# Patient Record
Sex: FEMALE | Race: WHITE | NOT HISPANIC OR LATINO | Employment: OTHER | URBAN - METROPOLITAN AREA
[De-identification: names, ages, dates, MRNs, and addresses within clinical notes are randomized per-mention and may not be internally consistent; named-entity substitution may affect disease eponyms.]

---

## 2017-01-30 ENCOUNTER — OFFICE VISIT (OUTPATIENT)
Dept: FAMILY MEDICINE | Facility: CLINIC | Age: 77
End: 2017-01-30
Payer: MEDICARE

## 2017-01-30 VITALS
HEART RATE: 67 BPM | RESPIRATION RATE: 17 BRPM | OXYGEN SATURATION: 95 % | DIASTOLIC BLOOD PRESSURE: 76 MMHG | BODY MASS INDEX: 40.39 KG/M2 | WEIGHT: 251.31 LBS | HEIGHT: 66 IN | TEMPERATURE: 98 F | SYSTOLIC BLOOD PRESSURE: 126 MMHG

## 2017-01-30 DIAGNOSIS — M54.12 CERVICAL RADICULAR PAIN: ICD-10-CM

## 2017-01-30 DIAGNOSIS — M94.0 COSTOCHONDRITIS: Primary | ICD-10-CM

## 2017-01-30 DIAGNOSIS — K21.9 GASTROESOPHAGEAL REFLUX DISEASE WITHOUT ESOPHAGITIS: ICD-10-CM

## 2017-01-30 DIAGNOSIS — I10 ESSENTIAL HYPERTENSION: ICD-10-CM

## 2017-01-30 PROCEDURE — 1157F ADVNC CARE PLAN IN RCRD: CPT | Mod: S$GLB,,, | Performed by: NURSE PRACTITIONER

## 2017-01-30 PROCEDURE — 96372 THER/PROPH/DIAG INJ SC/IM: CPT | Mod: S$GLB,,, | Performed by: NURSE PRACTITIONER

## 2017-01-30 PROCEDURE — 1160F RVW MEDS BY RX/DR IN RCRD: CPT | Mod: S$GLB,,, | Performed by: NURSE PRACTITIONER

## 2017-01-30 PROCEDURE — 99214 OFFICE O/P EST MOD 30 MIN: CPT | Mod: 25,S$GLB,, | Performed by: NURSE PRACTITIONER

## 2017-01-30 PROCEDURE — 99999 PR PBB SHADOW E&M-EST. PATIENT-LVL III: CPT | Mod: PBBFAC,,, | Performed by: NURSE PRACTITIONER

## 2017-01-30 PROCEDURE — 99499 UNLISTED E&M SERVICE: CPT | Mod: S$GLB,,, | Performed by: NURSE PRACTITIONER

## 2017-01-30 PROCEDURE — 1125F AMNT PAIN NOTED PAIN PRSNT: CPT | Mod: S$GLB,,, | Performed by: NURSE PRACTITIONER

## 2017-01-30 PROCEDURE — 3074F SYST BP LT 130 MM HG: CPT | Mod: S$GLB,,, | Performed by: NURSE PRACTITIONER

## 2017-01-30 PROCEDURE — 3078F DIAST BP <80 MM HG: CPT | Mod: S$GLB,,, | Performed by: NURSE PRACTITIONER

## 2017-01-30 PROCEDURE — 1159F MED LIST DOCD IN RCRD: CPT | Mod: S$GLB,,, | Performed by: NURSE PRACTITIONER

## 2017-01-30 RX ORDER — BACLOFEN 10 MG/1
TABLET ORAL
COMMUNITY
Start: 2017-01-10 | End: 2017-01-30

## 2017-01-30 RX ORDER — TIZANIDINE 2 MG/1
2-4 TABLET ORAL EVERY 8 HOURS PRN
Qty: 60 TABLET | Refills: 0 | Status: SHIPPED | OUTPATIENT
Start: 2017-01-30 | End: 2017-01-30 | Stop reason: SDUPTHER

## 2017-01-30 RX ORDER — KETOROLAC TROMETHAMINE 30 MG/ML
30 INJECTION, SOLUTION INTRAMUSCULAR; INTRAVENOUS
Status: COMPLETED | OUTPATIENT
Start: 2017-01-30 | End: 2017-01-30

## 2017-01-30 RX ORDER — DEXAMETHASONE SODIUM PHOSPHATE 4 MG/ML
8 INJECTION, SOLUTION INTRA-ARTICULAR; INTRALESIONAL; INTRAMUSCULAR; INTRAVENOUS; SOFT TISSUE
Status: COMPLETED | OUTPATIENT
Start: 2017-01-30 | End: 2017-01-30

## 2017-01-30 RX ORDER — HYDROCODONE BITARTRATE AND ACETAMINOPHEN 5; 325 MG/1; MG/1
1 TABLET ORAL EVERY 12 HOURS PRN
Qty: 30 TABLET | Refills: 0 | Status: SHIPPED | OUTPATIENT
Start: 2017-01-30 | End: 2017-01-30 | Stop reason: SDUPTHER

## 2017-01-30 RX ORDER — DULOXETIN HYDROCHLORIDE 30 MG/1
CAPSULE, DELAYED RELEASE ORAL
COMMUNITY
Start: 2016-12-08 | End: 2017-02-22

## 2017-01-30 RX ORDER — CARBAMAZEPINE 200 MG/1
TABLET ORAL
COMMUNITY
Start: 2016-12-08 | End: 2017-02-22

## 2017-01-30 RX ORDER — HYDROCODONE BITARTRATE AND ACETAMINOPHEN 5; 325 MG/1; MG/1
1 TABLET ORAL EVERY 12 HOURS PRN
Qty: 30 TABLET | Refills: 0 | Status: SHIPPED | OUTPATIENT
Start: 2017-01-30 | End: 2017-04-12 | Stop reason: SDUPTHER

## 2017-01-30 RX ORDER — TIZANIDINE 2 MG/1
2-4 TABLET ORAL EVERY 8 HOURS PRN
Qty: 60 TABLET | Refills: 0 | Status: SHIPPED | OUTPATIENT
Start: 2017-01-30 | End: 2017-02-09

## 2017-01-30 RX ORDER — METHYLPREDNISOLONE 4 MG/1
TABLET ORAL
Qty: 1 PACKAGE | Refills: 0 | Status: SHIPPED | OUTPATIENT
Start: 2017-01-30 | End: 2017-01-30 | Stop reason: SDUPTHER

## 2017-01-30 RX ORDER — METHYLPREDNISOLONE 4 MG/1
TABLET ORAL
Qty: 1 PACKAGE | Refills: 0 | Status: SHIPPED | OUTPATIENT
Start: 2017-01-30 | End: 2017-02-20

## 2017-01-30 RX ADMIN — KETOROLAC TROMETHAMINE 30 MG: 30 INJECTION, SOLUTION INTRAMUSCULAR; INTRAVENOUS at 03:01

## 2017-01-30 RX ADMIN — DEXAMETHASONE SODIUM PHOSPHATE 8 MG: 4 INJECTION, SOLUTION INTRA-ARTICULAR; INTRALESIONAL; INTRAMUSCULAR; INTRAVENOUS; SOFT TISSUE at 03:01

## 2017-01-30 NOTE — PROGRESS NOTES
Subjective:       Patient ID: Alaina Vee is a 76 y.o. female.    Chief Complaint: Breast Pain    HPI ms Clements is here for R breast and arm pain, she also has L sided neck pain, it has been for months, denies any precipitating factor, she had an MRI and mammogram done last month.  She has also seen Dr Rice and given Cymbalta and Tegretol.    Review of Systems   Constitutional: Negative for fever.   Respiratory: Negative.    Cardiovascular: Negative.    Musculoskeletal: Positive for arthralgias.       Objective:      Physical Exam   Constitutional: She is oriented to person, place, and time. She appears well-developed and well-nourished. No distress.   Cardiovascular: Normal rate, regular rhythm and normal heart sounds.  Exam reveals no friction rub.    No murmur heard.  Pulmonary/Chest: Effort normal and breath sounds normal. No respiratory distress. She has no wheezes. She has no rales.   Musculoskeletal: She exhibits no edema.        Cervical back: She exhibits normal range of motion and no tenderness.   BUE strength 5/5  B hand  equal and strong   Neurological: She is alert and oriented to person, place, and time.   Reflex Scores:       Brachioradialis reflexes are 2+ on the right side and 2+ on the left side.  Negative hoffmans   Skin: Skin is warm and dry. No erythema.   Psychiatric: She has a normal mood and affect. Her behavior is normal.   Vitals reviewed.      Assessment:       1. Costochondritis    2. Gastroesophageal reflux disease without esophagitis    3. Essential hypertension    4. Cervical radicular pain        Plan:       Costochondritis  -     Discontinue: hydrocodone-acetaminophen 5-325mg (NORCO) 5-325 mg per tablet; Take 1 tablet by mouth every 12 (twelve) hours as needed for Pain.  Dispense: 30 tablet; Refill: 0  -     Discontinue: tizanidine (ZANAFLEX) 2 MG tablet; Take 1-2 tablets (2-4 mg total) by mouth every 8 (eight) hours as needed.  Dispense: 60 tablet; Refill: 0  -      ketorolac injection 30 mg; Inject 30 mg into the muscle one time.  -     hydrocodone-acetaminophen 5-325mg (NORCO) 5-325 mg per tablet; Take 1 tablet by mouth every 12 (twelve) hours as needed for Pain.  Dispense: 30 tablet; Refill: 0  -     tizanidine (ZANAFLEX) 2 MG tablet; Take 1-2 tablets (2-4 mg total) by mouth every 8 (eight) hours as needed.  Dispense: 60 tablet; Refill: 0    Gastroesophageal reflux disease without esophagitis  Controlled, continue PPI    Essential hypertension  Controlled, continue PPI    Cervical radicular pain  -     hydrocodone-acetaminophen 5-325mg (NORCO) 5-325 mg per tablet; Take 1 tablet by mouth every 12 (twelve) hours as needed for Pain.  Dispense: 30 tablet; Refill: 0  -     methylPREDNISolone (MEDROL DOSEPACK) 4 mg tablet; use as directed  Dispense: 1 Package; Refill: 0  -     tizanidine (ZANAFLEX) 2 MG tablet; Take 1-2 tablets (2-4 mg total) by mouth every 8 (eight) hours as needed.  Dispense: 60 tablet; Refill: 0    MRI shows multilevel DJD, her pain is most likely radicular, will trial steroids, consider OT and referral if not improved  Mammogram shows fibrocystic breasts, I have reassured her this is a benign finding, and to avoid caffeine    ER for new worse or concerning symptoms    Verbalized understanding.

## 2017-01-30 NOTE — MR AVS SNAPSHOT
[facility]  [address]  [place] [address]  Phone: [phone]                  Alaina Vee   2017 2:20 PM   Office Visit    Description:  Female : 1940   Provider:  Audelia Kwon NP-C   Department:  [facility]           Reason for Visit     Breast Pain           Diagnoses this Visit        Comments    Costochondritis    -  Primary     Gastroesophageal reflux disease without esophagitis         Essential hypertension         Cervical radicular pain                To Do List           Future Appointments        Provider Department Dept Phone    2017 9:45 AM [facility] XR3 [facility] [phone]    2017 10:40 AM Yvonne Weaver MD [facility] - Urology [phone]      Goals (5 Years of Data)     None       These Medications        Disp Refills Start End    hydrocodone-acetaminophen 5-325mg (NORCO) 5-325 mg per tablet 30 tablet 0 2017     Take 1 tablet by mouth every 12 (twelve) hours as needed for Pain. - Oral    Pharmacy: [facility] - [place] [id number] Ph #: [phone]       tizanidine (ZANAFLEX) 2 MG tablet 60 tablet 0 2017    Take 1-2 tablets (2-4 mg total) by mouth every 8 (eight) hours as needed. - Oral    Pharmacy: [facility] - [facility] [address] [facility] Ph #: [phone]       methylPREDNISolone (MEDROL DOSEPACK) 4 mg tablet 1 Package 0 2017    use as directed    Pharmacy: [facility] - [place] [id number] Ph #: [phone]         [facility]     [facility] -  Assistance  Registered nurses in the [facility] provide clinical advisement, health education, appointment booking, and other advisory services.  Call for this free service at [phone].             Medications           Message regarding Medications     Verify the  changes and/or additions to your medication regime listed below are the same as discussed with your clinician today.  If any of these changes or additions are incorrect, please notify your healthcare provider.        START taking these NEW medications        Refills    tizanidine (ZANAFLEX) 2 MG tablet 0    Sig: Take 1-2 tablets (2-4 mg total) by mouth every 8 (eight) hours as needed.    Class: Normal    Route: Oral    methylPREDNISolone (MEDROL DOSEPACK) 4 mg tablet 0    Sig: use as directed    Class: Normal      These medications were administered today        Dose Freq    dexamethasone injection 8 mg 8 mg Clinic/HOD 1 time    Sig: Inject 2 mLs (8 mg total) into the muscle one time.    Class: Normal    Route: Intramuscular    ketorolac injection 30 mg 30 mg Clinic/HOD 1 time    Sig: Inject 30 mg into the muscle one time.    Class: Normal    Route: Intramuscular      STOP taking these medications     baclofen (LIORESAL) 10 MG tablet            Verify that the below list of medications is an accurate representation of the medications you are currently taking.  If none reported, the list may be blank. If incorrect, please contact your healthcare provider. Carry this list with you in case of emergency.           Current Medications     clotrimazole-betamethasone 1-0.05% (LOTRISONE) cream Apply topically 2 (two) times daily.    diclofenac sodium 1 % Gel Apply 2 g topically once daily.    duloxetine (CYMBALTA) 30 MG capsule     EPITOL 200 mg tablet     hydrocodone-acetaminophen 5-325mg (NORCO) 5-325 mg per tablet Take 1 tablet by mouth every 12 (twelve) hours as needed for Pain.    ibuprofen (ADVIL,MOTRIN) 600 MG tablet TAKE 1 TABLET EVERY 8 HOURS AS NEEDED FOR PAIN     lisinopril (PRINIVIL,ZESTRIL) 40 MG tablet TAKE 1 TABLET ONE TIME DAILY    lovastatin (MEVACOR) 40 MG tablet TAKE 1 TABLET EVERY EVENING    multivitamin (THERAGRAN) per tablet Take 1 tablet by mouth Daily. 1 Tablet Oral Every day    omeprazole (PRILOSEC)  "20 MG capsule TAKE 1 CAPSULE ONE TIME DAILY    oxybutynin (DITROPAN XL) 15 MG TR24 Take 1 tablet (15 mg total) by mouth once daily.    methylPREDNISolone (MEDROL DOSEPACK) 4 mg tablet use as directed    tizanidine (ZANAFLEX) 2 MG tablet Take 1-2 tablets (2-4 mg total) by mouth every 8 (eight) hours as needed.           Clinical Reference Information           Vital Signs - Last Recorded  Most recent update: 1/30/2017  2:40 PM by Genet Garcia MA    BP Pulse Temp Resp Ht Wt    126/76 (BP Location: Right arm, Patient Position: Sitting, BP Method: Manual) 67 97.7 °F (36.5 °C) (Oral) 17 5' 6" (1.676 m) 114 kg (251 lb 5.2 oz)    SpO2 BMI             95% 40.56 kg/m2         Blood Pressure          Most Recent Value    BP  126/76      Allergies as of 1/30/2017     No Known Allergies      Immunizations Administered on Date of Encounter - 1/30/2017     None      [facility] Sign-Up     Activating your [facility] account is as easy as 1-2-3!     1) Visit [email or web address], select Sign Up Now, enter this activation code and your date of birth, then select Next.  [email or web address]  Expires: 3/16/2017  3:05 PM      2) Create a username and password to use when you visit [facility] in the future and select a security question in case you lose your password and select Next.    3) Enter your e-mail address and click Sign Up!    Additional Information  If you have questions, please e-mail [email or web address] or call [phone] to talk to our [facility] staff. Remember, [facility] is NOT to be used for urgent needs. For medical emergencies, dial 911.         Instructions    Follow up if not improved  Go to the ER for new worse or concerning symptoms  Take Cymbalta every day  Begin medrol dose pack on Tuesday, take until it's gone  Take Ibuprofen if needed, if no relief with ibuprofen, take hydrocodone  Take tizanidine if needed    What Are Fibrocystic Breasts?  Do your breasts ever feel lumpy, sore, or tender? If so, you may have " fibrocystic breasts. This is a very common condition. It is not a disease, and it is not cancer. Your healthcare provider can rule out problems and help you ease your symptoms.    Normal breasts  Your breasts are made up of 3 kinds of tissue:  · Glandular tissue is made up of the milk ducts and glands.  · Fibrous tissue supports the breast.  · Fatty tissue fills the spaces between the other tissues. It gives the breast its size.  Fibrocystic breasts  Hormones are chemicals that control your menstrual cycle. Hormones can also make glandular tissue swell. This stretches fibrous tissue, making your breasts feel sore. Hormones may also cause one or more fluid-filled lumps to form. These lumps are called cysts. They may be large or small. Cysts are not cancerous. This means they are benign. Just before your period, cysts may become larger. Your breasts may feel more sore.  What you may feel  Changes in hormone levels during your menstrual cycle affect your breasts. If you have fibrocystic breasts, your breasts may become sore or even painful. This can often happen before your period. Your breasts may also swell or feel lumpier at this time.  Caring for your breasts  Breast self-awareness is the key to caring for your breasts. Self-awareness means knowing how your breasts normally look and feel. This helps you notice any changes right away. Call your provider if you notice new lumps or changes that feel different than normal. See your provider for regular exams. And have a mammogram as often as your provider suggests.   © 5128-0648 [employer]. [address] [place] [address], [place] [address]. All rights reserved. This information is not intended as a substitute for professional medical care. Always follow your healthcare professional's instructions.        Understanding Cervical Radiculopathy    Cervical radiculopathy is irritation or inflammation of a nerve root in the neck. It causes neck pain and other symptoms  that may spread into the chest or down the arm. To understand this condition, it helps to understand the parts of the spine:  · Vertebrae. These are bones that stack to form the spine. The cervical spine contains the 7 vertebrae in the neck.  · Disks. These are soft pads of tissue between the vertebrae. They act as shock absorbers for the spine.  · The spinal canal. This is a tunnel formed within the stacked vertebrae. The spinal cord runs through this canal.  · Nerves. These branch off the spinal cord. As they leave the spinal canal, nerves pass through openings between the vertebrae. The nerve root is the part of the nerve that is closest to the spinal cord.   With cervical radiculopathy, nerve roots in the neck become irritated. This leads to pain and symptoms that can travel to the nerves that go from the spinal cord down the arms and into the torso.  What causes cervical radiculopathy?  Aging, injury, poor posture, and other issues can lead to problems in the neck. These problems may then irritate nerve roots. These include:  · Damage to a disk in the cervical spine. The damaged disk may then press on nearby nerve roots.  · Degeneration from wear and tear, and aging. This can lead to narrowing (stenosis) of the openings between the vertebrae. The narrowed openings press on nerve roots as they leave the spinal canal.  · An unstable spine. This is when a vertebra slips forward. It can then press on a nerve root.  There are other, less common causes of pressure on nerves in the neck. These include infection, cysts, and tumors.  Symptoms of cervical radiculopathy  These include:  · Neck pain  · Pain, numbness, tingling, or weakness that travels down the arm  · Loss of neck movement  · Muscle spasms  Treatment for cervical radiculopathy  In most cases, your healthcare provider will first try treatments that help relieve symptoms. These may include:  · Prescription or over-the-counter pain medicines. These help  relieve pain and swelling.  · Cold packs. These help reduce pain.  · Resting. This involves avoiding positions and activities that increase pain.  · Neck brace (cervical collar). This can help relieve inflammation and pain.  · Physical therapy, including exercises and stretches. This can help decrease pain and increase movement and function.  · Shots of medicinesaround the nerve roots. This is done to help relieve symptoms for a time.  In some cases, your healthcare provider may advise surgery to fix the underlying problem. This depends on the cause, the symptoms, and how long the pain has lasted.  Possible complications  Over time, an irritated and inflamed nerve may become damaged. This may lead to long-lasting (permanent) numbness or weakness. If symptoms change suddenly or get worse, be sure to let your healthcare provider know.     When to call your healthcare provider  Call your healthcare provider right away if you have any of these:  · New pain or pain that gets worse  · New or increasing weakness, numbness, or tingling in your arm or hand  · Bowel or bladder changes   © 3986-8060 [employer]. [address] [place] [address], [place] [address]. All rights reserved. This information is not intended as a substitute for professional medical care. Always follow your healthcare professional's instructions.

## 2017-01-30 NOTE — PATIENT INSTRUCTIONS
Follow up if not improved  Go to the ER for new worse or concerning symptoms  Take Cymbalta every day  Begin medrol dose pack on Tuesday, take until it's gone  Take Ibuprofen if needed, if no relief with ibuprofen, take hydrocodone  Take tizanidine if needed    What Are Fibrocystic Breasts?  Do your breasts ever feel lumpy, sore, or tender? If so, you may have fibrocystic breasts. This is a very common condition. It is not a disease, and it is not cancer. Your healthcare provider can rule out problems and help you ease your symptoms.    Normal breasts  Your breasts are made up of 3 kinds of tissue:  · Glandular tissue is made up of the milk ducts and glands.  · Fibrous tissue supports the breast.  · Fatty tissue fills the spaces between the other tissues. It gives the breast its size.  Fibrocystic breasts  Hormones are chemicals that control your menstrual cycle. Hormones can also make glandular tissue swell. This stretches fibrous tissue, making your breasts feel sore. Hormones may also cause one or more fluid-filled lumps to form. These lumps are called cysts. They may be large or small. Cysts are not cancerous. This means they are benign. Just before your period, cysts may become larger. Your breasts may feel more sore.  What you may feel  Changes in hormone levels during your menstrual cycle affect your breasts. If you have fibrocystic breasts, your breasts may become sore or even painful. This can often happen before your period. Your breasts may also swell or feel lumpier at this time.  Caring for your breasts  Breast self-awareness is the key to caring for your breasts. Self-awareness means knowing how your breasts normally look and feel. This helps you notice any changes right away. Call your provider if you notice new lumps or changes that feel different than normal. See your provider for regular exams. And have a mammogram as often as your provider suggests.   © 3131-5504 [employer]. [address]  [place] [address]. All rights reserved. This information is not intended as a substitute for professional medical care. Always follow your healthcare professional's instructions.        Understanding Cervical Radiculopathy    Cervical radiculopathy is irritation or inflammation of a nerve root in the neck. It causes neck pain and other symptoms that may spread into the chest or down the arm. To understand this condition, it helps to understand the parts of the spine:  · Vertebrae. These are bones that stack to form the spine. The cervical spine contains the 7 vertebrae in the neck.  · Disks. These are soft pads of tissue between the vertebrae. They act as shock absorbers for the spine.  · The spinal canal. This is a tunnel formed within the stacked vertebrae. The spinal cord runs through this canal.  · Nerves. These branch off the spinal cord. As they leave the spinal canal, nerves pass through openings between the vertebrae. The nerve root is the part of the nerve that is closest to the spinal cord.   With cervical radiculopathy, nerve roots in the neck become irritated. This leads to pain and symptoms that can travel to the nerves that go from the spinal cord down the arms and into the torso.  What causes cervical radiculopathy?  Aging, injury, poor posture, and other issues can lead to problems in the neck. These problems may then irritate nerve roots. These include:  · Damage to a disk in the cervical spine. The damaged disk may then press on nearby nerve roots.  · Degeneration from wear and tear, and aging. This can lead to narrowing (stenosis) of the openings between the vertebrae. The narrowed openings press on nerve roots as they leave the spinal canal.  · An unstable spine. This is when a vertebra slips forward. It can then press on a nerve root.  There are other, less common causes of pressure on nerves in the neck. These include infection, cysts, and tumors.  Symptoms of cervical  radiculopathy  These include:  · Neck pain  · Pain, numbness, tingling, or weakness that travels down the arm  · Loss of neck movement  · Muscle spasms  Treatment for cervical radiculopathy  In most cases, your healthcare provider will first try treatments that help relieve symptoms. These may include:  · Prescription or over-the-counter pain medicines. These help relieve pain and swelling.  · Cold packs. These help reduce pain.  · Resting. This involves avoiding positions and activities that increase pain.  · Neck brace (cervical collar). This can help relieve inflammation and pain.  · Physical therapy, including exercises and stretches. This can help decrease pain and increase movement and function.  · Shots of medicinesaround the nerve roots. This is done to help relieve symptoms for a time.  In some cases, your healthcare provider may advise surgery to fix the underlying problem. This depends on the cause, the symptoms, and how long the pain has lasted.  Possible complications  Over time, an irritated and inflamed nerve may become damaged. This may lead to long-lasting (permanent) numbness or weakness. If symptoms change suddenly or get worse, be sure to let your healthcare provider know.     When to call your healthcare provider  Call your healthcare provider right away if you have any of these:  · New pain or pain that gets worse  · New or increasing weakness, numbness, or tingling in your arm or hand  · Bowel or bladder changes   © 8088-9249 [employer]. [address] [place] [address], [place] [address]. All rights reserved. This information is not intended as a substitute for professional medical care. Always follow your healthcare professional's instructions.

## 2017-01-31 ENCOUNTER — OFFICE VISIT (OUTPATIENT)
Dept: UROLOGY | Facility: CLINIC | Age: 77
End: 2017-01-31
Payer: MEDICARE

## 2017-01-31 ENCOUNTER — HOSPITAL ENCOUNTER (OUTPATIENT)
Dept: RADIOLOGY | Facility: HOSPITAL | Age: 77
Discharge: HOME | End: 2017-01-31
Attending: UROLOGY
Payer: MEDICARE

## 2017-01-31 VITALS
WEIGHT: 251 LBS | DIASTOLIC BLOOD PRESSURE: 70 MMHG | HEIGHT: 66 IN | SYSTOLIC BLOOD PRESSURE: 114 MMHG | BODY MASS INDEX: 40.34 KG/M2

## 2017-01-31 DIAGNOSIS — N20.0 NEPHROLITHIASIS: ICD-10-CM

## 2017-01-31 DIAGNOSIS — N39.46 MIXED INCONTINENCE URGE AND STRESS: ICD-10-CM

## 2017-01-31 DIAGNOSIS — N39.0 RECURRENT UTI: Primary | ICD-10-CM

## 2017-01-31 PROCEDURE — 74000 XR KUB: CPT | Mod: 26,,, | Performed by: RADIOLOGY

## 2017-01-31 PROCEDURE — 3078F DIAST BP <80 MM HG: CPT | Mod: S$GLB,,, | Performed by: UROLOGY

## 2017-01-31 PROCEDURE — 1159F MED LIST DOCD IN RCRD: CPT | Mod: S$GLB,,, | Performed by: UROLOGY

## 2017-01-31 PROCEDURE — 1126F AMNT PAIN NOTED NONE PRSNT: CPT | Mod: S$GLB,,, | Performed by: UROLOGY

## 2017-01-31 PROCEDURE — 99214 OFFICE O/P EST MOD 30 MIN: CPT | Mod: 25,S$GLB,, | Performed by: UROLOGY

## 2017-01-31 PROCEDURE — 99999 PR PBB SHADOW E&M-EST. PATIENT-LVL III: CPT | Mod: PBBFAC,,, | Performed by: UROLOGY

## 2017-01-31 PROCEDURE — 1160F RVW MEDS BY RX/DR IN RCRD: CPT | Mod: S$GLB,,, | Performed by: UROLOGY

## 2017-01-31 PROCEDURE — 3074F SYST BP LT 130 MM HG: CPT | Mod: S$GLB,,, | Performed by: UROLOGY

## 2017-01-31 PROCEDURE — 81002 URINALYSIS NONAUTO W/O SCOPE: CPT | Mod: S$GLB,,, | Performed by: UROLOGY

## 2017-01-31 PROCEDURE — 1157F ADVNC CARE PLAN IN RCRD: CPT | Mod: S$GLB,,, | Performed by: UROLOGY

## 2017-01-31 NOTE — MR AVS SNAPSHOT
[facility]  [address]  [address]  [place] [address]  Phone: [phone]                  Alaina Vee   2017 10:40 AM   Office Visit    Description:  Female : 1940   Provider:  Yvonne Weaver MD   Department:  [facility]           Reason for Visit     Follow-up           Diagnoses this Visit        Comments    Recurrent UTI    -  Primary     Nephrolithiasis         Mixed incontinence urge and stress                To Do List           Goals (5 Years of Data)     None      Follow-Up and Disposition     Return in about 6 months (around 2017) for Stone surveillance, KUB (x-ray) follow up.      [facility]     [facility] -  Assistance  Registered nurses in the [facility] provide clinical advisement, health education, appointment booking, and other advisory services.  Call for this free service at [phone].             Medications           Message regarding Medications     Verify the changes and/or additions to your medication regime listed below are the same as discussed with your clinician today.  If any of these changes or additions are incorrect, please notify your healthcare provider.             Verify that the below list of medications is an accurate representation of the medications you are currently taking.  If none reported, the list may be blank. If incorrect, please contact your healthcare provider. Carry this list with you in case of emergency.           Current Medications     clotrimazole-betamethasone 1-0.05% (LOTRISONE) cream Apply topically 2 (two) times daily.    diclofenac sodium 1 % Gel Apply 2 g topically once daily.    duloxetine (CYMBALTA) 30 MG capsule     EPITOL 200 mg tablet     hydrocodone-acetaminophen 5-325mg (NORCO) 5-325 mg per tablet Take 1 tablet by mouth every 12 (twelve) hours as needed for Pain.    ibuprofen (ADVIL,MOTRIN) 600 MG tablet TAKE 1 TABLET EVERY 8 HOURS AS  "NEEDED FOR PAIN     lisinopril (PRINIVIL,ZESTRIL) 40 MG tablet TAKE 1 TABLET ONE TIME DAILY    lovastatin (MEVACOR) 40 MG tablet TAKE 1 TABLET EVERY EVENING    methylPREDNISolone (MEDROL DOSEPACK) 4 mg tablet use as directed    multivitamin (THERAGRAN) per tablet Take 1 tablet by mouth Daily. 1 Tablet Oral Every day    omeprazole (PRILOSEC) 20 MG capsule TAKE 1 CAPSULE ONE TIME DAILY    oxybutynin (DITROPAN XL) 15 MG TR24 Take 1 tablet (15 mg total) by mouth once daily.    tizanidine (ZANAFLEX) 2 MG tablet Take 1-2 tablets (2-4 mg total) by mouth every 8 (eight) hours as needed.           Clinical Reference Information           Vital Signs - Last Recorded  Most recent update: 1/31/2017 11:11 AM by Lindsay Baugh MA    BP Ht Wt BMI       114/70 5' 6" (1.676 m) 113.9 kg (251 lb) 40.51 kg/m2       Blood Pressure          Most Recent Value    BP  114/70      Allergies as of 1/31/2017     No Known Allergies      Immunizations Administered on Date of Encounter - 1/31/2017     None      Orders Placed During Today's Visit     Future Labs/Procedures Expected by Expires    X-Ray KUB  8/3/2017 1/31/2018      [facility] Sign-Up     Activating your [facility] account is as easy as 1-2-3!     1) Visit [email or web address], select Sign Up Now, enter this activation code and your date of birth, then select Next.  [email or web address]  Expires: 3/16/2017  3:05 PM      2) Create a username and password to use when you visit [facility] in the future and select a security question in case you lose your password and select Next.    3) Enter your e-mail address and click Sign Up!    Additional Information  If you have questions, please e-mail [email or web address] or call [phone] to talk to our [facility] staff. Remember, [facility] is NOT to be used for urgent needs. For medical emergencies, dial 911.         "

## 2017-01-31 NOTE — PROGRESS NOTES
Subjective:       Alaina eVe is a 76 y.o. female who is an established patient who was referred by Dr Esquivel for evaluation of UTI.      She reports issues with recurrent UTIs. She was treated for UTI in 3/16 (100k E coli, pansensitive) and again in 4/16 (100k E coli). She has urinary frequency associated with UTIs. Nocturia x 1-2. She has urgency and UUI at baseline. Also with fecal urgency/incontinence. She was given Ditropan 5mg BID years ago. Also with RICHARD. She has not noted if this has helped. Denies current dysuria. Denies hematuria. No h/o kidney stones.     She has significant LBP issues. She also reports facial and R shoulder/arm/torso pain.     She was treated for UTI after initial visit. She remains on Ditropan IR not up to TID, declined ER formulations. She reports taking another medication for UI from me but I don't have records of this.     SERG (5/16) showed likley 9mm stone in LLP. PVR 2cc. Cytology was negative but noted uric acid crystals.     CT 7/16 - 7mm L UP, 6mm L LP stones and 8mm R renal pelvis stone.   KUB 7/16 - shows 7mm R stone but difficult to see (unsure if truly the stone)    KUB 1/17 - 7mm R renal stone though hard to see (likely overlying 12th rib)    She started Ditropan XL 15mg previously and had great improvement. She continues to have significant back issues.      The following portions of the patient's history were reviewed and updated as appropriate: allergies, current medications, past family history, past medical history, past social history, past surgical history and problem list.    Review of Systems  Constitutional: no fever or chills  ENT: no nasal congestion or sore throat  Respiratory: no cough or shortness of breath  Cardiovascular: no chest pain or palpitations  Gastrointestinal: no nausea or vomiting, tolerating diet  Genitourinary: as per HPI  Hematologic/Lymphatic: no easy bruising or lymphadenopathy  Musculoskeletal: no arthralgias or myalgias  Skin: no  "rashes or lesions  Neurological: no seizures or tremors  Behavioral/Psych: no auditory or visual hallucinations       Objective:    Vitals:   Visit Vitals    /70    Ht 5' 6" (1.676 m)    Wt 113.9 kg (251 lb)    BMI 40.51 kg/m2       Physical Exam   General: well developed, well nourished in no acute distress  Head: normocephalic, atraumatic  Neck: supple, trachea midline, no obvious enlargement of thyroid  HEENT: EOMI, mucus membranes moist, sclera anicteric, no hearing impairment  Lungs: symmetric expansion, non-labored breathing  Cardiovascular: regular rate and rhythm, normal pulses  Abdomen: soft, non tender, non distended, no palpable masses, no hepatosplenomegaly, no hernias, no CVA tenderness  Musculoskeletal: no peripheral edema, normal ROM in bilateral upper and lower extremities  Lymphatics: no cervical or inguinal lymphadenopathy  Skin: no rashes or lesions  Neuro: alert and oriented x 3, no gross deficits  Psych: normal judgment and insight, normal mood/affect and non-anxious  Genitourinary:   patient declined exam      Lab Review   Urine analysis today in clinic shows - 50 RBCs, pH 5    Lab Results   Component Value Date    WBC 6.80 12/08/2016    HGB 14.3 12/08/2016    HCT 43.4 12/08/2016    MCV 90 12/08/2016     12/08/2016     Lab Results   Component Value Date    CREATININE 0.9 12/08/2016    BUN 16 12/08/2016         Imaging  Images and reports were personally reviewed by me and discussed with patient  CT, SERG, KUB reviewed       Assessment/Plan:      1. Recurrent UTI / Nephrolithiasis   - UI likely contributing to UTIs   - Avoid constipation   - PVR acceptable   - Possible stone in LLP, uric acid crystals on cytology   - Discussed Estrace, will hold for now   - Consider cystoscopy in future   - CT - 2 stones on L, 1 stone on R. Only R stone visible on KUB.    - Discussed ESWL for R sided stone. Will hold for now and recheck KUB in 6 months.      2. Mixed incontinence urge and stress "    - Discussed difference of UUI and RICHARD components. Reviewed etiology and workup of each.   - RICHARD: Kegels, PFPT, bulking agent, MUS.   - UUI: Behavioral changes, PFPT, anticholinergics. Botox/InterStim for refractory UUI.   - Doing great with Ditropan XL 15mg, continue         Follow up in 6 months with KUB prior

## 2017-02-01 LAB
BILIRUB SERPL-MCNC: ABNORMAL MG/DL
BLOOD URINE, POC: 50
COLOR, POC UA: YELLOW
GLUCOSE UR QL STRIP: ABNORMAL
KETONES UR QL STRIP: ABNORMAL
LEUKOCYTE ESTERASE URINE, POC: ABNORMAL
NITRITE, POC UA: ABNORMAL
PH, POC UA: 5
PROTEIN, POC: ABNORMAL
SPECIFIC GRAVITY, POC UA: 1020
UROBILINOGEN, POC UA: ABNORMAL

## 2017-02-07 RX ORDER — OMEPRAZOLE 20 MG/1
CAPSULE, DELAYED RELEASE ORAL
Qty: 90 CAPSULE | Refills: 0 | Status: SHIPPED | OUTPATIENT
Start: 2017-02-07 | End: 2017-05-15 | Stop reason: SDUPTHER

## 2017-02-16 ENCOUNTER — TELEPHONE (OUTPATIENT)
Dept: UROLOGY | Facility: CLINIC | Age: 77
End: 2017-02-16

## 2017-02-16 ENCOUNTER — HOSPITAL ENCOUNTER (OUTPATIENT)
Dept: RADIOLOGY | Facility: HOSPITAL | Age: 77
Discharge: HOME | End: 2017-02-16
Attending: UROLOGY
Payer: MEDICARE

## 2017-02-16 DIAGNOSIS — N20.0 NEPHROLITHIASIS: ICD-10-CM

## 2017-02-16 DIAGNOSIS — R10.9 FLANK PAIN: Primary | ICD-10-CM

## 2017-02-16 PROCEDURE — 74000 XR KUB: CPT | Mod: 26,,, | Performed by: RADIOLOGY

## 2017-02-16 PROCEDURE — 74000 XR KUB: CPT | Mod: TC

## 2017-02-16 NOTE — TELEPHONE ENCOUNTER
Pt instr to go to [facility] and have KUB done, will have Dr. Weaver review result and she will be notified of result

## 2017-02-16 NOTE — TELEPHONE ENCOUNTER
----- Message from Marlen Blair sent at 2/16/2017  2:19 PM CST -----  Contact: [phone]  Pt is wanting to know since she has kidney stones should she take her pain medication Please call pt at your earliest convenience.  Thanks !

## 2017-02-16 NOTE — TELEPHONE ENCOUNTER
Difficult to tell if stones causing any obstruction based on KUB. Would rather check CT scan to evaluate acute, severe pain to better assess if stone causing issue.    CT renal stone study ordered.

## 2017-02-17 ENCOUNTER — TELEPHONE (OUTPATIENT)
Dept: UROLOGY | Facility: CLINIC | Age: 77
End: 2017-02-17

## 2017-02-17 ENCOUNTER — HOSPITAL ENCOUNTER (OUTPATIENT)
Dept: RADIOLOGY | Facility: HOSPITAL | Age: 77
Discharge: HOME | End: 2017-02-17
Attending: UROLOGY
Payer: MEDICARE

## 2017-02-17 DIAGNOSIS — R10.9 FLANK PAIN: ICD-10-CM

## 2017-02-17 PROCEDURE — 74176 CT ABD & PELVIS W/O CONTRAST: CPT | Mod: TC

## 2017-02-17 PROCEDURE — 74176 CT ABD & PELVIS W/O CONTRAST: CPT | Mod: 26,,, | Performed by: RADIOLOGY

## 2017-02-17 RX ORDER — TAMSULOSIN HYDROCHLORIDE 0.4 MG/1
0.4 CAPSULE ORAL DAILY
Qty: 20 CAPSULE | Refills: 0 | Status: SHIPPED | OUTPATIENT
Start: 2017-02-17 | End: 2017-10-11

## 2017-02-17 RX ORDER — TAMSULOSIN HYDROCHLORIDE 0.4 MG/1
0.4 CAPSULE ORAL DAILY
Qty: 20 CAPSULE | Refills: 0 | Status: SHIPPED | OUTPATIENT
Start: 2017-02-17 | End: 2017-02-17 | Stop reason: SDUPTHER

## 2017-02-17 RX ORDER — OXYCODONE AND ACETAMINOPHEN 5; 325 MG/1; MG/1
1 TABLET ORAL EVERY 4 HOURS PRN
Qty: 25 TABLET | Refills: 0 | Status: SHIPPED | OUTPATIENT
Start: 2017-02-17 | End: 2017-02-17 | Stop reason: SDUPTHER

## 2017-02-17 RX ORDER — OXYCODONE AND ACETAMINOPHEN 5; 325 MG/1; MG/1
1 TABLET ORAL EVERY 4 HOURS PRN
Qty: 25 TABLET | Refills: 0 | Status: SHIPPED | OUTPATIENT
Start: 2017-02-17 | End: 2017-10-13 | Stop reason: SDUPTHER

## 2017-02-17 NOTE — TELEPHONE ENCOUNTER
Please inform pt:  CT scan shows a stone in the ureter on the R side causing some obstruction. Recommend pain medication, Flomax to help with stone passage (sent to pharmacy).    Red flags to go to ER: fever, inability to tolerate PO, pain not controlled by medications.      Please make f/u appt to see me Monday or Tuesday.

## 2017-02-17 NOTE — TELEPHONE ENCOUNTER
----- Message from Kenyetta Padilla sent at 2/17/2017 10:24 AM CST -----  Contact: Paul Gulotta [facility]  X  1st Request  _  2nd Request  _  3rd Request        Who: Paul Gulotta [facility]    Why: Rory is calling to ask that Dr. Weaver review the report of his findings for pt.  Rory is also wanting to speak with Dr. Weaver regarding his finding on pt.  Please contact Rory to further discuss and advise.    What Number to Call Back: Pager [phone]    When to Expect a call back: (Before the end of the day)   -- if the call is after 12:00, the call back will be tomorrow.

## 2017-02-17 NOTE — TELEPHONE ENCOUNTER
Notified Dr. Cruz that Dr. Weaver did see the CT report and the patient has been taken care of. He does not need to speak to Dr. Weaver any further.

## 2017-02-17 NOTE — TELEPHONE ENCOUNTER
Patient was notified of results, what to look for, and medication. Medication was sent to [facility]. I have canceled these medications through [facility]. (Spoke to Bakari Morejon, [facility]) Please resend medication to the [facility] on [address]. I have changed the Pharmacy on the refill order.

## 2017-02-20 ENCOUNTER — OFFICE VISIT (OUTPATIENT)
Dept: UROLOGY | Facility: CLINIC | Age: 77
End: 2017-02-20
Payer: MEDICARE

## 2017-02-20 VITALS
RESPIRATION RATE: 16 BRPM | SYSTOLIC BLOOD PRESSURE: 112 MMHG | BODY MASS INDEX: 40.39 KG/M2 | DIASTOLIC BLOOD PRESSURE: 62 MMHG | WEIGHT: 251.31 LBS | HEART RATE: 68 BPM | HEIGHT: 66 IN

## 2017-02-20 DIAGNOSIS — N20.0 NEPHROLITHIASIS: Primary | ICD-10-CM

## 2017-02-20 DIAGNOSIS — N20.0 RENAL STONE: ICD-10-CM

## 2017-02-20 PROCEDURE — 99214 OFFICE O/P EST MOD 30 MIN: CPT | Mod: S$GLB,,, | Performed by: UROLOGY

## 2017-02-20 PROCEDURE — 1159F MED LIST DOCD IN RCRD: CPT | Mod: S$GLB,,, | Performed by: UROLOGY

## 2017-02-20 PROCEDURE — 3078F DIAST BP <80 MM HG: CPT | Mod: S$GLB,,, | Performed by: UROLOGY

## 2017-02-20 PROCEDURE — 99999 PR PBB SHADOW E&M-EST. PATIENT-LVL IV: CPT | Mod: PBBFAC,,, | Performed by: UROLOGY

## 2017-02-20 PROCEDURE — 1125F AMNT PAIN NOTED PAIN PRSNT: CPT | Mod: S$GLB,,, | Performed by: UROLOGY

## 2017-02-20 PROCEDURE — 1157F ADVNC CARE PLAN IN RCRD: CPT | Mod: S$GLB,,, | Performed by: UROLOGY

## 2017-02-20 PROCEDURE — 3074F SYST BP LT 130 MM HG: CPT | Mod: S$GLB,,, | Performed by: UROLOGY

## 2017-02-20 NOTE — H&P
Subjective:       Alaina Vee is a 76 y.o. female who is an established patient who was referred by Dr Esquivel for evaluation of UTI.      She reports issues with recurrent UTIs. She was treated for UTI in 3/16 (100k E coli, pansensitive) and again in 4/16 (100k E coli). She has urinary frequency associated with UTIs. Nocturia x 1-2. She has urgency and UUI at baseline. Also with fecal urgency/incontinence. She was given Ditropan 5mg BID years ago. Also with RICHARD. She has not noted if this has helped. Denies current dysuria. Denies hematuria. No h/o kidney stones.     She has significant LBP issues. She also reports facial and R shoulder/arm/torso pain.     She was treated for UTI after initial visit. She remains on Ditropan IR not up to TID, declined ER formulations. She reports taking another medication for UI from me but I don't have records of this.     SERG (5/16) showed ruslanley 9mm stone in LLP. PVR 2cc. Cytology was negative but noted uric acid crystals.     CT 7/16 - 7mm L UP, 6mm L LP stones and 8mm R renal pelvis stone.   KUB 7/16 - shows 7mm R stone but difficult to see (unsure if truly the stone)    KUB 1/17 - 7mm R renal stone though hard to see (likely overlying 12th rib)    She started Ditropan XL 15mg previously and had great improvement. She continues to have significant back issues.    She started to develop R flank pain and therefore CT scan ordered. CT showed 8mm stone at R UPJ, visible on KUB.       The following portions of the patient's history were reviewed and updated as appropriate: allergies, current medications, past family history, past medical history, past social history, past surgical history and problem list.    Review of Systems  Constitutional: no fever or chills  ENT: no nasal congestion or sore throat  Respiratory: no cough or shortness of breath  Cardiovascular: no chest pain or palpitations  Gastrointestinal: no nausea or vomiting, tolerating diet  Genitourinary: as per  "HPI  Hematologic/Lymphatic: no easy bruising or lymphadenopathy  Musculoskeletal: no arthralgias or myalgias  Skin: no rashes or lesions  Neurological: no seizures or tremors  Behavioral/Psych: no auditory or visual hallucinations       Objective:    Vitals:   Visit Vitals    /62    Pulse 68    Resp 16    Ht 5' 6" (1.676 m)    Wt 114 kg (251 lb 5.2 oz)    BMI 40.56 kg/m2       Physical Exam   General: well developed, well nourished in no acute distress  Head: normocephalic, atraumatic  Neck: supple, trachea midline, no obvious enlargement of thyroid  HEENT: EOMI, mucus membranes moist, sclera anicteric, no hearing impairment  Lungs: symmetric expansion, non-labored breathing  Cardiovascular: regular rate and rhythm, normal pulses  Abdomen: soft, non tender, non distended, no palpable masses, no hepatosplenomegaly, no hernias, no CVA tenderness  Musculoskeletal: no peripheral edema, normal ROM in bilateral upper and lower extremities  Lymphatics: no cervical or inguinal lymphadenopathy  Skin: no rashes or lesions  Neuro: alert and oriented x 3, no gross deficits  Psych: normal judgment and insight, normal mood/affect and non-anxious  Genitourinary:   patient declined exam      Lab Review   Urine analysis today in clinic shows - 50 RBCs, pH 5    Lab Results   Component Value Date    WBC 6.80 12/08/2016    HGB 14.3 12/08/2016    HCT 43.4 12/08/2016    MCV 90 12/08/2016     12/08/2016     Lab Results   Component Value Date    CREATININE 0.9 12/08/2016    BUN 16 12/08/2016         Imaging  Images and reports were personally reviewed by me and discussed with patient  CT, SERG, KUB reviewed       Assessment/Plan:      1. Recurrent UTI / Nephrolithiasis   - UI likely contributing to UTIs   - Avoid constipation   - PVR acceptable   - Possible stone in LLP, uric acid crystals on cytology   - Discussed Estrace, will hold for now   - Consider cystoscopy in future   - CT - 2 stones on L, 1 stone on R. Only R " stone visible on KUB.    - Now with 8mm R UPJ stone. Discussed options - will proceed with R ESWL if stone has not passed. Discussed risks, benefits, alternatives.   - Plan for R ESWL on 2/27/17. KUB in holding. Pt to call if stone passes.   - Strain urine     2. Mixed incontinence urge and stress    - Discussed difference of UUI and RICHARD components. Reviewed etiology and workup of each.   - RICHARD: Kegels, PFPT, bulking agent, MUS.   - UUI: Behavioral changes, PFPT, anticholinergics. Botox/InterStim for refractory UUI.   - Doing great with Ditropan XL 15mg, continue         Follow up in 2-3 weeks post-op with KUB

## 2017-02-20 NOTE — MR AVS SNAPSHOT
[facility]  [address]  [address]  [place] [address]  Phone: [phone]                  Alaina Vee   2017 10:00 AM   Office Visit    Description:  Female : 1940   Provider:  Yvonne Weaver MD   Department:  [facility]           Reason for Visit     Follow-up           Diagnoses this Visit        Comments    Nephrolithiasis    -  Primary     Renal stone                To Do List           Future Appointments        Provider Department Dept Phone    2017 11:20 AM Yvonne Weaver MD [facility] [phone]      Goals (5 Years of Data)     None      Follow-Up and Disposition     Return in about 3 weeks (around 3/13/2017) for Post-op.      [facility]     [facility] Nurse [facility] Line -  Assistance  Registered nurses in the [facility] provide clinical advisement, health education, appointment booking, and other advisory services.  Call for this free service at [phone].             Medications           Message regarding Medications     Verify the changes and/or additions to your medication regime listed below are the same as discussed with your clinician today.  If any of these changes or additions are incorrect, please notify your healthcare provider.             Verify that the below list of medications is an accurate representation of the medications you are currently taking.  If none reported, the list may be blank. If incorrect, please contact your healthcare provider. Carry this list with you in case of emergency.           Current Medications     clotrimazole-betamethasone 1-0.05% (LOTRISONE) cream Apply topically 2 (two) times daily.    diclofenac sodium 1 % Gel Apply 2 g topically once daily.    duloxetine (CYMBALTA) 30 MG capsule     EPITOL 200 mg tablet     hydrocodone-acetaminophen 5-325mg (NORCO) 5-325 mg per tablet Take 1 tablet by mouth every 12 (twelve) hours as needed for Pain.     "ibuprofen (ADVIL,MOTRIN) 600 MG tablet TAKE 1 TABLET EVERY 8 HOURS AS NEEDED FOR PAIN     lisinopril (PRINIVIL,ZESTRIL) 40 MG tablet TAKE 1 TABLET ONE TIME DAILY    lovastatin (MEVACOR) 40 MG tablet TAKE 1 TABLET EVERY EVENING    methylPREDNISolone (MEDROL DOSEPACK) 4 mg tablet use as directed    multivitamin (THERAGRAN) per tablet Take 1 tablet by mouth Daily. 1 Tablet Oral Every day    omeprazole (PRILOSEC) 20 MG capsule TAKE 1 CAPSULE ONE TIME DAILY    oxybutynin (DITROPAN XL) 15 MG TR24 Take 1 tablet (15 mg total) by mouth once daily.    oxycodone-acetaminophen (PERCOCET) 5-325 mg per tablet Take 1 tablet by mouth every 4 (four) hours as needed for Pain.    tamsulosin (FLOMAX) 0.4 mg Cp24 Take 1 capsule (0.4 mg total) by mouth once daily.           Clinical Reference Information           Your Vitals Were     BP Pulse Resp Height Weight BMI    112/62 68 16 5' 6" (1.676 m) 114 kg (251 lb 5.2 oz) 40.56 kg/m2      Blood Pressure          Most Recent Value    BP  112/62      Allergies as of 2/20/2017     No Known Allergies      Immunizations Administered on Date of Encounter - 2/20/2017     None      Orders Placed During Today's Visit      Normal Orders This Visit    Case Request Operating Room: LITHOTRIPSY-EXTRACORPOREAL SHOCK WAVE - KUB in holding       [facility] Sign-Up     Activating your [facility] account is as easy as 1-2-3!     1) Visit [email or web address], select Sign Up Now, enter this activation code and your date of birth, then select Next.  [email or web address]  Expires: 3/16/2017  3:05 PM      2) Create a username and password to use when you visit [facility] in the future and select a security question in case you lose your password and select Next.    3) Enter your e-mail address and click Sign Up!    Additional Information  If you have questions, please e-mail [email or web address] or call [phone] to talk to our [facility] staff. Remember, [facility] is NOT to be used for urgent needs. For medical " emergencies, dial 911.         Language Assistance Services     ATTENTION: Language assistance services are available, free of charge. Please call [phone].      ATENCIÓN: Si habla jeramie, tiene a coyle disposición servicios gratuitos de asistencia lingüística. Llame al [phone].     CHÚ Ý: N?u b?n nói Ti?ng Vi?t, có các d?ch v? h? tr? ngôn ng? mi?n phí dành cho b?n. G?i s? [phone].         [facility] complies with applicable Federal civil rights laws and does not discriminate on the basis of race, color, national origin, age, disability, or sex.

## 2017-02-22 ENCOUNTER — HOSPITAL ENCOUNTER (OUTPATIENT)
Dept: PREADMISSION TESTING | Facility: HOSPITAL | Age: 77
Discharge: HOME | End: 2017-02-22
Attending: UROLOGY
Payer: MEDICARE

## 2017-02-22 VITALS
SYSTOLIC BLOOD PRESSURE: 120 MMHG | BODY MASS INDEX: 39.86 KG/M2 | WEIGHT: 248 LBS | HEIGHT: 66 IN | DIASTOLIC BLOOD PRESSURE: 78 MMHG | RESPIRATION RATE: 16 BRPM | OXYGEN SATURATION: 96 % | HEART RATE: 70 BPM | TEMPERATURE: 99 F

## 2017-02-22 DIAGNOSIS — Z01.818 PRE-OP TESTING: Primary | ICD-10-CM

## 2017-02-22 LAB
ANION GAP SERPL CALC-SCNC: 9 MMOL/L
BASOPHILS # BLD AUTO: 0.04 K/UL
BASOPHILS NFR BLD: 0.6 %
BUN SERPL-MCNC: 19 MG/DL
CALCIUM SERPL-MCNC: 9.7 MG/DL
CHLORIDE SERPL-SCNC: 104 MMOL/L
CO2 SERPL-SCNC: 29 MMOL/L
CREAT SERPL-MCNC: 1.1 MG/DL
DIFFERENTIAL METHOD: NORMAL
EOSINOPHIL # BLD AUTO: 0.2 K/UL
EOSINOPHIL NFR BLD: 3.5 %
ERYTHROCYTE [DISTWIDTH] IN BLOOD BY AUTOMATED COUNT: 13.1 %
EST. GFR  (AFRICAN AMERICAN): 57 ML/MIN/1.73 M^2
EST. GFR  (NON AFRICAN AMERICAN): 50 ML/MIN/1.73 M^2
GLUCOSE SERPL-MCNC: 105 MG/DL
HCT VFR BLD AUTO: 38.4 %
HGB BLD-MCNC: 12.6 G/DL
LYMPHOCYTES # BLD AUTO: 1.2 K/UL
LYMPHOCYTES NFR BLD: 19.5 %
MCH RBC QN AUTO: 29.9 PG
MCHC RBC AUTO-ENTMCNC: 32.8 %
MCV RBC AUTO: 91 FL
MONOCYTES # BLD AUTO: 0.4 K/UL
MONOCYTES NFR BLD: 6.7 %
NEUTROPHILS # BLD AUTO: 4.4 K/UL
NEUTROPHILS NFR BLD: 69.7 %
PLATELET # BLD AUTO: 289 K/UL
PMV BLD AUTO: 9.9 FL
POTASSIUM SERPL-SCNC: 4.3 MMOL/L
RBC # BLD AUTO: 4.22 M/UL
SODIUM SERPL-SCNC: 142 MMOL/L
WBC # BLD AUTO: 6.3 K/UL

## 2017-02-22 PROCEDURE — 85025 COMPLETE CBC W/AUTO DIFF WBC: CPT

## 2017-02-22 PROCEDURE — 93005 ELECTROCARDIOGRAM TRACING: CPT

## 2017-02-22 PROCEDURE — 80048 BASIC METABOLIC PNL TOTAL CA: CPT

## 2017-02-22 RX ORDER — AMOXICILLIN 500 MG
1 CAPSULE ORAL DAILY
COMMUNITY
End: 2022-05-24

## 2017-02-22 NOTE — PLAN OF CARE
Pre-operative instructions, medication directives and pain scales reviewed with patient. All questions the patient had  were answered. Re-assurance about surgical procedure and day of surgery routine given as needed. The patient verbalized understanding of the pre-op instructions.      2/27/17 LITHOTRIPSY-EXTRACORPOREAL SHOCK WAVE --Right

## 2017-02-22 NOTE — PRE ADMISSION SCREENING
Newly noted Atrial Flutter on pre op EKG. Pt denies ant knowledge previously of any irregular heart rhythm. Dr. White states patient must see cardiology and get clearance for Monday's procedure.  2/23/17 ESWL-Right    Appointment with Dr. Hidalgo Thursday 2/23/17 at 0920.

## 2017-02-22 NOTE — DISCHARGE INSTRUCTIONS
Newly noted Atrial Flutter on pre op EKG. Pt denies ant knowledge previously of any irregular heart rhythm. Dr. White states patient must see cardiology and get clearance for Monday's procedure.    Appointment with Dr. Hidalgo Thursday 2/23/17 at 0920.        Your surgery is scheduled for __Monday 2/27/17 __________________.    Call [phone] between 2 p.m. and 5 p.m. on   ___Friday 2/24/17______ to find out your arrival time for the day of your surgery.      Please report to SAME DAY SURGERY UNIT on the 2nd FLOOR at _______ a.m.  Use front door entrance. The doors open at 0530 am.      INSTRUCTIONS IMPORTANT!!!  ¨ Do not eat or drink after 12 midnight-including water. OK to brush teeth, no   gum, candy or mints!    ¨ Take only these medicines with a small swallow of water-morning of surgery.    OMEPRAZOLE,  OXYBUTYNIN, TAMSULOSIN,  And a pain pill if needed      X____  Prep instructions:    SHOWER   OTHER  ______________________  X____  Do not wear makeup, including mascara. WEARING EYE MAKEUP MAY                   LEAD TO SERIOUS EYE INJURY during surgery.  X____  No powder, lotions or creams to surgical area.  X____  You may wear only deodorant on the day of surgery.  X____  Please remove all jewelry, including piercings and leave at home.  X____  No money or valuables needed. Please leave at home.  You may bring your           cell phone.  X____  If going home the same day, arrange for a ride home. You will not be able to   drive if Anesthesia was used.  X____  Wear loose fitting clothing. Allow for dressings, bandages.  X____  Stop Aspirin, Ibuprofen, Motrin and Aleve at least 3-5 days before    surgery, unless otherwise instructed by your doctor, or the nurse.              You MAY use Tylenol/acetaminophen until day of surgery.  X____  Call MD for temperature above 101 degrees.        X____ Stop taking any Fish Oil supplement or any Vitamins that contain Vitamin  E at least 5 days prior to surgery.          I  have read or had read and explained to me, and understand the above information.  Additional comments or instructions:Please call  [facility] at   [phone] if you have any questions regarding the instructions above.

## 2017-02-22 NOTE — IP AVS SNAPSHOT
[place] [address] [place] [address]  Phone: [phone]           Patient Discharge Instructions    Our goal is to set you up for success. This packet includes information on your condition, medications, and your home care. It will help you to care for yourself so you don't get sicker.     Please ask your nurse if you have any questions.        There are many details to remember when preparing for your surgery. Here is what you will need to do, please ask your nurse if there are more specific instructions and if you have any questions:    1. 24 hours before procedure Do not smoke or drink alcoholic beverages 24 hours prior to your procedure    2. Eating before procedure Do not eat or drink anything 8 hours before your procedure - this includes gum, mints, and candy.     3. Day of procedure Please remove all jewelry for the procedure. If you wear contact lenses, dentures, hearing aids or glasses, bring a container to put them in during your surgery and give to a family member for safekeeping.  If your doctor has scheduled you for an overnight stay, bring a small overnight bag with any personal items that you need.    4. After procedure Make arrangements in advance for transportation home by a responsible adult. It is not safe to drive a vehicle during the 24 hours following surgery.     PLEASE NOTE: You may be contacted the day before your surgery to confirm your surgery date and arrival time. The Surgery schedule has many variables which may affect the time of your surgery case. Family members should be available if your surgery time changes.                [facility]  Unless otherwise directed by your provider, please contact [facility], our nurse care line that is available for 24/7 assistance.     [phone] (toll-free)    Registered nurses in the [facility] provide clinical advisement, health education, appointment booking, and other advisory  services.                    ** Verify the list of medication(s) below is accurate and up to date. Carry this with you in case of emergency. If your medications have changed, please notify your healthcare provider.             Medication List      TAKE these medications        Additional Info                      clotrimazole-betamethasone 1-0.05% cream   Commonly known as:  LOTRISONE   Quantity:  90 g   Refills:  1    Instructions:  Apply topically 2 (two) times daily.     Begin Date    AM    Noon    PM    Bedtime       diclofenac sodium 1 % Gel   Quantity:  100 g   Refills:  3   Dose:  2 g    Instructions:  Apply 2 g topically once daily.     Begin Date    AM    Noon    PM    Bedtime       hydrocodone-acetaminophen 5-325mg 5-325 mg per tablet   Commonly known as:  NORCO   Quantity:  30 tablet   Refills:  0   Dose:  1 tablet    Instructions:  Take 1 tablet by mouth every 12 (twelve) hours as needed for Pain.     Begin Date    AM    Noon    PM    Bedtime       ibuprofen 600 MG tablet   Commonly known as:  ADVIL,MOTRIN   Quantity:  90 tablet   Refills:  0    Instructions:  TAKE 1 TABLET EVERY 8 HOURS AS NEEDED FOR PAIN     Begin Date    AM    Noon    PM    Bedtime       lisinopril 40 MG tablet   Commonly known as:  PRINIVIL,ZESTRIL   Quantity:  90 tablet   Refills:  1    Instructions:  TAKE 1 TABLET ONE TIME DAILY     Begin Date    AM    Noon    PM    Bedtime       lovastatin 40 MG tablet   Commonly known as:  MEVACOR   Quantity:  90 tablet   Refills:  1    Instructions:  TAKE 1 TABLET EVERY EVENING     Begin Date    AM    Noon    PM    Bedtime       multivitamin per tablet   Commonly known as:  THERAGRAN   Refills:  0   Dose:  1 tablet    Instructions:  Take 1 tablet by mouth Daily. 1 Tablet Oral Every day     Begin Date    AM    Noon    PM    Bedtime       omeprazole 20 MG capsule   Commonly known as:  PRILOSEC   Quantity:  90 capsule   Refills:  0    Instructions:  TAKE 1 CAPSULE ONE TIME DAILY     Begin Date     AM    Noon    PM    Bedtime       oxybutynin 15 MG Tr24   Commonly known as:  DITROPAN XL   Quantity:  30 tablet   Refills:  11   Dose:  15 mg    Instructions:  Take 1 tablet (15 mg total) by mouth once daily.     Begin Date    AM    Noon    PM    Bedtime       oxycodone-acetaminophen 5-325 mg per tablet   Commonly known as:  PERCOCET   Quantity:  25 tablet   Refills:  0   Dose:  1 tablet    Instructions:  Take 1 tablet by mouth every 4 (four) hours as needed for Pain.     Begin Date    AM    Noon    PM    Bedtime       tamsulosin 0.4 mg Cp24   Commonly known as:  FLOMAX   Quantity:  20 capsule   Refills:  0   Dose:  0.4 mg    Instructions:  Take 1 capsule (0.4 mg total) by mouth once daily.     Begin Date    AM    Noon    PM    Bedtime                  Please bring to all follow up appointments:    1. A copy of your discharge instructions.  2. All medicines you are currently taking in their original bottles.  3. Identification and insurance card.    Please arrive 15 minutes ahead of scheduled appointment time.    Please call 24 hours in advance if you must reschedule your appointment and/or time.        Your Scheduled Appointments     Feb 23, 2017  9:20 AM CST   New Patient with Nahid Hidalgo MD   [facility] - Cardiology ([facility])    [address]  [place] [address]   [phone]            Mar 13, 2017 10:00 AM CDT   Post OP with Yvonne Weaver MD   [facility] - Urology ([facility])    [address]  [address]  [place] [address]   [phone]            Jul 31, 2017 11:20 AM CDT   Established Patient Visit with Yvonne Weaver MD   [facility] Urology ([facility])    [address]  [address]  [place] [address]   [phone]              Your Future Surgeries/Procedures     Feb 27, 2017   Surgery with Yvonne Weaver MD   [facility] ([facility])    [address]  [place] [address]   [phone]                   Discharge Instructions       Newly noted Atrial Flutter on pre op EKG. Pt denies ant knowledge previously of any irregular heart rhythm. Dr. White states patient must see cardiology and get clearance for Monday's procedure.    Appointment with Dr. Hidalgo Thursday 2/23/17 at 0920.        Your surgery is scheduled for __Monday 2/23/17 __________________.    Call [phone] between 2 p.m. and 5 p.m. on   ___Friday 2/22/17______ to find out your arrival time for the day of your surgery.      Please report to SAME DAY SURGERY UNIT on the 2nd FLOOR at _______ a.m.  Use front door entrance. The doors open at 0530 am.      INSTRUCTIONS IMPORTANT!!!  ¨ Do not eat or drink after 12 midnight-including water. OK to brush teeth, no   gum, candy or mints!    ¨ Take only these medicines with a small swallow of water-morning of surgery.    OMEPRAZOLE,  OXYBUTYNIN, TAMSULOSIN,  And a pain pill if needed      X____  Prep instructions:    SHOWER   OTHER  ______________________  X____  Do not wear makeup, including mascara. WEARING EYE MAKEUP MAY                   LEAD TO SERIOUS EYE INJURY during surgery.  X____  No powder, lotions or creams to surgical area.  X____  You may wear only deodorant on the day of surgery.  X____  Please remove all jewelry, including piercings and leave at home.  X____  No money or valuables needed. Please leave at home.  You may bring your           cell phone.  X____  If going home the same day, arrange for a ride home. You will not be able to   drive if Anesthesia was used.  X____  Wear loose fitting clothing. Allow for dressings, bandages.  X____  Stop Aspirin, Ibuprofen, Motrin and Aleve at least 3-5 days before    surgery, unless otherwise instructed by your doctor, or the nurse.              You MAY use Tylenol/acetaminophen until day of surgery.  X____  Call MD for temperature above 101 degrees.        X____ Stop taking any Fish Oil supplement or any Vitamins that contain Vitamin  E at least  "5 days prior to surgery.          I have read or had read and explained to me, and understand the above information.  Additional comments or instructions:Please call  [facility] at   [phone] if you have any questions regarding the instructions above.                 Admission Information     Date & Time Provider Department CSN    2/22/2017 12:30 PM Yvonne Weaver MD [facility] [id number]      Care Providers     Provider Role Specialty Primary office phone    Yvonne Weaver MD Attending Provider Urology [phone]      Your Vitals Were     BP Pulse Temp Resp Height Weight    120/78 (BP Location: Left arm, Patient Position: Sitting, BP Method: Automatic) 70 99 °F (37.2 °C) (Oral) 16 5' 6" (1.676 m) 112.5 kg (248 lb)    SpO2 BMI             96% 40.03 kg/m2         Recent Lab Values        6/7/2013 12/8/2016                        8:12 AM  1:00 PM          A1C 6.1 5.8          Comment for A1C at  1:00 PM on 12/8/2016:  According to ADA guidelines, hemoglobin A1C <7.0% represents  optimal control in non-pregnant diabetic patients.  Different  metrics may apply to specific populations.   Standards of Medical Care in Diabetes - 2016.  For the purpose of screening for the presence of diabetes:  <5.7%     Consistent with the absence of diabetes  5.7-6.4%  Consistent with increasing risk for diabetes   (prediabetes)  >or=6.5%  Consistent with diabetes  Currently no consensus exists for use of hemoglobin A1C  for diagnosis of diabetes for children.        Pending Labs     Order Current Status    Basic metabolic panel In process    CBC auto differential In process      Allergies as of 2/22/2017     No Known Allergies      Advance Directives     An advance directive is a document which, in the event you are no longer able to make decisions for yourself, tells your healthcare team what kind of treatment you do or do not want to receive, or who you would like to make those decisions for you.  If you " do not currently have an advance directive, [facility] encourages you to create one.  For more information call:  [phone]), [phone] ([phone]),  or log on to [email or web address].        Language Assistance Services     ATTENTION: Language assistance services are available, free of charge. Please call [phone].      ATENCIÓN: Si habla jeramie, tiene a coyle disposición servicios gratuitos de asistencia lingüística. Llame al [phone].     [facility] Ý: N?u b?n nói Ti?ng Vi?t, có các d?ch v? h? tr? ngôn ng? mi?n phí dành cho b?n. G?i s? [phone].        [facility] Sign-Up     Activating your [facility] account is as easy as 1-2-3!     1) Visit [email or web address], select Sign Up Now, enter this activation code and your date of birth, then select Next.  [email or web address]  Expires: 3/16/2017  3:05 PM      2) Create a username and password to use when you visit [facility] in the future and select a security question in case you lose your password and select Next.    3) Enter your e-mail address and click Sign Up!    Additional Information  If you have questions, please e-mail [email or web address] or call [phone] to talk to our [facility] staff. Remember, [facility] is NOT to be used for urgent needs. For medical emergencies, dial 911.          [facility] complies with applicable Federal civil rights laws and does not discriminate on the basis of race, color, national origin, age, disability, or sex.

## 2017-02-23 ENCOUNTER — OFFICE VISIT (OUTPATIENT)
Dept: CARDIOLOGY | Facility: CLINIC | Age: 77
End: 2017-02-23
Payer: MEDICARE

## 2017-02-23 VITALS
HEART RATE: 74 BPM | BODY MASS INDEX: 37.46 KG/M2 | SYSTOLIC BLOOD PRESSURE: 112 MMHG | HEIGHT: 68 IN | WEIGHT: 247.13 LBS | OXYGEN SATURATION: 93 % | DIASTOLIC BLOOD PRESSURE: 55 MMHG | RESPIRATION RATE: 15 BRPM

## 2017-02-23 DIAGNOSIS — R06.09 DOE (DYSPNEA ON EXERTION): ICD-10-CM

## 2017-02-23 DIAGNOSIS — E66.9 NON MORBID OBESITY, UNSPECIFIED OBESITY TYPE: ICD-10-CM

## 2017-02-23 DIAGNOSIS — G47.33 OSA (OBSTRUCTIVE SLEEP APNEA): ICD-10-CM

## 2017-02-23 DIAGNOSIS — E78.5 HYPERLIPIDEMIA, UNSPECIFIED HYPERLIPIDEMIA TYPE: ICD-10-CM

## 2017-02-23 DIAGNOSIS — Z01.810 PREOP CARDIOVASCULAR EXAM: ICD-10-CM

## 2017-02-23 DIAGNOSIS — I48.3 TYPICAL ATRIAL FLUTTER: ICD-10-CM

## 2017-02-23 DIAGNOSIS — N20.0 NEPHROLITHIASIS: ICD-10-CM

## 2017-02-23 DIAGNOSIS — I10 ESSENTIAL HYPERTENSION: Primary | ICD-10-CM

## 2017-02-23 PROCEDURE — 99999 PR PBB SHADOW E&M-EST. PATIENT-LVL III: CPT | Mod: PBBFAC,,, | Performed by: INTERNAL MEDICINE

## 2017-02-23 PROCEDURE — 1126F AMNT PAIN NOTED NONE PRSNT: CPT | Mod: S$GLB,,, | Performed by: INTERNAL MEDICINE

## 2017-02-23 PROCEDURE — 1157F ADVNC CARE PLAN IN RCRD: CPT | Mod: S$GLB,,, | Performed by: INTERNAL MEDICINE

## 2017-02-23 PROCEDURE — 1160F RVW MEDS BY RX/DR IN RCRD: CPT | Mod: S$GLB,,, | Performed by: INTERNAL MEDICINE

## 2017-02-23 PROCEDURE — 99499 UNLISTED E&M SERVICE: CPT | Mod: S$GLB,,, | Performed by: INTERNAL MEDICINE

## 2017-02-23 PROCEDURE — 3078F DIAST BP <80 MM HG: CPT | Mod: S$GLB,,, | Performed by: INTERNAL MEDICINE

## 2017-02-23 PROCEDURE — 3074F SYST BP LT 130 MM HG: CPT | Mod: S$GLB,,, | Performed by: INTERNAL MEDICINE

## 2017-02-23 PROCEDURE — 99205 OFFICE O/P NEW HI 60 MIN: CPT | Mod: S$GLB,,, | Performed by: INTERNAL MEDICINE

## 2017-02-23 PROCEDURE — 1159F MED LIST DOCD IN RCRD: CPT | Mod: S$GLB,,, | Performed by: INTERNAL MEDICINE

## 2017-02-23 NOTE — MR AVS SNAPSHOT
[facility] Card[facility]  [address] [place] [address]  Phone: [phone]                  Alaina Vee   2017 9:20 AM   Office Visit    Description:  Female : 1940   Provider:  Nahid Hidalgo MD   Department:  [facility] - Cardiology           Reason for Visit     Hypertension                To Do List           Future Appointments        Provider Department Dept Phone    3/13/2017 10:00 AM Yvonne Weaver MD [facility] - Urology [phone]    2017 11:20 AM Yvonne Weaver MD [facility] Urology [phone]      Your Future Surgeries/Procedures     2017   Surgery with Yvonne Weaver MD   [facility] ([facility])    [address] [place] [address] [phone]              Goals (5 Years of Data)     None      [facility]     [facility] Nurse Care Line -  Assistance  Registered nurses in the [facility] provide clinical advisement, health education, appointment booking, and other advisory services.  Call for this free service at [phone].             Medications           Message regarding Medications     Verify the changes and/or additions to your medication regime listed below are the same as discussed with your clinician today.  If any of these changes or additions are incorrect, please notify your healthcare provider.             Verify that the below list of medications is an accurate representation of the medications you are currently taking.  If none reported, the list may be blank. If incorrect, please contact your healthcare provider. Carry this list with you in case of emergency.           Current Medications     clotrimazole-betamethasone 1-0.05% (LOTRISONE) cream Apply topically 2 (two) times daily.    diclofenac sodium 1 % Gel Apply 2 g topically once daily.    lisinopril (PRINIVIL,ZESTRIL) 40 MG tablet TAKE 1 TABLET ONE TIME DAILY    lovastatin (MEVACOR) 40  "MG tablet TAKE 1 TABLET EVERY EVENING    omeprazole (PRILOSEC) 20 MG capsule TAKE 1 CAPSULE ONE TIME DAILY    oxybutynin (DITROPAN XL) 15 MG TR24 Take 1 tablet (15 mg total) by mouth once daily.    tamsulosin (FLOMAX) 0.4 mg Cp24 Take 1 capsule (0.4 mg total) by mouth once daily.    fish oil-omega-3 fatty acids 300-1,000 mg capsule Take 1 g by mouth once daily.    hydrocodone-acetaminophen 5-325mg (NORCO) 5-325 mg per tablet Take 1 tablet by mouth every 12 (twelve) hours as needed for Pain.    ibuprofen (ADVIL,MOTRIN) 600 MG tablet TAKE 1 TABLET EVERY 8 HOURS AS NEEDED FOR PAIN     multivitamin (THERAGRAN) per tablet Take 1 tablet by mouth Daily. 1 Tablet Oral Every day    oxycodone-acetaminophen (PERCOCET) 5-325 mg per tablet Take 1 tablet by mouth every 4 (four) hours as needed for Pain.           Clinical Reference Information           Your Vitals Were     BP Pulse Resp Height Weight SpO2    112/55 74 15 5' 8" (1.727 m) 112.1 kg (247 lb 2.2 oz) 93%    BMI                37.58 kg/m2          Blood Pressure          Most Recent Value    BP  (!)  112/55      Allergies as of 2/23/2017     No Known Allergies      Immunizations Administered on Date of Encounter - 2/23/2017     None      [facility] Sign-Up     Activating your [facility] account is as easy as 1-2-3!     1) Visit [email or web address], select Sign Up Now, enter this activation code and your date of birth, then select Next.  [email or web address]  Expires: 3/16/2017  3:05 PM      2) Create a username and password to use when you visit [facility] in the future and select a security question in case you lose your password and select Next.    3) Enter your e-mail address and click Sign Up!    Additional Information  If you have questions, please e-mail [email or web address] or call [phone] to talk to our [facility] staff. Remember, [facility] is NOT to be used for urgent needs. For medical emergencies, dial 911.         Language Assistance Services     ATTENTION: " Language assistance services are available, free of charge. Please call [phone].      ATENCIÓN: Si habla jeramie, tiene a coyle disposición servicios gratuitos de asistencia lingüística. Llame al [phone].     CH[facility] Ý: N?u b?n nói Ti?ng Vi?t, có các d?ch v? h? tr? ngôn ng? mi?n phí dành cho b?n. G?i s? [phone].         [facility] complies with applicable Federal civil rights laws and does not discriminate on the basis of race, color, national origin, age, disability, or sex.

## 2017-02-23 NOTE — PROGRESS NOTES
CARDIOVASCULAR CONSULTATION    REASON FOR CONSULT:   Alaina Vee is a 76 y.o. female who presents for newly noted AFL, preop CV eval.    PCP: Sierra Chawla/anesth: Christopher  Uro: Meg  HISTORY OF PRESENT ILLNESS:   The patient comes in today at the request of the anesthesia service for preoperative cardiovascular evaluation.  In the preoperative area, the patient had an EKG performed noting atrial flutter, which apparently is a new diagnosis for this patient.  In regards to symptoms, she denies angina or dyspnea.  She's had no palpitations, lightheadedness, dizziness, or syncope.  There's been no PND, orthopnea, melena, hematuria, or claudicant symptoms.  She does describe some edema of her lower extremities, as well as a coolness to her toes.  She does not describe sarthak calf claudication.  She denies any prior history of stroke or TIA.    The patient is due to undergo lithotripsy with Dr. Yvonne Weaver on Monday February 27th.  I took the liberty of exercising the patient.  She was able to climb up 2 flights of stairs without angina or significant limitation, although she was winded when she was finished.    Family history appears to be negative for premature CAD or sudden cardiac death.    The patient is a nonsmoker.    CARDIOVASCULAR HISTORY:   AFL, CHADSVASC 3    PAST MEDICAL HISTORY:     Past Medical History   Diagnosis Date    Arthritis      knees    Degenerative disc disease     Depression     General anesthetics causing adverse effect in therapeutic use      pt states sometimes slow to awaken.    GERD (gastroesophageal reflux disease)     Hyperlipidemia     Hypertension     Sleep apnea      does not wear CPAP    Varicosities     Ventral hernia        PAST SURGICAL HISTORY:     Past Surgical History   Procedure Laterality Date    Appendectomy      Cholecystectomy      Breast biopsy, left      Pr exploratory of abdomen      Joint replacement       TKR , right    Joint replacement   2009     TKR  left       ALLERGIES AND MEDICATION:   Review of patient's allergies indicates:  No Known Allergies  Previous Medications    CLOTRIMAZOLE-BETAMETHASONE 1-0.05% (LOTRISONE) CREAM    Apply topically 2 (two) times daily.    DICLOFENAC SODIUM 1 % GEL    Apply 2 g topically once daily.    FISH OIL-OMEGA-3 FATTY ACIDS 300-1,000 MG CAPSULE    Take 1 g by mouth once daily.    HYDROCODONE-ACETAMINOPHEN 5-325MG (NORCO) 5-325 MG PER TABLET    Take 1 tablet by mouth every 12 (twelve) hours as needed for Pain.    IBUPROFEN (ADVIL,MOTRIN) 600 MG TABLET    TAKE 1 TABLET EVERY 8 HOURS AS NEEDED FOR PAIN     LISINOPRIL (PRINIVIL,ZESTRIL) 40 MG TABLET    TAKE 1 TABLET ONE TIME DAILY    LOVASTATIN (MEVACOR) 40 MG TABLET    TAKE 1 TABLET EVERY EVENING    MULTIVITAMIN (THERAGRAN) PER TABLET    Take 1 tablet by mouth Daily. 1 Tablet Oral Every day    OMEPRAZOLE (PRILOSEC) 20 MG CAPSULE    TAKE 1 CAPSULE ONE TIME DAILY    OXYBUTYNIN (DITROPAN XL) 15 MG TR24    Take 1 tablet (15 mg total) by mouth once daily.    OXYCODONE-ACETAMINOPHEN (PERCOCET) 5-325 MG PER TABLET    Take 1 tablet by mouth every 4 (four) hours as needed for Pain.    TAMSULOSIN (FLOMAX) 0.4 MG CP24    Take 1 capsule (0.4 mg total) by mouth once daily.       SOCIAL HISTORY:     Social History     Social History    Marital status:      Spouse name: N/A    Number of children: N/A    Years of education: N/A     Occupational History    Not on file.     Social History Main Topics    Smoking status: Never Smoker    Smokeless tobacco: Not on file    Alcohol use No    Drug use: Not on file    Sexual activity: Not on file     Other Topics Concern    Not on file     Social History Narrative       FAMILY HISTORY:     Family History   Problem Relation Age of Onset    COPD Mother     Heart disease Sister      half sister    COPD Sister        REVIEW OF SYSTEMS:   Review of Systems   Constitutional: Negative for chills, diaphoresis and fever.   HENT:  "Negative for nosebleeds.    Eyes: Negative for blurred vision, double vision and photophobia.   Respiratory: Negative for hemoptysis, shortness of breath and wheezing.    Cardiovascular: Positive for leg swelling. Negative for chest pain, palpitations, orthopnea, claudication and PND.   Gastrointestinal: Negative for abdominal pain, blood in stool, heartburn, melena, nausea and vomiting.   Genitourinary: Negative for flank pain and hematuria.   Musculoskeletal: Negative for falls, myalgias and neck pain.   Skin: Negative for rash.   Neurological: Negative for dizziness, seizures, loss of consciousness, weakness and headaches.   Endo/Heme/Allergies: Negative for polydipsia. Does not bruise/bleed easily.   Psychiatric/Behavioral: Negative for depression and memory loss. The patient is not nervous/anxious.        PHYSICAL EXAM:     Vitals:    02/23/17 0923   BP: (!) 112/55   Pulse: 74   Resp: 15    Body mass index is 37.58 kg/(m^2).  Weight: 112.1 kg (247 lb 2.2 oz)   Height: 5' 8" (172.7 cm)     Physical Exam   Constitutional: She is oriented to person, place, and time. She appears well-developed and well-nourished. She is cooperative.  Non-toxic appearance. No distress.   HENT:   Head: Normocephalic and atraumatic.   Eyes: Conjunctivae and EOM are normal. Pupils are equal, round, and reactive to light. No scleral icterus.   Neck: Trachea normal and normal range of motion. Neck supple. Normal carotid pulses and no JVD present. Carotid bruit is not present. No rigidity. No edema present. No thyromegaly present.   Cardiovascular: Normal rate, regular rhythm, S1 normal and S2 normal.  PMI is not displaced.  Exam reveals distant heart sounds. Exam reveals no gallop and no friction rub.    No murmur heard.  Pulses:       Carotid pulses are 2+ on the right side, and 2+ on the left side.  Pulmonary/Chest: Effort normal and breath sounds normal. No respiratory distress. She has no wheezes. She has no rales. She exhibits no " tenderness.   Abdominal: Soft. Bowel sounds are normal. She exhibits no distension and no mass. There is no hepatosplenomegaly. There is no tenderness.   obese   Musculoskeletal: She exhibits no edema or tenderness.   Feet:   Right Foot:   Skin Integrity: Negative for ulcer.   Left Foot:   Skin Integrity: Negative for ulcer.   Neurological: She is alert and oriented to person, place, and time. No cranial nerve deficit.   Skin: Skin is warm and dry. No rash noted. No erythema.   Psychiatric: She has a normal mood and affect. Her speech is normal and behavior is normal.   Vitals reviewed.      DATA:   EKG: (personally reviewed tracing)  2/23/17 AFL (typical) VR 73, 4:1 AV conduction.  No ST changes or Q waves.  AFL new vs 5/28/09.    Laboratory:  CBC:    Recent Labs  Lab 11/19/15  1037 12/08/16  1300 02/22/17  1500   WHITE BLOOD CELL COUNT 5.43 6.80 6.30   HEMOGLOBIN 14.8 14.3 12.6   HEMATOCRIT 44.1 43.4 38.4   PLATELETS 308 315 289       CHEMISTRIES:    Recent Labs  Lab 05/31/16  1452 12/08/16  1300 02/22/17  1500   GLUCOSE 104 98 105   SODIUM 141 142 142   POTASSIUM 4.9 4.7 4.3   BUN BLD 24 H 16 19   CREATININE 1.1 0.9 1.1   EGFR IF  58 A >60 57 A   EGFR IF NON- 50 A >60 50 A   CALCIUM 9.5 10.4 9.7   MAGNESIUM 2.4  --   --        CARDIAC BIOMARKERS:    Recent Labs  Lab 05/31/16  1452   CPK 49       COAGS:        LIPIDS/LFTS:    Recent Labs  Lab 06/24/14  0910 04/09/15  1050 11/19/15  1037 05/31/16  1452 12/08/16  1300   CHOLESTEROL 188 254 H 238 H  --   --    TRIGLYCERIDES 95 176 H 154 H  --   --    HDL 42 45 55  --   --    LDL CHOLESTEROL 127.0 173.8 H 152.2  --   --    NON-HDL CHOLESTEROL 146 209 183  --   --    AST 12 17 20 16 21   ALT 7 L 15 17 14 16     Lab Results   Component Value Date    TSH 1.350 12/08/2016     Cardiovascular Testing:  RADHA 7/14/16  Right lower extremity RADHA: 1.06  Left lower extremity RADHA: 1.08    ASSESSMENT:   # Preop CV eval prior to planned lithotripsy on  2/27/17.  The patient has acceptable exercise capacity and is at low risk for periop CV events.  No preop CV testing needed.  # AFL, controlled VR.  CHADSVASC 3.  # HTN  # HLP  # BMI 38  # SULEMA    PLAN:   The patient is at low risk for perioperative cardiac events.  No preoperative cardiac testing is required at this time.  Check 2-D echo and Holter monitor.  This does not need to hold up the patient's planned surgery.  Check lipid panel.  Diet/exercise/weight loss, consider bariatric evaluation.  Consider SULEMA evaluation for CPAP.  RTC 2-4 weeks for review of testing and initiation of NOAC.  Will hold off on initiation at present due to planned surgery early next week.    Nahid Hidalgo MD, FACC

## 2017-02-23 NOTE — PROGRESS NOTES
Patient, Alaina Vee (MRN #[id number]), presented with a recorded BMI of 37.58 kg/m^2 and a documented comorbidity(s):  - Obstructive Sleep Apnea  - Hypertension  - Hyperlipidemia  to which the severe obesity is a contributing factor. This is consistent with the definition of severe obesity (BMI 35.0-35.9) with comorbidity (ICD-10 E66.01, Z68.35). The patient's severe obesity was monitored, evaluated, addressed and/or treated. This addendum to the medical record is made on 02/23/2017.

## 2017-02-26 ENCOUNTER — ANESTHESIA EVENT (OUTPATIENT)
Dept: SURGERY | Facility: HOSPITAL | Age: 77
End: 2017-02-26
Payer: MEDICARE

## 2017-02-27 ENCOUNTER — SURGERY (OUTPATIENT)
Age: 77
End: 2017-02-27

## 2017-02-27 ENCOUNTER — HOSPITAL ENCOUNTER (OUTPATIENT)
Facility: HOSPITAL | Age: 77
Discharge: HOME | End: 2017-02-27
Attending: UROLOGY | Admitting: UROLOGY
Payer: MEDICARE

## 2017-02-27 ENCOUNTER — ANESTHESIA (OUTPATIENT)
Dept: SURGERY | Facility: HOSPITAL | Age: 77
End: 2017-02-27
Payer: MEDICARE

## 2017-02-27 VITALS
RESPIRATION RATE: 18 BRPM | HEIGHT: 66 IN | SYSTOLIC BLOOD PRESSURE: 108 MMHG | HEART RATE: 66 BPM | BODY MASS INDEX: 39.86 KG/M2 | TEMPERATURE: 98 F | WEIGHT: 248 LBS | OXYGEN SATURATION: 97 % | DIASTOLIC BLOOD PRESSURE: 73 MMHG

## 2017-02-27 DIAGNOSIS — N20.0 NEPHROLITHIASIS: ICD-10-CM

## 2017-02-27 DIAGNOSIS — N20.0 RENAL STONE: Primary | ICD-10-CM

## 2017-02-27 DIAGNOSIS — N20.0 NEPHROLITHIASIS: Primary | ICD-10-CM

## 2017-02-27 PROCEDURE — 71000016 HC POSTOP RECOV ADDL HR: Performed by: UROLOGY

## 2017-02-27 PROCEDURE — 25000003 PHARM REV CODE 250: Performed by: ANESTHESIOLOGY

## 2017-02-27 PROCEDURE — 37000008 HC ANESTHESIA 1ST 15 MINUTES: Performed by: UROLOGY

## 2017-02-27 PROCEDURE — 36000704 HC OR TIME LEV I 1ST 15 MIN: Performed by: UROLOGY

## 2017-02-27 PROCEDURE — 63600175 PHARM REV CODE 636 W HCPCS

## 2017-02-27 PROCEDURE — 25000003 PHARM REV CODE 250

## 2017-02-27 PROCEDURE — 63600175 PHARM REV CODE 636 W HCPCS: Performed by: NURSE ANESTHETIST, CERTIFIED REGISTERED

## 2017-02-27 PROCEDURE — 50590 FRAGMENTING OF KIDNEY STONE: CPT | Mod: RT,,, | Performed by: UROLOGY

## 2017-02-27 PROCEDURE — D9220A PRA ANESTHESIA: Mod: CRNA,,, | Performed by: NURSE ANESTHETIST, CERTIFIED REGISTERED

## 2017-02-27 PROCEDURE — 25000003 PHARM REV CODE 250: Performed by: NURSE ANESTHETIST, CERTIFIED REGISTERED

## 2017-02-27 PROCEDURE — D9220A PRA ANESTHESIA: Mod: ANES,,, | Performed by: ANESTHESIOLOGY

## 2017-02-27 PROCEDURE — 27200651 HC AIRWAY, LMA: Performed by: NURSE ANESTHETIST, CERTIFIED REGISTERED

## 2017-02-27 PROCEDURE — 36000705 HC OR TIME LEV I EA ADD 15 MIN: Performed by: UROLOGY

## 2017-02-27 PROCEDURE — 71000033 HC RECOVERY, INTIAL HOUR: Performed by: UROLOGY

## 2017-02-27 PROCEDURE — 37000009 HC ANESTHESIA EA ADD 15 MINS: Performed by: UROLOGY

## 2017-02-27 PROCEDURE — 71000015 HC POSTOP RECOV 1ST HR: Performed by: UROLOGY

## 2017-02-27 RX ORDER — PROPOFOL 10 MG/ML
VIAL (ML) INTRAVENOUS
Status: DISCONTINUED | OUTPATIENT
Start: 2017-02-27 | End: 2017-02-27

## 2017-02-27 RX ORDER — HYDROMORPHONE HYDROCHLORIDE 2 MG/ML
0.2 INJECTION, SOLUTION INTRAMUSCULAR; INTRAVENOUS; SUBCUTANEOUS EVERY 5 MIN PRN
Status: DISCONTINUED | OUTPATIENT
Start: 2017-02-27 | End: 2017-02-27 | Stop reason: HOSPADM

## 2017-02-27 RX ORDER — MIDAZOLAM HYDROCHLORIDE 1 MG/ML
INJECTION INTRAMUSCULAR; INTRAVENOUS
Status: COMPLETED
Start: 2017-02-27 | End: 2017-02-27

## 2017-02-27 RX ORDER — METOCLOPRAMIDE HYDROCHLORIDE 5 MG/ML
INJECTION INTRAMUSCULAR; INTRAVENOUS
Status: COMPLETED
Start: 2017-02-27 | End: 2017-02-27

## 2017-02-27 RX ORDER — HYDROCODONE BITARTRATE AND ACETAMINOPHEN 5; 325 MG/1; MG/1
1 TABLET ORAL EVERY 6 HOURS PRN
Qty: 20 TABLET | Refills: 0 | Status: ON HOLD | OUTPATIENT
Start: 2017-02-27 | End: 2017-06-13 | Stop reason: HOSPADM

## 2017-02-27 RX ORDER — CIPROFLOXACIN 2 MG/ML
400 INJECTION, SOLUTION INTRAVENOUS
Status: DISCONTINUED | OUTPATIENT
Start: 2017-02-27 | End: 2017-02-27 | Stop reason: HOSPADM

## 2017-02-27 RX ORDER — ONDANSETRON 2 MG/ML
4 INJECTION INTRAMUSCULAR; INTRAVENOUS EVERY 12 HOURS PRN
Status: DISCONTINUED | OUTPATIENT
Start: 2017-02-27 | End: 2017-02-27 | Stop reason: HOSPADM

## 2017-02-27 RX ORDER — GLYCOPYRROLATE 0.2 MG/ML
INJECTION INTRAMUSCULAR; INTRAVENOUS
Status: COMPLETED
Start: 2017-02-27 | End: 2017-02-27

## 2017-02-27 RX ORDER — SODIUM CHLORIDE 0.9 % (FLUSH) 0.9 %
3 SYRINGE (ML) INJECTION
Status: DISCONTINUED | OUTPATIENT
Start: 2017-02-27 | End: 2017-02-27 | Stop reason: HOSPADM

## 2017-02-27 RX ORDER — PHENYLEPHRINE HYDROCHLORIDE 10 MG/ML
INJECTION INTRAVENOUS
Status: DISCONTINUED | OUTPATIENT
Start: 2017-02-27 | End: 2017-02-27

## 2017-02-27 RX ORDER — CIPROFLOXACIN 2 MG/ML
INJECTION, SOLUTION INTRAVENOUS
Status: COMPLETED
Start: 2017-02-27 | End: 2017-02-27

## 2017-02-27 RX ORDER — OXYCODONE AND ACETAMINOPHEN 5; 325 MG/1; MG/1
1 TABLET ORAL
Status: DISCONTINUED | OUTPATIENT
Start: 2017-02-27 | End: 2017-02-27 | Stop reason: HOSPADM

## 2017-02-27 RX ORDER — HYDROCODONE BITARTRATE AND ACETAMINOPHEN 5; 325 MG/1; MG/1
1 TABLET ORAL EVERY 4 HOURS PRN
Status: DISCONTINUED | OUTPATIENT
Start: 2017-02-27 | End: 2017-02-27 | Stop reason: HOSPADM

## 2017-02-27 RX ORDER — MEPERIDINE HYDROCHLORIDE 50 MG/ML
12.5 INJECTION INTRAMUSCULAR; INTRAVENOUS; SUBCUTANEOUS ONCE AS NEEDED
Status: DISCONTINUED | OUTPATIENT
Start: 2017-02-27 | End: 2017-02-27 | Stop reason: HOSPADM

## 2017-02-27 RX ORDER — LIDOCAINE HYDROCHLORIDE 10 MG/ML
1 INJECTION, SOLUTION EPIDURAL; INFILTRATION; INTRACAUDAL; PERINEURAL ONCE
Status: DISCONTINUED | OUTPATIENT
Start: 2017-02-27 | End: 2017-02-27 | Stop reason: HOSPADM

## 2017-02-27 RX ORDER — LIDOCAINE HCL/PF 100 MG/5ML
SYRINGE (ML) INTRAVENOUS
Status: DISCONTINUED | OUTPATIENT
Start: 2017-02-27 | End: 2017-02-27

## 2017-02-27 RX ORDER — ACETAMINOPHEN 325 MG/1
650 TABLET ORAL EVERY 4 HOURS PRN
Status: DISCONTINUED | OUTPATIENT
Start: 2017-02-27 | End: 2017-02-27 | Stop reason: HOSPADM

## 2017-02-27 RX ORDER — ONDANSETRON 2 MG/ML
4 INJECTION INTRAMUSCULAR; INTRAVENOUS EVERY 8 HOURS PRN
Status: DISCONTINUED | OUTPATIENT
Start: 2017-02-27 | End: 2017-02-27 | Stop reason: HOSPADM

## 2017-02-27 RX ORDER — SODIUM CHLORIDE, SODIUM LACTATE, POTASSIUM CHLORIDE, CALCIUM CHLORIDE 600; 310; 30; 20 MG/100ML; MG/100ML; MG/100ML; MG/100ML
INJECTION, SOLUTION INTRAVENOUS CONTINUOUS
Status: DISCONTINUED | OUTPATIENT
Start: 2017-02-27 | End: 2017-02-27 | Stop reason: HOSPADM

## 2017-02-27 RX ORDER — HYDROMORPHONE HYDROCHLORIDE 2 MG/ML
0.5 INJECTION, SOLUTION INTRAMUSCULAR; INTRAVENOUS; SUBCUTANEOUS EVERY 4 HOURS PRN
Status: DISCONTINUED | OUTPATIENT
Start: 2017-02-27 | End: 2017-02-27 | Stop reason: HOSPADM

## 2017-02-27 RX ORDER — SODIUM CHLORIDE 0.9 % (FLUSH) 0.9 %
3 SYRINGE (ML) INJECTION EVERY 8 HOURS
Status: DISCONTINUED | OUTPATIENT
Start: 2017-02-27 | End: 2017-02-27 | Stop reason: HOSPADM

## 2017-02-27 RX ORDER — CIPROFLOXACIN 500 MG/1
500 TABLET ORAL 2 TIMES DAILY
Qty: 4 TABLET | Refills: 0 | Status: SHIPPED | OUTPATIENT
Start: 2017-02-27 | End: 2017-03-01

## 2017-02-27 RX ADMIN — PHENYLEPHRINE HYDROCHLORIDE 200 MCG: 10 INJECTION INTRAVENOUS at 07:02

## 2017-02-27 RX ADMIN — GLYCOPYRROLATE 0.2 MG: 0.2 INJECTION, SOLUTION INTRAMUSCULAR; INTRAVENOUS at 07:02

## 2017-02-27 RX ADMIN — PHENYLEPHRINE HYDROCHLORIDE 200 MCG: 10 INJECTION INTRAVENOUS at 08:02

## 2017-02-27 RX ADMIN — SODIUM CHLORIDE, SODIUM LACTATE, POTASSIUM CHLORIDE, AND CALCIUM CHLORIDE: .6; .31; .03; .02 INJECTION, SOLUTION INTRAVENOUS at 06:02

## 2017-02-27 RX ADMIN — MIDAZOLAM HYDROCHLORIDE 2 MG: 1 INJECTION, SOLUTION INTRAMUSCULAR; INTRAVENOUS at 07:02

## 2017-02-27 RX ADMIN — PROPOFOL 120 MG: 10 INJECTION, EMULSION INTRAVENOUS at 07:02

## 2017-02-27 RX ADMIN — CIPROFLOXACIN 400 MG: 2 INJECTION, SOLUTION INTRAVENOUS at 07:02

## 2017-02-27 RX ADMIN — LIDOCAINE HYDROCHLORIDE 100 MG: 20 INJECTION, SOLUTION INTRAVENOUS at 07:02

## 2017-02-27 RX ADMIN — METOCLOPRAMIDE 10 MG: 5 INJECTION, SOLUTION INTRAMUSCULAR; INTRAVENOUS at 07:02

## 2017-02-27 NOTE — ANESTHESIA PREPROCEDURE EVALUATION
02/27/2017  Alaina Vee is a 76 y.o., female.    [facility] Anesthesia Evaluation    I have reviewed the Patient Summary Reports.    I have reviewed the Nursing Notes.   I have reviewed the Medications.     Review of Systems  Anesthesia Hx:  No problems with previous Anesthesia Hx of Anesthetic complications Delayed emergence   Social:  Non-Smoker, No Alcohol Use    Cardiovascular:   Hypertension, well controlled hyperlipidemia    Pulmonary:   Sleep Apnea    Renal/:   Chronic Renal Disease renal calculi    Hepatic/GI:   GERD    Musculoskeletal:   Arthritis     Psych:   Psychiatric History          Physical Exam  General:  Well nourished    Airway/Jaw/Neck:  Airway Findings: Mouth Opening: Normal Tongue: Normal  General Airway Assessment: Adult  Mallampati: II  TM Distance: Normal, at least 6 cm  Jaw/Neck Findings:  Neck ROM: Normal ROM      Dental:  Dental Findings: In tact   Chest/Lungs:  Chest/Lungs Findings: Clear to auscultation, Normal Respiratory Rate     Heart/Vascular:  Heart Findings: Rate: Normal        Mental Status:  Mental Status Findings:  Cooperative, Alert and Oriented         Anesthesia Plan  Type of Anesthesia, risks & benefits discussed:  Anesthesia Type:  general  Patient's Preference:   Intra-op Monitoring Plan:   Intra-op Monitoring Plan Comments:   Post Op Pain Control Plan:   Post Op Pain Control Plan Comments:   Induction:   IV  Beta Blocker:  Patient is not currently on a Beta-Blocker (No further documentation required).       Informed Consent: Patient understands risks and agrees with Anesthesia plan.  Questions answered. Anesthesia consent signed with patient.  ASA Score: 3     Day of Surgery Review of History & Physical: I have interviewed and examined the patient. I have reviewed the patient's H&P dated:  There are no significant changes.          Ready For Surgery From  Anesthesia Perspective.

## 2017-02-27 NOTE — TRANSFER OF CARE
"Anesthesia Transfer of Care Note    Patient: Alaina Vee    Procedure(s) Performed: Procedure(s) (LRB):  LITHOTRIPSY-EXTRACORPOREAL SHOCK WAVE  (Right)    Patient location: PACU    Anesthesia Type: general    Transport from OR: Transported from OR on room air with adequate spontaneous ventilation    Post pain: adequate analgesia    Post assessment: no apparent anesthetic complications    Post vital signs: stable    Level of consciousness: awake, alert and oriented    Nausea/Vomiting: no nausea/vomiting    Complications: none          Last vitals:   Visit Vitals    BP (!) 94/54 (BP Location: Left arm, Patient Position: Lying, BP Method: Automatic)    Pulse 64    Temp 36.6 °C (97.9 °F) (Oral)    Resp 20    Ht 5' 6" (1.676 m)    Wt 112.5 kg (248 lb)    SpO2 96%    Breastfeeding No    BMI 40.03 kg/m2     "

## 2017-02-27 NOTE — DISCHARGE INSTRUCTIONS
Expect blood with urination. Drink plenty of fluids.  Have abdominal x-ray (KUB) done prior to follow up visit on 3/13/2017    BATHING/DRESSING:  ? Ok to shower tomorrow    ACTIVITY LEVEL: If you have received sedation or an anesthetic, you may feel sleepy for several hours. Rest until you are more awake. Gradually resume your normal activities.    No heavy lifting.      DIET: You may resume your home diet. If nausea is present, increase your diet gradually with fluids and bland foods.  Medications: Pain medication should be taken only if needed and as directed. If antibiotics are prescribed, the medication should be taken until completed. You will be given an updated list of you medications.  ? No driving, alcoholic beverages or signing legal documents for next 24 hours or while taking pain medication    CALL THE DOCTOR:    For any obvious bleeding (some dried blood over the incision is normal).    Some blood in your urine is normal.     Redness, swelling, foul smell around incision or fever over 101.   Shortness of breath, Coughing Up Bloody Sputum, or Pains or Swelling in your Calves..   Persistent pain or nausea not relieved by medication.   Problems urinating - unable to urinate or heavy bleeding (with our without clots) in urine.    If any unusual problems or difficulties occur contact your doctor. If you cannot contact your doctor but feel your signs and symptoms warrant a physicians attention return to the emergency room.

## 2017-02-27 NOTE — OP NOTE
DATE OF PROCEDURE:  02/27/2017 8:22 AM    SURGEON: Yvonne Weaver M.D.    PREOPERATIVE DIAGNOSIS: Right renal calculus.    POSTOPERATIVE DIAGNOSIS: Right renal calculus.  .  PROCEDURE PERFORMED: Right extracorporeal shockwave lithotripsy (ESWL)    INDICATIONS FOR PROCEDURE: Alaina Vee is a 76 y.o. female who is an established patient who was referred by Dr Esquivel for evaluation of UTI.      She reports issues with recurrent UTIs. She was treated for UTI in 3/16 (100k E coli, pansensitive) and again in 4/16 (100k E coli). She has urinary frequency associated with UTIs. Nocturia x 1-2. She has urgency and UUI at baseline. Also with fecal urgency/incontinence. She was given Ditropan 5mg BID years ago. Also with RICHARD. She has not noted if this has helped. Denies current dysuria. Denies hematuria. No h/o kidney stones.      She has significant LBP issues. She also reports facial and R shoulder/arm/torso pain.      She was treated for UTI after initial visit. She remains on Ditropan IR not up to TID, declined ER formulations. She reports taking another medication for UI from me but I don't have records of this.      SERG (5/16) showed pamela 9mm stone in LLP. PVR 2cc. Cytology was negative but noted uric acid crystals.      CT 7/16 - 7mm L UP, 6mm L LP stones and 8mm R renal pelvis stone.   KUB 7/16 - shows 7mm R stone but difficult to see (unsure if truly the stone)     KUB 1/17 - 7mm R renal stone though hard to see (likely overlying 12th rib)     She started Ditropan XL 15mg previously and had great improvement. She continues to have significant back issues.     She started to develop R flank pain and therefore CT scan ordered. CT showed 8mm stone at R UPJ, visible on KUB.     Pre-KUB showed stone no longer in R UPJ but rather in R kidney.      PROCEDURE IN DETAIL: The patient was brought to the Operating Room and placed under general LMA anesthesia. Full timeout procedures were performed   identifying the  correct patient, procedure and laterality. Appropriate IV   antibiotics with ciprofloxacin were given prior to commencement of surgery. The  patient was placed in a supine position on the ESWL table.    The stone was able to be visualized with fluoroscopy showing a 8mm   stone in the right kidney. The patient was placed in the appropriate position on the ESWL table and water was placed below for transfer of the energy from the ESWL machine. The stone was visualized and able to be easily seen and was located in X, Y and Z plane for adequate targeting. The ESWL was begun with low energy and increased appropriately. The rate of treatment was 120 shocks/min to allow appropriate fragmentation.     Fluoroscopy was done intermittently throughout the entirety of the procedure, monitoring the stone fragmentation, as well as to ensure that the stone continued to align appropriately in the X, Y and Z plane. At the conclusion of 3000 shocks, the   stone was barely visible on fluoroscopy.          The patient was awakened from general anesthesia and transferred to the PACU in   stable condition.    COMPLICATIONS: None.    FINDINGS: Radiopaque 8mm calculus in the right kidney with excellent response after 3000 shocks.    ESTIMATED BLOOD LOSS: 0 mL.    SPECIMENS: None.    DRAINS: None.    PATIENT DISPOSITION: The patient is stable and deemed appropriate for discharge home. Follow up will be in approximately 3 weeks with KUB prior.      Discharge Note    SUMMARY     Admit Date:  02/27/2017 8:24 AM    Discharge Date and Time:  02/27/2017 8:24 AM    Hospital Course (synopsis of major diagnoses, care, treatment, and services provided during the course of the hospital stay): Uncomplicated ESWL procedure     Final Diagnosis: Post-Op Diagnosis Codes:     * Nephrolithiasis [N20.0]    Disposition: Home or Self Care    Follow Up/Patient Instructions:   Drink plenty of fluids.      Medications:  Reconciled Home Medications:   Current  Discharge Medication List      CONTINUE these medications which have NOT CHANGED    Details   diclofenac sodium 1 % Gel Apply 2 g topically once daily.  Qty: 100 g, Refills: 3      lisinopril (PRINIVIL,ZESTRIL) 40 MG tablet TAKE 1 TABLET ONE TIME DAILY  Qty: 90 tablet, Refills: 1    Associated Diagnoses: HTN (hypertension), benign      lovastatin (MEVACOR) 40 MG tablet TAKE 1 TABLET EVERY EVENING  Qty: 90 tablet, Refills: 1      omeprazole (PRILOSEC) 20 MG capsule TAKE 1 CAPSULE ONE TIME DAILY  Qty: 90 capsule, Refills: 0      oxybutynin (DITROPAN XL) 15 MG TR24 Take 1 tablet (15 mg total) by mouth once daily.  Qty: 30 tablet, Refills: 11      oxycodone-acetaminophen (PERCOCET) 5-325 mg per tablet Take 1 tablet by mouth every 4 (four) hours as needed for Pain.  Qty: 25 tablet, Refills: 0      tamsulosin (FLOMAX) 0.4 mg Cp24 Take 1 capsule (0.4 mg total) by mouth once daily.  Qty: 20 capsule, Refills: 0      clotrimazole-betamethasone 1-0.05% (LOTRISONE) cream Apply topically 2 (two) times daily.  Qty: 90 g, Refills: 1    Associated Diagnoses: Dermatitis      fish oil-omega-3 fatty acids 300-1,000 mg capsule Take 1 g by mouth once daily.      hydrocodone-acetaminophen 5-325mg (NORCO) 5-325 mg per tablet Take 1 tablet by mouth every 12 (twelve) hours as needed for Pain.  Qty: 30 tablet, Refills: 0    Associated Diagnoses: Costochondritis; Cervical radicular pain      ibuprofen (ADVIL,MOTRIN) 600 MG tablet TAKE 1 TABLET EVERY 8 HOURS AS NEEDED FOR PAIN   Qty: 90 tablet, Refills: 0      multivitamin (THERAGRAN) per tablet Take 1 tablet by mouth Daily. 1 Tablet Oral Every day             Discharge Procedure Orders  Diet general     Activity as tolerated     Call MD for:  temperature >100.4     Call MD for:  persistent nausea and vomiting     Call MD for:  severe uncontrolled pain     Call MD for:  difficulty breathing, headache or visual disturbances     No dressing needed       Follow-up Information     Follow up with  Yvonne Weaver MD In 3 weeks.    Specialty:  Urology    Why:  For post-op follow up with KUB prior    Contact information:    [address] [place], [place] [address] [phone]

## 2017-02-27 NOTE — ANESTHESIA POSTPROCEDURE EVALUATION
"Anesthesia Post Evaluation    Patient: Alaian Vee    Procedure(s) Performed: Procedure(s) (LRB):  LITHOTRIPSY-EXTRACORPOREAL SHOCK WAVE  (Right)    Final Anesthesia Type: general  Patient location during evaluation: PACU  Patient participation: Yes- Able to Participate  Level of consciousness: awake and alert and oriented  Post-procedure vital signs: reviewed and stable  Pain management: adequate  Airway patency: patent  PONV status at discharge: No PONV  Anesthetic complications: no      Cardiovascular status: blood pressure returned to baseline and hemodynamically stable  Respiratory status: unassisted, spontaneous ventilation and room air  Hydration status: euvolemic  Follow-up not needed.        Visit Vitals    BP (!) 98/57    Pulse 65    Temp 37 °C (98.6 °F) (Oral)    Resp 20    Ht 5' 6" (1.676 m)    Wt 112.5 kg (248 lb)    SpO2 95%    Breastfeeding No    BMI 40.03 kg/m2       Pain/Rajeev Score: Pain Assessment Performed: Yes (2/27/2017  9:27 AM)  Presence of Pain: denies (2/27/2017  9:27 AM)  Pain Rating Prior to Med Admin: 0 (2/27/2017  9:21 AM)  Rajeev Score: 10 (2/27/2017  9:27 AM)      "

## 2017-02-27 NOTE — INTERVAL H&P NOTE
The patient has been examined and the H&P has been reviewed:    I concur with the findings and changes have been noted since the H&P was written: stone difficult to see KUB today. Consented for possible cysto/RPG if stone unable to be seen easily.     Anesthesia/Surgery risks, benefits and alternative options discussed and understood by patient/family.          Active Hospital Problems    Diagnosis  POA    Renal stone [N20.0]  Yes      Resolved Hospital Problems    Diagnosis Date Resolved POA   No resolved problems to display.

## 2017-03-07 ENCOUNTER — HOSPITAL ENCOUNTER (OUTPATIENT)
Dept: CARDIOLOGY | Facility: HOSPITAL | Age: 77
Discharge: HOME | End: 2017-03-07
Attending: INTERNAL MEDICINE
Payer: MEDICARE

## 2017-03-07 DIAGNOSIS — I48.3 TYPICAL ATRIAL FLUTTER: ICD-10-CM

## 2017-03-07 LAB
DIASTOLIC DYSFUNCTION: YES
ESTIMATED PA SYSTOLIC PRESSURE: 16.1
GLOBAL PERICARDIAL EFFUSION: ABNORMAL
MITRAL VALVE REGURGITATION: ABNORMAL
RETIRED EF AND QEF - SEE NOTES: 55 (ref 55–65)
TRICUSPID VALVE REGURGITATION: ABNORMAL

## 2017-03-07 PROCEDURE — 93225 XTRNL ECG REC<48 HRS REC: CPT

## 2017-03-07 PROCEDURE — 93306 TTE W/DOPPLER COMPLETE: CPT | Mod: 26,,, | Performed by: INTERNAL MEDICINE

## 2017-03-07 PROCEDURE — 93306 TTE W/DOPPLER COMPLETE: CPT

## 2017-03-13 ENCOUNTER — HOSPITAL ENCOUNTER (OUTPATIENT)
Dept: RADIOLOGY | Facility: HOSPITAL | Age: 77
Discharge: HOME | End: 2017-03-13
Attending: UROLOGY
Payer: MEDICARE

## 2017-03-13 ENCOUNTER — OFFICE VISIT (OUTPATIENT)
Dept: UROLOGY | Facility: CLINIC | Age: 77
End: 2017-03-13
Payer: MEDICARE

## 2017-03-13 VITALS
DIASTOLIC BLOOD PRESSURE: 60 MMHG | SYSTOLIC BLOOD PRESSURE: 100 MMHG | WEIGHT: 251.31 LBS | HEART RATE: 74 BPM | HEIGHT: 66 IN | BODY MASS INDEX: 40.39 KG/M2

## 2017-03-13 DIAGNOSIS — N39.46 MIXED INCONTINENCE URGE AND STRESS: ICD-10-CM

## 2017-03-13 DIAGNOSIS — N39.0 RECURRENT UTI: ICD-10-CM

## 2017-03-13 DIAGNOSIS — N20.0 NEPHROLITHIASIS: ICD-10-CM

## 2017-03-13 DIAGNOSIS — N20.0 NEPHROLITHIASIS: Primary | ICD-10-CM

## 2017-03-13 PROCEDURE — 99999 PR PBB SHADOW E&M-EST. PATIENT-LVL III: CPT | Mod: PBBFAC,,, | Performed by: UROLOGY

## 2017-03-13 PROCEDURE — 99024 POSTOP FOLLOW-UP VISIT: CPT | Mod: S$GLB,,, | Performed by: UROLOGY

## 2017-03-13 PROCEDURE — 74000 XR KUB: CPT | Mod: 26,,, | Performed by: RADIOLOGY

## 2017-03-13 NOTE — MR AVS SNAPSHOT
[facility]  [address]  [address]  [place] [address]  Phone: [phone]                  Alaina Vee   3/13/2017 10:00 AM   Office Visit    Description:  Female : 1940   Provider:  Yvonne Weaver MD   Department:  [facility]           Reason for Visit     Follow-up           Diagnoses this Visit        Comments    Nephrolithiasis    -  Primary     Recurrent UTI         Mixed incontinence urge and stress                To Do List           Future Appointments        Provider Department Dept Phone    3/15/2017 10:20 AM Nahid Hidalgo MD [facility] [phone]    2017 11:20 AM Yvonne Weaver MD [facility] [phone]      Goals (5 Years of Data)     None      Follow-Up and Disposition     Return in about 2 weeks (around 3/27/2017) for CT scan follow up.      [facility]     [facility] Nurse Care [facility] -  Assistance  Registered nurses in the [facility] provide clinical advisement, health education, appointment booking, and other advisory services.  Call for this free service at [phone].             Medications           Message regarding Medications     Verify the changes and/or additions to your medication regime listed below are the same as discussed with your clinician today.  If any of these changes or additions are incorrect, please notify your healthcare provider.             Verify that the below list of medications is an accurate representation of the medications you are currently taking.  If none reported, the list may be blank. If incorrect, please contact your healthcare provider. Carry this list with you in case of emergency.           Current Medications     diclofenac sodium 1 % Gel Apply 2 g topically once daily.    fish oil-omega-3 fatty acids 300-1,000 mg capsule Take 1 g by mouth once daily.    lisinopril (PRINIVIL,ZESTRIL) 40 MG tablet TAKE 1 TABLET ONE TIME DAILY     "lovastatin (MEVACOR) 40 MG tablet TAKE 1 TABLET EVERY EVENING    multivitamin (THERAGRAN) per tablet Take 1 tablet by mouth Daily. 1 Tablet Oral Every day    omeprazole (PRILOSEC) 20 MG capsule TAKE 1 CAPSULE ONE TIME DAILY    oxybutynin (DITROPAN XL) 15 MG TR24 Take 1 tablet (15 mg total) by mouth once daily.    clotrimazole-betamethasone 1-0.05% (LOTRISONE) cream Apply topically 2 (two) times daily.    hydrocodone-acetaminophen 5-325mg (NORCO) 5-325 mg per tablet Take 1 tablet by mouth every 12 (twelve) hours as needed for Pain.    hydrocodone-acetaminophen 5-325mg (NORCO) 5-325 mg per tablet Take 1 tablet by mouth every 6 (six) hours as needed for Pain.    ibuprofen (ADVIL,MOTRIN) 600 MG tablet TAKE 1 TABLET EVERY 8 HOURS AS NEEDED FOR PAIN     oxycodone-acetaminophen (PERCOCET) 5-325 mg per tablet Take 1 tablet by mouth every 4 (four) hours as needed for Pain.    tamsulosin (FLOMAX) 0.4 mg Cp24 Take 1 capsule (0.4 mg total) by mouth once daily.           Clinical Reference Information           Your Vitals Were     BP Pulse Height Weight BMI    100/60 74 5' 6" (1.676 m) 114 kg (251 lb 5.2 oz) 40.56 kg/m2      Blood Pressure          Most Recent Value    BP  100/60      Allergies as of 3/13/2017     No Known Allergies      Immunizations Administered on Date of Encounter - 3/13/2017     None      Orders Placed During Today's Visit     Future Labs/Procedures Expected by Expires    CT Renal Stone Study ABD Pelvis WO  3/13/2017 3/13/2018      [facility] Sign-Up     Activating your [facility] account is as easy as 1-2-3!     1) Visit [email or web address], select Sign Up Now, enter this activation code and your date of birth, then select Next.  [email or web address]  Expires: 3/16/2017  4:05 PM      2) Create a username and password to use when you visit [facility] in the future and select a security question in case you lose your password and select Next.    3) Enter your e-mail address and click Sign Up!    Additional " Information  If you have questions, please e-mail [email or web address] or call [phone] to talk to our [facility] staff. Remember, [facility] is NOT to be used for urgent needs. For medical emergencies, dial 911.         Language Assistance Services     ATTENTION: Language assistance services are available, free of charge. Please call [phone].      ATENCIÓN: Si habla español, tiene a coyle disposición servicios gratuitos de asistencia lingüística. Llame al [phone].     [facility] Ý: N?u b?n nói Ti?ng Vi?t, có các d?ch v? h? tr? ngôn ng? mi?n phí dành cho b?n. G?i s? [phone].         [facility] complies with applicable Federal civil rights laws and does not discriminate on the basis of race, color, national origin, age, disability, or sex.

## 2017-03-13 NOTE — PROGRESS NOTES
Subjective:       Alaina Vee is a 76 y.o. female who is an established patient who was referred by Dr Esquivel for evaluation of UTI.      She reports issues with recurrent UTIs. She was treated for UTI in 3/16 (100k E coli, pansensitive) and again in 4/16 (100k E coli). She has urinary frequency associated with UTIs. Nocturia x 1-2. She has urgency and UUI at baseline. Also with fecal urgency/incontinence. She was given Ditropan 5mg BID years ago. Also with RICHARD. She has not noted if this has helped. Denies current dysuria. Denies hematuria. No h/o kidney stones.     She has significant LBP issues. She also reports facial and R shoulder/arm/torso pain.     She was treated for UTI after initial visit. She remains on Ditropan IR not up to TID, declined ER formulations. She reports taking another medication for UI from me but I don't have records of this.     SERG (5/16) showed ruslanley 9mm stone in LLP. PVR 2cc. Cytology was negative but noted uric acid crystals.     CT 7/16 - 7mm L UP, 6mm L LP stones and 8mm R renal pelvis stone.   KUB 7/16 - shows 7mm R stone but difficult to see (unsure if truly the stone)    KUB 1/17 - 7mm R renal stone though hard to see (likely overlying 12th rib)    She started Ditropan XL 15mg previously and had great improvement. She continues to have significant back issues.    She started to develop R flank pain and therefore CT scan ordered. CT showed 8mm stone at R UPJ, visible on KUB. She is now s/p R ESWL. Stone was noted to fragment well. She did not note passing any stone and is still having flank pain.       The following portions of the patient's history were reviewed and updated as appropriate: allergies, current medications, past family history, past medical history, past social history, past surgical history and problem list.    Review of Systems  Constitutional: no fever or chills  ENT: no nasal congestion or sore throat  Respiratory: no cough or shortness of  "breath  Cardiovascular: no chest pain or palpitations  Gastrointestinal: no nausea or vomiting, tolerating diet  Genitourinary: as per HPI  Hematologic/Lymphatic: no easy bruising or lymphadenopathy  Musculoskeletal: no arthralgias or myalgias  Skin: no rashes or lesions  Neurological: no seizures or tremors  Behavioral/Psych: no auditory or visual hallucinations       Objective:    Vitals:   /60  Pulse 74  Ht 5' 6" (1.676 m)  Wt 114 kg (251 lb 5.2 oz)  BMI 40.56 kg/m2    Physical Exam   General: well developed, well nourished in no acute distress  Head: normocephalic, atraumatic  Neck: supple, trachea midline, no obvious enlargement of thyroid  HEENT: EOMI, mucus membranes moist, sclera anicteric, no hearing impairment  Lungs: symmetric expansion, non-labored breathing  Cardiovascular: regular rate and rhythm, normal pulses  Abdomen: soft, non tender, non distended, no palpable masses, no hepatosplenomegaly, no hernias, no CVA tenderness  Musculoskeletal: no peripheral edema, normal ROM in bilateral upper and lower extremities  Lymphatics: no cervical or inguinal lymphadenopathy  Skin: no rashes or lesions  Neuro: alert and oriented x 3, no gross deficits  Psych: normal judgment and insight, normal mood/affect and non-anxious  Genitourinary:   patient declined exam      Lab Review   Urine analysis today in clinic shows - no urine given    Lab Results   Component Value Date    WBC 6.30 02/22/2017    HGB 12.6 02/22/2017    HCT 38.4 02/22/2017    MCV 91 02/22/2017     02/22/2017     Lab Results   Component Value Date    CREATININE 1.1 02/22/2017    BUN 19 02/22/2017         Imaging  Images and reports were personally reviewed by me and discussed with patient  CT, SERG, KUB reviewed       Assessment/Plan:      1. Recurrent UTI / Nephrolithiasis   - UI likely contributing to UTIs   - Avoid constipation   - PVR acceptable   - Possible stone in LLP, uric acid crystals on cytology   - Discussed Estrace, " will hold for now   - Consider cystoscopy in future   - CT - 2 stones on L, 1 stone on R. Only R stone visible on KUB.    - 8mm R UPJ stone. Discussed options - s/p R ESWL. Discussed risks, benefits, alternatives.   - s/p R ESWL on 2/27/17 - stone with excellent response   - Strain urine   - KUB today - possible residual stone. + pain   - Will check post-op CT scan due to residual pain.     2. Mixed incontinence urge and stress    - Discussed difference of UUI and RICHARD components. Reviewed etiology and workup of each.   - RICHARD: Kegels, PFPT, bulking agent, MUS.   - UUI: Behavioral changes, PFPT, anticholinergics. Botox/InterStim for refractory UUI.   - Doing great with Ditropan XL 15mg, continue         Follow up in 2 weeks with CT scan

## 2017-03-14 ENCOUNTER — HOSPITAL ENCOUNTER (OUTPATIENT)
Dept: RADIOLOGY | Facility: HOSPITAL | Age: 77
Discharge: HOME | End: 2017-03-14
Attending: UROLOGY
Payer: MEDICARE

## 2017-03-14 DIAGNOSIS — N20.0 NEPHROLITHIASIS: ICD-10-CM

## 2017-03-14 PROCEDURE — 74176 CT ABD & PELVIS W/O CONTRAST: CPT | Mod: TC

## 2017-03-14 PROCEDURE — 74176 CT ABD & PELVIS W/O CONTRAST: CPT | Mod: 26,,, | Performed by: RADIOLOGY

## 2017-03-15 ENCOUNTER — OFFICE VISIT (OUTPATIENT)
Dept: CARDIOLOGY | Facility: CLINIC | Age: 77
End: 2017-03-15
Payer: MEDICARE

## 2017-03-15 ENCOUNTER — TELEPHONE (OUTPATIENT)
Dept: CARDIOLOGY | Facility: CLINIC | Age: 77
End: 2017-03-15

## 2017-03-15 ENCOUNTER — TELEPHONE (OUTPATIENT)
Dept: UROLOGY | Facility: CLINIC | Age: 77
End: 2017-03-15

## 2017-03-15 VITALS
WEIGHT: 250.88 LBS | DIASTOLIC BLOOD PRESSURE: 74 MMHG | OXYGEN SATURATION: 95 % | RESPIRATION RATE: 15 BRPM | BODY MASS INDEX: 40.32 KG/M2 | HEIGHT: 66 IN | HEART RATE: 56 BPM | SYSTOLIC BLOOD PRESSURE: 124 MMHG

## 2017-03-15 DIAGNOSIS — I10 ESSENTIAL HYPERTENSION: ICD-10-CM

## 2017-03-15 DIAGNOSIS — E66.01 MORBID OBESITY DUE TO EXCESS CALORIES: ICD-10-CM

## 2017-03-15 DIAGNOSIS — I48.3 TYPICAL ATRIAL FLUTTER: Primary | ICD-10-CM

## 2017-03-15 DIAGNOSIS — E78.5 HYPERLIPIDEMIA, UNSPECIFIED HYPERLIPIDEMIA TYPE: ICD-10-CM

## 2017-03-15 PROCEDURE — 3074F SYST BP LT 130 MM HG: CPT | Mod: S$GLB,,, | Performed by: INTERNAL MEDICINE

## 2017-03-15 PROCEDURE — 1160F RVW MEDS BY RX/DR IN RCRD: CPT | Mod: S$GLB,,, | Performed by: INTERNAL MEDICINE

## 2017-03-15 PROCEDURE — 99999 PR PBB SHADOW E&M-EST. PATIENT-LVL III: CPT | Mod: PBBFAC,,, | Performed by: INTERNAL MEDICINE

## 2017-03-15 PROCEDURE — 1157F ADVNC CARE PLAN IN RCRD: CPT | Mod: S$GLB,,, | Performed by: INTERNAL MEDICINE

## 2017-03-15 PROCEDURE — 3078F DIAST BP <80 MM HG: CPT | Mod: S$GLB,,, | Performed by: INTERNAL MEDICINE

## 2017-03-15 PROCEDURE — 1159F MED LIST DOCD IN RCRD: CPT | Mod: S$GLB,,, | Performed by: INTERNAL MEDICINE

## 2017-03-15 PROCEDURE — 1126F AMNT PAIN NOTED NONE PRSNT: CPT | Mod: S$GLB,,, | Performed by: INTERNAL MEDICINE

## 2017-03-15 PROCEDURE — 99215 OFFICE O/P EST HI 40 MIN: CPT | Mod: S$GLB,,, | Performed by: INTERNAL MEDICINE

## 2017-03-15 PROCEDURE — 99499 UNLISTED E&M SERVICE: CPT | Mod: S$GLB,,, | Performed by: INTERNAL MEDICINE

## 2017-03-15 RX ORDER — ATORVASTATIN CALCIUM 40 MG/1
40 TABLET, FILM COATED ORAL NIGHTLY
Qty: 90 TABLET | Refills: 3 | Status: SHIPPED | OUTPATIENT
Start: 2017-03-15 | End: 2017-03-16

## 2017-03-15 RX ORDER — ATORVASTATIN CALCIUM 40 MG/1
40 TABLET, FILM COATED ORAL NIGHTLY
Qty: 90 TABLET | Refills: 3 | Status: SHIPPED | OUTPATIENT
Start: 2017-03-15 | End: 2017-03-15 | Stop reason: SDUPTHER

## 2017-03-15 NOTE — PROGRESS NOTES
CARDIOVASCULAR PROGRESS NOTE    REASON FOR CONSULT:   Alaina Vee is a 76 y.o. female who presents for f/u of AFL.    PCP: Sierra  HISTORY OF PRESENT ILLNESS:   The patient returns for follow-up.  She underwent successful removal of her kidney stone by Dr. Weaver.  She reports a generally asymptomatic status without angina or dyspnea.  She's had no palpitations, lightheadedness, dizziness, or syncope.  She's had no PND, orthopnea.  She does report chronic bipedal edema.  There's been no melena, hematuria, or claudicant symptoms.    CARDIOVASCULAR HISTORY:   AFL, CHADSVASC 3  Aortic root dil 3.9 cm (echo 3/2017)    PAST MEDICAL HISTORY:     Past Medical History:   Diagnosis Date    Arthritis     knees    Atrial flutter     Degenerative disc disease     Depression     General anesthetics causing adverse effect in therapeutic use     pt states sometimes slow to awaken.    GERD (gastroesophageal reflux disease)     Hyperlipidemia     Hypertension     Sleep apnea     does not wear CPAP    Varicosities     Ventral hernia        PAST SURGICAL HISTORY:     Past Surgical History:   Procedure Laterality Date    APPENDECTOMY      breast biopsy, left      CHOLECYSTECTOMY      JOINT REPLACEMENT      TKR , right    JOINT REPLACEMENT  2009    TKR  left    OH EXPLORATORY OF ABDOMEN         ALLERGIES AND MEDICATION:   Review of patient's allergies indicates:  No Known Allergies  Previous Medications    CLOTRIMAZOLE-BETAMETHASONE 1-0.05% (LOTRISONE) CREAM    Apply topically 2 (two) times daily.    DICLOFENAC SODIUM 1 % GEL    Apply 2 g topically once daily.    FISH OIL-OMEGA-3 FATTY ACIDS 300-1,000 MG CAPSULE    Take 1 g by mouth once daily.    HYDROCODONE-ACETAMINOPHEN 5-325MG (NORCO) 5-325 MG PER TABLET    Take 1 tablet by mouth every 12 (twelve) hours as needed for Pain.    HYDROCODONE-ACETAMINOPHEN 5-325MG (NORCO) 5-325 MG PER TABLET    Take 1 tablet by mouth every 6 (six) hours as needed for Pain.     IBUPROFEN (ADVIL,MOTRIN) 600 MG TABLET    TAKE 1 TABLET EVERY 8 HOURS AS NEEDED FOR PAIN     LISINOPRIL (PRINIVIL,ZESTRIL) 40 MG TABLET    TAKE 1 TABLET ONE TIME DAILY    LOVASTATIN (MEVACOR) 40 MG TABLET    TAKE 1 TABLET EVERY EVENING    MULTIVITAMIN (THERAGRAN) PER TABLET    Take 1 tablet by mouth Daily. 1 Tablet Oral Every day    OMEPRAZOLE (PRILOSEC) 20 MG CAPSULE    TAKE 1 CAPSULE ONE TIME DAILY    OXYBUTYNIN (DITROPAN XL) 15 MG TR24    Take 1 tablet (15 mg total) by mouth once daily.    OXYCODONE-ACETAMINOPHEN (PERCOCET) 5-325 MG PER TABLET    Take 1 tablet by mouth every 4 (four) hours as needed for Pain.    TAMSULOSIN (FLOMAX) 0.4 MG CP24    Take 1 capsule (0.4 mg total) by mouth once daily.       SOCIAL HISTORY:     Social History     Social History    Marital status:      Spouse name: N/A    Number of children: N/A    Years of education: N/A     Occupational History    Not on file.     Social History Main Topics    Smoking status: Never Smoker    Smokeless tobacco: Not on file    Alcohol use No    Drug use: Not on file    Sexual activity: Not on file     Other Topics Concern    Not on file     Social History Narrative       FAMILY HISTORY:     Family History   Problem Relation Age of Onset    COPD Mother     Heart disease Sister      half sister    COPD Sister        REVIEW OF SYSTEMS:   Review of Systems   Constitutional: Negative for chills, diaphoresis and fever.   HENT: Negative for nosebleeds.    Eyes: Negative for blurred vision, double vision and photophobia.   Respiratory: Negative for hemoptysis, shortness of breath and wheezing.    Cardiovascular: Positive for leg swelling. Negative for chest pain, palpitations, orthopnea, claudication and PND.   Gastrointestinal: Negative for abdominal pain, blood in stool, heartburn, melena, nausea and vomiting.   Genitourinary: Negative for flank pain and hematuria.   Musculoskeletal: Negative for falls, myalgias and neck pain.   Skin:  "Negative for rash.   Neurological: Negative for dizziness, seizures, loss of consciousness, weakness and headaches.   Endo/Heme/Allergies: Negative for polydipsia. Does not bruise/bleed easily.   Psychiatric/Behavioral: Negative for depression and memory loss. The patient is not nervous/anxious.        PHYSICAL EXAM:     Vitals:    03/15/17 1013   BP: 124/74   Pulse: (!) 56   Resp: 15    Body mass index is 40.49 kg/(m^2).  Weight: 113.8 kg (250 lb 14.1 oz)   Height: 5' 6" (167.6 cm)     Physical Exam   Constitutional: She is oriented to person, place, and time. She appears well-developed and well-nourished. She is cooperative.  Non-toxic appearance. No distress.   HENT:   Head: Normocephalic and atraumatic.   Eyes: Conjunctivae and EOM are normal. Pupils are equal, round, and reactive to light. No scleral icterus.   Neck: Trachea normal and normal range of motion. Neck supple. Normal carotid pulses and no JVD present. Carotid bruit is not present. No rigidity. No edema present. No thyromegaly present.   Cardiovascular: Normal rate, regular rhythm, S1 normal and S2 normal.  PMI is not displaced.  Exam reveals distant heart sounds. Exam reveals no gallop and no friction rub.    No murmur heard.  Pulses:       Carotid pulses are 2+ on the right side, and 2+ on the left side.  Pulmonary/Chest: Effort normal and breath sounds normal. No respiratory distress. She has no wheezes. She has no rales. She exhibits no tenderness.   Abdominal: Soft. Bowel sounds are normal. She exhibits no distension and no mass. There is no hepatosplenomegaly. There is no tenderness.   obese   Musculoskeletal: She exhibits no edema or tenderness.   Feet:   Right Foot:   Skin Integrity: Negative for ulcer.   Left Foot:   Skin Integrity: Negative for ulcer.   Neurological: She is alert and oriented to person, place, and time. No cranial nerve deficit.   Skin: Skin is warm and dry. No rash noted. No erythema.   Psychiatric: She has a normal mood " and affect. Her speech is normal and behavior is normal.   Vitals reviewed.      DATA:   EKG: (personally reviewed tracing)  2/23/17 AFL (typical) VR 73, 4:1 AV conduction.  No ST changes or Q waves.  AFL new vs 5/28/09.    Laboratory:  CBC:    Recent Labs  Lab 11/19/15  1037 12/08/16  1300 02/22/17  1500   WHITE BLOOD CELL COUNT 5.43 6.80 6.30   HEMOGLOBIN 14.8 14.3 12.6   HEMATOCRIT 44.1 43.4 38.4   PLATELETS 308 315 289       CHEMISTRIES:    Recent Labs  Lab 05/31/16  1452 12/08/16  1300 02/22/17  1500   GLUCOSE 104 98 105   SODIUM 141 142 142   POTASSIUM 4.9 4.7 4.3   BUN BLD 24 H 16 19   CREATININE 1.1 0.9 1.1   EGFR IF  58 A >60 57 A   EGFR IF NON- 50 A >60 50 A   CALCIUM 9.5 10.4 9.7   MAGNESIUM 2.4  --   --        CARDIAC BIOMARKERS:    Recent Labs  Lab 05/31/16  1452   CPK 49       COAGS:        LIPIDS/LFTS:    Recent Labs  Lab 04/09/15  1050 11/19/15  1037 05/31/16  1452 12/08/16  1300 03/07/17  0920   CHOLESTEROL 254 H 238 H  --   --  193   TRIGLYCERIDES 176 H 154 H  --   --  156 H   HDL 45 55  --   --  33 L   LDL CHOLESTEROL 173.8 H 152.2  --   --  128.8   NON-HDL CHOLESTEROL 209 183  --   --  160   AST 17 20 16 21  --    ALT 15 17 14 16  --      Lab Results   Component Value Date    TSH 1.350 12/08/2016     Cardiovascular Testing:  Echo 3/7/17    1 - Normal left ventricular systolic function (EF 55-60%).     2 - Moderate left atrial enlargement.     3 - Aortic root dilation, 3.9cm    Holter 3/7/17  PREDOMINANT RHYTHM  1. Atrial flutter with ventricular rates varying between 29 and 89 bpm with an average of 55 bpm.   VENTRICULAR ARRHYTHMIAS  1. There were no PVCs. There was no ventricular ectopic activity.  SUPRA VENTRICULAR ARRHYTHMIAS  1. Atrial flutter was noted throughout the study.   AV CONDUCTION  1. There was no evidence of high grade AV emma filtering.   2. The longest RR interval was 2215 msec.   DIARY  1. The diary was not returned  MISCELLANEOUS  1. There were  occasional hookup related artifacts. Overall, the study was of good quality.   2. This was a tape of adequate length (24 hrs).    RADHA 7/14/16  Right lower extremity RADHA: 1.06  Left lower extremity RADHA: 1.08    ASSESSMENT:   # AFL, controlled VR.  CHADSVASC 3.  # bradycardia (asymptomatic) noted on no offending meds.  # HTN, controlled  # HLP, LDL not at goal on lovastatin  # BMI 40  # SULEMA  # Aortic root dil 3.9 cm (echo 3/2017)    PLAN:   Cont med rx  Change lovastatin to atorva 40mg qhs  Add Xarelto 20mg qd, pt has concerns about side effects and cost but is willing to give it a try.  Diet/exercise/weight loss, pt declines bariatric evaluation.  Consider SULEMA evaluation for CPAP.  Refer to Dr. Lloyd to discuss AFL ablation (and possible ability to stop NOAC if arrhythmia cured).  Hopefully will be able to avoid PPM given bradycardia.  Check lipids/LFT 3 month (mid June 2017)  RTC 3 months    Nahid Hidalgo MD, [facility]    Addendum 3/16/17:  Pt called the office to tell our staff that she is intolerant of Lipitor and is unable to afford Xarelto and would prefer coumadin.  I will stop the lipitor and restart the lovastatin at an increased dose of 80mg qd.  Start coumadin and refer pt to coumadin monitoring clinic.

## 2017-03-15 NOTE — MR AVS SNAPSHOT
[facility]  [address] [place] [address]  Phone: [phone]                  Alaina eVe   3/15/2017 10:20 AM   Office Visit    Description:  Female : 1940   Provider:  Nahid Hidalgo MD   Department:  [facility]           Reason for Visit     Hypertension                To Do List           Future Appointments        Provider Department Dept Phone    3/15/2017 10:20 AM Nahid Hidalgo MD [facility] [phone]    4/3/2017 9:20 AM Yvonne Weaver MD [facility] [phone]    2017 11:20 AM Yvonne Weaver MD [facility] [phone]      Goals (5 Years of Data)     None      [facility]     [facility] Care [facility] -  Assistance  Registered nurses in the [facility] provide clinical advisement, health education, appointment booking, and other advisory services.  Call for this free service at [phone].             Medications           Message regarding Medications     Verify the changes and/or additions to your medication regime listed below are the same as discussed with your clinician today.  If any of these changes or additions are incorrect, please notify your healthcare provider.             Verify that the below list of medications is an accurate representation of the medications you are currently taking.  If none reported, the list may be blank. If incorrect, please contact your healthcare provider. Carry this list with you in case of emergency.           Current Medications     clotrimazole-betamethasone 1-0.05% (LOTRISONE) cream Apply topically 2 (two) times daily.    diclofenac sodium 1 % Gel Apply 2 g topically once daily.    fish oil-omega-3 fatty acids 300-1,000 mg capsule Take 1 g by mouth once daily.    hydrocodone-acetaminophen 5-325mg (NORCO) 5-325 mg per tablet Take 1 tablet by mouth every 12 (twelve) hours as needed for Pain.     "hydrocodone-acetaminophen 5-325mg (NORCO) 5-325 mg per tablet Take 1 tablet by mouth every 6 (six) hours as needed for Pain.    ibuprofen (ADVIL,MOTRIN) 600 MG tablet TAKE 1 TABLET EVERY 8 HOURS AS NEEDED FOR PAIN     lisinopril (PRINIVIL,ZESTRIL) 40 MG tablet TAKE 1 TABLET ONE TIME DAILY    lovastatin (MEVACOR) 40 MG tablet TAKE 1 TABLET EVERY EVENING    multivitamin (THERAGRAN) per tablet Take 1 tablet by mouth Daily. 1 Tablet Oral Every day    omeprazole (PRILOSEC) 20 MG capsule TAKE 1 CAPSULE ONE TIME DAILY    oxybutynin (DITROPAN XL) 15 MG TR24 Take 1 tablet (15 mg total) by mouth once daily.    oxycodone-acetaminophen (PERCOCET) 5-325 mg per tablet Take 1 tablet by mouth every 4 (four) hours as needed for Pain.    tamsulosin (FLOMAX) 0.4 mg Cp24 Take 1 capsule (0.4 mg total) by mouth once daily.           Clinical Reference Information           Your Vitals Were     BP Pulse Resp Height Weight SpO2    124/74 56 15 5' 6" (1.676 m) 113.8 kg (250 lb 14.1 oz) 95%    BMI                40.49 kg/m2          Blood Pressure          Most Recent Value    BP  124/74      Allergies as of 3/15/2017     No Known Allergies      Immunizations Administered on Date of Encounter - 3/15/2017     None      [facility] Sign-Up     Activating your [facility] account is as easy as 1-2-3!     1) Visit [email or web address], select Sign Up Now, enter this activation code and your date of birth, then select Next.  [email or web address]  Expires: 3/16/2017  4:05 PM      2) Create a username and password to use when you visit [facility] in the future and select a security question in case you lose your password and select Next.    3) Enter your e-mail address and click Sign Up!    Additional Information  If you have questions, please e-mail [email or web address] or call [phone] to talk to our [facility] staff. Remember, [facility] is NOT to be used for urgent needs. For medical emergencies, dial 911.         Language Assistance Services     " ATTENTION: Language assistance services are available, free of charge. Please call [phone].      ATENCIÓN: Si habla stewartañol, tiene a coyle disposición servicios gratuitos de asistencia lingüística. Llame al [phone].     CHÚ Ý: N?u b?n nói Ti?ng Vi?t, có các d?ch v? h? tr? ngôn ng? mi?n phí dành cho b?n. G?i s? [phone].         [facility] complies with applicable Federal civil rights laws and does not discriminate on the basis of race, color, national origin, age, disability, or sex.

## 2017-03-15 NOTE — TELEPHONE ENCOUNTER
Please inform pt:    The stone causing her issues has been completely treated by her surgery - stone is no longer there. She does have some non-obstructing stones in both kidneys but this would not cause any pain.

## 2017-03-16 ENCOUNTER — ANTI-COAG VISIT (OUTPATIENT)
Dept: CARDIOLOGY | Facility: CLINIC | Age: 77
End: 2017-03-16

## 2017-03-16 DIAGNOSIS — Z79.01 LONG TERM (CURRENT) USE OF ANTICOAGULANTS: Primary | ICD-10-CM

## 2017-03-16 DIAGNOSIS — I48.3 TYPICAL ATRIAL FLUTTER: ICD-10-CM

## 2017-03-16 RX ORDER — WARFARIN SODIUM 5 MG/1
5 TABLET ORAL DAILY
Qty: 30 TABLET | Refills: 11 | Status: SHIPPED | OUTPATIENT
Start: 2017-03-16 | End: 2017-04-19 | Stop reason: SDUPTHER

## 2017-03-16 NOTE — PROGRESS NOTES
77yo F with PMHx: Atrial flutter, arthritis, DDD, depression, GERD, HTN, HLD, sleep apnea, varicosities, ventral hernia and hx of kidney stones.  The pt was given a Rx for Xarelto, but is unable to take it due to cost.  I was able to reach the pt this afternoon.  She has not yet picked up her Rx for warfarin, but will get it either later today or tomorrow.  Per her med card, she was given Warfarin 5mg tablets.  She will start therapy either 3/16 or 3/17 and knows to take her coumadin in the evenings.  She has our contact info and will go to the lab for an INR on 3/20.  She is already booked for a new pt education session on 3/23 at the [facility].

## 2017-03-20 ENCOUNTER — LAB VISIT (OUTPATIENT)
Dept: LAB | Facility: HOSPITAL | Age: 77
End: 2017-03-20
Attending: INTERNAL MEDICINE
Payer: MEDICARE

## 2017-03-20 ENCOUNTER — ANTI-COAG VISIT (OUTPATIENT)
Dept: CARDIOLOGY | Facility: CLINIC | Age: 77
End: 2017-03-20

## 2017-03-20 DIAGNOSIS — Z79.01 LONG TERM (CURRENT) USE OF ANTICOAGULANTS: ICD-10-CM

## 2017-03-20 DIAGNOSIS — I48.3 TYPICAL ATRIAL FLUTTER: ICD-10-CM

## 2017-03-20 LAB
INR PPP: 1.5
PROTHROMBIN TIME: 16 SEC

## 2017-03-20 PROCEDURE — 85610 PROTHROMBIN TIME: CPT

## 2017-03-20 PROCEDURE — 36415 COLL VENOUS BLD VENIPUNCTURE: CPT

## 2017-03-23 ENCOUNTER — ANTI-COAG VISIT (OUTPATIENT)
Dept: CARDIOLOGY | Facility: CLINIC | Age: 77
End: 2017-03-23
Payer: MEDICARE

## 2017-03-23 DIAGNOSIS — I48.3 TYPICAL ATRIAL FLUTTER: ICD-10-CM

## 2017-03-23 DIAGNOSIS — Z79.01 LONG TERM (CURRENT) USE OF ANTICOAGULANTS: Primary | ICD-10-CM

## 2017-03-23 LAB — INR PPP: 2.6 (ref 2–3)

## 2017-03-23 PROCEDURE — 85610 PROTHROMBIN TIME: CPT | Mod: QW,S$GLB,,

## 2017-03-23 PROCEDURE — 99211 OFF/OP EST MAY X REQ PHY/QHP: CPT | Mod: 25,S$GLB,,

## 2017-03-23 NOTE — PROGRESS NOTES
Educated pt on coumadin diet and when to call CC; handouts given on each. Pts  is also my patient. She will be avoiding high vit k foods. She occasionally has moderate vit k foods. She was taking an MVI but ran out. Pt advised of vit K in MVI and needing to keep consistent. She will resume her MVI asap. No s/sx of bleeding. INR is moving nicely. I think adding the MVI will help level off INR, as it is increasing. Repeat INR Tuesday. Pt will alternate lab with clinic visits while needing frequent testing.

## 2017-03-28 ENCOUNTER — ANTI-COAG VISIT (OUTPATIENT)
Dept: CARDIOLOGY | Facility: CLINIC | Age: 77
End: 2017-03-28

## 2017-03-28 ENCOUNTER — LAB VISIT (OUTPATIENT)
Dept: LAB | Facility: HOSPITAL | Age: 77
End: 2017-03-28
Attending: INTERNAL MEDICINE
Payer: MEDICARE

## 2017-03-28 DIAGNOSIS — Z79.01 LONG TERM (CURRENT) USE OF ANTICOAGULANTS: ICD-10-CM

## 2017-03-28 DIAGNOSIS — I48.3 TYPICAL ATRIAL FLUTTER: ICD-10-CM

## 2017-03-28 LAB
INR PPP: 3.2
PROTHROMBIN TIME: 32.5 SEC

## 2017-03-28 PROCEDURE — 36415 COLL VENOUS BLD VENIPUNCTURE: CPT

## 2017-03-28 PROCEDURE — 85610 PROTHROMBIN TIME: CPT

## 2017-03-31 ENCOUNTER — LAB VISIT (OUTPATIENT)
Dept: LAB | Facility: HOSPITAL | Age: 77
End: 2017-03-31
Attending: INTERNAL MEDICINE
Payer: MEDICARE

## 2017-03-31 ENCOUNTER — ANTI-COAG VISIT (OUTPATIENT)
Dept: CARDIOLOGY | Facility: CLINIC | Age: 77
End: 2017-03-31

## 2017-03-31 DIAGNOSIS — I48.3 TYPICAL ATRIAL FLUTTER: ICD-10-CM

## 2017-03-31 DIAGNOSIS — Z79.01 LONG TERM (CURRENT) USE OF ANTICOAGULANTS: ICD-10-CM

## 2017-03-31 LAB
INR PPP: 3.9
PROTHROMBIN TIME: 39.1 SEC

## 2017-03-31 PROCEDURE — 36415 COLL VENOUS BLD VENIPUNCTURE: CPT

## 2017-03-31 PROCEDURE — 85610 PROTHROMBIN TIME: CPT

## 2017-04-03 ENCOUNTER — OFFICE VISIT (OUTPATIENT)
Dept: UROLOGY | Facility: CLINIC | Age: 77
End: 2017-04-03
Payer: MEDICARE

## 2017-04-03 VITALS
BODY MASS INDEX: 40.11 KG/M2 | SYSTOLIC BLOOD PRESSURE: 100 MMHG | HEIGHT: 66 IN | DIASTOLIC BLOOD PRESSURE: 62 MMHG | WEIGHT: 249.56 LBS | RESPIRATION RATE: 16 BRPM | HEART RATE: 68 BPM

## 2017-04-03 DIAGNOSIS — N20.0 NEPHROLITHIASIS: Primary | ICD-10-CM

## 2017-04-03 DIAGNOSIS — N39.46 MIXED INCONTINENCE URGE AND STRESS: ICD-10-CM

## 2017-04-03 DIAGNOSIS — N39.0 RECURRENT UTI: ICD-10-CM

## 2017-04-03 PROCEDURE — 99499 UNLISTED E&M SERVICE: CPT | Mod: S$GLB,,, | Performed by: UROLOGY

## 2017-04-03 PROCEDURE — 99024 POSTOP FOLLOW-UP VISIT: CPT | Mod: S$GLB,,, | Performed by: UROLOGY

## 2017-04-03 PROCEDURE — 99999 PR PBB SHADOW E&M-EST. PATIENT-LVL III: CPT | Mod: PBBFAC,,, | Performed by: UROLOGY

## 2017-04-03 NOTE — MR AVS SNAPSHOT
[facility]  [address] [facility] [address]  [place] [address]  Phone: [phone]                  Alaina Vee   4/3/2017 9:20 AM   Office Visit    Description:  Female : 1940   Provider:  Yvonne Weaver MD   Department:  [facility]           Reason for Visit     Follow-up     Flank Pain           Diagnoses this Visit        Comments    Nephrolithiasis    -  Primary     Recurrent UTI         Mixed incontinence urge and stress                To Do List           Future Appointments        Provider Department Dept Phone    2017 8:15 AM Jessica Copeland, PharmD [place] - Coumadin [phone]    2017 10:45 AM EKG, APPT [facility] - [phone]    2017 11:20 AM Patrick Lloyd MD [facility] - Arrhythmia [phone]    2017 2:00 PM HRA, [facility] 1 [facility] [phone]    2017 10:00 AM LAB, [facility] [phone]      Goals (5 Years of Data)     None      Follow-Up and Disposition     Return in about 3 months (around 7/3/2017) for KUB (x-ray) follow up, Stone surveillance.      [facility]     [facility] Nurse Care [facility] -  Assistance  Unless otherwise directed by your provider, please contact [facility], our nurse care line that is available for  assistance.     Registered nurses in the [facility] provide: appointment scheduling, clinical advisement, health education, and other advisory services.  Call: [phone] (toll free)               Medications           Message regarding Medications     Verify the changes and/or additions to your medication regime listed below are the same as discussed with your clinician today.  If any of these changes or additions are incorrect, please notify your healthcare provider.             Verify that the below list of medications is an accurate representation of the medications you are currently taking.  If none reported, the list  "may be blank. If incorrect, please contact your healthcare provider. Carry this list with you in case of emergency.           Current Medications     clotrimazole-betamethasone 1-0.05% (LOTRISONE) cream Apply topically 2 (two) times daily.    diclofenac sodium 1 % Gel Apply 2 g topically once daily.    fish oil-omega-3 fatty acids 300-1,000 mg capsule Take 1 g by mouth once daily.    hydrocodone-acetaminophen 5-325mg (NORCO) 5-325 mg per tablet Take 1 tablet by mouth every 12 (twelve) hours as needed for Pain.    hydrocodone-acetaminophen 5-325mg (NORCO) 5-325 mg per tablet Take 1 tablet by mouth every 6 (six) hours as needed for Pain.    ibuprofen (ADVIL,MOTRIN) 600 MG tablet TAKE 1 TABLET EVERY 8 HOURS AS NEEDED FOR PAIN     lisinopril (PRINIVIL,ZESTRIL) 40 MG tablet TAKE 1 TABLET ONE TIME DAILY    lovastatin (ALTOPREV) 40 mg 24 hr tablet Take 2 tablets (80 mg total) by mouth every evening.    multivitamin (THERAGRAN) per tablet Take 1 tablet by mouth Daily. 1 Tablet Oral Every day    omeprazole (PRILOSEC) 20 MG capsule TAKE 1 CAPSULE ONE TIME DAILY    oxybutynin (DITROPAN XL) 15 MG TR24 Take 1 tablet (15 mg total) by mouth once daily.    oxycodone-acetaminophen (PERCOCET) 5-325 mg per tablet Take 1 tablet by mouth every 4 (four) hours as needed for Pain.    tamsulosin (FLOMAX) 0.4 mg Cp24 Take 1 capsule (0.4 mg total) by mouth once daily.    warfarin (COUMADIN) 5 MG tablet Take 1 tablet (5 mg total) by mouth Daily.           Clinical Reference Information           Your Vitals Were     BP Pulse Resp Height Weight BMI    100/62 68 16 5' 6" (1.676 m) 113.2 kg (249 lb 9 oz) 40.28 kg/m2      Blood Pressure          Most Recent Value    BP  100/62      Allergies as of 4/3/2017     Lipitor [Atorvastatin]      Immunizations Administered on Date of Encounter - 4/3/2017     None      Orders Placed During Today's Visit     Future Labs/Procedures Expected by Expires    X-Ray KUB  7/3/2017 4/3/2018      [facility] Sign-Up     " Activating your [facility] account is as easy as 1-2-3!     1) Visit [email or web address], select Sign Up Now, enter this activation code and your date of birth, then select Next.  [email or web address]  Expires: 5/18/2017  9:59 AM      2) Create a username and password to use when you visit [facility] in the future and select a security question in case you lose your password and select Next.    3) Enter your e-mail address and click Sign Up!    Additional Information  If you have questions, please e-mail [email or web address] or call [phone] to talk to our [facility] staff. Remember, [facility] is NOT to be used for urgent needs. For medical emergencies, dial 911.         Language Assistance Services     ATTENTION: Language assistance services are available, free of charge. Please call [phone].      ATENCIÓN: Si habla jeramie, tiene a coyle disposición servicios gratuitos de asistencia lingüística. Llame al [phone].     CHÚ Ý: N?u b?n nói Ti?ng Vi?t, có các d?ch v? h? tr? ngôn ng? mi?n phí dành cho b?n. G?i s? [phone].         [facility] complies with applicable Federal civil rights laws and does not discriminate on the basis of race, color, national origin, age, disability, or sex.

## 2017-04-03 NOTE — PROGRESS NOTES
Subjective:       Alaina Vee is a 76 y.o. female who is an established patient who was referred by Dr Esquivel for evaluation of UTI.      She reports issues with recurrent UTIs. She was treated for UTI in 3/16 (100k E coli, pansensitive) and again in 4/16 (100k E coli). She has urinary frequency associated with UTIs. Nocturia x 1-2. She has urgency and UUI at baseline. Also with fecal urgency/incontinence. She was given Ditropan 5mg BID years ago. Also with RICHARD. She has not noted if this has helped. Denies current dysuria. Denies hematuria. No h/o kidney stones.     She has significant LBP issues. She also reports facial and R shoulder/arm/torso pain.     She was treated for UTI after initial visit. She remains on Ditropan IR not up to TID, declined ER formulations. She reports taking another medication for UI from me but I don't have records of this.     SERG (5/16) showed ruslanley 9mm stone in LLP. PVR 2cc. Cytology was negative but noted uric acid crystals.     CT 7/16 - 7mm L UP, 6mm L LP stones and 8mm R renal pelvis stone.   KUB 7/16 - shows 7mm R stone but difficult to see (unsure if truly the stone)    KUB 1/17 - 7mm R renal stone though hard to see (likely overlying 12th rib)    She started Ditropan XL 15mg previously and had great improvement. She continues to have significant back issues.    She started to develop R flank pain and therefore CT scan ordered. CT showed 8mm stone at R UPJ, visible on KUB. She is now s/p R ESWL. Stone was noted to fragment well. She did not note passing any stone and is still having flank pain.     CT with R LP stone, no obstruction. Recently started on Coumadin for a fib.       The following portions of the patient's history were reviewed and updated as appropriate: allergies, current medications, past family history, past medical history, past social history, past surgical history and problem list.    Review of Systems  Constitutional: no fever or chills  ENT: no  "nasal congestion or sore throat  Respiratory: no cough or shortness of breath  Cardiovascular: no chest pain or palpitations  Gastrointestinal: no nausea or vomiting, tolerating diet  Genitourinary: as per HPI  Hematologic/Lymphatic: no easy bruising or lymphadenopathy  Musculoskeletal: no arthralgias or myalgias  Skin: no rashes or lesions  Neurological: no seizures or tremors  Behavioral/Psych: no auditory or visual hallucinations       Objective:    Vitals:   /62  Pulse 68  Resp 16  Ht 5' 6" (1.676 m)  Wt 113.2 kg (249 lb 9 oz)  BMI 40.28 kg/m2    Physical Exam   General: well developed, well nourished in no acute distress  Head: normocephalic, atraumatic  Neck: supple, trachea midline, no obvious enlargement of thyroid  HEENT: EOMI, mucus membranes moist, sclera anicteric, no hearing impairment  Lungs: symmetric expansion, non-labored breathing  Cardiovascular: regular rate and rhythm, normal pulses  Abdomen: soft, non tender, non distended, no palpable masses, no hepatosplenomegaly, no hernias, no CVA tenderness  Musculoskeletal: no peripheral edema, normal ROM in bilateral upper and lower extremities  Lymphatics: no cervical or inguinal lymphadenopathy  Skin: no rashes or lesions  Neuro: alert and oriented x 3, no gross deficits  Psych: normal judgment and insight, normal mood/affect and non-anxious  Genitourinary:   patient declined exam      Lab Review   Urine analysis today in clinic shows - 50 RBC    Lab Results   Component Value Date    WBC 6.30 02/22/2017    HGB 12.6 02/22/2017    HCT 38.4 02/22/2017    MCV 91 02/22/2017     02/22/2017     Lab Results   Component Value Date    CREATININE 1.1 02/22/2017    BUN 19 02/22/2017         Imaging  Images and reports were personally reviewed by me and discussed with patient  CT, SERG, KUB reviewed       Assessment/Plan:      1. Recurrent UTI / Nephrolithiasis   - UI likely contributing to UTIs   - Avoid constipation   - PVR acceptable   - " Possible stone in LLP, uric acid crystals on cytology   - Discussed Estrace, will hold for now   - Consider cystoscopy in future   - CT - 2 stones on L, 1 stone on R. Only R stone visible on KUB.    - 8mm R UPJ stone. Discussed options - s/p R ESWL. Discussed risks, benefits, alternatives.   - s/p R ESWL on 2/27/17 - stone with excellent response   - KUB - residual stone.    - CT - no obstructing stones, R LP noted     2. Mixed incontinence urge and stress    - Discussed difference of UUI and RICHARD components. Reviewed etiology and workup of each.   - RICHARD: Kegels, PFPT, bulking agent, MUS.   - UUI: Behavioral changes, PFPT, anticholinergics. Botox/InterStim for refractory UUI.   - Doing great with Ditropan XL 15mg, continue         Follow up in 3 months w KUB

## 2017-04-05 ENCOUNTER — ANTI-COAG VISIT (OUTPATIENT)
Dept: CARDIOLOGY | Facility: CLINIC | Age: 77
End: 2017-04-05
Payer: MEDICARE

## 2017-04-05 DIAGNOSIS — Z79.01 LONG TERM (CURRENT) USE OF ANTICOAGULANTS: Primary | ICD-10-CM

## 2017-04-05 DIAGNOSIS — I48.3 TYPICAL ATRIAL FLUTTER: ICD-10-CM

## 2017-04-05 LAB — INR PPP: 2.3 (ref 2–3)

## 2017-04-05 PROCEDURE — 99211 OFF/OP EST MAY X REQ PHY/QHP: CPT | Mod: 25,S$GLB,,

## 2017-04-05 PROCEDURE — 85610 PROTHROMBIN TIME: CPT | Mod: QW,S$GLB,,

## 2017-04-05 NOTE — PROGRESS NOTES
Pt here for close monitoring due to recent high INR and new start. INR good today. Pt confirmed dose and compliance. She has not started her MVI. Pt advised if she wants to be on a MVI then now is the time to start it. She is hesitating. Pt advised to let us know if/when she starts it. For now, we will adjust dose based on weekly trend. Repeat INR next week while at [facility] in lab.

## 2017-04-06 DIAGNOSIS — I48.92 ATRIAL FLUTTER, UNSPECIFIED TYPE: Primary | ICD-10-CM

## 2017-04-12 ENCOUNTER — INITIAL CONSULT (OUTPATIENT)
Dept: ELECTROPHYSIOLOGY | Facility: CLINIC | Age: 77
End: 2017-04-12
Payer: MEDICARE

## 2017-04-12 ENCOUNTER — LAB VISIT (OUTPATIENT)
Dept: LAB | Facility: HOSPITAL | Age: 77
End: 2017-04-12
Payer: MEDICARE

## 2017-04-12 ENCOUNTER — HOSPITAL ENCOUNTER (OUTPATIENT)
Dept: CARDIOLOGY | Facility: CLINIC | Age: 77
Discharge: HOME | End: 2017-04-12
Payer: MEDICARE

## 2017-04-12 ENCOUNTER — ANTI-COAG VISIT (OUTPATIENT)
Dept: CARDIOLOGY | Facility: CLINIC | Age: 77
End: 2017-04-12

## 2017-04-12 VITALS
SYSTOLIC BLOOD PRESSURE: 107 MMHG | HEIGHT: 66 IN | HEART RATE: 64 BPM | WEIGHT: 246 LBS | DIASTOLIC BLOOD PRESSURE: 57 MMHG | BODY MASS INDEX: 39.53 KG/M2

## 2017-04-12 DIAGNOSIS — I48.3 TYPICAL ATRIAL FLUTTER: Primary | ICD-10-CM

## 2017-04-12 DIAGNOSIS — I48.92 ATRIAL FLUTTER, UNSPECIFIED TYPE: ICD-10-CM

## 2017-04-12 DIAGNOSIS — I48.3 TYPICAL ATRIAL FLUTTER: ICD-10-CM

## 2017-04-12 DIAGNOSIS — E78.5 HYPERLIPIDEMIA, UNSPECIFIED HYPERLIPIDEMIA TYPE: ICD-10-CM

## 2017-04-12 DIAGNOSIS — I48.0 PAROXYSMAL ATRIAL FIBRILLATION: ICD-10-CM

## 2017-04-12 DIAGNOSIS — I10 ESSENTIAL HYPERTENSION: ICD-10-CM

## 2017-04-12 DIAGNOSIS — Z79.01 LONG TERM (CURRENT) USE OF ANTICOAGULANTS: ICD-10-CM

## 2017-04-12 DIAGNOSIS — G47.33 OSA (OBSTRUCTIVE SLEEP APNEA): ICD-10-CM

## 2017-04-12 LAB
INR PPP: 2.1
PROTHROMBIN TIME: 21.4 SEC

## 2017-04-12 PROCEDURE — 1126F AMNT PAIN NOTED NONE PRSNT: CPT | Mod: S$GLB,,, | Performed by: INTERNAL MEDICINE

## 2017-04-12 PROCEDURE — 3078F DIAST BP <80 MM HG: CPT | Mod: S$GLB,,, | Performed by: INTERNAL MEDICINE

## 2017-04-12 PROCEDURE — 99999 PR PBB SHADOW E&M-EST. PATIENT-LVL IV: CPT | Mod: PBBFAC,,, | Performed by: INTERNAL MEDICINE

## 2017-04-12 PROCEDURE — 3074F SYST BP LT 130 MM HG: CPT | Mod: S$GLB,,, | Performed by: INTERNAL MEDICINE

## 2017-04-12 PROCEDURE — 1160F RVW MEDS BY RX/DR IN RCRD: CPT | Mod: S$GLB,,, | Performed by: INTERNAL MEDICINE

## 2017-04-12 PROCEDURE — 99499 UNLISTED E&M SERVICE: CPT | Mod: S$GLB,,, | Performed by: INTERNAL MEDICINE

## 2017-04-12 PROCEDURE — 93000 ELECTROCARDIOGRAM COMPLETE: CPT | Mod: S$GLB,,, | Performed by: INTERNAL MEDICINE

## 2017-04-12 PROCEDURE — 99215 OFFICE O/P EST HI 40 MIN: CPT | Mod: S$GLB,,, | Performed by: INTERNAL MEDICINE

## 2017-04-12 PROCEDURE — 1159F MED LIST DOCD IN RCRD: CPT | Mod: S$GLB,,, | Performed by: INTERNAL MEDICINE

## 2017-04-12 PROCEDURE — 36415 COLL VENOUS BLD VENIPUNCTURE: CPT

## 2017-04-12 PROCEDURE — 1157F ADVNC CARE PLAN IN RCRD: CPT | Mod: S$GLB,,, | Performed by: INTERNAL MEDICINE

## 2017-04-12 PROCEDURE — 85610 PROTHROMBIN TIME: CPT

## 2017-04-12 NOTE — PROGRESS NOTES
Subjective:    Patient ID:  Alaina Vee is a 76 y.o. female who presents for evaluation of Atrial Flutter      HPI   76 y.o. F  HTN HL SULEMA(not on CPAP)    AFL detected during recent preprocedure (kidney stones) exam. Completely asx, though does get brief palps about once per day to once per week (short lived).  No syncope, ever.  Unlimited walking tolerance.  Saw Dr Hidalgo; Rx xarelto, but $$ -> coumadin.    ECGs of 2/23, 2/22: typical AFL  today: AF with controlled V response    echo 3/17: 55%.    My interpretation of today's ECG is AF with normal HR    Review of Systems   Constitution: Negative. Negative for weakness and malaise/fatigue.   HENT: Negative.  Negative for ear pain and tinnitus.    Eyes: Negative for blurred vision.   Cardiovascular: Positive for palpitations. Negative for chest pain, dyspnea on exertion, near-syncope and syncope.   Respiratory: Negative.  Negative for shortness of breath.    Endocrine: Negative.  Negative for polyuria.   Hematologic/Lymphatic: Does not bruise/bleed easily.   Skin: Negative.  Negative for rash.   Musculoskeletal: Negative.  Negative for joint pain and muscle weakness.   Gastrointestinal: Negative.  Negative for abdominal pain and change in bowel habit.   Genitourinary: Negative for frequency.   Neurological: Negative.  Negative for dizziness.   Psychiatric/Behavioral: Negative.  Negative for depression. The patient is not nervous/anxious.    Allergic/Immunologic: Negative for environmental allergies.        Objective:    Physical Exam   Constitutional: She is oriented to person, place, and time. Vital signs are normal. She appears well-developed and well-nourished. She is active and cooperative.   HENT:   Head: Normocephalic and atraumatic.   Eyes: Conjunctivae and EOM are normal.   Neck: Normal range of motion. Carotid bruit is not present. No tracheal deviation and no edema present. No thyroid mass and no thyromegaly present.   Cardiovascular: Normal rate,  normal heart sounds, intact distal pulses and normal pulses.  An irregularly irregular rhythm present.  No extrasystoles are present. PMI is not displaced.  Exam reveals no gallop and no friction rub.    No murmur heard.  Pulmonary/Chest: Effort normal and breath sounds normal. No respiratory distress. She has no wheezes. She has no rales.   Abdominal: Soft. Normal appearance. She exhibits no distension. There is no hepatosplenomegaly.   Musculoskeletal: Normal range of motion.   Neurological: She is alert and oriented to person, place, and time. Coordination normal.   Skin: Skin is warm and dry. No rash noted.   Psychiatric: She has a normal mood and affect. Her speech is normal and behavior is normal. Thought content normal. Cognition and memory are normal.   Nursing note and vitals reviewed.        Assessment:       1. Typical atrial flutter    2. Paroxysmal atrial fibrillation    3. SULEMA (obstructive sleep apnea)    4. Essential hypertension    5. Hyperlipidemia, unspecified hyperlipidemia type         Plan:       Discussed AFL and its basic pathophysiology, including its health implications and treatment options (rate vs. rhythm control, meds vs. procedural/device treatment) as appropriate for the patient.  Discussed AF and its basic pathophysiology, including its health implications and treatment options (rate vs. rhythm control, meds vs. procedural/device treatment) as appropriate for the patient.  Discussed that AF and AFL have both been seen, and that control of one (AFL) via ablation may facilitate control of the other (AF).  Discussed that in the presence of uncontrolled/incompletely treated obstructive sleep apnea, AF is very difficult to treat. Need to optimize SULEMA treatment in order to optimize attempts at AF treatment.    Sleep consult re: SULEMA. Need to treat SULEMA to facilitate control of AF.  RFA of AFL  SKINNY before ablation if in AF/AFL    I spent about a half hour discussing the nature of EP study and  ablation, including possible transseptal puncture. We discused risks and benefits at length. Our discussion included, but was not limited to the risk of death, infection, bleeding, stroke, MI, cardiac perforation, embolism, cardiac tamponade, skin burns, and other organic injury including the possibility for need for surgery or pacemaker implantation.  I discussed with patient risks, indications, benefits, and alternatives of the planned procedure. All questions were answered. Patient understands and wishes to proceed.

## 2017-04-12 NOTE — MR AVS SNAPSHOT
[facility]  [address]  [facility] [address]  Phone: [phone]  Fax: [phone]                  Alaina Vee   2017 11:20 AM   Initial consult    Description:  Female : 1940   Provider:  Patrick Lloyd MD   Department:  [facility]           Reason for Visit     Atrial Flutter           Diagnoses this Visit        Comments    Typical atrial flutter    -  Primary     Paroxysmal atrial fibrillation         SULEMA (obstructive sleep apnea)         Essential hypertension         Hyperlipidemia, unspecified hyperlipidemia type                To Do List           Future Appointments        Provider Department Dept Phone    2017 12:00 PM LAB, APPOINTMENT [place] [facility] [phone]    2017 2:00 PM HRA, [place] 1 [facility] [phone]    2017 10:00 AM LAB, [facility] [phone]    2017 10:40 AM Nahid Hidalgo MD [facility] [phone]    7/3/2017 9:15 AM [facility] XR3 [facility] [phone]      Goals (5 Years of Data)     None      [facility]     [facility] Nurse Care Line -  Assistance  Unless otherwise directed by your provider, please contact [facility], our nurse care line that is available for  assistance.     Registered nurses in the [facility] provide: appointment scheduling, clinical advisement, health education, and other advisory services.  Call: [phone] (toll free)               Medications           Message regarding Medications     Verify the changes and/or additions to your medication regime listed below are the same as discussed with your clinician today.  If any of these changes or additions are incorrect, please notify your healthcare provider.        STOP taking these medications     ibuprofen (ADVIL,MOTRIN) 600 MG tablet TAKE 1 TABLET EVERY 8 HOURS AS NEEDED FOR PAIN      "       Verify that the below list of medications is an accurate representation of the medications you are currently taking.  If none reported, the list may be blank. If incorrect, please contact your healthcare provider. Carry this list with you in case of emergency.           Current Medications     clotrimazole-betamethasone 1-0.05% (LOTRISONE) cream Apply topically 2 (two) times daily.    diclofenac sodium 1 % Gel Apply 2 g topically once daily.    fish oil-omega-3 fatty acids 300-1,000 mg capsule Take 1 g by mouth once daily.    hydrocodone-acetaminophen 5-325mg (NORCO) 5-325 mg per tablet Take 1 tablet by mouth every 6 (six) hours as needed for Pain.    lisinopril (PRINIVIL,ZESTRIL) 40 MG tablet TAKE 1 TABLET ONE TIME DAILY    lovastatin (ALTOPREV) 40 mg 24 hr tablet Take 2 tablets (80 mg total) by mouth every evening.    multivitamin (THERAGRAN) per tablet Take 1 tablet by mouth Daily. 1 Tablet Oral Every day    omeprazole (PRILOSEC) 20 MG capsule TAKE 1 CAPSULE ONE TIME DAILY    oxybutynin (DITROPAN XL) 15 MG TR24 Take 1 tablet (15 mg total) by mouth once daily.    oxycodone-acetaminophen (PERCOCET) 5-325 mg per tablet Take 1 tablet by mouth every 4 (four) hours as needed for Pain.    tamsulosin (FLOMAX) 0.4 mg Cp24 Take 1 capsule (0.4 mg total) by mouth once daily.    warfarin (COUMADIN) 5 MG tablet Take 1 tablet (5 mg total) by mouth Daily.           Clinical Reference Information           Your Vitals Were     BP Pulse Height Weight BMI    107/57 64 5' 6" (1.676 m) 111.6 kg (246 lb) 39.71 kg/m2      Blood Pressure          Most Recent Value    BP  (!)  107/57      Allergies as of 4/12/2017     Lipitor [Atorvastatin]      Immunizations Administered on Date of Encounter - 4/12/2017     None      Orders Placed During Today's Visit      Normal Orders This Visit    Ambulatory consult to Sleep Disorders       [facility] Sign-Up     Activating your [facility] account is as easy as 1-2-3!     1) Visit " [email or web address], select Sign Up Now, enter this activation code and your date of birth, then select Next.  [email or web address]  Expires: 5/18/2017  9:59 AM      2) Create a username and password to use when you visit [facility] in the future and select a security question in case you lose your password and select Next.    3) Enter your e-mail address and click Sign Up!    Additional Information  If you have questions, please e-mail [email or web address] or call [phone] to talk to our [facility] staff. Remember, [facility] is NOT to be used for urgent needs. For medical emergencies, dial 911.         Language Assistance Services     ATTENTION: Language assistance services are available, free of charge. Please call [phone].      ATENCIÓN: Si habla español, tiene a coyle disposición servicios gratuitos de asistencia lingüística. Llame al [phone].     CHÚ Ý: N?u b?n nói Ti?ng Vi?t, có các d?ch v? h? tr? ngôn ng? mi?n phí dành cho b?n. G?i s? [phone].         [facility] complies with applicable Federal civil rights laws and does not discriminate on the basis of race, color, national origin, age, disability, or sex.

## 2017-04-12 NOTE — LETTER
April 12, 2017      Nahid Hidalgo MD  [address]  [address]  [facility] [address]           [facility]  [address]  [facility] [address]  Phone: [phone]  Fax: [phone]          Patient: Alaina Vee   MR Number: [id number]   YOB: 1940   Date of Visit: 4/12/2017       Dear Dr. Nahid Hidalgo:    Thank you for referring Alaina Vee to me for evaluation. Attached you will find relevant portions of my assessment and plan of care.    If you have questions, please do not hesitate to call me. I look forward to following Alaina Vee along with you.    Sincerely,    Patrick Lloyd MD    Enclosure  CC:  No Recipients    If you would like to receive this communication electronically, please contact [email or web address] or [phone] to request more information on [facility] access.    For providers and/or their staff who would like to refer a patient to [facility], please contact us through our one-stop-shop provider referral line, [facility], at [phone].    If you feel you have received this communication in error or would no longer like to receive these types of communications, please e-mail [email or web address]

## 2017-04-19 ENCOUNTER — ANTI-COAG VISIT (OUTPATIENT)
Dept: CARDIOLOGY | Facility: CLINIC | Age: 77
End: 2017-04-19
Payer: MEDICARE

## 2017-04-19 DIAGNOSIS — I48.3 TYPICAL ATRIAL FLUTTER: ICD-10-CM

## 2017-04-19 DIAGNOSIS — Z79.01 LONG TERM (CURRENT) USE OF ANTICOAGULANTS: Primary | ICD-10-CM

## 2017-04-19 LAB — INR PPP: 1.6 (ref 2–3)

## 2017-04-19 PROCEDURE — 99211 OFF/OP EST MAY X REQ PHY/QHP: CPT | Mod: 25,S$GLB,,

## 2017-04-19 PROCEDURE — 85610 PROTHROMBIN TIME: CPT | Mod: QW,S$GLB,,

## 2017-04-19 RX ORDER — WARFARIN SODIUM 5 MG/1
2.5-5 TABLET ORAL DAILY
Qty: 90 TABLET | Refills: 3 | Status: SHIPPED | OUTPATIENT
Start: 2017-04-19 | End: 2018-05-30 | Stop reason: SDUPTHER

## 2017-04-20 ENCOUNTER — OFFICE VISIT (OUTPATIENT)
Dept: FAMILY MEDICINE | Facility: CLINIC | Age: 77
End: 2017-04-20
Payer: MEDICARE

## 2017-04-20 VITALS
HEART RATE: 60 BPM | DIASTOLIC BLOOD PRESSURE: 86 MMHG | TEMPERATURE: 98 F | SYSTOLIC BLOOD PRESSURE: 122 MMHG | WEIGHT: 246.94 LBS | RESPIRATION RATE: 17 BRPM | HEIGHT: 66 IN | OXYGEN SATURATION: 95 % | BODY MASS INDEX: 39.69 KG/M2

## 2017-04-20 DIAGNOSIS — I48.3 TYPICAL ATRIAL FLUTTER: ICD-10-CM

## 2017-04-20 DIAGNOSIS — N39.46 MIXED INCONTINENCE URGE AND STRESS: ICD-10-CM

## 2017-04-20 DIAGNOSIS — I48.0 PAROXYSMAL ATRIAL FIBRILLATION: ICD-10-CM

## 2017-04-20 DIAGNOSIS — I87.8 VENOUS STASIS OF LOWER EXTREMITY: ICD-10-CM

## 2017-04-20 DIAGNOSIS — Z00.00 ENCOUNTER FOR PREVENTIVE HEALTH EXAMINATION: Primary | ICD-10-CM

## 2017-04-20 DIAGNOSIS — I10 ESSENTIAL HYPERTENSION: ICD-10-CM

## 2017-04-20 DIAGNOSIS — E78.5 HYPERLIPIDEMIA, UNSPECIFIED HYPERLIPIDEMIA TYPE: ICD-10-CM

## 2017-04-20 DIAGNOSIS — Z79.01 LONG TERM (CURRENT) USE OF ANTICOAGULANTS: ICD-10-CM

## 2017-04-20 DIAGNOSIS — N39.0 RECURRENT UTI: ICD-10-CM

## 2017-04-20 DIAGNOSIS — N20.0 NEPHROLITHIASIS: ICD-10-CM

## 2017-04-20 DIAGNOSIS — G47.33 OSA (OBSTRUCTIVE SLEEP APNEA): ICD-10-CM

## 2017-04-20 DIAGNOSIS — K21.9 GASTROESOPHAGEAL REFLUX DISEASE WITHOUT ESOPHAGITIS: ICD-10-CM

## 2017-04-20 PROCEDURE — 99499 UNLISTED E&M SERVICE: CPT | Mod: S$GLB,,, | Performed by: NURSE PRACTITIONER

## 2017-04-20 PROCEDURE — 3079F DIAST BP 80-89 MM HG: CPT | Mod: S$GLB,,, | Performed by: NURSE PRACTITIONER

## 2017-04-20 PROCEDURE — 3074F SYST BP LT 130 MM HG: CPT | Mod: S$GLB,,, | Performed by: NURSE PRACTITIONER

## 2017-04-20 PROCEDURE — G0439 PPPS, SUBSEQ VISIT: HCPCS | Mod: S$GLB,,, | Performed by: NURSE PRACTITIONER

## 2017-04-20 PROCEDURE — 99999 PR PBB SHADOW E&M-EST. PATIENT-LVL IV: CPT | Mod: PBBFAC,,, | Performed by: NURSE PRACTITIONER

## 2017-04-20 NOTE — PATIENT INSTRUCTIONS
Counseling and Referral of Other Preventative  (Italic type indicates deductible and co-insurance are waived)    Patient Name: Alaina Vee  Today's Date: 4/20/2017      SERVICE LIMITATIONS RECOMMENDATION    Vaccines    · Pneumococcal (once after 65)    · Influenza (annually)    · Hepatitis B (if medium/high risk)    · Prevnar 13      Hepatitis B medium/high risk factors:       - End-stage renal disease       - Hemophiliacs who received Factor VII or         IX concentrates       - Clients of institutions for the mentally             retarded       - Persons who live in the same house as          a HepB carrier       - Homosexual men       - Illicit injectable drug abusers     Pneumococcal: N/A     Influenza: Done, repeat in one year     Hepatitis B: N/A     Prevnar 13: N/A    Mammogram (biennial age 50-74)  Annually (age 40 or over)  Done this year, repeat every year    Pap (up to age 70 and after 70 if unknown history or abnormal study last 10 years)    N/A     The USPSTF recommends against screening for cervical cancer in women older than age 65 years who have had adequate prior screening and are not otherwise at high risk for cervical cancer.      Colorectal cancer screening (to age 75)    · Fecal occult blood test (annual)  · Flexible sigmoidoscopy (5y)  · Screening colonoscopy (10y)  · Barium enema   N/A    Diabetes self-management training (no USPSTF recommendations)  Requires referral by treating physician for patient with diabetes or renal disease. 10 hours of initial DSMT sessions of no less than 30 minutes each in a continuous 12-month period. 2 hours of follow-up DSMT in subsequent years.  N/A    Bone mass measurements (age 65 & older, biennial)  Requires diagnosis related to osteoporosis or estrogen deficiency. Biennial benefit unless patient has history of long-term glucocorticoid  Done this year, repeat every year    Glaucoma screening (no USPSTF recommendation)  Diabetes mellitus, family history    , age 50 or over    American, age 65 or over  N/A    Medical nutrition therapy for diabetes or renal disease (no recommended schedule)  Requires referral by treating physician for patient with diabetes or renal disease or kidney transplant within the past 3 years.  Can be provided in same year as diabetes self-management training (DSMT), and CMS recommends medical nutrition therapy take place after DSMT. Up to 3 hours for initial year and 2 hours in subsequent years.  N/A    Cardiovascular screening blood tests (every 5 years)  · Fasting lipid panel  Order as a panel if possible  N/A    Diabetes screening tests (at least every 3 years, Medicare covers annually or at 6-month intervals for prediabetic patients)  · Fasting blood sugar (FBS) or glucose tolerance test (GTT)  Patient must be diagnosed with one of the following:       - Hypertension       - Dyslipidemia       - Obesity (BMI 30kg/m2)       - Previous elevated impaired FBS or GTT       ... or any two of the following:       - Overweight (BMI 25 but <30)       - Family history of diabetes       - Age 65 or older       - History of gestational diabetes or birth of baby weighing more than 9 pounds  N/A    Abdominal aortic aneurysm screening (once)  · Sonogram   Limited to patients who meet one of the following criteria:       - Men who are 65-75 years old and have smoked more than 100 cigarette in their lifetime       - Anyone with a family history of abdominal aortic aneurysm       - Anyone recommended for screening by the USPSTF  N/A    HIV screening (annually for increased risk patients)  · HIV-1 and HIV-2 by EIA, or NGOC, rapid antibody test or oral mucosa transudate  Patients must be at increased risk for HIV infection per USPSTF guidelines or pregnant. Tests covered annually for patient at increased risk or as requested by the patient. Pregnant patients may receive up to 3 tests during pregnancy.  Risks discussed, screening is not  recommended    Smoking cessation counseling (up to 8 sessions per year)  Patients must be asymptomatic of tobacco-related conditions to receive as a preventative service.  Non-smoker    Subsequent annual wellness visit  At least 12 months since last AWV  Return in one year     The following information is provided to all patients.  This information is to help you find resources for any of the problems found today that may be affecting your health:                Living healthy guide: www.[facility].[place].[facility]      Understanding Diabetes: www.diabetes.org      Eating healthy: www.cdc.gov/healthyweight      [facility] home safety checklist: www.cdc.gov/steadi/patient.html      Agency on Aging: www.goea.[place].[facility]      Alcoholics anonymous (AA): www.aa.org      Physical Activity: www.[place].nih.gov/fd1lyry      Tobacco use: www.quitwithusla.org

## 2017-04-21 PROBLEM — N20.0 RENAL STONE: Status: RESOLVED | Noted: 2017-02-27 | Resolved: 2017-04-21

## 2017-04-21 PROBLEM — E66.01 MORBID OBESITY DUE TO EXCESS CALORIES: Status: RESOLVED | Noted: 2017-02-23 | Resolved: 2017-04-21

## 2017-04-21 NOTE — PROGRESS NOTES
Subjective:       Patient ID: Alaina Vee is a 76 y.o. female.    Chief Complaint: HRA    HPI Ms Vee is here for her annual wellness exam.  She feels well today.  Review of Systems   Constitutional: Negative for fever.   Respiratory: Negative.    Cardiovascular: Negative.        Objective:      Physical Exam   Constitutional: She is oriented to person, place, and time. She appears well-developed and well-nourished. She does not appear ill. No distress.   Cardiovascular: Normal rate, regular rhythm and normal heart sounds.  Exam reveals no friction rub.    No murmur heard.  Pulses:       Dorsalis pedis pulses are 2+ on the right side, and 2+ on the left side.        Posterior tibial pulses are 2+ on the right side, and 2+ on the left side.   Pulmonary/Chest: Effort normal and breath sounds normal. No respiratory distress. She has no decreased breath sounds. She has no wheezes. She has no rhonchi. She has no rales.   Musculoskeletal: Normal range of motion. She exhibits no edema.   Neurological: She is alert and oriented to person, place, and time.   Skin: Skin is warm and dry. No erythema.   Psychiatric: She has a normal mood and affect. Her behavior is normal.   Vitals reviewed.      Assessment:       1. Encounter for preventive health examination    2. Gastroesophageal reflux disease without esophagitis    3. Essential hypertension    4. Hyperlipidemia, unspecified hyperlipidemia type    5. Long term (current) use of anticoagulants    6. Mixed incontinence urge and stress    7. SULEMA (obstructive sleep apnea)    8. Paroxysmal atrial fibrillation    9. Nephrolithiasis    10. Recurrent UTI    11. Typical atrial flutter    12. Venous stasis of lower extremity        Plan:       Encounter for preventive health examination    Gastroesophageal reflux disease without esophagitis  Comments:  controlled, continue omeprazole    Essential hypertension  Comments:  controlled, continue lisinopril    Hyperlipidemia,  unspecified hyperlipidemia type  Comments:  continue to monitor, continue lovastatin    Long term (current) use of anticoagulants  Comments:  controlled, continue coumadin, keep all f/u with Dr Lloyd    Mixed incontinence urge and stress  Comments:  continue to monitor, keep all appointments with Dr Weaver    SULEMA (obstructive sleep apnea)  Comments:  controlled, continue CPAP use    Paroxysmal atrial fibrillation  Comments:  controlled, continue coumadin, keep all f/u with Dr Lloyd    Nephrolithiasis  Comments:  continue to monitor, keep all appointments with Dr Weaver    Recurrent UTI  Comments:  continue to monitor, keep all appointments with Dr Weaver    Typical atrial flutter  Comments:  controlled, continue coumadin, keep all f/u with Dr Lloyd    Venous stasis of lower extremity  Comments:  controlled, continue to monitor    Pt instructed this does not replace routine followups with her PCP.  Follow up as previously directed.  Keep all f/u with specialists.  Verbalized understanding

## 2017-04-26 ENCOUNTER — ANTI-COAG VISIT (OUTPATIENT)
Dept: CARDIOLOGY | Facility: CLINIC | Age: 77
End: 2017-04-26

## 2017-04-26 ENCOUNTER — LAB VISIT (OUTPATIENT)
Dept: LAB | Facility: HOSPITAL | Age: 77
End: 2017-04-26
Attending: INTERNAL MEDICINE
Payer: MEDICARE

## 2017-04-26 DIAGNOSIS — Z79.01 LONG TERM (CURRENT) USE OF ANTICOAGULANTS: ICD-10-CM

## 2017-04-26 DIAGNOSIS — I48.3 TYPICAL ATRIAL FLUTTER: ICD-10-CM

## 2017-04-26 LAB
INR PPP: 2.2
PROTHROMBIN TIME: 22.7 SEC

## 2017-04-26 PROCEDURE — 85610 PROTHROMBIN TIME: CPT

## 2017-04-26 PROCEDURE — 36415 COLL VENOUS BLD VENIPUNCTURE: CPT

## 2017-05-03 ENCOUNTER — ANTI-COAG VISIT (OUTPATIENT)
Dept: CARDIOLOGY | Facility: CLINIC | Age: 77
End: 2017-05-03
Payer: MEDICARE

## 2017-05-03 DIAGNOSIS — Z79.01 LONG TERM (CURRENT) USE OF ANTICOAGULANTS: Primary | ICD-10-CM

## 2017-05-03 DIAGNOSIS — I48.3 TYPICAL ATRIAL FLUTTER: ICD-10-CM

## 2017-05-03 LAB — INR PPP: 4.2 (ref 2–3)

## 2017-05-03 PROCEDURE — 85610 PROTHROMBIN TIME: CPT | Mod: QW,S$GLB,,

## 2017-05-03 PROCEDURE — 99211 OFF/OP EST MAY X REQ PHY/QHP: CPT | Mod: 25,S$GLB,,

## 2017-05-03 NOTE — PROGRESS NOTES
INR high. Pt confirmed dose and compliance. She reports a couple alcoholic beverages last night which is not normal for her. No other changes. No s/sx of bleeding. I suspect INR is high due to alcohol however dose not yet established. We will hold a dose today and resume current dose until follow up next week.

## 2017-05-11 ENCOUNTER — ANTI-COAG VISIT (OUTPATIENT)
Dept: CARDIOLOGY | Facility: CLINIC | Age: 77
End: 2017-05-11

## 2017-05-11 ENCOUNTER — LAB VISIT (OUTPATIENT)
Dept: LAB | Facility: HOSPITAL | Age: 77
End: 2017-05-11
Attending: INTERNAL MEDICINE
Payer: MEDICARE

## 2017-05-11 DIAGNOSIS — Z79.01 LONG TERM (CURRENT) USE OF ANTICOAGULANTS: ICD-10-CM

## 2017-05-11 DIAGNOSIS — I48.3 TYPICAL ATRIAL FLUTTER: ICD-10-CM

## 2017-05-11 LAB
INR PPP: 3.8
PROTHROMBIN TIME: 38.1 SEC

## 2017-05-11 PROCEDURE — 36415 COLL VENOUS BLD VENIPUNCTURE: CPT

## 2017-05-11 PROCEDURE — 85610 PROTHROMBIN TIME: CPT

## 2017-05-11 RX ORDER — OXYBUTYNIN CHLORIDE 5 MG/1
TABLET ORAL
Qty: 180 TABLET | Refills: 1 | Status: SHIPPED | OUTPATIENT
Start: 2017-05-11 | End: 2017-09-05 | Stop reason: ALTCHOICE

## 2017-05-15 RX ORDER — OMEPRAZOLE 20 MG/1
20 CAPSULE, DELAYED RELEASE ORAL DAILY
Qty: 90 CAPSULE | Refills: 1 | Status: SHIPPED | OUTPATIENT
Start: 2017-05-15 | End: 2018-09-13

## 2017-05-18 ENCOUNTER — ANTI-COAG VISIT (OUTPATIENT)
Dept: CARDIOLOGY | Facility: CLINIC | Age: 77
End: 2017-05-18
Payer: MEDICARE

## 2017-05-18 DIAGNOSIS — Z79.01 LONG TERM (CURRENT) USE OF ANTICOAGULANTS: Primary | ICD-10-CM

## 2017-05-18 DIAGNOSIS — I48.3 TYPICAL ATRIAL FLUTTER: ICD-10-CM

## 2017-05-18 LAB — INR PPP: 2.8 (ref 2–3)

## 2017-05-18 PROCEDURE — 85610 PROTHROMBIN TIME: CPT | Mod: QW,S$GLB,,

## 2017-05-18 PROCEDURE — 99211 OFF/OP EST MAY X REQ PHY/QHP: CPT | Mod: 25,S$GLB,,

## 2017-05-18 NOTE — PROGRESS NOTES
INR good but trending back up with a held dose last week. Pt c/o severe back pain. She is now taking calcium. No s/sx of bleeding. No changes in diet. We will decrease dose a little more. Repeat INR 5/31 in lab

## 2017-05-19 DIAGNOSIS — M54.16 LUMBAR RADICULOPATHY: Primary | ICD-10-CM

## 2017-05-25 ENCOUNTER — TELEPHONE (OUTPATIENT)
Dept: ELECTROPHYSIOLOGY | Facility: CLINIC | Age: 77
End: 2017-05-25

## 2017-05-25 NOTE — TELEPHONE ENCOUNTER
Returned Pt call. Procedure scheduled for 6/15/17.Pt voiced understanding. Instructions will be mailed to Pt.

## 2017-05-25 NOTE — TELEPHONE ENCOUNTER
----- Message from Leann Galindo MA sent at 5/25/2017  9:34 AM CDT -----  Contact: patient  Talk procedure..  Wants Tuesday 6-13-17..  Please call her.  ----- Message -----  From: Deepika Lopez  Sent: 5/25/2017   9:07 AM  To: [facility] Staff    Please call pt at [phone]. Need to discuss future procedure with Dr Llody    Thank you

## 2017-05-30 ENCOUNTER — ANTI-COAG VISIT (OUTPATIENT)
Dept: CARDIOLOGY | Facility: CLINIC | Age: 77
End: 2017-05-30

## 2017-05-30 ENCOUNTER — LAB VISIT (OUTPATIENT)
Dept: LAB | Facility: HOSPITAL | Age: 77
End: 2017-05-30
Attending: INTERNAL MEDICINE
Payer: MEDICARE

## 2017-05-30 DIAGNOSIS — I48.3 TYPICAL ATRIAL FLUTTER: ICD-10-CM

## 2017-05-30 DIAGNOSIS — Z79.01 LONG TERM (CURRENT) USE OF ANTICOAGULANTS: ICD-10-CM

## 2017-05-30 LAB
INR PPP: 2
PROTHROMBIN TIME: 20.4 SEC

## 2017-05-30 PROCEDURE — 36415 COLL VENOUS BLD VENIPUNCTURE: CPT

## 2017-05-30 PROCEDURE — 85610 PROTHROMBIN TIME: CPT

## 2017-06-06 ENCOUNTER — TELEPHONE (OUTPATIENT)
Dept: CARDIOLOGY | Facility: CLINIC | Age: 77
End: 2017-06-06

## 2017-06-06 ENCOUNTER — HOSPITAL ENCOUNTER (OUTPATIENT)
Dept: RADIOLOGY | Facility: HOSPITAL | Age: 77
Discharge: HOME | End: 2017-06-06
Attending: PSYCHIATRY & NEUROLOGY
Payer: MEDICARE

## 2017-06-06 ENCOUNTER — ANTI-COAG VISIT (OUTPATIENT)
Dept: CARDIOLOGY | Facility: CLINIC | Age: 77
End: 2017-06-06

## 2017-06-06 ENCOUNTER — NURSE TRIAGE (OUTPATIENT)
Dept: ADMINISTRATIVE | Facility: CLINIC | Age: 77
End: 2017-06-06

## 2017-06-06 DIAGNOSIS — I48.3 TYPICAL ATRIAL FLUTTER: Primary | ICD-10-CM

## 2017-06-06 DIAGNOSIS — I48.3 TYPICAL ATRIAL FLUTTER: ICD-10-CM

## 2017-06-06 DIAGNOSIS — Z79.01 LONG TERM (CURRENT) USE OF ANTICOAGULANTS: ICD-10-CM

## 2017-06-06 DIAGNOSIS — I49.9 CARDIAC ARRHYTHMIA, UNSPECIFIED CARDIAC ARRHYTHMIA TYPE: ICD-10-CM

## 2017-06-06 DIAGNOSIS — M54.16 LUMBAR RADICULOPATHY: ICD-10-CM

## 2017-06-06 PROCEDURE — 72148 MRI LUMBAR SPINE W/O DYE: CPT | Mod: TC

## 2017-06-06 PROCEDURE — 72148 MRI LUMBAR SPINE W/O DYE: CPT | Mod: 26,,, | Performed by: RADIOLOGY

## 2017-06-06 NOTE — TELEPHONE ENCOUNTER
Reason for Disposition   [1] Caller requesting NON-URGENT health information AND [2] PCP's office is the best resource    Protocols used: ST INFORMATION ONLY CALL-A-AH    Pt is scheduled for cardiac ablation 6/12, states on the paper she was given it states to stop warfarin 5 days prior to surgery. Pt did not take warfarin today. Pt states she wants to make sure she should not be taking warfarin. Care advice given.

## 2017-06-06 NOTE — PROGRESS NOTES
ABLATION EDUCATION CHECKLIST    6-06-17   PRE - PROCEDURE LABS HAVE BEEN ORDERED FOR YOU @ [facility]  (YOU DO NOT HAVE TO FAST FOR THIS LABWORK!!!!)    6-12-17 @ 8 AM  Report to Cardiology Waiting Room on 3rd floor of the Hospital    (Do not report to clinic)  Directions for Reporting to Cardiology Waiting Area in the Hospital  If you park in the Parking Garage:  Take elevators to the 2nd floor  Walk up ramp and turn right by Gold Elevators  Take elevator to the 3rd floor  Upon exiting the elevator, turn away from the clinic areas  Walk long [address]lett around to front of hospital to area with windows overlooking [facility]  Check in at Reception Desk  OR  If family is dropping you off:  Have them drop you off at the front of the Hospital  (Near the ER, where all the flags are hung).  Take the E elevators to the 3rd floor.  Check in at the Reception Desk in the waiting room.        Do not eat or drink anything after: 12 mn on the night before your procedure    Medications:     You may take your usual morning medications with a sip of water    You will be spending the night after your procedure  You will need someone to drive you home the day after your procedure.    Your pain during your procedure will be managed by the anesthesia team.     THE ABOVE INSTRUCTIONS WERE GIVEN TO THE PATIENT VERBALLY AND THEY VERBALIZED UNDERSTANDING.  THEY DO NOT REQUIRE ANY SPECIAL NEEDS AND DO NOT HAVE ANY LEARNING BARRIERS.    Any need to reschedule or cancel procedures, or any questions regarding your procedures should be addressed directly with the Arrhythmia Department Nurses at the following phone number: [phone]

## 2017-06-06 NOTE — TELEPHONE ENCOUNTER
Patient called about SKINNY scheduled on 6/12/17   . Pre-procedural questions asked.  Denies swallowing issues and esophageal issues. Denies previous sedation/anesthesia problems. States does  have sleep apnea.  Denies recent trauma/surgery/radiation therapy to head/neck/airway. States is able to move neck without difficulty. SKINNY described to patient. Instructed NPO past midnight and to have a designated  to drive patient home after the procedures due to sedation being given. Instructed to report to  sscu at  0800 am.  Verbalizes understanding. Questions answered.

## 2017-06-07 NOTE — PROGRESS NOTES
Patient reports missing her coumadin dose on 6/6. Patient scheduled for SKINNY/RFA 6/12. Per our records (note from 5/25, need INR 2-3 for procedure)

## 2017-06-09 ENCOUNTER — ANTI-COAG VISIT (OUTPATIENT)
Dept: CARDIOLOGY | Facility: CLINIC | Age: 77
End: 2017-06-09

## 2017-06-09 ENCOUNTER — LAB VISIT (OUTPATIENT)
Dept: LAB | Facility: HOSPITAL | Age: 77
End: 2017-06-09
Attending: INTERNAL MEDICINE
Payer: MEDICARE

## 2017-06-09 DIAGNOSIS — Z79.01 LONG TERM (CURRENT) USE OF ANTICOAGULANTS: ICD-10-CM

## 2017-06-09 DIAGNOSIS — I48.3 TYPICAL ATRIAL FLUTTER: ICD-10-CM

## 2017-06-09 LAB
INR PPP: 2
PROTHROMBIN TIME: 20.5 SEC

## 2017-06-09 PROCEDURE — 85610 PROTHROMBIN TIME: CPT

## 2017-06-09 PROCEDURE — 36415 COLL VENOUS BLD VENIPUNCTURE: CPT

## 2017-06-12 ENCOUNTER — HOSPITAL ENCOUNTER (OUTPATIENT)
Dept: CARDIOLOGY | Facility: CLINIC | Age: 77
Discharge: HOME | End: 2017-06-12
Attending: INTERNAL MEDICINE | Admitting: INTERNAL MEDICINE
Payer: MEDICARE

## 2017-06-12 ENCOUNTER — HOSPITAL ENCOUNTER (OUTPATIENT)
Facility: HOSPITAL | Age: 77
Discharge: HOME | End: 2017-06-13
Attending: INTERNAL MEDICINE | Admitting: INTERNAL MEDICINE
Payer: MEDICARE

## 2017-06-12 ENCOUNTER — SURGERY (OUTPATIENT)
Age: 77
End: 2017-06-12

## 2017-06-12 ENCOUNTER — ANESTHESIA EVENT (OUTPATIENT)
Dept: MEDSURG UNIT | Facility: HOSPITAL | Age: 77
End: 2017-06-12
Payer: MEDICARE

## 2017-06-12 ENCOUNTER — ANESTHESIA (OUTPATIENT)
Dept: MEDSURG UNIT | Facility: HOSPITAL | Age: 77
End: 2017-06-12
Payer: MEDICARE

## 2017-06-12 DIAGNOSIS — I48.0 PAF (PAROXYSMAL ATRIAL FIBRILLATION): ICD-10-CM

## 2017-06-12 DIAGNOSIS — I48.3 TYPICAL ATRIAL FLUTTER: Primary | ICD-10-CM

## 2017-06-12 DIAGNOSIS — I48.92 ATRIAL FLUTTER: ICD-10-CM

## 2017-06-12 DIAGNOSIS — I48.92 ATRIAL FLUTTER, UNSPECIFIED TYPE: ICD-10-CM

## 2017-06-12 LAB
AORTIC ATHEROMA: YES
INR PPP: 2.5
MITRAL VALVE MOBILITY: NORMAL
MITRAL VALVE REGURGITATION: ABNORMAL
PROTHROMBIN TIME: 25.1 SEC

## 2017-06-12 PROCEDURE — 63600175 PHARM REV CODE 636 W HCPCS: Performed by: NURSE ANESTHETIST, CERTIFIED REGISTERED

## 2017-06-12 PROCEDURE — 93653 COMPRE EP EVAL TX SVT: CPT | Mod: ,,, | Performed by: INTERNAL MEDICINE

## 2017-06-12 PROCEDURE — 25000003 PHARM REV CODE 250

## 2017-06-12 PROCEDURE — 93621 COMP EP EVL L PAC&REC C SINS: CPT | Mod: 26,,, | Performed by: INTERNAL MEDICINE

## 2017-06-12 PROCEDURE — D9220A PRA ANESTHESIA: Mod: ANES,,, | Performed by: ANESTHESIOLOGY

## 2017-06-12 PROCEDURE — 85610 PROTHROMBIN TIME: CPT

## 2017-06-12 PROCEDURE — 63600175 PHARM REV CODE 636 W HCPCS

## 2017-06-12 PROCEDURE — 93010 ELECTROCARDIOGRAM REPORT: CPT | Mod: S$GLB,,, | Performed by: INTERNAL MEDICINE

## 2017-06-12 PROCEDURE — 25000003 PHARM REV CODE 250: Performed by: NURSE ANESTHETIST, CERTIFIED REGISTERED

## 2017-06-12 PROCEDURE — 93005 ELECTROCARDIOGRAM TRACING: CPT | Mod: 59

## 2017-06-12 PROCEDURE — 93653 COMPRE EP EVAL TX SVT: CPT

## 2017-06-12 PROCEDURE — C1731 CATH, EP, 20 OR MORE ELEC: HCPCS

## 2017-06-12 PROCEDURE — 37000009 HC ANESTHESIA EA ADD 15 MINS: Performed by: INTERNAL MEDICINE

## 2017-06-12 PROCEDURE — 37000008 HC ANESTHESIA 1ST 15 MINUTES: Performed by: INTERNAL MEDICINE

## 2017-06-12 PROCEDURE — 99220 PR INITIAL OBSERVATION CARE,LEVL III: CPT | Mod: ,,, | Performed by: INTERNAL MEDICINE

## 2017-06-12 PROCEDURE — 93355 ECHO TRANSESOPHAGEAL (TEE): CPT

## 2017-06-12 PROCEDURE — 93613 INTRACARDIAC EPHYS 3D MAPG: CPT | Mod: ,,, | Performed by: INTERNAL MEDICINE

## 2017-06-12 PROCEDURE — 93355 ECHO TRANSESOPHAGEAL (TEE): CPT | Mod: ,,, | Performed by: INTERNAL MEDICINE

## 2017-06-12 PROCEDURE — D9220A PRA ANESTHESIA: Mod: CRNA,,, | Performed by: NURSE ANESTHETIST, CERTIFIED REGISTERED

## 2017-06-12 RX ORDER — PROPOFOL 10 MG/ML
VIAL (ML) INTRAVENOUS CONTINUOUS PRN
Status: DISCONTINUED | OUTPATIENT
Start: 2017-06-12 | End: 2017-06-12

## 2017-06-12 RX ORDER — ONDANSETRON 2 MG/ML
4 INJECTION INTRAMUSCULAR; INTRAVENOUS EVERY 8 HOURS PRN
Status: DISCONTINUED | OUTPATIENT
Start: 2017-06-12 | End: 2017-06-13 | Stop reason: HOSPADM

## 2017-06-12 RX ORDER — LISINOPRIL 20 MG/1
40 TABLET ORAL DAILY
Status: DISCONTINUED | OUTPATIENT
Start: 2017-06-13 | End: 2017-06-13 | Stop reason: HOSPADM

## 2017-06-12 RX ORDER — FENTANYL CITRATE 50 UG/ML
INJECTION, SOLUTION INTRAMUSCULAR; INTRAVENOUS
Status: DISCONTINUED | OUTPATIENT
Start: 2017-06-12 | End: 2017-06-12

## 2017-06-12 RX ORDER — MIDAZOLAM HYDROCHLORIDE 1 MG/ML
INJECTION, SOLUTION INTRAMUSCULAR; INTRAVENOUS
Status: DISCONTINUED | OUTPATIENT
Start: 2017-06-12 | End: 2017-06-12

## 2017-06-12 RX ORDER — ACETAMINOPHEN 325 MG/1
650 TABLET ORAL EVERY 6 HOURS PRN
Status: DISCONTINUED | OUTPATIENT
Start: 2017-06-12 | End: 2017-06-13 | Stop reason: HOSPADM

## 2017-06-12 RX ORDER — HYDROCODONE BITARTRATE AND ACETAMINOPHEN 5; 325 MG/1; MG/1
1 TABLET ORAL EVERY 6 HOURS PRN
Status: DISCONTINUED | OUTPATIENT
Start: 2017-06-12 | End: 2017-06-13 | Stop reason: HOSPADM

## 2017-06-12 RX ORDER — LIDOCAINE HCL/PF 100 MG/5ML
SYRINGE (ML) INTRAVENOUS
Status: DISCONTINUED | OUTPATIENT
Start: 2017-06-12 | End: 2017-06-12

## 2017-06-12 RX ORDER — TAMSULOSIN HYDROCHLORIDE 0.4 MG/1
0.4 CAPSULE ORAL DAILY
Status: DISCONTINUED | OUTPATIENT
Start: 2017-06-13 | End: 2017-06-13 | Stop reason: HOSPADM

## 2017-06-12 RX ORDER — PANTOPRAZOLE SODIUM 40 MG/1
40 TABLET, DELAYED RELEASE ORAL DAILY
Status: DISCONTINUED | OUTPATIENT
Start: 2017-06-13 | End: 2017-06-13 | Stop reason: HOSPADM

## 2017-06-12 RX ORDER — SODIUM CHLORIDE 9 MG/ML
INJECTION, SOLUTION INTRAVENOUS CONTINUOUS PRN
Status: DISCONTINUED | OUTPATIENT
Start: 2017-06-12 | End: 2017-06-12

## 2017-06-12 RX ORDER — PROPOFOL 10 MG/ML
VIAL (ML) INTRAVENOUS
Status: DISCONTINUED | OUTPATIENT
Start: 2017-06-12 | End: 2017-06-12

## 2017-06-12 RX ORDER — ATROPINE SULFATE 0.1 MG/ML
INJECTION INTRAVENOUS
Status: DISCONTINUED | OUTPATIENT
Start: 2017-06-12 | End: 2017-06-12

## 2017-06-12 RX ORDER — LOVASTATIN 20 MG/1
80 TABLET ORAL NIGHTLY
Status: DISCONTINUED | OUTPATIENT
Start: 2017-06-12 | End: 2017-06-13 | Stop reason: HOSPADM

## 2017-06-12 RX ORDER — IBUPROFEN 600 MG/1
600 TABLET ORAL EVERY 6 HOURS PRN
Status: DISCONTINUED | OUTPATIENT
Start: 2017-06-12 | End: 2017-06-13 | Stop reason: HOSPADM

## 2017-06-12 RX ORDER — BISACODYL 5 MG
5 TABLET, DELAYED RELEASE (ENTERIC COATED) ORAL DAILY PRN
Status: DISCONTINUED | OUTPATIENT
Start: 2017-06-12 | End: 2017-06-13 | Stop reason: HOSPADM

## 2017-06-12 RX ORDER — OXYBUTYNIN CHLORIDE 5 MG/1
5 TABLET ORAL 2 TIMES DAILY
Status: DISCONTINUED | OUTPATIENT
Start: 2017-06-12 | End: 2017-06-13 | Stop reason: HOSPADM

## 2017-06-12 RX ORDER — WARFARIN 2.5 MG/1
2.5 TABLET ORAL DAILY
Status: DISCONTINUED | OUTPATIENT
Start: 2017-06-12 | End: 2017-06-13 | Stop reason: HOSPADM

## 2017-06-12 RX ADMIN — ATROPINE SULFATE 0.5 MG: 0.1 INJECTION PARENTERAL at 01:06

## 2017-06-12 RX ADMIN — FENTANYL CITRATE 25 MCG: 50 INJECTION, SOLUTION INTRAMUSCULAR; INTRAVENOUS at 01:06

## 2017-06-12 RX ADMIN — PROPOFOL 20 MG: 10 INJECTION, EMULSION INTRAVENOUS at 11:06

## 2017-06-12 RX ADMIN — PROPOFOL 30 MG: 10 INJECTION, EMULSION INTRAVENOUS at 11:06

## 2017-06-12 RX ADMIN — FENTANYL CITRATE 25 MCG: 50 INJECTION, SOLUTION INTRAMUSCULAR; INTRAVENOUS at 02:06

## 2017-06-12 RX ADMIN — PROPOFOL 50 MCG/KG/MIN: 10 INJECTION, EMULSION INTRAVENOUS at 11:06

## 2017-06-12 RX ADMIN — SODIUM CHLORIDE, SODIUM GLUCONATE, SODIUM ACETATE, POTASSIUM CHLORIDE, MAGNESIUM CHLORIDE, SODIUM PHOSPHATE, DIBASIC, AND POTASSIUM PHOSPHATE: .53; .5; .37; .037; .03; .012; .00082 INJECTION, SOLUTION INTRAVENOUS at 02:06

## 2017-06-12 RX ADMIN — FENTANYL CITRATE 50 MCG: 50 INJECTION, SOLUTION INTRAMUSCULAR; INTRAVENOUS at 11:06

## 2017-06-12 RX ADMIN — FENTANYL CITRATE 50 MCG: 50 INJECTION, SOLUTION INTRAMUSCULAR; INTRAVENOUS at 02:06

## 2017-06-12 RX ADMIN — LIDOCAINE HYDROCHLORIDE 75 MG: 20 INJECTION, SOLUTION INTRAVENOUS at 11:06

## 2017-06-12 RX ADMIN — MIDAZOLAM 0.5 MG: 1 INJECTION INTRAMUSCULAR; INTRAVENOUS at 01:06

## 2017-06-12 RX ADMIN — MIDAZOLAM 2 MG: 1 INJECTION INTRAMUSCULAR; INTRAVENOUS at 11:06

## 2017-06-12 RX ADMIN — SODIUM CHLORIDE: 0.9 INJECTION, SOLUTION INTRAVENOUS at 11:06

## 2017-06-12 RX ADMIN — FENTANYL CITRATE 25 MCG: 50 INJECTION, SOLUTION INTRAMUSCULAR; INTRAVENOUS at 12:06

## 2017-06-12 RX ADMIN — MIDAZOLAM 0.5 MG: 1 INJECTION INTRAMUSCULAR; INTRAVENOUS at 12:06

## 2017-06-12 NOTE — ANESTHESIA RELEASE NOTE
"Anesthesia Release from PACU Note    Patient: Alaina Vee    Procedure(s) Performed: Procedure(s) (LRB):  ABLATION (N/A)  TRANSESOPHAGEAL ECHOCARDIOGRAM (SKINNY) (N/A)    Anesthesia type: MAC    Post pain: Adequate analgesia    Post assessment: no apparent anesthetic complications, tolerated procedure well and no evidence of recall    Last Vitals:   Visit Vitals  /67 (BP Location: Left arm, Patient Position: Lying, BP Method: Automatic)   Pulse 72   Temp 36.4 °C (97.5 °F) (Oral)   Resp 16   Ht 5' 6" (1.676 m)   Wt 108.9 kg (240 lb)   SpO2 100%   Breastfeeding? No   BMI 38.74 kg/m²       Post vital signs: stable    Level of consciousness: awake, alert  and oriented    Nausea/Vomiting: no nausea/no vomiting    Complications: none    Airway Patency: patent    Respiratory: unassisted    Cardiovascular: stable and blood pressure at baseline    Hydration: euvolemic  "

## 2017-06-12 NOTE — TRANSFER OF CARE
"Anesthesia Transfer of Care Note    Patient: Alaina Vee    Procedure(s) Performed: Procedure(s) (LRB):  ABLATION (N/A)  TRANSESOPHAGEAL ECHOCARDIOGRAM (SKINNY) (N/A)    Patient location: PACU    Anesthesia Type: MAC    Transport from OR: Transported from OR on 2-3 L/min O2 by NC with adequate spontaneous ventilation    Post pain: adequate analgesia    Post assessment: no apparent anesthetic complications and tolerated procedure well    Post vital signs: stable    Level of consciousness: awake, alert and oriented    Nausea/Vomiting: no nausea/vomiting    Complications: none    Transfer of care protocol was followed      Last vitals:   Visit Vitals  /93   Pulse 76   Temp 97.8   Resp 17 Sats 100%   Ht 5' 6" (1.676 m)   Wt 108.9 kg (240 lb)   Breastfeeding? No   BMI 38.74 kg/m²     "

## 2017-06-12 NOTE — PROGRESS NOTES
EP Procedure Note    Procedure: Atrial flutter RFA  Access: R CFV x2    SKINNY was negative for thrombus.  Access was obtained in R CFV.   Catheters were advanced and positioned.  CTI ablation was performed.  During the procedure, she had frequent episodes of competing junctional and sinus rhythm.  Bidirectional block was confirmed.

## 2017-06-12 NOTE — PLAN OF CARE
Received report from SHELLY Ramos. Patient s/p rfa, AAOx3. VSS, no c/o pain or discomfort at this time, resp even and unlabored. Gauze/tegaderm dressing to r groin is CDI. No active bleeding. No hematoma noted. Post procedure protocol reviewed with patient and patient's family. Understanding verbalized. Family members at bedside. Nurse call bell within reach. Will continue to monitor per post procedure protocol.

## 2017-06-12 NOTE — NURSING
Pt ambulated on unit.   at side.  C/o pain of 4 in lower back.  Pt states she has chronic back pain.  Verbalizes understanding of procedure.  Will continue to monitor.

## 2017-06-12 NOTE — ANESTHESIA PREPROCEDURE EVALUATION
06/12/2017  Alaina Vee is a 76 y.o., female.    Active Ambulatory Problems     Diagnosis Date Noted    Essential hypertension 07/28/2012    Ventral hernia 07/28/2012    UI (urinary incontinence) 08/01/2012    Hyperlipidemia 08/01/2012    Venous stasis of lower extremity 10/02/2012    GERD (gastroesophageal reflux disease) 12/07/2012    Low back pain without sciatica 01/28/2016    Weakness of both hips 01/28/2016    Segmental dysfunction of lumbar region 01/28/2016    Recurrent UTI 04/29/2016    Mixed incontinence urge and stress 04/29/2016    Nephrolithiasis 07/26/2016    Cervical radicular pain 01/30/2017    Typical atrial flutter 02/23/2017    SULEMA (obstructive sleep apnea) 02/23/2017    Long term (current) use of anticoagulants 03/16/2017    PAF (paroxysmal atrial fibrillation) 04/12/2017     Resolved Ambulatory Problems     Diagnosis Date Noted    HTN (hypertension), benign 12/07/2012    Costochondritis 07/08/2014    Back tightness 01/28/2016    Poor motor control of trunk 01/28/2016    Morbid obesity due to excess calories 02/23/2017    Renal stone 02/27/2017     Past Medical History:   Diagnosis Date    Arthritis     Atrial fibrillation     Atrial flutter     Degenerative disc disease     Depression     General anesthetics causing adverse effect in therapeutic use     GERD (gastroesophageal reflux disease)     Hyperlipidemia     Hypertension     Sleep apnea     Varicosities     Ventral hernia          TTE 3/2017  CONCLUSIONS     1 - Normal left ventricular systolic function (EF 55-60%).     2 - Moderate left atrial enlargement.     Anesthesia Evaluation    I have reviewed the Patient Summary Reports.    I have reviewed the Nursing Notes.      Review of Systems  Anesthesia Hx:  Hx of Anesthetic complications    Social:  Non-Smoker    Hematology/Oncology:         Hematology Comments: On warfarin   Cardiovascular:   Exercise tolerance: good Hypertension Dysrhythmias (Aflutter/Afib)  Denies Angina. hyperlipidemia        Pulmonary:   Denies COPD.  Denies Asthma.  Denies Shortness of breath. Sleep Apnea (No longer complies with CPAP)    Renal/:   Denies Chronic Renal Disease. renal calculi     Hepatic/GI:   GERD, well controlled Denies Liver Disease.    Musculoskeletal:   Arthritis     Neurological:   Denies CVA. Denies Seizures.    Endocrine:   Denies Diabetes. Denies Hypothyroidism.    Psych:   depression          Physical Exam  General:  Morbid Obesity    Airway/Jaw/Neck:  Airway Findings: Mallampati: III Improves to II with phonation.  TM Distance: 4 - 6 cm  Jaw/Neck Findings:  Neck ROM: Normal ROM  Neck Findings:  Girth Increased       Chest/Lungs:  Chest/Lungs Findings: Clear to auscultation     Heart/Vascular:  Heart Findings: Rate: Normal  Rhythm: Irregularly Irregular        Mental Status:  Mental Status Findings:  Cooperative, Alert and Oriented         Anesthesia Plan  Type of Anesthesia, risks & benefits discussed:  Anesthesia Type:  general  Patient's Preference: GA  Intra-op Monitoring Plan: standard ASA monitors  Intra-op Monitoring Plan Comments:   Post Op Pain Control Plan: multimodal analgesia, IV/PO Opioids PRN and per primary service following discharge from PACU  Post Op Pain Control Plan Comments: Opioids PRN  Induction:    Beta Blocker:  Patient is not currently on a Beta-Blocker (No further documentation required).       Informed Consent: Patient understands risks and agrees with Anesthesia plan.  Questions answered. Anesthesia consent signed with patient.  ASA Score: 3     Day of Surgery Review of History & Physical:        Anesthesia Plan Notes: I personally saw the patient and a history and physical was performed.    Plan for GA - TIVA        Ready For Surgery From Anesthesia Perspective.

## 2017-06-12 NOTE — H&P
[facility]  EP Pre-Procedure Note    Attending Physician: Patrick Lloyd MD    HPI:     Ms Vee is a 75yo F with HTN, HLD, SULEMA, atrial fibrillation and atrial flutter, who presents for RFA for atrial flutter.     She was found to be in atrial flutter at the time of a pre-operative exam. She does report frequent palpitations, but unclear if symptoms correspond to atrial flutter. She is on warfarin for anticoagulation. Her INR has been >2.0 since 4/19/17. She took all of her usual medications this morning, including warfarin.    Review of Systems   Review of Systems   Constitution: Negative.   HENT: Negative.    Eyes: Negative.    Cardiovascular: Negative.    Respiratory: Negative.    Endocrine: Negative.    Hematologic/Lymphatic: Negative.    Skin: Negative.    Musculoskeletal: Negative.    Gastrointestinal: Negative.    Genitourinary: Negative.    Neurological: Negative.    Psychiatric/Behavioral: Negative.      PMH:     Past Medical History:   Diagnosis Date    Arthritis     knees    Atrial fibrillation     Atrial flutter     Degenerative disc disease     Depression     General anesthetics causing adverse effect in therapeutic use     pt states sometimes slow to awaken.    GERD (gastroesophageal reflux disease)     Hyperlipidemia     Hypertension     Sleep apnea     does not wear CPAP    Varicosities     Ventral hernia      Past Surgical History:   Procedure Laterality Date    APPENDECTOMY      breast biopsy, left      CHOLECYSTECTOMY      JOINT REPLACEMENT      TKR , right    JOINT REPLACEMENT  2009    TKR  left    MO EXPLORATORY OF ABDOMEN          Allergies:     Review of patient's allergies indicates:   Allergen Reactions    Lipitor [atorvastatin] Other (See Comments)        Medications:     No current facility-administered medications on file prior to encounter.      Current Outpatient Prescriptions on File Prior to Encounter   Medication Sig Dispense Refill     clotrimazole-betamethasone 1-0.05% (LOTRISONE) cream Apply topically 2 (two) times daily. 90 g 1    diclofenac sodium 1 % Gel Apply 2 g topically once daily. 100 g 3    fish oil-omega-3 fatty acids 300-1,000 mg capsule Take 1 g by mouth once daily.      hydrocodone-acetaminophen 5-325mg (NORCO) 5-325 mg per tablet Take 1 tablet by mouth every 6 (six) hours as needed for Pain. 20 tablet 0    lisinopril (PRINIVIL,ZESTRIL) 40 MG tablet TAKE 1 TABLET ONE TIME DAILY 90 tablet 1    lovastatin (ALTOPREV) 40 mg 24 hr tablet Take 2 tablets (80 mg total) by mouth every evening. 180 tablet 1    multivitamin (THERAGRAN) per tablet Take 1 tablet by mouth Daily. 1 Tablet Oral Every day      omeprazole (PRILOSEC) 20 MG capsule Take 1 capsule (20 mg total) by mouth once daily. 90 capsule 1    oxybutynin (DITROPAN) 5 MG Tab TAKE 1 TABLET TWICE DAILY 180 tablet 1    oxycodone-acetaminophen (PERCOCET) 5-325 mg per tablet Take 1 tablet by mouth every 4 (four) hours as needed for Pain. 25 tablet 0    tamsulosin (FLOMAX) 0.4 mg Cp24 Take 1 capsule (0.4 mg total) by mouth once daily. 20 capsule 0    warfarin (COUMADIN) 5 MG tablet Take 0.5-1 tablets (2.5-5 mg total) by mouth Daily. As directed by coumadin clinic 90 tablet 3        Social History:     Social History   Substance Use Topics    Smoking status: Never Smoker    Smokeless tobacco: Not on file    Alcohol use No        Family History:     Family History   Problem Relation Age of Onset    COPD Mother     Heart disease Sister      half sister    COPD Sister         Physical Exam:     Vitals:  Temp:  [97.8 °F (36.6 °C)]   Pulse:  [60]   Resp:  [20]   BP: (119)/(56)   I/O's:  No intake or output data in the 24 hours ending 06/12/17 0755     Physical Exam  Constitutional: NAD, conversant  HEENT: Sclera anicteric  Neck: No JVD  CV: RRR  Pulm: CTAB  GI: Abdomen soft, obese, NTND  Extremities: No LE edema, warm with palpable pulses  Skin: No ecchymosis, erythema, or  ulcers  Psych: AOx3, appropriate affect  Neuro: Nonfocal    Labs:     Recent Results (from the past 336 hour(s))   CBC auto differential    Collection Time: 17  4:35 PM   Result Value Ref Range    WBC 7.11 3.90 - 12.70 K/uL    Hemoglobin 13.7 12.0 - 16.0 g/dL    Hematocrit 41.7 37.0 - 48.5 %    Platelets 264 150 - 350 K/uL       Recent Results (from the past 336 hour(s))   Basic metabolic panel    Collection Time: 17  4:35 PM   Result Value Ref Range    Sodium 142 136 - 145 mmol/L    Potassium 4.0 3.5 - 5.1 mmol/L    Chloride 107 95 - 110 mmol/L    CO2 28 23 - 29 mmol/L    BUN, Bld 16 8 - 23 mg/dL    Creatinine 1.0 0.5 - 1.4 mg/dL    Calcium 9.4 8.7 - 10.5 mg/dL    Anion Gap 7 (L) 8 - 16 mmol/L       Lab Results   Component Value Date    CHOL 158 2017    HDL 35 (L) 2017    LDLCALC 89.0 2017    TRIG 170 (H) 2017       No results for input(s): TROPONINI, CPKMB, CPK, MB in the last 168 hours.    Lab Results   Component Value Date    HGBA1C 5.8 2016       Estimated Creatinine Clearance: 61.6 mL/min (based on Cr of 1).    Echo:  TTE 3/7/17  1 - Normal left ventricular systolic function (EF 55-60%).   2 - Moderate left atrial enlargement.     EK17 - atrial fibrillation, rate controlled    Assessment & Recommendations:     Ms Vee is a 77yo F with HTN, HLD, SULEMA, atrial fibrillation and atrial flutter, who presents for RFA for atrial flutter.    # Atrial flutter  - will proceed with scheduled procedure  - will need SKINNY prior to procedure    We discussed risks and benefits at length. Our discussion included, but was not limited to the risk of death, infection, bleeding, stroke, MI, cardiac perforation, vascular injury, embolism, cardiac tamponade, skin burns, and other organic injury including the possibility for need for surgery or pacemaker implantation.      Signed:  Brooks Pastrana MD  Cardiology Fellow, PGY-5  Pager: [phone]  2017 7:55 AM    Attending Addendum:

## 2017-06-12 NOTE — PLAN OF CARE
AAOx4. No complaints of pain or nausea. Dressing to right groin remains CDI. Pulses WNL. VSS. Safety maintained.

## 2017-06-12 NOTE — PROGRESS NOTES
TRANSESOPHAGEAL ECHOCARDIOGRAPHY   PRE-PROCEDURE NOTE    06/12/2017    HPI:     Alaina Vee is a 76 y.o. with HTN, HLD, SULEMA, atrial fibrillation and atrial flutter, who presents for RFA for atrial flutter.     Dysphagia or odynophagia:  no  Liver Disease, esophageal disease, or known varices:  no  Upper GI Bleeding: no  Snoring:  yes  Sleep Apnea:  Yes uses CPAP  Prior neck surgery or radiation:  no  Able to move neck in all directions:  yes  History of anesthetic difficulties:  no  Family history of anesthetic difficulties:  no  Last oral intake:  8 PM 6/11/17    Mallampati Class:  3  ASA Score:  2      Meds:     Scheduled Meds:  No current facility-administered medications on file prior to encounter.      Current Outpatient Prescriptions on File Prior to Encounter   Medication Sig Dispense Refill    clotrimazole-betamethasone 1-0.05% (LOTRISONE) cream Apply topically 2 (two) times daily. 90 g 1    diclofenac sodium 1 % Gel Apply 2 g topically once daily. 100 g 3    fish oil-omega-3 fatty acids 300-1,000 mg capsule Take 1 g by mouth once daily.      hydrocodone-acetaminophen 5-325mg (NORCO) 5-325 mg per tablet Take 1 tablet by mouth every 6 (six) hours as needed for Pain. 20 tablet 0    lisinopril (PRINIVIL,ZESTRIL) 40 MG tablet TAKE 1 TABLET ONE TIME DAILY 90 tablet 1    lovastatin (ALTOPREV) 40 mg 24 hr tablet Take 2 tablets (80 mg total) by mouth every evening. 180 tablet 1    multivitamin (THERAGRAN) per tablet Take 1 tablet by mouth Daily. 1 Tablet Oral Every day      omeprazole (PRILOSEC) 20 MG capsule Take 1 capsule (20 mg total) by mouth once daily. 90 capsule 1    oxybutynin (DITROPAN) 5 MG Tab TAKE 1 TABLET TWICE DAILY 180 tablet 1    oxycodone-acetaminophen (PERCOCET) 5-325 mg per tablet Take 1 tablet by mouth every 4 (four) hours as needed for Pain. 25 tablet 0    tamsulosin (FLOMAX) 0.4 mg Cp24 Take 1 capsule (0.4 mg total) by mouth once daily. 20 capsule 0    warfarin (COUMADIN) 5  MG tablet Take 0.5-1 tablets (2.5-5 mg total) by mouth Daily. As directed by coumadin clinic 90 tablet 3     PRN Meds:  Continuous Infusions:    Physical Exam:     Vitals:  Temp:  [97.8 °F (36.6 °C)]   Pulse:  [60]   Resp:  [20]   BP: (119)/(56)        Constitutional:  NAD, conversant  HEENT:   Sclera anicteric, Uvula midline, EOMI, OP clear  Neck:   No JVD, moves to all direction without any limitations  CV:   irregular, no murmurs / rubs / gallops, normal S1/S2  Pulm:   CTAB, no wheezes, rales, or ronchi  GI:   Abdomen soft, NTND, +BS  Extremities:  No LE edema, warm with palpable pulses  Neuro:  AAOX3, no focal motor deficits      Labs:     Recent Results (from the past 336 hour(s))   CBC auto differential    Collection Time: 06/06/17  4:35 PM   Result Value Ref Range    WBC 7.11 3.90 - 12.70 K/uL    Hemoglobin 13.7 12.0 - 16.0 g/dL    Hematocrit 41.7 37.0 - 48.5 %    Platelets 264 150 - 350 K/uL       Recent Results (from the past 336 hour(s))   Basic metabolic panel    Collection Time: 06/06/17  4:35 PM   Result Value Ref Range    Sodium 142 136 - 145 mmol/L    Potassium 4.0 3.5 - 5.1 mmol/L    Chloride 107 95 - 110 mmol/L    CO2 28 23 - 29 mmol/L    BUN, Bld 16 8 - 23 mg/dL    Creatinine 1.0 0.5 - 1.4 mg/dL    Calcium 9.4 8.7 - 10.5 mg/dL    Anion Gap 7 (L) 8 - 16 mmol/L       Estimated Creatinine Clearance: 61.6 mL/min (based on Cr of 1).    INR: 2 on 6/9/17    Imaging: 3/7/17 TTE    CONCLUSIONS     1 - Normal left ventricular systolic function (EF 55-60%).     2 - Moderate left atrial enlargement.       EKG: Afib  Date: 6/12/17      Telemetry:  Telemetry currently shows:       Assessment & Plan:       PLAN:  1. SKINNY for evaluation of LA/CARISA thrombus    -The risks, benefits & alternatives of the procedure were explained to the patient.    -The risks of transesophageal echo include but are not limited to:  Dental trauma, esophageal trauma/perforation, bleeding, laryngospasm/brochospasm, aspiration, sore  throat/hoarseness, & dislodgement of the endotracheal tube/nasogastric tube (where applicable).    -The risks of moderate sedation include hypotension, respiratory depression, arrhythmias, bronchospasm, & death.    -Informed consent was obtained & the patient is agreeable to proceed with the procedure.    I will discuss with the attending physician. Attending addendum is to follow.    Signed:  Cory Kraft MD  Cardiology Fellow, PGY-V  Pager: [phone]  6/12/2017 8:22 AM    Attending Addendum:

## 2017-06-12 NOTE — NURSING TRANSFER
Nursing Transfer Note      6/12/2017     Transfer to: [id number]    Transfer via: Stretcher    Transfer with: N/A    Transported by: PCT    Medicines sent: N/A    Chart send with patient: Yes    Notified: spouse; Report called to SHELLY Patrick    Patient reassessed at: 1700

## 2017-06-12 NOTE — ANESTHESIA POSTPROCEDURE EVALUATION
"Anesthesia Post Evaluation    Patient: Alaina Vee    Procedure(s) Performed: Procedure(s) (LRB):  ABLATION (N/A)  TRANSESOPHAGEAL ECHOCARDIOGRAM (SKINNY) (N/A)    Final Anesthesia Type: MAC  Patient location during evaluation: PACU  Patient participation: Yes- Able to Participate  Level of consciousness: awake and alert  Post-procedure vital signs: reviewed and stable  Pain management: adequate  Airway patency: patent  PONV status at discharge: No PONV  Anesthetic complications: no      Cardiovascular status: stable  Respiratory status: unassisted and spontaneous ventilation  Hydration status: euvolemic  Follow-up not needed.        Visit Vitals  /67 (BP Location: Left arm, Patient Position: Lying, BP Method: Automatic)   Pulse 72   Temp 36.4 °C (97.5 °F) (Oral)   Resp 16   Ht 5' 6" (1.676 m)   Wt 108.9 kg (240 lb)   SpO2 100%   Breastfeeding? No   BMI 38.74 kg/m²       Pain/Rajeev Score: Pain Assessment Performed: Yes (6/12/2017  4:10 PM)  Presence of Pain: denies (6/12/2017  4:10 PM)  Rajeev Score: 10 (6/12/2017  4:10 PM)      "

## 2017-06-13 ENCOUNTER — ANTI-COAG VISIT (OUTPATIENT)
Dept: CARDIOLOGY | Facility: CLINIC | Age: 77
End: 2017-06-13

## 2017-06-13 VITALS
DIASTOLIC BLOOD PRESSURE: 64 MMHG | HEART RATE: 65 BPM | RESPIRATION RATE: 18 BRPM | BODY MASS INDEX: 38.57 KG/M2 | HEIGHT: 66 IN | WEIGHT: 240 LBS | OXYGEN SATURATION: 96 % | SYSTOLIC BLOOD PRESSURE: 119 MMHG | TEMPERATURE: 98 F

## 2017-06-13 DIAGNOSIS — I48.3 TYPICAL ATRIAL FLUTTER: ICD-10-CM

## 2017-06-13 DIAGNOSIS — Z79.01 LONG TERM (CURRENT) USE OF ANTICOAGULANTS: ICD-10-CM

## 2017-06-13 NOTE — NURSING
Pt d/c'd to home per md orders.  Groin site remains cdi.  0 bleed, 0 hematoma.  piv removed intact and without difficulty.  Telemetry monitor removed prior to discharge.  Instructed pt on home medications, post procedure precautions and follow up visits.  Pt and spouse verbalizes understanding.  Pt in no acute distress and verbalizes no complaints.

## 2017-06-13 NOTE — DISCHARGE SUMMARY
Cardiology Discharge Summary      Admit Date: 6/12/2017    Discharge Date:  6/13/2017    Attending Physician: Patrick Lloyd MD    Discharge Physician: Dr. Karan Pastrana    Principal Diagnoses:   Atrial flutter    Indication for Admission:   RFA for atrial flutter    Discharged Condition:   Good    Hospital Course:   Ms Vee presented for RFA for atrial flutter. SKINNY was negative for thrombus. Access was obtained in R CFV. Catheters were advanced and positioned. CTI ablation was performed. During the procedure, she had frequent episodes of competing junctional and sinus rhythm.Bidirectional block was confirmed. No complications overnight.    Outpatient Plan:  Follow up with Dr Lloyd in 6 weeks    Notable Studies:  RFA 6/12/17    Activity:  No heavy lifting (>10lb) for one week  No driving for at least 72 hours  No baths, only showers for 1 week    Diet:  Cardiac    Disposition:   Home or Self Care    Discharge Medications:      Medication List      CONTINUE taking these medications    clotrimazole-betamethasone 1-0.05% cream  Commonly known as:  LOTRISONE  Apply topically 2 (two) times daily.     diclofenac sodium 1 % Gel  Apply 2 g topically once daily.     fish oil-omega-3 fatty acids 300-1,000 mg capsule     lisinopril 40 MG tablet  Commonly known as:  PRINIVIL,ZESTRIL  TAKE 1 TABLET ONE TIME DAILY     lovastatin 40 mg 24 hr tablet  Commonly known as:  ALTOPREV  Take 2 tablets (80 mg total) by mouth every evening.     multivitamin per tablet  Commonly known as:  THERAGRAN     omeprazole 20 MG capsule  Commonly known as:  PRILOSEC  Take 1 capsule (20 mg total) by mouth once daily.     oxybutynin 5 MG Tab  Commonly known as:  DITROPAN  TAKE 1 TABLET TWICE DAILY     oxycodone-acetaminophen 5-325 mg per tablet  Commonly known as:  PERCOCET  Take 1 tablet by mouth every 4 (four) hours as needed for Pain.     tamsulosin 0.4 mg Cp24  Commonly known as:  FLOMAX  Take 1 capsule (0.4 mg total) by mouth once daily.      warfarin 5 MG tablet  Commonly known as:  COUMADIN  Take 0.5-1 tablets (2.5-5 mg total) by mouth Daily. As directed by coumadin clinic        STOP taking these medications    hydrocodone-acetaminophen 5-325mg 5-325 mg per tablet  Commonly known as:  NORCO

## 2017-06-13 NOTE — PROGRESS NOTES
Pt d/c 6/13 s/p successful RFA/DCCV on 6/12. No significant med changes. Continue weekly INR for now

## 2017-06-13 NOTE — DISCHARGE INSTRUCTIONS
1. Do not strain or lift anything greater than 5 lb for 1 week.   2. Do not drive or operate any dangerous machinery for 24 hours.   3. Keep the dressing on, clean, and dry for 24 hours.   4. After 24 hours, the dressing may be removed and a shower is allowed.   5. Clean the area with mild soap and water and then leave it opened to air. Do not apply any lotions or ointments to the site.   6. Once the skin has healed, bathing in a tub or swimming is allowed.   7. Inspect the groin site daily and report to the physician any bleeding and/or swelling at the site that   cannot be controlled with manual pressure for 10 minutes, unusual pain at the   access site or affected extremity, unusual swelling at the access site, or signs or   symptoms of infection such as redness, pain, or fever.   Call 911 if you have:   Bleeding from the puncture site that you cannot stop by doing the following:   Relax and lie down right away. Keep your leg flat and apply firm pressure to the site using your fingers and a gauze pad. Keep the pressure on for 20 minutes. Continue this until the bleeding stops. This may take awhile. When bleeding stops, cover the site with a sterile bandage and keep your leg still as much as possible.

## 2017-06-13 NOTE — PLAN OF CARE
Problem: Patient Care Overview  Goal: Plan of Care Review  Outcome: Outcome(s) achieved Date Met: 06/13/17  Patient discharged per MD orders. Instructions given on medications, wound care, activity, signs of infection, when to call MD, and follow up appointments. Pt verbalized understanding. Patient AAOx3, VSS, no c/o pain or discomfort at this time. Right groin puncture site is closed and opened to air. No active bleeding. No hematoma noted. Telemetry and PIV removed. Pt will be discharged home via wheelchair with escort accompanied by her  once she is dressed and ready to go.

## 2017-06-19 ENCOUNTER — OFFICE VISIT (OUTPATIENT)
Dept: CARDIOLOGY | Facility: CLINIC | Age: 77
End: 2017-06-19
Payer: MEDICARE

## 2017-06-19 ENCOUNTER — ANTI-COAG VISIT (OUTPATIENT)
Dept: CARDIOLOGY | Facility: CLINIC | Age: 77
End: 2017-06-19
Payer: MEDICARE

## 2017-06-19 VITALS
OXYGEN SATURATION: 95 % | DIASTOLIC BLOOD PRESSURE: 61 MMHG | SYSTOLIC BLOOD PRESSURE: 118 MMHG | BODY MASS INDEX: 40.11 KG/M2 | WEIGHT: 249.56 LBS | HEIGHT: 66 IN | HEART RATE: 63 BPM | RESPIRATION RATE: 15 BRPM

## 2017-06-19 DIAGNOSIS — I48.3 TYPICAL ATRIAL FLUTTER: ICD-10-CM

## 2017-06-19 DIAGNOSIS — I48.3 TYPICAL ATRIAL FLUTTER: Primary | ICD-10-CM

## 2017-06-19 DIAGNOSIS — I10 ESSENTIAL HYPERTENSION: ICD-10-CM

## 2017-06-19 DIAGNOSIS — Z79.01 LONG TERM (CURRENT) USE OF ANTICOAGULANTS: Primary | ICD-10-CM

## 2017-06-19 DIAGNOSIS — E66.01 MORBID OBESITY, UNSPECIFIED OBESITY TYPE: ICD-10-CM

## 2017-06-19 DIAGNOSIS — E78.5 HYPERLIPIDEMIA, UNSPECIFIED HYPERLIPIDEMIA TYPE: ICD-10-CM

## 2017-06-19 LAB — INR PPP: 2.1 (ref 2–3)

## 2017-06-19 PROCEDURE — 1126F AMNT PAIN NOTED NONE PRSNT: CPT | Mod: S$GLB,,, | Performed by: INTERNAL MEDICINE

## 2017-06-19 PROCEDURE — 85610 PROTHROMBIN TIME: CPT | Mod: QW,S$GLB,,

## 2017-06-19 PROCEDURE — 99999 PR PBB SHADOW E&M-EST. PATIENT-LVL III: CPT | Mod: PBBFAC,,, | Performed by: INTERNAL MEDICINE

## 2017-06-19 PROCEDURE — 99211 OFF/OP EST MAY X REQ PHY/QHP: CPT | Mod: 25,S$GLB,,

## 2017-06-19 PROCEDURE — 1159F MED LIST DOCD IN RCRD: CPT | Mod: S$GLB,,, | Performed by: INTERNAL MEDICINE

## 2017-06-19 PROCEDURE — 99214 OFFICE O/P EST MOD 30 MIN: CPT | Mod: S$GLB,,, | Performed by: INTERNAL MEDICINE

## 2017-06-19 PROCEDURE — 99499 UNLISTED E&M SERVICE: CPT | Mod: S$GLB,,, | Performed by: INTERNAL MEDICINE

## 2017-06-19 NOTE — PROGRESS NOTES
CARDIOVASCULAR PROGRESS NOTE    REASON FOR CONSULT:   Alaina Vee is a 76 y.o. female who presents for f/u of AFL.    PCP: Seirra  EP: Prema  HISTORY OF PRESENT ILLNESS:   The patient returns for follow-up.  She underwent successful atrial flutter ablation by Dr. Lloyd on June 12, 2017.  She states that she's just getting use to her regular heart rhythm.  She tells me she still has some slight dyspnea without angina.  There's been no palpitations, lightheadedness, dizziness, or syncope.  There's been no PND, orthopnea, or lower extremity edema.  There's been no melena, hematuria, or claudicant symptoms.  The patient is asking if she can be enrolled in cardiac rehabilitation, explained to her that her cardiac issues do not qualify for cardiac rehabilitation.  I certainly do not have an issue with her exercising at a gym.    CARDIOVASCULAR HISTORY:   AFL, CHADSVASC 3, s/p ablation (Dr. Lloyd) 6/12/17  Aortic root dil 3.9 cm (echo 3/2017)    PAST MEDICAL HISTORY:     Past Medical History:   Diagnosis Date    Arthritis     knees    Atrial fibrillation     Atrial flutter     Degenerative disc disease     Depression     General anesthetics causing adverse effect in therapeutic use     pt states sometimes slow to awaken.    GERD (gastroesophageal reflux disease)     Hyperlipidemia     Hypertension     Sleep apnea     does not wear CPAP    Varicosities     Ventral hernia        PAST SURGICAL HISTORY:     Past Surgical History:   Procedure Laterality Date    APPENDECTOMY      breast biopsy, left      CHOLECYSTECTOMY      JOINT REPLACEMENT      TKR , right    JOINT REPLACEMENT  2009    TKR  left    MN EXPLORATORY OF ABDOMEN         ALLERGIES AND MEDICATION:   Review of patient's allergies indicates:  No Known Allergies  Previous Medications    CLOTRIMAZOLE-BETAMETHASONE 1-0.05% (LOTRISONE) CREAM    Apply topically 2 (two) times daily.    DICLOFENAC SODIUM 1 % GEL    Apply 2 g topically once daily.     FISH OIL-OMEGA-3 FATTY ACIDS 300-1,000 MG CAPSULE    Take 1 g by mouth once daily.    LISINOPRIL (PRINIVIL,ZESTRIL) 40 MG TABLET    TAKE 1 TABLET ONE TIME DAILY    LOVASTATIN (ALTOPREV) 40 MG 24 HR TABLET    Take 2 tablets (80 mg total) by mouth every evening.    MULTIVITAMIN (THERAGRAN) PER TABLET    Take 1 tablet by mouth Daily. 1 Tablet Oral Every day    OMEPRAZOLE (PRILOSEC) 20 MG CAPSULE    Take 1 capsule (20 mg total) by mouth once daily.    OXYBUTYNIN (DITROPAN) 5 MG TAB    TAKE 1 TABLET TWICE DAILY    OXYCODONE-ACETAMINOPHEN (PERCOCET) 5-325 MG PER TABLET    Take 1 tablet by mouth every 4 (four) hours as needed for Pain.    TAMSULOSIN (FLOMAX) 0.4 MG CP24    Take 1 capsule (0.4 mg total) by mouth once daily.    WARFARIN (COUMADIN) 5 MG TABLET    Take 0.5-1 tablets (2.5-5 mg total) by mouth Daily. As directed by coumadin clinic       SOCIAL HISTORY:     Social History     Social History    Marital status:      Spouse name: N/A    Number of children: N/A    Years of education: N/A     Occupational History    Not on file.     Social History Main Topics    Smoking status: Never Smoker    Smokeless tobacco: Not on file    Alcohol use No    Drug use: Unknown    Sexual activity: Not on file     Other Topics Concern    Not on file     Social History Narrative    No narrative on file       FAMILY HISTORY:     Family History   Problem Relation Age of Onset    COPD Mother     Heart disease Sister      half sister    COPD Sister        REVIEW OF SYSTEMS:   Review of Systems   Constitutional: Negative for chills, diaphoresis and fever.   HENT: Negative for nosebleeds.    Eyes: Negative for blurred vision, double vision and photophobia.   Respiratory: Negative for hemoptysis, shortness of breath and wheezing.    Cardiovascular: Negative for chest pain, palpitations, orthopnea, claudication, leg swelling and PND.   Gastrointestinal: Negative for abdominal pain, blood in stool, heartburn, melena, nausea  "and vomiting.   Genitourinary: Negative for flank pain and hematuria.   Musculoskeletal: Negative for falls, myalgias and neck pain.   Skin: Negative for rash.   Neurological: Negative for dizziness, seizures, loss of consciousness, weakness and headaches.   Endo/Heme/Allergies: Negative for polydipsia. Does not bruise/bleed easily.   Psychiatric/Behavioral: Negative for depression and memory loss. The patient is not nervous/anxious.        PHYSICAL EXAM:     Vitals:    06/19/17 1046   BP: 118/61   Pulse: 63   Resp: 15    Body mass index is 40.28 kg/m².  Weight: 113.2 kg (249 lb 9 oz)   Height: 5' 6" (167.6 cm)     Physical Exam   Constitutional: She is oriented to person, place, and time. She appears well-developed and well-nourished. She is cooperative.  Non-toxic appearance. No distress.   HENT:   Head: Normocephalic and atraumatic.   Eyes: Conjunctivae and EOM are normal. Pupils are equal, round, and reactive to light. No scleral icterus.   Neck: Trachea normal and normal range of motion. Neck supple. Normal carotid pulses and no JVD present. Carotid bruit is not present. No no neck rigidity. No edema present. No thyromegaly present.   Cardiovascular: Normal rate, regular rhythm, S1 normal and S2 normal.  PMI is not displaced.  Exam reveals distant heart sounds. Exam reveals no gallop and no friction rub.    No murmur heard.  Pulses:       Carotid pulses are 2+ on the right side, and 2+ on the left side.  Pulmonary/Chest: Effort normal and breath sounds normal. No respiratory distress. She has no wheezes. She has no rales. She exhibits no tenderness.   Abdominal: Soft. Bowel sounds are normal. She exhibits no distension and no mass. There is no hepatosplenomegaly. There is no tenderness.   obese   Musculoskeletal: She exhibits no edema or tenderness.   Feet:   Right Foot:   Skin Integrity: Negative for ulcer.   Left Foot:   Skin Integrity: Negative for ulcer.   Neurological: She is alert and oriented to person, " place, and time. No cranial nerve deficit.   Skin: Skin is warm and dry. No rash noted. No erythema.   Psychiatric: She has a normal mood and affect. Her speech is normal and behavior is normal.   Vitals reviewed.      DATA:   EKG: (personally reviewed tracing)  2/23/17 AFL (typical) VR 73, 4:1 AV conduction.  No ST changes or Q waves.  AFL new vs 5/28/09.    Laboratory:  CBC:    Recent Labs  Lab 12/08/16  1300 02/22/17  1500 06/06/17  1635   WHITE BLOOD CELL COUNT 6.80 6.30 7.11   HEMOGLOBIN 14.3 12.6 13.7   HEMATOCRIT 43.4 38.4 41.7   PLATELETS 315 289 264       CHEMISTRIES:    Recent Labs  Lab 05/31/16  1452 12/08/16  1300 02/22/17  1500 06/06/17  1635   GLUCOSE 104 98 105 115 H   SODIUM 141 142 142 142   POTASSIUM 4.9 4.7 4.3 4.0   BUN BLD 24 H 16 19 16   CREATININE 1.1 0.9 1.1 1.0   EGFR IF  58 A >60 57 A >60   EGFR IF NON- 50 A >60 50 A 56 A   CALCIUM 9.5 10.4 9.7 9.4   MAGNESIUM 2.4  --   --   --        CARDIAC BIOMARKERS:    Recent Labs  Lab 05/31/16  1452   CPK 49       COAGS:    Recent Labs  Lab 06/09/17  0801 06/12/17  0752 06/19/17  0846   INR 2.0 H 2.5 H 2.1       LIPIDS/LFTS:    Recent Labs  Lab 11/19/15  1037 05/31/16  1452 12/08/16  1300 03/07/17  0920 06/06/17  1635   CHOLESTEROL 238 H  --   --  193 158   TRIGLYCERIDES 154 H  --   --  156 H 170 H   HDL 55  --   --  33 L 35 L   LDL CHOLESTEROL 152.2  --   --  128.8 89.0   NON-HDL CHOLESTEROL 183  --   --  160 123   AST 20 16 21  --  13   ALT 17 14 16  --  11     Lab Results   Component Value Date    TSH 1.350 12/08/2016     Cardiovascular Testing:  Echo 3/7/17    1 - Normal left ventricular systolic function (EF 55-60%).     2 - Moderate left atrial enlargement.     3 - Aortic root dilation, 3.9cm    Holter 3/7/17  PREDOMINANT RHYTHM  1. Atrial flutter with ventricular rates varying between 29 and 89 bpm with an average of 55 bpm.   VENTRICULAR ARRHYTHMIAS  1. There were no PVCs. There was no ventricular ectopic  activity.  SUPRA VENTRICULAR ARRHYTHMIAS  1. Atrial flutter was noted throughout the study.   AV CONDUCTION  1. There was no evidence of high grade AV emma filtering.   2. The longest RR interval was 2215 msec.   DIARY  1. The diary was not returned  MISCELLANEOUS  1. There were occasional hookup related artifacts. Overall, the study was of good quality.   2. This was a tape of adequate length (24 hrs).    RADHA 7/14/16  Right lower extremity RADHA: 1.06  Left lower extremity RADHA: 1.08    ASSESSMENT:   # AFL, controlled VR.  CHADSVASC 3.  S/p ablation 6/2017  # bradycardia (asymptomatic) noted on no offending meds.  # HTN, controlled  # HLP, LDL acceptable  # BMI 40, stable vs last OV  # SULEMA  # Aortic root dil 3.9 cm (echo 3/2017)    PLAN:   Cont med rx  Diet/exercise/weight loss, pt previously declined bariatric evaluation.  Continuation of coumadin as per instructions of Dr. Lloyd's office  RTC 6 months    Nahid Hidalgo MD, FACC

## 2017-06-19 NOTE — PROGRESS NOTES
INR good. Patient is s/p ablation last week. She denies medication or diet changes. She is not sleeping well. No other complaints. No signs or symptoms of bleeding. We will continue on maintenance dose and repeat INR next week.

## 2017-06-22 ENCOUNTER — TELEPHONE (OUTPATIENT)
Dept: ELECTROPHYSIOLOGY | Facility: CLINIC | Age: 77
End: 2017-06-22

## 2017-06-22 NOTE — TELEPHONE ENCOUNTER
----- Message from Abelino Padilla sent at 6/22/2017 12:55 PM CDT -----  Contact: pt   Pt request to reschedule EKG and visit appointment 07/28/2017 to 07/29/2017 please call back to discuss [phone]

## 2017-06-26 ENCOUNTER — LAB VISIT (OUTPATIENT)
Dept: LAB | Facility: HOSPITAL | Age: 77
End: 2017-06-26
Attending: INTERNAL MEDICINE
Payer: MEDICARE

## 2017-06-26 ENCOUNTER — ANTI-COAG VISIT (OUTPATIENT)
Dept: CARDIOLOGY | Facility: CLINIC | Age: 77
End: 2017-06-26

## 2017-06-26 DIAGNOSIS — I48.3 TYPICAL ATRIAL FLUTTER: ICD-10-CM

## 2017-06-26 DIAGNOSIS — Z79.01 LONG TERM (CURRENT) USE OF ANTICOAGULANTS: ICD-10-CM

## 2017-06-26 LAB
INR PPP: 2
PROTHROMBIN TIME: 20.2 SEC

## 2017-06-26 PROCEDURE — 36415 COLL VENOUS BLD VENIPUNCTURE: CPT

## 2017-06-26 PROCEDURE — 85610 PROTHROMBIN TIME: CPT

## 2017-07-03 ENCOUNTER — ANTI-COAG VISIT (OUTPATIENT)
Dept: CARDIOLOGY | Facility: CLINIC | Age: 77
End: 2017-07-03
Payer: MEDICARE

## 2017-07-03 DIAGNOSIS — Z79.01 LONG TERM (CURRENT) USE OF ANTICOAGULANTS: Primary | ICD-10-CM

## 2017-07-03 DIAGNOSIS — I48.3 TYPICAL ATRIAL FLUTTER: ICD-10-CM

## 2017-07-03 LAB — INR PPP: 2.3 (ref 2–3)

## 2017-07-03 PROCEDURE — 85610 PROTHROMBIN TIME: CPT | Mod: QW,S$GLB,,

## 2017-07-03 PROCEDURE — 99211 OFF/OP EST MAY X REQ PHY/QHP: CPT | Mod: 25,S$GLB,,

## 2017-07-10 ENCOUNTER — LAB VISIT (OUTPATIENT)
Dept: LAB | Facility: HOSPITAL | Age: 77
End: 2017-07-10
Attending: INTERNAL MEDICINE
Payer: MEDICARE

## 2017-07-10 DIAGNOSIS — G50.9: ICD-10-CM

## 2017-07-10 LAB
CREAT SERPL-MCNC: 1.1 MG/DL
EST. GFR  (AFRICAN AMERICAN): 57 ML/MIN/1.73 M^2
EST. GFR  (NON AFRICAN AMERICAN): 50 ML/MIN/1.73 M^2

## 2017-07-10 PROCEDURE — 82565 ASSAY OF CREATININE: CPT

## 2017-07-10 PROCEDURE — 36415 COLL VENOUS BLD VENIPUNCTURE: CPT

## 2017-07-12 ENCOUNTER — ANTI-COAG VISIT (OUTPATIENT)
Dept: CARDIOLOGY | Facility: CLINIC | Age: 77
End: 2017-07-12

## 2017-07-12 ENCOUNTER — LAB VISIT (OUTPATIENT)
Dept: LAB | Facility: HOSPITAL | Age: 77
End: 2017-07-12
Attending: PSYCHIATRY & NEUROLOGY
Payer: MEDICARE

## 2017-07-12 DIAGNOSIS — Z79.01 LONG TERM (CURRENT) USE OF ANTICOAGULANTS: ICD-10-CM

## 2017-07-12 DIAGNOSIS — I48.3 TYPICAL ATRIAL FLUTTER: ICD-10-CM

## 2017-07-12 LAB
INR PPP: 2.9
PROTHROMBIN TIME: 29.6 SEC

## 2017-07-12 PROCEDURE — 85610 PROTHROMBIN TIME: CPT

## 2017-07-12 PROCEDURE — 36415 COLL VENOUS BLD VENIPUNCTURE: CPT

## 2017-07-18 DIAGNOSIS — I10 HTN (HYPERTENSION), BENIGN: ICD-10-CM

## 2017-07-18 RX ORDER — LISINOPRIL 40 MG/1
TABLET ORAL
Qty: 90 TABLET | Refills: 1 | Status: SHIPPED | OUTPATIENT
Start: 2017-07-18 | End: 2019-04-05

## 2017-07-19 ENCOUNTER — LAB VISIT (OUTPATIENT)
Dept: LAB | Facility: HOSPITAL | Age: 77
End: 2017-07-19
Attending: INTERNAL MEDICINE
Payer: MEDICARE

## 2017-07-19 ENCOUNTER — ANTI-COAG VISIT (OUTPATIENT)
Dept: CARDIOLOGY | Facility: CLINIC | Age: 77
End: 2017-07-19

## 2017-07-19 DIAGNOSIS — I48.3 TYPICAL ATRIAL FLUTTER: ICD-10-CM

## 2017-07-19 DIAGNOSIS — Z79.01 LONG TERM (CURRENT) USE OF ANTICOAGULANTS: ICD-10-CM

## 2017-07-19 LAB
INR PPP: 2.3
PROTHROMBIN TIME: 23.8 SEC

## 2017-07-19 PROCEDURE — 36415 COLL VENOUS BLD VENIPUNCTURE: CPT

## 2017-07-19 PROCEDURE — 85610 PROTHROMBIN TIME: CPT

## 2017-07-26 ENCOUNTER — OFFICE VISIT (OUTPATIENT)
Dept: ELECTROPHYSIOLOGY | Facility: CLINIC | Age: 77
End: 2017-07-26
Payer: MEDICARE

## 2017-07-26 ENCOUNTER — ANTI-COAG VISIT (OUTPATIENT)
Dept: CARDIOLOGY | Facility: CLINIC | Age: 77
End: 2017-07-26
Payer: MEDICARE

## 2017-07-26 ENCOUNTER — HOSPITAL ENCOUNTER (OUTPATIENT)
Dept: CARDIOLOGY | Facility: CLINIC | Age: 77
Discharge: HOME | End: 2017-07-26
Payer: MEDICARE

## 2017-07-26 VITALS
BODY MASS INDEX: 39.53 KG/M2 | HEIGHT: 66 IN | SYSTOLIC BLOOD PRESSURE: 125 MMHG | WEIGHT: 246 LBS | DIASTOLIC BLOOD PRESSURE: 69 MMHG | HEART RATE: 55 BPM

## 2017-07-26 DIAGNOSIS — I48.92 ATRIAL FLUTTER, UNSPECIFIED TYPE: ICD-10-CM

## 2017-07-26 DIAGNOSIS — Z79.01 LONG TERM (CURRENT) USE OF ANTICOAGULANTS: Primary | ICD-10-CM

## 2017-07-26 DIAGNOSIS — I48.0 PAF (PAROXYSMAL ATRIAL FIBRILLATION): ICD-10-CM

## 2017-07-26 DIAGNOSIS — G47.33 OSA (OBSTRUCTIVE SLEEP APNEA): ICD-10-CM

## 2017-07-26 DIAGNOSIS — Z79.01 LONG TERM (CURRENT) USE OF ANTICOAGULANTS: ICD-10-CM

## 2017-07-26 DIAGNOSIS — I48.3 TYPICAL ATRIAL FLUTTER: ICD-10-CM

## 2017-07-26 DIAGNOSIS — I48.3 TYPICAL ATRIAL FLUTTER: Primary | ICD-10-CM

## 2017-07-26 LAB
CTP QC/QA: YES
INR PPP: 1.9 (ref 2–3)

## 2017-07-26 PROCEDURE — 99211 OFF/OP EST MAY X REQ PHY/QHP: CPT | Mod: 25,S$GLB,,

## 2017-07-26 PROCEDURE — 99499 UNLISTED E&M SERVICE: CPT | Mod: S$GLB,,, | Performed by: INTERNAL MEDICINE

## 2017-07-26 PROCEDURE — 99215 OFFICE O/P EST HI 40 MIN: CPT | Mod: S$GLB,,, | Performed by: INTERNAL MEDICINE

## 2017-07-26 PROCEDURE — 93000 ELECTROCARDIOGRAM COMPLETE: CPT | Mod: S$GLB,,, | Performed by: INTERNAL MEDICINE

## 2017-07-26 PROCEDURE — 85610 PROTHROMBIN TIME: CPT | Mod: QW,S$GLB,,

## 2017-07-26 PROCEDURE — 99999 PR PBB SHADOW E&M-EST. PATIENT-LVL III: CPT | Mod: PBBFAC,,, | Performed by: INTERNAL MEDICINE

## 2017-07-26 PROCEDURE — 1159F MED LIST DOCD IN RCRD: CPT | Mod: S$GLB,,, | Performed by: INTERNAL MEDICINE

## 2017-07-26 PROCEDURE — 1126F AMNT PAIN NOTED NONE PRSNT: CPT | Mod: S$GLB,,, | Performed by: INTERNAL MEDICINE

## 2017-07-26 NOTE — PROGRESS NOTES
"From JORGE Wise: "Pt is having SKINNY/DCCV on 8/21/17. Need INR 2-3. "  Reschedule next INR from 8/9 to 8/2.  "

## 2017-07-26 NOTE — PROGRESS NOTES
INR slightly subtherapeutic, likely due to increased greens this past week. Patient denies other changes.  Boost then rechallenge warfarin dose.  Consider dose increase if INR remains low without changes.  She has completed weekly INRs s/p RFA.  Patient advised to contact clinic with any changes, questions, or concerns.

## 2017-07-26 NOTE — PROGRESS NOTES
Subjective:    Patient ID:  Alaina Vee is a 76 y.o. female who presents for evaluation of Atrial Flutter      HPI   76 y.o. F  HTN on meds  HL on meds   SULEMA (has CPAP machine in her closet at home)  AFL, s/p RFA 6/2017  PAF    AFL detected during recent preprocedure (kidney stones) exam. Completely asx, though does get brief palps about once per day to once per week (short lived).  No syncope, ever. Unlimited walking tolerance.  Saw Dr Hidalgo; Rx xarelto, but $$ -> coumadin.    Since ablation, she feels much better: more energy. Today she's back into AF.    ECGs of 2/23, 2/22: typical AFL  today: AF with controlled V response    echo 3/17: 55%.    My interpretation of today's ECG is AF with HR 49 bpm    Review of Systems   Constitution: Negative. Negative for weakness and malaise/fatigue.   HENT: Negative.  Negative for ear pain and tinnitus.    Eyes: Negative for blurred vision.   Cardiovascular: Negative for chest pain, dyspnea on exertion, near-syncope and syncope.   Respiratory: Negative.  Negative for shortness of breath.    Endocrine: Negative.  Negative for polyuria.   Hematologic/Lymphatic: Does not bruise/bleed easily.   Skin: Negative.  Negative for rash.   Musculoskeletal: Negative.  Negative for joint pain and muscle weakness.   Gastrointestinal: Negative.  Negative for abdominal pain and change in bowel habit.   Genitourinary: Negative for frequency.   Neurological: Negative.  Negative for dizziness.   Psychiatric/Behavioral: Negative.  Negative for depression. The patient is not nervous/anxious.    Allergic/Immunologic: Negative for environmental allergies.        Objective:    Physical Exam   Constitutional: She is oriented to person, place, and time. Vital signs are normal. She appears well-developed and well-nourished. She is active and cooperative.   HENT:   Head: Normocephalic and atraumatic.   Eyes: Conjunctivae and EOM are normal.   Neck: Normal range of motion. Carotid bruit is not  present. No tracheal deviation and no edema present. No thyroid mass and no thyromegaly present.   Cardiovascular: Normal heart sounds, intact distal pulses and normal pulses.  An irregularly irregular rhythm present.  No extrasystoles are present. Bradycardia present.  PMI is not displaced.  Exam reveals no gallop and no friction rub.    No murmur heard.  Pulmonary/Chest: Effort normal and breath sounds normal. No respiratory distress. She has no wheezes. She has no rales.   Abdominal: Soft. Normal appearance. She exhibits no distension. There is no hepatosplenomegaly.   Musculoskeletal: Normal range of motion.   Neurological: She is alert and oriented to person, place, and time. Coordination normal.   Skin: Skin is warm and dry. No rash noted.   Psychiatric: She has a normal mood and affect. Her speech is normal and behavior is normal. Thought content normal. Cognition and memory are normal.   Nursing note and vitals reviewed.        Assessment:       1. Typical atrial flutter    2. PAF (paroxysmal atrial fibrillation)    3. SULEMA (obstructive sleep apnea)    4. Long term (current) use of anticoagulants         Plan:       Discussed AFL and its basic pathophysiology, including its health implications and treatment options (rate vs. rhythm control, meds vs. procedural/device treatment) as appropriate for the patient.  Discussed AF and its basic pathophysiology, including its health implications and treatment options (rate vs. rhythm control, meds vs. procedural/device treatment) as appropriate for the patient.  Discussed that AF and AFL have both been seen, and that control of one (AFL) via ablation may facilitate control of the other (AF).  Discussed that in the presence of uncontrolled/incompletely treated obstructive sleep apnea, AF is very difficult to treat. Need to optimize SULEMA treatment in order to optimize attempts at AF treatment.    Sleep consult re: SULEMA. Need to treat SULEMA to facilitate control of  AF.    She might feel even better if we're able to maintain NSR.  Plan SKINNY/DCCV, then start propafenone  bid and place 30-day monitor.  If no sx change, then rate-control strategy would be reasonable. If improvement, then continue to attempt rhythm control.    I discussed with patient risks, indications, benefits, and alternatives of the planned procedure. All questions were answered. Patient understands and wishes to proceed.  Discussed the indications and possible side effects of the medications prescribed to the patient by me.

## 2017-07-27 DIAGNOSIS — I48.92 ATRIAL FLUTTER, UNSPECIFIED TYPE: Primary | ICD-10-CM

## 2017-07-27 DIAGNOSIS — I49.9 CARDIAC ARRHYTHMIA, UNSPECIFIED CARDIAC ARRHYTHMIA TYPE: ICD-10-CM

## 2017-07-27 NOTE — PROGRESS NOTES
CARDIOVERSION EDUCATION CHECK LIST    8/18/17 -Labs  PRE - PROCEDURE LABS HAVE BEEN ORDERED FOR YOU @ [facility]  BE SURE TO ARRIVE AT YOUR SCHEDULED TIME FOR THIS LAB WORK!   (YOU DO NOT HAVE TO FAST FOR THIS LABWORK!!!!)    8/21/17 @ 9 AM -SKINNY/Cardioversion  REPORT TO CARDIOLOGY WAITING ROOM ON 3RD FLOOR OF HOSPITAL (DO NOT REPORT TO CLINIC)  Directions for Reporting to Cardiology Waiting Area in the Hospital  If you park in the Parking Garage:  Take elevators to the 2nd floor  Walk up ramp and turn right by Gold Elevators  Take elevator to the 3rd floor  Upon exiting the elevator, turn away from the clinic areas  Walk long willett around to front of hospital to area with windows overlooking [facility]  Check in at Reception Desk  OR  If family is dropping you off:  Have them drop you off at the front of the Hospital  (Near the ER, where all the flags are hung).  Take the E elevators to the 3rd floor.  Check in at the Reception Desk in the waiting room.    DO NOT EAT OR DRINK ANYTHING AFTER: 12 MIDNIGHT ON THE NIGHT BEFORE YOUR PROCEDURE    MEDICATIONS:  YOU MAY TAKE YOUR USUAL MORNING MEDICATIONS WITH A SIP OF WATER    YOU WILL BE GOING HOME THE SAME DAY AS YOUR PROCEDURE  YOU WILL NEED SOMEONE TO DRIVE YOU HOME AFTER YOUR PROCEDURE     YOUR PAIN DURING YOUR PROCEDURE WILL BE MANAGED BY THE ANESTHESIA TEAM      THE ABOVE INSTRUCTIONS WERE GIVEN TO THE PATIENT VERBALLY AND THEY VERBALIZED UNDERSTANDING. THEY DO NOT REQUIRE ANY SPECIAL NEEDS AND DO NOT HAVE ANY LEARNING BARRIERS.     Any need to reschedule or cancel procedures, or any questions regarding your procedure should be addressed directly with the Arrhythmia Department Nurses at the following phone number: [phone]

## 2017-08-02 ENCOUNTER — ANTI-COAG VISIT (OUTPATIENT)
Dept: CARDIOLOGY | Facility: CLINIC | Age: 77
End: 2017-08-02
Payer: MEDICARE

## 2017-08-02 DIAGNOSIS — Z79.01 LONG TERM (CURRENT) USE OF ANTICOAGULANTS: Primary | ICD-10-CM

## 2017-08-02 DIAGNOSIS — I48.3 TYPICAL ATRIAL FLUTTER: ICD-10-CM

## 2017-08-02 LAB — INR PPP: 1.8 (ref 2–3)

## 2017-08-02 PROCEDURE — 99211 OFF/OP EST MAY X REQ PHY/QHP: CPT | Mod: 25,S$GLB,,

## 2017-08-02 PROCEDURE — 85610 PROTHROMBIN TIME: CPT | Mod: QW,S$GLB,,

## 2017-08-02 NOTE — PROGRESS NOTES
INR low again. Patient denies changes. We will boost dose and increase dose. Continue weekly INR. Message sent to JORGE Wise in EP about 2 low INRs and asked to let us know if DCCV will need to be r/s

## 2017-08-08 ENCOUNTER — TELEPHONE (OUTPATIENT)
Dept: CARDIOLOGY | Facility: CLINIC | Age: 77
End: 2017-08-08

## 2017-08-08 NOTE — TELEPHONE ENCOUNTER
Patient called about SKINNY scheduled on 8/21/17 . Pre-procedural questions asked.  Denies swallowing issues and esophageal issues. Denies previous sedation/anesthesia problems. States does  have sleep apnea.  Denies recent trauma/surgery/radiation therapy to head/neck/airway. States is able to move neck without difficulty. SKINNY described to patient. Instructed NPO past midnight and to have a designated  to drive patient home after the procedures due to sedation being given. Instructed to report to   [facility] at 0900 am.  Verbalizes understanding. Questions answered.

## 2017-08-09 ENCOUNTER — ANTI-COAG VISIT (OUTPATIENT)
Dept: CARDIOLOGY | Facility: CLINIC | Age: 77
End: 2017-08-09

## 2017-08-09 ENCOUNTER — LAB VISIT (OUTPATIENT)
Dept: LAB | Facility: HOSPITAL | Age: 77
End: 2017-08-09
Attending: INTERNAL MEDICINE
Payer: MEDICARE

## 2017-08-09 DIAGNOSIS — I48.3 TYPICAL ATRIAL FLUTTER: ICD-10-CM

## 2017-08-09 DIAGNOSIS — Z79.01 LONG TERM (CURRENT) USE OF ANTICOAGULANTS: ICD-10-CM

## 2017-08-09 LAB
INR PPP: 2.7
PROTHROMBIN TIME: 27.3 SEC

## 2017-08-09 PROCEDURE — 85610 PROTHROMBIN TIME: CPT

## 2017-08-09 PROCEDURE — 36415 COLL VENOUS BLD VENIPUNCTURE: CPT

## 2017-08-15 ENCOUNTER — LAB VISIT (OUTPATIENT)
Dept: LAB | Facility: HOSPITAL | Age: 77
End: 2017-08-15
Attending: INTERNAL MEDICINE
Payer: MEDICARE

## 2017-08-15 ENCOUNTER — ANTI-COAG VISIT (OUTPATIENT)
Dept: CARDIOLOGY | Facility: CLINIC | Age: 77
End: 2017-08-15

## 2017-08-15 DIAGNOSIS — Z79.01 LONG TERM (CURRENT) USE OF ANTICOAGULANTS: ICD-10-CM

## 2017-08-15 DIAGNOSIS — I48.3 TYPICAL ATRIAL FLUTTER: ICD-10-CM

## 2017-08-15 LAB
INR PPP: 2
PROTHROMBIN TIME: 21 SEC

## 2017-08-15 PROCEDURE — 85610 PROTHROMBIN TIME: CPT

## 2017-08-17 RX ORDER — OXYBUTYNIN CHLORIDE 15 MG/1
TABLET, EXTENDED RELEASE ORAL
Qty: 90 TABLET | Refills: 11 | Status: SHIPPED | OUTPATIENT
Start: 2017-08-17 | End: 2018-02-06

## 2017-08-18 ENCOUNTER — TELEPHONE (OUTPATIENT)
Dept: ELECTROPHYSIOLOGY | Facility: CLINIC | Age: 77
End: 2017-08-18

## 2017-08-18 ENCOUNTER — ANTI-COAG VISIT (OUTPATIENT)
Dept: CARDIOLOGY | Facility: CLINIC | Age: 77
End: 2017-08-18

## 2017-08-18 ENCOUNTER — LAB VISIT (OUTPATIENT)
Dept: LAB | Facility: HOSPITAL | Age: 77
End: 2017-08-18
Attending: INTERNAL MEDICINE
Payer: MEDICARE

## 2017-08-18 DIAGNOSIS — Z79.01 LONG TERM (CURRENT) USE OF ANTICOAGULANTS: ICD-10-CM

## 2017-08-18 DIAGNOSIS — I48.3 TYPICAL ATRIAL FLUTTER: ICD-10-CM

## 2017-08-18 LAB
ANION GAP SERPL CALC-SCNC: 9 MMOL/L
BASOPHILS # BLD AUTO: 0.05 K/UL
BASOPHILS NFR BLD: 0.9 %
BUN SERPL-MCNC: 19 MG/DL
CALCIUM SERPL-MCNC: 9.8 MG/DL
CHLORIDE SERPL-SCNC: 105 MMOL/L
CO2 SERPL-SCNC: 28 MMOL/L
CREAT SERPL-MCNC: 1.1 MG/DL
DIFFERENTIAL METHOD: NORMAL
EOSINOPHIL # BLD AUTO: 0.2 K/UL
EOSINOPHIL NFR BLD: 2.6 %
ERYTHROCYTE [DISTWIDTH] IN BLOOD BY AUTOMATED COUNT: 14.1 %
EST. GFR  (AFRICAN AMERICAN): 57 ML/MIN/1.73 M^2
EST. GFR  (NON AFRICAN AMERICAN): 50 ML/MIN/1.73 M^2
GLUCOSE SERPL-MCNC: 116 MG/DL
HCT VFR BLD AUTO: 44.4 %
HGB BLD-MCNC: 14.6 G/DL
INR PPP: 2.3
LYMPHOCYTES # BLD AUTO: 1.5 K/UL
LYMPHOCYTES NFR BLD: 26.7 %
MCH RBC QN AUTO: 29.6 PG
MCHC RBC AUTO-ENTMCNC: 32.9 G/DL
MCV RBC AUTO: 90 FL
MONOCYTES # BLD AUTO: 0.5 K/UL
MONOCYTES NFR BLD: 8.1 %
NEUTROPHILS # BLD AUTO: 3.6 K/UL
NEUTROPHILS NFR BLD: 61.5 %
PLATELET # BLD AUTO: 256 K/UL
PMV BLD AUTO: 10.2 FL
POTASSIUM SERPL-SCNC: 4.6 MMOL/L
PROTHROMBIN TIME: 23.2 SEC
RBC # BLD AUTO: 4.93 M/UL
SODIUM SERPL-SCNC: 142 MMOL/L
WBC # BLD AUTO: 5.77 K/UL

## 2017-08-18 PROCEDURE — 85610 PROTHROMBIN TIME: CPT

## 2017-08-18 PROCEDURE — 36415 COLL VENOUS BLD VENIPUNCTURE: CPT

## 2017-08-18 PROCEDURE — 80048 BASIC METABOLIC PNL TOTAL CA: CPT

## 2017-08-18 PROCEDURE — 85025 COMPLETE CBC W/AUTO DIFF WBC: CPT

## 2017-08-18 NOTE — TELEPHONE ENCOUNTER
Pt was calling to see what medication that Dr Lloyd wanted her to check the price on prior to procedure on Monday. Informed Pt that it would be propafenone ER 225mg BID. Pt stated she would check price for Monday.

## 2017-08-18 NOTE — TELEPHONE ENCOUNTER
----- Message from Christy Langley sent at 8/18/2017 10:24 AM CDT -----  Contact: pt  Zonia pt said she spoke to you this morning and she would like for you to call her.    Thanks

## 2017-08-21 ENCOUNTER — HOSPITAL ENCOUNTER (OUTPATIENT)
Dept: CARDIOLOGY | Facility: CLINIC | Age: 77
Discharge: HOME | End: 2017-08-21

## 2017-08-22 ENCOUNTER — TELEPHONE (OUTPATIENT)
Dept: ELECTROPHYSIOLOGY | Facility: CLINIC | Age: 77
End: 2017-08-22

## 2017-08-22 NOTE — TELEPHONE ENCOUNTER
----- Message from Christy Langley sent at 8/22/2017 12:24 PM CDT -----  Contact: pt  Pt called to reschedule her missed procedure from yesterday.    Thanks

## 2017-08-22 NOTE — PROGRESS NOTES
See the above note.  The pt has rescheduled her DCCV from 8/21 to 8/31.  See calendar for current warfarin dose and we need an INR this week.
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
## 2017-08-22 NOTE — PROGRESS NOTES
CARDIOVERSION EDUCATION CHECK LIST    8-28-17    - LAB WORK  PRE - PROCEDURE LABS HAVE BEEN ORDERED FOR YOU @ [facility]  BE SURE TO ARRIVE AT YOUR SCHEDULED TIME FOR THIS LAB WORK!   (YOU DO NOT HAVE TO FAST FOR THIS LABWORK!!!!)     8-31-17 @ 11 AM  REPORT TO CARDIOLOGY WAITING ROOM ON 3RD FLOOR OF HOSPITAL (DO NOT REPORT TO CLINIC)  Directions for Reporting to Cardiology Waiting Area in the Hospital  If you park in the Parking Garage:  Take elevators to the 2nd floor  Walk up ramp and turn right by Gold Elevators  Take elevator to the 3rd floor  Upon exiting the elevator, turn away from the clinic areas  Walk long willett around to front of hospital to area with windows overlooking [facility]  Check in at Reception Desk  OR  If family is dropping you off:  Have them drop you off at the front of the Hospital  (Near the ER, where all the flags are hung).  Take the E elevators to the 3rd floor.  Check in at the Reception Desk in the waiting room.        DO NOT EAT OR DRINK ANYTHING AFTER: 12 MN ON THE NIGHT BEFORE YOUR PROCEDURE    MEDICATIONS:  YOU MAY TAKE ALL YOUR  MORNING MEDICATIONS WITH A SIP OF WATER    YOU WILL BE GOING HOME THE SAME DAY AS YOUR PROCEDURE  YOU WILL NEED SOMEONE TO DRIVE YOU HOME AFTER YOUR PROCEDURE     YOUR PAIN DURING YOUR PROCEDURE WILL BE MANAGED BY THE ANESTHESIA TEAM      THE ABOVE INSTRUCTIONS WERE GIVEN TO THE PATIENT VERBALLY AND THEY VERBALIZED UNDERSTANDING. THEY DO NOT REQUIRE ANY SPECIAL NEEDS AND DO NOT HAVE ANY LEARNING BARRIERS.     Any need to reschedule or cancel procedures, or any questions regarding your procedure should be addressed directly with the Arrhythmia Department Nurses at the following phone number: [phone]

## 2017-08-23 ENCOUNTER — LAB VISIT (OUTPATIENT)
Dept: LAB | Facility: HOSPITAL | Age: 77
End: 2017-08-23
Attending: INTERNAL MEDICINE
Payer: MEDICARE

## 2017-08-23 ENCOUNTER — ANTI-COAG VISIT (OUTPATIENT)
Dept: CARDIOLOGY | Facility: CLINIC | Age: 77
End: 2017-08-23

## 2017-08-23 DIAGNOSIS — I48.3 TYPICAL ATRIAL FLUTTER: ICD-10-CM

## 2017-08-23 DIAGNOSIS — Z79.01 LONG TERM (CURRENT) USE OF ANTICOAGULANTS: ICD-10-CM

## 2017-08-23 LAB
INR PPP: 2.1
PROTHROMBIN TIME: 21.4 SEC

## 2017-08-23 PROCEDURE — 36415 COLL VENOUS BLD VENIPUNCTURE: CPT

## 2017-08-23 PROCEDURE — 85610 PROTHROMBIN TIME: CPT

## 2017-08-28 ENCOUNTER — LAB VISIT (OUTPATIENT)
Dept: LAB | Facility: HOSPITAL | Age: 77
End: 2017-08-28
Attending: INTERNAL MEDICINE
Payer: MEDICARE

## 2017-08-28 ENCOUNTER — ANTI-COAG VISIT (OUTPATIENT)
Dept: CARDIOLOGY | Facility: CLINIC | Age: 77
End: 2017-08-28

## 2017-08-28 DIAGNOSIS — I48.3 TYPICAL ATRIAL FLUTTER: ICD-10-CM

## 2017-08-28 DIAGNOSIS — Z79.01 LONG TERM (CURRENT) USE OF ANTICOAGULANTS: ICD-10-CM

## 2017-08-28 LAB
INR PPP: 2.5
PROTHROMBIN TIME: 25.5 SEC

## 2017-08-28 PROCEDURE — 36415 COLL VENOUS BLD VENIPUNCTURE: CPT

## 2017-08-28 PROCEDURE — 85610 PROTHROMBIN TIME: CPT

## 2017-08-30 ENCOUNTER — TELEPHONE (OUTPATIENT)
Dept: ELECTROPHYSIOLOGY | Facility: CLINIC | Age: 77
End: 2017-08-30

## 2017-08-30 NOTE — H&P
TRANSESOPHAGEAL ECHOCARDIOGRAPHY   PRE-PROCEDURE NOTE    08/31/2017    HPI:     Alaina Vee is a 76 y.o. woman with PMHx of HTN, HLD, SULEMA, and PAF s/p ablation on 6/2017 who presents today for SKINNY prior to DCC with Dr. Lloyd. Afib occasionally symptomatic.    The patient states that She feels well and has no complaints.    Dysphagia or odynophagia:  No  Liver Disease, esophageal disease, or known varices:  No  Upper GI Bleeding: No  Snoring:  No  Sleep Apnea:  No  Prior neck surgery or radiation:  No  History of anesthetic difficulties:  No  Family history of anesthetic difficulties:  No  Last oral intake:  12 hours ago  Able to move neck in all directions:  Yes      Meds:     No current facility-administered medications on file prior to encounter.      Current Outpatient Prescriptions on File Prior to Encounter   Medication Sig    lisinopril (PRINIVIL,ZESTRIL) 40 MG tablet TAKE 1 TABLET ONE TIME DAILY    lovastatin (ALTOPREV) 40 mg 24 hr tablet Take 2 tablets (80 mg total) by mouth every evening.    omeprazole (PRILOSEC) 20 MG capsule Take 1 capsule (20 mg total) by mouth once daily.    warfarin (COUMADIN) 5 MG tablet Take 0.5-1 tablets (2.5-5 mg total) by mouth Daily. As directed by coumadin clinic    diclofenac sodium 1 % Gel Apply 2 g topically once daily.    fish oil-omega-3 fatty acids 300-1,000 mg capsule Take 1 g by mouth once daily.    multivitamin (THERAGRAN) per tablet Take 1 tablet by mouth Daily. 1 Tablet Oral Every day    oxybutynin (DITROPAN) 5 MG Tab TAKE 1 TABLET TWICE DAILY    oxycodone-acetaminophen (PERCOCET) 5-325 mg per tablet Take 1 tablet by mouth every 4 (four) hours as needed for Pain.    tamsulosin (FLOMAX) 0.4 mg Cp24 Take 1 capsule (0.4 mg total) by mouth once daily.       Physical Exam:     Vitals:  Temp:  [97.7 °F (36.5 °C)]   Pulse:  [57]   Resp:  [18]   BP: ()/(55-63)   SpO2:  [96 %]        Constitutional: NAD, conversant  HEENT:   Sclera anicteric, Uvula  midline, EOMI, OP clear  Neck:               No JVD, moves to all direction without any limitations  CV:               RRR, no murmurs / rubs / gallops, normal S1/S2  Pulm:               CTAB, no wheezes, rales, or ronchi  GI:               Abdomen soft, NTND, +BS  Extremities:              No LE edema, warm with palpable pulses  Neuro:   AAOX3, no focal motor deficits      Labs:     Recent Results (from the past 336 hour(s))   CBC W/ AUTO DIFFERENTIAL    Collection Time: 17  8:38 AM   Result Value Ref Range    WBC 5.77 3.90 - 12.70 K/uL    Hemoglobin 14.6 12.0 - 16.0 g/dL    Hematocrit 44.4 37.0 - 48.5 %    Platelets 256 150 - 350 K/uL       Recent Results (from the past 336 hour(s))   BASIC METABOLIC PANEL    Collection Time: 17  8:38 AM   Result Value Ref Range    Sodium 142 136 - 145 mmol/L    Potassium 4.6 3.5 - 5.1 mmol/L    Chloride 105 95 - 110 mmol/L    CO2 28 23 - 29 mmol/L    BUN, Bld 19 8 - 23 mg/dL    Creatinine 1.1 0.5 - 1.4 mg/dL    Calcium 9.8 8.7 - 10.5 mg/dL    Anion Gap 9 8 - 16 mmol/L       CrCl cannot be calculated (Patient's most recent sCr result is older than the maximum 7 days allowed.).    INR: 3.2    Imaging:  SKINNY 17  CONCLUSIONS     1 - Left atrium is enlarged.     2 - Left atrial appendage is single lobed with no visualized thrombus.     3 - Normal left ventricular systolic function.     4 - Grade 3 atheroma disease of aorta.    Echo 3/7/17  CONCLUSIONS     1 - Normal left ventricular systolic function (EF 55-60%).     2 - Moderate left atrial enlargement.       EK2017  aFib @ 54 bpm        Assessment & Plan:     PLAN:  1. SKINNY for evaluation of LA and CARISA prior to DCCV    -The risks, benefits & alternatives of the procedure were explained to the patient.    -The risks of transesophageal echo include but are not limited to:  Dental trauma, esophageal trauma/perforation, bleeding, laryngospasm/brochospasm, aspiration, sore throat/hoarseness, & dislodgement of the  endotracheal tube/nasogastric tube (where applicable).    -The risks of moderate sedation include hypotension, respiratory depression, arrhythmias, bronchospasm, & death.    -Informed consent was obtained & the patient is agreeable to proceed with the procedure.    I will discuss with the attending physician. Attending addendum is to follow.    Signed:    Deshaun Perea MD  Chief Cardiology Fellow  Pager: [phone]  8/31/2017 11:50 AM

## 2017-08-30 NOTE — TELEPHONE ENCOUNTER
Called Pt this morning to verify procedure and time. No answer. Left message for her to report at 11am on 8/31/17. Advised to call for any questions or concerns.

## 2017-08-31 ENCOUNTER — CLINICAL SUPPORT (OUTPATIENT)
Dept: ELECTROPHYSIOLOGY | Facility: CLINIC | Age: 77
End: 2017-08-31
Payer: MEDICARE

## 2017-08-31 ENCOUNTER — ANESTHESIA (OUTPATIENT)
Dept: MEDSURG UNIT | Facility: HOSPITAL | Age: 77
End: 2017-08-31
Payer: MEDICARE

## 2017-08-31 ENCOUNTER — HOSPITAL ENCOUNTER (OUTPATIENT)
Facility: HOSPITAL | Age: 77
Discharge: HOME | End: 2017-08-31
Attending: INTERNAL MEDICINE | Admitting: INTERNAL MEDICINE
Payer: MEDICARE

## 2017-08-31 ENCOUNTER — ANTI-COAG VISIT (OUTPATIENT)
Dept: CARDIOLOGY | Facility: CLINIC | Age: 77
End: 2017-08-31

## 2017-08-31 ENCOUNTER — HOSPITAL ENCOUNTER (OUTPATIENT)
Dept: CARDIOLOGY | Facility: CLINIC | Age: 77
Discharge: HOME | End: 2017-08-31
Attending: INTERNAL MEDICINE | Admitting: INTERNAL MEDICINE
Payer: MEDICARE

## 2017-08-31 ENCOUNTER — ANESTHESIA EVENT (OUTPATIENT)
Dept: MEDSURG UNIT | Facility: HOSPITAL | Age: 77
End: 2017-08-31
Payer: MEDICARE

## 2017-08-31 ENCOUNTER — SURGERY (OUTPATIENT)
Age: 77
End: 2017-08-31

## 2017-08-31 VITALS
TEMPERATURE: 98 F | SYSTOLIC BLOOD PRESSURE: 117 MMHG | WEIGHT: 200 LBS | OXYGEN SATURATION: 97 % | RESPIRATION RATE: 18 BRPM | HEART RATE: 65 BPM | BODY MASS INDEX: 32.14 KG/M2 | DIASTOLIC BLOOD PRESSURE: 58 MMHG | HEIGHT: 66 IN

## 2017-08-31 DIAGNOSIS — I48.92 ATRIAL FLUTTER: ICD-10-CM

## 2017-08-31 DIAGNOSIS — I48.92 ATRIAL FLUTTER, UNSPECIFIED TYPE: ICD-10-CM

## 2017-08-31 DIAGNOSIS — I48.3 TYPICAL ATRIAL FLUTTER: Primary | ICD-10-CM

## 2017-08-31 DIAGNOSIS — I48.3 TYPICAL ATRIAL FLUTTER: ICD-10-CM

## 2017-08-31 DIAGNOSIS — Z79.01 LONG TERM (CURRENT) USE OF ANTICOAGULANTS: ICD-10-CM

## 2017-08-31 DIAGNOSIS — I48.0 PAF (PAROXYSMAL ATRIAL FIBRILLATION): Primary | ICD-10-CM

## 2017-08-31 LAB
AORTIC ATHEROMA: YES
INR PPP: 3.2
MITRAL VALVE MOBILITY: NORMAL
MITRAL VALVE REGURGITATION: ABNORMAL
PROTHROMBIN TIME: 32.6 SEC

## 2017-08-31 PROCEDURE — 27100006 CARDIOVERSION (DCCV)

## 2017-08-31 PROCEDURE — 93010 ELECTROCARDIOGRAM REPORT: CPT | Mod: 76,,, | Performed by: INTERNAL MEDICINE

## 2017-08-31 PROCEDURE — 25000003 PHARM REV CODE 250

## 2017-08-31 PROCEDURE — D9220A PRA ANESTHESIA: Mod: CRNA,,, | Performed by: NURSE ANESTHETIST, CERTIFIED REGISTERED

## 2017-08-31 PROCEDURE — 25000003 PHARM REV CODE 250: Performed by: NURSE PRACTITIONER

## 2017-08-31 PROCEDURE — 93325 DOPPLER ECHO COLOR FLOW MAPG: CPT

## 2017-08-31 PROCEDURE — 63600175 PHARM REV CODE 636 W HCPCS: Performed by: NURSE ANESTHETIST, CERTIFIED REGISTERED

## 2017-08-31 PROCEDURE — 37000009 HC ANESTHESIA EA ADD 15 MINS: Performed by: INTERNAL MEDICINE

## 2017-08-31 PROCEDURE — 93312 ECHO TRANSESOPHAGEAL: CPT | Mod: 26,,, | Performed by: INTERNAL MEDICINE

## 2017-08-31 PROCEDURE — 92960 CARDIOVERSION ELECTRIC EXT: CPT | Mod: ,,, | Performed by: INTERNAL MEDICINE

## 2017-08-31 PROCEDURE — 25000003 PHARM REV CODE 250: Performed by: NURSE ANESTHETIST, CERTIFIED REGISTERED

## 2017-08-31 PROCEDURE — 85610 PROTHROMBIN TIME: CPT

## 2017-08-31 PROCEDURE — 93312 ECHO TRANSESOPHAGEAL: CPT

## 2017-08-31 PROCEDURE — 93320 DOPPLER ECHO COMPLETE: CPT | Mod: 26,,, | Performed by: INTERNAL MEDICINE

## 2017-08-31 PROCEDURE — D9220A PRA ANESTHESIA: Mod: ANES,,, | Performed by: ANESTHESIOLOGY

## 2017-08-31 PROCEDURE — 93005 ELECTROCARDIOGRAM TRACING: CPT

## 2017-08-31 PROCEDURE — 93010 ELECTROCARDIOGRAM REPORT: CPT | Mod: ,,, | Performed by: INTERNAL MEDICINE

## 2017-08-31 PROCEDURE — 93325 DOPPLER ECHO COLOR FLOW MAPG: CPT | Mod: 26,,, | Performed by: INTERNAL MEDICINE

## 2017-08-31 PROCEDURE — 37000008 HC ANESTHESIA 1ST 15 MINUTES: Performed by: INTERNAL MEDICINE

## 2017-08-31 RX ORDER — FENTANYL CITRATE 50 UG/ML
INJECTION, SOLUTION INTRAMUSCULAR; INTRAVENOUS
Status: DISCONTINUED | OUTPATIENT
Start: 2017-08-31 | End: 2017-08-31

## 2017-08-31 RX ORDER — LIDOCAINE HCL/PF 100 MG/5ML
SYRINGE (ML) INTRAVENOUS
Status: DISCONTINUED | OUTPATIENT
Start: 2017-08-31 | End: 2017-08-31

## 2017-08-31 RX ORDER — PROPOFOL 10 MG/ML
VIAL (ML) INTRAVENOUS
Status: DISCONTINUED | OUTPATIENT
Start: 2017-08-31 | End: 2017-08-31

## 2017-08-31 RX ORDER — PROPAFENONE HYDROCHLORIDE 225 MG/1
225 CAPSULE, EXTENDED RELEASE ORAL EVERY 12 HOURS
Qty: 60 CAPSULE | Refills: 0 | Status: SHIPPED | OUTPATIENT
Start: 2017-08-31 | End: 2017-08-31

## 2017-08-31 RX ORDER — PROPAFENONE HYDROCHLORIDE 225 MG/1
225 CAPSULE, EXTENDED RELEASE ORAL EVERY 12 HOURS
Qty: 180 CAPSULE | Refills: 3
Start: 2017-08-31 | End: 2017-09-01 | Stop reason: SDUPTHER

## 2017-08-31 RX ORDER — MIDAZOLAM HYDROCHLORIDE 1 MG/ML
INJECTION, SOLUTION INTRAMUSCULAR; INTRAVENOUS
Status: DISCONTINUED | OUTPATIENT
Start: 2017-08-31 | End: 2017-08-31

## 2017-08-31 RX ORDER — LIDOCAINE HYDROCHLORIDE 20 MG/ML
SOLUTION OROPHARYNGEAL
Status: DISCONTINUED | OUTPATIENT
Start: 2017-08-31 | End: 2017-08-31

## 2017-08-31 RX ORDER — SODIUM CHLORIDE 9 MG/ML
INJECTION, SOLUTION INTRAVENOUS CONTINUOUS
Status: DISCONTINUED | OUTPATIENT
Start: 2017-08-31 | End: 2017-08-31 | Stop reason: HOSPADM

## 2017-08-31 RX ORDER — PROPAFENONE HYDROCHLORIDE 150 MG/1
150 TABLET, COATED ORAL ONCE
Status: COMPLETED | OUTPATIENT
Start: 2017-08-31 | End: 2017-08-31

## 2017-08-31 RX ADMIN — MIDAZOLAM 1 MG: 1 INJECTION INTRAMUSCULAR; INTRAVENOUS at 01:08

## 2017-08-31 RX ADMIN — PROPOFOL 40 MG: 10 INJECTION, EMULSION INTRAVENOUS at 01:08

## 2017-08-31 RX ADMIN — FENTANYL CITRATE 25 MCG: 50 INJECTION, SOLUTION INTRAMUSCULAR; INTRAVENOUS at 01:08

## 2017-08-31 RX ADMIN — PROPAFENONE HYDROCHLORIDE 150 MG: 150 TABLET, FILM COATED ORAL at 03:08

## 2017-08-31 RX ADMIN — LIDOCAINE HYDROCHLORIDE 15 ML: 20 SOLUTION OROPHARYNGEAL at 01:08

## 2017-08-31 RX ADMIN — SODIUM CHLORIDE 1000 ML: 0.9 INJECTION, SOLUTION INTRAVENOUS at 10:08

## 2017-08-31 RX ADMIN — PROPOFOL 30 MG: 10 INJECTION, EMULSION INTRAVENOUS at 01:08

## 2017-08-31 RX ADMIN — LIDOCAINE HYDROCHLORIDE 20 MG: 20 INJECTION, SOLUTION INTRAVENOUS at 01:08

## 2017-08-31 NOTE — PLAN OF CARE
Patient discharged per MD orders. Instructions given on medications, wound care, activity, signs of infection, when to call MD, and follow up appointments. Pt verbalized understanding. Telemetry and PIV removed. Patient and family used wc off unit.

## 2017-08-31 NOTE — PLAN OF CARE
Problem: Patient Care Overview  Goal: Plan of Care Review  Outcome: Ongoing (interventions implemented as appropriate)  Pt transported from recovery via stretcher.  Vss.  Post op orders and assessment initiated.  Pt aao.  Pt in no acute distress and verbalizes no complaints.  Will continue to monitor.   Call bell within reach

## 2017-08-31 NOTE — ANESTHESIA POSTPROCEDURE EVALUATION
"Anesthesia Post Evaluation    Patient: Alaina Vee    Procedure(s) Performed: Procedure(s) (LRB):  CARDIOVERSION (N/A)  TRANSESOPHAGEAL ECHOCARDIOGRAM (SKINNY) (N/A)    Final Anesthesia Type: general  Patient location during evaluation: PACU  Patient participation: Yes- Able to Participate  Level of consciousness: awake and alert  Post-procedure vital signs: reviewed and stable  Pain management: adequate  Airway patency: patent  PONV status at discharge: No PONV  Anesthetic complications: no      Cardiovascular status: blood pressure returned to baseline  Respiratory status: unassisted  Hydration status: euvolemic  Follow-up not needed.        Visit Vitals  BP (!) 117/58 (BP Location: Left arm, Patient Position: Lying)   Pulse 65   Temp 36.6 °C (97.8 °F) (Oral)   Resp 18   Ht 5' 6" (1.676 m)   Wt 90.7 kg (200 lb)   SpO2 97%   Breastfeeding? No   BMI 32.28 kg/m²       Pain/Rajeev Score: Pain Assessment Performed: Yes (8/31/2017  2:37 PM)  Presence of Pain: denies (8/31/2017  2:37 PM)      "

## 2017-08-31 NOTE — PROGRESS NOTES
The pt underwent a DCCV today and her INR is slightly supratherapeutic at 3.2.  I have advised a 2.5mg dose for 8/31 to Brisa PATEL

## 2017-08-31 NOTE — DISCHARGE SUMMARY
[facility]  Cardiac Electrophysiology  Discharge Summary      Patient Name: Alaina Vee  MRN: [id number]  Admission Date: 8/31/2017  Hospital Length of Stay: 0 days  Discharge Date and Time:  08/31/2017 2:34 PM  Attending Physician: Patrick Lloyd MD    Discharging Provider: Christine Kohli NP  Primary Care Physician: Long Esquivel MD    HPI: 76 year old woman with PMHx of HTN, HLD, SULEMA, and AFL s/p CTI  ablation on 6/12/ 2017, PAF who presents today for SKINNY prior to DCCV,        Procedure(s) (LRB):  CARDIOVERSION (N/A)  TRANSESOPHAGEAL ECHOCARDIOGRAM (SKINNY) (N/A)     Hospital Course: SKINNY done showed no CARISA thrombus, hence proceeded to DCCV which converted patient from AF to sinus rhythm with PACs ( see procedure note for details)   Patient tolerated procedure.  Pt on coumadin, discussed with coumadin clinic, will take 2.5 mg tonight> coumadin clinic will closely follow. Pt to start on start propafenone  bid ( 1 monthx Rx to [facility], and then  refills Rx to [facility] per pt request).  placed 30-day monitor. Plan to assess for symptomatic benefit. Pt to follow up with EKG in 1 week,  and 4 weeks with MD Lloyd    Discharge plans/instructions discussed with patient who verbalized understanding  and agreement of plans of care. No further questions or concerns  voiced at this time.     Consults:  Anesthesia     Final Active Diagnoses:    Diagnosis Date Noted POA    PRINCIPAL PROBLEM:  PAF (paroxysmal atrial fibrillation) [I48.0] 04/12/2017 Yes    Atrial flutter [I48.92] 06/12/2017 Yes      Problems Resolved During this Admission:    Diagnosis Date Noted Date Resolved POA     Discharged Condition: good    Disposition: Home or Self Care    Follow Up:  Follow-up Information     Patrick Lloyd MD In 3 months.    Specialties:  Cardiology, Electrophysiology  Why:  EKG in 1 week   Contact information:  [address] [place] [id number] [phone]                 Patient Instructions:      Diet general   Order Specific Question Answer Comments   Additional restrictions: Cardiac (Low Na/Chol)      Activity as tolerated     Call MD for:  temperature >100.4     Call MD for:  persistent nausea and vomiting or diarrhea     Call MD for:  severe uncontrolled pain     Call MD for:  redness, tenderness, or signs of infection (pain, swelling, redness, odor or green/yellow discharge around incision site)     Call MD for:  difficulty breathing or increased cough     Call MD for:  severe persistent headache     Call MD for:  worsening rash     Call MD for:  persistent dizziness, light-headedness, or visual disturbances     Call MD for:  increased confusion or weakness     Call MD for:   Scheduling Instructions: For any concerning medical symptoms     EKG 12-lead   Standing Status: Future  Standing Exp. Date: 08/31/18   Order Specific Question Answer Comments   Diagnosis Atrial fibrillation [427.31.ICD-9-CM]        Medications:  Reconciled Home Medications:   Current Discharge Medication List      START taking these medications    Details   propafenone (RYTHMOL SR) 225 MG Cp12 Take 1 capsule (225 mg total) by mouth every 12 (twelve) hours.  Qty: 180 capsule, Refills: 3         CONTINUE these medications which have NOT CHANGED    Details   lisinopril (PRINIVIL,ZESTRIL) 40 MG tablet TAKE 1 TABLET ONE TIME DAILY  Qty: 90 tablet, Refills: 1    Associated Diagnoses: HTN (hypertension), benign      lovastatin (ALTOPREV) 40 mg 24 hr tablet Take 2 tablets (80 mg total) by mouth every evening.  Qty: 180 tablet, Refills: 1      omeprazole (PRILOSEC) 20 MG capsule Take 1 capsule (20 mg total) by mouth once daily.  Qty: 90 capsule, Refills: 1      oxybutynin (DITROPAN XL) 15 MG TR24 TAKE 1 TABLET ONE TIME DAILY  Qty: 90 tablet, Refills: 11      warfarin (COUMADIN) 5 MG tablet Take 0.5-1 tablets (2.5-5 mg total) by mouth Daily. As directed by coumadin clinic  Qty: 90 tablet, Refills: 3    Associated Diagnoses: Long term  (current) use of anticoagulants; Typical atrial flutter      diclofenac sodium 1 % Gel Apply 2 g topically once daily.  Qty: 100 g, Refills: 3      fish oil-omega-3 fatty acids 300-1,000 mg capsule Take 1 g by mouth once daily.      multivitamin (THERAGRAN) per tablet Take 1 tablet by mouth Daily. 1 Tablet Oral Every day      oxybutynin (DITROPAN) 5 MG Tab TAKE 1 TABLET TWICE DAILY  Qty: 180 tablet, Refills: 1      oxycodone-acetaminophen (PERCOCET) 5-325 mg per tablet Take 1 tablet by mouth every 4 (four) hours as needed for Pain.  Qty: 25 tablet, Refills: 0      tamsulosin (FLOMAX) 0.4 mg Cp24 Take 1 capsule (0.4 mg total) by mouth once daily.  Qty: 20 capsule, Refills: 0             ELIDIA Lazcano-BC  Cardiology Electrophysiology  NP   [facility]    Attending: MD Prema Nguyen

## 2017-08-31 NOTE — ANESTHESIA PREPROCEDURE EVALUATION
08/31/2017  Alaina Vee is a 76 y.o., female.    Anesthesia Evaluation    I have reviewed the Patient Summary Reports.     I have reviewed the Medications.     Review of Systems  Anesthesia Hx:  History of prior surgery of interest to airway management or planning:  Denies Personal Hx of Anesthesia complications.   Cardiovascular:   Hypertension    Pulmonary:   Sleep Apnea    Renal/:   Chronic Renal Disease    Hepatic/GI:   GERD    Musculoskeletal:   Arthritis   Spine Disorders:    Neurological:   Chronic Pain Syndrome   Psych:   Psychiatric History          Physical Exam  General:  Obesity    Airway/Jaw/Neck:  Airway Findings: Mouth Opening: Normal Tongue: Normal  General Airway Assessment: Adult  Mallampati: III  Improves to II with phonation.  TM Distance: Normal, at least 6 cm  Jaw/Neck Findings:  Neck ROM: Normal ROM       Chest/Lungs:  Chest/Lungs Findings: Normal Respiratory Rate     Heart/Vascular:  Heart Findings: Rate: Normal        Mental Status:  Mental Status Findings:  Alert and Oriented         Anesthesia Plan  Type of Anesthesia, risks & benefits discussed:  Anesthesia Type:  general  Patient's Preference: General, likely with natural airway  Intra-op Monitoring Plan: standard ASA monitors  Intra-op Monitoring Plan Comments:   Post Op Pain Control Plan: IV/PO Opioids PRN  Post Op Pain Control Plan Comments:   Induction:   IV  Beta Blocker:  Patient is on a Beta-Blocker and has received one dose within the past 24 hours (No further documentation required).       Informed Consent: Patient understands risks and agrees with Anesthesia plan.  Questions answered. Anesthesia consent signed with patient.  ASA Score: 3     Day of Surgery Review of History & Physical:    H&P update referred to the surgeon.     Anesthesia Plan Notes: NPO confirmed.   No anesthesia problems.        Ready For  Surgery From Anesthesia Perspective.

## 2017-08-31 NOTE — TRANSFER OF CARE
"Anesthesia Transfer of Care Note    Patient: Alaina Vee    Procedure(s) Performed: Procedure(s) (LRB):  CARDIOVERSION (N/A)  TRANSESOPHAGEAL ECHOCARDIOGRAM (SKINNY) (N/A)    Patient location: PACU    Anesthesia Type: MAC    Transport from OR: Transported from OR on 2-3 L/min O2 by NC with adequate spontaneous ventilation    Post pain: adequate analgesia    Post assessment: no apparent anesthetic complications and tolerated procedure well    Post vital signs: stable    Level of consciousness: awake, alert and oriented    Nausea/Vomiting: no nausea/vomiting    Complications: none    Transfer of care protocol was followed      Last vitals:   Visit Vitals  BP (!) 99/55 (BP Location: Right arm, Patient Position: Lying)   Pulse (!) 57   Temp 36.5 °C (97.7 °F) (Oral)   Resp 18   Ht 5' 6" (1.676 m)   Wt 90.7 kg (200 lb)   SpO2 96%   Breastfeeding? No   BMI 32.28 kg/m²     "

## 2017-08-31 NOTE — NURSING TRANSFER
Nursing Transfer Note      8/31/2017     Transfer To: [facility] From: [id number]    Transfer via stretcher    Transfer with none    Transported by Pct    Medicines sent: none    Chart send with patient: Yes    Notified: [facility] RN    Patient reassessed at: 08/31/17 1415 (date, time)    Upon arrival to floor: patient oriented to room, call bell in reach and bed in lowest position

## 2017-09-01 DIAGNOSIS — I48.0 PAF (PAROXYSMAL ATRIAL FIBRILLATION): Primary | ICD-10-CM

## 2017-09-01 RX ORDER — PROPAFENONE HYDROCHLORIDE 225 MG/1
225 CAPSULE, EXTENDED RELEASE ORAL EVERY 12 HOURS
Qty: 180 CAPSULE | Refills: 3
Start: 2017-09-01 | End: 2017-09-05

## 2017-09-01 NOTE — TELEPHONE ENCOUNTER
----- Message from Deepika Lopez sent at 9/1/2017  3:33 PM CDT -----  Contact: patient  Please call pt at [phone]. Needs help purchasing Propafenone 225 mg because it is too expensive (Have not started medication). Dr Lloyd pt    Thank you

## 2017-09-01 NOTE — TELEPHONE ENCOUNTER
Spoke with patient. She is able to get a 90 day supply for $76 through [employer]. The rx did not go through the first time. Sent to Dr Lloyd to sign and resend.

## 2017-09-05 ENCOUNTER — OFFICE VISIT (OUTPATIENT)
Dept: CARDIOLOGY | Facility: CLINIC | Age: 77
End: 2017-09-05
Payer: MEDICARE

## 2017-09-05 VITALS
RESPIRATION RATE: 15 BRPM | DIASTOLIC BLOOD PRESSURE: 63 MMHG | WEIGHT: 248.25 LBS | HEART RATE: 60 BPM | SYSTOLIC BLOOD PRESSURE: 115 MMHG | OXYGEN SATURATION: 95 % | HEIGHT: 66 IN | BODY MASS INDEX: 39.9 KG/M2

## 2017-09-05 DIAGNOSIS — I10 ESSENTIAL HYPERTENSION: ICD-10-CM

## 2017-09-05 DIAGNOSIS — I48.0 PAF (PAROXYSMAL ATRIAL FIBRILLATION): Primary | ICD-10-CM

## 2017-09-05 DIAGNOSIS — E66.01 MORBID OBESITY, UNSPECIFIED OBESITY TYPE: ICD-10-CM

## 2017-09-05 DIAGNOSIS — E78.5 HYPERLIPIDEMIA, UNSPECIFIED HYPERLIPIDEMIA TYPE: ICD-10-CM

## 2017-09-05 DIAGNOSIS — G47.33 OSA (OBSTRUCTIVE SLEEP APNEA): ICD-10-CM

## 2017-09-05 PROCEDURE — 3078F DIAST BP <80 MM HG: CPT | Mod: S$GLB,,, | Performed by: INTERNAL MEDICINE

## 2017-09-05 PROCEDURE — 1159F MED LIST DOCD IN RCRD: CPT | Mod: S$GLB,,, | Performed by: INTERNAL MEDICINE

## 2017-09-05 PROCEDURE — 93000 ELECTROCARDIOGRAM COMPLETE: CPT | Mod: S$GLB,,, | Performed by: INTERNAL MEDICINE

## 2017-09-05 PROCEDURE — 99999 PR PBB SHADOW E&M-EST. PATIENT-LVL III: CPT | Mod: PBBFAC,,, | Performed by: INTERNAL MEDICINE

## 2017-09-05 PROCEDURE — 3074F SYST BP LT 130 MM HG: CPT | Mod: S$GLB,,, | Performed by: INTERNAL MEDICINE

## 2017-09-05 PROCEDURE — 99499 UNLISTED E&M SERVICE: CPT | Mod: S$GLB,,, | Performed by: INTERNAL MEDICINE

## 2017-09-05 PROCEDURE — 3008F BODY MASS INDEX DOCD: CPT | Mod: S$GLB,,, | Performed by: INTERNAL MEDICINE

## 2017-09-05 PROCEDURE — 99214 OFFICE O/P EST MOD 30 MIN: CPT | Mod: S$GLB,,, | Performed by: INTERNAL MEDICINE

## 2017-09-05 PROCEDURE — 1126F AMNT PAIN NOTED NONE PRSNT: CPT | Mod: S$GLB,,, | Performed by: INTERNAL MEDICINE

## 2017-09-05 RX ORDER — FLECAINIDE ACETATE 100 MG/1
100 TABLET ORAL EVERY 12 HOURS
Qty: 60 TABLET | Refills: 3 | Status: SHIPPED | OUTPATIENT
Start: 2017-09-05 | End: 2018-01-04 | Stop reason: SDUPTHER

## 2017-09-05 NOTE — PROGRESS NOTES
CARDIOVASCULAR PROGRESS NOTE    REASON FOR CONSULT:   Alaina Vee is a 77 y.o. female who presents for f/u of AFL.    PCP: Sierra  EP: Prema  HISTORY OF PRESENT ILLNESS:   The patient returns for follow-up.  She underwent SKINNY guided cardioversion on 8/31/17.  The patient does not seem to feel like she was feeling any better when she was in sinus rhythm immediately after the cardioversion.  She was supposed to start propafenone, but did not get the medication due to cost and supply issues.  She has been taking her Coumadin, and had previously been recommended to start a NOAC but did not get this medication also because of cost issues.  At present, she denies any angina or dyspnea.  She's had no palpitations, lightheadedness, dizziness, or syncope.  There's been no PND, orthopnea, or lower extremity edema.  She denies melena, hematuria, or claudicant symptoms.  She's currently wearing an event monitor, and I suggested that she send 1 tracing a day even if she has no symptoms.    CARDIOVASCULAR HISTORY:   AFL, CHADSVASC 3, s/p ablation (Dr. Lloyd) 6/12/17  Aortic root dil 3.9 cm (echo 3/2017)    PAST MEDICAL HISTORY:     Past Medical History:   Diagnosis Date    Arthritis     knees    Atrial fibrillation     Atrial flutter     Degenerative disc disease     Depression     General anesthetics causing adverse effect in therapeutic use     pt states sometimes slow to awaken.    GERD (gastroesophageal reflux disease)     Hyperlipidemia     Hypertension     Sleep apnea     does not wear CPAP    Varicosities     Ventral hernia        PAST SURGICAL HISTORY:     Past Surgical History:   Procedure Laterality Date    APPENDECTOMY      breast biopsy, left      CHOLECYSTECTOMY      JOINT REPLACEMENT      TKR , right    JOINT REPLACEMENT  2009    TKR  left    RI EXPLORATORY OF ABDOMEN         ALLERGIES AND MEDICATION:   Review of patient's allergies indicates:  No Known Allergies  Previous Medications     DICLOFENAC SODIUM 1 % GEL    Apply 2 g topically once daily.    FISH OIL-OMEGA-3 FATTY ACIDS 300-1,000 MG CAPSULE    Take 1 g by mouth once daily.    LISINOPRIL (PRINIVIL,ZESTRIL) 40 MG TABLET    TAKE 1 TABLET ONE TIME DAILY    LOVASTATIN (ALTOPREV) 40 MG 24 HR TABLET    Take 2 tablets (80 mg total) by mouth every evening.    MULTIVITAMIN (THERAGRAN) PER TABLET    Take 1 tablet by mouth Daily. 1 Tablet Oral Every day    OMEPRAZOLE (PRILOSEC) 20 MG CAPSULE    Take 1 capsule (20 mg total) by mouth once daily.    OXYBUTYNIN (DITROPAN XL) 15 MG TR24    TAKE 1 TABLET ONE TIME DAILY    OXYCODONE-ACETAMINOPHEN (PERCOCET) 5-325 MG PER TABLET    Take 1 tablet by mouth every 4 (four) hours as needed for Pain.    PROPAFENONE (RYTHMOL SR) 225 MG CP12    Take 1 capsule (225 mg total) by mouth every 12 (twelve) hours.    TAMSULOSIN (FLOMAX) 0.4 MG CP24    Take 1 capsule (0.4 mg total) by mouth once daily.    WARFARIN (COUMADIN) 5 MG TABLET    Take 0.5-1 tablets (2.5-5 mg total) by mouth Daily. As directed by coumadin clinic       SOCIAL HISTORY:     Social History     Social History    Marital status:      Spouse name: N/A    Number of children: N/A    Years of education: N/A     Occupational History    Not on file.     Social History Main Topics    Smoking status: Never Smoker    Smokeless tobacco: Never Used    Alcohol use No    Drug use: Unknown    Sexual activity: Not on file     Other Topics Concern    Not on file     Social History Narrative    No narrative on file       FAMILY HISTORY:     Family History   Problem Relation Age of Onset    COPD Mother     Heart disease Sister      half sister    COPD Sister        REVIEW OF SYSTEMS:   Review of Systems   Constitutional: Negative for chills, diaphoresis and fever.   HENT: Negative for nosebleeds.    Eyes: Negative for blurred vision, double vision and photophobia.   Respiratory: Negative for hemoptysis, shortness of breath and wheezing.   "  Cardiovascular: Negative for chest pain, palpitations, orthopnea, claudication, leg swelling and PND.   Gastrointestinal: Negative for abdominal pain, blood in stool, heartburn, melena, nausea and vomiting.   Genitourinary: Negative for flank pain and hematuria.   Musculoskeletal: Negative for falls, myalgias and neck pain.   Skin: Negative for rash.   Neurological: Negative for dizziness, seizures, loss of consciousness, weakness and headaches.   Endo/Heme/Allergies: Negative for polydipsia. Does not bruise/bleed easily.   Psychiatric/Behavioral: Negative for depression and memory loss. The patient is not nervous/anxious.        PHYSICAL EXAM:     Vitals:    09/05/17 0900   BP: 115/63   Pulse: 60   Resp: 15    Body mass index is 40.07 kg/m².  Weight: 112.6 kg (248 lb 3.8 oz)   Height: 5' 6" (167.6 cm)     Physical Exam   Constitutional: She is oriented to person, place, and time. She appears well-developed and well-nourished. She is cooperative.  Non-toxic appearance. No distress.   HENT:   Head: Normocephalic and atraumatic.   Eyes: Conjunctivae and EOM are normal. Pupils are equal, round, and reactive to light. No scleral icterus.   Neck: Trachea normal and normal range of motion. Neck supple. Normal carotid pulses and no JVD present. Carotid bruit is not present. No neck rigidity. No edema present. No thyromegaly present.   Cardiovascular: Normal rate, regular rhythm, S1 normal and S2 normal.  PMI is not displaced.  Exam reveals distant heart sounds. Exam reveals no gallop and no friction rub.    No murmur heard.  Pulses:       Carotid pulses are 2+ on the right side, and 2+ on the left side.  Pulmonary/Chest: Effort normal and breath sounds normal. No respiratory distress. She has no wheezes. She has no rales. She exhibits no tenderness.   Abdominal: Soft. Bowel sounds are normal. She exhibits no distension and no mass. There is no hepatosplenomegaly. There is no tenderness.   obese   Musculoskeletal: She " exhibits no edema or tenderness.   Feet:   Right Foot:   Skin Integrity: Negative for ulcer.   Left Foot:   Skin Integrity: Negative for ulcer.   Neurological: She is alert and oriented to person, place, and time. No cranial nerve deficit.   Skin: Skin is warm and dry. No rash noted. No erythema.   Psychiatric: She has a normal mood and affect. Her speech is normal and behavior is normal.   Vitals reviewed.      DATA:   EKG: (personally reviewed tracing)  9/5/17 AF 59, c/w 8/31/17 AFib replaced SR    Laboratory:  CBC:    Recent Labs  Lab 02/22/17  1500 06/06/17  1635 08/18/17  0838   WHITE BLOOD CELL COUNT 6.30 7.11 5.77   HEMOGLOBIN 12.6 13.7 14.6   HEMATOCRIT 38.4 41.7 44.4   PLATELETS 289 264 256       CHEMISTRIES:    Recent Labs  Lab 05/31/16  1452  02/22/17  1500 06/06/17  1635 07/10/17  0940 08/18/17  0838   GLUCOSE 104  < > 105 115 H  --  116 H   SODIUM 141  < > 142 142  --  142   POTASSIUM 4.9  < > 4.3 4.0  --  4.6   BUN BLD 24 H  < > 19 16  --  19   CREATININE 1.1  < > 1.1 1.0 1.1 1.1   EGFR IF  58 A  < > 57 A >60 57 A 57 A   EGFR IF NON- 50 A  < > 50 A 56 A 50 A 50 A   CALCIUM 9.5  < > 9.7 9.4  --  9.8   MAGNESIUM 2.4  --   --   --   --   --    < > = values in this interval not displayed.    CARDIAC BIOMARKERS:    Recent Labs  Lab 05/31/16  1452   CPK 49       COAGS:    Recent Labs  Lab 08/23/17  1328 08/28/17  0904 08/31/17  1026   INR 2.1 H 2.5 H 3.2 H       LIPIDS/LFTS:    Recent Labs  Lab 11/19/15  1037 05/31/16  1452 12/08/16  1300 03/07/17  0920 06/06/17  1635   CHOLESTEROL 238 H  --   --  193 158   TRIGLYCERIDES 154 H  --   --  156 H 170 H   HDL 55  --   --  33 L 35 L   LDL CHOLESTEROL 152.2  --   --  128.8 89.0   NON-HDL CHOLESTEROL 183  --   --  160 123   AST 20 16 21  --  13   ALT 17 14 16  --  11     Lab Results   Component Value Date    TSH 1.350 12/08/2016     Cardiovascular Testing:  Echo 3/7/17    1 - Normal left ventricular systolic function (EF 55-60%).      2 - Moderate left atrial enlargement.     3 - Aortic root dilation, 3.9cm    Holter 3/7/17  PREDOMINANT RHYTHM  1. Atrial flutter with ventricular rates varying between 29 and 89 bpm with an average of 55 bpm.   VENTRICULAR ARRHYTHMIAS  1. There were no PVCs. There was no ventricular ectopic activity.  SUPRA VENTRICULAR ARRHYTHMIAS  1. Atrial flutter was noted throughout the study.   AV CONDUCTION  1. There was no evidence of high grade AV emma filtering.   2. The longest RR interval was 2215 msec.   DIARY  1. The diary was not returned  MISCELLANEOUS  1. There were occasional hookup related artifacts. Overall, the study was of good quality.   2. This was a tape of adequate length (24 hrs).    RADHA 7/14/16  Right lower extremity RADHA: 1.06  Left lower extremity RADHA: 1.08    ASSESSMENT:   # AFib, controlled VR.  CHADSVASC 3.  S/p AFL ablation 6/2017, s/p SKINNY/DCCV 8/31/17. Not taking propafenone d/t cost.  # HTN, controlled  # HLP, LDL acceptable  # BMI 40, stable vs last OV  # SULEMA  # Aortic root dil 3.9 cm (echo 3/2017)    PLAN:   Cont med rx  Propafenone removed from med list  Case d/w Dr. Lloyd, will start flecainide 100mg bid  Diet/exercise/weight loss, pt previously declined bariatric evaluation.  Continuation of coumadin for goal INR 2.0-3.0 (pt not on NOAC d/t cost issues)  Follow up with Dr. Lloyd planned at the end of Sept 2017, he will discuss continuation of Flecainide vs rate control/OAC strategy.  Not clear that pt is feeling any better when in SR.  RTC 6 months    Nahid Hidalgo MD, FACC

## 2017-09-06 ENCOUNTER — ANTI-COAG VISIT (OUTPATIENT)
Dept: CARDIOLOGY | Facility: CLINIC | Age: 77
End: 2017-09-06

## 2017-09-06 ENCOUNTER — TELEPHONE (OUTPATIENT)
Dept: CARDIOLOGY | Facility: CLINIC | Age: 77
End: 2017-09-06

## 2017-09-06 ENCOUNTER — LAB VISIT (OUTPATIENT)
Dept: LAB | Facility: HOSPITAL | Age: 77
End: 2017-09-06
Attending: INTERNAL MEDICINE
Payer: MEDICARE

## 2017-09-06 DIAGNOSIS — Z79.01 LONG TERM (CURRENT) USE OF ANTICOAGULANTS: ICD-10-CM

## 2017-09-06 DIAGNOSIS — I48.3 TYPICAL ATRIAL FLUTTER: ICD-10-CM

## 2017-09-06 LAB
INR PPP: 5.1
PROTHROMBIN TIME: 50.6 SEC

## 2017-09-06 PROCEDURE — 36415 COLL VENOUS BLD VENIPUNCTURE: CPT

## 2017-09-06 PROCEDURE — 85610 PROTHROMBIN TIME: CPT

## 2017-09-06 NOTE — PROGRESS NOTES
Patient questioned and confirmed correct dose 2.5 mg 8/31 as advised .  Reports new medication flecainide (TAMBOCOR) 100 MG Tab started Tuesday 9/5.  Reports no other changes.

## 2017-09-07 ENCOUNTER — ANTI-COAG VISIT (OUTPATIENT)
Dept: CARDIOLOGY | Facility: CLINIC | Age: 77
End: 2017-09-07
Payer: MEDICARE

## 2017-09-07 DIAGNOSIS — Z79.01 LONG TERM (CURRENT) USE OF ANTICOAGULANTS: Primary | ICD-10-CM

## 2017-09-07 DIAGNOSIS — I48.3 TYPICAL ATRIAL FLUTTER: ICD-10-CM

## 2017-09-07 LAB — INR PPP: 4.3 (ref 2–3)

## 2017-09-07 PROCEDURE — 99211 OFF/OP EST MAY X REQ PHY/QHP: CPT | Mod: 25,S$GLB,,

## 2017-09-07 PROCEDURE — 85610 PROTHROMBIN TIME: CPT | Mod: QW,S$GLB,,

## 2017-09-07 NOTE — PROGRESS NOTES
Patient here for close monitoring due to recent high INR. INR was 5.1 yesterday she held and ate broccoli. INR improved but still high. She is now on flecainide since DCCV. She reports increases stress with home life. No signs or symptoms of bleeding. She was on a lower dose in the past. We will hold another dose and plan a dose decrease. Repeat INR Monday.

## 2017-09-11 ENCOUNTER — ANTI-COAG VISIT (OUTPATIENT)
Dept: CARDIOLOGY | Facility: CLINIC | Age: 77
End: 2017-09-11

## 2017-09-11 ENCOUNTER — LAB VISIT (OUTPATIENT)
Dept: LAB | Facility: HOSPITAL | Age: 77
End: 2017-09-11
Attending: INTERNAL MEDICINE
Payer: MEDICARE

## 2017-09-11 DIAGNOSIS — Z79.01 LONG TERM (CURRENT) USE OF ANTICOAGULANTS: ICD-10-CM

## 2017-09-11 DIAGNOSIS — I48.3 TYPICAL ATRIAL FLUTTER: ICD-10-CM

## 2017-09-11 LAB
INR PPP: 2
PROTHROMBIN TIME: 20.7 SEC

## 2017-09-11 PROCEDURE — 85610 PROTHROMBIN TIME: CPT

## 2017-09-11 PROCEDURE — 36415 COLL VENOUS BLD VENIPUNCTURE: CPT

## 2017-09-14 ENCOUNTER — ANTI-COAG VISIT (OUTPATIENT)
Dept: CARDIOLOGY | Facility: CLINIC | Age: 77
End: 2017-09-14

## 2017-09-14 ENCOUNTER — LAB VISIT (OUTPATIENT)
Dept: LAB | Facility: HOSPITAL | Age: 77
End: 2017-09-14
Attending: INTERNAL MEDICINE
Payer: MEDICARE

## 2017-09-14 DIAGNOSIS — I48.3 TYPICAL ATRIAL FLUTTER: ICD-10-CM

## 2017-09-14 DIAGNOSIS — Z79.01 LONG TERM (CURRENT) USE OF ANTICOAGULANTS: ICD-10-CM

## 2017-09-14 LAB
INR PPP: 3.5
PROTHROMBIN TIME: 35 SEC

## 2017-09-14 PROCEDURE — 85610 PROTHROMBIN TIME: CPT

## 2017-09-14 PROCEDURE — 36415 COLL VENOUS BLD VENIPUNCTURE: CPT

## 2017-09-19 ENCOUNTER — ANTI-COAG VISIT (OUTPATIENT)
Dept: CARDIOLOGY | Facility: CLINIC | Age: 77
End: 2017-09-19

## 2017-09-19 ENCOUNTER — LAB VISIT (OUTPATIENT)
Dept: LAB | Facility: HOSPITAL | Age: 77
End: 2017-09-19
Attending: INTERNAL MEDICINE
Payer: MEDICARE

## 2017-09-19 DIAGNOSIS — Z79.01 LONG TERM (CURRENT) USE OF ANTICOAGULANTS: ICD-10-CM

## 2017-09-19 DIAGNOSIS — I48.3 TYPICAL ATRIAL FLUTTER: ICD-10-CM

## 2017-09-19 LAB
INR PPP: 3.2
PROTHROMBIN TIME: 32.2 SEC

## 2017-09-19 PROCEDURE — 36415 COLL VENOUS BLD VENIPUNCTURE: CPT

## 2017-09-19 PROCEDURE — 85610 PROTHROMBIN TIME: CPT

## 2017-09-25 ENCOUNTER — ANTI-COAG VISIT (OUTPATIENT)
Dept: CARDIOLOGY | Facility: CLINIC | Age: 77
End: 2017-09-25
Payer: MEDICARE

## 2017-09-25 DIAGNOSIS — I48.0 PAF (PAROXYSMAL ATRIAL FIBRILLATION): Primary | ICD-10-CM

## 2017-09-25 DIAGNOSIS — Z79.01 LONG TERM (CURRENT) USE OF ANTICOAGULANTS: Primary | ICD-10-CM

## 2017-09-25 DIAGNOSIS — I48.3 TYPICAL ATRIAL FLUTTER: ICD-10-CM

## 2017-09-25 LAB — INR PPP: 2.2 (ref 2–3)

## 2017-09-25 PROCEDURE — 99211 OFF/OP EST MAY X REQ PHY/QHP: CPT | Mod: 25,S$GLB,,

## 2017-09-25 PROCEDURE — 85610 PROTHROMBIN TIME: CPT | Mod: QW,S$GLB,,

## 2017-09-25 NOTE — PROGRESS NOTES
Patient here for close monitoring due to recent high INRs and dose change. Patient confirmed dose and compliance. No changes since last week. No signs or symptoms of bleeding. We will continue on current dose plan. Repeat INR next week. Patient has post DCCV follow up on 9/29. If INR is good next week and no more plans for procedures. We will be able to resume routine INR.

## 2017-09-29 ENCOUNTER — ANTI-COAG VISIT (OUTPATIENT)
Dept: CARDIOLOGY | Facility: CLINIC | Age: 77
End: 2017-09-29

## 2017-09-29 ENCOUNTER — TELEPHONE (OUTPATIENT)
Dept: ELECTROPHYSIOLOGY | Facility: CLINIC | Age: 77
End: 2017-09-29

## 2017-09-29 ENCOUNTER — OFFICE VISIT (OUTPATIENT)
Dept: ELECTROPHYSIOLOGY | Facility: CLINIC | Age: 77
End: 2017-09-29
Payer: MEDICARE

## 2017-09-29 ENCOUNTER — HOSPITAL ENCOUNTER (OUTPATIENT)
Dept: CARDIOLOGY | Facility: CLINIC | Age: 77
Discharge: HOME | End: 2017-09-29
Payer: MEDICARE

## 2017-09-29 ENCOUNTER — LAB VISIT (OUTPATIENT)
Dept: LAB | Facility: HOSPITAL | Age: 77
End: 2017-09-29
Payer: MEDICARE

## 2017-09-29 VITALS
SYSTOLIC BLOOD PRESSURE: 101 MMHG | HEIGHT: 66 IN | DIASTOLIC BLOOD PRESSURE: 66 MMHG | HEART RATE: 59 BPM | WEIGHT: 249 LBS | BODY MASS INDEX: 40.02 KG/M2

## 2017-09-29 DIAGNOSIS — I49.9 CARDIAC ARRHYTHMIA, UNSPECIFIED CARDIAC ARRHYTHMIA TYPE: ICD-10-CM

## 2017-09-29 DIAGNOSIS — I48.0 PAF (PAROXYSMAL ATRIAL FIBRILLATION): ICD-10-CM

## 2017-09-29 DIAGNOSIS — E78.5 HYPERLIPIDEMIA, UNSPECIFIED HYPERLIPIDEMIA TYPE: ICD-10-CM

## 2017-09-29 DIAGNOSIS — I48.92 ATRIAL FLUTTER, UNSPECIFIED TYPE: Primary | ICD-10-CM

## 2017-09-29 DIAGNOSIS — I48.3 TYPICAL ATRIAL FLUTTER: ICD-10-CM

## 2017-09-29 DIAGNOSIS — I48.3 TYPICAL ATRIAL FLUTTER: Primary | ICD-10-CM

## 2017-09-29 DIAGNOSIS — Z79.01 LONG TERM (CURRENT) USE OF ANTICOAGULANTS: ICD-10-CM

## 2017-09-29 LAB
ANION GAP SERPL CALC-SCNC: 6 MMOL/L
APTT BLDCRRT: 29.4 SEC
BASOPHILS # BLD AUTO: 0.04 K/UL
BASOPHILS NFR BLD: 0.6 %
BUN SERPL-MCNC: 18 MG/DL
CALCIUM SERPL-MCNC: 9.3 MG/DL
CHLORIDE SERPL-SCNC: 105 MMOL/L
CO2 SERPL-SCNC: 30 MMOL/L
CREAT SERPL-MCNC: 1 MG/DL
DIFFERENTIAL METHOD: NORMAL
EOSINOPHIL # BLD AUTO: 0.2 K/UL
EOSINOPHIL NFR BLD: 2.4 %
ERYTHROCYTE [DISTWIDTH] IN BLOOD BY AUTOMATED COUNT: 13.9 %
EST. GFR  (AFRICAN AMERICAN): >60 ML/MIN/1.73 M^2
EST. GFR  (NON AFRICAN AMERICAN): 55.2 ML/MIN/1.73 M^2
GLUCOSE SERPL-MCNC: 111 MG/DL
HCT VFR BLD AUTO: 41.8 %
HGB BLD-MCNC: 13.9 G/DL
INR PPP: 2
LYMPHOCYTES # BLD AUTO: 1.4 K/UL
LYMPHOCYTES NFR BLD: 21 %
MCH RBC QN AUTO: 29.6 PG
MCHC RBC AUTO-ENTMCNC: 33.3 G/DL
MCV RBC AUTO: 89 FL
MONOCYTES # BLD AUTO: 0.5 K/UL
MONOCYTES NFR BLD: 7.8 %
NEUTROPHILS # BLD AUTO: 4.6 K/UL
NEUTROPHILS NFR BLD: 67.9 %
PLATELET # BLD AUTO: 263 K/UL
PMV BLD AUTO: 9.8 FL
POTASSIUM SERPL-SCNC: 4.1 MMOL/L
PROTHROMBIN TIME: 19.8 SEC
RBC # BLD AUTO: 4.7 M/UL
SODIUM SERPL-SCNC: 141 MMOL/L
WBC # BLD AUTO: 6.8 K/UL

## 2017-09-29 PROCEDURE — 99999 PR PBB SHADOW E&M-EST. PATIENT-LVL III: CPT | Mod: PBBFAC,,, | Performed by: INTERNAL MEDICINE

## 2017-09-29 PROCEDURE — 85610 PROTHROMBIN TIME: CPT

## 2017-09-29 PROCEDURE — 36415 COLL VENOUS BLD VENIPUNCTURE: CPT

## 2017-09-29 PROCEDURE — 3074F SYST BP LT 130 MM HG: CPT | Mod: S$GLB,,, | Performed by: INTERNAL MEDICINE

## 2017-09-29 PROCEDURE — 85025 COMPLETE CBC W/AUTO DIFF WBC: CPT

## 2017-09-29 PROCEDURE — 1159F MED LIST DOCD IN RCRD: CPT | Mod: S$GLB,,, | Performed by: INTERNAL MEDICINE

## 2017-09-29 PROCEDURE — 99499 UNLISTED E&M SERVICE: CPT | Mod: S$GLB,,, | Performed by: INTERNAL MEDICINE

## 2017-09-29 PROCEDURE — 1126F AMNT PAIN NOTED NONE PRSNT: CPT | Mod: S$GLB,,, | Performed by: INTERNAL MEDICINE

## 2017-09-29 PROCEDURE — 3078F DIAST BP <80 MM HG: CPT | Mod: S$GLB,,, | Performed by: INTERNAL MEDICINE

## 2017-09-29 PROCEDURE — 85730 THROMBOPLASTIN TIME PARTIAL: CPT

## 2017-09-29 PROCEDURE — 3008F BODY MASS INDEX DOCD: CPT | Mod: S$GLB,,, | Performed by: INTERNAL MEDICINE

## 2017-09-29 PROCEDURE — 93000 ELECTROCARDIOGRAM COMPLETE: CPT | Mod: S$GLB,,, | Performed by: INTERNAL MEDICINE

## 2017-09-29 PROCEDURE — 99215 OFFICE O/P EST HI 40 MIN: CPT | Mod: S$GLB,,, | Performed by: INTERNAL MEDICINE

## 2017-09-29 PROCEDURE — 80048 BASIC METABOLIC PNL TOTAL CA: CPT

## 2017-09-29 NOTE — PROGRESS NOTES
Subjective:    Patient ID:  Alaina Vee is a 77 y.o. female who presents for evaluation of No chief complaint on file.      HPI   77 y.o. F  HTN on meds  HL on meds   SULEMA (has CPAP machine in her closet at home)  AFL, s/p RFA 6/2017  PAF    AFL detected during recent preprocedure (kidney stones) exam. Completely asx, though does get brief palps about once per day to once per week (short lived).  No syncope, ever. Unlimited walking tolerance.  Saw Dr Hidalgo; Rx xarelto, but $$ -> coumadin.    Since ablation, she feels much better: more energy. She went back into AF, though.  Underwent DCCV 8/17, after which she was to start propafenone but didn't get Rx filled due to $.  Was back into AF by 4 days post DCCV. Unclear whether there was any sx benefit from SR.  She's been taking flecainide since 9/5/17 without problems. INRs remain therapeutic.    ECGs of 2/23, 2/22: typical AFL  today: AF with controlled V response    echo 3/17: 55%.    My interpretation of today's ECG is AF with HR 59 bpm    Review of Systems   Constitution: Negative. Negative for weakness and malaise/fatigue.   HENT: Negative.  Negative for ear pain and tinnitus.    Eyes: Negative for blurred vision.   Cardiovascular: Negative.  Negative for chest pain, dyspnea on exertion, near-syncope, palpitations and syncope.   Respiratory: Negative.  Negative for shortness of breath.    Endocrine: Negative.  Negative for polyuria.   Hematologic/Lymphatic: Does not bruise/bleed easily.   Skin: Negative.  Negative for rash.   Musculoskeletal: Negative.  Negative for joint pain and muscle weakness.   Gastrointestinal: Negative.  Negative for abdominal pain and change in bowel habit.   Genitourinary: Negative for frequency.   Neurological: Negative.  Negative for dizziness.   Psychiatric/Behavioral: Negative.  Negative for depression. The patient is not nervous/anxious.    Allergic/Immunologic: Negative for environmental allergies.        Objective:     Physical Exam   Constitutional: She is oriented to person, place, and time. Vital signs are normal. She appears well-developed and well-nourished. She is active and cooperative.   HENT:   Head: Normocephalic and atraumatic.   Eyes: Conjunctivae and EOM are normal.   Neck: Normal range of motion. Carotid bruit is not present. No tracheal deviation and no edema present. No thyroid mass and no thyromegaly present.   Cardiovascular: Normal heart sounds, intact distal pulses and normal pulses.  An irregularly irregular rhythm present.  No extrasystoles are present. Bradycardia present.  PMI is not displaced.  Exam reveals no gallop and no friction rub.    No murmur heard.  Pulmonary/Chest: Effort normal and breath sounds normal. No respiratory distress. She has no wheezes. She has no rales.   Abdominal: Soft. Normal appearance. She exhibits no distension. There is no hepatosplenomegaly.   Musculoskeletal: Normal range of motion.   Neurological: She is alert and oriented to person, place, and time. Coordination normal.   Skin: Skin is warm and dry. No rash noted.   Psychiatric: She has a normal mood and affect. Her speech is normal and behavior is normal. Thought content normal. Cognition and memory are normal.   Nursing note and vitals reviewed.        Assessment:       1. Atrial flutter, unspecified type    2. Typical atrial flutter    3. Hyperlipidemia, unspecified hyperlipidemia type         Plan:       Will repeat DCCV and support NSR with flecainide.  30 day monitor again after DCCV, to follow rhythm/symptom correlation. If no change in sx with NSR, would likely d/c flecainide and adopt rate-control strategy.    I discussed with patient risks, indications, benefits, and alternatives of the planned procedure. All questions were answered. Patient understands and wishes to proceed.

## 2017-09-29 NOTE — TELEPHONE ENCOUNTER
CARDIOVERSION EDUCATION CHECK LIST    10/4/17 @ 8 AM  REPORT TO CARDIOLOGY WAITING ROOM ON 3RD FLOOR OF HOSPITAL (DO NOT REPORT TO CLINIC)  Directions for Reporting to Cardiology Waiting Area in the Hospital  If you park in the Parking Garage:  Take elevators to the 2nd floor  Walk up ramp and turn right by Gold Elevators  Take elevator to the 3rd floor  Upon exiting the elevator, turn away from the clinic areas  Walk long [address] around to front of hospital to area with windows overlooking [facility]  Check in at Reception Desk  OR  If family is dropping you off:  Have them drop you off at the front of the Hospital  (Near the ER, where all the flags are hung).  Take the E elevators to the 3rd floor.  Check in at the Reception Desk in the waiting room.    DO NOT EAT OR DRINK ANYTHING AFTER: 12 MN ON THE NIGHT BEFORE YOUR PROCEDURE    MEDICATIONS:  YOU MAY TAKE YOUR USUAL MORNING MEDICATIONS WITH A SIP OF WATER    YOU WILL BE GOING HOME THE SAME DAY AS YOUR PROCEDURE  YOU WILL NEED SOMEONE TO DRIVE YOU HOME AFTER YOUR PROCEDURE     YOUR PAIN DURING YOUR PROCEDURE WILL BE MANAGED BY THE ANESTHESIA TEAM    THE ABOVE INSTRUCTIONS WERE GIVEN TO THE PATIENT VERBALLY AND THEY VERBALIZED UNDERSTANDING. THEY DO NOT REQUIRE ANY SPECIAL NEEDS AND DO NOT HAVE ANY LEARNING BARRIERS.     Any need to reschedule or cancel procedures, or any questions regarding your procedure should be addressed directly with the Arrhythmia Department Nurses at the following phone number: [phone]

## 2017-10-02 DIAGNOSIS — I48.0 PAF (PAROXYSMAL ATRIAL FIBRILLATION): Primary | ICD-10-CM

## 2017-10-03 ENCOUNTER — TELEPHONE (OUTPATIENT)
Dept: ELECTROPHYSIOLOGY | Facility: CLINIC | Age: 77
End: 2017-10-03

## 2017-10-03 PROCEDURE — 93268 ECG RECORD/REVIEW: CPT | Mod: S$GLB,,, | Performed by: INTERNAL MEDICINE

## 2017-10-03 NOTE — TELEPHONE ENCOUNTER
Called Pt to confirm procedure for tomorrow. Reviewed instructions. Pt denied any further questions, needs or concerns.

## 2017-10-04 ENCOUNTER — ANESTHESIA EVENT (OUTPATIENT)
Dept: MEDSURG UNIT | Facility: HOSPITAL | Age: 77
End: 2017-10-04
Payer: MEDICARE

## 2017-10-04 ENCOUNTER — CLINICAL SUPPORT (OUTPATIENT)
Dept: ELECTROPHYSIOLOGY | Facility: CLINIC | Age: 77
End: 2017-10-04
Payer: MEDICARE

## 2017-10-04 ENCOUNTER — ANESTHESIA (OUTPATIENT)
Dept: MEDSURG UNIT | Facility: HOSPITAL | Age: 77
End: 2017-10-04
Payer: MEDICARE

## 2017-10-04 ENCOUNTER — ANTI-COAG VISIT (OUTPATIENT)
Dept: CARDIOLOGY | Facility: CLINIC | Age: 77
End: 2017-10-04

## 2017-10-04 ENCOUNTER — HOSPITAL ENCOUNTER (OUTPATIENT)
Facility: HOSPITAL | Age: 77
Discharge: HOME | End: 2017-10-04
Attending: INTERNAL MEDICINE | Admitting: INTERNAL MEDICINE
Payer: MEDICARE

## 2017-10-04 VITALS
TEMPERATURE: 97 F | SYSTOLIC BLOOD PRESSURE: 112 MMHG | WEIGHT: 249 LBS | DIASTOLIC BLOOD PRESSURE: 57 MMHG | HEIGHT: 66 IN | OXYGEN SATURATION: 96 % | BODY MASS INDEX: 40.02 KG/M2 | RESPIRATION RATE: 16 BRPM | HEART RATE: 60 BPM

## 2017-10-04 DIAGNOSIS — I48.92 ATRIAL FLUTTER, UNSPECIFIED TYPE: ICD-10-CM

## 2017-10-04 DIAGNOSIS — I48.0 PAF (PAROXYSMAL ATRIAL FIBRILLATION): ICD-10-CM

## 2017-10-04 DIAGNOSIS — I48.3 TYPICAL ATRIAL FLUTTER: ICD-10-CM

## 2017-10-04 DIAGNOSIS — Z98.890 POSTPROCEDURAL STATE: ICD-10-CM

## 2017-10-04 DIAGNOSIS — I48.92 ATRIAL FLUTTER: Primary | ICD-10-CM

## 2017-10-04 LAB
INR PPP: 2.5
PROTHROMBIN TIME: 25.3 SEC

## 2017-10-04 PROCEDURE — 85610 PROTHROMBIN TIME: CPT

## 2017-10-04 PROCEDURE — 99220 PR INITIAL OBSERVATION CARE,LEVL III: CPT | Mod: ,,, | Performed by: INTERNAL MEDICINE

## 2017-10-04 PROCEDURE — 37000009 HC ANESTHESIA EA ADD 15 MINS: Performed by: INTERNAL MEDICINE

## 2017-10-04 PROCEDURE — 25000003 PHARM REV CODE 250: Performed by: NURSE PRACTITIONER

## 2017-10-04 PROCEDURE — 93005 ELECTROCARDIOGRAM TRACING: CPT

## 2017-10-04 PROCEDURE — D9220A PRA ANESTHESIA: Mod: ANES,,, | Performed by: ANESTHESIOLOGY

## 2017-10-04 PROCEDURE — 37000008 HC ANESTHESIA 1ST 15 MINUTES: Performed by: INTERNAL MEDICINE

## 2017-10-04 PROCEDURE — 93010 ELECTROCARDIOGRAM REPORT: CPT | Mod: ,,, | Performed by: INTERNAL MEDICINE

## 2017-10-04 PROCEDURE — 92960 CARDIOVERSION ELECTRIC EXT: CPT | Mod: ,,, | Performed by: INTERNAL MEDICINE

## 2017-10-04 PROCEDURE — 63600175 PHARM REV CODE 636 W HCPCS: Performed by: NURSE ANESTHETIST, CERTIFIED REGISTERED

## 2017-10-04 PROCEDURE — D9220A PRA ANESTHESIA: Mod: CRNA,,, | Performed by: NURSE ANESTHETIST, CERTIFIED REGISTERED

## 2017-10-04 PROCEDURE — 93271 ECG/MONITORING AND ANALYSIS: CPT | Mod: S$GLB,,, | Performed by: INTERNAL MEDICINE

## 2017-10-04 PROCEDURE — 27100006 CARDIOVERSION (DCCV)

## 2017-10-04 PROCEDURE — 93270 REMOTE 30 DAY ECG REV/REPORT: CPT | Mod: S$GLB,,, | Performed by: INTERNAL MEDICINE

## 2017-10-04 PROCEDURE — 94760 N-INVAS EAR/PLS OXIMETRY 1: CPT

## 2017-10-04 RX ORDER — SILVER SULFADIAZINE 10 G/1000G
CREAM TOPICAL 2 TIMES DAILY PRN
Status: DISCONTINUED | OUTPATIENT
Start: 2017-10-04 | End: 2017-10-04 | Stop reason: HOSPADM

## 2017-10-04 RX ORDER — PROPOFOL 10 MG/ML
VIAL (ML) INTRAVENOUS
Status: DISCONTINUED | OUTPATIENT
Start: 2017-10-04 | End: 2017-10-04

## 2017-10-04 RX ORDER — LIDOCAINE HCL/PF 100 MG/5ML
SYRINGE (ML) INTRAVENOUS
Status: DISCONTINUED | OUTPATIENT
Start: 2017-10-04 | End: 2017-10-04

## 2017-10-04 RX ORDER — SODIUM CHLORIDE 0.9 % (FLUSH) 0.9 %
3 SYRINGE (ML) INJECTION
Status: DISCONTINUED | OUTPATIENT
Start: 2017-10-04 | End: 2017-10-04 | Stop reason: HOSPADM

## 2017-10-04 RX ORDER — SODIUM CHLORIDE 9 MG/ML
INJECTION, SOLUTION INTRAVENOUS CONTINUOUS
Status: DISCONTINUED | OUTPATIENT
Start: 2017-10-04 | End: 2017-10-04 | Stop reason: HOSPADM

## 2017-10-04 RX ADMIN — PROPOFOL 100 MG: 10 INJECTION, EMULSION INTRAVENOUS at 08:10

## 2017-10-04 RX ADMIN — LIDOCAINE HYDROCHLORIDE 100 MG: 20 INJECTION, SOLUTION INTRAVENOUS at 08:10

## 2017-10-04 RX ADMIN — SODIUM CHLORIDE 1000 ML: 0.9 INJECTION, SOLUTION INTRAVENOUS at 07:10

## 2017-10-04 NOTE — PROGRESS NOTES
Pt DC'd per MD order. Discharge instructions given including activity, wound care, future appointments, and when to call MD. Medications reviewed including when to take next dose. PIV DC'd, catheter tip intact. Pt left unit via wheelchair with .

## 2017-10-04 NOTE — INTERVAL H&P NOTE
The patient has been examined and the H&P has been reviewed:    I concur with the findings and no changes have occurred since H&P was written. Pt. Presents for DCCV.  Last dose flecainide this morning.  INR 2.5 this morning.   EKG: AFib rate controlled.   Labs reviewed       - planned for DCCV  - No SKINNY given reports full compliance with coumadin, INR is 2.5 this morning.  - Anesthesia for sedation      Prior to procedure, extensive discussion with patient regarding risks and benefits of DCCV, and patient would like to proceed.  The patient voices understanding and all questions have been answered. No further questions/concerns voiced at this time.       Active Hospital Problems    Diagnosis  POA    Atrial flutter [I48.92]  Yes      Resolved Hospital Problems    Diagnosis Date Resolved POA   No resolved problems to display.       KOLBY Rubio, APRN, FNP-BC  Nurse Practitioner  Cardiac Electrophysiology    Attending:  JORGE Lloyd MD

## 2017-10-04 NOTE — NURSING TRANSFER
Nursing Transfer Note      10/4/2017     Transfer SSCU room 4    Transfer via stretcher    Transfer with chart and ECG in chart    Transported by PCT    Medicines sent: Silver cream    Chart send with patient: yes    Notified: Matilda    Patient reassessed at:departure and arrival to SSCU

## 2017-10-04 NOTE — DISCHARGE SUMMARY
[facility]  Cardiac Electrophysiology  Discharge Summary      Patient Name: Alaina Vee  MRN: [id number]  Admission Date: 10/4/2017  Hospital Length of Stay: 0 days  Discharge Date and Time: 10/4/2017 11:08 AM  Attending Physician: JORGE Lloyd MD  Discharging Provider: Laurie Saucedo NP  Primary Care Physician: Long Esquivel MD    HPI: Alaina Vee 77 y.o. HTN on meds, HL on meds, SULEMA (has CPAP machine in her closet at home)  AFL, s/p RFA 6/2017,PAF  AFL detected during recent preprocedure (kidney stones) exam. Completely asx, though does get brief palps about once per day to once per week (short lived).  No syncope, ever. Unlimited walking tolerance.  Saw Dr Hidalgo; Rx xarelto, but $$ -> coumadin.     Since ablation, she feels much better: more energy. She went back into AF, though.  Underwent DCCV 8/17, after which she was to start propafenone but didn't get Rx filled due to $.  Was back into AF by 4 days post DCCV. Unclear whether there was any sx benefit from SR.  She's been taking flecainide since 9/5/17 without problems. INRs remain therapeutic.    INR 2.5 this morning.   EKG: AFib rate controlled. Last dose flecainide this morning.     ECGs of 2/23, 2/22: typical AFL  today: AF with controlled V response    echo 3/17: 55%.    Procedure(s) (LRB):  CARDIOVERSION (N/A)     Hospital Course: s/p DCCV. Tolerated procedure well. EKG now NSR. Home with 30 day monitor.         Consults: Anesthesia     Significant Diagnostic Studies:    Final Active Diagnoses:    Diagnosis Date Noted POA    PRINCIPAL PROBLEM:  Atrial flutter [I48.92] 06/12/2017 Yes      Problems Resolved During this Admission:    Diagnosis Date Noted Date Resolved POA     Discharged Condition: good    Disposition: Home or Self Care    Follow Up:  Follow-up Information     Patrick Lloyd MD In 4 weeks.    Specialties:  Cardiology, Electrophysiology  Why:  F/u in 1 week w EKG; 4 weeks with Prema  Contact  information:  [address]  [facility] [address]  [phone]                 Patient Instructions:     Diet general   Order Comments: Cardiac     Activity as tolerated     Call MD for:  temperature >100.4     Call MD for:  persistent nausea and vomiting     Call MD for:  severe uncontrolled pain     Call MD for:  difficulty breathing, headache or visual disturbances     Call MD for:  redness, tenderness, or signs of infection (pain, swelling, redness, odor or green/yellow discharge around incision site)     Call MD for:  hives     Call MD for:  persistent dizziness or light-headedness     Call MD for:  extreme fatigue     Call MD for:   Order Comments: Any concerns regarding procedure     No dressing needed       Medications:  Reconciled Home Medications:   Discharge Medication List as of 10/4/2017 10:48 AM      CONTINUE these medications which have NOT CHANGED    Details   CYANOCOBALAMIN, VITAMIN B-12, (VITAMIN B-12 ORAL) Take 2,500 mcg by mouth every evening., Historical Med      diclofenac sodium 1 % Gel Apply 2 g topically once daily., Starting 5/20/2015, Until Discontinued, Normal      fish oil-omega-3 fatty acids 300-1,000 mg capsule Take 1 g by mouth once daily., Until Discontinued, Historical Med      flecainide (TAMBOCOR) 100 MG Tab Take 1 tablet (100 mg total) by mouth every 12 (twelve) hours., Starting Tue 9/5/2017, Until Wed 9/5/2018, Normal      lisinopril (PRINIVIL,ZESTRIL) 40 MG tablet TAKE 1 TABLET ONE TIME DAILY, Normal      lovastatin (ALTOPREV) 40 mg 24 hr tablet Take 2 tablets (80 mg total) by mouth every evening., Starting Thu 6/1/2017, Until Fri 6/1/2018, Normal      multivitamin (THERAGRAN) per tablet Take 1 tablet by mouth every evening. 1 Tablet Oral Every day, Historical Med      omeprazole (PRILOSEC) 20 MG capsule Take 1 capsule (20 mg total) by mouth once daily., Starting 5/15/2017, Until Discontinued, Normal      oxybutynin (DITROPAN XL) 15 MG TR24 TAKE 1 TABLET ONE TIME DAILY,  Normal      oxycodone-acetaminophen (PERCOCET) 5-325 mg per tablet Take 1 tablet by mouth every 4 (four) hours as needed for Pain., Starting 2/17/2017, Until Discontinued, Normal      tamsulosin (FLOMAX) 0.4 mg Cp24 Take 1 capsule (0.4 mg total) by mouth once daily., Starting 2/17/2017, Until Sat 2/17/18, Normal      warfarin (COUMADIN) 5 MG tablet Take 0.5-1 tablets (2.5-5 mg total) by mouth Daily. As directed by coumadin clinic, Starting 4/19/2017, Until Discontinued, Normal             Laurie Saucedo NP  Cardiac Electrophysiology  [facility]    Attending: JORGE Lloyd MD

## 2017-10-04 NOTE — ANESTHESIA PREPROCEDURE EVALUATION
10/04/2017  Alaina Vee is a 77 y.o., female.    Anesthesia Evaluation    I have reviewed the Patient Summary Reports.     I have reviewed the Medications.     Review of Systems  Anesthesia Hx:  History of prior surgery of interest to airway management or planning:  Denies Personal Hx of Anesthesia complications.   Social:  Non-Smoker    Cardiovascular:   Hypertension Dysrhythmias hyperlipidemia    Pulmonary:   Sleep Apnea    Renal/:   renal calculi    Hepatic/GI:   GERD    Musculoskeletal:   Arthritis   Spine Disorders: lumbar and cervical    Psych:   depression          Physical Exam  General:  Obesity    Airway/Jaw/Neck:  Airway Findings: Mouth Opening: Normal Tongue: Normal  General Airway Assessment: Adult  Mallampati: III  Improves to II with phonation.  TM Distance: Normal, at least 6 cm  Jaw/Neck Findings:  Neck ROM: Normal ROM       Chest/Lungs:  Chest/Lungs Findings: Normal Respiratory Rate     Heart/Vascular:  Heart Findings: Rhythm: Irregularly Irregular        Mental Status:  Mental Status Findings:  Alert and Oriented         Anesthesia Plan  Type of Anesthesia, risks & benefits discussed:  Anesthesia Type:  general  Patient's Preference: General   Intra-op Monitoring Plan: standard ASA monitors  Intra-op Monitoring Plan Comments:   Post Op Pain Control Plan: IV/PO Opioids PRN  Post Op Pain Control Plan Comments:   Induction:   IV  Beta Blocker:  Patient is on a Beta-Blocker and has received one dose within the past 24 hours (No further documentation required).       Informed Consent: Patient understands risks and agrees with Anesthesia plan.  Questions answered. Anesthesia consent signed with patient.  ASA Score: 3     Day of Surgery Review of History & Physical:    H&P update referred to the surgeon.     Anesthesia Plan Notes: NPO confirmed.   Per patient, no history of anesthesia  problems.   Brief IV general with natural airway.          Ready For Surgery From Anesthesia Perspective.

## 2017-10-04 NOTE — ANESTHESIA POSTPROCEDURE EVALUATION
"Anesthesia Post Evaluation    Patient: Alaina Vee    Procedure(s) Performed: Procedure(s) (LRB):  CARDIOVERSION (N/A)    Final Anesthesia Type: general  Patient location during evaluation: PACU  Patient participation: Yes- Able to Participate  Level of consciousness: awake and alert  Post-procedure vital signs: reviewed and stable  Pain management: adequate  Airway patency: patent  PONV status at discharge: No PONV  Anesthetic complications: no      Cardiovascular status: blood pressure returned to baseline  Respiratory status: unassisted  Hydration status: euvolemic  Follow-up not needed.        Visit Vitals  BP (!) 112/57   Pulse 60   Temp 35.8 °C (96.5 °F) (Oral)   Resp 16   Ht 5' 6" (1.676 m)   Wt 112.9 kg (249 lb)   SpO2 96%   Breastfeeding? No   BMI 40.19 kg/m²       Pain/Rajeev Score: Pain Assessment Performed: Yes (10/4/2017  9:30 AM)  Presence of Pain: denies (10/4/2017  9:30 AM)      "

## 2017-10-04 NOTE — TRANSFER OF CARE
"Anesthesia Transfer of Care Note    Patient: Alaina Vee    Procedure(s) Performed: Procedure(s) (LRB):  CARDIOVERSION (N/A)    Patient location: [facility]    Anesthesia Type: general    Transport from OR: Transported from OR on room air with adequate spontaneous ventilation    Post pain: adequate analgesia    Post assessment: no apparent anesthetic complications    Post vital signs: stable    Level of consciousness: awake    Nausea/Vomiting: no nausea/vomiting    Complications: none    Transfer of care protocol was followed      Last vitals:   Visit Vitals  BP (!) 98/55 (BP Location: Left arm, Patient Position: Lying)   Pulse 62   Temp 36.9 °C (98.4 °F) (Oral)   Resp 20   Ht 5' 6" (1.676 m)   Wt 112.9 kg (249 lb)   SpO2 (!) 93%   Breastfeeding? No   BMI 40.19 kg/m²     "

## 2017-10-05 NOTE — PROGRESS NOTES
"Pt. Had DCCV today. She is now NSR. Per Dr. Lloyd,   "If ECG at four weeks reveals NSR, switch to monthly INR check. Check INR weekly for three more weeks  "

## 2017-10-11 ENCOUNTER — ANTI-COAG VISIT (OUTPATIENT)
Dept: CARDIOLOGY | Facility: CLINIC | Age: 77
End: 2017-10-11

## 2017-10-11 ENCOUNTER — HOSPITAL ENCOUNTER (OUTPATIENT)
Dept: CARDIOLOGY | Facility: HOSPITAL | Age: 77
Discharge: HOME | End: 2017-10-11
Attending: INTERNAL MEDICINE
Payer: MEDICARE

## 2017-10-11 ENCOUNTER — OFFICE VISIT (OUTPATIENT)
Dept: UROLOGY | Facility: CLINIC | Age: 77
End: 2017-10-11
Payer: MEDICARE

## 2017-10-11 VITALS
SYSTOLIC BLOOD PRESSURE: 130 MMHG | DIASTOLIC BLOOD PRESSURE: 80 MMHG | WEIGHT: 249 LBS | HEIGHT: 66 IN | BODY MASS INDEX: 40.02 KG/M2 | HEART RATE: 72 BPM

## 2017-10-11 DIAGNOSIS — N20.0 NEPHROLITHIASIS: ICD-10-CM

## 2017-10-11 DIAGNOSIS — I48.3 TYPICAL ATRIAL FLUTTER: ICD-10-CM

## 2017-10-11 DIAGNOSIS — R10.9 RIGHT FLANK PAIN: Primary | ICD-10-CM

## 2017-10-11 DIAGNOSIS — R11.0 NAUSEA: ICD-10-CM

## 2017-10-11 PROCEDURE — 99214 OFFICE O/P EST MOD 30 MIN: CPT | Mod: 25,S$GLB,, | Performed by: NURSE PRACTITIONER

## 2017-10-11 PROCEDURE — 81001 URINALYSIS AUTO W/SCOPE: CPT | Mod: S$GLB,,, | Performed by: NURSE PRACTITIONER

## 2017-10-11 PROCEDURE — 99999 PR PBB SHADOW E&M-EST. PATIENT-LVL IV: CPT | Mod: PBBFAC,,, | Performed by: NURSE PRACTITIONER

## 2017-10-11 RX ORDER — ONDANSETRON HYDROCHLORIDE 8 MG/1
8 TABLET, FILM COATED ORAL EVERY 8 HOURS PRN
Qty: 10 TABLET | Refills: 0 | Status: SHIPPED | OUTPATIENT
Start: 2017-10-11 | End: 2018-09-13

## 2017-10-11 NOTE — PROGRESS NOTES
Subjective:       Patient ID: Alaina Vee is a 77 y.o. female who is an established patient of Dr. Weaver though new to me was last seen in this office 4/3/2017 found    Chief Complaint:   Chief Complaint   Patient presents with    Flank Pain     pt coming in for rt.flank pain        Nephrolithiasis   Patient complains of right flank pain without radiation to the abdomen. Onset of symptoms was abrupt starting 1 day ago with gradually improving course since that time. She reports moderate relief of right flank pain after taking Percocet on yesterday.  Patient now describes the pain as aching and dull, continuous and rated as mild (3/10). The patient has had nausea and vomiting x 1 episode (she reports emesis was mostly water) and no diaphoresis. There has been no fever or chills. The patient is not complaining of dysuria or frequency. Risk factors for urolithiasis: history of stone disease.    Patient is s/p right ESWL on 2/27/17 per Dr. Weaver for 8 mm stone in R kidney. Stone was noted to fragment well though patient with right flank pain following procedure. Subsequently CT renal stone study was ordered. CT (3/14/17) with bilateral non obstructing stones, largest measuring  6 mm, no hydro noted.    ACTIVE MEDICAL ISSUES:  Patient Active Problem List   Diagnosis    Essential hypertension    Ventral hernia    UI (urinary incontinence)    Hyperlipidemia    Venous stasis of lower extremity    GERD (gastroesophageal reflux disease)    Low back pain without sciatica    Weakness of both hips    Segmental dysfunction of lumbar region    Recurrent UTI    Mixed incontinence urge and stress    Nephrolithiasis    Cervical radicular pain    Typical atrial flutter    SULEMA (obstructive sleep apnea)    Long term (current) use of anticoagulants    PAF (paroxysmal atrial fibrillation)    Atrial flutter       ALLERGIES AND MEDICATIONS: updated and reviewed.  Review of patient's allergies indicates:    Allergen Reactions    Lipitor [atorvastatin] Other (See Comments)     Current Outpatient Prescriptions   Medication Sig    CYANOCOBALAMIN, VITAMIN B-12, (VITAMIN B-12 ORAL) Take 2,500 mcg by mouth every evening.    diclofenac sodium 1 % Gel Apply 2 g topically once daily.    fish oil-omega-3 fatty acids 300-1,000 mg capsule Take 1 g by mouth once daily.    flecainide (TAMBOCOR) 100 MG Tab Take 1 tablet (100 mg total) by mouth every 12 (twelve) hours.    lisinopril (PRINIVIL,ZESTRIL) 40 MG tablet TAKE 1 TABLET ONE TIME DAILY (Patient taking differently: every evening. TAKE 1 TABLET ONE TIME DAILY)    lovastatin (ALTOPREV) 40 mg 24 hr tablet Take 2 tablets (80 mg total) by mouth every evening.    multivitamin (THERAGRAN) per tablet Take 1 tablet by mouth every evening. 1 Tablet Oral Every day    omeprazole (PRILOSEC) 20 MG capsule Take 1 capsule (20 mg total) by mouth once daily. (Patient taking differently: Take 20 mg by mouth every evening. )    oxybutynin (DITROPAN XL) 15 MG TR24 TAKE 1 TABLET ONE TIME DAILY (Patient taking differently: TAKE 1 TABLET ONE TIME nightly)    oxycodone-acetaminophen (PERCOCET) 5-325 mg per tablet Take 1 tablet by mouth every 4 (four) hours as needed for Pain.    warfarin (COUMADIN) 5 MG tablet Take 0.5-1 tablets (2.5-5 mg total) by mouth Daily. As directed by coumadin clinic    ondansetron (ZOFRAN) 8 MG tablet Take 1 tablet (8 mg total) by mouth every 8 (eight) hours as needed for Nausea.     No current facility-administered medications for this visit.        Review of Systems   Constitutional: Negative for activity change, chills, fatigue, fever and unexpected weight change.   Eyes: Negative for discharge, redness and visual disturbance.   Respiratory: Negative for cough, shortness of breath and wheezing.    Cardiovascular: Negative for chest pain and leg swelling.   Gastrointestinal: Positive for nausea and vomiting. Negative for abdominal distention, abdominal pain,  "constipation and diarrhea.   Genitourinary: Positive for flank pain (R sided). Negative for difficulty urinating, dysuria, frequency, hematuria, pelvic pain and urgency.   Musculoskeletal: Negative for arthralgias, joint swelling and myalgias.   Skin: Negative for color change and rash.   Neurological: Negative for dizziness and light-headedness.   Psychiatric/Behavioral: Negative for behavioral problems and confusion. The patient is not nervous/anxious.        Objective:      Vitals:    10/11/17 0920   BP: 130/80   Pulse: 72   Weight: 112.9 kg (249 lb)   Height: 5' 6" (1.676 m)     Physical Exam   Constitutional: She is oriented to person, place, and time. She appears well-developed.   HENT:   Head: Normocephalic and atraumatic.   Nose: Nose normal.   Eyes: Conjunctivae are normal. Right eye exhibits no discharge. Left eye exhibits no discharge.   Neck: Normal range of motion. Neck supple. No tracheal deviation present. No thyromegaly present.   Cardiovascular: Normal rate and regular rhythm.    Pulmonary/Chest: Effort normal. No respiratory distress. She has no wheezes.   Abdominal: Soft. She exhibits no distension. There is no hepatosplenomegaly. There is no tenderness. There is no CVA tenderness. No hernia.   Genitourinary:   Genitourinary Comments: Patient declined exam   Musculoskeletal: Normal range of motion. She exhibits no edema.   Neurological: She is alert and oriented to person, place, and time.   Skin: Skin is warm and dry. No rash noted. No erythema.     Psychiatric: She has a normal mood and affect. Her behavior is normal. Judgment normal.       Urine dipstick shows 5-10 RBCs.      CT abdomen and pelvis without contrast    The adrenal glands, liver, spleen, and pancreas are unremarkable. There are non-obstructing bilateral renal calculi. The largest measures 6 mm. The 8 mm proximal left ureteral calculus seen on prior CT is not visualized. There is no evidence bladder calculus or " hydronephrosis.    There is no evidence bowel obstruction. The patient is status post cholecystectomy. There is a fat containing umbilical hernia.    There is sigmoid diverticulosis without evidence diverticulitis. There is no evidence of right lower quadrant abdominal inflammation.   Impression      The 8mm proximal right ureteral calculus seen on prior CT is no longer visualized. There is no evidence hydronephrosis.      Electronically signed by: KEEGAN BOGGS MD  Date: 03/14/17  Time: 15:15          Assessment:       1. Right flank pain    2. Nephrolithiasis    3. Nausea          Plan:       1. Right flank pain  -Gradually improving since onset w/ pain medication, continue Percocet prn pain  -Adequate hydration  - POCT urinalysis, dipstick or tablet reag  - CT Renal Stone Study ABD Pelvis WO; Future  -Discussed return precautions with patient. To ER with uncontrollable pain and n/v, fever, or gross hematuria    2. Nephrolithiasis  -s/p R ESWL on 2/27/17, stone with excellent response  -CT (3/14/17)--allen non obstructing stones, no hydro    3. Nausea  -Zofran prn nausea, rx sent to pharmacy         Return in about 2 weeks (around 10/25/2017) for Follow up, Review X-ray.

## 2017-10-13 ENCOUNTER — TELEPHONE (OUTPATIENT)
Dept: UROLOGY | Facility: CLINIC | Age: 77
End: 2017-10-13

## 2017-10-13 ENCOUNTER — HOSPITAL ENCOUNTER (OUTPATIENT)
Dept: RADIOLOGY | Facility: HOSPITAL | Age: 77
Discharge: HOME | End: 2017-10-13
Attending: NURSE PRACTITIONER
Payer: MEDICARE

## 2017-10-13 DIAGNOSIS — R10.9 RIGHT FLANK PAIN: ICD-10-CM

## 2017-10-13 LAB
BILIRUB SERPL-MCNC: NORMAL MG/DL
BLOOD URINE, POC: NORMAL
COLOR, POC UA: YELLOW
GLUCOSE UR QL STRIP: NORMAL
KETONES UR QL STRIP: NORMAL
LEUKOCYTE ESTERASE URINE, POC: NORMAL
NITRITE, POC UA: NORMAL
PH, POC UA: 5
PROTEIN, POC: NORMAL
SPECIFIC GRAVITY, POC UA: 1030
UROBILINOGEN, POC UA: NORMAL

## 2017-10-13 PROCEDURE — 74176 CT ABD & PELVIS W/O CONTRAST: CPT | Mod: 26,,, | Performed by: RADIOLOGY

## 2017-10-13 PROCEDURE — 74176 CT ABD & PELVIS W/O CONTRAST: CPT | Mod: TC

## 2017-10-13 RX ORDER — TAMSULOSIN HYDROCHLORIDE 0.4 MG/1
0.4 CAPSULE ORAL DAILY
Qty: 10 CAPSULE | Refills: 0 | Status: SHIPPED | OUTPATIENT
Start: 2017-10-13 | End: 2017-10-16 | Stop reason: CLARIF

## 2017-10-13 RX ORDER — OXYCODONE AND ACETAMINOPHEN 5; 325 MG/1; MG/1
1 TABLET ORAL EVERY 4 HOURS PRN
Qty: 25 TABLET | Refills: 0 | Status: SHIPPED | OUTPATIENT
Start: 2017-10-13 | End: 2018-04-20

## 2017-10-13 NOTE — TELEPHONE ENCOUNTER
Attempt made to contact patient via phone to inform of CT results and f/u on patient ssx, no answer, left VM to return call.    Please contact patient and instruct to increase fluid intake. Inform patient that Percocet for pain and flomax sent to pharmacy. Have patient schedule f/u visit with me early next week when Dr. Weaver is here so that we can discuss intervention for stone.    Instruct patient to present to ED if she experiences fever, uncontrollable pain, n/v, or hematuria

## 2017-10-13 NOTE — TELEPHONE ENCOUNTER
Attempted to call patient multiple times and was unsuccessful. Pt medication called into pharmacy.  Patient needs to come in sometime Monday for assessment - apt made in the even that patient calls over the weekend. Please confirm apt.

## 2017-10-16 ENCOUNTER — TELEPHONE (OUTPATIENT)
Dept: UROLOGY | Facility: CLINIC | Age: 77
End: 2017-10-16

## 2017-10-16 ENCOUNTER — OFFICE VISIT (OUTPATIENT)
Dept: UROLOGY | Facility: CLINIC | Age: 77
End: 2017-10-16
Payer: MEDICARE

## 2017-10-16 VITALS — BODY MASS INDEX: 40 KG/M2 | RESPIRATION RATE: 14 BRPM | HEIGHT: 66 IN | WEIGHT: 248.88 LBS

## 2017-10-16 DIAGNOSIS — N13.2 HYDRONEPHROSIS WITH URINARY OBSTRUCTION DUE TO URETERAL CALCULUS: ICD-10-CM

## 2017-10-16 DIAGNOSIS — N20.0 NEPHROLITHIASIS: ICD-10-CM

## 2017-10-16 DIAGNOSIS — R10.9 RIGHT FLANK PAIN: Primary | ICD-10-CM

## 2017-10-16 PROCEDURE — 99214 OFFICE O/P EST MOD 30 MIN: CPT | Mod: S$GLB,,, | Performed by: NURSE PRACTITIONER

## 2017-10-16 PROCEDURE — 99499 UNLISTED E&M SERVICE: CPT | Mod: S$GLB,,, | Performed by: NURSE PRACTITIONER

## 2017-10-16 PROCEDURE — 99999 PR PBB SHADOW E&M-EST. PATIENT-LVL III: CPT | Mod: PBBFAC,,, | Performed by: NURSE PRACTITIONER

## 2017-10-16 RX ORDER — TAMSULOSIN HYDROCHLORIDE 0.4 MG/1
0.4 CAPSULE ORAL DAILY
Qty: 14 CAPSULE | Refills: 0 | Status: SHIPPED | OUTPATIENT
Start: 2017-10-16 | End: 2018-07-19

## 2017-10-16 RX ORDER — TAMSULOSIN HYDROCHLORIDE 0.4 MG/1
0.4 CAPSULE ORAL DAILY
Qty: 30 CAPSULE | Refills: 11 | Status: SHIPPED | OUTPATIENT
Start: 2017-10-16 | End: 2017-10-16 | Stop reason: SDUPTHER

## 2017-10-16 NOTE — TELEPHONE ENCOUNTER
----- Message from Rocio Birch sent at 10/16/2017  8:07 AM CDT -----  Contact: Self/[phone]  Patient returned staff's call. Thank you.

## 2017-10-16 NOTE — PROGRESS NOTES
"Subjective:       Patient ID: Alaina Vee is a 77 y.o. female who was last seen in this office 10/11/2017    Chief Complaint:   Chief Complaint   Patient presents with    Follow-up     patient is here to discuss tx options for stone     Nephrolithiasis   Patient presented to clinic with c/o right flank pain without radiation to the abdomen on 10/11/17. Onset of symptoms was abrupt starting 1 day prior to office visit with gradually improving course since that time. She reports moderate relief of right flank pain after taking Percocet.  Patient described the pain as aching and dull, continuous and rated as mild (3/10). The patient has had nausea and vomiting x 1 episode (she reports emesis was mostly water) and no diaphoresis. There has been no fever or chills. The patient is not complaining of dysuria or frequency. Risk factors for urolithiasis: history of stone disease.  Patient is s/p right ESWL on 2/27/17 per Dr. Weaver for 8 mm stone in R kidney.     She underwent CT renal stone study on 10/13/17 d/t c/o R flank pain. CT showed small stone in UVJ with moderate hydro, small stone RLP x2 and non obstructing stones in LUP and LLP. She is here today to discuss results of imaging and treatment options. She tells me that her right flank pain is "about the same" (remains 3/10 dull ache). Today she denies dysuria, frequency, hematuria, n/v or fever      ACTIVE MEDICAL ISSUES:  Patient Active Problem List   Diagnosis    Essential hypertension    Ventral hernia    UI (urinary incontinence)    Hyperlipidemia    Venous stasis of lower extremity    GERD (gastroesophageal reflux disease)    Low back pain without sciatica    Weakness of both hips    Segmental dysfunction of lumbar region    Recurrent UTI    Mixed incontinence urge and stress    Nephrolithiasis    Cervical radicular pain    Typical atrial flutter    SULEMA (obstructive sleep apnea)    Long term (current) use of anticoagulants    PAF " (paroxysmal atrial fibrillation)    Atrial flutter       ALLERGIES AND MEDICATIONS: updated and reviewed.  Review of patient's allergies indicates:   Allergen Reactions    Lipitor [atorvastatin] Other (See Comments)     Current Outpatient Prescriptions   Medication Sig    CYANOCOBALAMIN, VITAMIN B-12, (VITAMIN B-12 ORAL) Take 2,500 mcg by mouth every evening.    diclofenac sodium 1 % Gel Apply 2 g topically once daily.    fish oil-omega-3 fatty acids 300-1,000 mg capsule Take 1 g by mouth once daily.    flecainide (TAMBOCOR) 100 MG Tab Take 1 tablet (100 mg total) by mouth every 12 (twelve) hours.    lisinopril (PRINIVIL,ZESTRIL) 40 MG tablet TAKE 1 TABLET ONE TIME DAILY (Patient taking differently: every evening. TAKE 1 TABLET ONE TIME DAILY)    lovastatin (ALTOPREV) 40 mg 24 hr tablet Take 2 tablets (80 mg total) by mouth every evening.    multivitamin (THERAGRAN) per tablet Take 1 tablet by mouth every evening. 1 Tablet Oral Every day    omeprazole (PRILOSEC) 20 MG capsule Take 1 capsule (20 mg total) by mouth once daily. (Patient taking differently: Take 20 mg by mouth every evening. )    ondansetron (ZOFRAN) 8 MG tablet Take 1 tablet (8 mg total) by mouth every 8 (eight) hours as needed for Nausea.    oxybutynin (DITROPAN XL) 15 MG TR24 TAKE 1 TABLET ONE TIME DAILY (Patient taking differently: TAKE 1 TABLET ONE TIME nightly)    oxycodone-acetaminophen (PERCOCET) 5-325 mg per tablet Take 1 tablet by mouth every 4 (four) hours as needed for Pain.    warfarin (COUMADIN) 5 MG tablet Take 0.5-1 tablets (2.5-5 mg total) by mouth Daily. As directed by coumadin clinic    tamsulosin (FLOMAX) 0.4 mg Cp24 Take 1 capsule (0.4 mg total) by mouth once daily.     No current facility-administered medications for this visit.        Review of Systems   Constitutional: Negative for activity change, chills, fatigue, fever and unexpected weight change.   Eyes: Negative for discharge, redness and visual disturbance.  "  Respiratory: Negative for cough, shortness of breath and wheezing.    Cardiovascular: Negative for chest pain and leg swelling.   Gastrointestinal: Negative for abdominal distention, abdominal pain, constipation, diarrhea, nausea and vomiting.   Genitourinary: Positive for flank pain (Right). Negative for decreased urine volume, difficulty urinating, dysuria, frequency, hematuria, pelvic pain and urgency.   Musculoskeletal: Negative for arthralgias, joint swelling and myalgias.   Skin: Negative for color change and rash.   Neurological: Negative for dizziness and light-headedness.   Psychiatric/Behavioral: Negative for behavioral problems and confusion. The patient is not nervous/anxious.        Objective:      Vitals:    10/16/17 1134   Resp: 14   Weight: 112.9 kg (248 lb 14.4 oz)   Height: 5' 6" (1.676 m)     Physical Exam   Constitutional: She is oriented to person, place, and time. She appears well-developed.   HENT:   Head: Normocephalic and atraumatic.   Nose: Nose normal.   Eyes: Conjunctivae are normal. Right eye exhibits no discharge. Left eye exhibits no discharge.   Neck: Normal range of motion. Neck supple. No tracheal deviation present. No thyromegaly present.   Cardiovascular: Normal rate and regular rhythm.    Pulmonary/Chest: Effort normal. No respiratory distress. She has no wheezes.   Abdominal: Soft. She exhibits no distension. There is no hepatosplenomegaly. There is no tenderness. There is no CVA tenderness. No hernia.   Genitourinary:   Genitourinary Comments: Patient declined exam   Musculoskeletal: Normal range of motion. She exhibits no edema.   Neurological: She is alert and oriented to person, place, and time.   Skin: Skin is warm and dry. No rash noted. No erythema.     Psychiatric: She has a normal mood and affect. Her behavior is normal. Judgment normal.       Urine dipstick shows not done.  Micro exam: not done.    History: Nonspecific abdominal pain.    Procedure: Axial CT scan of " the abdomen and pelvis are performed from the lung bases to the ischial tuberosity without contrast.  Coronal and sagittal reformats performed.    Comparison study: CT scan 3/14/2017.    Findings    Since the previous study there is moderate right hydronephrosis.  The 2 small lower pole calyceal calculi are in unchanged position.  The small midpole renal calculi that were seen on the previous study are now  at the right ureterovesical junction (image 76 series 2.  There is also mild-to-moderate hydroureter.  Also on the course of the distal one third of the ureter there is infiltration of the periureteral fat, most likely related to the obstructive uropathy.    The upper pole and lower pole left renal calculi remain without significant change from the previous study.  There is no obstructive uropathy on the left.    Minimal scarlike change in the left base noted.  There is no pleural effusion.  Heart size is within normal limits.    Limited evaluation of the solid organs of the abdomen without intravenous contrast.  The liver, the spleen and the pancreas and the adrenal glands are within normal limits.  Gallbladder is surgically absent.    There is a anteverted uterus.  No adnexal masses visualized.  There is no free fluid in the cul-de-sac.  There is no ascites.    Small to moderate colonic stool burden.  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  No inflammatory processes of the colon.  There is no small bowel obstruction.  No mesenteric masses noted.    Atherosclerotic changes of the abdominal aorta without aneurysmal dilatation.  There is no retroperitoneal or pelvic or inguinal adenopathy.    There is a approximately 3 cm fat containing umbilical hernia.   Impression         1.  Right ureterovesical junction calculus associated with obstructive uropathy as above.  2.  Nonobstructing left renal calculi.  2.  Nonobstructing lower pole right renal calculi.  4.  Status post  cholecystectomy.  5.  Fat containing umbilical hernia.    This report has been flagged in the [facility] medical record.      Electronically signed by: PRECIOUS MAYER MD  Date: 10/13/17  Time: 14:50          Assessment:       1. Nephrolithiasis    2. Right flank pain    3. Hydronephrosis with urinary obstruction due to ureteral calculus          Plan:     1. Right flank pain  -Pain controlled with percocet  -Percocet prn pain, rx given to patient today    2. Hydronephrosis with urinary obstruction due to ureteral calculus  -CT (10/13/17)--- small stone R UVJ. CT reviewed with Dr. Weaver, patient has good possibility stone will pass on its own  -Discussed treatment options--ureteroscopy vs trial of passage, patient would like to proceed with trial of passage at this time  -Encouraged adequate hydration  -Strain urine, strainer provided to patient  -Flomax  -Return precautions discussed with patient. To ER with uncontrollable pain and n/v, fever, or gross hematuria    3. Nephrolithiasis  -same as above  -s/p R ESWL on 2/27/17, stone with excellent response  -CT(3/14/17)-- allen non obstructing stones, no hydro  -CT (10/13/17)-- small stone R UVJ w/ hydro; small stone RLP x 2 and non obstructing stones LUP and LLP  - US Retroperitoneal Complete (Kidney and; Future          Return in about 2 weeks (around 10/30/2017) for Follow up, Review X-ray.

## 2017-10-18 ENCOUNTER — ANTI-COAG VISIT (OUTPATIENT)
Dept: CARDIOLOGY | Facility: CLINIC | Age: 77
End: 2017-10-18
Payer: MEDICARE

## 2017-10-18 DIAGNOSIS — I48.3 TYPICAL ATRIAL FLUTTER: ICD-10-CM

## 2017-10-18 DIAGNOSIS — Z79.01 LONG-TERM (CURRENT) USE OF ANTICOAGULANTS: Primary | ICD-10-CM

## 2017-10-18 LAB — INR PPP: 1.7 (ref 2–3)

## 2017-10-18 PROCEDURE — 99211 OFF/OP EST MAY X REQ PHY/QHP: CPT | Mod: 25,S$GLB,,

## 2017-10-18 PROCEDURE — 85610 PROTHROMBIN TIME: CPT | Mod: QW,S$GLB,,

## 2017-10-18 NOTE — PROGRESS NOTES
INR low. Patient reports kidney stones and went to ER 10/13. She missed her coumadin that day and possible another day. She is now on flomax. No other changes. No signs or symptoms of bleeding. We will boost dose today then resume maintenance dose.

## 2017-10-25 ENCOUNTER — LAB VISIT (OUTPATIENT)
Dept: LAB | Facility: HOSPITAL | Age: 77
End: 2017-10-25
Attending: INTERNAL MEDICINE
Payer: MEDICARE

## 2017-10-25 ENCOUNTER — ANTI-COAG VISIT (OUTPATIENT)
Dept: CARDIOLOGY | Facility: CLINIC | Age: 77
End: 2017-10-25

## 2017-10-25 DIAGNOSIS — I48.3 TYPICAL ATRIAL FLUTTER: ICD-10-CM

## 2017-10-25 DIAGNOSIS — Z79.01 LONG TERM CURRENT USE OF ANTICOAGULANT THERAPY: ICD-10-CM

## 2017-10-25 LAB
INR PPP: 2.6
PROTHROMBIN TIME: 26.4 SEC

## 2017-10-25 PROCEDURE — 85610 PROTHROMBIN TIME: CPT

## 2017-10-25 PROCEDURE — 36415 COLL VENOUS BLD VENIPUNCTURE: CPT

## 2017-10-27 ENCOUNTER — HOSPITAL ENCOUNTER (OUTPATIENT)
Dept: RADIOLOGY | Facility: HOSPITAL | Age: 77
Discharge: HOME | End: 2017-10-27
Attending: NURSE PRACTITIONER
Payer: MEDICARE

## 2017-10-27 DIAGNOSIS — N20.0 NEPHROLITHIASIS: ICD-10-CM

## 2017-10-27 PROCEDURE — 76770 US EXAM ABDO BACK WALL COMP: CPT | Mod: 26,,, | Performed by: RADIOLOGY

## 2017-10-27 PROCEDURE — 76770 US EXAM ABDO BACK WALL COMP: CPT | Mod: TC

## 2017-10-30 ENCOUNTER — OFFICE VISIT (OUTPATIENT)
Dept: UROLOGY | Facility: CLINIC | Age: 77
End: 2017-10-30
Payer: MEDICARE

## 2017-10-30 VITALS
HEIGHT: 66 IN | RESPIRATION RATE: 14 BRPM | BODY MASS INDEX: 42.1 KG/M2 | DIASTOLIC BLOOD PRESSURE: 78 MMHG | SYSTOLIC BLOOD PRESSURE: 120 MMHG | WEIGHT: 261.94 LBS

## 2017-10-30 DIAGNOSIS — N20.0 NEPHROLITHIASIS: Primary | ICD-10-CM

## 2017-10-30 DIAGNOSIS — N13.2 HYDRONEPHROSIS WITH URINARY OBSTRUCTION DUE TO RENAL CALCULUS: ICD-10-CM

## 2017-10-30 DIAGNOSIS — R10.9 RIGHT FLANK PAIN: ICD-10-CM

## 2017-10-30 PROCEDURE — 99214 OFFICE O/P EST MOD 30 MIN: CPT | Mod: 25,S$GLB,, | Performed by: NURSE PRACTITIONER

## 2017-10-30 PROCEDURE — 81001 URINALYSIS AUTO W/SCOPE: CPT | Mod: S$GLB,,, | Performed by: NURSE PRACTITIONER

## 2017-10-30 PROCEDURE — 99999 PR PBB SHADOW E&M-EST. PATIENT-LVL IV: CPT | Mod: PBBFAC,,, | Performed by: NURSE PRACTITIONER

## 2017-10-31 LAB
BILIRUB SERPL-MCNC: NORMAL MG/DL
BLOOD URINE, POC: NORMAL
COLOR, POC UA: NORMAL
GLUCOSE UR QL STRIP: NORMAL
KETONES UR QL STRIP: NORMAL
LEUKOCYTE ESTERASE URINE, POC: NORMAL
NITRITE, POC UA: NORMAL
PH, POC UA: 5
PROTEIN, POC: NORMAL
SPECIFIC GRAVITY, POC UA: 1000
UROBILINOGEN, POC UA: NORMAL

## 2017-11-01 ENCOUNTER — ANTI-COAG VISIT (OUTPATIENT)
Dept: CARDIOLOGY | Facility: CLINIC | Age: 77
End: 2017-11-01

## 2017-11-01 ENCOUNTER — LAB VISIT (OUTPATIENT)
Dept: LAB | Facility: HOSPITAL | Age: 77
End: 2017-11-01
Attending: INTERNAL MEDICINE
Payer: MEDICARE

## 2017-11-01 DIAGNOSIS — I48.3 TYPICAL ATRIAL FLUTTER: ICD-10-CM

## 2017-11-01 DIAGNOSIS — Z79.01 LONG TERM CURRENT USE OF ANTICOAGULANT THERAPY: ICD-10-CM

## 2017-11-01 LAB
INR PPP: 2.4
PROTHROMBIN TIME: 24.5 SEC

## 2017-11-01 PROCEDURE — 85610 PROTHROMBIN TIME: CPT

## 2017-11-01 PROCEDURE — 36415 COLL VENOUS BLD VENIPUNCTURE: CPT

## 2017-11-08 ENCOUNTER — ANTI-COAG VISIT (OUTPATIENT)
Dept: CARDIOLOGY | Facility: CLINIC | Age: 77
End: 2017-11-08
Payer: MEDICARE

## 2017-11-08 DIAGNOSIS — I48.3 TYPICAL ATRIAL FLUTTER: ICD-10-CM

## 2017-11-08 DIAGNOSIS — Z79.01 LONG-TERM (CURRENT) USE OF ANTICOAGULANTS: Primary | ICD-10-CM

## 2017-11-08 LAB — INR PPP: 2.3 (ref 2–3)

## 2017-11-08 PROCEDURE — 99211 OFF/OP EST MAY X REQ PHY/QHP: CPT | Mod: 25,S$GLB,,

## 2017-11-08 PROCEDURE — 85610 PROTHROMBIN TIME: CPT | Mod: QW,S$GLB,,

## 2017-11-08 NOTE — PROGRESS NOTES
INR good and stable. Patient is now on flomax; no interaction. She reports increased stress. No other changes. No signs or symptoms of bleeding. She has normal bruising. Continue maintenance dose. Patient is 4 weeks out from DCCV, we will start to space labs out. Patient advised to contact Dr. Lloyd's office to reschedule her post DCCV follow up that she cancelled. Recheck INR in 2 weeks

## 2017-11-10 ENCOUNTER — HOSPITAL ENCOUNTER (OUTPATIENT)
Dept: RADIOLOGY | Facility: HOSPITAL | Age: 77
Discharge: HOME | End: 2017-11-10
Attending: NURSE PRACTITIONER
Payer: MEDICARE

## 2017-11-10 DIAGNOSIS — N13.2 HYDRONEPHROSIS WITH URINARY OBSTRUCTION DUE TO RENAL CALCULUS: ICD-10-CM

## 2017-11-10 PROCEDURE — 76770 US EXAM ABDO BACK WALL COMP: CPT | Mod: TC

## 2017-11-10 PROCEDURE — 76770 US EXAM ABDO BACK WALL COMP: CPT | Mod: 26,,, | Performed by: RADIOLOGY

## 2017-11-13 ENCOUNTER — OFFICE VISIT (OUTPATIENT)
Dept: UROLOGY | Facility: CLINIC | Age: 77
End: 2017-11-13
Payer: MEDICARE

## 2017-11-13 VITALS
DIASTOLIC BLOOD PRESSURE: 70 MMHG | HEIGHT: 66 IN | BODY MASS INDEX: 39.43 KG/M2 | WEIGHT: 245.38 LBS | RESPIRATION RATE: 14 BRPM | SYSTOLIC BLOOD PRESSURE: 110 MMHG

## 2017-11-13 DIAGNOSIS — R10.9 RIGHT FLANK PAIN: ICD-10-CM

## 2017-11-13 DIAGNOSIS — N13.2 HYDRONEPHROSIS WITH URINARY OBSTRUCTION DUE TO RENAL CALCULUS: ICD-10-CM

## 2017-11-13 DIAGNOSIS — N20.0 NEPHROLITHIASIS: Primary | ICD-10-CM

## 2017-11-13 PROCEDURE — 81001 URINALYSIS AUTO W/SCOPE: CPT | Mod: S$GLB,,, | Performed by: NURSE PRACTITIONER

## 2017-11-13 PROCEDURE — 99214 OFFICE O/P EST MOD 30 MIN: CPT | Mod: 25,S$GLB,, | Performed by: NURSE PRACTITIONER

## 2017-11-13 PROCEDURE — 99999 PR PBB SHADOW E&M-EST. PATIENT-LVL IV: CPT | Mod: PBBFAC,,, | Performed by: NURSE PRACTITIONER

## 2017-11-13 NOTE — PROGRESS NOTES
"Subjective:       Patient ID: Alaina Vee is a 77 y.o. female who was last seen in this office 10/30/2017    Chief Complaint:   Chief Complaint   Patient presents with    Follow-up       Nephrolithiasis   Patient presented to clinic with c/o right flank pain without radiation to the abdomen on 10/11/17. Onset of symptoms was abrupt starting 1 day prior to office visit with gradually improving course since that time. She reports moderate relief of right flank pain after taking Percocet.  Patient described the pain as aching and dull, continuous and rated as mild (3/10). The patient has had nausea and vomiting x 1 episode (she reports emesis was mostly water) and no diaphoresis. There has been no fever or chills. The patient is not complaining of dysuria or frequency. Risk factors for urolithiasis: history of stone disease.  Patient is s/p right ESWL on 2/27/17 per Dr. Weaver for 8 mm stone in R kidney.     She underwent CT renal stone study on 10/13/17 d/t c/o R flank pain. CT showed small stone in UVJ with moderate hydro, small stone RLP x2 and non obstructing stones in LUP and LLP. During her last visit with me she reported persistent right flank pain that remained unchanged since onset of symptoms. She elected to undergo trial of passage instead of ureteroscopy.  It was recommended that she also have SERG done.    SERG (10/27/17)-- reduced prominence of right renal pelvis suggestive of improving hydro, RLP stones not visible, non obstructing stones in LUP and LLP noted    Patient still with persistent intermittent (3/10) right flank pain. She tells me that she has not needed the pain medication that was prescribed d/t pain not being "that bad".She denies dysuria, gross hematuria, abdominal pain, fever, or n/v. She is unsure is she has passed any stones    Repeat SERG done on 11/10/17 showed mild right sided hydro (similar to previous imaging). No stones or masses noted. Patient still reluctant to have any " procedures done at this time.    ACTIVE MEDICAL ISSUES:  Patient Active Problem List   Diagnosis    Essential hypertension    Ventral hernia    UI (urinary incontinence)    Hyperlipidemia    Venous stasis of lower extremity    GERD (gastroesophageal reflux disease)    Low back pain without sciatica    Weakness of both hips    Segmental dysfunction of lumbar region    Recurrent UTI    Mixed incontinence urge and stress    Nephrolithiasis    Cervical radicular pain    Typical atrial flutter    SULEMA (obstructive sleep apnea)    Long term (current) use of anticoagulants    PAF (paroxysmal atrial fibrillation)    Atrial flutter       ALLERGIES AND MEDICATIONS: updated and reviewed.  Review of patient's allergies indicates:   Allergen Reactions    Lipitor [atorvastatin] Other (See Comments)     Current Outpatient Prescriptions   Medication Sig    CYANOCOBALAMIN, VITAMIN B-12, (VITAMIN B-12 ORAL) Take 2,500 mcg by mouth every evening.    diclofenac sodium 1 % Gel Apply 2 g topically once daily.    fish oil-omega-3 fatty acids 300-1,000 mg capsule Take 1 g by mouth once daily.    flecainide (TAMBOCOR) 100 MG Tab Take 1 tablet (100 mg total) by mouth every 12 (twelve) hours.    lisinopril (PRINIVIL,ZESTRIL) 40 MG tablet TAKE 1 TABLET ONE TIME DAILY (Patient taking differently: every evening. TAKE 1 TABLET ONE TIME DAILY)    lovastatin (ALTOPREV) 40 mg 24 hr tablet Take 2 tablets (80 mg total) by mouth every evening.    multivitamin (THERAGRAN) per tablet Take 1 tablet by mouth every evening. 1 Tablet Oral Every day    omeprazole (PRILOSEC) 20 MG capsule Take 1 capsule (20 mg total) by mouth once daily. (Patient taking differently: Take 20 mg by mouth every evening. )    ondansetron (ZOFRAN) 8 MG tablet Take 1 tablet (8 mg total) by mouth every 8 (eight) hours as needed for Nausea.    oxybutynin (DITROPAN XL) 15 MG TR24 TAKE 1 TABLET ONE TIME DAILY (Patient taking differently: TAKE 1 TABLET ONE  "TIME nightly)    oxycodone-acetaminophen (PERCOCET) 5-325 mg per tablet Take 1 tablet by mouth every 4 (four) hours as needed for Pain.    tamsulosin (FLOMAX) 0.4 mg Cp24 Take 1 capsule (0.4 mg total) by mouth once daily.    warfarin (COUMADIN) 5 MG tablet Take 0.5-1 tablets (2.5-5 mg total) by mouth Daily. As directed by coumadin clinic     No current facility-administered medications for this visit.        Review of Systems   Constitutional: Negative for activity change, chills, fatigue, fever and unexpected weight change.   Eyes: Negative for discharge, redness and visual disturbance.   Respiratory: Negative for cough, shortness of breath and wheezing.    Cardiovascular: Negative for chest pain and leg swelling.   Gastrointestinal: Negative for abdominal distention, abdominal pain, constipation, diarrhea, nausea and vomiting.   Genitourinary: Positive for flank pain (right sided). Negative for decreased urine volume, difficulty urinating, dysuria, frequency, hematuria, pelvic pain and urgency.   Musculoskeletal: Negative for arthralgias, joint swelling and myalgias.   Skin: Negative for color change and rash.   Neurological: Negative for dizziness and light-headedness.   Psychiatric/Behavioral: Negative for behavioral problems and confusion. The patient is not nervous/anxious.        Objective:      Vitals:    11/13/17 1038   BP: 110/70   Resp: 14   Weight: 111.3 kg (245 lb 6 oz)   Height: 5' 6" (1.676 m)     Physical Exam   Constitutional: She is oriented to person, place, and time. She appears well-developed.   HENT:   Head: Normocephalic and atraumatic.   Nose: Nose normal.   Eyes: Conjunctivae are normal. Right eye exhibits no discharge. Left eye exhibits no discharge.   Neck: Normal range of motion. Neck supple. No tracheal deviation present. No thyromegaly present.   Cardiovascular: Normal rate and regular rhythm.    Pulmonary/Chest: Effort normal. No respiratory distress. She has no wheezes. "   Abdominal: Soft. She exhibits no distension. There is no hepatosplenomegaly. There is no tenderness. There is no CVA tenderness. No hernia.   Genitourinary:   Genitourinary Comments: Patient declined exam   Musculoskeletal: Normal range of motion. She exhibits no edema.   Neurological: She is alert and oriented to person, place, and time.   Skin: Skin is warm and dry. No rash noted. No erythema.     Psychiatric: She has a normal mood and affect. Her behavior is normal. Judgment normal.       Urine dipstick shows negative for all components.  Micro exam: negative for WBC's or RBC's.      Narrative     Renal ultrasound    History: Hydronephrosis    Comparison: None    Technique: Sonographic evaluation of the kidneys and bladder was performed.    Findings: The right kidney measures 11.0 cm in length.  There is no hydronephrosis.  The resistive index within a parenchymal artery is 0.7.There is mild hydronephrosis.      The left kidney measures 10.1 cm in length.  There is no hydronephrosis. The resistive index within a parenchymal artery is 0.7.    The bladder is unremarkable.   Impression       There is mild right hydronephrosis appearing similar to what was seen on prior exam.      Electronically signed by: KEEGAN BOGGS MD  Date: 11/10/17  Time: 15:01        Assessment:       1. Nephrolithiasis    2. Right flank pain    3. Hydronephrosis with urinary obstruction due to renal calculus          Plan:       1. Nephrolithiasis  -s/p R ESWL on 2/27/17, stone with excellent response  -CT(3/14/17)-- allen non obstructing stones, no hydro  -CT (10/13/17)-- small stone R UVJ w/ hydro; small stone RLP x 2 and non obstructing stones LUP and LLP  -SERG (10/27/17)-- reduced prominence of right renal pelvis suggestive of improving hydro, RLP stones not visible, non obstructing stones in LUP and LLP noted (reviewed with Dr. eWaver)  -SERG (11/10/17)--mild right sided hydro (similar to previous imaging). No stones or masses  noted.  - POCT urinalysis, dipstick or tablet reag  - X-Ray Abdomen AP 1 View; Future    2. Right flank pain  -Stable, not worsening    3. Hydronephrosis with urinary obstruction due to renal calculus  -CT (10/13/17)--- small stone R UVJ. CT reviewed with Dr. Weaver, patient has good possibility stone will pass on its own  -SERG (10/27/17)-- suggestive of improving hydronephrosis  -SERG (11/10/17)--mild right sided hydro (similar to previous imaging). No stones or masses noted.  -Again strongly recommend patient have URS to evaluate cause of persistent right sided hydro. Discussed with patient long term effects/risks of prolonged obstruction to kidneys including but not limited to renal failure. She verbalized understanding of risks. Patient declined having any procedures done at this time  -Advised patient to present to ED with fever, uncontrollable pain, n/v, or gross hematuria. She verbalized understanding  -KUB/SERG in 2 months             Return in about 2 months (around 1/13/2018) for Review X-ray, Follow up.

## 2017-11-16 PROCEDURE — 93272 ECG/REVIEW INTERPRET ONLY: CPT | Mod: S$GLB,,, | Performed by: INTERNAL MEDICINE

## 2017-11-21 ENCOUNTER — LAB VISIT (OUTPATIENT)
Dept: LAB | Facility: HOSPITAL | Age: 77
End: 2017-11-21
Attending: INTERNAL MEDICINE
Payer: MEDICARE

## 2017-11-21 ENCOUNTER — ANTI-COAG VISIT (OUTPATIENT)
Dept: CARDIOLOGY | Facility: CLINIC | Age: 77
End: 2017-11-21

## 2017-11-21 DIAGNOSIS — I48.3 TYPICAL ATRIAL FLUTTER: ICD-10-CM

## 2017-11-21 DIAGNOSIS — Z79.01 LONG TERM CURRENT USE OF ANTICOAGULANT THERAPY: ICD-10-CM

## 2017-11-21 LAB
INR PPP: 2.2
PROTHROMBIN TIME: 23 SEC

## 2017-11-21 PROCEDURE — 36415 COLL VENOUS BLD VENIPUNCTURE: CPT

## 2017-11-21 PROCEDURE — 85610 PROTHROMBIN TIME: CPT

## 2017-12-12 ENCOUNTER — TELEPHONE (OUTPATIENT)
Dept: UROLOGY | Facility: CLINIC | Age: 77
End: 2017-12-12

## 2017-12-12 NOTE — TELEPHONE ENCOUNTER
Please advise on the correct way to take the Oxybutynin. I will  Then check to see if she needs refills and confirm the pharmacy. Thank you

## 2017-12-12 NOTE — TELEPHONE ENCOUNTER
----- Message from Lakshmi Phillip sent at 12/12/2017  9:24 AM CST -----  Contact: Self   Patient says she would like to go back to taking her medication twice a day because there is no copy when she get it that way. Please call patient at [phone].      oxybutynin (DITROPAN XL) 15 MG TR24      [facility] - [place], [place] - [address]

## 2017-12-12 NOTE — TELEPHONE ENCOUNTER
Per Dr. Weaver's notes Patient has been taking Ditropan 15 mg XL daily since approximately 4/2016. Prior to this she was taking Ditropan 5 mg IR TID. She is still receiving same dosage of medication just in less quantity of pills. Advise patient that extended release tablets release the medication over a longer period of time. Thanks

## 2017-12-21 ENCOUNTER — ANTI-COAG VISIT (OUTPATIENT)
Dept: CARDIOLOGY | Facility: CLINIC | Age: 77
End: 2017-12-21
Payer: MEDICARE

## 2017-12-21 DIAGNOSIS — I48.3 TYPICAL ATRIAL FLUTTER: ICD-10-CM

## 2017-12-21 DIAGNOSIS — Z79.01 LONG-TERM (CURRENT) USE OF ANTICOAGULANTS: Primary | ICD-10-CM

## 2017-12-21 LAB — INR PPP: 1.6 (ref 2–3)

## 2017-12-21 PROCEDURE — 85610 PROTHROMBIN TIME: CPT | Mod: QW,S$GLB,,

## 2017-12-21 PROCEDURE — 99211 OFF/OP EST MAY X REQ PHY/QHP: CPT | Mod: 25,S$GLB,,

## 2017-12-21 NOTE — PROGRESS NOTES
INR low. Patient could not confirm compliance. She may have missed a dose. Patient advised to start using a weekly pillbox. She also reports having a dark leaf salad a few days ago. No other changes. No signs or symptoms of bleeding. We will boost dose today then resume maintenance dose. Recheck INR in 2 weeks

## 2017-12-28 ENCOUNTER — TELEPHONE (OUTPATIENT)
Dept: UROLOGY | Facility: CLINIC | Age: 77
End: 2017-12-28

## 2017-12-28 DIAGNOSIS — N20.0 NEPHROLITHIASIS: Primary | ICD-10-CM

## 2017-12-28 NOTE — TELEPHONE ENCOUNTER
Pt dropped off a stone she passed does a stone analysis need to be done on this./please advise/isaura

## 2018-01-04 ENCOUNTER — TELEPHONE (OUTPATIENT)
Dept: UROLOGY | Facility: CLINIC | Age: 78
End: 2018-01-04

## 2018-01-04 ENCOUNTER — ANTI-COAG VISIT (OUTPATIENT)
Dept: CARDIOLOGY | Facility: CLINIC | Age: 78
End: 2018-01-04

## 2018-01-04 ENCOUNTER — LAB VISIT (OUTPATIENT)
Dept: LAB | Facility: HOSPITAL | Age: 78
End: 2018-01-04
Attending: INTERNAL MEDICINE
Payer: MEDICARE

## 2018-01-04 DIAGNOSIS — N20.0 NEPHROLITHIASIS: ICD-10-CM

## 2018-01-04 DIAGNOSIS — Z79.01 LONG TERM CURRENT USE OF ANTICOAGULANT THERAPY: ICD-10-CM

## 2018-01-04 DIAGNOSIS — I48.3 TYPICAL ATRIAL FLUTTER: ICD-10-CM

## 2018-01-04 LAB
INR PPP: 2.2
PROTHROMBIN TIME: 22.1 SEC

## 2018-01-04 PROCEDURE — 82365 CALCULUS SPECTROSCOPY: CPT

## 2018-01-04 PROCEDURE — 36415 COLL VENOUS BLD VENIPUNCTURE: CPT

## 2018-01-04 PROCEDURE — 85610 PROTHROMBIN TIME: CPT

## 2018-01-04 RX ORDER — FLECAINIDE ACETATE 100 MG/1
100 TABLET ORAL EVERY 12 HOURS
Qty: 180 TABLET | Refills: 3 | Status: SHIPPED | OUTPATIENT
Start: 2018-01-04 | End: 2018-01-05 | Stop reason: SDUPTHER

## 2018-01-05 ENCOUNTER — LAB VISIT (OUTPATIENT)
Dept: LAB | Facility: HOSPITAL | Age: 78
End: 2018-01-05
Attending: UROLOGY
Payer: MEDICARE

## 2018-01-05 DIAGNOSIS — N20.0 URIC ACID NEPHROLITHIASIS: Primary | ICD-10-CM

## 2018-01-05 PROCEDURE — 88300 SURGICAL PATH GROSS: CPT | Performed by: PATHOLOGY

## 2018-01-05 PROCEDURE — 88300 SURGICAL PATH GROSS: CPT | Mod: 26,,, | Performed by: PATHOLOGY

## 2018-01-05 RX ORDER — FLECAINIDE ACETATE 100 MG/1
100 TABLET ORAL EVERY 12 HOURS
Qty: 60 TABLET | Refills: 11 | Status: SHIPPED | OUTPATIENT
Start: 2018-01-05 | End: 2018-01-12 | Stop reason: SDUPTHER

## 2018-01-10 LAB
ANNOTATION COMMENT IMP: NORMAL
COMPN STONE: NORMAL
SPECIMEN SOURCE: NORMAL
STONE ANALYSIS IR-IMP: NORMAL

## 2018-01-12 RX ORDER — FLECAINIDE ACETATE 100 MG/1
100 TABLET ORAL EVERY 12 HOURS
Qty: 60 TABLET | Refills: 11 | Status: SHIPPED | OUTPATIENT
Start: 2018-01-12 | End: 2018-04-20 | Stop reason: SDUPTHER

## 2018-01-15 ENCOUNTER — TELEPHONE (OUTPATIENT)
Dept: UROLOGY | Facility: CLINIC | Age: 78
End: 2018-01-15

## 2018-01-15 ENCOUNTER — ANTI-COAG VISIT (OUTPATIENT)
Dept: CARDIOLOGY | Facility: CLINIC | Age: 78
End: 2018-01-15

## 2018-01-15 ENCOUNTER — LAB VISIT (OUTPATIENT)
Dept: LAB | Facility: HOSPITAL | Age: 78
End: 2018-01-15
Attending: INTERNAL MEDICINE
Payer: MEDICARE

## 2018-01-15 DIAGNOSIS — I48.3 TYPICAL ATRIAL FLUTTER: ICD-10-CM

## 2018-01-15 DIAGNOSIS — Z79.01 LONG TERM (CURRENT) USE OF ANTICOAGULANTS: ICD-10-CM

## 2018-01-15 DIAGNOSIS — Z79.01 LONG TERM CURRENT USE OF ANTICOAGULANT THERAPY: ICD-10-CM

## 2018-01-15 LAB
INR PPP: 2.2
PROTHROMBIN TIME: 23.4 SEC

## 2018-01-15 PROCEDURE — 85610 PROTHROMBIN TIME: CPT

## 2018-01-15 PROCEDURE — 36415 COLL VENOUS BLD VENIPUNCTURE: CPT

## 2018-01-15 NOTE — TELEPHONE ENCOUNTER
----- Message from Denisse Gonzalez sent at 1/15/2018  8:47 AM CST -----  Contact: SELF  PATIENT REQUESTING A CALL BACK REGARDING OV. PLEASE CALL [phone].    THANKS!

## 2018-01-16 ENCOUNTER — HOSPITAL ENCOUNTER (OUTPATIENT)
Dept: RADIOLOGY | Facility: HOSPITAL | Age: 78
Discharge: HOME | End: 2018-01-16
Attending: NURSE PRACTITIONER
Payer: MEDICARE

## 2018-01-16 ENCOUNTER — OFFICE VISIT (OUTPATIENT)
Dept: CARDIOLOGY | Facility: CLINIC | Age: 78
End: 2018-01-16
Payer: MEDICARE

## 2018-01-16 VITALS
DIASTOLIC BLOOD PRESSURE: 68 MMHG | WEIGHT: 242.75 LBS | SYSTOLIC BLOOD PRESSURE: 132 MMHG | OXYGEN SATURATION: 95 % | HEIGHT: 66 IN | BODY MASS INDEX: 39.01 KG/M2 | HEART RATE: 57 BPM | RESPIRATION RATE: 15 BRPM

## 2018-01-16 DIAGNOSIS — I10 ESSENTIAL HYPERTENSION: ICD-10-CM

## 2018-01-16 DIAGNOSIS — I48.3 TYPICAL ATRIAL FLUTTER: ICD-10-CM

## 2018-01-16 DIAGNOSIS — E66.9 NON MORBID OBESITY, UNSPECIFIED OBESITY TYPE: ICD-10-CM

## 2018-01-16 DIAGNOSIS — E78.5 HYPERLIPIDEMIA, UNSPECIFIED HYPERLIPIDEMIA TYPE: ICD-10-CM

## 2018-01-16 DIAGNOSIS — G47.33 OSA (OBSTRUCTIVE SLEEP APNEA): ICD-10-CM

## 2018-01-16 DIAGNOSIS — N13.2 HYDRONEPHROSIS WITH URINARY OBSTRUCTION DUE TO RENAL CALCULUS: ICD-10-CM

## 2018-01-16 DIAGNOSIS — I48.0 PAROXYSMAL ATRIAL FIBRILLATION: Primary | ICD-10-CM

## 2018-01-16 DIAGNOSIS — N20.0 NEPHROLITHIASIS: ICD-10-CM

## 2018-01-16 DIAGNOSIS — I48.0 PAF (PAROXYSMAL ATRIAL FIBRILLATION): ICD-10-CM

## 2018-01-16 DIAGNOSIS — Z79.01 LONG TERM (CURRENT) USE OF ANTICOAGULANTS: ICD-10-CM

## 2018-01-16 PROCEDURE — 99999 PR PBB SHADOW E&M-EST. PATIENT-LVL III: CPT | Mod: PBBFAC,,, | Performed by: INTERNAL MEDICINE

## 2018-01-16 PROCEDURE — 76770 US EXAM ABDO BACK WALL COMP: CPT | Mod: 26,,, | Performed by: RADIOLOGY

## 2018-01-16 PROCEDURE — 76770 US EXAM ABDO BACK WALL COMP: CPT | Mod: TC

## 2018-01-16 PROCEDURE — 74018 RADEX ABDOMEN 1 VIEW: CPT | Mod: TC,FY

## 2018-01-16 PROCEDURE — 74018 RADEX ABDOMEN 1 VIEW: CPT | Mod: 26,,, | Performed by: RADIOLOGY

## 2018-01-16 PROCEDURE — 99214 OFFICE O/P EST MOD 30 MIN: CPT | Mod: S$GLB,,, | Performed by: INTERNAL MEDICINE

## 2018-01-16 PROCEDURE — 99499 UNLISTED E&M SERVICE: CPT | Mod: S$GLB,,, | Performed by: INTERNAL MEDICINE

## 2018-01-16 PROCEDURE — 93010 ELECTROCARDIOGRAM REPORT: CPT | Mod: S$GLB,,, | Performed by: INTERNAL MEDICINE

## 2018-01-16 NOTE — PROGRESS NOTES
CARDIOVASCULAR PROGRESS NOTE    REASON FOR CONSULT:   Alaina Vee is a 77 y.o. female who presents for f/u of AF/AFL.    PCP: Sierra  EP: Prema  HISTORY OF PRESENT ILLNESS:   The patient returns for follow-up.  She was seen by Dr. Lloyd back in September and underwent repeat cardioversion with assistance of flecainide in early October.  Subsequent event monitor reveals sinus rhythm, as does today's EKG.  The patient tells me she feels better while she is in sinus rhythm.  Her main complaint today appears to be related to an upper respiratory tract infection.  She otherwise denies angina or dyspnea.  She's had no palpitations, lightheadedness, dizziness, or syncope.  There's been no PND, orthopnea, or lower extremity edema.  She denies melena, hematuria, or claudicant symptoms.    Of note, the patient carries a diagnosis of sleep apnea, and apparently has a CPAP apparatus at home which she received 12 years ago.  She tells me she's not been using it because her  was not aware that she is supposed to be using it.  I strongly encouraged the patient to follow-up with our sleep clinic in order to obtain the appropriate prescription for CPAP.  I've also encouraged the patient to have her  come to her office visits if he feels the need to direct her care in any way.    CARDIOVASCULAR HISTORY:   AF/AFL, CHADSVASC 3, s/p ablation (Dr. Lloyd) 6/12/17, DCCV 10/4/17  Aortic root dil 3.9 cm (echo 3/2017)  SULEMA    PAST MEDICAL HISTORY:     Past Medical History:   Diagnosis Date    Arthritis     knees    Atrial fibrillation     Atrial flutter     Degenerative disc disease     Depression     General anesthetics causing adverse effect in therapeutic use     pt states sometimes slow to awaken.    GERD (gastroesophageal reflux disease)     Hyperlipidemia     Hypertension     Kidney stone     Sleep apnea     does not wear CPAP    Varicosities     Ventral hernia        PAST SURGICAL HISTORY:     Past  Surgical History:   Procedure Laterality Date    APPENDECTOMY      breast biopsy, left      CHOLECYSTECTOMY      JOINT REPLACEMENT      TKR , right    JOINT REPLACEMENT  2009    TKR  left    ID EXPLORATORY OF ABDOMEN         ALLERGIES AND MEDICATION:   Review of patient's allergies indicates:  No Known Allergies  Previous Medications    CYANOCOBALAMIN, VITAMIN B-12, (VITAMIN B-12 ORAL)    Take 2,500 mcg by mouth every evening.    DICLOFENAC SODIUM 1 % GEL    Apply 2 g topically once daily.    FISH OIL-OMEGA-3 FATTY ACIDS 300-1,000 MG CAPSULE    Take 1 g by mouth once daily.    FLECAINIDE (TAMBOCOR) 100 MG TAB    Take 1 tablet (100 mg total) by mouth every 12 (twelve) hours.    LISINOPRIL (PRINIVIL,ZESTRIL) 40 MG TABLET    TAKE 1 TABLET ONE TIME DAILY    LOVASTATIN (ALTOPREV) 40 MG 24 HR TABLET    Take 2 tablets (80 mg total) by mouth every evening.    MULTIVITAMIN (THERAGRAN) PER TABLET    Take 1 tablet by mouth every evening. 1 Tablet Oral Every day    OMEPRAZOLE (PRILOSEC) 20 MG CAPSULE    Take 1 capsule (20 mg total) by mouth once daily.    ONDANSETRON (ZOFRAN) 8 MG TABLET    Take 1 tablet (8 mg total) by mouth every 8 (eight) hours as needed for Nausea.    OXYBUTYNIN (DITROPAN XL) 15 MG TR24    TAKE 1 TABLET ONE TIME DAILY    OXYCODONE-ACETAMINOPHEN (PERCOCET) 5-325 MG PER TABLET    Take 1 tablet by mouth every 4 (four) hours as needed for Pain.    TAMSULOSIN (FLOMAX) 0.4 MG CP24    Take 1 capsule (0.4 mg total) by mouth once daily.    WARFARIN (COUMADIN) 5 MG TABLET    Take 0.5-1 tablets (2.5-5 mg total) by mouth Daily. As directed by coumadin clinic       SOCIAL HISTORY:     Social History     Social History    Marital status:      Spouse name: N/A    Number of children: N/A    Years of education: N/A     Occupational History    Not on file.     Social History Main Topics    Smoking status: Never Smoker    Smokeless tobacco: Never Used    Alcohol use No    Drug use: No    Sexual activity:  "No     Other Topics Concern    Not on file     Social History Narrative    No narrative on file       FAMILY HISTORY:     Family History   Problem Relation Age of Onset    COPD Mother     Heart disease Sister      half sister    COPD Sister        REVIEW OF SYSTEMS:   Review of Systems   Constitutional: Negative for chills, diaphoresis and fever.   HENT: Negative for nosebleeds.    Eyes: Negative for blurred vision, double vision and photophobia.   Respiratory: Negative for hemoptysis, shortness of breath and wheezing.    Cardiovascular: Negative for chest pain, palpitations, orthopnea, claudication, leg swelling and PND.   Gastrointestinal: Negative for abdominal pain, blood in stool, heartburn, melena, nausea and vomiting.   Genitourinary: Negative for flank pain and hematuria.   Musculoskeletal: Negative for falls, myalgias and neck pain.   Skin: Negative for rash.   Neurological: Negative for dizziness, seizures, loss of consciousness, weakness and headaches.   Endo/Heme/Allergies: Negative for polydipsia. Does not bruise/bleed easily.   Psychiatric/Behavioral: Negative for depression and memory loss. The patient is not nervous/anxious.        PHYSICAL EXAM:     Vitals:    01/16/18 0846   BP: 132/68   Pulse: (!) 57   Resp: 15    Body mass index is 39.18 kg/m².  Weight: 110.1 kg (242 lb 11.6 oz)   Height: 5' 6" (167.6 cm)     Physical Exam   Constitutional: She is oriented to person, place, and time. She appears well-developed and well-nourished. She is cooperative.  Non-toxic appearance. No distress.   HENT:   Head: Normocephalic and atraumatic.   Eyes: Conjunctivae and EOM are normal. Pupils are equal, round, and reactive to light. No scleral icterus.   Neck: Trachea normal and normal range of motion. Neck supple. Normal carotid pulses and no JVD present. Carotid bruit is not present. No neck rigidity. No edema present. No thyromegaly present.   Cardiovascular: Normal rate, regular rhythm, S1 normal and S2 " normal.  PMI is not displaced.  Exam reveals distant heart sounds. Exam reveals no gallop and no friction rub.    No murmur heard.  Pulses:       Carotid pulses are 2+ on the right side, and 2+ on the left side.  Pulmonary/Chest: Effort normal and breath sounds normal. No respiratory distress. She has no wheezes. She has no rales. She exhibits no tenderness.   Abdominal: Soft. Bowel sounds are normal. She exhibits no distension and no mass. There is no hepatosplenomegaly. There is no tenderness.   obese   Musculoskeletal: She exhibits no edema or tenderness.   Feet:   Right Foot:   Skin Integrity: Negative for ulcer.   Left Foot:   Skin Integrity: Negative for ulcer.   Neurological: She is alert and oriented to person, place, and time. No cranial nerve deficit.   Skin: Skin is warm and dry. No rash noted. No erythema.   Psychiatric: She has a normal mood and affect. Her speech is normal and behavior is normal.   Vitals reviewed.      DATA:   EKG: (personally reviewed tracing)  1/16/18 SR 64, 1st deg AVB, low voltage, QRS 84msec    Laboratory:  CBC:    Recent Labs  Lab 06/06/17  1635 08/18/17  0838 09/29/17  1110   WHITE BLOOD CELL COUNT 7.11 5.77 6.80   HEMOGLOBIN 13.7 14.6 13.9   HEMATOCRIT 41.7 44.4 41.8   PLATELETS 264 256 263       CHEMISTRIES:    Recent Labs  Lab 05/31/16  1452  06/06/17  1635 07/10/17  0940 08/18/17  0838 09/29/17  1110   GLUCOSE 104  < > 115 H  --  116 H 111 H   SODIUM 141  < > 142  --  142 141   POTASSIUM 4.9  < > 4.0  --  4.6 4.1   BUN BLD 24 H  < > 16  --  19 18   CREATININE 1.1  < > 1.0 1.1 1.1 1.0   EGFR IF  58 A  < > >60 57 A 57 A >60.0   EGFR IF NON- 50 A  < > 56 A 50 A 50 A 55.2 A   CALCIUM 9.5  < > 9.4  --  9.8 9.3   MAGNESIUM 2.4  --   --   --   --   --    < > = values in this interval not displayed.    CARDIAC BIOMARKERS:    Recent Labs  Lab 05/31/16  1452   CPK 49       COAGS:    Recent Labs  Lab 12/21/17  0829 01/04/18  1015 01/15/18  1110   INR  1.6 2.2 H 2.2 H       LIPIDS/LFTS:    Recent Labs  Lab 11/19/15  1037 05/31/16  1452 12/08/16  1300 03/07/17  0920 06/06/17  1635   CHOLESTEROL 238 H  --   --  193 158   TRIGLYCERIDES 154 H  --   --  156 H 170 H   HDL 55  --   --  33 L 35 L   LDL CHOLESTEROL 152.2  --   --  128.8 89.0   NON-HDL CHOLESTEROL 183  --   --  160 123   AST 20 16 21  --  13   ALT 17 14 16  --  11     Lab Results   Component Value Date    TSH 1.350 12/08/2016     Cardiovascular Testing:  EVM 10/5/17-11/4/17  Sinus rhythm with first-degree AV block.  Overall, negative event monitor.    SKINNY 8/31/17    1 - Normal left ventricular systolic function.     2 - Left atrial appendage is single lobed with no visualized thrombus.    3 - Aortic root 3.8cm    Echo 3/7/17    1 - Normal left ventricular systolic function (EF 55-60%).     2 - Moderate left atrial enlargement.     3 - Aortic root dilation, 3.9cm    Holter 3/7/17  PREDOMINANT RHYTHM  1. Atrial flutter with ventricular rates varying between 29 and 89 bpm with an average of 55 bpm.   VENTRICULAR ARRHYTHMIAS  1. There were no PVCs. There was no ventricular ectopic activity.  SUPRA VENTRICULAR ARRHYTHMIAS  1. Atrial flutter was noted throughout the study.   AV CONDUCTION  1. There was no evidence of high grade AV emma filtering.   2. The longest RR interval was 2215 msec.   DIARY  1. The diary was not returned  MISCELLANEOUS  1. There were occasional hookup related artifacts. Overall, the study was of good quality.   2. This was a tape of adequate length (24 hrs).    RADHA 7/14/16  Right lower extremity RADHA: 1.06  Left lower extremity RADHA: 1.08    ASSESSMENT:   # PAF/AFL, now in SR.  CHADSVASC 3.  S/p AFL ablation 6/2017, s/p SKINNY/DCCV 8/31/17, DCCV 10/4/17, now on flecainide/coumadin.  # HTN, controlled  # HLP, LDL acceptable  # BMI 39, down 1 unit vs last OV  # SULEMA, has CPAP at home but not using it  # Aortic root dil 3.8 cm (SKINNY 8/31/2017)    PLAN:   Cont med rx  Cont flecainide, pt feels  better in SR  Diet/exercise/weight loss, pt previously declined bariatric evaluation.  Continuation of coumadin for goal INR 2.0-3.0 (pt not on NOAC d/t cost issues)  Sleep eval, pt apparently has CPAP machine at home, but does not know how to use it (and there also seems to be some objection from the patient's  as he was not consulted in it's necessity)!  RTC 6 months    Nahid Hidalgo MD, FACC

## 2018-01-16 NOTE — PROGRESS NOTES
Patient, Alaina Vee (MRN #[id number]), presented with a recorded BMI of 39.18 kg/m^2 and a documented comorbidity(s):  - Obstructive Sleep Apnea  - Hypertension  - Hyperlipidemia  - Atrial Fibrillation  to which the severe obesity is a contributing factor. This is consistent with the definition of severe obesity (BMI 35.0-35.9) with comorbidity (ICD-10 E66.01, Z68.35). The patient's severe obesity was monitored, evaluated, addressed and/or treated. This addendum to the medical record is made on 01/16/2018.

## 2018-01-25 ENCOUNTER — OFFICE VISIT (OUTPATIENT)
Dept: UROLOGY | Facility: CLINIC | Age: 78
End: 2018-01-25
Payer: MEDICARE

## 2018-01-25 VITALS
HEIGHT: 66 IN | HEART RATE: 68 BPM | SYSTOLIC BLOOD PRESSURE: 108 MMHG | WEIGHT: 243.38 LBS | DIASTOLIC BLOOD PRESSURE: 62 MMHG | BODY MASS INDEX: 39.12 KG/M2 | RESPIRATION RATE: 16 BRPM

## 2018-01-25 DIAGNOSIS — N20.0 NEPHROLITHIASIS: Primary | ICD-10-CM

## 2018-01-25 DIAGNOSIS — N39.46 MIXED INCONTINENCE URGE AND STRESS: ICD-10-CM

## 2018-01-25 PROCEDURE — 99999 PR PBB SHADOW E&M-EST. PATIENT-LVL IV: CPT | Mod: PBBFAC,,, | Performed by: UROLOGY

## 2018-01-25 PROCEDURE — 99499 UNLISTED E&M SERVICE: CPT | Mod: S$GLB,,, | Performed by: UROLOGY

## 2018-01-25 PROCEDURE — 99214 OFFICE O/P EST MOD 30 MIN: CPT | Mod: S$GLB,,, | Performed by: UROLOGY

## 2018-01-25 NOTE — PATIENT INSTRUCTIONS
"1. Eat fewer high-oxalate foods.  The first suggestion is the most obvious. The more oxalate that is absorbed from your digestive tract, the more oxalate in your urine. High-oxalate foods to limit, if you eat them, are:   Spinach   Bran flakes   Rhubarb   Beets   Potato chips   French fries   Nuts and nut butters   You do not need to cut out other healthy foods that provide some oxalate. In fact, oxalate is practically unavoidable, because most plant foods have some. Often a combination of calcium from foods or beverages with meals and fewer high-oxalate foods is required.    2. Increase the amount of calcium in your diet.  Low amounts of calcium in your diet will increase your chances of forming calcium oxalate kidney stones. Many people are afraid to eat calcium because of the name "calcium oxalate stones." However, calcium binds oxalate in the intestines. A diet rich in calcium helps reduce the amount of oxalate being absorbed by your body, so stones are less likely to form. Eat calcium rich foods and beverages every day (2 to 3 servings) from dairy foods or other calcium-rich foods.   Also, eating high calcium foods at the same time as high oxalate food is helpful; for example have low fat cheese with a spinach salad or yogurt with berries. If you take a calcium supplement, calcium citrate is the preferred form.    3. Limit the vitamin C content of your diet.  Oxalate is produced as an end product of Vitamin C (ascorbic acid) metabolism. Large doses of Vitamin C may increase the amount of oxalate in your urine, increasing the risk of kidney stone formation. If you are taking a supplement, do not take more than 500 mg of Vitamin C daily.    4. Drink the right amount of fluids every day.  It is very important to drink plenty of liquids. Your goal should be 10-12 glasses a day. At least 5-6 glasses should be water. You may also want to consider drinking lemonade. Research suggests that lemonade may be helpful in " reducing the risk of calcium oxalate stone formation.    5. Eating the right amount of protein daily.  Eating large amounts of protein may increase the risk of kidney stone formation. Your daily protein needs can usually be met with 2-3 servings a day, or 4 to 6 ounces. Eating more than this if you are at risk at kidney stones is unnecessary.    6. Reduce the amount of sodium in your diet.  Reduce the amount of sodium in your diet to 2-3 grams per day. Limit eating processed foods such as hot dogs, deli meats, sausage, canned products, dry soup mixes, sauerkraut, pickles, and various convenience mixes.    Use the ChooseMyPlate.gov web site to plan a well-balanced diet. Carbohydrates, proteins, and fats are necessary for the proper functioning, maintenance, and repair of your body. In addition to these major nutrients, the body requires water, minerals, and vitamins for good health.

## 2018-01-25 NOTE — PROGRESS NOTES
Subjective:       Alaina Vee is a 77 y.o. female who is an established patient who was referred by Dr Esquivel for evaluation of UTI.      She reports issues with recurrent UTIs. She was treated for UTI in 3/16 (100k E coli, pansensitive) and again in 4/16 (100k E coli). She has urinary frequency associated with UTIs. Nocturia x 1-2. She has urgency and UUI at baseline. Also with fecal urgency/incontinence. She was given Ditropan 5mg BID years ago. Also with RICHARD. She has not noted if this has helped. Denies current dysuria. Denies hematuria. No h/o kidney stones.     She has significant LBP issues. She also reports facial and R shoulder/arm/torso pain.     She was treated for UTI after initial visit. She remains on Ditropan IR not up to TID, declined ER formulations. She reports taking another medication for UI from me but I don't have records of this.     SERG (5/16) showed ruslanley 9mm stone in LLP. PVR 2cc. Cytology was negative but noted uric acid crystals.     CT 7/16 - 7mm L UP, 6mm L LP stones and 8mm R renal pelvis stone.   KUB 7/16 - shows 7mm R stone but difficult to see (unsure if truly the stone)    KUB 1/17 - 7mm R renal stone though hard to see (likely overlying 12th rib)    She started Ditropan XL 15mg previously and had great improvement. She continues to have significant back issues.    She started to develop R flank pain and therefore CT scan ordered. CT showed 8mm stone at R UPJ, visible on KUB. She is now s/p R ESWL. Stone was noted to fragment well. She did not note passing any stone and is still having flank pain.     CT with R LP stone, no obstruction. Recently started on Coumadin for a fib.     KUB 1/18 - two left renal calculi  SERG 1/18 - 3mm and 5mm L calculi - hydro resolved    100% CaOx mono - stone passed spontaneously. Pain present for 1 hour at that time (R side).       The following portions of the patient's history were reviewed and updated as appropriate: allergies, current  "medications, past family history, past medical history, past social history, past surgical history and problem list.    Review of Systems  Constitutional: no fever or chills  ENT: no nasal congestion or sore throat  Respiratory: no cough or shortness of breath  Cardiovascular: no chest pain or palpitations  Gastrointestinal: no nausea or vomiting, tolerating diet  Genitourinary: as per HPI  Hematologic/Lymphatic: no easy bruising or lymphadenopathy  Musculoskeletal: no arthralgias or myalgias  Skin: no rashes or lesions  Neurological: no seizures or tremors  Behavioral/Psych: no auditory or visual hallucinations       Objective:    Vitals:   /62   Pulse 68   Resp 16   Ht 5' 6" (1.676 m)   Wt 110.4 kg (243 lb 6.2 oz)   BMI 39.28 kg/m²     Physical Exam   General: well developed, well nourished in no acute distress  Head: normocephalic, atraumatic  Neck: supple, trachea midline, no obvious enlargement of thyroid  HEENT: EOMI, mucus membranes moist, sclera anicteric, no hearing impairment  Lungs: symmetric expansion, non-labored breathing  Cardiovascular: regular rate and rhythm, normal pulses  Abdomen: soft, non tender, non distended, no palpable masses, no hepatosplenomegaly, no hernias, no CVA tenderness  Musculoskeletal: no peripheral edema, normal ROM in bilateral upper and lower extremities  Lymphatics: no cervical or inguinal lymphadenopathy  Skin: no rashes or lesions  Neuro: alert and oriented x 3, no gross deficits  Psych: normal judgment and insight, normal mood/affect and non-anxious  Genitourinary:   patient declined exam      Lab Review   Urine analysis today in clinic shows - trace protein    Lab Results   Component Value Date    WBC 6.80 09/29/2017    HGB 13.9 09/29/2017    HCT 41.8 09/29/2017    MCV 89 09/29/2017     09/29/2017     Lab Results   Component Value Date    CREATININE 1.0 09/29/2017    BUN 18 09/29/2017         Imaging  Images and reports were personally reviewed by me and " discussed with patient  CT, SERG, KUB reviewed       Assessment/Plan:      1. Recurrent UTI / Nephrolithiasis   - UI likely contributing to UTIs   - Avoid constipation   - PVR acceptable   - Possible stone in LLP, uric acid crystals on cytology   - Discussed Estrace, will hold for now   - Consider cystoscopy in future   - CT - 2 stones on L, 1 stone on R. Only R stone visible on KUB.    - 8mm R UPJ stone. Discussed options - s/p R ESWL. Discussed risks, benefits, alternatives.   - s/p R ESWL on 2/27/17 - stone with excellent response   - KUB post-op - residual stone    - CT - no obstructing stones, R LP noted   - SERG/KUB - R hydro resolved. Two stones in L kidney   - KUB in 6mths, KUB/SERG in 12 mths     2. Mixed incontinence urge and stress    - Discussed difference of UUI and RICHARD components. Reviewed etiology and workup of each.   - RICHARD: Kegels, PFPT, bulking agent, MUS.   - UUI: Behavioral changes, PFPT, anticholinergics. Botox/InterStim for refractory UUI.   - Doing great with Ditropan XL 15mg, continue         Follow up in 6 months w KUB

## 2018-02-05 ENCOUNTER — TELEPHONE (OUTPATIENT)
Dept: UROLOGY | Facility: CLINIC | Age: 78
End: 2018-02-05

## 2018-02-05 NOTE — TELEPHONE ENCOUNTER
Patient states that Anh notified her that her oxybutynin rice has changed to 85 dollars- patient was notified to contact insurance to see if her pharmacy benefits have changed. The let us know if there was a change we needed to make to her medications or submit a PA-

## 2018-02-05 NOTE — TELEPHONE ENCOUNTER
----- Message from Rachel Esparza sent at 2/5/2018  9:02 AM CST -----  Contact: self  Pt calling to state that her medications have gone up. Please call pt to discuss to [phone].

## 2018-02-05 NOTE — TELEPHONE ENCOUNTER
----- Message from Martita Clemons sent at 2/5/2018 10:32 AM CST -----  Contact: [phone]/PT  Returning call to office

## 2018-02-06 ENCOUNTER — TELEPHONE (OUTPATIENT)
Dept: UROLOGY | Facility: CLINIC | Age: 78
End: 2018-02-06

## 2018-02-06 RX ORDER — OXYBUTYNIN CHLORIDE 5 MG/1
5 TABLET ORAL 3 TIMES DAILY
Qty: 90 TABLET | Refills: 11 | Status: SHIPPED | OUTPATIENT
Start: 2018-02-06 | End: 2019-02-18 | Stop reason: SDUPTHER

## 2018-02-06 NOTE — TELEPHONE ENCOUNTER
----- Message from Alexandra Meehan LPN sent at 2/6/2018  9:51 AM CST -----  [facility] pharmacy states that the oxybutynin er 15 mg has changed in price going up to about 100. They are requesting that the medication be changed to oxybutynin 5 mg tab. Please advise on medication change

## 2018-02-06 NOTE — TELEPHONE ENCOUNTER
[facility] has raised price of Ditropan XL, now cost prohibitive. They are now recommending Ditropan TID. Will proceed with changing prescription. There are known higher reported side effects with immediate release dosing and less pt compliance with TID dosing.      Please inform pt:  Changed to Ditropan 5mg PO TID per pharmacy request. Sent to [id number].

## 2018-02-07 ENCOUNTER — ANTI-COAG VISIT (OUTPATIENT)
Dept: CARDIOLOGY | Facility: CLINIC | Age: 78
End: 2018-02-07
Payer: MEDICARE

## 2018-02-07 ENCOUNTER — TELEPHONE (OUTPATIENT)
Dept: UROLOGY | Facility: CLINIC | Age: 78
End: 2018-02-07

## 2018-02-07 DIAGNOSIS — Z79.01 LONG TERM (CURRENT) USE OF ANTICOAGULANTS: Primary | ICD-10-CM

## 2018-02-07 DIAGNOSIS — I48.3 TYPICAL ATRIAL FLUTTER: ICD-10-CM

## 2018-02-07 LAB — INR PPP: 1.9 (ref 2–3)

## 2018-02-07 PROCEDURE — 85610 PROTHROMBIN TIME: CPT | Mod: QW,S$GLB,,

## 2018-02-07 PROCEDURE — 99211 OFF/OP EST MAY X REQ PHY/QHP: CPT | Mod: 25,S$GLB,,

## 2018-02-07 RX ORDER — OXYBUTYNIN CHLORIDE 5 MG/1
5 TABLET ORAL 3 TIMES DAILY
Qty: 21 TABLET | Refills: 0 | Status: SHIPPED | OUTPATIENT
Start: 2018-02-07 | End: 2018-02-14

## 2018-02-07 NOTE — TELEPHONE ENCOUNTER
----- Message from Marlen Blair sent at 2/7/2018  1:02 PM CST -----  Contact: [phone]  Pt said her medication was not at the pharmacy Please call pt at your earliest convenience.  Thanks !oxybutynin (DITROPAN) 5 MG Tab

## 2018-02-07 NOTE — TELEPHONE ENCOUNTER
Patient stated ok to send to [facility] mail order- can we send in a 7 day supply to [facility] on [facility] until her rx comes in from [facility]? Thank you  Oxybutynin 5mg TID

## 2018-02-07 NOTE — PROGRESS NOTES
INR a tad low. Patient may have missed a dose. She stopped lovastatin and started a new cholesterol med. She cannot recall the name. She will call in new med when she gets home. She reports having cold/flu symptoms in the past 2-3 weeks but has improved this past week. No other changes. No signs or symptoms of bleeding. We will boost dose today then resume maintenance dose. Recheck INR in 2 weeks. We will adjust dose plan if needed due to med change when she calls it in.     Called patient and reports atorvastatin is new medication for cholesterol - no interaction expected.

## 2018-02-07 NOTE — TELEPHONE ENCOUNTER
[facility] on patient the only issue they were having is because that one rx went to [facility] an a small supply went to [facility] pt would have to out of pocket at [facility] which will cost 11.00 pt states this will be okay./isaura

## 2018-02-07 NOTE — TELEPHONE ENCOUNTER
----- Message from Rachel Esparza sent at 2/7/2018  8:33 AM CST -----  Contact: self  Pt calling to state that [facility] did not have her week supply of oxybutynin (DITROPAN) 5 MG Tab. Please send. Thanks

## 2018-02-22 ENCOUNTER — ANTI-COAG VISIT (OUTPATIENT)
Dept: CARDIOLOGY | Facility: CLINIC | Age: 78
End: 2018-02-22

## 2018-02-22 ENCOUNTER — LAB VISIT (OUTPATIENT)
Dept: LAB | Facility: HOSPITAL | Age: 78
End: 2018-02-22
Attending: INTERNAL MEDICINE
Payer: MEDICARE

## 2018-02-22 DIAGNOSIS — Z79.01 LONG TERM CURRENT USE OF ANTICOAGULANT THERAPY: ICD-10-CM

## 2018-02-22 DIAGNOSIS — Z79.01 LONG TERM (CURRENT) USE OF ANTICOAGULANTS: ICD-10-CM

## 2018-02-22 DIAGNOSIS — I48.3 TYPICAL ATRIAL FLUTTER: ICD-10-CM

## 2018-02-22 LAB
INR PPP: 1.2
PROTHROMBIN TIME: 12.4 SEC

## 2018-02-22 PROCEDURE — 36415 COLL VENOUS BLD VENIPUNCTURE: CPT

## 2018-02-22 PROCEDURE — 85610 PROTHROMBIN TIME: CPT

## 2018-02-22 NOTE — PROGRESS NOTES
Questioned .  Confirmed correct dose and color tablet .  Patient stated '' I don't think I missed any doses.  Cucumbers last week. No greens this week. Started taking flexeril last week  3x daily and oxybutynin (DITROPAN) 5 MG 3 x daily started 2/9.  Denies any other changes .Pt also ate (Coleslaw) on 02/18/18 and she is taking (Baclofen 10mg) 1 tablet x3 daily) with food. Started on 02/02/16/18.

## 2018-03-02 ENCOUNTER — PES CALL (OUTPATIENT)
Dept: ADMINISTRATIVE | Facility: CLINIC | Age: 78
End: 2018-03-02

## 2018-03-08 ENCOUNTER — ANTI-COAG VISIT (OUTPATIENT)
Dept: CARDIOLOGY | Facility: CLINIC | Age: 78
End: 2018-03-08
Payer: MEDICARE

## 2018-03-08 DIAGNOSIS — I48.3 TYPICAL ATRIAL FLUTTER: ICD-10-CM

## 2018-03-08 DIAGNOSIS — Z79.01 LONG TERM (CURRENT) USE OF ANTICOAGULANTS: Primary | ICD-10-CM

## 2018-03-08 LAB — INR PPP: 1.4 (ref 2–3)

## 2018-03-08 PROCEDURE — 85610 PROTHROMBIN TIME: CPT | Mod: QW,S$GLB,,

## 2018-03-08 PROCEDURE — 99211 OFF/OP EST MAY X REQ PHY/QHP: CPT | Mod: 25,S$GLB,,

## 2018-03-08 NOTE — PROGRESS NOTES
Patient seen by Ann Marie GÓMEZ. I have reviewed her initial findings and agree with her assessment.  Care plan made together.

## 2018-03-08 NOTE — PROGRESS NOTES
"Quick follow up from low INR on 2/22. INR is low today. Patient reports a change in appetite, eating salsa and does not know the contents in it. Patient states "she thinks she took her coumadin". Patient educated on the use of a pill box to help her remember if she took it. Patient educated on importance of taking coumadin everyday because she is putting herself at risk for a stroke/clot. Patient understands.  Will boost today and resume with weekly dose until follow up in 1 week. Advised patient to call with any changes or concerns.   "

## 2018-03-14 ENCOUNTER — ANTI-COAG VISIT (OUTPATIENT)
Dept: CARDIOLOGY | Facility: CLINIC | Age: 78
End: 2018-03-14

## 2018-03-14 ENCOUNTER — LAB VISIT (OUTPATIENT)
Dept: LAB | Facility: HOSPITAL | Age: 78
End: 2018-03-14
Attending: INTERNAL MEDICINE
Payer: MEDICARE

## 2018-03-14 DIAGNOSIS — Z79.01 LONG TERM (CURRENT) USE OF ANTICOAGULANTS: ICD-10-CM

## 2018-03-14 DIAGNOSIS — Z79.01 LONG TERM CURRENT USE OF ANTICOAGULANT THERAPY: ICD-10-CM

## 2018-03-14 DIAGNOSIS — I48.3 TYPICAL ATRIAL FLUTTER: ICD-10-CM

## 2018-03-14 LAB
INR PPP: 2.2
PROTHROMBIN TIME: 23.5 SEC

## 2018-03-14 PROCEDURE — 85610 PROTHROMBIN TIME: CPT

## 2018-03-14 PROCEDURE — 36415 COLL VENOUS BLD VENIPUNCTURE: CPT

## 2018-03-21 ENCOUNTER — ANTI-COAG VISIT (OUTPATIENT)
Dept: CARDIOLOGY | Facility: CLINIC | Age: 78
End: 2018-03-21
Payer: MEDICARE

## 2018-03-21 DIAGNOSIS — Z79.01 LONG TERM (CURRENT) USE OF ANTICOAGULANTS: Primary | ICD-10-CM

## 2018-03-21 DIAGNOSIS — I48.3 TYPICAL ATRIAL FLUTTER: ICD-10-CM

## 2018-03-21 LAB — INR PPP: 5.3 (ref 2–3)

## 2018-03-21 PROCEDURE — 85610 PROTHROMBIN TIME: CPT | Mod: QW,S$GLB,,

## 2018-03-21 NOTE — PROGRESS NOTES
INR high today. Patient reports a change in medication, fluconazole 200mg once a week until MD specifies differently. Patient reports increased pain and not taking anything for it. Patient advised to always call in new medications and that we recommend tylenol for any pain. No bleeding or bruising. Will close monitor in 1 week for DDI. Advised patient to call with any changes or concerns.

## 2018-03-28 ENCOUNTER — LAB VISIT (OUTPATIENT)
Dept: LAB | Facility: HOSPITAL | Age: 78
End: 2018-03-28
Attending: INTERNAL MEDICINE
Payer: MEDICARE

## 2018-03-28 ENCOUNTER — ANTI-COAG VISIT (OUTPATIENT)
Dept: CARDIOLOGY | Facility: CLINIC | Age: 78
End: 2018-03-28

## 2018-03-28 DIAGNOSIS — I48.3 TYPICAL ATRIAL FLUTTER: ICD-10-CM

## 2018-03-28 DIAGNOSIS — Z79.01 LONG TERM CURRENT USE OF ANTICOAGULANT THERAPY: ICD-10-CM

## 2018-03-28 DIAGNOSIS — Z79.01 LONG TERM (CURRENT) USE OF ANTICOAGULANTS: ICD-10-CM

## 2018-03-28 LAB
INR PPP: 2.1
PROTHROMBIN TIME: 21.7 SEC

## 2018-03-28 PROCEDURE — 85610 PROTHROMBIN TIME: CPT

## 2018-03-28 PROCEDURE — 36415 COLL VENOUS BLD VENIPUNCTURE: CPT

## 2018-04-04 ENCOUNTER — ANTI-COAG VISIT (OUTPATIENT)
Dept: CARDIOLOGY | Facility: CLINIC | Age: 78
End: 2018-04-04

## 2018-04-04 ENCOUNTER — LAB VISIT (OUTPATIENT)
Dept: LAB | Facility: HOSPITAL | Age: 78
End: 2018-04-04
Attending: INTERNAL MEDICINE
Payer: MEDICARE

## 2018-04-04 DIAGNOSIS — Z79.01 LONG TERM CURRENT USE OF ANTICOAGULANT THERAPY: ICD-10-CM

## 2018-04-04 DIAGNOSIS — Z79.01 LONG TERM (CURRENT) USE OF ANTICOAGULANTS: ICD-10-CM

## 2018-04-04 DIAGNOSIS — I48.3 TYPICAL ATRIAL FLUTTER: ICD-10-CM

## 2018-04-04 LAB
INR PPP: 2.1
PROTHROMBIN TIME: 22.4 SEC

## 2018-04-04 PROCEDURE — 36415 COLL VENOUS BLD VENIPUNCTURE: CPT

## 2018-04-04 PROCEDURE — 85610 PROTHROMBIN TIME: CPT

## 2018-04-11 ENCOUNTER — ANTI-COAG VISIT (OUTPATIENT)
Dept: CARDIOLOGY | Facility: CLINIC | Age: 78
End: 2018-04-11
Payer: MEDICARE

## 2018-04-11 DIAGNOSIS — Z79.01 LONG TERM (CURRENT) USE OF ANTICOAGULANTS: Primary | ICD-10-CM

## 2018-04-11 DIAGNOSIS — I48.3 TYPICAL ATRIAL FLUTTER: ICD-10-CM

## 2018-04-11 LAB — INR PPP: 2.4 (ref 2–3)

## 2018-04-11 PROCEDURE — 85610 PROTHROMBIN TIME: CPT | Mod: QW,S$GLB,,

## 2018-04-11 PROCEDURE — 99211 OFF/OP EST MAY X REQ PHY/QHP: CPT | Mod: 25,S$GLB,ICN,

## 2018-04-11 NOTE — PROGRESS NOTES
Close monitoring for DDI fluconazole. Patient unknown of stop date and will call when she does stop. INR in therapeutic range. Patient reports she will start over the counter hair, skin and nails vitamin. No interaction with coumadin. Will maintain current weekly dose until follow up in 2 weeks for close monitoring. Advised patient to call with any changes or concerns.

## 2018-04-20 ENCOUNTER — OFFICE VISIT (OUTPATIENT)
Dept: FAMILY MEDICINE | Facility: CLINIC | Age: 78
End: 2018-04-20
Payer: MEDICARE

## 2018-04-20 VITALS
HEART RATE: 70 BPM | OXYGEN SATURATION: 97 % | WEIGHT: 240.75 LBS | TEMPERATURE: 98 F | SYSTOLIC BLOOD PRESSURE: 130 MMHG | RESPIRATION RATE: 18 BRPM | BODY MASS INDEX: 38.69 KG/M2 | HEIGHT: 66 IN | DIASTOLIC BLOOD PRESSURE: 70 MMHG

## 2018-04-20 DIAGNOSIS — N20.0 NEPHROLITHIASIS: ICD-10-CM

## 2018-04-20 DIAGNOSIS — M54.12 CERVICAL RADICULAR PAIN: ICD-10-CM

## 2018-04-20 DIAGNOSIS — I48.0 PAF (PAROXYSMAL ATRIAL FIBRILLATION): ICD-10-CM

## 2018-04-20 DIAGNOSIS — G47.33 OSA (OBSTRUCTIVE SLEEP APNEA): ICD-10-CM

## 2018-04-20 DIAGNOSIS — K21.9 GASTROESOPHAGEAL REFLUX DISEASE WITHOUT ESOPHAGITIS: ICD-10-CM

## 2018-04-20 DIAGNOSIS — I48.92 ATRIAL FLUTTER, UNSPECIFIED TYPE: Primary | ICD-10-CM

## 2018-04-20 DIAGNOSIS — Z78.0 MENOPAUSE: ICD-10-CM

## 2018-04-20 DIAGNOSIS — Z79.01 LONG TERM (CURRENT) USE OF ANTICOAGULANTS: ICD-10-CM

## 2018-04-20 DIAGNOSIS — I10 ESSENTIAL HYPERTENSION: ICD-10-CM

## 2018-04-20 DIAGNOSIS — E66.9 NON MORBID OBESITY, UNSPECIFIED OBESITY TYPE: ICD-10-CM

## 2018-04-20 DIAGNOSIS — Z00.00 ENCOUNTER FOR PREVENTIVE HEALTH EXAMINATION: ICD-10-CM

## 2018-04-20 DIAGNOSIS — N39.46 MIXED INCONTINENCE URGE AND STRESS: ICD-10-CM

## 2018-04-20 DIAGNOSIS — E78.5 HYPERLIPIDEMIA, UNSPECIFIED HYPERLIPIDEMIA TYPE: ICD-10-CM

## 2018-04-20 PROBLEM — I48.3 TYPICAL ATRIAL FLUTTER: Status: RESOLVED | Noted: 2017-02-23 | Resolved: 2018-04-20

## 2018-04-20 PROCEDURE — 3078F DIAST BP <80 MM HG: CPT | Mod: CPTII,S$GLB,, | Performed by: NURSE PRACTITIONER

## 2018-04-20 PROCEDURE — 99999 PR PBB SHADOW E&M-EST. PATIENT-LVL IV: CPT | Mod: PBBFAC,,, | Performed by: NURSE PRACTITIONER

## 2018-04-20 PROCEDURE — G0439 PPPS, SUBSEQ VISIT: HCPCS | Mod: S$GLB,,, | Performed by: NURSE PRACTITIONER

## 2018-04-20 PROCEDURE — 3075F SYST BP GE 130 - 139MM HG: CPT | Mod: CPTII,S$GLB,, | Performed by: NURSE PRACTITIONER

## 2018-04-20 PROCEDURE — 99499 UNLISTED E&M SERVICE: CPT | Mod: S$PBB,,, | Performed by: NURSE PRACTITIONER

## 2018-04-20 RX ORDER — KETOCONAZOLE 20 MG/G
CREAM TOPICAL
COMMUNITY
Start: 2018-02-07 | End: 2019-08-06

## 2018-04-20 RX ORDER — FLECAINIDE ACETATE 100 MG/1
100 TABLET ORAL EVERY 12 HOURS
Qty: 180 TABLET | Refills: 3 | Status: SHIPPED | OUTPATIENT
Start: 2018-04-20 | End: 2018-06-07 | Stop reason: SDUPTHER

## 2018-04-20 RX ORDER — BACLOFEN 10 MG/1
TABLET ORAL
COMMUNITY
Start: 2018-02-14 | End: 2019-08-06

## 2018-04-20 NOTE — PATIENT INSTRUCTIONS
Counseling and Referral of Other Preventative  (Italic type indicates deductible and co-insurance are waived)    Patient Name: Alaina Vee  Today's Date: 4/20/2018    Health Maintenance       Date Due Completion Date    TETANUS VACCINE 09[birth year]04/1960 ---    Zoster Vaccine 09/04/2002 ---    DEXA SCAN 12/11/2015 12/11/2012    Override on 11/16/2010: Done    Colonoscopy 07/12/2016 7/12/2006 (Done)    Override on 7/12/2006: Done    Influenza Vaccine 08/01/2017 8/29/2016    Lipid Panel 06/06/2022 6/6/2017        No orders of the defined types were placed in this encounter.    The following information is provided to all patients.  This information is to help you find resources for any of the problems found today that may be affecting your health:                Living healthy guide: www.[facility].[place].gov      Understanding Diabetes: www.diabetes.org      Eating healthy: www.cdc.gov/healthyweight      [facility] home safety checklist: www.cdc.gov/steadi/patient.html      Agency on Aging: www.goea.[place].[facility]      Alcoholics anonymous (AA): www.aa.org      Physical Activity: www.carmen.nih.gov/ot5zhkz      Tobacco use: www.quitwithusla.org

## 2018-04-20 NOTE — PROGRESS NOTES
"Alaina Vee presented for a  Medicare AWV and comprehensive Health Risk Assessment today. The following components were reviewed and updated:    · Medical history  · Family History  · Social history  · Allergies and Current Medications  · Health Risk Assessment  · Health Maintenance  · Care Team     ** See Completed Assessments for Annual Wellness Visit within the encounter summary.**       The following assessments were completed:  · Living Situation  · CAGE  · Depression Screening  · Timed Get Up and Go  · Whisper Test  · Cognitive Function Screening  · Nutrition Screening  · ADL Screening  · PAQ Screening    Vitals:    04/20/18 0903   BP: 130/70   BP Location: Right arm   Patient Position: Sitting   BP Method: Medium (Manual)   Pulse: 70   Resp: 18   Temp: 97.5 °F (36.4 °C)   TempSrc: Oral   SpO2: 97%   Weight: 109.2 kg (240 lb 11.9 oz)   Height: 5' 6" (1.676 m)     Body mass index is 38.86 kg/m².  Physical Exam      Diagnoses and health risks identified today and associated recommendations/orders:    Atrial flutter, unspecified type  Comments:  controlled, continue to monitor, conitnue current treatment flecanide    Cervical radicular pain  Comments:  controlled, continue to monitor    Essential hypertension  Comments:  controlled, continue to monitor, conitnue current treatment amlodipine, atenolol    Gastroesophageal reflux disease without esophagitis  Comments:  controlled, continue to monitor, conitnue current treatment omeprazole    Hyperlipidemia, unspecified hyperlipidemia type  Comments:  controlled, continue to monitor, conitnue current treatment lipitor    Long term (current) use of anticoagulants  Comments:  controlled, continue to monitor, conitnue current treatment warfarin, aspirin    Mixed incontinence urge and stress  Comments:  controlled, continue to monitor, conitnue current treatment ditropan    Nephrolithiasis  Comments:  controlled, continue to monitor, conitnue current treatment " flomax    Non morbid obesity, unspecified obesity type  Comments:  ouraged healthy diet    SULEMA (obstructive sleep apnea)  Comments:  controlled, continue to monitor, conitnue current treatment    PAF (paroxysmal atrial fibrillation)  Comments:  controlled, continue to monitor, conitnue current treatment flecanide    Encounter for preventive health examination          Provided Alaina with a 5-10 year written screening schedule and personal prevention plan. Recommendations were developed using the USPSTF age appropriate recommendations. Education, counseling, and referrals were provided as needed. After Visit Summary printed and given to patient which includes a list of additional screenings\tests needed.    No Follow-up on file.    GREER CurryC

## 2018-04-25 ENCOUNTER — ANTI-COAG VISIT (OUTPATIENT)
Dept: CARDIOLOGY | Facility: CLINIC | Age: 78
End: 2018-04-25

## 2018-04-25 ENCOUNTER — LAB VISIT (OUTPATIENT)
Dept: LAB | Facility: HOSPITAL | Age: 78
End: 2018-04-25
Attending: INTERNAL MEDICINE
Payer: MEDICARE

## 2018-04-25 DIAGNOSIS — Z79.01 LONG TERM CURRENT USE OF ANTICOAGULANT THERAPY: ICD-10-CM

## 2018-04-25 DIAGNOSIS — Z79.01 LONG TERM (CURRENT) USE OF ANTICOAGULANTS: ICD-10-CM

## 2018-04-25 DIAGNOSIS — I48.3 TYPICAL ATRIAL FLUTTER: ICD-10-CM

## 2018-04-25 LAB
INR PPP: 1.9
PROTHROMBIN TIME: 19.6 SEC

## 2018-04-25 PROCEDURE — 85610 PROTHROMBIN TIME: CPT

## 2018-04-25 PROCEDURE — 36415 COLL VENOUS BLD VENIPUNCTURE: CPT

## 2018-05-09 ENCOUNTER — ANTI-COAG VISIT (OUTPATIENT)
Dept: CARDIOLOGY | Facility: CLINIC | Age: 78
End: 2018-05-09
Payer: MEDICARE

## 2018-05-09 DIAGNOSIS — Z79.01 LONG TERM (CURRENT) USE OF ANTICOAGULANTS: Primary | ICD-10-CM

## 2018-05-09 LAB — INR PPP: 1.4 (ref 2–3)

## 2018-05-09 PROCEDURE — 85610 PROTHROMBIN TIME: CPT | Mod: QW,S$GLB,,

## 2018-05-09 NOTE — PROGRESS NOTES
Quick follow up from low INR on 4/25. INR low today. Patient reports a change in medication, finish fluconazole 200mg on 5/4. Reports she missed a dose on 5/8. No bleeding or bruising. Will increase weekly dose until follow up in 1 week for close monitoring. Advised patient to call with any changes or concerns.

## 2018-05-10 NOTE — PROGRESS NOTES
Patient seen by Ann Marie GÓMEZ. I have reviewed her initial findings and agree with her assessment.  Care plan made together. Of note, she didn't miss dose ut took 2.5mg instead of 5mg 5/8

## 2018-05-16 ENCOUNTER — ANTI-COAG VISIT (OUTPATIENT)
Dept: CARDIOLOGY | Facility: CLINIC | Age: 78
End: 2018-05-16

## 2018-05-16 ENCOUNTER — LAB VISIT (OUTPATIENT)
Dept: LAB | Facility: HOSPITAL | Age: 78
End: 2018-05-16
Attending: INTERNAL MEDICINE
Payer: MEDICARE

## 2018-05-16 DIAGNOSIS — I48.3 TYPICAL ATRIAL FLUTTER: ICD-10-CM

## 2018-05-16 DIAGNOSIS — Z79.01 LONG TERM (CURRENT) USE OF ANTICOAGULANTS: ICD-10-CM

## 2018-05-16 DIAGNOSIS — Z79.01 LONG TERM CURRENT USE OF ANTICOAGULANT THERAPY: ICD-10-CM

## 2018-05-16 LAB
INR PPP: 1.3
PROTHROMBIN TIME: 13.7 SEC

## 2018-05-16 PROCEDURE — 85610 PROTHROMBIN TIME: CPT

## 2018-05-16 PROCEDURE — 36415 COLL VENOUS BLD VENIPUNCTURE: CPT

## 2018-05-16 NOTE — PROGRESS NOTES
Questioned confirmed taking correct dose of coumadin  Reports having a hand full of peanuts on 5/14  Pt reports stopping meds (fluconazole) on 5/11  No other changes

## 2018-05-23 ENCOUNTER — ANTI-COAG VISIT (OUTPATIENT)
Dept: CARDIOLOGY | Facility: CLINIC | Age: 78
End: 2018-05-23

## 2018-05-23 ENCOUNTER — LAB VISIT (OUTPATIENT)
Dept: LAB | Facility: HOSPITAL | Age: 78
End: 2018-05-23
Attending: INTERNAL MEDICINE
Payer: MEDICARE

## 2018-05-23 DIAGNOSIS — I48.3 TYPICAL ATRIAL FLUTTER: ICD-10-CM

## 2018-05-23 DIAGNOSIS — Z79.01 LONG TERM CURRENT USE OF ANTICOAGULANT THERAPY: ICD-10-CM

## 2018-05-23 DIAGNOSIS — Z79.01 LONG TERM (CURRENT) USE OF ANTICOAGULANTS: ICD-10-CM

## 2018-05-23 LAB
INR PPP: 1.5
PROTHROMBIN TIME: 15.4 SEC

## 2018-05-23 PROCEDURE — 36415 COLL VENOUS BLD VENIPUNCTURE: CPT

## 2018-05-23 PROCEDURE — 85610 PROTHROMBIN TIME: CPT

## 2018-05-30 ENCOUNTER — LAB VISIT (OUTPATIENT)
Dept: LAB | Facility: HOSPITAL | Age: 78
End: 2018-05-30
Attending: INTERNAL MEDICINE
Payer: MEDICARE

## 2018-05-30 ENCOUNTER — ANTI-COAG VISIT (OUTPATIENT)
Dept: CARDIOLOGY | Facility: CLINIC | Age: 78
End: 2018-05-30

## 2018-05-30 DIAGNOSIS — I48.3 TYPICAL ATRIAL FLUTTER: ICD-10-CM

## 2018-05-30 DIAGNOSIS — Z79.01 LONG TERM (CURRENT) USE OF ANTICOAGULANTS: ICD-10-CM

## 2018-05-30 DIAGNOSIS — Z79.01 LONG TERM CURRENT USE OF ANTICOAGULANT THERAPY: ICD-10-CM

## 2018-05-30 LAB
INR PPP: 1.4
PROTHROMBIN TIME: 14.9 SEC

## 2018-05-30 PROCEDURE — 85610 PROTHROMBIN TIME: CPT

## 2018-05-30 PROCEDURE — 36415 COLL VENOUS BLD VENIPUNCTURE: CPT

## 2018-05-30 RX ORDER — WARFARIN SODIUM 5 MG/1
2.5-5 TABLET ORAL DAILY
Qty: 90 TABLET | Refills: 3 | Status: SHIPPED | OUTPATIENT
Start: 2018-05-30 | End: 2018-06-07 | Stop reason: SDUPTHER

## 2018-06-06 ENCOUNTER — ANTI-COAG VISIT (OUTPATIENT)
Dept: CARDIOLOGY | Facility: CLINIC | Age: 78
End: 2018-06-06

## 2018-06-06 ENCOUNTER — LAB VISIT (OUTPATIENT)
Dept: LAB | Facility: HOSPITAL | Age: 78
End: 2018-06-06
Attending: INTERNAL MEDICINE
Payer: MEDICARE

## 2018-06-06 DIAGNOSIS — Z79.01 LONG TERM (CURRENT) USE OF ANTICOAGULANTS: ICD-10-CM

## 2018-06-06 DIAGNOSIS — Z79.01 LONG TERM CURRENT USE OF ANTICOAGULANT THERAPY: ICD-10-CM

## 2018-06-06 DIAGNOSIS — I48.3 TYPICAL ATRIAL FLUTTER: ICD-10-CM

## 2018-06-06 LAB
INR PPP: 2.7
PROTHROMBIN TIME: 28 SEC

## 2018-06-06 PROCEDURE — 36415 COLL VENOUS BLD VENIPUNCTURE: CPT

## 2018-06-06 PROCEDURE — 85610 PROTHROMBIN TIME: CPT

## 2018-06-07 DIAGNOSIS — I48.3 TYPICAL ATRIAL FLUTTER: ICD-10-CM

## 2018-06-07 DIAGNOSIS — Z79.01 LONG TERM (CURRENT) USE OF ANTICOAGULANTS: ICD-10-CM

## 2018-06-07 RX ORDER — WARFARIN SODIUM 5 MG/1
2.5-5 TABLET ORAL DAILY
Qty: 90 TABLET | Refills: 3 | Status: SHIPPED | OUTPATIENT
Start: 2018-06-07 | End: 2018-06-11 | Stop reason: SDUPTHER

## 2018-06-07 RX ORDER — FLECAINIDE ACETATE 100 MG/1
100 TABLET ORAL EVERY 12 HOURS
Qty: 180 TABLET | Refills: 3 | Status: SHIPPED | OUTPATIENT
Start: 2018-06-07 | End: 2018-06-11 | Stop reason: SDUPTHER

## 2018-06-11 DIAGNOSIS — Z79.01 LONG TERM (CURRENT) USE OF ANTICOAGULANTS: ICD-10-CM

## 2018-06-11 DIAGNOSIS — I48.3 TYPICAL ATRIAL FLUTTER: ICD-10-CM

## 2018-06-11 RX ORDER — FLECAINIDE ACETATE 100 MG/1
100 TABLET ORAL EVERY 12 HOURS
Qty: 180 TABLET | Refills: 3 | Status: SHIPPED | OUTPATIENT
Start: 2018-06-11 | End: 2019-09-05 | Stop reason: SDUPTHER

## 2018-06-11 RX ORDER — WARFARIN SODIUM 5 MG/1
2.5-5 TABLET ORAL DAILY
Qty: 90 TABLET | Refills: 3 | Status: SHIPPED | OUTPATIENT
Start: 2018-06-11 | End: 2018-07-19 | Stop reason: SDUPTHER

## 2018-06-13 ENCOUNTER — LAB VISIT (OUTPATIENT)
Dept: LAB | Facility: HOSPITAL | Age: 78
End: 2018-06-13
Attending: INTERNAL MEDICINE
Payer: MEDICARE

## 2018-06-13 ENCOUNTER — ANTI-COAG VISIT (OUTPATIENT)
Dept: CARDIOLOGY | Facility: CLINIC | Age: 78
End: 2018-06-13

## 2018-06-13 DIAGNOSIS — I48.3 TYPICAL ATRIAL FLUTTER: ICD-10-CM

## 2018-06-13 DIAGNOSIS — Z79.01 LONG TERM CURRENT USE OF ANTICOAGULANT THERAPY: ICD-10-CM

## 2018-06-13 DIAGNOSIS — Z79.01 LONG TERM (CURRENT) USE OF ANTICOAGULANTS: ICD-10-CM

## 2018-06-13 LAB
INR PPP: 3.3
PROTHROMBIN TIME: 34.1 SEC

## 2018-06-13 PROCEDURE — 85610 PROTHROMBIN TIME: CPT

## 2018-06-13 PROCEDURE — 36415 COLL VENOUS BLD VENIPUNCTURE: CPT

## 2018-06-13 NOTE — PROGRESS NOTES
""Pt questioned confirmed taking correct dose  Reports alcohol intake on 6/9  No other changes" INR continues on upward trend. Decrease weekly regimen.  "

## 2018-06-20 ENCOUNTER — LAB VISIT (OUTPATIENT)
Dept: LAB | Facility: HOSPITAL | Age: 78
End: 2018-06-20
Attending: INTERNAL MEDICINE
Payer: MEDICARE

## 2018-06-20 ENCOUNTER — ANTI-COAG VISIT (OUTPATIENT)
Dept: CARDIOLOGY | Facility: CLINIC | Age: 78
End: 2018-06-20

## 2018-06-20 DIAGNOSIS — Z79.01 LONG TERM CURRENT USE OF ANTICOAGULANT THERAPY: ICD-10-CM

## 2018-06-20 DIAGNOSIS — Z79.01 LONG TERM (CURRENT) USE OF ANTICOAGULANTS: ICD-10-CM

## 2018-06-20 DIAGNOSIS — I48.3 TYPICAL ATRIAL FLUTTER: ICD-10-CM

## 2018-06-20 LAB
INR PPP: 2.1
PROTHROMBIN TIME: 22.1 SEC

## 2018-06-20 PROCEDURE — 85610 PROTHROMBIN TIME: CPT

## 2018-06-20 PROCEDURE — 36415 COLL VENOUS BLD VENIPUNCTURE: CPT

## 2018-07-03 ENCOUNTER — LAB VISIT (OUTPATIENT)
Dept: LAB | Facility: HOSPITAL | Age: 78
End: 2018-07-03
Attending: INTERNAL MEDICINE
Payer: MEDICARE

## 2018-07-03 ENCOUNTER — ANTI-COAG VISIT (OUTPATIENT)
Dept: CARDIOLOGY | Facility: CLINIC | Age: 78
End: 2018-07-03

## 2018-07-03 DIAGNOSIS — Z79.01 LONG TERM (CURRENT) USE OF ANTICOAGULANTS: ICD-10-CM

## 2018-07-03 DIAGNOSIS — Z79.01 LONG TERM CURRENT USE OF ANTICOAGULANT THERAPY: ICD-10-CM

## 2018-07-03 DIAGNOSIS — I48.3 TYPICAL ATRIAL FLUTTER: ICD-10-CM

## 2018-07-03 LAB
INR PPP: 2.4
PROTHROMBIN TIME: 24.9 SEC

## 2018-07-03 PROCEDURE — 85610 PROTHROMBIN TIME: CPT

## 2018-07-03 PROCEDURE — 36415 COLL VENOUS BLD VENIPUNCTURE: CPT

## 2018-07-19 ENCOUNTER — OFFICE VISIT (OUTPATIENT)
Dept: FAMILY MEDICINE | Facility: CLINIC | Age: 78
End: 2018-07-19
Payer: MEDICARE

## 2018-07-19 VITALS
OXYGEN SATURATION: 98 % | HEART RATE: 78 BPM | HEIGHT: 66 IN | WEIGHT: 239 LBS | TEMPERATURE: 98 F | SYSTOLIC BLOOD PRESSURE: 122 MMHG | BODY MASS INDEX: 38.41 KG/M2 | RESPIRATION RATE: 16 BRPM | DIASTOLIC BLOOD PRESSURE: 70 MMHG

## 2018-07-19 DIAGNOSIS — N39.41 URGE INCONTINENCE OF URINE: ICD-10-CM

## 2018-07-19 DIAGNOSIS — I48.0 PAF (PAROXYSMAL ATRIAL FIBRILLATION): ICD-10-CM

## 2018-07-19 DIAGNOSIS — I10 ESSENTIAL HYPERTENSION: ICD-10-CM

## 2018-07-19 DIAGNOSIS — M51.36 DEGENERATIVE DISC DISEASE, LUMBAR: Primary | ICD-10-CM

## 2018-07-19 DIAGNOSIS — M48.00 CENTRAL STENOSIS OF SPINAL CANAL: ICD-10-CM

## 2018-07-19 DIAGNOSIS — E78.2 MIXED HYPERLIPIDEMIA: ICD-10-CM

## 2018-07-19 PROBLEM — M51.369 DEGENERATIVE DISC DISEASE, LUMBAR: Status: ACTIVE | Noted: 2018-07-19

## 2018-07-19 PROCEDURE — 3078F DIAST BP <80 MM HG: CPT | Mod: CPTII,S$GLB,, | Performed by: FAMILY MEDICINE

## 2018-07-19 PROCEDURE — 99214 OFFICE O/P EST MOD 30 MIN: CPT | Mod: S$GLB,,, | Performed by: FAMILY MEDICINE

## 2018-07-19 PROCEDURE — 3074F SYST BP LT 130 MM HG: CPT | Mod: CPTII,S$GLB,, | Performed by: FAMILY MEDICINE

## 2018-07-19 PROCEDURE — 99999 PR PBB SHADOW E&M-EST. PATIENT-LVL V: CPT | Mod: PBBFAC,,, | Performed by: FAMILY MEDICINE

## 2018-07-19 RX ORDER — SIMVASTATIN 40 MG/1
TABLET, FILM COATED ORAL
COMMUNITY
End: 2018-09-13 | Stop reason: HOSPADM

## 2018-07-19 RX ORDER — WARFARIN SODIUM 5 MG/1
2.5-5 TABLET ORAL DAILY
Qty: 90 TABLET | Refills: 3 | Status: SHIPPED | OUTPATIENT
Start: 2018-07-19 | End: 2019-09-26 | Stop reason: SDUPTHER

## 2018-07-19 NOTE — PATIENT INSTRUCTIONS
Relieving Back Pain  Back pain is a common problem. You can strain back muscles by lifting too much weight or just by moving the wrong way. Back strain can be uncomfortable, even painful. And it can take weeks or months to improve. To help yourself feel better and prevent future back strains, try these tips.  Important Note: Do not give aspirin to children or teens without first discussing it with your healthcare provider.      ? Ice    Ice reduces muscle pain and swelling. It helps most during the first 24 to 48 hours after an injury.  · Wrap an ice pack or a bag of frozen peas in a thin towel. (Never place ice directly on your skin.)  · Place the ice where your back hurts the most.  · Dont ice for more than 20 minutes at a time.  · You can use ice several times a day.  ? Medicines  Over-the-counter pain relievers can include acetaminophen and anti-inflammatory medicines, which includes aspirin or ibuprofen. They can help ease discomfort. Some also reduce swelling.  · Tell your healthcare provider about any medicines you are already taking.  · Take medicines only as directed.  ? Heat  After the first 48 hours, heat can relax sore muscles and improve blood flow.  · Try a warm bath or shower. Or use a heating pad set on low. To prevent a burn, keep a cloth between you and the heating pad.  · Dont use a heating pad for more than 15 minutes at a time. Never sleep on a heating pad.  Date Last Reviewed: 9/1/2015  © 9968-3067 [employer]. [address] [place] [address], [place] [address]. All rights reserved. This information is not intended as a substitute for professional medical care. Always follow your healthcare professional's instructions.        Self-Care for Low Back Pain    Most people have low back pain now and then. In many cases, it isnt serious and self-care can help. Sometimes low back pain can be a sign of a bigger problem. Call your healthcare provider if your pain returns often or gets worse  over time. For the long-term care of your back, get regular exercise, lose any excess weight and learn good posture.  Take a short rest  Lying down during the day may be beneficial for short periods of time if severe pain increases with sitting or standing. Long-term bed rest could be detrimental.  Reduce pain and swelling  Cold reduces swelling. Both cold and heat can reduce pain. Protect your skin by placing a towel between your body and the ice or heat source.  · For the first few days, apply an ice pack for 15 to 20 minutes .  · After the first few days, try heat for 15 minutes at a time to ease pain. Never sleep on a heating pad.  · Over-the-counter medicine can help control pain and swelling. Try aspirin or ibuprofen.  Exercise  Exercise can help your back heal. It also helps your back get stronger and more flexible, preventing any reinjury. Ask your healthcare provider about specific exercises for your back.  Use good posture to avoid reinjury  · When moving, bend at the hips and knees. Dont bend at the waist or twist around.  · When lifting, keep the object close to your body. Dont try to lift more than you can handle.  · When sitting, keep your lower back supported. Use a rolled-up towel as needed.  Seek immediate medical care if:  · Youre unable to stand or walk.  · You have a temperature over 100.4°F (38.0°C)  · You have frequent, painful, or bloody urination.  · You have severe abdominal pain.  · You have a sharp, stabbing pain.  · Your pain is constant.  · You have pain or numbness in your leg.  · You feel pain in a new area of your back.  · You notice that the pain isnt decreasing after more than a week.   Date Last Reviewed: 9/29/2015  © 7300-0335 [employer]. [address] [place] [address], [place] [address]. All rights reserved. This information is not intended as a substitute for professional medical care. Always follow your healthcare professional's instructions.

## 2018-07-19 NOTE — PROGRESS NOTES
Chief Complaint   Patient presents with    Establish Care       HPI    Alaina Vee is 77 y.o. female. The primary encounter diagnosis was Degenerative disc disease, lumbar. Diagnoses of Central stenosis of spinal canal, Essential hypertension, PAF (paroxysmal atrial fibrillation), Urge incontinence of urine, and Mixed hyperlipidemia were also pertinent to this visit.      77-year-old female with multiple medical problems as documented above comes to clinic to establish care.  Patient also will like discuss persistent pain in issues and requests referral to pain management for evaluation.    Pain - patient reports a history of lithotripsy kidney stones.  She reports pain is present in the lower left back.  She reports seeing Neurology for this in the past.  She denies recommendation for surgery at that.  She reports knowledge of lumbar disc herniation.  She she previously used an oral muscle relaxer but unable to tolerate due to sedation.  She reports using a topical medication for pain management.  She has not tried any other medications for pain.  She has tried PT for her knee in 2005 and 2009 but has not been for her back.    HTN - patient reports compliance with medication, though she does not believe she has HTN.  HLD - patient reports intolerance to Lipitor.  She has not had issues with the Zocor.  Atrial fibrillation - patient denies issues with palpations. She is compliant with Coumadin therapy.      Review of Systems   Constitutional: Negative for activity change.   HENT: Negative for congestion.    Respiratory: Negative for shortness of breath.    Cardiovascular: Negative for chest pain.   Gastrointestinal: Negative for abdominal pain.   Genitourinary: Negative for dysuria.   Musculoskeletal: Positive for arthralgias, back pain and myalgias. Negative for gait problem.   Skin: Negative for rash.   Neurological: Negative for dizziness.   Psychiatric/Behavioral: Negative for suicidal ideas.        Past Medical History:   Diagnosis Date    Arthritis     knees    Atrial fibrillation     Atrial flutter     Back pain     Degenerative disc disease     Depression     General anesthetics causing adverse effect in therapeutic use     pt states sometimes slow to awaken.    GERD (gastroesophageal reflux disease)     Hyperlipidemia     Hypertension     Kidney stone     Obesity     Sleep apnea     does not wear CPAP    Varicosities     Ventral hernia      Past Surgical History:   Procedure Laterality Date    APPENDECTOMY      breast biopsy, left      CHOLECYSTECTOMY      JOINT REPLACEMENT      TKR , right    JOINT REPLACEMENT  2009    TKR  left    MI EXPLORATORY OF ABDOMEN       Family History   Problem Relation Age of Onset    COPD Mother     Heart disease Sister         half sister    COPD Sister       reports that she has never smoked. She has never used smokeless tobacco. She reports that she does not drink alcohol or use drugs.  Review of patient's allergies indicates:   Allergen Reactions    Lipitor [atorvastatin] Other (See Comments)         Current Outpatient Prescriptions:     fish oil-omega-3 fatty acids 300-1,000 mg capsule, Take 1 g by mouth once daily., Disp: , Rfl:     flecainide (TAMBOCOR) 100 MG Tab, Take 1 tablet (100 mg total) by mouth every 12 (twelve) hours., Disp: 180 tablet, Rfl: 3    lisinopril (PRINIVIL,ZESTRIL) 40 MG tablet, TAKE 1 TABLET ONE TIME DAILY (Patient taking differently: every evening. TAKE 1 TABLET ONE TIME DAILY), Disp: 90 tablet, Rfl: 1    omeprazole (PRILOSEC) 20 MG capsule, Take 1 capsule (20 mg total) by mouth once daily. (Patient taking differently: Take 20 mg by mouth every evening. ), Disp: 90 capsule, Rfl: 1    oxybutynin (DITROPAN) 5 MG Tab, Take 1 tablet (5 mg total) by mouth 3 (three) times daily., Disp: 90 tablet, Rfl: 11    simvastatin (ZOCOR) 40 MG tablet, simvastatin 40 mg tablet, Disp: , Rfl:     warfarin (COUMADIN) 5 MG tablet,  "Take 0.5-1 tablets (2.5-5 mg total) by mouth Daily. As directed by coumadin clinic, Disp: 90 tablet, Rfl: 3    baclofen (LIORESAL) 10 MG tablet, , Disp: , Rfl:     CYANOCOBALAMIN, VITAMIN B-12, (VITAMIN B-12 ORAL), Take 2,500 mcg by mouth every evening., Disp: , Rfl:     diclofenac sodium 1 % Gel, Apply 2 g topically once daily., Disp: 100 g, Rfl: 3    ketoconazole (NIZORAL) 2 % cream, , Disp: , Rfl:     multivitamin (THERAGRAN) per tablet, Take 1 tablet by mouth every evening. 1 Tablet Oral Every day, Disp: , Rfl:     ondansetron (ZOFRAN) 8 MG tablet, Take 1 tablet (8 mg total) by mouth every 8 (eight) hours as needed for Nausea., Disp: 10 tablet, Rfl: 0        Blood pressure 122/70, pulse 78, temperature 98.3 °F (36.8 °C), temperature source Oral, resp. rate 16, height 5' 6" (1.676 m), weight 108.4 kg (238 lb 15.7 oz), SpO2 98 %.    Physical Exam   Constitutional: Vital signs are normal. She appears well-developed.   HENT:   Mouth/Throat: Normal dentition.   Neck: Trachea normal. No thyromegaly present.   Cardiovascular: Normal rate, regular rhythm and intact distal pulses.    No murmur heard.  Pulmonary/Chest: Effort normal. She has no decreased breath sounds. She has no wheezes. She exhibits no deformity.   Musculoskeletal:        Lumbar back: She exhibits decreased range of motion, pain and spasm. She exhibits no tenderness, no bony tenderness and no deformity.   Normal gait. No decreased range of motion of major joints.   Neurological: She is not disoriented.   Skin: Skin is intact. Capillary refill takes less than 2 seconds.   Psychiatric: Her speech is normal and behavior is normal. Her mood appears not anxious. She does not exhibit a depressed mood.       Lab Visit on 07/03/2018   Component Date Value Ref Range Status    Prothrombin Time 07/03/2018 24.9* 9.0 - 12.5 sec Final    INR 07/03/2018 2.4* 0.8 - 1.2 Final   Lab Visit on 06/20/2018   Component Date Value Ref Range Status    Prothrombin Time " 06/20/2018 22.1* 9.0 - 12.5 sec Final    INR 06/20/2018 2.1* 0.8 - 1.2 Final   Lab Visit on 06/13/2018   Component Date Value Ref Range Status    Prothrombin Time 06/13/2018 34.1* 9.0 - 12.5 sec Final    INR 06/13/2018 3.3* 0.8 - 1.2 Final   Lab Visit on 06/06/2018   Component Date Value Ref Range Status    Prothrombin Time 06/06/2018 28.0* 9.0 - 12.5 sec Final    INR 06/06/2018 2.7* 0.8 - 1.2 Final   Lab Visit on 05/30/2018   Component Date Value Ref Range Status    Prothrombin Time 05/30/2018 14.9* 9.0 - 12.5 sec Final    INR 05/30/2018 1.4* 0.8 - 1.2 Final   Lab Visit on 05/23/2018   Component Date Value Ref Range Status    Prothrombin Time 05/23/2018 15.4* 9.0 - 12.5 sec Final    INR 05/23/2018 1.5* 0.8 - 1.2 Final   Lab Visit on 05/16/2018   Component Date Value Ref Range Status    Prothrombin Time 05/16/2018 13.7* 9.0 - 12.5 sec Final    INR 05/16/2018 1.3* 0.8 - 1.2 Final   Anti-coag visit on 05/09/2018   Component Date Value Ref Range Status    INR 05/09/2018 1.4   Final   Lab Visit on 04/25/2018   Component Date Value Ref Range Status    Prothrombin Time 04/25/2018 19.6* 9.0 - 12.5 sec Final    INR 04/25/2018 1.9* 0.8 - 1.2 Final   Anti-coag visit on 04/11/2018   Component Date Value Ref Range Status    INR 04/11/2018 2.4   Final   There may be more visits with results that are not included.   ]    ASSESSMENT:    1. Degenerative disc disease, lumbar    2. Central stenosis of spinal canal    3. Essential hypertension    4. PAF (paroxysmal atrial fibrillation)    5. Urge incontinence of urine    6. Mixed hyperlipidemia        Alaina was seen today for establish care.    Diagnoses and all orders for this visit:    Degenerative disc disease, lumbar  -     Ambulatory referral to Pain Clinic  -     Ambulatory Referral to Neurosurgery  -     Ambulatory Consult to [facility]  - Stable.  Patient counseled on anti-inflammatories for pain management.  Okay to continue topical therapy as needed  for pain.  Patient referred to Neurosurgery due to abnormal imaging in the past and to physical therapy.  - referral to pain management placed per patient preference.    Central stenosis of spinal canal  -     Ambulatory referral to Pain Clinic  -     Ambulatory Referral to Neurosurgery  -     Ambulatory Consult to [facility]  - stable.  See plan above.  Central canal stenosis noted on previous imaging referred to Neurosurgery for further evaluation.    Essential hypertension  -     Comprehensive metabolic panel; Future  -     TSH; Future  -     Microalbumin/creatinine urine ratio; Future  - stable.  No change to current medication regimen.  Obtain labs to screen for comorbid conditions.    PAF (paroxysmal atrial fibrillation)  -     warfarin (COUMADIN) 5 MG tablet; Take 0.5-1 tablets (2.5-5 mg total) by mouth Daily. As directed by coumadin clinic  - [facility].  Patient remains rate controlled.  She is currently followed by Cardiology in Coumadin Clinic.    Urge incontinence of urine   -new problem.  Discussed fluid intake.  Patient will be given lifestyle modifications.  We begin medication if symptoms do not improve.    Mixed hyperlipidemia  -     Lipid panel; Future  - stable.  Patient currently on statin.  Reassess lipid control and adjust statin therapy as needed.          FOLLOW UP: Follow-up in about 2 months (around 9/19/2018) for Follow up with lab review.

## 2018-07-23 ENCOUNTER — TELEPHONE (OUTPATIENT)
Dept: PAIN MEDICINE | Facility: CLINIC | Age: 78
End: 2018-07-23

## 2018-07-23 NOTE — TELEPHONE ENCOUNTER
Message left reminding patient of Pain Management appointment scheduled for tomorrow at 2.15p with Dr. March.  Location information also included.

## 2018-07-24 ENCOUNTER — OFFICE VISIT (OUTPATIENT)
Dept: PAIN MEDICINE | Facility: CLINIC | Age: 78
End: 2018-07-24
Payer: MEDICARE

## 2018-07-24 VITALS
RESPIRATION RATE: 18 BRPM | SYSTOLIC BLOOD PRESSURE: 117 MMHG | WEIGHT: 238.19 LBS | OXYGEN SATURATION: 94 % | HEART RATE: 56 BPM | HEIGHT: 66 IN | BODY MASS INDEX: 38.28 KG/M2 | DIASTOLIC BLOOD PRESSURE: 57 MMHG

## 2018-07-24 DIAGNOSIS — M47.816 LUMBAR SPONDYLOSIS: ICD-10-CM

## 2018-07-24 DIAGNOSIS — M48.061 SPINAL STENOSIS OF LUMBAR REGION, UNSPECIFIED WHETHER NEUROGENIC CLAUDICATION PRESENT: ICD-10-CM

## 2018-07-24 DIAGNOSIS — M51.36 DDD (DEGENERATIVE DISC DISEASE), LUMBAR: Primary | ICD-10-CM

## 2018-07-24 PROCEDURE — 3074F SYST BP LT 130 MM HG: CPT | Mod: CPTII,S$GLB,, | Performed by: PAIN MEDICINE

## 2018-07-24 PROCEDURE — 99204 OFFICE O/P NEW MOD 45 MIN: CPT | Mod: S$GLB,,, | Performed by: PAIN MEDICINE

## 2018-07-24 PROCEDURE — 3078F DIAST BP <80 MM HG: CPT | Mod: CPTII,S$GLB,, | Performed by: PAIN MEDICINE

## 2018-07-24 PROCEDURE — 99999 PR PBB SHADOW E&M-EST. PATIENT-LVL III: CPT | Mod: PBBFAC,,, | Performed by: PAIN MEDICINE

## 2018-07-24 NOTE — LETTER
July 26, 2018      Lindsay Rea MD  [address]  [place] [id number]           [facility]  [address]  [place] [address]  Phone: [phone]  Fax: [phone]          Patient: Alaina Vee   MR Number: [id number]   YOB: 1940   Date of Visit: 7/24/2018       Dear Dr. Lindsay Rea:    Thank you for referring Alaina Vee to me for evaluation. Attached you will find relevant portions of my assessment and plan of care.    If you have questions, please do not hesitate to call me. I look forward to following Alaina Vee along with you.    Sincerely,    Nahid March Jr., MD    Enclosure  CC:  No Recipients    If you would like to receive this communication electronically, please contact [email or web address] or [phone] to request more information on [employer] access.    For providers and/or their staff who would like to refer a patient to [facility], please contact us through our one-stop-shop provider referral line, [facility], at [phone].    If you feel you have received this communication in error or would no longer like to receive these types of communications, please e-mail [email or web address]

## 2018-07-24 NOTE — PROGRESS NOTES
Subjective:     Patient ID: Alaina Vee is a 77 y.o. female    Chief Complaint: Low-back Pain (patient experiences pain due to degenerative disc disease & central stenosis of the spinal canal-treatment w/ medication  )      Referred by: Lindsay Rea MD      HPI:    Initial Encounter (7/24/18):  Alaina Vee is a 77 y.o. female who presents today with chronic bilateral low back pain. This pain has been present for years. No specific inciting event or injury. The pain is located in the bilateral lower lumbar regions. It does not radiate. She denies any associated numbness, tingling, weakness or b/b dysfunction. The pain is constant. She denies any worsening with activity. She is planned to start PT soon.   This pain is described in detail below.    Physical Therapy: Starting soon    Non-pharmacologic Treatment: Nothing helps         · TENS? No    Pain Medications:         · Currently taking: Voltaren gel, Baclofen    · Has tried in the past:      · Has not tried: Opioids, NSAIDs, Tylenol, TCAs, SNRIs, anticonvulsants    Blood thinners: Warfarin    Interventional Therapies: None    Relevant Surgeries: None    Affecting sleep? Yes    Affecting daily activities? yes    Depressive symptoms? no          · SI/HI? No    Work status: Unemployed    Pain Scores:    Best:       3/10  Worst:     9/10  Usually:   5/10  Today:    3/10    Review of Systems   Constitutional: Negative for activity change, appetite change, chills, fatigue, fever and unexpected weight change.   HENT: Negative for hearing loss.    Eyes: Negative for visual disturbance.   Respiratory: Negative for chest tightness and shortness of breath.    Cardiovascular: Negative for chest pain.   Gastrointestinal: Negative for abdominal pain, constipation, diarrhea, nausea and vomiting.   Genitourinary: Negative for difficulty urinating.   Musculoskeletal: Positive for back pain. Negative for gait problem and neck pain.   Skin: Negative for rash.    Neurological: Negative for dizziness, weakness, light-headedness, numbness and headaches.   Psychiatric/Behavioral: Positive for sleep disturbance. Negative for hallucinations and suicidal ideas. The patient is not nervous/anxious.        Past Medical History:   Diagnosis Date    Arthritis     knees    Atrial fibrillation     Atrial flutter     Back pain     Degenerative disc disease     Depression     General anesthetics causing adverse effect in therapeutic use     pt states sometimes slow to awaken.    GERD (gastroesophageal reflux disease)     Hyperlipidemia     Hypertension     Kidney stone     Obesity     Sleep apnea     does not wear CPAP    Varicosities     Ventral hernia        Past Surgical History:   Procedure Laterality Date    APPENDECTOMY      breast biopsy, left      CHOLECYSTECTOMY      JOINT REPLACEMENT      TKR , right    JOINT REPLACEMENT  2009    TKR  left    ND EXPLORATORY OF ABDOMEN         Social History     Social History    Marital status:      Spouse name: N/A    Number of children: N/A    Years of education: N/A     Occupational History    Not on file.     Social History Main Topics    Smoking status: Never Smoker    Smokeless tobacco: Never Used    Alcohol use No    Drug use: No    Sexual activity: No     Other Topics Concern    Not on file     Social History Narrative    No narrative on file       Review of patient's allergies indicates:   Allergen Reactions    Lipitor [atorvastatin] Other (See Comments)       Current Outpatient Prescriptions on File Prior to Visit   Medication Sig Dispense Refill    baclofen (LIORESAL) 10 MG tablet       CYANOCOBALAMIN, VITAMIN B-12, (VITAMIN B-12 ORAL) Take 2,500 mcg by mouth every evening.      diclofenac sodium 1 % Gel Apply 2 g topically once daily. 100 g 3    fish oil-omega-3 fatty acids 300-1,000 mg capsule Take 1 g by mouth once daily.      flecainide (TAMBOCOR) 100 MG Tab Take 1 tablet (100 mg  "total) by mouth every 12 (twelve) hours. 180 tablet 3    ketoconazole (NIZORAL) 2 % cream       lisinopril (PRINIVIL,ZESTRIL) 40 MG tablet TAKE 1 TABLET ONE TIME DAILY (Patient taking differently: every evening. TAKE 1 TABLET ONE TIME DAILY) 90 tablet 1    multivitamin (THERAGRAN) per tablet Take 1 tablet by mouth every evening. 1 Tablet Oral Every day      omeprazole (PRILOSEC) 20 MG capsule Take 1 capsule (20 mg total) by mouth once daily. (Patient taking differently: Take 20 mg by mouth every evening. ) 90 capsule 1    ondansetron (ZOFRAN) 8 MG tablet Take 1 tablet (8 mg total) by mouth every 8 (eight) hours as needed for Nausea. 10 tablet 0    oxybutynin (DITROPAN) 5 MG Tab Take 1 tablet (5 mg total) by mouth 3 (three) times daily. 90 tablet 11    simvastatin (ZOCOR) 40 MG tablet simvastatin 40 mg tablet      warfarin (COUMADIN) 5 MG tablet Take 0.5-1 tablets (2.5-5 mg total) by mouth Daily. As directed by coumadin clinic 90 tablet 3     No current facility-administered medications on file prior to visit.        Objective:      BP (!) 117/57   Pulse (!) 56   Resp 18   Ht 5' 6" (1.676 m)   Wt 108 kg (238 lb 3.2 oz)   SpO2 (!) 94%   BMI 38.45 kg/m²     Exam:  GEN:  Well developed, well nourished.  No acute distress. Normal pain behavior.  HEENT:  No trauma.  Mucous membranes moist.  Nares patent bilaterally.  PSYCH: Normal affect. Thought content appropriate.  CHEST:  Breathing symmetric.  No audible wheezing.  ABD: Soft, non-distended.  SKIN:  Warm, pink, dry.  No rash on exposed areas.    EXT:  No cyanosis, clubbing, or edema.  No color change or changes in nail or hair growth.  NEURO/MUSCULOSKELETAL:  Fully alert, oriented, and appropriate. Speech normal micheal. No cranial nerve deficits.   Gait: Normal.  No trendelenburg sign bilaterally.   Motor Strength: 5/5 motor strength throughout lower extremities.   Sensory: No sensory deficit in the lower extremities.   Reflexes:  2+ and symmetric " throughout.  Downgoing Babinski's bilaterally.  No clonus or spasticity.  L-Spine:  Limited ROM with pain on extension. Positive facet loading bilaterally.  Negative SLR bilaterally.    SI Joint/Hip: Unable to perform ANGELINA bilaterally.  Unable to perform FADIR bilaterally.  No TTP over lumbar paraspinals, bilateral SI joints, hips, piriformis muscles, or GTB.          Imaging:  Narrative     History: Lumbar radiculopathy.    Procedure: Sagittal T1, T2, STIR sequences and axial T1 and T2 weighted sequences are performed.    Comparison study: CT scan 12/20/2010.    There is a slight retrolisthesis of L2 on L3.  Degenerative disc disease throughout the lumbar spine.  No acute fractures or marrow replacing processes to suggest neoplastic processes.  The tip of the conus is at the L2 level, at lower limits of normal.  Paraspinal soft tissues are unremarkable.    L5-S1: Degenerative disc disease associated with mild global posterior bulging of the annulus.  There is mild bilateral foraminal stenosis without definite signs for compression of the exiting L5 roots.  No significant central canal stenosis.  There is degenerative hypertrophic facet arthropathy.    L4-L5: Severe degenerative disc disease with disc space narrowing and a moderate global degenerative disc bulge with compression of the thecal sac.  There is hypertrophic facet arthropathy and hypertrophy of the ligamentum flavum a resulting in  moderate central canal spinal stenosis.  Moderate bilateral foraminal stenosis.    L3-L4: Disc degeneration associated with a global mild degenerative disc bulge with compression of the thecal sac.  There is degenerative hypertrophic facet arthropathy.  Mild central canal spinal stenosis.    L2-L3: Approximately 3 mm retrolisthesis of L2 on L3 associated with a global degenerative disc bulge.  There is Schmorl's nodes noted at the endplates of L2 and L3.  Posterior displacement of thecal sac from retrolisthesis.  There is  hypertrophic facet arthropathy.  Mild central canal spinal stenosis.    L1-L2: Spondylosis with mild degenerative disc bulge without significant spinal stenosis.  There is facet arthropathy.    T12-L1: There is type I Modic degeneration associated with a global degenerative disc bulge with mild compression of the thecal sac.  No cord compression.  Degenerative facet arthropathy bilaterally without significant spinal stenosis there    On the sagittal images there is also spondylosis at T10-T11 and T11-T12 disc spaces.   Impression         1.  Multilevel lumbar spondylosis with spinal stenosis at the L4-L5 and L3-L4 and L2-L3 disc spaces as above.  2.  Multilevel facet arthropathy.        Electronically signed by: PRECIOUS MAYER MD  Date: 06/07/17  Time: 07:45          Assessment:       Encounter Diagnoses   Name Primary?    DDD (degenerative disc disease), lumbar Yes    Lumbar spondylosis     Spinal stenosis of lumbar region, unspecified whether neurogenic claudication present          Plan:       Alaina was seen today for low-back pain.    Diagnoses and all orders for this visit:    DDD (degenerative disc disease), lumbar    Lumbar spondylosis    Spinal stenosis of lumbar region, unspecified whether neurogenic claudication present        Alaina Vee is a 77 y.o. female with chronic bilateral low back pain. Appears to be axial. Most likely facet mediated. Given MRI findings, it is possible that she has neurogenic claudication, but her symptoms and exam are more consistent with facet pain.    1. Pertinent imaging studies reviewed by me. Imaging results were discussed with patient.  2. Proceed with PT as planned.   3. RTC in 10 weeks. At that time we will discuss efficacy of PT and consider lumbar MBBs vs ILESI.

## 2018-07-25 ENCOUNTER — ANTI-COAG VISIT (OUTPATIENT)
Dept: CARDIOLOGY | Facility: CLINIC | Age: 78
End: 2018-07-25
Payer: MEDICARE

## 2018-07-25 DIAGNOSIS — Z79.01 LONG TERM (CURRENT) USE OF ANTICOAGULANTS: Primary | ICD-10-CM

## 2018-07-25 LAB — INR PPP: 3.4 (ref 2–3)

## 2018-07-25 PROCEDURE — 85610 PROTHROMBIN TIME: CPT | Mod: QW,S$GLB,,

## 2018-07-25 NOTE — PROGRESS NOTES
INR high today. Patient reports a increase in pain. No bleeding or bruising. Will decrease weekly dose until follow up in 1 week for close monitoring. Advised patient to call with any changes or concerns. Care Plan made with Halima Hernandez D.

## 2018-08-01 ENCOUNTER — ANTI-COAG VISIT (OUTPATIENT)
Dept: CARDIOLOGY | Facility: CLINIC | Age: 78
End: 2018-08-01

## 2018-08-01 ENCOUNTER — LAB VISIT (OUTPATIENT)
Dept: LAB | Facility: HOSPITAL | Age: 78
End: 2018-08-01
Attending: INTERNAL MEDICINE
Payer: MEDICARE

## 2018-08-01 DIAGNOSIS — Z79.01 LONG TERM CURRENT USE OF ANTICOAGULANT THERAPY: ICD-10-CM

## 2018-08-01 DIAGNOSIS — Z79.01 LONG TERM (CURRENT) USE OF ANTICOAGULANTS: ICD-10-CM

## 2018-08-01 DIAGNOSIS — I48.3 TYPICAL ATRIAL FLUTTER: ICD-10-CM

## 2018-08-01 LAB
INR PPP: 1.9
PROTHROMBIN TIME: 20.1 SEC

## 2018-08-01 PROCEDURE — 85610 PROTHROMBIN TIME: CPT

## 2018-08-01 PROCEDURE — 36415 COLL VENOUS BLD VENIPUNCTURE: CPT

## 2018-08-08 ENCOUNTER — ANTI-COAG VISIT (OUTPATIENT)
Dept: CARDIOLOGY | Facility: CLINIC | Age: 78
End: 2018-08-08
Payer: MEDICARE

## 2018-08-08 DIAGNOSIS — Z79.01 LONG TERM (CURRENT) USE OF ANTICOAGULANTS: Primary | ICD-10-CM

## 2018-08-08 LAB — INR PPP: 2.4 (ref 2–3)

## 2018-08-08 PROCEDURE — 85610 PROTHROMBIN TIME: CPT | Mod: QW,S$GLB,,

## 2018-08-08 NOTE — PROGRESS NOTES
Close monitoring for new dose. Patient INR in therapeutic range today. Reports no bleeding, bruising or other changes. Will maintain current weekly dose until follow up in 1 week. Advised patient to call with any concerns or changes.

## 2018-08-10 NOTE — PROGRESS NOTES
Pt seen by Ann Marie GÓMEZ. I have reviewed her documentation and agree with her assessment and plan.

## 2018-08-14 ENCOUNTER — CLINICAL SUPPORT (OUTPATIENT)
Dept: REHABILITATION | Facility: HOSPITAL | Age: 78
End: 2018-08-14
Payer: MEDICARE

## 2018-08-14 ENCOUNTER — LAB VISIT (OUTPATIENT)
Dept: LAB | Facility: HOSPITAL | Age: 78
End: 2018-08-14
Attending: INTERNAL MEDICINE
Payer: MEDICARE

## 2018-08-14 ENCOUNTER — ANTI-COAG VISIT (OUTPATIENT)
Dept: CARDIOLOGY | Facility: CLINIC | Age: 78
End: 2018-08-14

## 2018-08-14 DIAGNOSIS — Z79.01 LONG TERM CURRENT USE OF ANTICOAGULANT THERAPY: ICD-10-CM

## 2018-08-14 DIAGNOSIS — G89.29 CHRONIC BILATERAL LOW BACK PAIN WITHOUT SCIATICA: ICD-10-CM

## 2018-08-14 DIAGNOSIS — I48.3 TYPICAL ATRIAL FLUTTER: ICD-10-CM

## 2018-08-14 DIAGNOSIS — M54.50 CHRONIC BILATERAL LOW BACK PAIN WITHOUT SCIATICA: ICD-10-CM

## 2018-08-14 DIAGNOSIS — Z79.01 LONG TERM (CURRENT) USE OF ANTICOAGULANTS: ICD-10-CM

## 2018-08-14 LAB
INR PPP: 2.6
PROTHROMBIN TIME: 27.1 SEC

## 2018-08-14 PROCEDURE — G8979 MOBILITY GOAL STATUS: HCPCS | Mod: CK,PN

## 2018-08-14 PROCEDURE — 97110 THERAPEUTIC EXERCISES: CPT | Mod: PN

## 2018-08-14 PROCEDURE — 97162 PT EVAL MOD COMPLEX 30 MIN: CPT | Mod: PN

## 2018-08-14 PROCEDURE — G8978 MOBILITY CURRENT STATUS: HCPCS | Mod: CK,PN

## 2018-08-14 PROCEDURE — 85610 PROTHROMBIN TIME: CPT

## 2018-08-14 PROCEDURE — 36415 COLL VENOUS BLD VENIPUNCTURE: CPT

## 2018-08-14 NOTE — PLAN OF CARE
[facility] - PHYSICAL THERAPY EVALUATION     Name: Alaina Vee  Clinic Number: [id number]    Alaina is a 77 y.o. female evaluated on 08/14/2018.   Time In: 3:45  Time out: 4:32    Diagnosis:   Encounter Diagnosis   Name Primary?    Chronic bilateral low back pain without sciatica      Physician: Lindsay Rea MD  Treatment Orders: PT Eval and Treat    Past Medical History:   Diagnosis Date    Arthritis     knees    Atrial fibrillation     Atrial flutter     Back pain     Degenerative disc disease     Depression     General anesthetics causing adverse effect in therapeutic use     pt states sometimes slow to awaken.    GERD (gastroesophageal reflux disease)     Hyperlipidemia     Hypertension     Kidney stone     Obesity     Sleep apnea     does not wear CPAP    Varicosities     Ventral hernia      Current Outpatient Medications   Medication Sig    baclofen (LIORESAL) 10 MG tablet     CYANOCOBALAMIN, VITAMIN B-12, (VITAMIN B-12 ORAL) Take 2,500 mcg by mouth every evening.    diclofenac sodium 1 % Gel Apply 2 g topically once daily.    fish oil-omega-3 fatty acids 300-1,000 mg capsule Take 1 g by mouth once daily.    flecainide (TAMBOCOR) 100 MG Tab Take 1 tablet (100 mg total) by mouth every 12 (twelve) hours.    ketoconazole (NIZORAL) 2 % cream     lisinopril (PRINIVIL,ZESTRIL) 40 MG tablet TAKE 1 TABLET ONE TIME DAILY (Patient taking differently: every evening. TAKE 1 TABLET ONE TIME DAILY)    multivitamin (THERAGRAN) per tablet Take 1 tablet by mouth every evening. 1 Tablet Oral Every day    omeprazole (PRILOSEC) 20 MG capsule Take 1 capsule (20 mg total) by mouth once daily. (Patient taking differently: Take 20 mg by mouth every evening. )    ondansetron (ZOFRAN) 8 MG tablet Take 1 tablet (8 mg total) by mouth every 8 (eight) hours as needed for Nausea.    oxybutynin (DITROPAN) 5 MG Tab Take 1 tablet (5 mg total) by mouth 3 (three) times daily.    simvastatin  (ZOCOR) 40 MG tablet simvastatin 40 mg tablet    warfarin (COUMADIN) 5 MG tablet Take 0.5-1 tablets (2.5-5 mg total) by mouth Daily. As directed by coumadin clinic     No current facility-administered medications for this visit.      Review of patient's allergies indicates:   Allergen Reactions    Lipitor [atorvastatin] Other (See Comments)     Precautions: atrial fibrillation     Pattern of pain determined: 1  Visit # authorized: 20  Authorization period: 12-31-18  Plan of care Expiration: 11-14-18      HISTORY   History of Present Illness: Patient has c/o LBP, as well as B LE weakness.  Her symptoms began without specific cause 2 years ago, and increase with functional activities.    She cares for her , which requires a lot of lifting, pushing and pulling.  Her gait is affected.  She requires frequent rest breaks.        Diagnostic Tests: From EPIC     Pain Scale: Alaina rates pain on a scale of 0-10 to be 9 at worst; 9 currently; 5 at best using VAS.   Pain location: Bilateral thoraco/lumbar region    Aggravating factors: Functional activities/prolonged gait/transfers  Easing Factors: Rest  Disturbed Sleep: yes    Pattern of pain questions:  1.  Where is your pain the worst? Bilateral thoraco/lumbar region  2.  Is your pain constant or intermittent? constant  3.  Does bending forward make your typical pain worse? yes  4.  Since the start of your back pain, has there been a change in your bowel or bladder? no  5.  What can't you do now that you use to be able to do? Walk prolonged distances    Prior Treatment: meds  Prior functional status: Independent  DME owned/used: none  Leisure: Household activities/caring for her                      Pts goals:  Walk longer distances/increase strength    Red Flag Screening:   Cough  Sneeze  Strain: (--)  Bladder/ bowel: (--)  Falls: (--)  Night pain: (--)  Unexplained weight loss: (--)  General health: fair/deconditioning    OBJECTIVE     Postural  examination/scapula alignment: Rounded shoulder, Head forward, Abnormal trunk flexion, Slouched posture and Increased kyphosis  Correction of posture: better with lumbar roll    MOVEMENT LOSS    ROM Loss   Flexion moderate loss   Extension moderate loss   Side bending Right moderate loss   Side bending Left moderate loss   Rotation Right moderate loss   Rotation Left moderate loss     Lower Extremity Strength  Right LE  Left LE    Hip flexion: 4+/5 Hip flexion: 4+/5   Hip extension:  4+/5 Hip extension: 4+/5   Hip abduction: 4+/5 Hip abduction: 4+/5   Hip adduction:  4+/5 Hip adduction:  4+/5   Hip Internal rotation   4+/5 Hip Internal rotation 4+/5   Knee Flexion 4+/5 Knee Flexion 4+/5   Knee Extension 4+/5 Knee Extension 4+/5   Ankle dorsiflexion: 4+/5 Ankle dorsiflexion: 4+/5   Ankle plantarflexion: 4+/5 Ankle plantarflexion: 4+/5       GAIT:  Assistive Device used: none  Level of Assistance: independent  Patient displays the following gait deviations:  unsteady gait and decreased step length.     Special Tests:     SLR -    NEUROLOGICAL SCREENING     Sensory deficit: B LE sensation is intact to light touch    Reflexes:    Left Right   Patella Tendon 1+ 1+   Achilles Tendon 1+ 1+   Babinski  (--) (--)   Clonus (--) (--)           Baseline Isometric Testing on Med X equipment: Testing administered by PT (will test next visit)      CMS Impairment/Limitation/Restriction for FOTO Lumbar Spine Survey  Status Limitation G-Code CMS Severity Modifier  Intake 43% 57% Current Status CK - At least 40 percent but less than 60 percent  Predicted 54% 46% Goal Status+ CK - At least 40 percent but less than 60 percent  D/C Status CK **only report if this is a one time visit  +Based on FOTO predicted change score    Score interpretation is as follows:     TEST SCORE  Modifier  Impairment Limitation Restriction    0/50  CH  0 % impaired, limited or restricted   1-9/50  CI  @ least 1% but less than 20% impaired, limited or  restricted   10-19/50  CJ  @ least 20%<40% impaired, limited or restricted   20-29/50  CK  @ least 40%<60% impaired, limited or restricted   30-39/50  CL  @ least 60% <80% impaired, limited or restricted   40-49/50  CM  @ least 80%<100% impaired limited or restricted   50/50  CN  100% impaired, limited or restricted       Treatment   Time In: 4:04  Time Out: 4:32    PT Evaluation Completed? Yes  Discussed Plan of Care with patient: Yes      Home Exercise Program as follows:   Handouts were given to the patient. Pt demo good understanding of the education provided. Alaina demonstrated good return demonstration of activities.     - Patient received education regarding proper posture and body mechanics.  Patient was given top 10 tips handout which discusses posture seated, standing, lifting correctly, components of exercise, importance of nutrition and hydration, and importance of sleep.  - Nirmal roll tried, recommended, and purchase information was provided.    - Patient received a handout regarding anticipated muscular soreness following the isometric test and strategies for management were reviewed with patient including stretching, using ice and scheduled rest.       Pt was instructed in and performed the following:   Cardiovascular exercise and therapeutic exercise to improve posture, lumbar/cervical ROM, strength, and muscular endurance as follows:     Alaina received therapeutic exercises to develop/improve posture, lumbar/cervical ROM, strength and muscular endurance for 20 minutes including the following exercises:     B seated LAQ, 3x10, 5 sec hold  B seated heel/toe raises 3x10  Standing trunk extension with towel overpressure, 10:5 sec hold    Cold Pack to Thoracolumbar spine x 10 min.      Assessment   This is a 77 y.o. female referred to [facility] and presents with a medical diagnosis of   Encounter Diagnosis   Name Primary?    Chronic bilateral low back pain without sciatica     and  demonstrates limitations as described below in the problem list. Pt rehab potential is Good. Pt presents with Chronic Low Back Pain without Sciatica.    Pain Pattern: 1       Patient received education on the [facility] program, purpose of the isometric test, progression of back strengthening as well as wellness approach and systemic strengthening.  Details of the program were discussed.  Reviewed that patient should feel support/pressure from med ex restraints but no pain or discomfort and patient expressed understanding.    Based on the above history and physical examination an active physical therapy program is recommended.  Pt will continue to benefit from skilled outpatient physical therapy to address the deficits listed below in the chart, provide pt/family education and to maximize pt's level of independence in the home and community environment. .     No environmental, cultural, spiritual, developmental or education needs expressed or noted    Medical necessity is demonstrated by the following problem list.    Pt presents with the following impairments:   History  Co-morbidities and personal factors that may impact the plan of care Examination  Body Structures and Functions, activity limitations and participation restrictions that may impact the plan of care Clinical Presentation   Decision Making/ Complexity Score   Co-morbidities:   advanced age, depression, HTN and atrial fibrillation        Personal Factors:   no deficits Body Regions:   back  lower extremities    Body Systems:   ROM  strength  balance  gait  transfers  motor control    Activity limitations:   Learning and applying knowledge  no deficits    General Tasks and Commands  no deficits    Communication  no deficits    Mobility  lifting and carrying objects    Self care  no deficits    Domestic Life  shopping  cooking  doing house work (cleaning house, washing dishes, laundry)  assisting others    Interactions/Relationships  intimate  relationships    Life Areas  no deficits    Community and Social Life  community life  recreation and leisure    Participation Restrictions:   none     evolving clinical presentation with changing clinical characteristics   moderate         GOALS: Pt is in agreement with the following goals.    Short term goals:  6 weeks or 10 visits   1.  Pt will demonstrate increased lumbar ROM by at least 3 degrees from the initial ROM value with improvements noted in functional ROM and ability to perform ADLs  2.  Pt will demonstrate increased maximum isometric torque value by 5% when compared to the initial value resulting in improved ability to perform bending, lifting, and carrying activities safely, confidently.    3.  Patient report a reduction in worst pain score by 1-2 points for improved tolerance during work and recreational activities  4.  Pt able to perform HEP correctly with minimal cueing or supervision for therapist      Long term goals: 13 weeks or 20 visits   1. Pt will demonstrate increased lumbar ROM by at least 6 degrees from initial ROM value, resulting in improved ability to perform functional fwd bending while standing and sitting.   2. Pt will demonstrate increased maximum isometric torque value by 10% when compared to the initial value resulting in improved ability to perform bending, lifting, and carrying activities safely, confidently.  3. Pt to demonstrate ability to independently control and reduce their pain through posture positioning and mechanical movements throughout a typical day.  4.  Patient will demonstrate improved overall function per FOTO Survey to CK = at least 40% but < 60% impaired, limited or restricted score or less.      Plan   Outpatient physical therapy 2x week for 13 weeks or 20 visits to include the following:   - Patient education  - Therapeutic exercise  - Manual therapy  - Performance testing   - Neuromuscular Re-education  - Therapeutic activity   - Modalities    Pt may be  "seen by PTA as part of the rehabilitation team.     Therapist: Raghavendra Bauer, PT  8/14/2018    "I certify the need for these services furnished under this plan of treatment and while under my care."    ____________________________________  Physician/Referring Practitioner    _______________  Date of Signature          "

## 2018-08-24 ENCOUNTER — CLINICAL SUPPORT (OUTPATIENT)
Dept: REHABILITATION | Facility: HOSPITAL | Age: 78
End: 2018-08-24
Payer: MEDICARE

## 2018-08-24 DIAGNOSIS — G89.29 CHRONIC BILATERAL LOW BACK PAIN WITHOUT SCIATICA: ICD-10-CM

## 2018-08-24 DIAGNOSIS — M54.50 CHRONIC BILATERAL LOW BACK PAIN WITHOUT SCIATICA: ICD-10-CM

## 2018-08-24 PROCEDURE — 97110 THERAPEUTIC EXERCISES: CPT | Mod: PN

## 2018-08-24 PROCEDURE — 97750 PHYSICAL PERFORMANCE TEST: CPT | Mod: PN

## 2018-08-24 NOTE — PROGRESS NOTES
[facility] Physical Therapy Treatment      Name: Alaina Vee  Clinic Number: [id number]  Date of Treatment: 2018   Diagnosis:   Encounter Diagnosis   Name Primary?    Chronic bilateral low back pain without sciatica      Physician: Lindsay Rea MD    Pain pattern determined: 1  Plan of care signed: 8-15-18  Time in: 2:40 pm  Time Out: 3:33 pm  Total Billable Units: 43 min  Precautions: atrial fibrillation  Visit #: 2      Subjective   Alaina reports continuing LBP.  She denies any B LE radiculopathy.  Her symptoms increase with functional activities.  Performance of HEP 2x/day and use of Ice Packs with 10-15 min on cycle and 30 min off cycle for pain relief as needed was stressed.    Patient reports tolerating previous visit well.  Patient reports their pain to be 2/10 on a 0-10 scale with 0 being no pain and 10 being the worst pain imaginable.  Pain Location: Bilateral lumbar region     Prior Treatment: meds  Prior functional status: Independent  DME owned/used: none  Leisure: Household activities/caring for her                      Pts goals:  Walk longer distances/increase strength    Objective     Baseline IM Testing Results:   Date of testin18  ROM 0-36 deg   Max Peak Torque 75 ft/lbs   Min Peak Torque 21 ft/lbs    Flex/Ext Ratio 3.57   % below normative data 48     CMS Impairment/Limitation/Restriction for FOTO Lumbar Spine Survey  Status Limitation G-Code CMS Severity Modifier  Intake 43% 57% Current Status CK - At least 40 percent but less than 60 percent  Predicted 54% 46% Goal Status+ CK - At least 40 percent but less than 60 percent  D/C Status CK **only report if this is a one time visit  +Based on FOTO predicted change score    Goal:  46%-54%, CK       Treatment    Pt was instructed in and performed the following:     Alaina received therapeutic exercises to develop/improved posture, cardiovascular endurance, muscular endurance, lumbar/cervical ROM, strength and  muscular endurance for 28  minutes including the following exercises:     B seated LAQ, 3x10, 5 sec hold  B seated heel/toe raises 3x10  Standing trunk extension with towel overpressure, 10:5 sec hold    [facility] 8/24/2018   Visit Number 2   VAS Pain Rating 2   Treadmill Time (in min.) 10   Speed 1.1   Incline 0   Lumbar Extension Seat Pad 0   Femur Restraint 5   Top Dead Center 24   Counterweight 21   Lumbar Flexion 36   Lumbar Extension 0   Lumbar Peak Torque 75   Min Torque 21   Ice - Z Lie (in min.) 10           Home Exercise Program as follows:     B seated LAQ, 3x10, 5 sec hold  B seated heel/toe raises 3x10  Standing trunk extension with towel overpressure, 10:5 sec hold    Handouts were given to the patient. Pt demo good understanding of the education provided. Alaina demonstrated good return demonstration of activities.   Lumbar roll use compliance: yes      Assessment       Patient is making good progress towards established goals.  Pt will continue to benefit from skilled outpatient physical therapy to address the deficits stated in the impairment chart, provide pt/family education and to maximize pt's level of independence in the home and community environment.       Pt's spiritual, cultural and educational needs considered and pt agreeable to plan of care and goals as stated below:     GOALS: Pt is in agreement with the following goals.     Short term goals:  6 weeks or 10 visits   1.  Pt will demonstrate increased lumbar ROM by at least 3 degrees from the initial ROM value with improvements noted in functional ROM and ability to perform ADLs  2.  Pt will demonstrate increased maximum isometric torque value by 5% when compared to the initial value resulting in improved ability to perform bending, lifting, and carrying activities safely, confidently.     3.  Patient report a reduction in worst pain score by 1-2 points for improved tolerance during work and recreational activities  4.  Pt able  to perform HEP correctly with minimal cueing or supervision for therapist        Long term goals: 13 weeks or 20 visits   1. Pt will demonstrate increased lumbar ROM by at least 6 degrees from initial ROM value, resulting in improved ability to perform functional fwd bending while standing and sitting.   2. Pt will demonstrate increased maximum isometric torque value by 10% when compared to the initial value resulting in improved ability to perform bending, lifting, and carrying activities safely, confidently.  3. Pt to demonstrate ability to independently control and reduce their pain through posture positioning and mechanical movements throughout a typical day.  4.  Patient will demonstrate improved overall function per FOTO Survey to CK,  46%-54% impaired, limited or restricted score or less.        Plan   Continue with established Plan of Care towards established PT goals.   Raghavendra Bauer, PT   08/24/2018

## 2018-08-27 NOTE — PROGRESS NOTES
[facility]      Name: Alaina Vee  Clinic Number: [id number]  Date of Treatment: 2018   Diagnosis:   Encounter Diagnosis   Name Primary?    Chronic bilateral low back pain without sciatica      Physician: Lindsay Rea MD    Pain pattern determined: 1  Plan of care signed: 8-15-18  Time in: 1:25  Time Out: 2:18  Total Billable Units: 2  Precautions: atrial fibrillation  Visit #: 3      Subjective   Alaina reports continuing LBP.  She reports compliance with performance of HEP 2x/day and use of Ice Packs with 10-15 min on cycle and 30 min off cycle for pain relief as needed.    Patient reports tolerating previous visit well.  Patient reports their pain to be 2/10 on a 0-10 scale with 0 being no pain and 10 being the worst pain imaginable.  Pain Location: Bilateral lumbar region     Prior Treatment: meds  Prior functional status: Independent  DME owned/used: none  Leisure: Household activities/caring for her                      Pts goals:  Walk longer distances/increase strength    Objective     Baseline IM Testing Results:   Date of testin18  ROM 0-36 deg   Max Peak Torque 75 ft/lbs   Min Peak Torque 21 ft/lbs    Flex/Ext Ratio 3.57   % below normative data 48     CMS Impairment/Limitation/Restriction for FOTO Lumbar Spine Survey  Status Limitation G-Code CMS Severity Modifier  Intake 43% 57% Current Status CK - At least 40 percent but less than 60 percent  Predicted 54% 46% Goal Status+ CK - At least 40 percent but less than 60 percent  D/C Status CK **only report if this is a one time visit  +Based on FOTO predicted change score    Goal:  46%-54%, CK       Treatment    Pt was instructed in and performed the following:     Alaina received therapeutic exercises to develop/improved posture, cardiovascular endurance, muscular endurance, lumbar/cervical ROM, strength and muscular endurance for 43  minutes including the following exercises:     B seated  Leg Kicks,  3x10 with  5 sec hold  B seated Heel and Toe raises 3x10  Standing Back Extension with wall support, 10:5 sec hold  [facility] 8/28/2018   Visit Number 3   VAS Pain Rating 2   Treadmill Time (in min.) 10   Speed 1.1   Incline 0   Lumbar Extension Seat Pad -   Femur Restraint -   Top Dead Center -   Counterweight -   Lumbar Flexion -   Lumbar Extension -   Lumbar Peak Torque -   Min Torque -   Lumbar Weight 38   Repetitions 20   Rating of Perceived Exertion 2.5   Ice - Z Lie (in min.) 10         Home Exercise Program as below:    B seated Leg Kicks,  3x10 with  5 sec hold  B seated Heel and Toe raises 3x10  Standing Back Extension with wall support, 10:5 sec hold    Handouts were given to the patient. Pt demo good understanding of the education provided. Alaina demonstrated good return demonstration of activities.   Lumbar roll use compliance: yes      Assessment     Patient is making good progress towards established goals.  She was able to perform 38 ft/lbs at 20 reps with an RPE of 2.5.  She also had good tolerance to torso as well as B UE peripheral strengthening exercises exhibiting a strong muscle response.  Use of cold packs prn for pain and performance of  HEP 2x/day relief was stressed.        Pt will continue to benefit from skilled outpatient physical therapy to address the deficits stated in the impairment chart, provide pt/family education and to maximize pt's level of independence in the home and community environment.         Pt's spiritual, cultural and educational needs considered and pt agreeable to plan of care and goals as stated below:         GOALS: Pt is in agreement with the following goals.     Short term goals:  6 weeks or 10 visits   1.  Pt will demonstrate increased lumbar ROM by at least 3 degrees from the initial ROM value with improvements noted in functional ROM and ability to perform ADLs  2.  Pt will demonstrate increased maximum isometric torque value by 5% when  compared to the initial value resulting in improved ability to perform bending, lifting, and carrying activities safely, confidently.     3.  Patient report a reduction in worst pain score by 1-2 points for improved tolerance during work and recreational activities  4.  Pt able to perform HEP correctly with minimal cueing or supervision for therapist        Long term goals: 13 weeks or 20 visits   1. Pt will demonstrate increased lumbar ROM by at least 6 degrees from initial ROM value, resulting in improved ability to perform functional fwd bending while standing and sitting.   2. Pt will demonstrate increased maximum isometric torque value by 10% when compared to the initial value resulting in improved ability to perform bending, lifting, and carrying activities safely, confidently.  3. Pt to demonstrate ability to independently control and reduce their pain through posture positioning and mechanical movements throughout a typical day.  4.  Patient will demonstrate improved overall function per FOTO Survey to CK,  46%-54% impaired, limited or restricted score or less.        Plan   Continue with established Plan of Care towards established PT goals.   Raghavendra Bauer, PT   08/28/2018

## 2018-08-28 ENCOUNTER — CLINICAL SUPPORT (OUTPATIENT)
Dept: REHABILITATION | Facility: HOSPITAL | Age: 78
End: 2018-08-28
Payer: MEDICARE

## 2018-08-28 DIAGNOSIS — G89.29 CHRONIC BILATERAL LOW BACK PAIN WITHOUT SCIATICA: ICD-10-CM

## 2018-08-28 DIAGNOSIS — M54.50 CHRONIC BILATERAL LOW BACK PAIN WITHOUT SCIATICA: ICD-10-CM

## 2018-08-28 PROCEDURE — 97110 THERAPEUTIC EXERCISES: CPT | Mod: PN

## 2018-08-29 ENCOUNTER — ANTI-COAG VISIT (OUTPATIENT)
Dept: CARDIOLOGY | Facility: CLINIC | Age: 78
End: 2018-08-29
Payer: MEDICARE

## 2018-08-29 DIAGNOSIS — Z79.01 LONG TERM (CURRENT) USE OF ANTICOAGULANTS: Primary | ICD-10-CM

## 2018-08-29 LAB — INR PPP: 4.1 (ref 2–3)

## 2018-08-29 PROCEDURE — 85610 PROTHROMBIN TIME: CPT | Mod: QW,S$GLB,, | Performed by: INTERNAL MEDICINE

## 2018-08-29 PROCEDURE — 99211 OFF/OP EST MAY X REQ PHY/QHP: CPT | Mod: 25,S$GLB,, | Performed by: INTERNAL MEDICINE

## 2018-08-29 NOTE — PROGRESS NOTES
INR high today. Patient reports back pain and taking a muscle relaxer. Reports a increase in stress. No bleeding or bruising. Will hold coumadin today and decrease weekly dose until follow up in 1 week for close monitoring. Advised patient to call with nay changes or concerns. Care Plan made with Halima Hernandez D.

## 2018-09-05 ENCOUNTER — ANTI-COAG VISIT (OUTPATIENT)
Dept: CARDIOLOGY | Facility: CLINIC | Age: 78
End: 2018-09-05

## 2018-09-05 ENCOUNTER — LAB VISIT (OUTPATIENT)
Dept: LAB | Facility: HOSPITAL | Age: 78
End: 2018-09-05
Attending: INTERNAL MEDICINE
Payer: MEDICARE

## 2018-09-05 DIAGNOSIS — Z79.01 LONG TERM CURRENT USE OF ANTICOAGULANT THERAPY: ICD-10-CM

## 2018-09-05 DIAGNOSIS — Z79.01 LONG TERM (CURRENT) USE OF ANTICOAGULANTS: ICD-10-CM

## 2018-09-05 DIAGNOSIS — I48.3 TYPICAL ATRIAL FLUTTER: ICD-10-CM

## 2018-09-05 LAB
INR PPP: 1.8
PROTHROMBIN TIME: 19 SEC

## 2018-09-05 PROCEDURE — 36415 COLL VENOUS BLD VENIPUNCTURE: CPT

## 2018-09-05 PROCEDURE — 85610 PROTHROMBIN TIME: CPT

## 2018-09-06 ENCOUNTER — CLINICAL SUPPORT (OUTPATIENT)
Dept: REHABILITATION | Facility: HOSPITAL | Age: 78
End: 2018-09-06
Payer: MEDICARE

## 2018-09-06 DIAGNOSIS — G89.29 CHRONIC BILATERAL LOW BACK PAIN WITHOUT SCIATICA: ICD-10-CM

## 2018-09-06 DIAGNOSIS — M54.50 CHRONIC BILATERAL LOW BACK PAIN WITHOUT SCIATICA: ICD-10-CM

## 2018-09-06 PROCEDURE — 97110 THERAPEUTIC EXERCISES: CPT | Mod: PN | Performed by: PHYSICAL MEDICINE & REHABILITATION

## 2018-09-06 NOTE — PATIENT INSTRUCTIONS
Pelvic Tilt        Flatten back by tightening stomach muscles and buttocks, roll back into mat  Repeat ____ times per set. Do __1__ sets per session. Do ___3_ sessions per day.   10  [email or web address]     Copyright © [employer]. All rights reserved.                Knee-to-Chest Stretch: Unilateral.   Have both knees bent at your starting position.        With hand behind right knee, pull knee in to chest until a comfortable stretch is felt in lower back and buttocks. Keep back relaxed. Hold __3__ seconds.  Repeat ___10_ times per set. Do __1__ sets per session. Do ___3_ sessions per day.  Do each leg.     [email or web address]     Copyright © [employer]. All rights reserved.        Knee-to-Chest Stretch: Bilateral  ( can be done with ball under knees)        With hands behind knees, pull both knees in to chest until a comfortable stretch is felt in lower back and buttocks. Keep back relaxed. Hold __3__ seconds.  Repeat __10__ times per set. Do ___1_ sets per session. Do ___3_ sessions per day.     [email or web address]     Copyright © [employer]. All rights reserved.              Lumbar Rotation (Non-Weight Bearing)        Feet on floor, slowly rock knees from side to side in small, pain-free range of motion. Allow lower back to rotate slightly.  Repeat _10___ times per set. Do __1__ sets per session. Do _3___ sessions per day.     [email or web address]     Copyright © [employer]. All rights reserved.                         Lower Limb Neural Tension (Sitting)  Sit tall, not slouched        Feet on floor, hands behind back, Sit tall, don't slouch.    Straighten right knee until stretch is felt. Point toe towards you.  Lower.   Then switch legs and do the left.  Hold __3__ seconds. Relax.  Repeat __10__ times per set. Do ___1_ sets per session. Do __3__ sessions per day.     [email or web address]     Copyright © [employer]. All rights reserved.            Backward Bend (Standing)        Arch backward to make hollow of back  deeper. Hold ____ seconds.  Repeat ____ times per set. Do ____ sets per session. Do ____ sessions per day.     [email or web address]     Thoracolumbar Side-Bend: Double Arm (Standing)  Hold cane overhead and stretch up first.  Then stretch to each side        Hands clasped, reach over head to left side until stretch is felt. Hold ____ seconds. Relax.  Repeat ____ times per set. Do ____ sets per session. Do ____ sessions per day.     [email or web address]     Copyright © [employer]. All rights reserved.   Scapular Retraction (Standing)        With arms at sides, pinch shoulder blades together.  Repeat ____ times per set. Do ____ sets per session. Do ____ sessions per day.     [email or web address]     Copyright © [employer]. All rights reserved.

## 2018-09-06 NOTE — PROGRESS NOTES
[facility]      Name: Alaina Vee  Clinic Number: [id number]  Date of Treatment: 2018   Diagnosis:   Encounter Diagnosis   Name Primary?    Chronic bilateral low back pain without sciatica      Physician: Lindsay Rea MD    Pain pattern determined: 1  Plan of care signed: 8-15-18 through 11/15/18  Time in: 9:00 am  Time Out: 10:00 am  Total Billable Units: 2  Precautions: atrial fibrillation  Visit #: 4    Assessment 18  Assessment due: 10/618    Subjective   Alaina reports continuing LBP, but slight improvement since starting the program. She is sore today because she mowed the lawn last night.  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  She finds home environment is tough right now.  She reports compliance with performance of HEP 2x/day.  Encouraged walking daily for physical and mental health.  She will do this and may also go to gym as well, she us to do this.   Pain back 5/10pre visit and 0/10 post visit and feeling much better.    Patient reports tolerating previous visit well.  Patient reports their pain to be 5/10 on a 0-10 scale with 0 being no pain and 10 being the worst pain imaginable.  Pain Location: Bilateral lumbar region     Prior Treatment: meds  Prior functional status: Independent  DME owned/used: none  Leisure: Household activities/caring for her                      Pts goals:  Walk longer distances/increase strength    Objective   MOVEMENT LOSS     ROM Loss   Flexion moderate loss improved to min loss   Extension moderate loss   Side bending Right moderate loss   Side bending Left moderate loss   Rotation Right moderate loss   Rotation Left moderate loss        Baseline IM Testing Results:   Date of testin18  ROM 0-36 deg   Max Peak Torque 75 ft/lbs   Min Peak Torque 21 ft/lbs    Flex/Ext Ratio 3.57   % below normative data 48     CMS  Impairment/Limitation/Restriction for FOTO Lumbar Spine Survey  Status Limitation G-Code CMS Severity Modifier  Intake  57% Current Status CK - At least 40 percent but less than 60 percent  Predicted 46% Goal Status+ CK - At least 40 percent but less than 60 percent    Goal:  46%-54%, CK       Treatment    Pt was instructed in and performed the following:     Alaina received therapeutic exercises to develop/improved posture, cardiovascular endurance, muscular endurance, lumbar/cervical ROM, strength and muscular endurance for 43  minutes including the following exercises:     B seated Leg Kicks,  3x10 with  5 sec hold  B seated Heel and Toe raises 3x10  Standing Back Extension with wall support, 10:5 sec hold  Pelvic tilts 10 reps 2/day  Single knee to chest 10 reps 2/day with towel  LTR 10 reps 2/day  scap retraction with broom or cane behind back and arms behind cane 10 reps 2/day  Holding broom or cane overhead, quad lumb and UE stretch 10 reps bilat  Add DKTC next visit    [facility] 9/6/2018   Visit Number 4   VAS Pain Rating 5   Treadmill Time (in min.) 10   Speed 1.3   Incline -   Extension in Standing 10   Flexion in Lying- single knee to chest 10   Lumbar Extension Seat Pad -   Femur Restraint -   Top Dead Center -   Counterweight 121   Lumbar Flexion -   Lumbar Extension -   Lumbar Peak Torque -   Min Torque -   Lumbar Weight 45   Repetitions 15   Rating of Perceived Exertion 4   Ice - Z Lie (in min.) 10         Peripheral muscle strengthening which included 1 set of 15-20 repetitions at a slow, controlled 7 second per rep pace focused on strengthening supporting musculature for improved body mechanics and functional mobility.  Pt and therapist focused on proper form during treatment to ensure optimal strengthening of each targeted muscle group.  Machines were utilized including torso rotation, chest press, rowing, triceps, leg curl, hip abd, hip add, sit to stand for leg press from elevated mat,  and free weights for biceps.          Home Exercise Program as below:    B seated Leg Kicks,  3x10 with  5 sec hold  B seated Heel and Toe raises 3x10  Standing Back Extension with wall support, 10:5 sec hold  Pelvic tilts 10 reps 2/day  Single knee to chest 10 reps 2/day with towel  LTR 10 reps 2/day  scap retraction with broom or cane behind back and arms behind cane 10 reps 2/day  Holding broom or cane overhead, quad lumb and UE stretch 10 reps bilat          Handouts were given to the patient. Pt demo good understanding of the education provided. Alaina demonstrated good return demonstration of activities.   Lumbar roll use compliance: yes      Assessment     Patient is making good progress towards established goals.  she attended with pain that abolished with stretching.  She tolerated warm up, progression of HEP with handouts given,, lumbar med x at new weight and peripheral strengthening with some modifications.     She also had good tolerance to torso as well as B UE peripheral strengthening exercises exhibiting a strong muscle response.  Use of stretching, watching posture, and  cold packs prn for pain and performance of  HEP 2x/day relief was stressed.        Pt will continue to benefit from skilled outpatient physical therapy to address the deficits stated in the impairment chart, provide pt/family education and to maximize pt's level of independence in the home and community environment.         Pt's spiritual, cultural and educational needs considered and pt agreeable to plan of care and goals as stated below:         GOALS: Pt is in agreement with the following goals.     Short term goals:  6 weeks or 10 visits   1.  Pt will demonstrate increased lumbar ROM by at least 3 degrees from the initial ROM value with improvements noted in functional ROM and ability to perform ADLs  2.  Pt will demonstrate increased maximum isometric torque value by 5% when compared to the initial value resulting in improved  ability to perform bending, lifting, and carrying activities safely, confidently.     3.  Patient report a reduction in worst pain score by 1-2 points for improved tolerance during work and recreational activities  4.  Pt able to perform HEP correctly with minimal cueing or supervision for therapist        Long term goals: 13 weeks or 20 visits   1. Pt will demonstrate increased lumbar ROM by at least 6 degrees from initial ROM value, resulting in improved ability to perform functional fwd bending while standing and sitting.   2. Pt will demonstrate increased maximum isometric torque value by 10% when compared to the initial value resulting in improved ability to perform bending, lifting, and carrying activities safely, confidently.  3. Pt to demonstrate ability to independently control and reduce their pain through posture positioning and mechanical movements throughout a typical day.  4.  Patient will demonstrate improved overall function per FOTO Survey to CK,  46%-54% impaired, limited or restricted score or less.        Plan   Continue with established Plan of Care towards established PT goals.   July Wallcae, PT   09/06/2018

## 2018-09-09 NOTE — PROGRESS NOTES
[facility]      Name: Alaina Vee  Clinic Number: [id number]  Date of Treatment: 09/10/2018   Diagnosis:   Encounter Diagnosis   Name Primary?    Chronic bilateral low back pain without sciatica      Physician: Lindsay Rea MD    Pain pattern determined: 1  Plan of care signed: 8-15-18 through 11/15/18  Time in: 11:00 am  Time Out: 12:00 pm  Total Billable Units: 2  Precautions: atrial fibrillation  Visit #: 5       Assessment 18  Assessment due: 10/618    Subjective   Alaina reports continuing LBP, but slight improvement since starting the program.   She reports her husbands health is failing and his memory is too, she finds home environment is tough right now.  She reports compliance with performance of HEP 2x/day.  Encouraged walking daily for physical and mental health.  She will do this and may also go to gym as well, she us to do this.   Pain back 1/10pre visit and 0/10 post visit and feeling much better.    Patient reports tolerating previous visit well.  Patient reports their pain to be 1/10 on a 0-10 scale with 0 being no pain and 10 being the worst pain imaginable.  Pain Location: Bilateral lumbar region     Prior Treatment: meds  Prior functional status: Independent  DME owned/used: none  Leisure: Household activities/caring for her                      Pts goals:  Walk longer distances/increase strength    Objective   MOVEMENT LOSS     ROM Loss   Flexion moderate loss improved to min loss   Extension moderate loss   Side bending Right moderate loss   Side bending Left moderate loss   Rotation Right moderate loss   Rotation Left moderate loss        Baseline IM Testing Results:   Date of testin18  ROM 0-36 deg   Max Peak Torque 75 ft/lbs   Min Peak Torque 21 ft/lbs    Flex/Ext Ratio 3.57   % below normative data 48     CMS Impairment/Limitation/Restriction for FOTO Lumbar Spine Survey  Status Limitation G-Code CMS Severity  Modifier  Intake  57% Current Status CK - At least 40 percent but less than 60 percent  Predicted 46% Goal Status+ CK - At least 40 percent but less than 60 percent    Foto visit 5, 60%  Goal:  46%-54%, CK       Treatment    Pt was instructed in and performed the following:     Alaina received therapeutic exercises to develop/improved posture, cardiovascular endurance, muscular endurance, lumbar/cervical ROM, strength and muscular endurance for 43  minutes including the following exercises:     B seated Leg Kicks,  3x10 with  5 sec hold  B seated Heel and Toe raises 3x10  Standing Back Extension with wall support, 10:5 sec hold  Pelvic tilts 10 reps 2/day  Single knee to chest 10 reps 2/day with towel  LTR 10 reps 2/day  scap retraction with broom or cane behind back and arms behind cane 10 reps 2/day  Holding broom or cane overhead, quad lumb and UE stretch 10 reps bilat       [facility] 9/10/2018   Visit Number 5   VAS Pain Rating 2   Treadmill Time (in min.) 10   Speed 1.3   Incline -   Extension in Standing 10   Flexion in Lying 10   Lumbar Extension Seat Pad -   Femur Restraint -   Top Dead Center -   Counterweight -   Lumbar Flexion -   Lumbar Extension -   Lumbar Peak Torque -   Min Torque -   Lumbar Weight 45   Repetitions 20   Rating of Perceived Exertion 4   Ice - Z Lie (in min.) 10             Peripheral muscle strengthening which included 1 set of 15-20 repetitions at a slow, controlled 7 second per rep pace focused on strengthening supporting musculature for improved body mechanics and functional mobility.  Pt and therapist focused on proper form during treatment to ensure optimal strengthening of each targeted muscle group.  Machines were utilized including torso rotation, chest press, rowing, triceps, leg curl, hip abd, hip add, sit to stand for leg press from elevated mat, and free weights for biceps.          Home Exercise Program as below:    B seated Leg Kicks,  3x10 with  5 sec hold  B  seated Heel and Toe raises 3x10  Standing Back Extension with wall support, 10:5 sec hold  Pelvic tilts 10 reps 2/day  Single knee to chest 10 reps 2/day with towel  LTR 10 reps 2/day  scap retraction with broom or cane behind back and arms behind cane 10 reps 2/day  Holding broom or cane overhead, quad lumb and UE stretch 10 reps bilat  DKTC 10 reps 3/day          Handouts were given to the patient. Pt demo good understanding of the education provided. Alaina demonstrated good return demonstration of activities.   Lumbar roll use compliance: yes      Assessment     Patient is making good progress towards established goals.  she attended with mild pain today that abolished with stretching.  She tolerated warm up, progression of HEP with handouts given, lumbar med x  and peripheral strengthening with some modifications.     She also had good tolerance to torso as well as B UE peripheral and LE  strengthening exercises exhibiting a strong muscle response.  Use of stretching, watching posture, and  cold packs prn for pain and performance of  HEP 2x/day relief was stressed.        Pt will continue to benefit from skilled outpatient physical therapy to address the deficits stated in the impairment chart, provide pt/family education and to maximize pt's level of independence in the home and community environment.         Pt's spiritual, cultural and educational needs considered and pt agreeable to plan of care and goals as stated below:         GOALS: Pt is in agreement with the following goals.     Short term goals:  6 weeks or 10 visits   1.  Pt will demonstrate increased lumbar ROM by at least 3 degrees from the initial ROM value with improvements noted in functional ROM and ability to perform ADLs  2.  Pt will demonstrate increased maximum isometric torque value by 5% when compared to the initial value resulting in improved ability to perform bending, lifting, and carrying activities safely, confidently.     3.   Patient report a reduction in worst pain score by 1-2 points for improved tolerance during work and recreational activities  4.  Pt able to perform HEP correctly with minimal cueing or supervision for therapist        Long term goals: 13 weeks or 20 visits   1. Pt will demonstrate increased lumbar ROM by at least 6 degrees from initial ROM value, resulting in improved ability to perform functional fwd bending while standing and sitting.   2. Pt will demonstrate increased maximum isometric torque value by 10% when compared to the initial value resulting in improved ability to perform bending, lifting, and carrying activities safely, confidently.  3. Pt to demonstrate ability to independently control and reduce their pain through posture positioning and mechanical movements throughout a typical day.  4.  Patient will demonstrate improved overall function per FOTO Survey to CK,  46%-54% impaired, limited or restricted score or less.        Plan   Continue with established Plan of Care towards established PT goals.   July Wallace, PT   09/10/2018

## 2018-09-10 ENCOUNTER — CLINICAL SUPPORT (OUTPATIENT)
Dept: REHABILITATION | Facility: HOSPITAL | Age: 78
End: 2018-09-10
Payer: MEDICARE

## 2018-09-10 DIAGNOSIS — G89.29 CHRONIC BILATERAL LOW BACK PAIN WITHOUT SCIATICA: ICD-10-CM

## 2018-09-10 DIAGNOSIS — M54.50 CHRONIC BILATERAL LOW BACK PAIN WITHOUT SCIATICA: ICD-10-CM

## 2018-09-10 PROCEDURE — 97110 THERAPEUTIC EXERCISES: CPT | Mod: PN | Performed by: PHYSICAL MEDICINE & REHABILITATION

## 2018-09-13 ENCOUNTER — OFFICE VISIT (OUTPATIENT)
Dept: OPTOMETRY | Facility: CLINIC | Age: 78
End: 2018-09-13
Payer: MEDICARE

## 2018-09-13 DIAGNOSIS — H25.13 NUCLEAR SCLEROSIS OF BOTH EYES: Primary | ICD-10-CM

## 2018-09-13 DIAGNOSIS — H52.7 REFRACTIVE ERROR: ICD-10-CM

## 2018-09-13 DIAGNOSIS — Z46.0 ENCOUNTER FOR FITTING OR ADJUSTMENT OF SPECTACLES OR CONTACT LENSES: Primary | ICD-10-CM

## 2018-09-13 PROCEDURE — 99211 OFF/OP EST MAY X REQ PHY/QHP: CPT | Mod: PBBFAC,PO | Performed by: OPTOMETRIST

## 2018-09-13 PROCEDURE — 92310 CONTACT LENS FITTING OU: CPT | Mod: ,,, | Performed by: OPTOMETRIST

## 2018-09-13 PROCEDURE — 99999 PR PBB SHADOW E&M-EST. PATIENT-LVL I: CPT | Mod: PBBFAC,,, | Performed by: OPTOMETRIST

## 2018-09-13 PROCEDURE — 99499 UNLISTED E&M SERVICE: CPT | Mod: S$PBB,,, | Performed by: OPTOMETRIST

## 2018-09-13 PROCEDURE — 92004 COMPRE OPH EXAM NEW PT 1/>: CPT | Mod: S$PBB,,, | Performed by: OPTOMETRIST

## 2018-09-13 PROCEDURE — 92015 DETERMINE REFRACTIVE STATE: CPT | Mod: ,,, | Performed by: OPTOMETRIST

## 2018-09-13 NOTE — PROGRESS NOTES
Subjective:       Patient ID: Alaina Vee is a 78 y.o. female      Chief Complaint   Patient presents with    Concerns About Ocular Health    Contact Lens Fit     History of Present Illness  DLE:  11/20/17 at [place]    No eye surgery   Visine PRN OU     Pt here for check of ocular health and clfit. Pt states her eyes get crusted together during the night, along with  Itching and burning.   Pt denies flashes, floaters, headaches or eye pain. Pt states she is not sure what brand of contacts she wore or what her Rx was.    Last wore lenses 6 months. Pt does not know lens brand. Replaced lenses every week. Did not sleep in lenses. Pt was in monovision fit, OD near, OS distance         Assessment/Plan:     1. Nuclear sclerosis of both eyes  Educated pt on presence of cataracts and effects on vision. No surgery at this time. Recheck in one year.    2. Refractive error  Educated patient on refractive error and discussed lens options. Dispensed updated spectacle Rx. Educated about adaptation period to new specs.    Eyeglass Final Rx     Eyeglass Final Rx       Sphere Cylinder Axis Add    Right -2.75 +0.25 005 +2.50    Left -0.75 Sphere  +2.50    Expiration Date:  9/14/2019                Contact lens Rx released to pt. Daily wear only advised, replacement monthly with education to risks of extended wear.  Discussed lens care, compliance and solutions. RTC yearly contact lens follow up.     Contact Lens Final Rx     Final Contact Lens Rx       Brand Base Curve Diameter Sphere Cylinder    Right No lens        Left Acuvue Johnna 8.4 14.0 -0.75 Sphere    Expiration Date:  9/14/2019    Replacement:  Monthly    Solutions:  OptiFree PureMoist    Wearing Schedule:  Daily wear                  Follow-up in about 1 year (around 9/13/2019) for Annual eye exam, Contact Lens Follow Up.

## 2018-09-14 ENCOUNTER — CLINICAL SUPPORT (OUTPATIENT)
Dept: REHABILITATION | Facility: HOSPITAL | Age: 78
End: 2018-09-14
Payer: MEDICARE

## 2018-09-14 DIAGNOSIS — G89.29 CHRONIC BILATERAL LOW BACK PAIN WITHOUT SCIATICA: ICD-10-CM

## 2018-09-14 DIAGNOSIS — M54.50 CHRONIC BILATERAL LOW BACK PAIN WITHOUT SCIATICA: ICD-10-CM

## 2018-09-14 PROCEDURE — 97110 THERAPEUTIC EXERCISES: CPT | Mod: PN

## 2018-09-18 ENCOUNTER — CLINICAL SUPPORT (OUTPATIENT)
Dept: REHABILITATION | Facility: HOSPITAL | Age: 78
End: 2018-09-18
Payer: MEDICARE

## 2018-09-18 DIAGNOSIS — G89.29 CHRONIC BILATERAL LOW BACK PAIN WITHOUT SCIATICA: ICD-10-CM

## 2018-09-18 DIAGNOSIS — M54.50 CHRONIC BILATERAL LOW BACK PAIN WITHOUT SCIATICA: ICD-10-CM

## 2018-09-18 PROCEDURE — 97110 THERAPEUTIC EXERCISES: CPT | Mod: PN

## 2018-09-18 NOTE — PROGRESS NOTES
[facility] Back Physical Therapy Treatment      Name: Alaina Vee  Clinic Number: [id number]  Date of Treatment: 2018   Diagnosis:   Encounter Diagnosis   Name Primary?    Chronic bilateral low back pain without sciatica      Physician: Lindsay Rea MD    Pain pattern determined: 1  Plan of care signed: 8/15/18 through 11/15/18  Time in: 1430  Time Out: 1545  Total Treatment Time: 65 minutes (1:1 with PTA for 30 minutes)  Total Billable Units: 2  Precautions: atrial fibrillation  Visit #: 7    Assessment 18  Assessment due: 10/618    Subjective     Alaina reports that she feels ok today. Patient reports low back pain this afternoon.    Patient reports tolerating previous visit well.  Patient reports their pain to be 2/10 on a 0-10 scale with 0 being no pain and 10 being the worst pain imaginable.  Pain Location: Bilateral lumbar region     Prior Treatment: meds  Prior functional status: Independent  DME owned/used: none  Leisure: Household activities/caring for her                      Pts goals:  Walk longer distances/increase strength    Objective     MOVEMENT LOSS     ROM Loss   Flexion moderate loss improved to min loss   Extension moderate loss   Side bending Right moderate loss   Side bending Left moderate loss   Rotation Right moderate loss   Rotation Left moderate loss      Baseline IM Testing Results:   Date of testin18  ROM 0-36 deg   Max Peak Torque 75 ft/lbs   Min Peak Torque 21 ft/lbs    Flex/Ext Ratio 3.57   % below normative data 48     CMS Impairment/Limitation/Restriction for FOTO Lumbar Spine Survey  Status Limitation G-Code CMS Severity Modifier  Intake  57% Current Status CK - At least 40 percent but less than 60 percent  Predicted 46% Goal Status+ CK - At least 40 percent but less than 60 percent    Foto visit 5, 60%  Goal:  46%-54%, CK     Treatment      Pt was instructed in and performed the following:     Alaina received therapeutic exercises to  develop/improved posture, cardiovascular endurance, muscular endurance, lumbar/cervical ROM, strength and muscular endurance for 65 minutes including the following exercises:     B seated Leg Kicks, 2# x 3 x 10 with 5 sec hold  B seated Heel and Toe raises 3 x 10  Standing Back Extension with wall support, 10:5 sec hold  Pelvic tilts 10 reps 2/day - performed as isoflexion today x 10 reps  Single knee to chest 10 reps 2/day with towel  LTR 10 reps 2/day  Scap retraction with broom or cane behind back and arms behind cane 10 reps 2/day  Holding broom or cane overhead, quad lumb and UE stretch 10 reps bilat     [facility] 9/18/2018   Visit Number 7   VAS Pain Rating 2   Treadmill Time (in min.) 10   Speed 1.4   Incline 0   Extension in Standing 10   Flexion in Lying 10   Manual Therapy 0   Lumbar Weight 47   Repetitions 20   Rating of Perceived Exertion 3   Ice - Z Lie (in min.) 10     Peripheral muscle strengthening which included 1 set of 15-20 repetitions at a slow, controlled 7 second per rep pace focused on strengthening supporting musculature for improved body mechanics and functional mobility.  Pt and therapist focused on proper form during treatment to ensure optimal strengthening of each targeted muscle group.  Machines were utilized including torso rotation, chest press, rowing, triceps, leg curl, hip abd, hip add, sit to stand for leg press from elevated mat, and free weights for biceps.    Home Exercise Program as below:  B seated Leg Kicks,  3x10 with  5 sec hold  B seated Heel and Toe raises 3x10  Standing Back Extension with wall support, 10:5 sec hold  Pelvic tilts 10 reps 2/day  Single knee to chest 10 reps 2/day with towel  LTR 10 reps 2/day  scap retraction with broom or cane behind back and arms behind cane 10 reps 2/day  Holding broom or cane overhead, quad lumb and UE stretch 10 reps bilat  DKTC 10 reps 3/day    Handouts were given to the patient. Pt demo good understanding of the  education provided. Alaina demonstrated good return demonstration of activities.   Lumbar roll use compliance: yes    Assessment     Patient tolerated treatment session well today. Good tolerance to exercises performed reporting appropriate muscle fatigue at end of session. Patient with good tolerance to MedX resisted lumbar extension machine and peripheral exercises with no exacerbation of pain. Patient is progressing well with current POC.     Pt will continue to benefit from skilled outpatient physical therapy to address the deficits stated in the impairment chart, provide pt/family education and to maximize pt's level of independence in the home and community environment.     Pt's spiritual, cultural and educational needs considered and pt agreeable to plan of care and goals as stated below:      GOALS: Pt is in agreement with the following goals.     Short term goals:  6 weeks or 10 visits   1.  Pt will demonstrate increased lumbar ROM by at least 3 degrees from the initial ROM value with improvements noted in functional ROM and ability to perform ADLs  2.  Pt will demonstrate increased maximum isometric torque value by 5% when compared to the initial value resulting in improved ability to perform bending, lifting, and carrying activities safely, confidently.  3.  Patient report a reduction in worst pain score by 1-2 points for improved tolerance during work and recreational activities  4.  Pt able to perform HEP correctly with minimal cueing or supervision for therapist     Long term goals: 13 weeks or 20 visits   1. Pt will demonstrate increased lumbar ROM by at least 6 degrees from initial ROM value, resulting in improved ability to perform functional fwd bending while standing and sitting.   2. Pt will demonstrate increased maximum isometric torque value by 10% when compared to the initial value resulting in improved ability to perform bending, lifting, and carrying activities safely, confidently.  3. Pt to  demonstrate ability to independently control and reduce their pain through posture positioning and mechanical movements throughout a typical day.  4.  Patient will demonstrate improved overall function per FOTO Survey to CK,  46%-54% impaired, limited or restricted score or less.    Plan     Continue with established Plan of Care towards established PT goals.     Rosa Cano, PTA   09/18/2018

## 2018-09-19 ENCOUNTER — LAB VISIT (OUTPATIENT)
Dept: LAB | Facility: HOSPITAL | Age: 78
End: 2018-09-19
Attending: INTERNAL MEDICINE
Payer: MEDICARE

## 2018-09-19 ENCOUNTER — ANTI-COAG VISIT (OUTPATIENT)
Dept: CARDIOLOGY | Facility: CLINIC | Age: 78
End: 2018-09-19

## 2018-09-19 DIAGNOSIS — Z79.01 LONG TERM (CURRENT) USE OF ANTICOAGULANTS: ICD-10-CM

## 2018-09-19 LAB
INR PPP: 1.9
PROTHROMBIN TIME: 20.3 SEC

## 2018-09-19 PROCEDURE — 36415 COLL VENOUS BLD VENIPUNCTURE: CPT

## 2018-09-19 PROCEDURE — 85610 PROTHROMBIN TIME: CPT

## 2018-09-20 ENCOUNTER — OFFICE VISIT (OUTPATIENT)
Dept: FAMILY MEDICINE | Facility: CLINIC | Age: 78
End: 2018-09-20
Payer: MEDICARE

## 2018-09-20 ENCOUNTER — LAB VISIT (OUTPATIENT)
Dept: LAB | Facility: HOSPITAL | Age: 78
End: 2018-09-20
Attending: FAMILY MEDICINE
Payer: MEDICARE

## 2018-09-20 VITALS
BODY MASS INDEX: 38.69 KG/M2 | OXYGEN SATURATION: 92 % | TEMPERATURE: 98 F | SYSTOLIC BLOOD PRESSURE: 114 MMHG | WEIGHT: 240.75 LBS | HEART RATE: 64 BPM | RESPIRATION RATE: 14 BRPM | DIASTOLIC BLOOD PRESSURE: 62 MMHG | HEIGHT: 66 IN

## 2018-09-20 DIAGNOSIS — E78.2 MIXED HYPERLIPIDEMIA: ICD-10-CM

## 2018-09-20 DIAGNOSIS — I10 ESSENTIAL HYPERTENSION: ICD-10-CM

## 2018-09-20 DIAGNOSIS — Z23 NEED FOR INFLUENZA VACCINATION: ICD-10-CM

## 2018-09-20 DIAGNOSIS — Z12.31 ENCOUNTER FOR SCREENING MAMMOGRAM FOR BREAST CANCER: ICD-10-CM

## 2018-09-20 DIAGNOSIS — K42.9 UMBILICAL HERNIA WITHOUT OBSTRUCTION AND WITHOUT GANGRENE: Primary | ICD-10-CM

## 2018-09-20 LAB
ALBUMIN SERPL BCP-MCNC: 3.8 G/DL
ALP SERPL-CCNC: 108 U/L
ALT SERPL W/O P-5'-P-CCNC: 14 U/L
ANION GAP SERPL CALC-SCNC: 8 MMOL/L
AST SERPL-CCNC: 20 U/L
BILIRUB SERPL-MCNC: 0.6 MG/DL
BUN SERPL-MCNC: 21 MG/DL
CALCIUM SERPL-MCNC: 9.9 MG/DL
CHLORIDE SERPL-SCNC: 102 MMOL/L
CHOLEST SERPL-MCNC: 221 MG/DL
CHOLEST/HDLC SERPL: 4.4 {RATIO}
CO2 SERPL-SCNC: 30 MMOL/L
CREAT SERPL-MCNC: 1.1 MG/DL
EST. GFR  (AFRICAN AMERICAN): 56 ML/MIN/1.73 M^2
EST. GFR  (NON AFRICAN AMERICAN): 49 ML/MIN/1.73 M^2
GLUCOSE SERPL-MCNC: 89 MG/DL
HDLC SERPL-MCNC: 50 MG/DL
HDLC SERPL: 22.6 %
LDLC SERPL CALC-MCNC: 154.2 MG/DL
NONHDLC SERPL-MCNC: 171 MG/DL
POTASSIUM SERPL-SCNC: 4.5 MMOL/L
PROT SERPL-MCNC: 7.6 G/DL
SODIUM SERPL-SCNC: 140 MMOL/L
TRIGL SERPL-MCNC: 84 MG/DL
TSH SERPL DL<=0.005 MIU/L-ACNC: 1.37 UIU/ML

## 2018-09-20 PROCEDURE — 3074F SYST BP LT 130 MM HG: CPT | Mod: CPTII,,, | Performed by: FAMILY MEDICINE

## 2018-09-20 PROCEDURE — 80061 LIPID PANEL: CPT

## 2018-09-20 PROCEDURE — 84443 ASSAY THYROID STIM HORMONE: CPT

## 2018-09-20 PROCEDURE — 80053 COMPREHEN METABOLIC PANEL: CPT

## 2018-09-20 PROCEDURE — 3078F DIAST BP <80 MM HG: CPT | Mod: CPTII,,, | Performed by: FAMILY MEDICINE

## 2018-09-20 PROCEDURE — 99213 OFFICE O/P EST LOW 20 MIN: CPT | Mod: S$PBB,,, | Performed by: FAMILY MEDICINE

## 2018-09-20 PROCEDURE — 99215 OFFICE O/P EST HI 40 MIN: CPT | Mod: PBBFAC,PN | Performed by: FAMILY MEDICINE

## 2018-09-20 PROCEDURE — 99999 PR PBB SHADOW E&M-EST. PATIENT-LVL V: CPT | Mod: PBBFAC,,, | Performed by: FAMILY MEDICINE

## 2018-09-20 PROCEDURE — 1101F PT FALLS ASSESS-DOCD LE1/YR: CPT | Mod: CPTII,,, | Performed by: FAMILY MEDICINE

## 2018-09-20 PROCEDURE — 90662 IIV NO PRSV INCREASED AG IM: CPT | Mod: PBBFAC,PN

## 2018-09-20 PROCEDURE — 36415 COLL VENOUS BLD VENIPUNCTURE: CPT | Mod: PN

## 2018-09-20 RX ORDER — SIMVASTATIN 40 MG/1
40 TABLET, FILM COATED ORAL NIGHTLY
COMMUNITY
End: 2019-11-06

## 2018-09-20 NOTE — PROGRESS NOTES
After follow up call pt reports incorrect dose, will still boost her over the weekend and ask her maintain the correct dose per calendar.

## 2018-09-20 NOTE — PROGRESS NOTES
INR remains subtherapeutic (now x 2), will increase her dose once this week, then resume her normal maintenance dose.

## 2018-09-20 NOTE — PROGRESS NOTES
Chief Complaint   Patient presents with    Follow-up    Hernia       HPI    Alaina Vee is 78 y.o. female. The primary encounter diagnosis was Umbilical hernia without obstruction and without gangrene. Diagnoses of Encounter for screening mammogram for breast cancer and Need for influenza vaccination were also pertinent to this visit.    78 year old female comes to clinic for screening orders.  She also reports abdominal issue.  Patient reports presence of an umbilical hernia for the last 6 months.  She reports the abnormality has been there for much longer but worsened over this short period of time.  She denies pain, constipation, or vomiting.  She does intermittent feelings of bloating. This has never been evaluated.      Review of Systems   Constitutional: Negative for activity change.   Respiratory: Negative for shortness of breath.    Cardiovascular: Negative for chest pain.   Gastrointestinal: Positive for abdominal distention. Negative for abdominal pain, constipation, diarrhea, nausea and vomiting.   Musculoskeletal: Negative for gait problem.   Psychiatric/Behavioral: Negative for suicidal ideas.           Current Outpatient Medications:     fish oil-omega-3 fatty acids 300-1,000 mg capsule, Take 1 g by mouth once daily., Disp: , Rfl:     flecainide (TAMBOCOR) 100 MG Tab, Take 1 tablet (100 mg total) by mouth every 12 (twelve) hours., Disp: 180 tablet, Rfl: 3    lisinopril (PRINIVIL,ZESTRIL) 40 MG tablet, TAKE 1 TABLET ONE TIME DAILY (Patient taking differently: every evening. TAKE 1 TABLET ONE TIME DAILY), Disp: 90 tablet, Rfl: 1    multivitamin (THERAGRAN) per tablet, Take 1 tablet by mouth every evening. 1 Tablet Oral Every day, Disp: , Rfl:     oxybutynin (DITROPAN) 5 MG Tab, Take 1 tablet (5 mg total) by mouth 3 (three) times daily., Disp: 90 tablet, Rfl: 11    simvastatin (ZOCOR) 40 MG tablet, Take 40 mg by mouth every evening., Disp: , Rfl:     warfarin (COUMADIN) 5 MG tablet, Take  "0.5-1 tablets (2.5-5 mg total) by mouth Daily. As directed by coumadin clinic, Disp: 90 tablet, Rfl: 3    baclofen (LIORESAL) 10 MG tablet, , Disp: , Rfl:     CYANOCOBALAMIN, VITAMIN B-12, (VITAMIN B-12 ORAL), Take 2,500 mcg by mouth every evening., Disp: , Rfl:     diclofenac sodium 1 % Gel, Apply 2 g topically once daily., Disp: 100 g, Rfl: 3    ketoconazole (NIZORAL) 2 % cream, , Disp: , Rfl:       Blood pressure 114/62, pulse 64, temperature 97.9 °F (36.6 °C), temperature source Oral, resp. rate 14, height 5' 6" (1.676 m), weight 109.2 kg (240 lb 11.9 oz), SpO2 (!) 92 %.    Physical Exam   Constitutional: Vital signs are normal. She appears well-developed and well-nourished. She does not appear ill. No distress.   Abdominal: Soft. She exhibits no distension and no mass. There is no tenderness. A hernia is present. Hernia confirmed positive in the ventral area.       Lab Visit on 09/20/2018   Component Date Value Ref Range Status    Microalbum.,U,Random 09/20/2018 10.0  ug/mL Final    Creatinine, Random Ur 09/20/2018 164.0  15.0 - 325.0 mg/dL Final    Microalb Creat Ratio 09/20/2018 6.1  0.0 - 30.0 ug/mg Final   Lab Visit on 09/20/2018   Component Date Value Ref Range Status    Sodium 09/20/2018 140  136 - 145 mmol/L Final    Potassium 09/20/2018 4.5  3.5 - 5.1 mmol/L Final    Chloride 09/20/2018 102  95 - 110 mmol/L Final    CO2 09/20/2018 30* 23 - 29 mmol/L Final    Glucose 09/20/2018 89  70 - 110 mg/dL Final    BUN, Bld 09/20/2018 21  8 - 23 mg/dL Final    Creatinine 09/20/2018 1.1  0.5 - 1.4 mg/dL Final    Calcium 09/20/2018 9.9  8.7 - 10.5 mg/dL Final    Total Protein 09/20/2018 7.6  6.0 - 8.4 g/dL Final    Albumin 09/20/2018 3.8  3.5 - 5.2 g/dL Final    Total Bilirubin 09/20/2018 0.6  0.1 - 1.0 mg/dL Final    Alkaline Phosphatase 09/20/2018 108  55 - 135 U/L Final    AST 09/20/2018 20  10 - 40 U/L Final    ALT 09/20/2018 14  10 - 44 U/L Final    Anion Gap 09/20/2018 8  8 - 16 mmol/L " Final    eGFR if  09/20/2018 56* >60 mL/min/1.73 m^2 Final    eGFR if non African American 09/20/2018 49* >60 mL/min/1.73 m^2 Final    Cholesterol 09/20/2018 221* 120 - 199 mg/dL Final    Triglycerides 09/20/2018 84  30 - 150 mg/dL Final    HDL 09/20/2018 50  40 - 75 mg/dL Final    LDL Cholesterol 09/20/2018 154.2  63.0 - 159.0 mg/dL Final    HDL/Chol Ratio 09/20/2018 22.6  20.0 - 50.0 % Final    Total Cholesterol/HDL Ratio 09/20/2018 4.4  2.0 - 5.0 Final    Non-HDL Cholesterol 09/20/2018 171  mg/dL Final    TSH 09/20/2018 1.372  0.400 - 4.000 uIU/mL Final   Lab Visit on 09/19/2018   Component Date Value Ref Range Status    Prothrombin Time 09/19/2018 20.3* 9.0 - 12.5 sec Final    INR 09/19/2018 1.9* 0.8 - 1.2 Final   Lab Visit on 09/05/2018   Component Date Value Ref Range Status    Prothrombin Time 09/05/2018 19.0* 9.0 - 12.5 sec Final    INR 09/05/2018 1.8* 0.8 - 1.2 Final   Anti-coag visit on 08/29/2018   Component Date Value Ref Range Status    INR 08/29/2018 4.1   Final   Lab Visit on 08/14/2018   Component Date Value Ref Range Status    Prothrombin Time 08/14/2018 27.1* 9.0 - 12.5 sec Final    INR 08/14/2018 2.6* 0.8 - 1.2 Final   Anti-coag visit on 08/08/2018   Component Date Value Ref Range Status    INR 08/08/2018 2.4   Final   Lab Visit on 08/01/2018   Component Date Value Ref Range Status    Prothrombin Time 08/01/2018 20.1* 9.0 - 12.5 sec Final    INR 08/01/2018 1.9* 0.8 - 1.2 Final   Anti-coag visit on 07/25/2018   Component Date Value Ref Range Status    INR 07/25/2018 3.4   Final   Lab Visit on 07/03/2018   Component Date Value Ref Range Status    Prothrombin Time 07/03/2018 24.9* 9.0 - 12.5 sec Final    INR 07/03/2018 2.4* 0.8 - 1.2 Final   There may be more visits with results that are not included.   ]    Assessment:    1. Umbilical hernia without obstruction and without gangrene    2. Encounter for screening mammogram for breast cancer    3. Need for  influenza vaccination          Alaina was seen today for follow-up and hernia.    Diagnoses and all orders for this visit:    Umbilical hernia without obstruction and without gangrene  -     Ambulatory referral to General Surgery  - New problem.  Refer to Surgery for consultation.    Encounter for screening mammogram for breast cancer  -     Cancel: Mammo Digital Screening Bilat with CAD; Future  -     Mammo Digital Screening Bilat with Tomosynthesis_CAD; Future    Need for influenza vaccination  -     Influenza - High Dose (65+) (PF) (IM)          FOLLOW UP: No Follow-up on file.

## 2018-09-20 NOTE — PROGRESS NOTES
Patient returned call and she was given lab result, she reports she continued coumadin: 5 mg daily except 2.5 mg Wednesday and Saturday, no other changes reported

## 2018-09-20 NOTE — PATIENT INSTRUCTIONS
Understanding Alopecia Areata    Alopecia areata is a condition that causes your hair to fall out. It causes bald patches on the scalp, but it can also cause hair loss on other parts of the body.  How to say it  [id number]   What causes alopecia areata?  Alopecia areata is an autoimmune disease. This means its caused by the bodys immune system attacking its own tissues. The immune system attacks the hair follicles. It causes hair to stop growing, and then break off and fall out.  You may be more likely to have alopecia areata if you have another type of autoimmune disease, such as:  · Thyroid disease  · Vitiligo  · Eczema (atopic dermatitis)  Symptoms of alopecia areata  The main symptom is one or more bald patches on the scalp that occur over a few weeks as hair falls out. Bald patches most often occur on the scalp, but hair can also fall out on the face and other parts of the body. The skin may itch or burn before the hair falls out. Then the skin may be smooth, or have some short hairs left. In some people, the rest of the hair on the head and the entire body may also thin or fall out.  Some people also have small dents or pits in their fingernails, or other nail symptoms. These may include roughness, cracks, red spots, or the nail pulling away from the finger.  Treatment for alopecia areata  You can choose not to have any treatment. For many people, the hair will grow back in time. In some cases, it may fall out again. Treatment doesnt prevent hair from falling out in the future.  Some medicines can help regrow hair faster, or stop hair from falling out. These medicines include:  · Corticosteroid medicine. This is injected into the areas where hair is falling out or gone. The injections are done every 4 to 12 weeks. Corticosteroid medicine can also be used as an ointment or cream put on the skin. These are often used along with the injections.  · Immunotherapy medicine. This is a type of  medicine that causes an allergic reaction on the skin. You apply it once a week as a lotion onto the skin of the scalp.  · Topical minoxidil. You put this over-the-counter medicine right on the scalp. It may help new hair grow.  · Other medicines. Many other medicines can be used, but they may suppress the immune system. They can have unwanted side effects.  Medicines used for treatment have side effects. They also work better on some people than others. It depends on how severe your hair loss is. Talk with your healthcare provider about what medicines are the best options for you.  Living with alopecia areata  For many people, the hair grows back within a year. But your hair may fall out again the future. This process may occur a few times over several years. Or the hair may not fully grow back. In some people, all the scalp hair or body hair may fall out.  Hair loss is more likely to happen again if you have any of these:  · Hair loss for more than 1 year  · Nail symptoms  · A family history of alopecia areata  · Hair loss that started in childhood  · Loss of a lot of hair  · Loss of hair in a band from the ears around the back of the head  Hair loss can cause stress and other emotional upset. Talk with your healthcare provider about finding support groups in your area to help you manage your condition. You can also talk to him or her about cosmetic fixes. A wig can be used to conceal bald patches. Tattooing of the eyebrows can restore the look of eyebrow hairs. Your healthcare provider may have resources to help.  When to call your healthcare provider  Call your healthcare provider right away if you have any of these:  · Symptoms that dont get better, or get worse  · New symptoms   Date Last Reviewed: 5/1/2016  © 2241-2697 [employer]. [address] [place] [address], [place] [address]. All rights reserved. This information is not intended as a substitute for professional medical care. Always follow your  healthcare professional's instructions.

## 2018-09-21 ENCOUNTER — CLINICAL SUPPORT (OUTPATIENT)
Dept: REHABILITATION | Facility: HOSPITAL | Age: 78
End: 2018-09-21
Payer: MEDICARE

## 2018-09-21 DIAGNOSIS — G89.29 CHRONIC BILATERAL LOW BACK PAIN WITHOUT SCIATICA: ICD-10-CM

## 2018-09-21 DIAGNOSIS — M54.50 CHRONIC BILATERAL LOW BACK PAIN WITHOUT SCIATICA: ICD-10-CM

## 2018-09-21 PROCEDURE — 97110 THERAPEUTIC EXERCISES: CPT | Mod: PN

## 2018-09-21 NOTE — PROGRESS NOTES
[facility] Back Physical Therapy Treatment      Name: Alaina Vee  Clinic Number: [id number]  Date of Treatment: 2018   Diagnosis:   Encounter Diagnosis   Name Primary?    Chronic bilateral low back pain without sciatica      Physician: Lindsay Rea MD    Pain pattern determined: 1  Plan of care signed: 8/15/18 through 11/15/18  Time in: 1400  Time Out: 1500  Total Treatment Time: 60 minutes (1:1 with PT for 60 minutes)  Total Billable Units: 3  Precautions: atrial fibrillation  Visit #: 8    Assessment 18  Assessment due: 10/618    Subjective     Alaina arrives to PT in good spirits and denies any pain today. She reports that she even mowed her lawn yesterday, and she does not have any issues today.     Patient reports tolerating previous visit well.  Patient reports their pain to be 0/10 on a 0-10 scale with 0 being no pain and 10 being the worst pain imaginable.  Pain Location: Bilateral lumbar region     Prior Treatment: meds  Prior functional status: Independent  DME owned/used: none  Leisure: Household activities/caring for her                      Pts goals:  Walk longer distances/increase strength    Objective     MOVEMENT LOSS     ROM Loss   Flexion moderate loss improved to min loss   Extension moderate loss   Side bending Right moderate loss   Side bending Left moderate loss   Rotation Right moderate loss   Rotation Left moderate loss      Baseline IM Testing Results:   Date of testin18  ROM 0-36 deg   Max Peak Torque 75 ft/lbs   Min Peak Torque 21 ft/lbs    Flex/Ext Ratio 3.57   % below normative data 48     CMS Impairment/Limitation/Restriction for FOTO Lumbar Spine Survey  Status Limitation G-Code CMS Severity Modifier  Intake  57% Current Status CK - At least 40 percent but less than 60 percent  Predicted 46% Goal Status+ CK - At least 40 percent but less than 60 percent    Foto visit 5, 60%  Goal:  46%-54%, CK     Treatment      Pt was instructed in and  performed the following:     Alaina received therapeutic exercises to develop/improved posture, cardiovascular endurance, muscular endurance, lumbar/cervical ROM, strength and muscular endurance for 60 minutes including the following exercises:     B seated Leg Kicks, 2# x 3 x 10 with 5 sec hold  B standing  Heel and Toe raises 3 x 10  Standing Back Extension with wall support, 10:5 sec hold  Pelvic tilts 10 reps 2/day - performed as isoflexion today x 10 reps  Single knee to chest 10 reps 2/day with towel  LTR 10 reps 2/day  Scap retraction with broom or cane behind back and arms behind cane 10 reps 2/day  Holding broom or cane overhead, quad lumb and UE stretch 10 reps bilat      [facility] 9/21/2018   Visit Number 8   VAS Pain Rating 0   Treadmill Time (in min.) 10   Speed 1.4   Incline 0   Extension in Standing 10   Flexion in Lying 10   Manual Therapy 0   Lumbar Extension Seat Pad -   Femur Restraint -   Top Dead Center -   Counterweight -   Lumbar Flexion -   Lumbar Extension -   Lumbar Peak Torque -   Min Torque -   Lumbar Weight 47   Repetitions 20   Rating of Perceived Exertion 3   Ice - Z Lie (in min.) 10         Peripheral muscle strengthening which included 1 set of 15-20 repetitions at a slow, controlled 7 second per rep pace focused on strengthening supporting musculature for improved body mechanics and functional mobility.  Pt and therapist focused on proper form during treatment to ensure optimal strengthening of each targeted muscle group.  Machines were utilized including torso rotation, chest press, rowing, triceps, leg curl, hip abd, hip add, sit to stand for leg press from elevated mat, and free weights for biceps.    Home Exercise Program as below:  B seated Leg Kicks,  3x10 with  5 sec hold  B seated Heel and Toe raises 3x10  Standing Back Extension with wall support, 10:5 sec hold  Pelvic tilts 10 reps 2/day  Single knee to chest 10 reps 2/day with towel  LTR 10 reps 2/day  scap  retraction with broom or cane behind back and arms behind cane 10 reps 2/day  Holding broom or cane overhead, quad lumb and UE stretch 10 reps bilat  DKTC 10 reps 3/day    Handouts were given to the patient. Pt demo good understanding of the education provided. Alaina demonstrated good return demonstration of activities.   Lumbar roll use compliance: yes    Assessment     Alaina completed today's session without concerns. She reported mild discomfort during stretches, which resolved as she carried on with the program today. She completed 20 reps at 47#, with an RPE of 3 as per previous session. All other peripheral exercises were completed as well w/o concerns today.      Pt will continue to benefit from skilled outpatient physical therapy to address the deficits stated in the impairment chart, provide pt/family education and to maximize pt's level of independence in the home and community environment.     Pt's spiritual, cultural and educational needs considered and pt agreeable to plan of care and goals as stated below:      GOALS: Pt is in agreement with the following goals.     Short term goals:  6 weeks or 10 visits   1.  Pt will demonstrate increased lumbar ROM by at least 3 degrees from the initial ROM value with improvements noted in functional ROM and ability to perform ADLs  2.  Pt will demonstrate increased maximum isometric torque value by 5% when compared to the initial value resulting in improved ability to perform bending, lifting, and carrying activities safely, confidently.  3.  Patient report a reduction in worst pain score by 1-2 points for improved tolerance during work and recreational activities  4.  Pt able to perform HEP correctly with minimal cueing or supervision for therapist     Long term goals: 13 weeks or 20 visits   1. Pt will demonstrate increased lumbar ROM by at least 6 degrees from initial ROM value, resulting in improved ability to perform functional fwd bending while standing and  sitting.   2. Pt will demonstrate increased maximum isometric torque value by 10% when compared to the initial value resulting in improved ability to perform bending, lifting, and carrying activities safely, confidently.  3. Pt to demonstrate ability to independently control and reduce their pain through posture positioning and mechanical movements throughout a typical day.  4.  Patient will demonstrate improved overall function per FOTO Survey to CK,  46%-54% impaired, limited or restricted score or less.    Plan     Continue with established Plan of Care towards established PT goals.     Vahe Murray, PT   09/21/2018

## 2018-09-24 ENCOUNTER — TELEPHONE (OUTPATIENT)
Dept: FAMILY MEDICINE | Facility: CLINIC | Age: 78
End: 2018-09-24

## 2018-09-25 ENCOUNTER — CLINICAL SUPPORT (OUTPATIENT)
Dept: REHABILITATION | Facility: HOSPITAL | Age: 78
End: 2018-09-25
Payer: MEDICARE

## 2018-09-25 DIAGNOSIS — G89.29 CHRONIC BILATERAL LOW BACK PAIN WITHOUT SCIATICA: ICD-10-CM

## 2018-09-25 DIAGNOSIS — M54.50 CHRONIC BILATERAL LOW BACK PAIN WITHOUT SCIATICA: ICD-10-CM

## 2018-09-25 PROCEDURE — 97110 THERAPEUTIC EXERCISES: CPT | Mod: PN

## 2018-09-25 NOTE — PROGRESS NOTES
[facility] Treatment      Name: Alaina Vee  Clinic Number: [id number]  Date of Treatment: 2018   Diagnosis:   Encounter Diagnosis   Name Primary?    Chronic bilateral low back pain without sciatica      Physician: Lindsay Rea MD    Pain pattern determined: 1  Plan of care signed: 8/15/18 through 11/15/18  Time in: 1430  Time Out: 15:30  Total Treatment Time: 60 minutes (1:1 with PT for 40 minutes)  Total Billable Units: 3  Precautions: atrial fibrillation  Visit #: 9    Assessment 18  Assessment due: 10/618    Subjective     Alaina arrives to PT in good spirits and denies any pain today. She reports that she even mowed her lawn yesterday, and she does not have any issues today.     Patient reports tolerating previous visit well.  Patient reports their pain to be 0/10 on a 0-10 scale with 0 being no pain and 10 being the worst pain imaginable.  Pain Location: Bilateral lumbar region     Prior Treatment: meds  Prior functional status: Independent  DME owned/used: none  Leisure: Household activities/caring for her                      Pts goals:  Walk longer distances/increase strength    Objective     MOVEMENT LOSS     ROM Loss   Flexion moderate loss improved to min loss   Extension moderate loss   Side bending Right moderate loss   Side bending Left moderate loss   Rotation Right moderate loss   Rotation Left moderate loss      Baseline IM Testing Results:   Date of testin18  ROM 0-36 deg   Max Peak Torque 75 ft/lbs   Min Peak Torque 21 ft/lbs    Flex/Ext Ratio 3.57   % below normative data 48     CMS Impairment/Limitation/Restriction for FOTO Lumbar Spine Survey  Status Limitation G-Code CMS Severity Modifier  Intake  57% Current Status CK - At least 40 percent but less than 60 percent  Predicted 46% Goal Status+ CK - At least 40 percent but less than 60 percent    Foto visit 5, 60%  Goal:  46%-54%, CK     Treatment      Pt was instructed in and  performed the following:     Alaina received therapeutic exercises to develop/improved posture, cardiovascular endurance, muscular endurance, lumbar/cervical ROM, strength and muscular endurance for 60 minutes including the following exercises:     B seated Leg Kicks, 2# x 3 x 10 with 5 sec hold  B standing  Heel and Toe raises 3 x 10  Standing Back Extension with wall support, 10:5 sec hold  Pelvic tilts 10 reps 2/day - performed as isoflexion today x 10 reps  Single knee to chest 10 reps 2/day with towel  LTR 10 reps 2/day  Scap retraction with broom or cane behind back and arms behind cane 10 reps 2/day  Holding broom or cane overhead, quad lumb and UE stretch 10 reps bilat    [facility] 9/25/2018   Visit Number 9   VAS Pain Rating 0   Treadmill Time (in min.) 10   Speed 1.4   Incline 0   Extension in Standing -   Flexion in Lying -   Manual Therapy -   Lumbar Extension Seat Pad -   Femur Restraint -   Top Dead Center -   Counterweight -   Lumbar Flexion 39   Lumbar Extension 0   Lumbar Peak Torque -   Min Torque -   Lumbar Weight 47   Repetitions 20   Rating of Perceived Exertion 3   Ice - Z Lie (in min.) 10         Peripheral muscle strengthening which included 1 set of 15-20 repetitions at a slow, controlled 7 second per rep pace focused on strengthening supporting musculature for improved body mechanics and functional mobility.  Pt and therapist focused on proper form during treatment to ensure optimal strengthening of each targeted muscle group.  Machines were utilized including torso rotation, chest press, rowing, triceps, leg curl, hip abd, hip add, sit to stand for leg press from elevated mat, and free weights for biceps.    Home Exercise Program as below:  B seated Leg Kicks,  3x10 with  5 sec hold  B seated Heel and Toe raises 3x10  Standing Back Extension with wall support, 10:5 sec hold  Pelvic tilts 10 reps 2/day  Single knee to chest 10 reps 2/day with towel  LTR 10 reps 2/day  scap  retraction with broom or cane behind back and arms behind cane 10 reps 2/day  Holding broom or cane overhead, quad lumb and UE stretch 10 reps bilat  DKTC 10 reps 3/day    Handouts were given to the patient. Pt demo good understanding of the education provided. Alaina demonstrated good return demonstration of activities.   Lumbar roll use compliance: yes    Assessment     Alaina completed today's session without concerns.Good tolerance to increase in 3 degrees in Medx lumbar extension. Pt able to tolerated 47 ft lbs with 20 reps, and RPE of 3. Pt tolerated peripheral muscle strengthing with no adverse effects. Pt will be re-asssessed next visit.     Pt will continue to benefit from skilled outpatient physical therapy to address the deficits stated in the impairment chart, provide pt/family education and to maximize pt's level of independence in the home and community environment.     Pt's spiritual, cultural and educational needs considered and pt agreeable to plan of care and goals as stated below:      GOALS: Pt is in agreement with the following goals.     Short term goals:  6 weeks or 10 visits   1.  Pt will demonstrate increased lumbar ROM by at least 3 degrees from the initial ROM value with improvements noted in functional ROM and ability to perform ADLs  2.  Pt will demonstrate increased maximum isometric torque value by 5% when compared to the initial value resulting in improved ability to perform bending, lifting, and carrying activities safely, confidently.  3.  Patient report a reduction in worst pain score by 1-2 points for improved tolerance during work and recreational activities  4.  Pt able to perform HEP correctly with minimal cueing or supervision for therapist     Long term goals: 13 weeks or 20 visits   1. Pt will demonstrate increased lumbar ROM by at least 6 degrees from initial ROM value, resulting in improved ability to perform functional fwd bending while standing and sitting.   2. Pt will  demonstrate increased maximum isometric torque value by 10% when compared to the initial value resulting in improved ability to perform bending, lifting, and carrying activities safely, confidently.  3. Pt to demonstrate ability to independently control and reduce their pain through posture positioning and mechanical movements throughout a typical day.  4.  Patient will demonstrate improved overall function per FOTO Survey to CK,  46%-54% impaired, limited or restricted score or less.    Plan     Continue with established Plan of Care towards established PT goals.     Benoit Jones, PT   09/25/2018

## 2018-09-26 ENCOUNTER — HOSPITAL ENCOUNTER (OUTPATIENT)
Dept: RADIOLOGY | Facility: HOSPITAL | Age: 78
Discharge: HOME | End: 2018-09-26
Attending: FAMILY MEDICINE
Payer: MEDICARE

## 2018-09-26 ENCOUNTER — TELEPHONE (OUTPATIENT)
Dept: SURGERY | Facility: CLINIC | Age: 78
End: 2018-09-26

## 2018-09-26 VITALS — WEIGHT: 240 LBS | HEIGHT: 66 IN | BODY MASS INDEX: 38.57 KG/M2

## 2018-09-26 DIAGNOSIS — Z12.31 ENCOUNTER FOR SCREENING MAMMOGRAM FOR BREAST CANCER: ICD-10-CM

## 2018-09-26 PROCEDURE — 77067 SCR MAMMO BI INCL CAD: CPT | Mod: 26,,, | Performed by: RADIOLOGY

## 2018-09-26 PROCEDURE — 77063 BREAST TOMOSYNTHESIS BI: CPT | Mod: TC

## 2018-09-26 PROCEDURE — 77063 BREAST TOMOSYNTHESIS BI: CPT | Mod: 26,,, | Performed by: RADIOLOGY

## 2018-09-27 ENCOUNTER — TELEPHONE (OUTPATIENT)
Dept: FAMILY MEDICINE | Facility: CLINIC | Age: 78
End: 2018-09-27

## 2018-09-27 NOTE — TELEPHONE ENCOUNTER
----- Message from Lindsay Rea MD sent at 9/26/2018  5:00 PM CDT -----  Please contact patient and inform that her mammogram was normal.  She will repeat her screening mammogram in 1 year.

## 2018-09-28 NOTE — PROGRESS NOTES
[facility]      Name: Alaina Vee  Clinic Number: [id number]  Date of Treatment: 10/01/2018   Diagnosis:   Encounter Diagnosis   Name Primary?    Chronic bilateral low back pain without sciatica      Physician: Lindsay Rea MD    Pain pattern determined: 1  Plan of care signed: 8/15/18 through 11/15/18   G-code: drop visit 10th  Time in: 9:03 am  Time Out: 10:06 am  Total Treatment Time: 63 minutes (1:1 with PT for 53 minutes)  Total Billable Units: 3  Precautions: atrial fibrillation  Visit #: 10    Assessment 18  Assessment due: 10/618    Subjective      Alaina reports increase pain in the right lower back today. Pt states since she has began therapy she gain more mobility, but lower back pain is about the same.     Patient reports tolerating previous visit well.  Patient reports their pain to be 3/10 on a 0-10 scale with 0 being no pain and 10 being the worst pain imaginable.  Pain Location: Bilateral lumbar region     Prior Treatment: meds  Prior functional status: Independent  DME owned/used: none  Leisure: Household activities/caring for her                      Pts goals:  Walk longer distances/increase strength    Objective     MOVEMENT LOSS     ROM Loss   Flexion  min loss   Extension Moderate to min  loss   Side bending Right moderate to min loss   Side bending Left moderate to min loss   Rotation Right Moderate to min  loss   Rotation Left Moderate to min loss      Baseline IM Testing Results:   Date of testin18  ROM 0-36 deg   Max Peak Torque 75 ft/lbs   Min Peak Torque 21 ft/lbs    Flex/Ext Ratio 3.57   % below normative data 48     Mid-term IM Testing Results:   Date of testing: 10-01-18  ROM 0-39 deg   Max Peak Torque 89 ft/lbs   Min Peak Torque 71 ft/lbs    Flex/Ext Ratio 1.2   % below normative data 10%     CMS Impairment/Limitation/Restriction for FOTO Lumbar Spine Survey  Status Limitation G-Code CMS Severity Modifier  Intake 43%  57%  Predicted 54% 46% Goal Status+ CK - At least 40 percent but less than 60 percent  9/10/2018 40% 60%  10/1/2018 70% 30% Current Status CJ - At least 20 percent but less than 40 percent  D/C Status CJ **only report if this is discharge survey  +Based on FOTO predicted change score    Foto visit 5, 60%  Goal:  46%-54%, CK     Treatment      Pt was instructed in and performed the following:     Alaina received therapeutic exercises to develop/improved posture, cardiovascular endurance, muscular endurance, lumbar/cervical ROM, strength and muscular endurance for 60 minutes including the following exercises:     B seated Leg Kicks, 2# x 3 x 10 with 5 sec hold  B standing  Heel and Toe raises 3 x 10  Standing Back Extension with wall support, 10:5 sec hold  Pelvic tilts 10 reps 2/day - performed as isoflexion today x 10 reps  Single knee to chest 10 reps 2/day with towel  LTR 10 reps 2/day  Scap retraction with broom or cane behind back and arms behind cane 10 reps 2/day  Holding broom or cane overhead, quad lumb and UE stretch 10 reps bilat    [facility] 10/1/2018   Visit Number 10   VAS Pain Rating 3   Treadmill Time (in min.) 10   Speed 1.4   Incline 0   Extension in Standing -   Flexion in Lying -   Manual Therapy -   Lumbar Extension Seat Pad 0   Femur Restraint 5   Top Dead Center 24   Counterweight 121   Lumbar Flexion 39   Lumbar Extension 0   Lumbar Peak Torque 89   Min Torque 71   Test Percent Below Normative Data 10   Test Percent Gain in Strength from Initial  99   Lumbar Weight -   Repetitions -   Rating of Perceived Exertion -   Ice - Z Lie (in min.) 10     Peripheral muscle strengthening which included 1 set of 15-20 repetitions at a slow, controlled 7 second per rep pace focused on strengthening supporting musculature for improved body mechanics and functional mobility.  Pt and therapist focused on proper form during treatment to ensure optimal strengthening of each targeted muscle group.   Machines were utilized including torso rotation, chest press, rowing, triceps, leg curl, hip abd, hip add, sit to stand for leg press from elevated mat, and free weights for biceps.    Home Exercise Program as below:  B seated Leg Kicks,  3x10 with  5 sec hold  B seated Heel and Toe raises 3x10  Standing Back Extension with wall support, 10:5 sec hold  Pelvic tilts 10 reps 2/day  Single knee to chest 10 reps 2/day with towel  LTR 10 reps 2/day  scap retraction with broom or cane behind back and arms behind cane 10 reps 2/day  Holding broom or cane overhead, quad lumb and UE stretch 10 reps bilat  DKTC 10 reps 3/day    Handouts were given to the patient. Pt demo good understanding of the education provided. Alaina demonstrated good return demonstration of activities.   Lumbar roll use compliance: yes    Assessment      Patient has attended 10 visits at [facility] which included MD evaluation, PT evaluation with isometric testing, and physical therapy treatment including HEP instruction, education, aerobic work, dynamic strengthening on med ex equipment for the spine, and whole body strengthening on med ex equipment with increasing weight loads.  Patient  is demonstrating increased ability to reduce symptoms, improved posture, improved   ROM, and improved  strength on med ex tre. Pt scored  10% below the normative data today. Pt was 48% below normative data in the initial eval. Pt demonstrated an 99% improvement on Medx lumbar extension muscle strength comparing to his initial data. Pt demonstrated Max peak torque of 89 ft/lbs and Min peak torque of 71 ft/lbs today. Pt demonstrated an improvement since initial eval (Max peak torque of 75% ft/lbs and Min peak torque of 21 ft/lbs). FOTO lumbar spine survey demonstrated 30% impairment today. Pt has met 3/4 STGs today. Overall, pt is tolerating HB program well. Cont skilled PT services to decrease functional limitations.     Pt will continue to benefit from  skilled outpatient physical therapy to address the deficits stated in the impairment chart, provide pt/family education and to maximize pt's level of independence in the home and community environment.     Pt's spiritual, cultural and educational needs considered and pt agreeable to plan of care and goals as stated below:      GOALS: Pt is in agreement with the following goals.     Short term goals:  6 weeks or 10 visits updated 10/01/2018  1.  Pt will demonstrate increased lumbar ROM by at least 3 degrees from the initial ROM value with improvements noted in functional ROM and ability to perform ADLs (MET)  2.  Pt will demonstrate increased maximum isometric torque value by 5% when compared to the initial value resulting in improved ability to perform bending, lifting, and carrying activities safely, confidently. (MET)  3.  Patient report a reduction in worst pain score by 1-2 points for improved tolerance during work and recreational activities (goal in progress)  4.  Pt able to perform HEP correctly with minimal cueing or supervision for therapist (( MET)     Long term goals: 13 weeks or 20 visits   1. Pt will demonstrate increased lumbar ROM by at least 6 degrees from initial ROM value, resulting in improved ability to perform functional fwd bending while standing and sitting.   2. Pt will demonstrate increased maximum isometric torque value by 10% when compared to the initial value resulting in improved ability to perform bending, lifting, and carrying activities safely, confidently.  3. Pt to demonstrate ability to independently control and reduce their pain through posture positioning and mechanical movements throughout a typical day.  4.  Patient will demonstrate improved overall function per FOTO Survey to CK,  46%-54% impaired, limited or restricted score or less.    Plan     Continue with established Plan of Care towards established PT goals.     Benoit Jones, PT   10/01/2018

## 2018-10-01 ENCOUNTER — CLINICAL SUPPORT (OUTPATIENT)
Dept: REHABILITATION | Facility: HOSPITAL | Age: 78
End: 2018-10-01
Payer: MEDICARE

## 2018-10-01 ENCOUNTER — TELEPHONE (OUTPATIENT)
Dept: PAIN MEDICINE | Facility: CLINIC | Age: 78
End: 2018-10-01

## 2018-10-01 DIAGNOSIS — G89.29 CHRONIC BILATERAL LOW BACK PAIN WITHOUT SCIATICA: ICD-10-CM

## 2018-10-01 DIAGNOSIS — M54.50 CHRONIC BILATERAL LOW BACK PAIN WITHOUT SCIATICA: ICD-10-CM

## 2018-10-01 PROCEDURE — G8978 MOBILITY CURRENT STATUS: HCPCS | Mod: CJ,PN

## 2018-10-01 PROCEDURE — 97750 PHYSICAL PERFORMANCE TEST: CPT | Mod: PN

## 2018-10-01 PROCEDURE — 97110 THERAPEUTIC EXERCISES: CPT | Mod: PN

## 2018-10-01 PROCEDURE — G8979 MOBILITY GOAL STATUS: HCPCS | Mod: CK,PN

## 2018-10-01 NOTE — TELEPHONE ENCOUNTER
Reminded patient of Pain Management appointment scheduled for tomorrow at 1.45p with Dr. March- verbal confirmation received.  Location information also provided.

## 2018-10-02 ENCOUNTER — LAB VISIT (OUTPATIENT)
Dept: LAB | Facility: HOSPITAL | Age: 78
End: 2018-10-02
Attending: INTERNAL MEDICINE
Payer: MEDICARE

## 2018-10-02 ENCOUNTER — ANTI-COAG VISIT (OUTPATIENT)
Dept: CARDIOLOGY | Facility: CLINIC | Age: 78
End: 2018-10-02

## 2018-10-02 ENCOUNTER — OFFICE VISIT (OUTPATIENT)
Dept: PAIN MEDICINE | Facility: CLINIC | Age: 78
End: 2018-10-02
Payer: MEDICARE

## 2018-10-02 VITALS
RESPIRATION RATE: 18 BRPM | BODY MASS INDEX: 38.65 KG/M2 | WEIGHT: 240.5 LBS | DIASTOLIC BLOOD PRESSURE: 57 MMHG | HEIGHT: 66 IN | HEART RATE: 80 BPM | SYSTOLIC BLOOD PRESSURE: 101 MMHG

## 2018-10-02 DIAGNOSIS — M47.816 LUMBAR SPONDYLOSIS: ICD-10-CM

## 2018-10-02 DIAGNOSIS — Z79.01 LONG TERM (CURRENT) USE OF ANTICOAGULANTS: ICD-10-CM

## 2018-10-02 DIAGNOSIS — M48.061 SPINAL STENOSIS OF LUMBAR REGION, UNSPECIFIED WHETHER NEUROGENIC CLAUDICATION PRESENT: ICD-10-CM

## 2018-10-02 DIAGNOSIS — M51.36 DDD (DEGENERATIVE DISC DISEASE), LUMBAR: Primary | ICD-10-CM

## 2018-10-02 LAB
INR PPP: 1.9
PROTHROMBIN TIME: 20.4 SEC

## 2018-10-02 PROCEDURE — 3074F SYST BP LT 130 MM HG: CPT | Mod: CPTII,,, | Performed by: PAIN MEDICINE

## 2018-10-02 PROCEDURE — 1101F PT FALLS ASSESS-DOCD LE1/YR: CPT | Mod: CPTII,,, | Performed by: PAIN MEDICINE

## 2018-10-02 PROCEDURE — 99214 OFFICE O/P EST MOD 30 MIN: CPT | Mod: S$PBB,,, | Performed by: PAIN MEDICINE

## 2018-10-02 PROCEDURE — 99999 PR PBB SHADOW E&M-EST. PATIENT-LVL III: CPT | Mod: PBBFAC,,, | Performed by: PAIN MEDICINE

## 2018-10-02 PROCEDURE — 85610 PROTHROMBIN TIME: CPT

## 2018-10-02 PROCEDURE — 99213 OFFICE O/P EST LOW 20 MIN: CPT | Mod: PBBFAC,PN | Performed by: PAIN MEDICINE

## 2018-10-02 PROCEDURE — 36415 COLL VENOUS BLD VENIPUNCTURE: CPT | Mod: PN

## 2018-10-02 PROCEDURE — 3078F DIAST BP <80 MM HG: CPT | Mod: CPTII,,, | Performed by: PAIN MEDICINE

## 2018-10-02 NOTE — PROGRESS NOTES
Subjective:     Patient ID: Alaina Vee is a 78 y.o. female    Chief Complaint: Follow-up (10 week f/u)      Referred by: No ref. provider found      HPI:    Interval History (10/2/18):  She returns today for follow up.  She reports that PT has been helpful for the low back pain, but she still has significant pain. The pain is unchanged in quality and location. She denies any new associated symptoms. She is being evaluated for possible hernia repair soon.    Initial Encounter (7/24/18):  Alaina Vee is a 78 y.o. female who presents today with chronic bilateral low back pain. This pain has been present for years. No specific inciting event or injury. The pain is located in the bilateral lower lumbar regions. It does not radiate. She denies any associated numbness, tingling, weakness or b/b dysfunction. The pain is constant. She denies any worsening with activity. She is planned to start PT soon.   This pain is described in detail below.    Physical Therapy: Helpful    Non-pharmacologic Treatment: Nothing helps         · TENS? No    Pain Medications:         · Currently taking: Voltaren gel, Baclofen    · Has tried in the past:      · Has not tried: Opioids, NSAIDs, Tylenol, TCAs, SNRIs, anticonvulsants    Blood thinners: Warfarin    Interventional Therapies: None    Relevant Surgeries: None    Affecting sleep? Yes    Affecting daily activities? yes    Depressive symptoms? no          · SI/HI? No    Work status: Unemployed    Pain Scores:    Best:       3/10  Worst:     9/10  Usually:   5/10  Today:    1/10    Review of Systems   Constitutional: Negative for activity change, appetite change, chills, fatigue, fever and unexpected weight change.   HENT: Negative for hearing loss.    Eyes: Negative for visual disturbance.   Respiratory: Negative for chest tightness and shortness of breath.    Cardiovascular: Negative for chest pain.   Gastrointestinal: Negative for abdominal pain, constipation,  diarrhea, nausea and vomiting.   Genitourinary: Negative for difficulty urinating.   Musculoskeletal: Positive for back pain. Negative for gait problem and neck pain.   Skin: Negative for rash.   Neurological: Negative for dizziness, weakness, light-headedness, numbness and headaches.   Psychiatric/Behavioral: Positive for sleep disturbance. Negative for hallucinations and suicidal ideas. The patient is not nervous/anxious.        Past Medical History:   Diagnosis Date    Arthritis     knees    Atrial fibrillation     Atrial flutter     Back pain     Degenerative disc disease     Depression     General anesthetics causing adverse effect in therapeutic use     pt states sometimes slow to awaken.    GERD (gastroesophageal reflux disease)     Hyperlipidemia     Hypertension     Kidney stone     Obesity     Sleep apnea     does not wear CPAP    Varicosities     Ventral hernia        Past Surgical History:   Procedure Laterality Date    ABLATION N/A 6/12/2017    [facility] C[facility] LAB    APPENDECTOMY      BREAST BIOPSY Bilateral     1985    breast biopsy, left      CARDIOVERSION N/A 10/4/2017    [facility] CATH LAB    CARDIOVERSION N/A 8/31/2017    [facility] CATH LAB    CHOLECYSTECTOMY      JOINT REPLACEMENT      TKR , right    JOINT REPLACEMENT  2009    TKR  left    LITHOTRIPSY-EXTRACORPOREAL SHOCK WAVE Right 2/27/2017    [facility]    CO EXPLORATORY OF ABDOMEN      TRANSESOPHAGEAL ECHOCARDIOGRAM (SKINNY) N/A 8/31/2017    [facility] CATH LAB    TRANSESOPHAGEAL ECHOCARDIOGRAM (SKINNY) N/A 6/12/2017    Per[facility] CATH LAB       Social History     Socioeconomic History    Marital status:      Spouse name: Not on file    Number of children: Not on file    Years of education: Not on file    Highest education  "level: Not on file   Social Needs    Financial resource strain: Not on file    Food insecurity - worry: Not on file    Food insecurity - inability: Not on file    Transportation needs - medical: Not on file    Transportation needs - non-medical: Not on file   Occupational History    Not on file   Tobacco Use    Smoking status: Never Smoker    Smokeless tobacco: Never Used   Substance and Sexual Activity    Alcohol use: No    Drug use: No    Sexual activity: No   Other Topics Concern    Not on file   Social History Narrative    Not on file       Review of patient's allergies indicates:   Allergen Reactions    Lipitor [atorvastatin] Other (See Comments)       Current Outpatient Medications on File Prior to Visit   Medication Sig Dispense Refill    baclofen (LIORESAL) 10 MG tablet       CYANOCOBALAMIN, VITAMIN B-12, (VITAMIN B-12 ORAL) Take 2,500 mcg by mouth every evening.      diclofenac sodium 1 % Gel Apply 2 g topically once daily. 100 g 3    fish oil-omega-3 fatty acids 300-1,000 mg capsule Take 1 g by mouth once daily.      flecainide (TAMBOCOR) 100 MG Tab Take 1 tablet (100 mg total) by mouth every 12 (twelve) hours. 180 tablet 3    ketoconazole (NIZORAL) 2 % cream       lisinopril (PRINIVIL,ZESTRIL) 40 MG tablet TAKE 1 TABLET ONE TIME DAILY (Patient taking differently: every evening. TAKE 1 TABLET ONE TIME DAILY) 90 tablet 1    multivitamin (THERAGRAN) per tablet Take 1 tablet by mouth every evening. 1 Tablet Oral Every day      oxybutynin (DITROPAN) 5 MG Tab Take 1 tablet (5 mg total) by mouth 3 (three) times daily. 90 tablet 11    simvastatin (ZOCOR) 40 MG tablet Take 40 mg by mouth every evening.      warfarin (COUMADIN) 5 MG tablet Take 0.5-1 tablets (2.5-5 mg total) by mouth Daily. As directed by coumadin clinic 90 tablet 3     No current facility-administered medications on file prior to visit.        Objective:      BP (!) 101/57   Pulse 80   Resp 18   Ht 5' 6" (1.676 m)   Wt " 109.1 kg (240 lb 8 oz)   BMI 38.82 kg/m²     Exam:  GEN:  Well developed, well nourished.  No acute distress. Normal pain behavior.  HEENT:  No trauma.  Mucous membranes moist.  Nares patent bilaterally.  PSYCH: Normal affect. Thought content appropriate.  CHEST:  Breathing symmetric.  No audible wheezing.  ABD: Soft, non-distended.  SKIN:  Warm, pink, dry.  No rash on exposed areas.    EXT:  No cyanosis, clubbing, or edema.  No color change or changes in nail or hair growth.  NEURO/MUSCULOSKELETAL:  Fully alert, oriented, and appropriate. Speech normal micheal. No cranial nerve deficits.   Gait: Normal.  No trendelenburg sign bilaterally.   Motor Strength: 5/5 motor strength throughout lower extremities.   Sensory: No sensory deficit in the lower extremities.   Reflexes:  2+ and symmetric throughout.  Downgoing Babinski's bilaterally.  No clonus or spasticity.  L-Spine:  Limited ROM with pain on extension. Positive facet loading bilaterally.  Negative SLR bilaterally.    SI Joint/Hip: Unable to perform ANGELINA bilaterally.  Unable to perform FADIR bilaterally.  No TTP over lumbar paraspinals, bilateral SI joints, hips, piriformis muscles, or GTB.          Imaging:  Narrative     History: Lumbar radiculopathy.    Procedure: Sagittal T1, T2, STIR sequences and axial T1 and T2 weighted sequences are performed.    Comparison study: CT scan 12/20/2010.    There is a slight retrolisthesis of L2 on L3.  Degenerative disc disease throughout the lumbar spine.  No acute fractures or marrow replacing processes to suggest neoplastic processes.  The tip of the conus is at the L2 level, at lower limits of normal.  Paraspinal soft tissues are unremarkable.    L5-S1: Degenerative disc disease associated with mild global posterior bulging of the annulus.  There is mild bilateral foraminal stenosis without definite signs for compression of the exiting L5 roots.  No significant central canal stenosis.  There is degenerative  hypertrophic facet arthropathy.    L4-L5: Severe degenerative disc disease with disc space narrowing and a moderate global degenerative disc bulge with compression of the thecal sac.  There is hypertrophic facet arthropathy and hypertrophy of the ligamentum flavum a resulting in  moderate central canal spinal stenosis.  Moderate bilateral foraminal stenosis.    L3-L4: Disc degeneration associated with a global mild degenerative disc bulge with compression of the thecal sac.  There is degenerative hypertrophic facet arthropathy.  Mild central canal spinal stenosis.    L2-L3: Approximately 3 mm retrolisthesis of L2 on L3 associated with a global degenerative disc bulge.  There is Schmorl's nodes noted at the endplates of L2 and L3.  Posterior displacement of thecal sac from retrolisthesis.  There is hypertrophic facet arthropathy.  Mild central canal spinal stenosis.    L1-L2: Spondylosis with mild degenerative disc bulge without significant spinal stenosis.  There is facet arthropathy.    T12-L1: There is type I Modic degeneration associated with a global degenerative disc bulge with mild compression of the thecal sac.  No cord compression.  Degenerative facet arthropathy bilaterally without significant spinal stenosis there    On the sagittal images there is also spondylosis at T10-T11 and T11-T12 disc spaces.   Impression         1.  Multilevel lumbar spondylosis with spinal stenosis at the L4-L5 and L3-L4 and L2-L3 disc spaces as above.  2.  Multilevel facet arthropathy.        Electronically signed by: PRECIOUS MAYER MD  Date: 06/07/17  Time: 07:45          Assessment:       Encounter Diagnoses   Name Primary?    DDD (degenerative disc disease), lumbar Yes    Lumbar spondylosis     Spinal stenosis of lumbar region, unspecified whether neurogenic claudication present          Plan:       Alaina was seen today for follow-up.    Diagnoses and all orders for this visit:    DDD (degenerative disc disease),  lumbar    Lumbar spondylosis    Spinal stenosis of lumbar region, unspecified whether neurogenic claudication present        Alaina Vee is a 78 y.o. female with chronic bilateral low back pain. Appears to be axial. Most likely facet mediated. Given MRI findings, it is possible that she has neurogenic claudication, but her symptoms and exam are more consistent with facet pain.    1. We discussed potentially performing bilateral L2-5 MBBs/RFA. Patient wishes to wait until her hernia is fully evaluated/treated before making decision to undergo pain procedures. Procedures were discussed today and written consent was obtained.  2. Patient will call if she decides to undergo abovementioned procedures.

## 2018-10-03 ENCOUNTER — CLINICAL SUPPORT (OUTPATIENT)
Dept: REHABILITATION | Facility: HOSPITAL | Age: 78
End: 2018-10-03
Payer: MEDICARE

## 2018-10-03 DIAGNOSIS — G89.29 CHRONIC BILATERAL LOW BACK PAIN WITHOUT SCIATICA: ICD-10-CM

## 2018-10-03 DIAGNOSIS — M54.50 CHRONIC BILATERAL LOW BACK PAIN WITHOUT SCIATICA: ICD-10-CM

## 2018-10-03 PROCEDURE — 97110 THERAPEUTIC EXERCISES: CPT | Mod: PN

## 2018-10-03 NOTE — PROGRESS NOTES
[facility] Back Physical Therapy Treatment      Name: Alaina Vee  Clinic Number: [id number]  Date of Treatment: 10/03/2018   Diagnosis:   Encounter Diagnosis   Name Primary?    Chronic bilateral low back pain without sciatica      Physician: Lindsay Rea MD    Pain pattern determined: 1  Plan of care signed: 8/15/18 through 11/15/18   G-code: drop visit 10th  Time in: 1400  Time Out: 1520  Total Treatment Time: 70 minutes (1:1 with PTA for 38 minutes)  Total Billable Units: 3  Precautions: atrial fibrillation  Visit #: 11    Assessment 18  Assessment due: 10/618    Subjective      Alaina reports that she feels better than she did on Monday. Patient denies any pain currently.    Patient reports tolerating previous visit well.  Patient reports their pain to be 0 out of 10 on a 0-10 scale with 0 being no pain and 10 being the worst pain imaginable.  Pain Location: Bilateral lumbar region     Prior Treatment: meds  Prior functional status: Independent  DME owned/used: none  Leisure: Household activities/caring for her                      Pts goals:  Walk longer distances/increase strength    Objective     MOVEMENT LOSS     ROM Loss   Flexion  min loss   Extension Moderate to min  loss   Side bending Right moderate to min loss   Side bending Left moderate to min loss   Rotation Right Moderate to min  loss   Rotation Left Moderate to min loss      Baseline IM Testing Results:   Date of testin18  ROM 0-36 deg   Max Peak Torque 75 ft/lbs   Min Peak Torque 21 ft/lbs    Flex/Ext Ratio 3.57   % below normative data 48     Mid-term IM Testing Results:   Date of testing: 10-01-18  ROM 0-39 deg   Max Peak Torque 89 ft/lbs   Min Peak Torque 71 ft/lbs    Flex/Ext Ratio 1.2   % below normative data 10%     CMS Impairment/Limitation/Restriction for FOTO Lumbar Spine Survey  Status Limitation G-Code CMS Severity Modifier  Intake 43% 57%  Predicted 54% 46% Goal Status+ CK - At least 40 percent but  less than 60 percent  9/10/2018 40% 60%  10/1/2018 70% 30% Current Status CJ - At least 20 percent but less than 40 percent  D/C Status CJ **only report if this is discharge survey  +Based on FOTO predicted change score    Foto visit 5, 60%  Goal:  46%-54%, CK     Treatment      Pt was instructed in and performed the following:     Alaina received therapeutic exercises to develop/improved posture, cardiovascular endurance, muscular endurance, lumbar/cervical ROM, strength and muscular endurance for 70 minutes including the following exercises:     B seated Leg Kicks, 2# x 3 x 10 with 5 sec hold  B standing Heel and Toe raises 3 x 10  Standing Back Extension with wall support, 10:5 sec hold  Pelvic tilts 10 reps 2/day - performed as isoflexion today x 10 reps  Single knee to chest 10 reps 2/day with towel  LTR 10 reps 2/da  Scap retraction with broom or cane behind back and arms behind cane 10 reps 2/day  Holding broom or cane overhead, quad lumb and UE stretch 10 reps bilat    [facility] 10/3/2018   Visit Number 11   VAS Pain Rating 0   Treadmill Time (in min.) 10   Speed 1.4   Incline 0   Flexion in Lying 10   Manual Therapy 0   Lumbar Weight 50   Repetitions 20   Rating of Perceived Exertion 3   Ice - Z Lie (in min.) 10     Peripheral muscle strengthening which included 1 set of 15-20 repetitions at a slow, controlled 7 second per rep pace focused on strengthening supporting musculature for improved body mechanics and functional mobility.  Pt and therapist focused on proper form during treatment to ensure optimal strengthening of each targeted muscle group.  Machines were utilized including torso rotation, chest press, rowing, triceps, leg curl, hip abd, hip add, sit to stand for leg press from elevated mat, and free weights for biceps.    Home Exercise Program as below:  B seated Leg Kicks,  3x10 with  5 sec hold  B seated Heel and Toe raises 3x10  Standing Back Extension with wall support, 10:5 sec  hold  Pelvic tilts 10 reps 2/day  Single knee to chest 10 reps 2/day with towel  LTR 10 reps 2/day  scap retraction with broom or cane behind back and arms behind cane 10 reps 2/day  Holding broom or cane overhead, quad lumb and UE stretch 10 reps bilat  DKTC 10 reps 3/day    Handouts were given to the patient. Pt demo good understanding of the education provided. Alaina demonstrated good return demonstration of activities.   Lumbar roll use compliance: yes    Assessment     Patient tolerated treatment session well today. Good tolerance to exercises performed reporting appropriate muscle fatigue throughout. Patient with good tolerance to 5% increase in ft/lbs on MedX machine. Patient able to complete peripheral machines with no exacerbation of pain. Patient is progressing well with current POC.    Pt will continue to benefit from skilled outpatient physical therapy to address the deficits stated in the impairment chart, provide pt/family education and to maximize pt's level of independence in the home and community environment.     Pt's spiritual, cultural and educational needs considered and pt agreeable to plan of care and goals as stated below:      GOALS: Pt is in agreement with the following goals.     Short term goals:  6 weeks or 10 visits updated 10/01/2018  1.  Pt will demonstrate increased lumbar ROM by at least 3 degrees from the initial ROM value with improvements noted in functional ROM and ability to perform ADLs (MET)  2.  Pt will demonstrate increased maximum isometric torque value by 5% when compared to the initial value resulting in improved ability to perform bending, lifting, and carrying activities safely, confidently. (MET)  3.  Patient report a reduction in worst pain score by 1-2 points for improved tolerance during work and recreational activities (goal in progress)  4.  Pt able to perform HEP correctly with minimal cueing or supervision for therapist (( MET)     Long term goals: 13 weeks or  20 visits   1. Pt will demonstrate increased lumbar ROM by at least 6 degrees from initial ROM value, resulting in improved ability to perform functional fwd bending while standing and sitting.   2. Pt will demonstrate increased maximum isometric torque value by 10% when compared to the initial value resulting in improved ability to perform bending, lifting, and carrying activities safely, confidently.  3. Pt to demonstrate ability to independently control and reduce their pain through posture positioning and mechanical movements throughout a typical day.  4.  Patient will demonstrate improved overall function per FOTO Survey to CK,  46%-54% impaired, limited or restricted score or less.    Plan     Continue with established Plan of Care towards established PT goals.     Rosa Cano, PTA   10/03/2018

## 2018-10-08 ENCOUNTER — CLINICAL SUPPORT (OUTPATIENT)
Dept: REHABILITATION | Facility: HOSPITAL | Age: 78
End: 2018-10-08
Payer: MEDICARE

## 2018-10-08 DIAGNOSIS — M54.50 CHRONIC BILATERAL LOW BACK PAIN WITHOUT SCIATICA: ICD-10-CM

## 2018-10-08 DIAGNOSIS — G89.29 CHRONIC BILATERAL LOW BACK PAIN WITHOUT SCIATICA: ICD-10-CM

## 2018-10-08 PROCEDURE — 97110 THERAPEUTIC EXERCISES: CPT | Mod: PN

## 2018-10-08 NOTE — PROGRESS NOTES
[facility]      Name: Alaina Vee  Clinic Number: [id number]  Date of Treatment: 10/08/2018   Diagnosis:   No diagnosis found.  Physician: Lindsay Rea MD    Pain pattern determined: 1  Plan of care signed: 8/15/18 through 11/15/18   G-code: drop visit 10th  Time in: 1300  Time Out: 1400  Total Treatment Time: 60 minutes (1:1 with PTA for 30 minutes)  Total Billable Units: 3  Precautions: atrial fibrillation  Visit #: 12    Assessment 18  Assessment due: 10/618    Subjective      Alaina reports that she feels great today with 0/10 pain in low back which has not happened in a very long time.  Pt reports feeling good after last session without adverse reaction.  Pt compliant with stretching at home.     Patient reports tolerating previous visit well.  Patient reports their pain to be 0 out of 10 on a 0-10 scale with 0 being no pain and 10 being the worst pain imaginable.  Pain Location: Bilateral lumbar region     Prior Treatment: meds  Prior functional status: Independent  DME owned/used: none  Leisure: Household activities/caring for her                      Pts goals:  Walk longer distances/increase strength    Objective     MOVEMENT LOSS     ROM Loss   Flexion  min loss   Extension Moderate to min  loss   Side bending Right moderate to min loss   Side bending Left moderate to min loss   Rotation Right Moderate to min  loss   Rotation Left Moderate to min loss      Baseline IM Testing Results:   Date of testin18  ROM 0-36 deg   Max Peak Torque 75 ft/lbs   Min Peak Torque 21 ft/lbs    Flex/Ext Ratio 3.57   % below normative data 48     Mid-term IM Testing Results:   Date of testing: 10-01-18  ROM 0-39 deg   Max Peak Torque 89 ft/lbs   Min Peak Torque 71 ft/lbs    Flex/Ext Ratio 1.2   % below normative data 10%     CMS Impairment/Limitation/Restriction for FOTO Lumbar Spine Survey  Status Limitation G-Code CMS Severity Modifier  Intake 43%  57%  Predicted 54% 46% Goal Status+ CK - At least 40 percent but less than 60 percent  9/10/2018 40% 60%  10/1/2018 70% 30% Current Status CJ - At least 20 percent but less than 40 percent  D/C Status CJ **only report if this is discharge survey  +Based on FOTO predicted change score    Foto visit 5, 60%  Goal:  46%-54%, CK     Treatment      Pt was instructed in and performed the following:     Alaina received therapeutic exercises to develop/improved posture, cardiovascular endurance, muscular endurance, lumbar/cervical ROM, strength and muscular endurance for 70 minutes including the following exercises:     B seated Leg Kicks, 2# x 3 x 10 with 5 sec hold  TrA march with LE's fully ext on PB: alt LE's with activation  B standing Heel and Toe raises 3 x 10  Standing Back Extension with wall support, 10:5 sec hold  Pelvic tilts 10 reps 2/day - performed as isoflexion today x 10 reps  Single knee to chest 10 reps 2/day with towel  LTR 10 reps 2/da  Scap retraction with broom or cane behind back and arms behind cane 10 reps 2/day  Holding broom or cane overhead, quad lumb and UE stretch 10 reps bilat    [facility] 10/3/2018   Visit Number 12   VAS Pain Rating 0   Treadmill Time (in min.) 10   Speed 1.5   Incline 0   Flexion in Lying 10   Manual Therapy 0   Lumbar Weight 55   Repetitions 20   Rating of Perceived Exertion 3   Ice - Z Lie (in min.) 10     Peripheral muscle strengthening which included 1 set of 15-20 repetitions at a slow, controlled 7 second per rep pace focused on strengthening supporting musculature for improved body mechanics and functional mobility.  Pt and therapist focused on proper form during treatment to ensure optimal strengthening of each targeted muscle group.  Machines were utilized including torso rotation, chest press, rowing, triceps, leg curl, hip abd, hip add, sit to stand for leg press from elevated mat, and free weights for biceps.    Home Exercise Program as below:  B seated  Leg Kicks,  3x10 with  5 sec hold  B heel and toe raises 3x10  Standing Back Extension with wall support, 10:5 sec hold  Pelvic tilts 10 reps 2/day  Single knee to chest 10 reps 2/day with towel  LTR 10 reps 2/day  scap retraction with broom or cane behind back and arms behind cane 10 reps 2/day  Holding broom or cane overhead, quad lumb and UE stretch 10 reps bilat  DKTC 10 reps 3/day    Handouts were given to the patient. Pt demo good understanding of the education provided. Alaina demonstrated good return demonstration of activities.   Lumbar roll use compliance: yes    Assessment     Patient tolerated treatment well this visit with reports of no pain at beginning or end of session.  Appropriate fatigue noted with full program.  Able to tolerate increase to 53ft.lbs this visit on medX machine for 20 repetitions without complaint.  Pt progression well and making functional improvements in performance of daily tasks.  Cont with progression as indicated.     Pt will continue to benefit from skilled outpatient physical therapy to address the deficits stated in the impairment chart, provide pt/family education and to maximize pt's level of independence in the home and community environment.     Pt's spiritual, cultural and educational needs considered and pt agreeable to plan of care and goals as stated below:      GOALS: Pt is in agreement with the following goals.     Short term goals:  6 weeks or 10 visits updated 10/01/2018  1.  Pt will demonstrate increased lumbar ROM by at least 3 degrees from the initial ROM value with improvements noted in functional ROM and ability to perform ADLs (MET)  2.  Pt will demonstrate increased maximum isometric torque value by 5% when compared to the initial value resulting in improved ability to perform bending, lifting, and carrying activities safely, confidently. (MET)  3.  Patient report a reduction in worst pain score by 1-2 points for improved tolerance during work and  recreational activities (goal in progress)  4.  Pt able to perform HEP correctly with minimal cueing or supervision for therapist (( MET)     Long term goals: 13 weeks or 20 visits   1. Pt will demonstrate increased lumbar ROM by at least 6 degrees from initial ROM value, resulting in improved ability to perform functional fwd bending while standing and sitting.   2. Pt will demonstrate increased maximum isometric torque value by 10% when compared to the initial value resulting in improved ability to perform bending, lifting, and carrying activities safely, confidently.  3. Pt to demonstrate ability to independently control and reduce their pain through posture positioning and mechanical movements throughout a typical day.  4.  Patient will demonstrate improved overall function per FOTO Survey to CK,  46%-54% impaired, limited or restricted score or less.    Plan     Continue with established Plan of Care towards established PT goals.     Krystal Carreon, PT   10/08/2018

## 2018-10-09 ENCOUNTER — ANTI-COAG VISIT (OUTPATIENT)
Dept: CARDIOLOGY | Facility: CLINIC | Age: 78
End: 2018-10-09

## 2018-10-09 ENCOUNTER — LAB VISIT (OUTPATIENT)
Dept: LAB | Facility: HOSPITAL | Age: 78
End: 2018-10-09
Attending: INTERNAL MEDICINE
Payer: MEDICARE

## 2018-10-09 DIAGNOSIS — Z79.01 LONG TERM (CURRENT) USE OF ANTICOAGULANTS: ICD-10-CM

## 2018-10-09 LAB
INR PPP: 2
PROTHROMBIN TIME: 21.1 SEC

## 2018-10-09 PROCEDURE — 85610 PROTHROMBIN TIME: CPT

## 2018-10-09 PROCEDURE — 36415 COLL VENOUS BLD VENIPUNCTURE: CPT

## 2018-10-10 ENCOUNTER — CLINICAL SUPPORT (OUTPATIENT)
Dept: REHABILITATION | Facility: HOSPITAL | Age: 78
End: 2018-10-10
Payer: MEDICARE

## 2018-10-10 DIAGNOSIS — G89.29 CHRONIC BILATERAL LOW BACK PAIN WITHOUT SCIATICA: ICD-10-CM

## 2018-10-10 DIAGNOSIS — M54.50 CHRONIC BILATERAL LOW BACK PAIN WITHOUT SCIATICA: ICD-10-CM

## 2018-10-10 PROCEDURE — 97110 THERAPEUTIC EXERCISES: CPT | Mod: PN

## 2018-10-10 NOTE — PROGRESS NOTES
[facility]      Name: Alaina Vee  Clinic Number: [id number]  Date of Treatment: 10/10/2018   Diagnosis:   Encounter Diagnosis   Name Primary?    Chronic bilateral low back pain without sciatica      Physician: Lindsay Rea MD    Pain pattern determined: 1  Plan of care signed: 8/15/18 through 11/15/18   G-code: drop visit 10th  Time in: 1410  Time Out: 1515  Total Treatment Time: 55 minutes (1:1 with PTA for 50 minutes)  Total Billable Units: 3  Precautions: atrial fibrillation  Visit #: 13    Assessment 18  Assessment due: 10/618    Subjective      Alaina reports increased Right low back pain this afternoon. Patient reports that her AC leaked all over her carpet, so she's having to deal with the stress of that right now.     Patient reports tolerating previous visit well.  Patient reports their pain to be 4 out of 10 on a 0-10 scale with 0 being no pain and 10 being the worst pain imaginable.  Pain Location: Bilateral lumbar region     Prior Treatment: meds  Prior functional status: Independent  DME owned/used: none  Leisure: Household activities/caring for her                      Pts goals:  Walk longer distances/increase strength    Objective     MOVEMENT LOSS     ROM Loss   Flexion  min loss   Extension Moderate to min  loss   Side bending Right moderate to min loss   Side bending Left moderate to min loss   Rotation Right Moderate to min  loss   Rotation Left Moderate to min loss      Baseline IM Testing Results:   Date of testin18  ROM 0-36 deg   Max Peak Torque 75 ft/lbs   Min Peak Torque 21 ft/lbs    Flex/Ext Ratio 3.57   % below normative data 48     Mid-term IM Testing Results:   Date of testing: 10-01-18  ROM 0-39 deg   Max Peak Torque 89 ft/lbs   Min Peak Torque 71 ft/lbs    Flex/Ext Ratio 1.2   % below normative data 10%     CMS Impairment/Limitation/Restriction for FOTO Lumbar Spine Survey  Status Limitation G-Code CMS Severity  Modifier  Intake 43% 57%  Predicted 54% 46% Goal Status+ CK - At least 40 percent but less than 60 percent  9/10/2018 40% 60%  10/1/2018 70% 30% Current Status CJ - At least 20 percent but less than 40 percent  D/C Status CJ **only report if this is discharge survey  +Based on FOTO predicted change score    Foto visit 5, 60%  Goal:  46%-54%, CK     Treatment      Pt was instructed in and performed the following:     Alaina received therapeutic exercises to develop/improved posture, cardiovascular endurance, muscular endurance, lumbar/cervical ROM, strength and muscular endurance for 70 minutes including the following exercises:     B seated Leg Kicks, 2# x 3 x 10 with 5 sec hold  TrA march with LE's fully ext on PB: alt LE's with activation  B standing Heel and Toe raises 3 x 10  Standing Back Extension with wall support, 10:5 sec hold  Pelvic tilts 10 reps 2/day - performed as isoflexion today x 10 reps  Single knee to chest 10 reps 2/day with towel  LTR 10 reps 2/day  Scap retraction with broom or cane behind back and arms behind cane 10 reps 2/day  Holding broom or cane overhead, quad lumb and UE stretch 10 reps bilat    [facility] 10/10/2018   Visit Number 13   VAS Pain Rating 4   Treadmill Time (in min.) 10   Speed 1.5   Incline 0   Flexion in Lying 10   Manual Therapy 0   Lumbar Weight 56   Repetitions 20   Rating of Perceived Exertion 3   Ice - Z Lie (in min.) 10     Peripheral muscle strengthening which included 1 set of 15-20 repetitions at a slow, controlled 7 second per rep pace focused on strengthening supporting musculature for improved body mechanics and functional mobility.  Pt and therapist focused on proper form during treatment to ensure optimal strengthening of each targeted muscle group.  Machines were utilized including torso rotation, chest press, rowing, triceps, leg curl, hip abd, hip add, sit to stand for leg press from elevated mat, and free weights for biceps.    Home Exercise  Program as below:  B seated Leg Kicks,  3x10 with  5 sec hold  B heel and toe raises 3x10  Standing Back Extension with wall support, 10:5 sec hold  Pelvic tilts 10 reps 2/day  Single knee to chest 10 reps 2/day with towel  LTR 10 reps 2/day  scap retraction with broom or cane behind back and arms behind cane 10 reps 2/day  Holding broom or cane overhead, quad lumb and UE stretch 10 reps bilat  DKTC 10 reps 3/day    Handouts were given to the patient. Pt demo good understanding of the education provided. Alaina demonstrated good return demonstration of activities.   Lumbar roll use compliance: yes    Assessment     Patient tolerated treatment session fairly well today. Good tolerance to 5% increase in ft/lbs on MedX lumbar extension machine reporting appropriate muscle fatigue at end of exercise. Patient requested to end session early secondary to increased low back pain after bicep curl machine.     Pt will continue to benefit from skilled outpatient physical therapy to address the deficits stated in the impairment chart, provide pt/family education and to maximize pt's level of independence in the home and community environment.     Pt's spiritual, cultural and educational needs considered and pt agreeable to plan of care and goals as stated below:      GOALS: Pt is in agreement with the following goals.     Short term goals:  6 weeks or 10 visits updated 10/01/2018  1.  Pt will demonstrate increased lumbar ROM by at least 3 degrees from the initial ROM value with improvements noted in functional ROM and ability to perform ADLs (MET)  2.  Pt will demonstrate increased maximum isometric torque value by 5% when compared to the initial value resulting in improved ability to perform bending, lifting, and carrying activities safely, confidently. (MET)  3.  Patient report a reduction in worst pain score by 1-2 points for improved tolerance during work and recreational activities (goal in progress)  4.  Pt able to perform  HEP correctly with minimal cueing or supervision for therapist (( MET)     Long term goals: 13 weeks or 20 visits   1. Pt will demonstrate increased lumbar ROM by at least 6 degrees from initial ROM value, resulting in improved ability to perform functional fwd bending while standing and sitting.   2. Pt will demonstrate increased maximum isometric torque value by 10% when compared to the initial value resulting in improved ability to perform bending, lifting, and carrying activities safely, confidently.  3. Pt to demonstrate ability to independently control and reduce their pain through posture positioning and mechanical movements throughout a typical day.  4.  Patient will demonstrate improved overall function per FOTO Survey to CK,  46%-54% impaired, limited or restricted score or less.    Plan     Continue with established Plan of Care towards established PT goals.     Rosa Cano, PTA   10/10/2018

## 2018-10-17 ENCOUNTER — OFFICE VISIT (OUTPATIENT)
Dept: SURGERY | Facility: CLINIC | Age: 78
End: 2018-10-17
Payer: MEDICARE

## 2018-10-17 ENCOUNTER — LAB VISIT (OUTPATIENT)
Dept: LAB | Facility: HOSPITAL | Age: 78
End: 2018-10-17
Attending: INTERNAL MEDICINE
Payer: MEDICARE

## 2018-10-17 ENCOUNTER — ANTI-COAG VISIT (OUTPATIENT)
Dept: CARDIOLOGY | Facility: CLINIC | Age: 78
End: 2018-10-17

## 2018-10-17 VITALS
WEIGHT: 240 LBS | BODY MASS INDEX: 38.57 KG/M2 | SYSTOLIC BLOOD PRESSURE: 115 MMHG | HEART RATE: 61 BPM | DIASTOLIC BLOOD PRESSURE: 75 MMHG | HEIGHT: 66 IN

## 2018-10-17 DIAGNOSIS — Z79.01 LONG TERM (CURRENT) USE OF ANTICOAGULANTS: ICD-10-CM

## 2018-10-17 DIAGNOSIS — K42.9 UMBILICAL HERNIA WITHOUT OBSTRUCTION AND WITHOUT GANGRENE: Primary | ICD-10-CM

## 2018-10-17 LAB
INR PPP: 2.5
PROTHROMBIN TIME: 26.6 SEC

## 2018-10-17 PROCEDURE — 1101F PT FALLS ASSESS-DOCD LE1/YR: CPT | Mod: CPTII,S$GLB,, | Performed by: SURGERY

## 2018-10-17 PROCEDURE — 3074F SYST BP LT 130 MM HG: CPT | Mod: CPTII,S$GLB,, | Performed by: SURGERY

## 2018-10-17 PROCEDURE — 36415 COLL VENOUS BLD VENIPUNCTURE: CPT

## 2018-10-17 PROCEDURE — 3078F DIAST BP <80 MM HG: CPT | Mod: CPTII,S$GLB,, | Performed by: SURGERY

## 2018-10-17 PROCEDURE — 99203 OFFICE O/P NEW LOW 30 MIN: CPT | Mod: S$GLB,,, | Performed by: SURGERY

## 2018-10-17 PROCEDURE — 85610 PROTHROMBIN TIME: CPT

## 2018-10-17 NOTE — LETTER
October 17, 2018      Lindsay Rea MD  [address] [facility] [facility] [address]           [facility], [facility]  [address] [facility] [address], [address]  [facility] [address]  Phone: [phone]  Fax: [phone]          Patient: Alaina Vee   MR Number: [id number]   YOB: 1940   Date of Visit: 10/17/2018       Dear Dr. Lindsay Rea:    Thank you for referring Alaina Vee to me for evaluation. Attached you will find relevant portions of my assessment and plan of care.    If you have questions, please do not hesitate to call me. I look forward to following Alaina Vee along with you.    Sincerely,    Lucian Moody MD    Enclosure  CC:  No Recipients    If you would like to receive this communication electronically, please contact [email or web address] or [phone] to request more information on [facility] access.    For providers and/or their staff who would like to refer a patient to [facility], please contact us through our one-stop-shop provider referral line, [facility], at [phone].    If you feel you have received this communication in error or would no longer like to receive these types of communications, please e-mail [email or web address]

## 2018-10-31 ENCOUNTER — CLINICAL SUPPORT (OUTPATIENT)
Dept: REHABILITATION | Facility: HOSPITAL | Age: 78
End: 2018-10-31
Payer: MEDICARE

## 2018-10-31 ENCOUNTER — ANTI-COAG VISIT (OUTPATIENT)
Dept: CARDIOLOGY | Facility: CLINIC | Age: 78
End: 2018-10-31

## 2018-10-31 DIAGNOSIS — Z79.01 LONG TERM (CURRENT) USE OF ANTICOAGULANTS: ICD-10-CM

## 2018-10-31 DIAGNOSIS — M54.50 CHRONIC BILATERAL LOW BACK PAIN WITHOUT SCIATICA: ICD-10-CM

## 2018-10-31 DIAGNOSIS — G89.29 CHRONIC BILATERAL LOW BACK PAIN WITHOUT SCIATICA: ICD-10-CM

## 2018-10-31 PROCEDURE — 97110 THERAPEUTIC EXERCISES: CPT | Mod: PN

## 2018-10-31 NOTE — PROGRESS NOTES
[facility]      Name: Alaina Vee  Clinic Number: [id number]  Date of Treatment: 10/31/2018   Diagnosis:   Encounter Diagnosis   Name Primary?    Chronic bilateral low back pain without sciatica      Physician: Lindsay Rea MD    Pain pattern determined: 1  Plan of care signed: 8/15/18 through 11/15/18   G-code: drop visit 10th  Time in: 1045  Time Out: 1155  Total Treatment Time: 70 minutes (1:1 with PT for 40 minutes)  Total Billable Units: 3  Precautions: atrial fibrillation  Visit #: 14    Assessment 18  Assessment due: 10/618    Subjective      Alaina reports back is feeling 'ok' today.  She has been up since 3:30am due to not being able to sleep.  Pt reports that her  has been very sick and she has been spending time taking care of him.      Patient reports tolerating previous visit well.  Patient reports their pain to be 4 out of 10 on a 0-10 scale with 0 being no pain and 10 being the worst pain imaginable.  Pain Location: Bilateral lumbar region     Prior Treatment: meds  Prior functional status: Independent  DME owned/used: none  Leisure: Household activities/caring for her                      Pts goals:  Walk longer distances/increase strength    Objective     MOVEMENT LOSS     ROM Loss   Flexion  min loss   Extension Moderate to min  loss   Side bending Right moderate to min loss   Side bending Left moderate to min loss   Rotation Right Moderate to min  loss   Rotation Left Moderate to min loss      Baseline IM Testing Results:   Date of testin18  ROM 0-36 deg   Max Peak Torque 75 ft/lbs   Min Peak Torque 21 ft/lbs    Flex/Ext Ratio 3.57   % below normative data 48     Mid-term IM Testing Results:   Date of testing: 10-01-18  ROM 0-39 deg   Max Peak Torque 89 ft/lbs   Min Peak Torque 71 ft/lbs    Flex/Ext Ratio 1.2   % below normative data 10%     CMS Impairment/Limitation/Restriction for FOTO Lumbar Spine Survey  Status  Limitation G-Code CMS Severity Modifier  Intake 43% 57%  Predicted 54% 46% Goal Status+ CK - At least 40 percent but less than 60 percent  9/10/2018 40% 60%  10/1/2018 70% 30% Current Status CJ - At least 20 percent but less than 40 percent  D/C Status CJ **only report if this is discharge survey  +Based on FOTO predicted change score    Foto visit 5, 60%  Goal:  46%-54%, CK     Treatment      Pt was instructed in and performed the following:     Alaina received therapeutic exercises to develop/improved posture, cardiovascular endurance, muscular endurance, lumbar/cervical ROM, strength and muscular endurance for 70 minutes including the following exercises:     B seated Leg Kicks, 2# x 3 x 10 with 5 sec hold  TrA march, alt LE's  B standing Heel and Toe raises 3 x 10  Standing Back Extension with wall support, 10:5 sec hold  Pelvic tilts 10 reps 2/day - performed as isoflexion today x 10 reps  Single knee to chest 10 reps 2/day with towel  LTR 10 reps 2/day  Scap retraction with broom or cane behind back and arms behind cane 10 reps 2/day  Holding broom or cane overhead, quad lumb and UE stretch 10 reps bilat    [facility] 10/31/2018   Visit Number 14   VAS Pain Rating 4   Treadmill Time (in min.) 10   Speed 1.3   Incline 0   Extension in Standing -   Flexion in Lying 10   Manual Therapy -   Lumbar Extension Seat Pad -   Femur Restraint -   Top Dead Center -   Counterweight -   Lumbar Flexion -   Lumbar Extension -   Lumbar Peak Torque -   Min Torque -   Test Percent Below Normative Data -   Test Percent Gain in Strength from Initial  -   Lumbar Weight 58   Repetitions 20   Rating of Perceived Exertion 3   Ice - Z Lie (in min.) 10       Peripheral muscle strengthening which included 1 set of 15-20 repetitions at a slow, controlled 7 second per rep pace focused on strengthening supporting musculature for improved body mechanics and functional mobility.  Pt and therapist focused on proper form during  treatment to ensure optimal strengthening of each targeted muscle group.  Machines were utilized including torso rotation, chest press, rowing, triceps, leg curl, hip abd, hip add, sit to stand for leg press from elevated mat, and free weights for biceps.    Home Exercise Program as below:  B seated Leg Kicks,  3x10 with  5 sec hold  B heel and toe raises 3x10  Standing Back Extension with wall support, 10:5 sec hold  Pelvic tilts 10 reps 2/day  Single knee to chest 10 reps 2/day with towel  LTR 10 reps 2/day  scap retraction with broom or cane behind back and arms behind cane 10 reps 2/day  Holding broom or cane overhead, quad lumb and UE stretch 10 reps bilat  DKTC 10 reps 3/day    Handouts were given to the patient. Pt demo good understanding of the education provided. Alaina demonstrated good return demonstration of activities.   Lumbar roll use compliance: yes    Assessment     Patient continues to progress well with current program.  Pt with small lapse in treatment over past 2 weeks, however, able to tolerate increase in MedX weight to 58ft.lbs for 20 repetitions today.  Pt considering hernia repair in near future but wishes to try to finish program before.  Will progress as able with monitoring of condition.     Pt will continue to benefit from skilled outpatient physical therapy to address the deficits stated in the impairment chart, provide pt/family education and to maximize pt's level of independence in the home and community environment.     Pt's spiritual, cultural and educational needs considered and pt agreeable to plan of care and goals as stated below:      GOALS: Pt is in agreement with the following goals.     Short term goals:  6 weeks or 10 visits updated 10/01/2018  1.  Pt will demonstrate increased lumbar ROM by at least 3 degrees from the initial ROM value with improvements noted in functional ROM and ability to perform ADLs (MET)  2.  Pt will demonstrate increased maximum isometric torque  value by 5% when compared to the initial value resulting in improved ability to perform bending, lifting, and carrying activities safely, confidently. (MET)  3.  Patient report a reduction in worst pain score by 1-2 points for improved tolerance during work and recreational activities (goal in progress)  4.  Pt able to perform HEP correctly with minimal cueing or supervision for therapist (( MET)     Long term goals: 13 weeks or 20 visits   1. Pt will demonstrate increased lumbar ROM by at least 6 degrees from initial ROM value, resulting in improved ability to perform functional fwd bending while standing and sitting.   2. Pt will demonstrate increased maximum isometric torque value by 10% when compared to the initial value resulting in improved ability to perform bending, lifting, and carrying activities safely, confidently.  3. Pt to demonstrate ability to independently control and reduce their pain through posture positioning and mechanical movements throughout a typical day.  4.  Patient will demonstrate improved overall function per FOTO Survey to CK,  46%-54% impaired, limited or restricted score or less.    Plan     Continue with established Plan of Care towards established PT goals.     Krystal Carreon, PT   10/31/2018

## 2018-11-13 ENCOUNTER — CLINICAL SUPPORT (OUTPATIENT)
Dept: REHABILITATION | Facility: HOSPITAL | Age: 78
End: 2018-11-13
Payer: MEDICARE

## 2018-11-13 ENCOUNTER — LAB VISIT (OUTPATIENT)
Dept: LAB | Facility: HOSPITAL | Age: 78
End: 2018-11-13
Attending: FAMILY MEDICINE
Payer: MEDICARE

## 2018-11-13 ENCOUNTER — ANTI-COAG VISIT (OUTPATIENT)
Dept: CARDIOLOGY | Facility: CLINIC | Age: 78
End: 2018-11-13

## 2018-11-13 DIAGNOSIS — Z79.01 LONG TERM (CURRENT) USE OF ANTICOAGULANTS: ICD-10-CM

## 2018-11-13 DIAGNOSIS — G89.29 CHRONIC BILATERAL LOW BACK PAIN WITHOUT SCIATICA: ICD-10-CM

## 2018-11-13 DIAGNOSIS — M54.50 CHRONIC BILATERAL LOW BACK PAIN WITHOUT SCIATICA: ICD-10-CM

## 2018-11-13 LAB
INR PPP: 2.6
PROTHROMBIN TIME: 25.3 SEC

## 2018-11-13 PROCEDURE — 36415 COLL VENOUS BLD VENIPUNCTURE: CPT | Mod: HCNC,PO

## 2018-11-13 PROCEDURE — 85610 PROTHROMBIN TIME: CPT | Mod: HCNC

## 2018-11-13 PROCEDURE — 97110 THERAPEUTIC EXERCISES: CPT | Mod: HCNC,PN

## 2018-11-13 NOTE — PROGRESS NOTES
[facility]      Name: Alaina Vee  Clinic Number: [id number]  Date of Treatment: 2018   Diagnosis:   Encounter Diagnosis   Name Primary?    Chronic bilateral low back pain without sciatica      Physician: Lindsay Rea MD    Pain pattern determined: 1  Plan of care signed: 8/15/18 through 11/15/18   G-code: drop visit 10th  Time in: 1405  Time Out: 1510  Total Treatment Time: 55 minutes (1:1 with PTA for 30 minutes)  Total Billable Units: 2  Precautions: atrial fibrillation  Visit #: 15    Assessment 18  Assessment due: 10/618    Subjective      Alaina reports that she's hurting today.     Patient reports tolerating previous visit well.  Patient reports their pain to be 4 out of 10 on a 0-10 scale with 0 being no pain and 10 being the worst pain imaginable.  Pain Location: Bilateral lumbar region     Prior Treatment: meds  Prior functional status: Independent  DME owned/used: none  Leisure: Household activities/caring for her                      Pts goals:  Walk longer distances/increase strength    Objective     MOVEMENT LOSS     ROM Loss   Flexion  min loss   Extension Moderate to min  loss   Side bending Right moderate to min loss   Side bending Left moderate to min loss   Rotation Right Moderate to min  loss   Rotation Left Moderate to min loss      Baseline IM Testing Results:   Date of testin18  ROM 0-36 deg   Max Peak Torque 75 ft/lbs   Min Peak Torque 21 ft/lbs    Flex/Ext Ratio 3.57   % below normative data 48     Mid-term IM Testing Results:   Date of testing: 10-01-18  ROM 0-39 deg   Max Peak Torque 89 ft/lbs   Min Peak Torque 71 ft/lbs    Flex/Ext Ratio 1.2   % below normative data 10%     CMS Impairment/Limitation/Restriction for FOTO Lumbar Spine Survey  Status Limitation G-Code CMS Severity Modifier  Intake 43% 57%  Predicted 54% 46% Goal Status+ CK - At least 40 percent but less than 60 percent  9/10/2018 40% 60%  10/1/2018 70%  30% Current Status CJ - At least 20 percent but less than 40 percent  D/C Status CJ **only report if this is discharge survey  +Based on FOTO predicted change score    Foto visit 5, 60%  Goal:  46%-54%, CK     Treatment      Pt was instructed in and performed the following:     Alaina received therapeutic exercises to develop/improved posture, cardiovascular endurance, muscular endurance, lumbar/cervical ROM, strength and muscular endurance for 70 minutes including the following exercises:     B seated Leg Kicks, 2# x 3 x 10 with 5 sec hold  TrA march, alt LE's  B standing Heel and Toe raises 3 x 10  Standing Back Extension with wall support, 10:5 sec hold  Pelvic tilts 10 reps 2/day - performed as isoflexion today x 10 reps  Single knee to chest 10 reps 2/day with towel  LTR 10 reps 2/day  Scap retraction with broom or cane behind back and arms behind cane 10 reps 2/day  Holding broom or cane overhead, quad lumb and UE stretch 10 reps bilat    [facility] 11/13/2018   Visit Number 15   VAS Pain Rating 4   Treadmill Time (in min.) 10   Speed 1.3   Incline 0   Flexion in Lying 10   Manual Therapy 0   Lumbar Weight 62   Repetitions 20   Rating of Perceived Exertion 3   Ice - Z Lie (in min.) 10     Peripheral muscle strengthening which included 1 set of 15-20 repetitions at a slow, controlled 7 second per rep pace focused on strengthening supporting musculature for improved body mechanics and functional mobility.  Pt and therapist focused on proper form during treatment to ensure optimal strengthening of each targeted muscle group.  Machines were utilized including torso rotation, chest press, rowing, triceps, leg curl, hip abd, hip add, sit to stand for leg press from elevated mat, and free weights for biceps.    Home Exercise Program as below:  B seated Leg Kicks,  3x10 with  5 sec hold  B heel and toe raises 3x10  Standing Back Extension with wall support, 10:5 sec hold  Pelvic tilts 10 reps 2/day  Single  knee to chest 10 reps 2/day with towel  LTR 10 reps 2/day  scap retraction with broom or cane behind back and arms behind cane 10 reps 2/day  Holding broom or cane overhead, quad lumb and UE stretch 10 reps bilat  DKTC 10 reps 3/day    Handouts were given to the patient. Pt demo good understanding of the education provided. Alaina demonstrated good return demonstration of activities.   Lumbar roll use compliance: yes    Assessment     Patient tolerated treatment session fairly well today. Good tolerance to 5% increase in ft/lbs on MedX Lumbar Extension machine reporting appropriate muscle fatigue. Patient reported difficulty with weight progression on triceps extension machine completing only 10 repetitions at an RPE of 4. Continue progressing patient per tolerance towards discharge.    Pt will continue to benefit from skilled outpatient physical therapy to address the deficits stated in the impairment chart, provide pt/family education and to maximize pt's level of independence in the home and community environment.     Pt's spiritual, cultural and educational needs considered and pt agreeable to plan of care and goals as stated below:      GOALS: Pt is in agreement with the following goals.     Short term goals:  6 weeks or 10 visits updated 10/01/2018  1.  Pt will demonstrate increased lumbar ROM by at least 3 degrees from the initial ROM value with improvements noted in functional ROM and ability to perform ADLs (MET)  2.  Pt will demonstrate increased maximum isometric torque value by 5% when compared to the initial value resulting in improved ability to perform bending, lifting, and carrying activities safely, confidently. (MET)  3.  Patient report a reduction in worst pain score by 1-2 points for improved tolerance during work and recreational activities (goal in progress)  4.  Pt able to perform HEP correctly with minimal cueing or supervision for therapist (( MET)     Long term goals: 13 weeks or 20 visits    1. Pt will demonstrate increased lumbar ROM by at least 6 degrees from initial ROM value, resulting in improved ability to perform functional fwd bending while standing and sitting.   2. Pt will demonstrate increased maximum isometric torque value by 10% when compared to the initial value resulting in improved ability to perform bending, lifting, and carrying activities safely, confidently.  3. Pt to demonstrate ability to independently control and reduce their pain through posture positioning and mechanical movements throughout a typical day.  4.  Patient will demonstrate improved overall function per FOTO Survey to CK,  46%-54% impaired, limited or restricted score or less.    Plan     Continue with established Plan of Care towards established PT goals.     Rosa Cano, PTA   11/13/2018

## 2018-11-19 ENCOUNTER — CLINICAL SUPPORT (OUTPATIENT)
Dept: REHABILITATION | Facility: HOSPITAL | Age: 78
End: 2018-11-19
Payer: MEDICARE

## 2018-11-19 DIAGNOSIS — M54.50 CHRONIC BILATERAL LOW BACK PAIN WITHOUT SCIATICA: ICD-10-CM

## 2018-11-19 DIAGNOSIS — G89.29 CHRONIC BILATERAL LOW BACK PAIN WITHOUT SCIATICA: ICD-10-CM

## 2018-11-19 PROCEDURE — 97110 THERAPEUTIC EXERCISES: CPT | Mod: HCNC,PN

## 2018-11-19 NOTE — PROGRESS NOTES
[facility] Physical Therapy Treatment      Name: Alaina Vee  Clinic Number: [id number]  Date of Treatment: 2018   Diagnosis:   Encounter Diagnosis   Name Primary?    Chronic bilateral low back pain without sciatica      Physician: Lindsay Rea MD    Pain pattern determined: 1  Plan of care signed: 8/15/18 through 11/15/18   G-code: drop visit 10th  Time in: 1435  Time Out: 1545  Total Treatment Time: 70 minutes (1:1 with PT for 30 minutes)  Total Billable Units: 2  Precautions: atrial fibrillation  Visit #: 16    Assessment 18  Assessment due: 10/618    Subjective      Alaina reports that today is a good day.  Pt reports that her back is feeling stronger but continues to have bad days intermittently where R side low back grabs and limits overall daily performance.     Patient reports tolerating previous visit well.  Patient reports their pain to be 2 out of 10 on a 0-10 scale with 0 being no pain and 10 being the worst pain imaginable.  Pain Location: Bilateral lumbar region     Prior Treatment: meds  Prior functional status: Independent  DME owned/used: none  Leisure: Household activities/caring for her                      Pts goals:  Walk longer distances/increase strength    Objective     MOVEMENT LOSS     ROM Loss   Flexion  min loss   Extension Moderate to min  loss   Side bending Right moderate to min loss   Side bending Left moderate to min loss   Rotation Right Moderate to min  loss   Rotation Left Moderate to min loss      Baseline IM Testing Results:   Date of testin18  ROM 0-36 deg   Max Peak Torque 75 ft/lbs   Min Peak Torque 21 ft/lbs    Flex/Ext Ratio 3.57   % below normative data 48     Mid-term IM Testing Results:   Date of testing: 10-01-18  ROM 0-39 deg   Max Peak Torque 89 ft/lbs   Min Peak Torque 71 ft/lbs    Flex/Ext Ratio 1.2   % below normative data 10%     CMS Impairment/Limitation/Restriction for FOTO Lumbar Spine Survey  Status Limitation  G-Code CMS Severity Modifier  Intake 43% 57%  Predicted 54% 46% Goal Status+ CK - At least 40 percent but less than 60 percent  9/10/2018 40% 60%  10/1/2018 70% 30% Current Status CJ - At least 20 percent but less than 40 percent  D/C Status CJ **only report if this is discharge survey  +Based on FOTO predicted change score    Foto visit 5, 60%  Goal:  46%-54%, CK     Treatment      Pt was instructed in and performed the following:     Alaina received therapeutic exercises to develop/improved posture, cardiovascular endurance, muscular endurance, lumbar/cervical ROM, strength and muscular endurance for 70 minutes including the following exercises:     B seated Leg Kicks, 2# x 3 x 10 with 5 sec hold  TrA march, alt LE's  B standing Heel and Toe raises 3 x 10  Standing Back Extension with wall support, 10:5 sec hold  Pelvic tilts 10 repsx 10 reps  Single knee to chest 10 reps 2/day with towel  LTR 10 reps 2/day  DKTC with SB x10  Scap retraction with broom or cane behind back and arms behind cane 10 reps 2/day  Holding broom or cane overhead, quad lumb and UE stretch 10 reps bilat    [facility] 11/19/2018   Visit Number 16   VAS Pain Rating 2   Treadmill Time (in min.) 10   Speed 1.3   Incline -   Extension in Standing -   Flexion in Lying 10   Lumbar Weight 62   Repetitions 30   Rating of Perceived Exertion 3   Ice - Z Lie (in min.) 10     Peripheral muscle strengthening which included 1 set of 15-20 repetitions at a slow, controlled 7 second per rep pace focused on strengthening supporting musculature for improved body mechanics and functional mobility.  Pt and therapist focused on proper form during treatment to ensure optimal strengthening of each targeted muscle group.  Machines were utilized including torso rotation, chest press, rowing, triceps, leg curl, hip abd, hip add, sit to stand for leg press from elevated mat, and free weights for biceps.    Home Exercise Program as below:  B seated Leg Kicks,   3x10 with  5 sec hold  B heel and toe raises 3x10  Standing Back Extension with wall support, 10:5 sec hold  Pelvic tilts 10 reps 2/day  Single knee to chest 10 reps 2/day with towel  LTR 10 reps 2/day  scap retraction with broom or cane behind back and arms behind cane 10 reps 2/day  Holding broom or cane overhead, quad lumb and UE stretch 10 reps bilat  DKTC 10 reps 3/day    Handouts were given to the patient. Pt demo good understanding of the education provided. Alaina demonstrated good return demonstration of activities.   Lumbar roll use compliance: yes    Assessment     Patient with good performance today with minimal reported pain at beginning of session.  Tolerated 62ft.lb on MedX machine for 20 reps with RPE of 3.  Inc in weight indicated for next session.  Cont per POC.     Pt will continue to benefit from skilled outpatient physical therapy to address the deficits stated in the impairment chart, provide pt/family education and to maximize pt's level of independence in the home and community environment.     Pt's spiritual, cultural and educational needs considered and pt agreeable to plan of care and goals as stated below:      GOALS: Pt is in agreement with the following goals.     Short term goals:  6 weeks or 10 visits updated 10/01/2018  1.  Pt will demonstrate increased lumbar ROM by at least 3 degrees from the initial ROM value with improvements noted in functional ROM and ability to perform ADLs (MET)  2.  Pt will demonstrate increased maximum isometric torque value by 5% when compared to the initial value resulting in improved ability to perform bending, lifting, and carrying activities safely, confidently. (MET)  3.  Patient report a reduction in worst pain score by 1-2 points for improved tolerance during work and recreational activities (goal in progress)  4.  Pt able to perform HEP correctly with minimal cueing or supervision for therapist (( MET)     Long term goals: 13 weeks or 20 visits   1.  Pt will demonstrate increased lumbar ROM by at least 6 degrees from initial ROM value, resulting in improved ability to perform functional fwd bending while standing and sitting.   2. Pt will demonstrate increased maximum isometric torque value by 10% when compared to the initial value resulting in improved ability to perform bending, lifting, and carrying activities safely, confidently.  3. Pt to demonstrate ability to independently control and reduce their pain through posture positioning and mechanical movements throughout a typical day.  4.  Patient will demonstrate improved overall function per FOTO Survey to CK,  46%-54% impaired, limited or restricted score or less.    Plan     Continue with established Plan of Care towards established PT goals.     Krystal Carreon, PT   11/20/2018

## 2018-11-21 ENCOUNTER — CLINICAL SUPPORT (OUTPATIENT)
Dept: REHABILITATION | Facility: HOSPITAL | Age: 78
End: 2018-11-21
Payer: MEDICARE

## 2018-11-21 DIAGNOSIS — M54.50 CHRONIC BILATERAL LOW BACK PAIN WITHOUT SCIATICA: ICD-10-CM

## 2018-11-21 DIAGNOSIS — G89.29 CHRONIC BILATERAL LOW BACK PAIN WITHOUT SCIATICA: ICD-10-CM

## 2018-11-21 PROCEDURE — 97110 THERAPEUTIC EXERCISES: CPT | Mod: HCNC,PN

## 2018-11-21 NOTE — PROGRESS NOTES
[facility] Back Physical Therapy Treatment      Name: Alaina Vee  Clinic Number: [id number]  Date of Treatment: 2018   Diagnosis:   Encounter Diagnosis   Name Primary?    Chronic bilateral low back pain without sciatica      Physician: Lindsay Rea MD    Pain pattern determined: 1  Plan of care signed: 8/15/18 through 11/15/18   G-code: drop visit 10th  Time in: 1430  Time Out: 1510  Total Treatment Time: 40 minutes (1:1 with PT for 30 minutes)  Total Billable Units: 2  Precautions: atrial fibrillation  Visit #: 17    Assessment 18  Assessment due: 10/618    Subjective      Alaina reports feeling well today.  Has been up moving about home a lot over the past few days getting ready for the holiday and that she hasn't noticed pain in back.  Pt reports that she feels much better than when she started, she would have never been able to do this much.     Patient reports tolerating previous visit well.  Patient reports their pain to be 2 out of 10 on a 0-10 scale with 0 being no pain and 10 being the worst pain imaginable.  Pain Location: Bilateral lumbar region     Prior Treatment: meds  Prior functional status: Independent  DME owned/used: none  Leisure: Household activities/caring for her                      Pts goals:  Walk longer distances/increase strength    Objective     MOVEMENT LOSS     ROM Loss   Flexion  min loss   Extension Moderate to min  loss   Side bending Right moderate to min loss   Side bending Left moderate to min loss   Rotation Right Moderate to min  loss   Rotation Left Moderate to min loss      Baseline IM Testing Results:   Date of testin18  ROM 0-36 deg   Max Peak Torque 75 ft/lbs   Min Peak Torque 21 ft/lbs    Flex/Ext Ratio 3.57   % below normative data 48     Mid-term IM Testing Results:   Date of testing: 10-01-18  ROM 0-39 deg   Max Peak Torque 89 ft/lbs   Min Peak Torque 71 ft/lbs    Flex/Ext Ratio 1.2   % below normative data 10%     CMS  Impairment/Limitation/Restriction for FOTO Lumbar Spine Survey  Status Limitation G-Code CMS Severity Modifier  Intake 43% 57%  Predicted 54% 46% Goal Status+ CK - At least 40 percent but less than 60 percent  9/10/2018 40% 60%  10/1/2018 70% 30% Current Status CJ - At least 20 percent but less than 40 percent  D/C Status CJ **only report if this is discharge survey  +Based on FOTO predicted change score    Foto visit 5, 60%  Goal:  46%-54%, CK     Treatment      Pt was instructed in and performed the following:     Alaina received therapeutic exercises to develop/improved posture, cardiovascular endurance, muscular endurance, lumbar/cervical ROM, strength and muscular endurance for 70 minutes including the following exercises:     B seated Leg Kicks, 2# x 3 x 10 with 5 sec hold  TrA march, alt LE's  B standing Heel and Toe raises 3 x 10  Standing Back Extension with wall support, 10:5 sec hold  Pelvic tilts 10 repsx 10 reps  Single knee to chest 10 reps 2/day with towel  LTR 10 reps 2/day  DKTC with SB x10  Scap retraction with broom or cane behind back and arms behind cane 10 reps 2/day  Holding broom or cane overhead, quad lumb and UE stretch 10 reps bilat    [facility] 11/21/2018   Visit Number 17   VAS Pain Rating 2   Treadmill Time (in min.) 10   Speed 1.3   Incline -   Extension in Standing -   Flexion in Lying 10   Lumbar Weight 64   Repetitions 20   Rating of Perceived Exertion 3   Ice - Z Lie (in min.) -     Peripheral muscle strengthening which included 1 set of 15-20 repetitions at a slow, controlled 7 second per rep pace focused on strengthening supporting musculature for improved body mechanics and functional mobility.  Pt and therapist focused on proper form during treatment to ensure optimal strengthening of each targeted muscle group.  Machines were utilized including torso rotation, chest press, rowing, triceps, leg curl, hip abd, hip add, sit to stand for leg press from elevated mat, and  free weights for biceps.    Home Exercise Program as below:  B seated Leg Kicks,  3x10 with  5 sec hold  B heel and toe raises 3x10  Standing Back Extension with wall support, 10:5 sec hold  Pelvic tilts 10 reps 2/day  Single knee to chest 10 reps 2/day with towel  LTR 10 reps 2/day  scap retraction with broom or cane behind back and arms behind cane 10 reps 2/day  Holding broom or cane overhead, quad lumb and UE stretch 10 reps bilat  DKTC 10 reps 3/day    Handouts were given to the patient. Pt demo good understanding of the education provided. Alaina demonstrated good return demonstration of activities.   Lumbar roll use compliance: yes    Assessment     Patient on time constraint this visit related to holiday plans and therefore shortened session completed.  Pt completes above stretching prior to medx extension with increased weight to 64ft.lbs for 20 repetitions.  Pt completes UE peripheral machines only this visit.  Complete program to be continued next session.  Pt progressing well towards 20 visits.     Pt will continue to benefit from skilled outpatient physical therapy to address the deficits stated in the impairment chart, provide pt/family education and to maximize pt's level of independence in the home and community environment.     Pt's spiritual, cultural and educational needs considered and pt agreeable to plan of care and goals as stated below:      GOALS: Pt is in agreement with the following goals.     Short term goals:  6 weeks or 10 visits updated 10/01/2018  1.  Pt will demonstrate increased lumbar ROM by at least 3 degrees from the initial ROM value with improvements noted in functional ROM and ability to perform ADLs (MET)  2.  Pt will demonstrate increased maximum isometric torque value by 5% when compared to the initial value resulting in improved ability to perform bending, lifting, and carrying activities safely, confidently. (MET)  3.  Patient report a reduction in worst pain score by 1-2  points for improved tolerance during work and recreational activities (goal in progress)  4.  Pt able to perform HEP correctly with minimal cueing or supervision for therapist (( MET)     Long term goals: 13 weeks or 20 visits   1. Pt will demonstrate increased lumbar ROM by at least 6 degrees from initial ROM value, resulting in improved ability to perform functional fwd bending while standing and sitting.   2. Pt will demonstrate increased maximum isometric torque value by 10% when compared to the initial value resulting in improved ability to perform bending, lifting, and carrying activities safely, confidently.  3. Pt to demonstrate ability to independently control and reduce their pain through posture positioning and mechanical movements throughout a typical day.  4.  Patient will demonstrate improved overall function per FOTO Survey to CK,  46%-54% impaired, limited or restricted score or less.    Plan     Continue with established Plan of Care towards established PT goals.     Krystal Carreon, PT   11/21/2018

## 2018-11-27 ENCOUNTER — LAB VISIT (OUTPATIENT)
Dept: LAB | Facility: HOSPITAL | Age: 78
End: 2018-11-27
Attending: INTERNAL MEDICINE
Payer: MEDICARE

## 2018-11-27 ENCOUNTER — ANTI-COAG VISIT (OUTPATIENT)
Dept: CARDIOLOGY | Facility: CLINIC | Age: 78
End: 2018-11-27

## 2018-11-27 DIAGNOSIS — Z79.01 LONG TERM (CURRENT) USE OF ANTICOAGULANTS: ICD-10-CM

## 2018-11-27 LAB
INR PPP: 2.7
PROTHROMBIN TIME: 28.6 SEC

## 2018-11-27 PROCEDURE — 36415 COLL VENOUS BLD VENIPUNCTURE: CPT | Mod: HCNC,PN

## 2018-11-27 PROCEDURE — 85610 PROTHROMBIN TIME: CPT | Mod: HCNC

## 2018-12-05 ENCOUNTER — CLINICAL SUPPORT (OUTPATIENT)
Dept: REHABILITATION | Facility: HOSPITAL | Age: 78
End: 2018-12-05
Payer: MEDICARE

## 2018-12-05 DIAGNOSIS — G89.29 CHRONIC BILATERAL LOW BACK PAIN WITHOUT SCIATICA: ICD-10-CM

## 2018-12-05 DIAGNOSIS — M54.50 CHRONIC BILATERAL LOW BACK PAIN WITHOUT SCIATICA: ICD-10-CM

## 2018-12-05 PROCEDURE — 97110 THERAPEUTIC EXERCISES: CPT | Mod: HCNC,PN

## 2018-12-05 NOTE — PROGRESS NOTES
[facility]      Name: Alaina Vee  Clinic Number: [id number]  Date of Treatment: 2018   Diagnosis:   Encounter Diagnosis   Name Primary?    Chronic bilateral low back pain without sciatica      Physician: Lindsay Rea MD    Pain pattern determined: 1  Plan of care signed: 8/15/18 through 11/15/18   G-code: drop visit 10th  Time in: 0907  Time Out: 1007  Total Treatment Time: 50 minutes (1:1 with PTA for 28 minutes)  Total Billable Units: 2  Precautions: atrial fibrillation  Visit #: 18    Assessment 18  Assessment due: 10/618    Subjective      Alaina reports that her back feels good today, no new complaints. Patient states that she plans to go back to [facility] following discharge from program.    Patient reports tolerating previous visit well.  Patient reports their pain to be 0 out of 10 on a 0-10 scale with 0 being no pain and 10 being the worst pain imaginable.  Pain Location: Bilateral lumbar region     Prior Treatment: meds  Prior functional status: Independent  DME owned/used: none  Leisure: Household activities/caring for her                      Pts goals:  Walk longer distances/increase strength    Objective     MOVEMENT LOSS     ROM Loss   Flexion  min loss   Extension Moderate to min  loss   Side bending Right moderate to min loss   Side bending Left moderate to min loss   Rotation Right Moderate to min  loss   Rotation Left Moderate to min loss      Baseline IM Testing Results:   Date of testin18  ROM 0-36 deg   Max Peak Torque 75 ft/lbs   Min Peak Torque 21 ft/lbs    Flex/Ext Ratio 3.57   % below normative data 48     Mid-term IM Testing Results:   Date of testing: 10-01-18  ROM 0-39 deg   Max Peak Torque 89 ft/lbs   Min Peak Torque 71 ft/lbs    Flex/Ext Ratio 1.2   % below normative data 10%     CMS Impairment/Limitation/Restriction for FOTO Lumbar Spine Survey  Status Limitation G-Code CMS Severity  Modifier  Intake 43% 57%  Predicted 54% 46% Goal Status+ CK - At least 40 percent but less than 60 percent  9/10/2018 40% 60%  10/1/2018 70% 30% Current Status CJ - At least 20 percent but less than 40 percent  D/C Status CJ **only report if this is discharge survey  +Based on FOTO predicted change score    Foto visit 5, 60%  Goal:  46%-54%, CK     Treatment      Pt was instructed in and performed the following:     Alaina received therapeutic exercises to develop/improved posture, cardiovascular endurance, muscular endurance, lumbar/cervical ROM, strength and muscular endurance for 50 minutes including the following exercises:     Pelvic tilts x 10  LTR on SB x 10  DKTC with SB x 10    [facility] 12/5/2018   Visit Number 18   VAS Pain Rating 0   Treadmill Time (in min.) 10   Speed 1.5   Incline 0   Flexion in Lying 10   Manual Therapy 0   Lumbar Flexion 39   Lumbar Extension 0   Lumbar Weight 66   Repetitions 20   Rating of Perceived Exertion 3   Ice - Z Lie (in min.) 10     Not performed:   Single knee to chest 10 reps 2/day with towel  TrA march, alt LE's  B standing Heel and Toe raises 3 x 10  Standing Back Extension with wall support, 10:5 sec hold    Peripheral muscle strengthening which included 1 set of 15-20 repetitions at a slow, controlled 7 second per rep pace focused on strengthening supporting musculature for improved body mechanics and functional mobility.  Pt and therapist focused on proper form during treatment to ensure optimal strengthening of each targeted muscle group.  Machines were utilized including torso rotation, chest press, rowing, triceps, leg curl, hip abd, hip add, sit to stand for leg press from elevated mat, and free weights for biceps.    Home Exercise Program as below:  B seated Leg Kicks,  3x10 with  5 sec hold  B heel and toe raises 3x10  Standing Back Extension with wall support, 10:5 sec hold  Pelvic tilts 10 reps 2/day  Single knee to chest 10 reps 2/day with  towel  LTR 10 reps 2/day  scap retraction with broom or cane behind back and arms behind cane 10 reps 2/day  Holding broom or cane overhead, quad lumb and UE stretch 10 reps bilat  DKTC 10 reps 3/day    Handouts were given to the patient. Pt demo good understanding of the education provided. Alaina demonstrated good return demonstration of activities.   Lumbar roll use compliance: yes    Assessment     Patient tolerated treatment session well today. Good tolerance to 5% increase in ft/lbs on MedX Lumbar Extension machine reporting an RPE of 3 after 20 repetitions. Patient demonstrating improvement in strength and neuromuscular control. Patient able to complete all peripheral machines with appropriate muscle response post session. Patient is progressing well with current POC towards discharge.    Pt will continue to benefit from skilled outpatient physical therapy to address the deficits stated in the impairment chart, provide pt/family education and to maximize pt's level of independence in the home and community environment.     Pt's spiritual, cultural and educational needs considered and pt agreeable to plan of care and goals as stated below:      GOALS: Pt is in agreement with the following goals.     Short term goals:  6 weeks or 10 visits updated 10/01/2018  1.  Pt will demonstrate increased lumbar ROM by at least 3 degrees from the initial ROM value with improvements noted in functional ROM and ability to perform ADLs (MET)  2.  Pt will demonstrate increased maximum isometric torque value by 5% when compared to the initial value resulting in improved ability to perform bending, lifting, and carrying activities safely, confidently. (MET)  3.  Patient report a reduction in worst pain score by 1-2 points for improved tolerance during work and recreational activities (goal in progress)  4.  Pt able to perform HEP correctly with minimal cueing or supervision for therapist (( MET)     Long term goals: 13 weeks or 20  visits   1. Pt will demonstrate increased lumbar ROM by at least 6 degrees from initial ROM value, resulting in improved ability to perform functional fwd bending while standing and sitting.   2. Pt will demonstrate increased maximum isometric torque value by 10% when compared to the initial value resulting in improved ability to perform bending, lifting, and carrying activities safely, confidently.  3. Pt to demonstrate ability to independently control and reduce their pain through posture positioning and mechanical movements throughout a typical day.  4.  Patient will demonstrate improved overall function per FOTO Survey to CK,  46%-54% impaired, limited or restricted score or less.    Plan     Continue with established Plan of Care towards established PT goals.     Rosa Cano, PTA   12/05/2018

## 2018-12-07 ENCOUNTER — CLINICAL SUPPORT (OUTPATIENT)
Dept: REHABILITATION | Facility: HOSPITAL | Age: 78
End: 2018-12-07
Payer: MEDICARE

## 2018-12-07 DIAGNOSIS — G89.29 CHRONIC BILATERAL LOW BACK PAIN WITHOUT SCIATICA: ICD-10-CM

## 2018-12-07 DIAGNOSIS — M54.50 CHRONIC BILATERAL LOW BACK PAIN WITHOUT SCIATICA: ICD-10-CM

## 2018-12-07 PROCEDURE — 97110 THERAPEUTIC EXERCISES: CPT | Mod: HCNC,PN

## 2018-12-07 NOTE — PROGRESS NOTES
" [facility]      Name: Alaina Vee  Clinic Number: [id number]  Date of Treatment: 2018   Diagnosis:   Encounter Diagnosis   Name Primary?    Chronic bilateral low back pain without sciatica      Physician: Lindsay Rea MD    Pain pattern determined: 1  Plan of care signed: 8/15/18 to 11/15/18  Time in: 1000  Time Out: 1102  Total Treatment Time: 52 minutes (1:1 with PTA for 30 minutes)  Total Billable Units: 2  Precautions: atrial fibrillation  Visit #: 19    Subjective      Alaina reports that she is pain free this morning. Patient notes improvement in her symptoms stating "I'm surprised that I feel good today."    Patient reports tolerating previous visit well.  Patient reports their pain to be 0 out of 10 on a 0-10 scale with 0 being no pain and 10 being the worst pain imaginable.  Pain Location: Bilateral lumbar region     Prior Treatment: meds  Prior functional status: Independent  DME owned/used: none  Leisure: Household activities/caring for her                      Pts goals:  Walk longer distances/increase strength    Objective     MOVEMENT LOSS     ROM Loss   Flexion  min loss   Extension Moderate to min  loss   Side bending Right moderate to min loss   Side bending Left moderate to min loss   Rotation Right Moderate to min  loss   Rotation Left Moderate to min loss      Baseline IM Testing Results:   Date of testin18  ROM 0-36 deg   Max Peak Torque 75 ft/lbs   Min Peak Torque 21 ft/lbs    Flex/Ext Ratio 3.57   % below normative data 48     Mid-term IM Testing Results:   Date of testing: 10-01-18  ROM 0-39 deg   Max Peak Torque 89 ft/lbs   Min Peak Torque 71 ft/lbs    Flex/Ext Ratio 1.2   % below normative data 10%     CMS Impairment/Limitation/Restriction for FOTO Lumbar Spine Survey  Status Limitation G-Code CMS Severity Modifier  Intake 43% 57%  Predicted 54% 46% Goal Status+ CK - At least 40 percent but less than 60 " percent  9/10/2018 40% 60%  10/1/2018 70% 30% Current Status CJ - At least 20 percent but less than 40 percent  D/C Status CJ **only report if this is discharge survey  +Based on FOTO predicted change score    Foto visit 5, 60%  Goal:  46%-54%, CK     Treatment      Pt was instructed in and performed the following:     Alaina received therapeutic exercises to develop/improved posture, cardiovascular endurance, muscular endurance, lumbar/cervical ROM, strength and muscular endurance for 52 minutes including the following exercises:     Pelvic tilts x 10  LTR on SB x 10  DKTC with SB x 10    [facility] 12/7/2018   Visit Number 19   VAS Pain Rating 0   Treadmill Time (in min.) 10   Speed 1.5   Incline 0   Flexion in Lying 10   Manual Therapy 0   Lumbar Weight 69   Repetitions 20   Rating of Perceived Exertion 3   Ice - Z Lie (in min.) 10     Not performed:   Single knee to chest 10 reps 2/day with towel  TrA march, alt LE's  B standing Heel and Toe raises 3 x 10  Standing Back Extension with wall support, 10:5 sec hold    Peripheral muscle strengthening which included 1 set of 15-20 repetitions at a slow, controlled 7 second per rep pace focused on strengthening supporting musculature for improved body mechanics and functional mobility.  Pt and therapist focused on proper form during treatment to ensure optimal strengthening of each targeted muscle group.  Machines were utilized including torso rotation, chest press, rowing, triceps, leg curl, hip abd, hip add, sit to stand for leg press from elevated mat, and free weights for biceps.    Home Exercise Program as below:  B seated Leg Kicks,  3x10 with  5 sec hold  B heel and toe raises 3x10  Standing Back Extension with wall support, 10:5 sec hold  Pelvic tilts 10 reps 2/day  Single knee to chest 10 reps 2/day with towel  LTR 10 reps 2/day  scap retraction with broom or cane behind back and arms behind cane 10 reps 2/day  Holding broom or cane overhead, quad  lumb and UE stretch 10 reps bilat  DKTC 10 reps 3/day    Handouts were given to the patient. Pt demo good understanding of the education provided. Alaina demonstrated good return demonstration of activities.   Lumbar roll use compliance: yes    Assessment     Patient tolerated treatment session very well today. Good tolerance to 5% increase in weight on MedX Lumbar Extension machine reporting an RPE of 3 after 20 repetitions. Patient with good tolerance to peripheral machines reporting appropriate muscle response. Patient to be discharged next visit by PT with plans to attend [facility] for wellness.     Pt will continue to benefit from skilled outpatient physical therapy to address the deficits stated in the impairment chart, provide pt/family education and to maximize pt's level of independence in the home and community environment.     Pt's spiritual, cultural and educational needs considered and pt agreeable to plan of care and goals as stated below:      GOALS: Pt is in agreement with the following goals.     Short term goals:  6 weeks or 10 visits updated 10/01/2018  1.  Pt will demonstrate increased lumbar ROM by at least 3 degrees from the initial ROM value with improvements noted in functional ROM and ability to perform ADLs (MET)  2.  Pt will demonstrate increased maximum isometric torque value by 5% when compared to the initial value resulting in improved ability to perform bending, lifting, and carrying activities safely, confidently. (MET)  3.  Patient report a reduction in worst pain score by 1-2 points for improved tolerance during work and recreational activities (goal in progress)  4.  Pt able to perform HEP correctly with minimal cueing or supervision for therapist (( MET)     Long term goals: 13 weeks or 20 visits   1. Pt will demonstrate increased lumbar ROM by at least 6 degrees from initial ROM value, resulting in improved ability to perform functional fwd bending while standing and sitting.    2. Pt will demonstrate increased maximum isometric torque value by 10% when compared to the initial value resulting in improved ability to perform bending, lifting, and carrying activities safely, confidently.  3. Pt to demonstrate ability to independently control and reduce their pain through posture positioning and mechanical movements throughout a typical day.  4.  Patient will demonstrate improved overall function per FOTO Survey to CK,  46%-54% impaired, limited or restricted score or less.    Plan     Continue with established Plan of Care towards established PT goals.     Rosa Cano, PTA   12/07/2018

## 2018-12-11 ENCOUNTER — ANTI-COAG VISIT (OUTPATIENT)
Dept: CARDIOLOGY | Facility: CLINIC | Age: 78
End: 2018-12-11

## 2018-12-11 ENCOUNTER — CLINICAL SUPPORT (OUTPATIENT)
Dept: REHABILITATION | Facility: HOSPITAL | Age: 78
End: 2018-12-11
Payer: MEDICARE

## 2018-12-11 ENCOUNTER — LAB VISIT (OUTPATIENT)
Dept: LAB | Facility: HOSPITAL | Age: 78
End: 2018-12-11
Attending: INTERNAL MEDICINE
Payer: MEDICARE

## 2018-12-11 DIAGNOSIS — M54.50 CHRONIC BILATERAL LOW BACK PAIN WITHOUT SCIATICA: ICD-10-CM

## 2018-12-11 DIAGNOSIS — Z79.01 LONG TERM (CURRENT) USE OF ANTICOAGULANTS: ICD-10-CM

## 2018-12-11 DIAGNOSIS — G89.29 CHRONIC BILATERAL LOW BACK PAIN WITHOUT SCIATICA: ICD-10-CM

## 2018-12-11 LAB
INR PPP: 2.3
PROTHROMBIN TIME: 24 SEC

## 2018-12-11 PROCEDURE — 36415 COLL VENOUS BLD VENIPUNCTURE: CPT | Mod: HCNC,PN

## 2018-12-11 PROCEDURE — G8980 MOBILITY D/C STATUS: HCPCS | Mod: CJ,HCNC,PN

## 2018-12-11 PROCEDURE — G8979 MOBILITY GOAL STATUS: HCPCS | Mod: CK,HCNC,PN

## 2018-12-11 PROCEDURE — 85610 PROTHROMBIN TIME: CPT | Mod: HCNC

## 2018-12-11 PROCEDURE — 97110 THERAPEUTIC EXERCISES: CPT | Mod: HCNC,PN

## 2018-12-11 PROCEDURE — 97750 PHYSICAL PERFORMANCE TEST: CPT | Mod: HCNC,PN

## 2018-12-11 NOTE — PROGRESS NOTES
[facility] Discharge     Name: Alaina Vee  Clinic Number: [id number]  Date of Treatment: 2018   Diagnosis:   Encounter Diagnosis   Name Primary?    Chronic bilateral low back pain without sciatica      Physician: Lindsay Rea MD    Pain pattern determined: 1  Plan of care signed: 8/15/18 to 11/15/18  Time in: 1030  Time Out: 1130  Total Treatment Time: 30 minutes (1:1 with PT for 30 minutes)  Total Billable Units: 2  Precautions: atrial fibrillation  Visit #: 20    Subjective      Alaina reports that she didn't get much sleep last night but she feels 'ok'.  Pt reports that after this visit she is planning to continue with going to the gym with her  3x/week at [facility]; signed up 2 weeks ago but has not began.  Pt reports that she is feeling 80% better since initial evaluation but does continue to have R sided pain which is lower in intensity than beginning.     Patient reports tolerating previous visit well.  Patient reports their pain to be 0 out of 10 on a 0-10 scale with 0 being no pain and 10 being the worst pain imaginable.  Pain Location: Bilateral lumbar region     Prior Treatment: meds  Prior functional status: Independent  DME owned/used: none  Leisure: Household activities/caring for her                      Pts goals:  Walk longer distances/increase strength    Objective     MOVEMENT LOSS     ROM Loss   Flexion  min loss   Extension Moderate to min  loss   Side bending Right moderate to min loss   Side bending Left moderate to min loss   Rotation Right Moderate to min  loss   Rotation Left Moderate to min loss      Baseline IM Testing Results:   Date of testin18  ROM 0-36 deg   Max Peak Torque 75 ft/lbs   Min Peak Torque 21 ft/lbs    Flex/Ext Ratio 3.57   % below normative data 48     Mid-term IM Testing Results:   Date of testing: 10-01-18  ROM 0-39 deg   Max Peak Torque 89 ft/lbs   Min Peak Torque 71 ft/lbs    Flex/Ext Ratio  1.2   % below normative data 10%     Discharge IM Testing Results:   Date of testin18  ROM 0-39 deg   Max Peak Torque 160 ft/lbs   Min Peak Torque 84 ft/lbs    Flex/Ext Ratio 1.9/1   % above normative data 30%     CMS Impairment/Limitation/Restriction for FOTO Lumbar Spine Survey  Status Limitation G-Code CMS Severity Modifier  Intake 43% 57%  Predicted 54% 46% Goal Status+ CK - At least 40 percent but less than 60 percent  9/10/2018 40% 60%  10/1/2018 70% 30%  2018 80% 20% Current Status CJ - At least 20 percent but less than 40 percent  D/C Status CJ **only report if this is discharge survey  +Based on FOTO predicted change score     Treatment      Pt was instructed in and performed the following:     Alaina received therapeutic exercises to develop/improved posture, cardiovascular endurance, muscular endurance, lumbar/cervical ROM, strength and muscular endurance for 52 minutes including the following exercises:     Pelvic tilts x 10  LTR on SB x 10  DKTC with SB x 10  SKTC x10    [facility] 2018   Visit Number 20   VAS Pain Rating 0   Treadmill Time (in min.) 10   Speed 1.5   Incline 0   Extension in Standing -   Flexion in Lying 10   Manual Therapy -   Lumbar Extension Seat Pad 0   Femur Restraint 5   Top Dead Center 24   Counterweight 21   Lumbar Flexion 39   Lumbar Extension 0   Lumbar Peak Torque 160   Min Torque 84   Test Percent Below Normative Data -   Test Percent Gain in Strength from Initial  176   Ice - Z Lie (in min.) 10     Not performed:   Single knee to chest 10 reps 2/day with towel  TrA march, alt LE's  B standing Heel and Toe raises 3 x 10  Standing Back Extension with wall support, 10:5 sec hold    Peripheral muscle strengthening which included 1 set of 15-20 repetitions at a slow, controlled 7 second per rep pace focused on strengthening supporting musculature for improved body mechanics and functional mobility.  Pt and therapist focused on proper form during  treatment to ensure optimal strengthening of each targeted muscle group.  Machines were utilized including torso rotation, chest press, rowing, triceps, leg curl, hip abd, hip add, sit to stand for leg press from elevated mat, and free weights for biceps.    Home Exercise Program as below:  B seated Leg Kicks,  3x10 with  5 sec hold  B heel and toe raises 3x10  Standing Back Extension with wall support, 10:5 sec hold  Pelvic tilts 10 reps 2/day  Single knee to chest 10 reps 2/day with towel  LTR 10 reps 2/day  scap retraction with broom or cane behind back and arms behind cane 10 reps 2/day  Holding broom or cane overhead, quad lumb and UE stretch 10 reps bilat  DKTC 10 reps 3/day    Handouts were given to the patient. Pt demo good understanding of the education provided. Alaina demonstrated good return demonstration of activities.   Lumbar roll use compliance: yes    Assessment     Patient has attended 20 visits of the [facility] program for aerobic exercise, med ex isometric testing with dynamic strengthening on med ex equipment for spine, whole body strengthening on med ex peripheral equipment, HEP with progression, and pt education. Pt has made great imrpovements in overall strength and functional mobility and deems self 80% improved since beginning program.  Pt did not show improvement in spinal ROM, however, has shown increase in lumbar strength via medX testing by 176% since initial evaluation.  Pt is currently 30% above normative data in relation to spinal extensor strength.  Pt given information related to wellness program, however, declines participation as she and spouse have new memberships to outside gym.  Pt given education on continuation of strengthening program to allow for maintenance of current gains.  Pt re-issued HEP for home completion with demonstration for lumbar stretching and strengthening program.  Pt has met 7/8 goals from initial POC.  Pt to benefit from discharge from program at this  time.      Pt's spiritual, cultural and educational needs considered and pt agreeable to plan of care and goals as stated below:      GOALS: Pt is in agreement with the following goals.     Short term goals:  6 weeks or 10 visits updated 10/01/2018  1.  Pt will demonstrate increased lumbar ROM by at least 3 degrees from the initial ROM value with improvements noted in functional ROM and ability to perform ADLs (MET)  2.  Pt will demonstrate increased maximum isometric torque value by 5% when compared to the initial value resulting in improved ability to perform bending, lifting, and carrying activities safely, confidently. (MET)  3.  Patient report a reduction in worst pain score by 1-2 points for improved tolerance during work and recreational activities -MET  4.  Pt able to perform HEP correctly with minimal cueing or supervision for therapist (( MET)     Long term goals: 13 weeks or 20 visits   1. Pt will demonstrate increased lumbar ROM by at least 6 degrees from initial ROM value, resulting in improved ability to perform functional fwd bending while standing and sitting. -not met   2. Pt will demonstrate increased maximum isometric torque value by 10% when compared to the initial value resulting in improved ability to perform bending, lifting, and carrying activities safely, confidently.- MET  3. Pt to demonstrate ability to independently control and reduce their pain through posture positioning and mechanical movements throughout a typical day.-MET  4.  Patient will demonstrate improved overall function per FOTO Survey to CK,  46%-54% impaired, limited or restricted score or less. -MET - current 20% limitation     Plan     Discharge from program with independent home management.     Krystal Carreon, PT   12/11/2018

## 2018-12-16 ENCOUNTER — NURSE TRIAGE (OUTPATIENT)
Dept: ADMINISTRATIVE | Facility: CLINIC | Age: 78
End: 2018-12-16

## 2018-12-16 NOTE — TELEPHONE ENCOUNTER
Reason for Disposition   Taking Coumadin (warfarin) or other strong blood thinner, or known bleeding disorder (e.g., thrombocytopenia)    Protocols used: ST URINE - BLOOD IN-A-AH    Pt calling regarding blood in urine.  Pt describes as kidneys are leaking.  Care advice given.  Will message MD and Staff.

## 2018-12-17 ENCOUNTER — TELEPHONE (OUTPATIENT)
Dept: UROLOGY | Facility: CLINIC | Age: 78
End: 2018-12-17

## 2018-12-17 ENCOUNTER — OFFICE VISIT (OUTPATIENT)
Dept: UROLOGY | Facility: CLINIC | Age: 78
End: 2018-12-17
Payer: MEDICARE

## 2018-12-17 VITALS
WEIGHT: 245.38 LBS | HEIGHT: 66 IN | SYSTOLIC BLOOD PRESSURE: 124 MMHG | DIASTOLIC BLOOD PRESSURE: 80 MMHG | BODY MASS INDEX: 39.43 KG/M2

## 2018-12-17 DIAGNOSIS — N20.0 NEPHROLITHIASIS: ICD-10-CM

## 2018-12-17 DIAGNOSIS — N39.0 RECURRENT UTI: ICD-10-CM

## 2018-12-17 DIAGNOSIS — N39.46 MIXED INCONTINENCE URGE AND STRESS: ICD-10-CM

## 2018-12-17 DIAGNOSIS — R31.0 GROSS HEMATURIA: Primary | ICD-10-CM

## 2018-12-17 LAB
BACTERIA #/AREA URNS HPF: NORMAL /HPF
BILIRUB SERPL-MCNC: NORMAL MG/DL
BLOOD URINE, POC: 50
COLOR, POC UA: YELLOW
GLUCOSE UR QL STRIP: NORMAL
KETONES UR QL STRIP: NORMAL
LEUKOCYTE ESTERASE URINE, POC: NORMAL
MICROSCOPIC COMMENT: NORMAL
NITRITE, POC UA: NORMAL
PH, POC UA: 5
PROTEIN, POC: NORMAL
RBC #/AREA URNS HPF: 2 /HPF (ref 0–4)
SPECIFIC GRAVITY, POC UA: 1025
SQUAMOUS #/AREA URNS HPF: 3 /HPF
UROBILINOGEN, POC UA: NORMAL

## 2018-12-17 PROCEDURE — 1101F PT FALLS ASSESS-DOCD LE1/YR: CPT | Mod: CPTII,HCNC,S$GLB, | Performed by: NURSE PRACTITIONER

## 2018-12-17 PROCEDURE — 3079F DIAST BP 80-89 MM HG: CPT | Mod: CPTII,HCNC,S$GLB, | Performed by: NURSE PRACTITIONER

## 2018-12-17 PROCEDURE — 3074F SYST BP LT 130 MM HG: CPT | Mod: CPTII,HCNC,S$GLB, | Performed by: NURSE PRACTITIONER

## 2018-12-17 PROCEDURE — 88112 CYTOPATH CELL ENHANCE TECH: CPT | Mod: HCNC | Performed by: PATHOLOGY

## 2018-12-17 PROCEDURE — 87086 URINE CULTURE/COLONY COUNT: CPT | Mod: HCNC

## 2018-12-17 PROCEDURE — 99999 PR PBB SHADOW E&M-EST. PATIENT-LVL IV: CPT | Mod: PBBFAC,HCNC,, | Performed by: NURSE PRACTITIONER

## 2018-12-17 PROCEDURE — 99214 OFFICE O/P EST MOD 30 MIN: CPT | Mod: HCNC,25,S$GLB, | Performed by: NURSE PRACTITIONER

## 2018-12-17 PROCEDURE — 88112 CYTOPATH CELL ENHANCE TECH: CPT | Mod: 26,HCNC,, | Performed by: PATHOLOGY

## 2018-12-17 PROCEDURE — 81000 URINALYSIS NONAUTO W/SCOPE: CPT | Mod: HCNC

## 2018-12-17 PROCEDURE — 81001 URINALYSIS AUTO W/SCOPE: CPT | Mod: HCNC,S$GLB,, | Performed by: NURSE PRACTITIONER

## 2018-12-17 NOTE — PROGRESS NOTES
Subjective:       Patient ID: Alaina Vee is a 78 y.o. female who was last seen in this office 1/25/18    Chief Complaint:   Chief Complaint   Patient presents with    Other     Patient states yesterdagy morning.. says she barely made it to the rest room and she saw stream of blood down her leg.. once she showered she didnt see it anymore.. she said she saw faint spoting this morning     Per Dr. Weaver:    She reports issues with recurrent UTIs. She was treated for UTI in 3/16 (100k E coli, pansensitive) and again in 4/16 (100k E coli). She has urinary frequency associated with UTIs. Nocturia x 1-2. She has urgency and UUI at baseline. Also with fecal urgency/incontinence. She was given Ditropan 5mg BID years ago. Also with RICHARD. She has not noted if this has helped. Denies current dysuria. Denies hematuria. No h/o kidney stones.     She has significant LBP issues. She also reports facial and R shoulder/arm/torso pain.     She was treated for UTI after initial visit. She remains on Ditropan IR not up to TID, declined ER formulations. She reports taking another medication for UI from me but I don't have records of this.     SERG (5/16) showed ruslanley 9mm stone in LLP. PVR 2cc. Cytology was negative but noted uric acid crystals.     CT 7/16 - 7mm L UP, 6mm L LP stones and 8mm R renal pelvis stone.   KUB 7/16 - shows 7mm R stone but difficult to see (unsure if truly the stone)    KUB 1/17 - 7mm R renal stone though hard to see (likely overlying 12th rib)    She started Ditropan XL 15mg previously and had great improvement. She continues to have significant back issues.    She started to develop R flank pain and therefore CT scan ordered. CT showed 8mm stone at R UPJ, visible on KUB. She is now s/p R ESWL. Stone was noted to fragment well. She did not note passing any stone and is still having flank pain.     CT with R LP stone, no obstruction. Recently started on Coumadin for a fib.     KUB 1/18 - two left  "renal calculi  SERG 1/18 - 3mm and 5mm L calculi - hydro resolved    100% CaOx mono - stone passed spontaneously. Pain present for 1 hour at that time (R side).     Today she presents as an urgent visit with c/o a bloody discharge x 1 episode on yesterday. She reports "red stream running down her leg" on yesterday. She denies seeing blood in urine or when she wipes. Denies dysuria, frequency, urgency or flank pain    ACTIVE MEDICAL ISSUES:  Patient Active Problem List   Diagnosis    Essential hypertension    Ventral hernia    UI (urinary incontinence)    Hyperlipidemia    Venous stasis of lower extremity    GERD (gastroesophageal reflux disease)    Central stenosis of spinal canal    Weakness of both hips    Segmental dysfunction of lumbar region    Recurrent UTI    Mixed incontinence urge and stress    Cervical radicular pain    Morbid obesity    SULEMA (obstructive sleep apnea)    Long term (current) use of anticoagulants    PAF (paroxysmal atrial fibrillation)    Atrial flutter    Degenerative disc disease, lumbar    Chronic bilateral low back pain without sciatica    Nuclear sclerosis of both eyes    Refractive error       ALLERGIES AND MEDICATIONS: updated and reviewed.  Review of patient's allergies indicates:   Allergen Reactions    Lipitor [atorvastatin] Other (See Comments)     Current Outpatient Medications   Medication Sig    baclofen (LIORESAL) 10 MG tablet     CYANOCOBALAMIN, VITAMIN B-12, (VITAMIN B-12 ORAL) Take 2,500 mcg by mouth every evening.    diclofenac sodium 1 % Gel Apply 2 g topically once daily.    fish oil-omega-3 fatty acids 300-1,000 mg capsule Take 1 g by mouth once daily.    flecainide (TAMBOCOR) 100 MG Tab Take 1 tablet (100 mg total) by mouth every 12 (twelve) hours.    ketoconazole (NIZORAL) 2 % cream     lisinopril (PRINIVIL,ZESTRIL) 40 MG tablet TAKE 1 TABLET ONE TIME DAILY (Patient taking differently: every evening. TAKE 1 TABLET ONE TIME DAILY)    " "multivitamin (THERAGRAN) per tablet Take 1 tablet by mouth every evening. 1 Tablet Oral Every day    oxybutynin (DITROPAN) 5 MG Tab Take 1 tablet (5 mg total) by mouth 3 (three) times daily.    simvastatin (ZOCOR) 40 MG tablet Take 40 mg by mouth every evening.    warfarin (COUMADIN) 5 MG tablet Take 0.5-1 tablets (2.5-5 mg total) by mouth Daily. As directed by coumadin clinic     No current facility-administered medications for this visit.        Review of Systems   Constitutional: Negative for activity change, chills, fatigue, fever and unexpected weight change.   Eyes: Negative for discharge, redness and visual disturbance.   Respiratory: Negative for cough, shortness of breath and wheezing.    Cardiovascular: Negative for chest pain and leg swelling.   Gastrointestinal: Negative for abdominal distention, abdominal pain, constipation, diarrhea, nausea and vomiting.   Genitourinary: Positive for hematuria. Negative for decreased urine volume, difficulty urinating, dysuria, flank pain, frequency, pelvic pain, urgency, vaginal discharge and vaginal pain.   Musculoskeletal: Negative for arthralgias, joint swelling and myalgias.   Skin: Negative for color change and rash.   Neurological: Negative for dizziness and light-headedness.   Psychiatric/Behavioral: Negative for behavioral problems and confusion. The patient is not nervous/anxious.        Objective:      Vitals:    12/17/18 1010   BP: 124/80   Weight: 111.3 kg (245 lb 6 oz)   Height: 5' 6" (1.676 m)     Physical Exam   Constitutional: She is oriented to person, place, and time. She appears well-developed.   HENT:   Head: Normocephalic and atraumatic.   Nose: Nose normal.   Eyes: Conjunctivae are normal. Right eye exhibits no discharge. Left eye exhibits no discharge.   Neck: Normal range of motion. Neck supple. No tracheal deviation present. No thyromegaly present.   Cardiovascular: Normal rate and regular rhythm.    Pulmonary/Chest: Effort normal. No " respiratory distress. She has no wheezes.   Abdominal: Soft. She exhibits no distension. There is no hepatosplenomegaly. There is no tenderness. There is no CVA tenderness. No hernia.   Genitourinary:   Genitourinary Comments: Patient declined exam   Musculoskeletal: Normal range of motion. She exhibits no edema.   Neurological: She is alert and oriented to person, place, and time.   Skin: Skin is warm and dry. No rash noted. No erythema.     Psychiatric: She has a normal mood and affect. Her behavior is normal. Judgment normal.       Urine dipstick shows 50 RBCs.     Assessment:       1. Gross hematuria    2. Nephrolithiasis    3. Recurrent UTI    4. Mixed incontinence urge and stress          Plan:       1. Gross hematuria   - Discussed etiology and workup of hematuria   - CT urogram/office cystoscopy--will hold on this for now pending micro UA results  - POCT urinalysis, dipstick or tablet reag   -Unsure if true gross hematuria. UA dipstick with 50 RBCs will obtain micro UA  - Urine culture today  - Urinalysis Microscopic  - Cytology, urine    2. Nephrolithiasis   - s/p R ESWL on 2/27/17 - stone with excellent response   - KUB post-op - residual stone    - CT - no obstructing stones, R LP noted   - SERG/KUB - R hydro resolved. Two stones in L kidney  - X-Ray Abdomen AP 1 View; Future  - US Retroperitoneal Complete (Kidney and; Future    3. Recurrent UTI  - UI likely contributing to UTIs  - Urine culture    4. Mixed incontinence urge and stress  - Discussed difference of UUI and RICHARD components. Reviewed etiology and workup of each.   - RICHARD: Kegels, PFPT, bulking agent, MUS.   - UUI: Behavioral changes, PFPT, anticholinergics. Botox/InterStim for refractory UUI.   - Doing great with Ditropan XL 15mg--medication changed to Ditropan 5mg PO TID per pharmacy request in 2/2018               Follow-up in about 2 months (around 2/17/2019).

## 2018-12-17 NOTE — TELEPHONE ENCOUNTER
----- Message from Samantha Morataya sent at 12/17/2018  8:04 AM CST -----  Contact: Self   Patient is calling to report a Discharge over the weekend, it was dark with blood. Asking for someone to call as soon as possible. Please call at [phone].

## 2018-12-19 ENCOUNTER — TELEPHONE (OUTPATIENT)
Dept: UROLOGY | Facility: CLINIC | Age: 78
End: 2018-12-19

## 2018-12-19 LAB — BACTERIA UR CULT: NORMAL

## 2018-12-19 NOTE — TELEPHONE ENCOUNTER
Please inform patient that recent urine culture was negative for infection.    Micro UA without significant amount of red blood cells to warrant additional testing at this time. She should keep scheduled appointment with Dr. Weaver in 2/2018 with SERG/BRENDON prior to appointment    Advise patient to notify office if symptoms (bloody discharge) occur again

## 2018-12-19 NOTE — TELEPHONE ENCOUNTER
Spoke to pt advised urine culture negative for infection also advised to keep follow up appts for imaging an to see .-isaura

## 2019-01-04 ENCOUNTER — TELEPHONE (OUTPATIENT)
Dept: UROLOGY | Facility: CLINIC | Age: 79
End: 2019-01-04

## 2019-01-04 DIAGNOSIS — R31.0 GROSS HEMATURIA: Primary | ICD-10-CM

## 2019-01-04 NOTE — TELEPHONE ENCOUNTER
Spoke with patient via phone and notified of urine cytology showing atypical cells    Recommend proceeding with CT urogram and office cystoscopy with Dr. Weaver. Imaging and procedure was explained to patient by myself    Please assist patient with scheduling CT scan/cysto. Thanks

## 2019-01-05 ENCOUNTER — NURSE TRIAGE (OUTPATIENT)
Dept: ADMINISTRATIVE | Facility: CLINIC | Age: 79
End: 2019-01-05

## 2019-01-05 NOTE — TELEPHONE ENCOUNTER
"  Reason for Disposition   Earache    Answer Assessment - Initial Assessment Questions  1. ONSET: "When did the nasal discharge start?"       December 18th  2. AMOUNT: "How much discharge is there?"       Small amount  3. COUGH: "Do you have a cough?" If yes, ask: "Describe the color of your sputum" (clear, white, yellow, green)      Yes. Sputum is green, clear, and yellow.  4. RESPIRATORY DISTRESS: "Describe your breathing."       Normal breathing  5. FEVER: "Do you have a fever?" If so, ask: "What is your temperature, how was it measured, and when did it start?"      No  6. SEVERITY: "Overall, how bad are you feeling right now?" (e.g., doesn't interfere with normal activities, staying home from school/work, staying in bed)       No energy  7. OTHER SYMPTOMS: "Do you have any other symptoms?" (e.g., sore throat, earache, wheezing, vomiting)     Slight earache in both ears, throat and tongue are a little "raw"  8. PREGNANCY: "Is there any chance you are pregnant?" "When was your last menstrual period?"      No    Protocols used: ST COLDS-A-    "

## 2019-01-08 ENCOUNTER — LAB VISIT (OUTPATIENT)
Dept: LAB | Facility: HOSPITAL | Age: 79
End: 2019-01-08
Attending: INTERNAL MEDICINE
Payer: MEDICARE

## 2019-01-08 ENCOUNTER — ANTI-COAG VISIT (OUTPATIENT)
Dept: CARDIOLOGY | Facility: CLINIC | Age: 79
End: 2019-01-08

## 2019-01-08 DIAGNOSIS — Z79.01 LONG TERM (CURRENT) USE OF ANTICOAGULANTS: ICD-10-CM

## 2019-01-08 LAB
INR PPP: 3.7
PROTHROMBIN TIME: 36.5 SEC

## 2019-01-08 PROCEDURE — 36415 COLL VENOUS BLD VENIPUNCTURE: CPT | Mod: HCNC,PO

## 2019-01-08 PROCEDURE — 85610 PROTHROMBIN TIME: CPT | Mod: HCNC

## 2019-01-09 NOTE — TELEPHONE ENCOUNTER
Please contact patient and inform that I have reviewed her report of earache.  This pain could be due to a viral infection causing congestion.    I recommend the over the counter regimen recommend by the on call nurse.  If this has not reduced her symptoms or they have worsened, I recommend she come to clinic for further evaluation.

## 2019-01-10 NOTE — PROGRESS NOTES
INR drawn yesterday was elevated. Patient reports being ill with the flu for which she has been taking OTC remedies (should not cause DDI). Will hold tonight's dose and resume usual maintenance plan.

## 2019-01-14 ENCOUNTER — ANTI-COAG VISIT (OUTPATIENT)
Dept: CARDIOLOGY | Facility: CLINIC | Age: 79
End: 2019-01-14

## 2019-01-14 ENCOUNTER — HOSPITAL ENCOUNTER (OUTPATIENT)
Dept: RADIOLOGY | Facility: HOSPITAL | Age: 79
Discharge: HOME | End: 2019-01-14
Attending: NURSE PRACTITIONER
Payer: MEDICARE

## 2019-01-14 DIAGNOSIS — R31.0 GROSS HEMATURIA: ICD-10-CM

## 2019-01-14 DIAGNOSIS — Z79.01 LONG TERM (CURRENT) USE OF ANTICOAGULANTS: ICD-10-CM

## 2019-01-14 PROCEDURE — 74178 CT ABD&PLV WO CNTR FLWD CNTR: CPT | Mod: TC,HCNC

## 2019-01-14 PROCEDURE — 74178 CT ABD&PLV WO CNTR FLWD CNTR: CPT | Mod: 26,HCNC,, | Performed by: RADIOLOGY

## 2019-01-14 PROCEDURE — 25500020 PHARM REV CODE 255: Mod: HCNC | Performed by: NURSE PRACTITIONER

## 2019-01-14 PROCEDURE — 74178 CT UROGRAM ABD PELVIS W WO: ICD-10-PCS | Mod: 26,HCNC,, | Performed by: RADIOLOGY

## 2019-01-14 RX ADMIN — IOHEXOL 125 ML: 350 INJECTION, SOLUTION INTRAVENOUS at 04:01

## 2019-01-21 ENCOUNTER — LAB VISIT (OUTPATIENT)
Dept: LAB | Facility: HOSPITAL | Age: 79
End: 2019-01-21
Attending: INTERNAL MEDICINE
Payer: MEDICARE

## 2019-01-21 ENCOUNTER — OFFICE VISIT (OUTPATIENT)
Dept: FAMILY MEDICINE | Facility: CLINIC | Age: 79
End: 2019-01-21
Payer: MEDICARE

## 2019-01-21 ENCOUNTER — ANTI-COAG VISIT (OUTPATIENT)
Dept: CARDIOLOGY | Facility: CLINIC | Age: 79
End: 2019-01-21

## 2019-01-21 VITALS
SYSTOLIC BLOOD PRESSURE: 120 MMHG | TEMPERATURE: 98 F | DIASTOLIC BLOOD PRESSURE: 75 MMHG | BODY MASS INDEX: 40.11 KG/M2 | WEIGHT: 249.56 LBS | HEART RATE: 66 BPM | RESPIRATION RATE: 16 BRPM | OXYGEN SATURATION: 98 % | HEIGHT: 66 IN

## 2019-01-21 DIAGNOSIS — M25.561 CHRONIC KNEE PAIN AFTER TOTAL REPLACEMENT OF RIGHT KNEE JOINT: ICD-10-CM

## 2019-01-21 DIAGNOSIS — G89.29 CHRONIC KNEE PAIN AFTER TOTAL REPLACEMENT OF RIGHT KNEE JOINT: ICD-10-CM

## 2019-01-21 DIAGNOSIS — K43.9 VENTRAL HERNIA WITHOUT OBSTRUCTION OR GANGRENE: Primary | ICD-10-CM

## 2019-01-21 DIAGNOSIS — E66.01 MORBID OBESITY: ICD-10-CM

## 2019-01-21 DIAGNOSIS — Z79.01 LONG TERM (CURRENT) USE OF ANTICOAGULANTS: ICD-10-CM

## 2019-01-21 DIAGNOSIS — Z96.651 CHRONIC KNEE PAIN AFTER TOTAL REPLACEMENT OF RIGHT KNEE JOINT: ICD-10-CM

## 2019-01-21 DIAGNOSIS — Z98.890 STATUS POST ARTHROSCOPIC SURGERY OF RIGHT KNEE: ICD-10-CM

## 2019-01-21 DIAGNOSIS — I48.0 PAF (PAROXYSMAL ATRIAL FIBRILLATION): ICD-10-CM

## 2019-01-21 LAB
INR PPP: 3
PROTHROMBIN TIME: 31.4 SEC

## 2019-01-21 PROCEDURE — 85610 PROTHROMBIN TIME: CPT | Mod: HCNC

## 2019-01-21 PROCEDURE — 3074F SYST BP LT 130 MM HG: CPT | Mod: CPTII,HCNC,S$GLB, | Performed by: FAMILY MEDICINE

## 2019-01-21 PROCEDURE — 99214 OFFICE O/P EST MOD 30 MIN: CPT | Mod: HCNC,S$GLB,, | Performed by: FAMILY MEDICINE

## 2019-01-21 PROCEDURE — 3078F PR MOST RECENT DIASTOLIC BLOOD PRESSURE < 80 MM HG: ICD-10-PCS | Mod: CPTII,HCNC,S$GLB, | Performed by: FAMILY MEDICINE

## 2019-01-21 PROCEDURE — 36415 COLL VENOUS BLD VENIPUNCTURE: CPT | Mod: HCNC,PN

## 2019-01-21 PROCEDURE — 1101F PT FALLS ASSESS-DOCD LE1/YR: CPT | Mod: CPTII,HCNC,S$GLB, | Performed by: FAMILY MEDICINE

## 2019-01-21 PROCEDURE — 3078F DIAST BP <80 MM HG: CPT | Mod: CPTII,HCNC,S$GLB, | Performed by: FAMILY MEDICINE

## 2019-01-21 PROCEDURE — 99999 PR PBB SHADOW E&M-EST. PATIENT-LVL IV: ICD-10-PCS | Mod: PBBFAC,HCNC,, | Performed by: FAMILY MEDICINE

## 2019-01-21 PROCEDURE — 1101F PR PT FALLS ASSESS DOC 0-1 FALLS W/OUT INJ PAST YR: ICD-10-PCS | Mod: CPTII,HCNC,S$GLB, | Performed by: FAMILY MEDICINE

## 2019-01-21 PROCEDURE — 99999 PR PBB SHADOW E&M-EST. PATIENT-LVL IV: CPT | Mod: PBBFAC,HCNC,, | Performed by: FAMILY MEDICINE

## 2019-01-21 PROCEDURE — 3074F PR MOST RECENT SYSTOLIC BLOOD PRESSURE < 130 MM HG: ICD-10-PCS | Mod: CPTII,HCNC,S$GLB, | Performed by: FAMILY MEDICINE

## 2019-01-21 PROCEDURE — 99214 PR OFFICE/OUTPT VISIT, EST, LEVL IV, 30-39 MIN: ICD-10-PCS | Mod: HCNC,S$GLB,, | Performed by: FAMILY MEDICINE

## 2019-01-21 RX ORDER — DICLOFENAC SODIUM 10 MG/G
2 GEL TOPICAL DAILY
Qty: 100 G | Refills: 3 | Status: SHIPPED | OUTPATIENT
Start: 2019-01-21

## 2019-01-21 RX ORDER — TRAMADOL HYDROCHLORIDE 50 MG/1
50 TABLET ORAL
Qty: 10 TABLET | Refills: 0 | Status: SHIPPED | OUTPATIENT
Start: 2019-01-21 | End: 2019-01-31

## 2019-01-21 NOTE — PATIENT INSTRUCTIONS
Exercise: Making the Most of Your Time  Your exercise goal is 30 minutes on most days. Aim for a total of 150 or more minutes a week. Not sure you can fit one 30-minute block of exercise time into your day? Split it up into shorter 10- and 15-minute blocks. Do this 2 to 3 times a day. You'll still get all of the benefits of exercise.   Use your whole body  Aerobic exercise works your heart and lungs. Some examples are walking, running, and cycling. Try adding a few other activities, too. This can help you strengthen and stretch many different muscles in your body.  Strengthen your:  · Lower legs. Go up and down slowly on your toes, while youre filing papers or washing dishes.  · Upper legs. Lower yourself slowly into a chair without using your arms.  Stretch your:  · Back. After you get up from a chair, place your palms on your low back and lean your upper body back.  · Shoulders and chest. Lift your arms overhead and reach tall while waiting for your computer to warm up.  · Lower legs. Raise your toes and press them against a wall (with your heel on the ground).  Find a few extra minutes  Try these tips to add some extra exercise and activity to your day:  · At work. Pick a lunch spot a few blocks away and walk there and back. Take a brisk walk on your break.  · At home. Ride bikes with your kids. Use an exercise bike in the living room while you watch TV.  · On errands. Park a few blocks away from where you need to go and walk there. Power-walk in malls by doing a fast lap or two before shopping.  · At play. Go hiking with friends instead of sitting on a bench.Walk through a street fair instead of sitting in a movie.  Tips for sticking with it  · Plan your activity by writing it on a calendar.  · Find a ashley at work to walk with during a lunch break.  · Take your kids with you on a short walk after dinner.  · Post a reminder list of the benefits of being active where you can see it.  · Set an alarm to tell you  when its time for an activity break.   Date Last Reviewed: 8/13/2015  © 1122-0699 [employer]. [address] [place] [address], [place] [address]. All rights reserved. This information is not intended as a substitute for professional medical care. Always follow your healthcare professional's instructions.        Advanced Exercises After Knee Replacement Surgery   Advanced exercises help strengthen and stretch the muscles around your knee. Unless told otherwise, repeat each exercise 10 times per session. Build up to 25 repetitions. Do 2 sessions each day. You may be told to take pain medicine 30 minutes before each session.  Make exercise part of your daily routine. Lack of exercise can cause joint stiffness and decreased range of motion.  But with continued exercise, you may even gain more strength and range of motion than you had before surgery. Keep meeting with your physical therapist as directed. He or she may add riding a stationary bike or other new exercises to your program.    Short-arc knee extensions  · Lie on your back. Place a rolled towel under your new knee and bend the other knee.  · Keeping your new knee on the towel, lift your foot several inches to straighten the knee.  · Hold for 3 seconds. Slowly lower the foot.  Straight leg raises  · Lie down on your bed. Bend your good leg keeping your foot flat on the bed.  · With your operated leg as straight as possible and lift it about 1 foot off the bed. (At first, you may only be able to lift the leg a few inches.)  · Hold for 3 seconds. Slowly lower the leg.    Standing knee bends  · Stand while hold on to a steady surface, such as a table.  · Bend your operated knee as far as you can.  · Hold for 3 seconds. Slowly lower the leg.  Long-arc knee extensions  · Sit in a chair with both feet flat on the floor.  · Straighten the operated knee as much as you can.  · Hold for 3 seconds. Slowly lower the leg.  Date Last Reviewed: 11/14/2015  © 0897-0260  [employer]. [address] [place] [address], [place] [address]. All rights reserved. This information is not intended as a substitute for professional medical care. Always follow your healthcare professional's instructions.

## 2019-01-21 NOTE — PROGRESS NOTES
Chief Complaint   Patient presents with    Follow-up       HPI    Alaina Vee is 78 y.o. female. The primary encounter diagnosis was Ventral hernia without obstruction or gangrene. 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    78 year old female with Atrial Fibrillation and Ventral Hernia comes to clinic for follow up and complaint of severe knee pain.    Right knee - She reports pain became noticeable about 2 weeks ago.  She reports pain is located throughout the knee, but is sharp and intermittent.   She also reports sensation of stiffness and instability.  She reports bilateral knee replacement with right knee in 2005.  Obesity - patient reports increase in physical activity.  She currently attends a gym.  Patient also asks if Kaden is indicated for her weight loss.  She reports evaluation by Bariatrics.  Hernia - patient reports that she was informed that repair is needed.  She has not obtained clearance from Cardiologist.  PAF - patient reports stable and remains compliant with Coumadin.    Review of Systems   Constitutional: Negative for activity change.   Respiratory: Negative for shortness of breath.    Cardiovascular: Negative for chest pain.   Gastrointestinal: Negative for abdominal distention, abdominal pain, blood in stool, constipation, diarrhea and nausea.   Musculoskeletal: Positive for arthralgias, gait problem, joint swelling and myalgias.   Psychiatric/Behavioral: Negative for suicidal ideas.           Current Outpatient Medications:     baclofen (LIORESAL) 10 MG tablet, , Disp: , Rfl:     CYANOCOBALAMIN, VITAMIN B-12, (VITAMIN B-12 ORAL), Take 2,500 mcg by mouth every evening., Disp: , Rfl:     diclofenac sodium (VOLTAREN) 1 % Gel, Apply 2 g topically once daily., Disp: 100 g, Rfl: 3    fish oil-omega-3 fatty  "acids 300-1,000 mg capsule, Take 1 g by mouth once daily., Disp: , Rfl:     flecainide (TAMBOCOR) 100 MG Tab, Take 1 tablet (100 mg total) by mouth every 12 (twelve) hours., Disp: 180 tablet, Rfl: 3    ketoconazole (NIZORAL) 2 % cream, , Disp: , Rfl:     lisinopril (PRINIVIL,ZESTRIL) 40 MG tablet, TAKE 1 TABLET ONE TIME DAILY (Patient taking differently: every evening. TAKE 1 TABLET ONE TIME DAILY), Disp: 90 tablet, Rfl: 1    multivitamin (THERAGRAN) per tablet, Take 1 tablet by mouth every evening. 1 Tablet Oral Every day, Disp: , Rfl:     oxybutynin (DITROPAN) 5 MG Tab, Take 1 tablet (5 mg total) by mouth 3 (three) times daily., Disp: 90 tablet, Rfl: 11    simvastatin (ZOCOR) 40 MG tablet, Take 40 mg by mouth every evening., Disp: , Rfl:     traMADol (ULTRAM) 50 mg tablet, Take 1 tablet (50 mg total) by mouth every 24 hours as needed (severe knee pain)., Disp: 10 tablet, Rfl: 0    warfarin (COUMADIN) 5 MG tablet, Take 0.5-1 tablets (2.5-5 mg total) by mouth Daily. As directed by coumadin clinic, Disp: 90 tablet, Rfl: 3      Blood pressure 120/75, pulse 66, temperature 97.6 °F (36.4 °C), temperature source Oral, resp. rate 16, height 5' 6" (1.676 m), weight 113.2 kg (249 lb 9 oz), SpO2 98 %.    Physical Exam   Constitutional: Vital signs are normal. She appears well-developed and well-nourished. She does not appear ill. No distress.   Musculoskeletal:        Right knee: She exhibits normal range of motion, no swelling, no effusion and no deformity. No tenderness found.        Left knee: She exhibits normal range of motion, no swelling, no effusion and no deformity. No tenderness found.       Lab Visit on 01/21/2019   Component Date Value Ref Range Status    Prothrombin Time 01/21/2019 31.4* 9.0 - 12.5 sec Final    INR 01/21/2019 3.0* 0.8 - 1.2 Final   Lab Visit on 01/14/2019   Component Date Value Ref Range Status    Sodium 01/14/2019 141  136 - 145 mmol/L Final    Potassium 01/14/2019 4.2  3.5 - 5.1 " mmol/L Final    Chloride 01/14/2019 102  95 - 110 mmol/L Final    CO2 01/14/2019 30* 23 - 29 mmol/L Final    Glucose 01/14/2019 102  70 - 110 mg/dL Final    BUN, Bld 01/14/2019 19  8 - 23 mg/dL Final    Creatinine 01/14/2019 0.9  0.5 - 1.4 mg/dL Final    Calcium 01/14/2019 9.9  8.7 - 10.5 mg/dL Final    Anion Gap 01/14/2019 9  8 - 16 mmol/L Final    eGFR if African American 01/14/2019 >60  >60 mL/min/1.73 m^2 Final    eGFR if non African American 01/14/2019 >60  >60 mL/min/1.73 m^2 Final   Lab Visit on 01/14/2019   Component Date Value Ref Range Status    Prothrombin Time 01/14/2019 20.2* 9.0 - 12.5 sec Final    INR 01/14/2019 1.9* 0.8 - 1.2 Final   Lab Visit on 01/08/2019   Component Date Value Ref Range Status    Prothrombin Time 01/08/2019 36.5* 9.0 - 12.5 sec Final    INR 01/08/2019 3.7* 0.8 - 1.2 Final   Office Visit on 12/17/2018   Component Date Value Ref Range Status    Color, UA 12/17/2018 yellow   Final    Spec Grav UA 12/17/2018 1,025   Final    pH, UA 12/17/2018 5   Final    WBC, UA 12/17/2018 neg   Final    Nitrite, UA 12/17/2018 neg   Final    Protein 12/17/2018 neg   Final    Glucose, UA 12/17/2018 neg   Final    Ketones, UA 12/17/2018 neg   Final    Urobilinogen, UA 12/17/2018 neg   Final    Bilirubin 12/17/2018 neg   Final    Blood, UA 12/17/2018 50   Final    Urine Culture, Routine 12/17/2018 No significant growth   Final    RBC, UA 12/17/2018 2  0 - 4 /hpf Final    Bacteria, UA 12/17/2018 Rare  None-Occ /hpf Final    Squam Epithel, UA 12/17/2018 3  /hpf Final    Microscopic Comment 12/17/2018 SEE COMMENT   Final   Lab Visit on 12/11/2018   Component Date Value Ref Range Status    Prothrombin Time 12/11/2018 24.0* 9.0 - 12.5 sec Final    INR 12/11/2018 2.3* 0.8 - 1.2 Final   Lab Visit on 11/27/2018   Component Date Value Ref Range Status    Prothrombin Time 11/27/2018 28.6* 9.0 - 12.5 sec Final    INR 11/27/2018 2.7* 0.8 - 1.2 Final   Lab Visit on 11/13/2018    Component Date Value Ref Range Status    Prothrombin Time 11/13/2018 25.3* 9.0 - 12.5 sec Final    INR 11/13/2018 2.6* 0.8 - 1.2 Final   Lab Visit on 10/31/2018   Component Date Value Ref Range Status    Prothrombin Time 10/31/2018 17.2* 9.0 - 12.5 sec Final    INR 10/31/2018 1.6* 0.8 - 1.2 Final   ]    Assessment:    1. Ventral hernia without obstruction or gangrene    2. PAF (paroxysmal atrial fibrillation)    3. 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 Status post arthroscopic surgery of right knee    5. Morbid obesity          Alaina was seen today for follow-up.    Diagnoses and all orders for this visit:    Ventral hernia without obstruction or gangrene   -Stable. Patient to obtain cardiac clearance prior to surgery.    PAF (paroxysmal atrial fibrillation)   -Stable. Continue Coumadin therapy and discuss cardiac clearance for surgery at next follow up.    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  -     traMADol (ULTRAM) 50 mg tablet; Take 1 tablet (50 mg total) by mouth every 24 hours as needed (severe knee pain).  - New problem.  Referral to Orthopedics for evaluation. Medications for pain prescribed.    Status post arthroscopic surgery of right knee  -     Ambulatory referral to Orthopedics  - New problem. Patient referred to Orthopedics.    Morbid obesity   -Worsening. Patient counseled on need for weight loss to reduce knee pain.   -Okay to use Kaden over the counter for weight loss.        FOLLOW UP: Follow-up in about 4 months (around 5/21/2019) for Follow up.

## 2019-01-25 DIAGNOSIS — M25.569 KNEE PAIN, UNSPECIFIED CHRONICITY, UNSPECIFIED LATERALITY: Primary | ICD-10-CM

## 2019-01-28 ENCOUNTER — PROCEDURE VISIT (OUTPATIENT)
Dept: UROLOGY | Facility: CLINIC | Age: 79
End: 2019-01-28
Payer: MEDICARE

## 2019-01-28 ENCOUNTER — OFFICE VISIT (OUTPATIENT)
Dept: ORTHOPEDICS | Facility: CLINIC | Age: 79
End: 2019-01-28
Payer: MEDICARE

## 2019-01-28 VITALS — RESPIRATION RATE: 16 BRPM | WEIGHT: 246.94 LBS | HEIGHT: 66 IN | BODY MASS INDEX: 39.69 KG/M2

## 2019-01-28 VITALS
SYSTOLIC BLOOD PRESSURE: 110 MMHG | DIASTOLIC BLOOD PRESSURE: 80 MMHG | HEIGHT: 66 IN | HEART RATE: 63 BPM | BODY MASS INDEX: 39.82 KG/M2 | WEIGHT: 247.81 LBS

## 2019-01-28 DIAGNOSIS — R31.0 GROSS HEMATURIA: ICD-10-CM

## 2019-01-28 DIAGNOSIS — T84.84XA PAIN DUE TO TOTAL RIGHT KNEE REPLACEMENT, INITIAL ENCOUNTER: ICD-10-CM

## 2019-01-28 DIAGNOSIS — Z96.651 PAIN DUE TO TOTAL RIGHT KNEE REPLACEMENT, INITIAL ENCOUNTER: ICD-10-CM

## 2019-01-28 PROCEDURE — 1101F PT FALLS ASSESS-DOCD LE1/YR: CPT | Mod: HCNC,CPTII,S$GLB, | Performed by: ORTHOPAEDIC SURGERY

## 2019-01-28 PROCEDURE — 99203 PR OFFICE/OUTPT VISIT, NEW, LEVL III, 30-44 MIN: ICD-10-PCS | Mod: HCNC,S$GLB,, | Performed by: ORTHOPAEDIC SURGERY

## 2019-01-28 PROCEDURE — 99999 PR PBB SHADOW E&M-EST. PATIENT-LVL III: CPT | Mod: PBBFAC,HCNC,, | Performed by: ORTHOPAEDIC SURGERY

## 2019-01-28 PROCEDURE — 52000 CYSTOSCOPY: ICD-10-PCS | Mod: HCNC,S$GLB,, | Performed by: UROLOGY

## 2019-01-28 PROCEDURE — 3079F PR MOST RECENT DIASTOLIC BLOOD PRESSURE 80-89 MM HG: ICD-10-PCS | Mod: HCNC,CPTII,S$GLB, | Performed by: ORTHOPAEDIC SURGERY

## 2019-01-28 PROCEDURE — 99999 PR PBB SHADOW E&M-EST. PATIENT-LVL III: ICD-10-PCS | Mod: PBBFAC,HCNC,, | Performed by: ORTHOPAEDIC SURGERY

## 2019-01-28 PROCEDURE — 52000 CYSTOURETHROSCOPY: CPT | Mod: HCNC,S$GLB,, | Performed by: UROLOGY

## 2019-01-28 PROCEDURE — 1101F PR PT FALLS ASSESS DOC 0-1 FALLS W/OUT INJ PAST YR: ICD-10-PCS | Mod: HCNC,CPTII,S$GLB, | Performed by: ORTHOPAEDIC SURGERY

## 2019-01-28 PROCEDURE — 3074F SYST BP LT 130 MM HG: CPT | Mod: HCNC,CPTII,S$GLB, | Performed by: ORTHOPAEDIC SURGERY

## 2019-01-28 PROCEDURE — 99203 OFFICE O/P NEW LOW 30 MIN: CPT | Mod: HCNC,S$GLB,, | Performed by: ORTHOPAEDIC SURGERY

## 2019-01-28 PROCEDURE — 3079F DIAST BP 80-89 MM HG: CPT | Mod: HCNC,CPTII,S$GLB, | Performed by: ORTHOPAEDIC SURGERY

## 2019-01-28 PROCEDURE — 3074F PR MOST RECENT SYSTOLIC BLOOD PRESSURE < 130 MM HG: ICD-10-PCS | Mod: HCNC,CPTII,S$GLB, | Performed by: ORTHOPAEDIC SURGERY

## 2019-01-28 NOTE — PROCEDURES
"Cystoscopy  Date/Time: 1/28/2019 2:14 PM  Performed by: Yvonne Waever MD  Authorized by: Mary Gomez NP     Consent Done?:  Yes (Written)  Time out: Immediately prior to procedure a "time out" was called to verify the correct patient, procedure, equipment, support staff and site/side marked as required.    Indications: recurrent UTIs and hematuria    Position:  Dorsal lithotomy  Anesthesia:  Lidocaine jelly  Patient sedated?: No    Preparation: Patient was prepped and draped in usual sterile fashion      Scope type:  Flexible cystoscope  Stent inserted: No    Stent removed: No    External exam normal: Yes    Digital exam performed: No    Urethra normal: Yes  Bladder neck normal: Bladder neck normal   Bladder normal: Yes      Patient tolerance:  Patient tolerated the procedure well with no immediate complications     Normal cystoscopy      "

## 2019-01-28 NOTE — LETTER
January 28, 2019      Lindsay Rea MD  [address]  [place] [address]           [facility]  [address]  [place] [address]  Phone: [phone]          Patient: Alaina Vee   MR Number: [id number]   YOB: 1940   Date of Visit: 1/28/2019       Dear Dr. Lindsay Rea:    Thank you for referring Alaina Vee to me for evaluation. Attached you will find relevant portions of my assessment and plan of care.    If you have questions, please do not hesitate to call me. I look forward to following Alaina Vee along with you.    Sincerely,    Julieta Alan MD    Enclosure  CC:  No Recipients    If you would like to receive this communication electronically, please contact [email or web address] or [phone] to request more information on [employer] [facility] access.    For providers and/or their staff who would like to refer a patient to [facility], please contact us through our one-stop-shop provider referral line, [facility], at [phone].    If you feel you have received this communication in error or would no longer like to receive these types of communications, please e-mail [email or web address]

## 2019-01-28 NOTE — PROGRESS NOTES
Chief Complaint   Patient presents with    Right Knee - Pain       This patient was seen in consultation at the request of Dr. Lindsay Rea     HPI: Alaina Vee is a 78 y.o. female who presents today complaining of right knee stiffness, pain and sensation of instability.  The most bothersome symptom is the sensation that the knee is going to give out  Duration of symptoms: 2 weeks  Trauma or new activity: no  No recent UTI, infection, dental work or other procedures   Pain is intermittent  Aggravating factors: no known   Relieving factors: no known  Radicular symptoms: no numbness and paresthesias   Associated symptoms:  giving way.  No swelling or erythema  Prior treatment:  icyhot  without improvement in pain.     Pain does interfere with activities of daily living .    This is the extent of the patient's complaints at this time.     Occupation:  Retired, cooked at [employer]     Review of Systems   Constitutional: Negative for chills and fever.   HENT: Negative.    Eyes: Negative.    Respiratory: Negative.    Cardiovascular: Negative.    Gastrointestinal: Negative.    Genitourinary: Negative.    Musculoskeletal: Positive for joint pain.   Skin: Negative.    Neurological: Negative.    Endo/Heme/Allergies: Negative.    Psychiatric/Behavioral: Negative.          Review of patient's allergies indicates:   Allergen Reactions    Lipitor [atorvastatin] Other (See Comments)         Current Outpatient Medications:     CYANOCOBALAMIN, VITAMIN B-12, (VITAMIN B-12 ORAL), Take 2,500 mcg by mouth every evening., Disp: , Rfl:     diclofenac sodium (VOLTAREN) 1 % Gel, Apply 2 g topically once daily., Disp: 100 g, Rfl: 3    fish oil-omega-3 fatty acids 300-1,000 mg capsule, Take 1 g by mouth once daily., Disp: , Rfl:     flecainide (TAMBOCOR) 100 MG Tab, Take 1 tablet (100 mg total) by mouth every 12 (twelve) hours., Disp: 180 tablet, Rfl: 3    lisinopril (PRINIVIL,ZESTRIL) 40 MG tablet, TAKE 1 TABLET ONE  TIME DAILY (Patient taking differently: every evening. TAKE 1 TABLET ONE TIME DAILY), Disp: 90 tablet, Rfl: 1    multivitamin (THERAGRAN) per tablet, Take 1 tablet by mouth every evening. 1 Tablet Oral Every day, Disp: , Rfl:     oxybutynin (DITROPAN) 5 MG Tab, Take 1 tablet (5 mg total) by mouth 3 (three) times daily., Disp: 90 tablet, Rfl: 11    simvastatin (ZOCOR) 40 MG tablet, Take 40 mg by mouth every evening., Disp: , Rfl:     traMADol (ULTRAM) 50 mg tablet, Take 1 tablet (50 mg total) by mouth every 24 hours as needed (severe knee pain)., Disp: 10 tablet, Rfl: 0    warfarin (COUMADIN) 5 MG tablet, Take 0.5-1 tablets (2.5-5 mg total) by mouth Daily. As directed by coumadin clinic, Disp: 90 tablet, Rfl: 3    baclofen (LIORESAL) 10 MG tablet, , Disp: , Rfl:     ketoconazole (NIZORAL) 2 % cream, , Disp: , Rfl:     Past Medical History:   Diagnosis Date    Arthritis     knees    Atrial fibrillation     Atrial flutter     Back pain     Degenerative disc disease     Depression     General anesthetics causing adverse effect in therapeutic use     pt states sometimes slow to awaken.    GERD (gastroesophageal reflux disease)     Hyperlipidemia     Hypertension     Kidney stone     Obesity     Sleep apnea     does not wear CPAP    Varicosities     Ventral hernia        Patient Active Problem List   Diagnosis    Essential hypertension    Ventral hernia    UI (urinary incontinence)    Hyperlipidemia    Venous stasis of lower extremity    GERD (gastroesophageal reflux disease)    Central stenosis of spinal canal    Weakness of both hips    Segmental dysfunction of lumbar region    Recurrent UTI    Mixed incontinence urge and stress    Cervical radicular pain    Morbid obesity    SULEMA (obstructive sleep apnea)    Long term (current) use of anticoagulants    PAF (paroxysmal atrial fibrillation)    Atrial flutter    Degenerative disc disease, lumbar    Chronic bilateral low back pain  without sciatica    Nuclear sclerosis of both eyes    Refractive error    Status post arthroscopic surgery of right knee       Past Surgical History:   Procedure Laterality Date    ABLATION N/A 6/12/2017    [facility]    APPENDECTOMY      BREAST BIOPSY Bilateral     1985    breast biopsy, left      CARDIOVERSION N/A 10/4/2017    [facility] CATH [facility]    CARDIOVERSION N/A 8/31/2017    [facility] C[facility]    CHOLECYSTECTOMY      JOINT REPLACEMENT      TKR , right    JOINT REPLACEMENT  2009    TKR  left    LITHOTRIPSY-EXTRACORPOREAL SHOCK WAVE Right 2/27/2017    [facility] OR    [place] EXPLORATORY OF ABDOMEN      TRANSESOPHAGEAL ECHOCARDIOGRAM (SKINNY) N/A 8/31/2017    [facility] CATH LAB    TRANSESOPHAGEAL ECHOCARDIOGRAM (SKINNY) N/A 6/12/2017    [facility] CATH LAB       Social History     Tobacco Use    Smoking status: Never Smoker    Smokeless tobacco: Never Used   Substance Use Topics    Alcohol use: No    Drug use: No       Family History   Problem Relation Age of Onset    COPD Mother     Heart disease Sister         half sister    COPD Sister        Physical Exam:     Vitals:    01/28/19 1057   BP: 110/80   Pulse: 63        General: Weight: 112.4 kg (247 lb 12.8 oz) Body mass index is 40 kg/m².  Patient is alert, awake and oriented to time, place and person. Mood and affect are appropriate.  Patient does not appear to be in any distress, denies any constitutional symptoms and appears stated age.   HEENT: Pupils are equal and round, sclera are not injected. External examination of ears and nose reveals no abnormalities. Cranial nerves II-X are grossly intact  Skin: no rashes, abrasions or open wounds on the affected extremity   Resp: No respiratory distress or audible wheezing   CV: 2+  pulses, all extremities  warm and well perfused   Right Knee    Well healed anterior incision   No erythema or swelling  No drainage  AROM 0-110  Patella stable to medial and lateral stress  Mildly increased gapping with valgus stress than varus  No flexion instability  NVI distally      Imaging: 3 views right knee: No lucency around femoral or tibial components. Mildly decreased joint space medially compared to previous films. No evidence of dislocation     Assessment: 78 y.o. female s/p R TKA with acute onset of sensation of instability. No evidence of acute infection.    I explained my diagnostic impression and the reasoning behind it in detail, using layman's terms.  Models and/or pictures were used to help in the explanation.    Plan:   - Will refer to [facility] for further workup of her symptoms.  She does not want to go to [facility] because she takes care of her ill  so I gave her the names of Dr. Winetr and Dr. Raymundo.  If she changes her mind and would like to stay within [facility] we can assist with getting an appointment scheduled for her.   - Return to clinic as needed     All questions were answered in detail. The patient is in full agreement with the treatment plan and will proceed accordingly.    A note notifying Dr. Lindsay Rea of my findings was sent via the electronic medical record

## 2019-01-30 ENCOUNTER — ANTI-COAG VISIT (OUTPATIENT)
Dept: CARDIOLOGY | Facility: CLINIC | Age: 79
End: 2019-01-30
Payer: MEDICARE

## 2019-01-30 DIAGNOSIS — Z79.01 LONG TERM (CURRENT) USE OF ANTICOAGULANTS: ICD-10-CM

## 2019-01-30 LAB — INR PPP: 3.3 (ref 2–3)

## 2019-01-30 PROCEDURE — 85610 PROTHROMBIN TIME: CPT | Mod: QW,HCNC,S$GLB,

## 2019-01-30 PROCEDURE — 85610 POCT INR: ICD-10-PCS | Mod: QW,HCNC,S$GLB,

## 2019-01-30 PROCEDURE — 99211 PR OFFICE/OUTPT VISIT, EST, LEVL I: ICD-10-PCS | Mod: 25,HCNC,S$GLB,

## 2019-01-30 PROCEDURE — 99211 OFF/OP EST MAY X REQ PHY/QHP: CPT | Mod: 25,HCNC,S$GLB,

## 2019-01-30 NOTE — PROGRESS NOTES
Patient here for close monitoring due to recent high and low INRs with recent dose change. INR a little high today. She reports a stressful week. She had a cystoscope on Monday to evaluate hematuria that occurred last month. She denies any recent hematuria. Cystoscope was normal. She reports knee pain and now using voltaren gel topically. No interaction. No other changes. No signs or symptoms of bleeding. We will adjust dose for today then resume current dose plan. Recheck INR in 2 weeks

## 2019-02-05 RX ORDER — FLECAINIDE ACETATE 100 MG/1
TABLET ORAL
Qty: 60 TABLET | Refills: 1 | Status: SHIPPED | OUTPATIENT
Start: 2019-02-05 | End: 2019-04-16 | Stop reason: SDUPTHER

## 2019-02-05 NOTE — TELEPHONE ENCOUNTER
pls call pt to schedule f/u OV.  No further refills will be authorized unless she is seen in the office.

## 2019-02-06 RX ORDER — FLECAINIDE ACETATE 100 MG/1
100 TABLET ORAL EVERY 12 HOURS
Qty: 60 TABLET | Refills: 1 | OUTPATIENT
Start: 2019-02-06

## 2019-02-06 NOTE — TELEPHONE ENCOUNTER
----- Message from Audelia Mera sent at 2/6/2019  8:16 AM CST -----  Contact: Self/ [phone]  Refill:  flecainide (TAMBOCOR) 100 MG Tab    [facility] [address] [place] [address] [place]  Thank You.

## 2019-02-13 ENCOUNTER — ANTI-COAG VISIT (OUTPATIENT)
Dept: CARDIOLOGY | Facility: CLINIC | Age: 79
End: 2019-02-13
Payer: MEDICARE

## 2019-02-13 DIAGNOSIS — Z79.01 LONG TERM (CURRENT) USE OF ANTICOAGULANTS: ICD-10-CM

## 2019-02-13 LAB — INR PPP: 2 (ref 2–3)

## 2019-02-13 PROCEDURE — 85610 PROTHROMBIN TIME: CPT | Mod: QW,HCNC,S$GLB,

## 2019-02-13 PROCEDURE — 99211 OFF/OP EST MAY X REQ PHY/QHP: CPT | Mod: 25,HCNC,S$GLB,

## 2019-02-13 PROCEDURE — 85610 POCT INR: ICD-10-PCS | Mod: QW,HCNC,S$GLB,

## 2019-02-13 PROCEDURE — 99211 PR OFFICE/OUTPT VISIT, EST, LEVL I: ICD-10-PCS | Mod: 25,HCNC,S$GLB,

## 2019-02-13 NOTE — PROGRESS NOTES
Patient here for close monitoring due to recent high INR. INR now on low end of range. Patient reports hematuria resolved. She reports knee pain improved. She denies any medication changes. No diet changes. No signs or symptoms of bleeding. Prior to recent fluctuations, dose was a little higher. INR now on low end. Will watch closely to assess trend and recent dose change. Recheck INR in 2 weeks

## 2019-02-18 RX ORDER — OXYBUTYNIN CHLORIDE 5 MG/1
TABLET ORAL
Qty: 270 TABLET | Refills: 11 | Status: SHIPPED | OUTPATIENT
Start: 2019-02-18 | End: 2020-05-15 | Stop reason: SDUPTHER

## 2019-02-18 RX ORDER — OXYBUTYNIN CHLORIDE 5 MG/1
5 TABLET ORAL 3 TIMES DAILY
Qty: 90 TABLET | Refills: 3 | Status: SHIPPED | OUTPATIENT
Start: 2019-02-18 | End: 2019-02-18 | Stop reason: SDUPTHER

## 2019-02-18 NOTE — TELEPHONE ENCOUNTER
----- Message from Lakshmi Phillip sent at 2/18/2019  9:50 AM CST -----  Contact: Self   Patient states she need a refill on her medication. She states she need it sent to a local pharmacy because she is completely out. Please call patient at [phone].          oxybutynin (DITROPAN) 5 MG Tab      [id number] - [place], [place] - [address]

## 2019-02-28 ENCOUNTER — LAB VISIT (OUTPATIENT)
Dept: LAB | Facility: HOSPITAL | Age: 79
End: 2019-02-28
Attending: INTERNAL MEDICINE
Payer: MEDICARE

## 2019-02-28 ENCOUNTER — ANTI-COAG VISIT (OUTPATIENT)
Dept: CARDIOLOGY | Facility: CLINIC | Age: 79
End: 2019-02-28

## 2019-02-28 DIAGNOSIS — Z79.01 LONG TERM (CURRENT) USE OF ANTICOAGULANTS: ICD-10-CM

## 2019-02-28 LAB
INR PPP: 2.9
PROTHROMBIN TIME: 30.5 SEC

## 2019-02-28 PROCEDURE — 36415 COLL VENOUS BLD VENIPUNCTURE: CPT | Mod: HCNC,PN

## 2019-02-28 PROCEDURE — 85610 PROTHROMBIN TIME: CPT | Mod: HCNC

## 2019-03-08 ENCOUNTER — OFFICE VISIT (OUTPATIENT)
Dept: FAMILY MEDICINE | Facility: CLINIC | Age: 79
End: 2019-03-08
Payer: MEDICARE

## 2019-03-08 VITALS
SYSTOLIC BLOOD PRESSURE: 112 MMHG | HEIGHT: 66 IN | RESPIRATION RATE: 18 BRPM | HEART RATE: 65 BPM | TEMPERATURE: 98 F | BODY MASS INDEX: 40.89 KG/M2 | DIASTOLIC BLOOD PRESSURE: 66 MMHG | WEIGHT: 254.44 LBS | OXYGEN SATURATION: 97 %

## 2019-03-08 DIAGNOSIS — R68.2 DRY MOUTH: ICD-10-CM

## 2019-03-08 DIAGNOSIS — R60.0 LOWER EXTREMITY EDEMA: ICD-10-CM

## 2019-03-08 DIAGNOSIS — R09.82 POSTNASAL DRIP: Primary | ICD-10-CM

## 2019-03-08 DIAGNOSIS — I48.0 PAF (PAROXYSMAL ATRIAL FIBRILLATION): ICD-10-CM

## 2019-03-08 PROCEDURE — 99999 PR PBB SHADOW E&M-EST. PATIENT-LVL III: CPT | Mod: PBBFAC,HCNC,, | Performed by: FAMILY MEDICINE

## 2019-03-08 PROCEDURE — 3078F DIAST BP <80 MM HG: CPT | Mod: HCNC,CPTII,S$GLB, | Performed by: FAMILY MEDICINE

## 2019-03-08 PROCEDURE — 99999 PR PBB SHADOW E&M-EST. PATIENT-LVL III: ICD-10-PCS | Mod: PBBFAC,HCNC,, | Performed by: FAMILY MEDICINE

## 2019-03-08 PROCEDURE — 3074F SYST BP LT 130 MM HG: CPT | Mod: HCNC,CPTII,S$GLB, | Performed by: FAMILY MEDICINE

## 2019-03-08 PROCEDURE — 99214 OFFICE O/P EST MOD 30 MIN: CPT | Mod: HCNC,S$GLB,, | Performed by: FAMILY MEDICINE

## 2019-03-08 PROCEDURE — 99214 PR OFFICE/OUTPT VISIT, EST, LEVL IV, 30-39 MIN: ICD-10-PCS | Mod: HCNC,S$GLB,, | Performed by: FAMILY MEDICINE

## 2019-03-08 PROCEDURE — 1101F PT FALLS ASSESS-DOCD LE1/YR: CPT | Mod: HCNC,CPTII,S$GLB, | Performed by: FAMILY MEDICINE

## 2019-03-08 PROCEDURE — 1101F PR PT FALLS ASSESS DOC 0-1 FALLS W/OUT INJ PAST YR: ICD-10-PCS | Mod: HCNC,CPTII,S$GLB, | Performed by: FAMILY MEDICINE

## 2019-03-08 PROCEDURE — 3074F PR MOST RECENT SYSTOLIC BLOOD PRESSURE < 130 MM HG: ICD-10-PCS | Mod: HCNC,CPTII,S$GLB, | Performed by: FAMILY MEDICINE

## 2019-03-08 PROCEDURE — 3078F PR MOST RECENT DIASTOLIC BLOOD PRESSURE < 80 MM HG: ICD-10-PCS | Mod: HCNC,CPTII,S$GLB, | Performed by: FAMILY MEDICINE

## 2019-03-08 RX ORDER — LORATADINE 10 MG/1
10 TABLET ORAL DAILY
Qty: 30 TABLET | Refills: 0 | Status: SHIPPED | OUTPATIENT
Start: 2019-03-08 | End: 2019-08-06

## 2019-03-13 ENCOUNTER — ANTI-COAG VISIT (OUTPATIENT)
Dept: CARDIOLOGY | Facility: CLINIC | Age: 79
End: 2019-03-13

## 2019-03-13 ENCOUNTER — LAB VISIT (OUTPATIENT)
Dept: LAB | Facility: HOSPITAL | Age: 79
End: 2019-03-13
Attending: INTERNAL MEDICINE
Payer: MEDICARE

## 2019-03-13 DIAGNOSIS — Z79.01 LONG TERM (CURRENT) USE OF ANTICOAGULANTS: ICD-10-CM

## 2019-03-13 LAB
INR PPP: 2.3
PROTHROMBIN TIME: 23.6 SEC

## 2019-03-13 PROCEDURE — 85610 PROTHROMBIN TIME: CPT | Mod: HCNC

## 2019-03-13 PROCEDURE — 36415 COLL VENOUS BLD VENIPUNCTURE: CPT | Mod: HCNC,PN

## 2019-03-13 NOTE — PROGRESS NOTES
Routine Office Visit    Patient Name: Alaina Vee    : 1940  MRN: [id number]    Subjective:  Alaina is a 78 y.o. female who presents today for:   Chief Complaint   Patient presents with    Dry Mouth    Leg Swelling     78-year-old female with multiple comorbidities comes in a for evaluation of a dry mouth, 4 postnasal drip, and for lower extremity edema.    In terms of the dry mouth she reports that she has been having this for a few weeks.  She states that she thinks it is due to decreased fluid intake.  She states that she cannot drink much fluids because it will cause a swelling of her legs.  She does report that she has been chewing gum and this helps with the dry mouth.    In terms of the postnasal drip, she states that it has been going on for about a week.  She reports some nasal congestion but no runny nose.  The there is no sore throat.  There is no fevers or chills.  She denies itchy or runny eyes, and denies ear symptoms.    In terms of her lower extremity edema, she states that this has been an on and off thing for over a year.  She states that it is worse when she is standing or sitting down for prolonged period of time.  She states that it is better with compression sock use.  She also reports that it is improved when she puts her legs on the bed.  She reports a history of atrial fibrillation.  She denies any chest pain, or palpitations.  She is on Coumadin because of the atrial fibrillation.    Past Medical History  Past Medical History:   Diagnosis Date    Arthritis     knees    Atrial fibrillation     Atrial flutter     Back pain     Degenerative disc disease     Depression     General anesthetics causing adverse effect in therapeutic use     pt states sometimes slow to awaken.    GERD (gastroesophageal reflux disease)     Hyperlipidemia     Hypertension     Kidney stone     Obesity     Sleep apnea     does not wear CPAP    Varicosities     Ventral hernia        Past  Surgical History  Past Surgical History:   Procedure Laterality Date    ABLATION N/A 6/12/2017    [facility]    APPENDECTOMY      BREAST BIOPSY Bilateral     1985    breast biopsy, left      CARDIOVERSION N/A 10/4/2017    [facility] C[facility]    CARDIOVERSION N/A 8/31/2017    [facility] C[facility]    CHOLECYSTECTOMY      JOINT REPLACEMENT      TKR , right    JOINT REPLACEMENT  2009    TKR  left    LITHOTRIPSY-EXTRACORPOREAL SHOCK WAVE Right 2/27/2017    [facility]    [place] EXPLORATORY OF ABDOMEN      TRANSESOPHAGEAL ECHOCARDIOGRAM (SKINNY) N/A 8/31/2017    [facility] CATH LAB    TRANSESOPHAGEAL ECHOCARDIOGRAM (SKINNY) N/A 6/12/2017    [facility] CATH LAB        Family History  Family History   Problem Relation Age of Onset    COPD Mother     Heart disease Sister         half sister    COPD Sister        Social History  Social History     Socioeconomic History    Marital status:      Spouse name: Not on file    Number of children: Not on file    Years of education: Not on file    Highest education level: Not on file   Social Needs    Financial resource strain: Not on file    Food insecurity - worry: Not on file    Food insecurity - inability: Not on file    Transportation needs - medical: Not on file    Transportation needs - non-medical: Not on file   Occupational History    Not on file   Tobacco Use    Smoking status: Never Smoker    Smokeless tobacco: Never Used   Substance and Sexual Activity    Alcohol use: No    Drug use: No    Sexual activity: No   Other Topics Concern    Not on file   Social History Narrative    Not on file       Current Medications  Current Outpatient Medications on File Prior to Visit   Medication Sig Dispense Refill    baclofen (LIORESAL) 10 MG tablet        "CYANOCOBALAMIN, VITAMIN B-12, (VITAMIN B-12 ORAL) Take 2,500 mcg by mouth every evening.      diclofenac sodium (VOLTAREN) 1 % Gel Apply 2 g topically once daily. 100 g 3    fish oil-omega-3 fatty acids 300-1,000 mg capsule Take 1 g by mouth once daily.      flecainide (TAMBOCOR) 100 MG Tab Take 1 tablet (100 mg total) by mouth every 12 (twelve) hours. 180 tablet 3    flecainide (TAMBOCOR) 100 MG Tab TAKE ONE TABLET BY MOUTH EVERY 12 HOURS 60 tablet 1    lisinopril (PRINIVIL,ZESTRIL) 40 MG tablet TAKE 1 TABLET ONE TIME DAILY (Patient taking differently: every evening. TAKE 1 TABLET ONE TIME DAILY) 90 tablet 1    multivitamin (THERAGRAN) per tablet Take 1 tablet by mouth every evening. 1 Tablet Oral Every day      oxybutynin (DITROPAN) 5 MG Tab TAKE 1 TABLET THREE TIMES DAILY 270 tablet 11    simvastatin (ZOCOR) 40 MG tablet Take 40 mg by mouth every evening.      warfarin (COUMADIN) 5 MG tablet Take 0.5-1 tablets (2.5-5 mg total) by mouth Daily. As directed by coumadin clinic 90 tablet 3    ketoconazole (NIZORAL) 2 % cream        No current facility-administered medications on file prior to visit.        Allergies   Review of patient's allergies indicates:   Allergen Reactions    Lipitor [atorvastatin] Other (See Comments)       Review of Systems   Constitutional: Negative for chills and fever.   HENT: Positive for congestion and postnasal drip. Negative for rhinorrhea, sinus pressure, sore throat and voice change.    Respiratory: Negative for cough, shortness of breath and wheezing.    Cardiovascular: Positive for leg swelling. Negative for chest pain and palpitations.   Gastrointestinal: Negative for abdominal pain, blood in stool, constipation, diarrhea and nausea.   Genitourinary: Negative for dysuria.     /66 (BP Location: Right arm, Patient Position: Sitting, BP Method: Large (Manual))   Pulse 65   Temp 97.7 °F (36.5 °C) (Oral)   Resp 18   Ht 5' 6" (1.676 m)   Wt 115.4 kg (254 lb 6.6 oz)  "  SpO2 97%   BMI 41.06 kg/m²     Physical Exam   Constitutional: She appears well-developed and well-nourished.   HENT:   Head: Normocephalic and atraumatic.   Right Ear: External ear normal.   Left Ear: External ear normal.   Nose: Nose normal.   Mouth/Throat: Oropharynx is clear and moist. No oropharyngeal exudate.   Eyes: Conjunctivae and EOM are normal. Pupils are equal, round, and reactive to light. Right eye exhibits no discharge. Left eye exhibits no discharge.   Neck: Normal range of motion. Neck supple. No tracheal deviation present.   Cardiovascular: Normal rate, regular rhythm, normal heart sounds and intact distal pulses.   No murmur heard.  Pulmonary/Chest: Effort normal and breath sounds normal. She has no wheezes. She has no rales.   Abdominal: Soft. Bowel sounds are normal. She exhibits no mass. There is no tenderness.   Musculoskeletal:        Right lower leg: She exhibits edema (2+).        Left lower leg: She exhibits edema (2+).   Lymphadenopathy:     She has no cervical adenopathy.   Psychiatric: She has a normal mood and affect.   Vitals reviewed.      Assessment/Plan:  Alaina was seen today for dry mouth and leg swelling.    Diagnoses and all orders for this visit:    Postnasal drip  -     loratadine (CLARITIN) 10 mg tablet; Take 1 tablet (10 mg total) by mouth once daily.  Short course of loratadine to the postnasal drip resolves.    Lower extremity edema  Discussed with the patient use of a compression stockings and to keep legs elevated when she is sitting or lying down.  If the edema it worsens, she is to make an appointment with her PCP    Dry mouth  Discussed with the patient on a using sugar free gum to help with the dry mouth symptoms.  If they persist after this will need further workup.    PAF (paroxysmal atrial fibrillation)  -     Ambulatory referral to Cardiology  Patient reports that she has not seen her cardiologist in some time as such will place a referral.        This office  note has been dictated.  This dictation has been generated using M-Modal Fluency Direct dictation; some phonetic errors may occur.

## 2019-03-14 ENCOUNTER — TELEPHONE (OUTPATIENT)
Dept: ADMINISTRATIVE | Facility: HOSPITAL | Age: 79
End: 2019-03-14

## 2019-03-29 ENCOUNTER — ANTI-COAG VISIT (OUTPATIENT)
Dept: CARDIOLOGY | Facility: CLINIC | Age: 79
End: 2019-03-29

## 2019-03-29 ENCOUNTER — HOSPITAL ENCOUNTER (OUTPATIENT)
Dept: CARDIOLOGY | Facility: HOSPITAL | Age: 79
Discharge: HOME | End: 2019-03-29
Attending: INTERNAL MEDICINE
Payer: MEDICARE

## 2019-03-29 ENCOUNTER — OFFICE VISIT (OUTPATIENT)
Dept: CARDIOLOGY | Facility: CLINIC | Age: 79
End: 2019-03-29
Payer: MEDICARE

## 2019-03-29 ENCOUNTER — TELEPHONE (OUTPATIENT)
Dept: CARDIOLOGY | Facility: CLINIC | Age: 79
End: 2019-03-29

## 2019-03-29 VITALS — WEIGHT: 245 LBS | HEART RATE: 50 BPM | HEIGHT: 66 IN | BODY MASS INDEX: 39.37 KG/M2

## 2019-03-29 VITALS
SYSTOLIC BLOOD PRESSURE: 132 MMHG | RESPIRATION RATE: 15 BRPM | HEART RATE: 71 BPM | HEIGHT: 66 IN | OXYGEN SATURATION: 98 % | BODY MASS INDEX: 39.46 KG/M2 | DIASTOLIC BLOOD PRESSURE: 74 MMHG | WEIGHT: 245.56 LBS

## 2019-03-29 DIAGNOSIS — R60.0 EDEMA OF LEFT LOWER EXTREMITY: ICD-10-CM

## 2019-03-29 DIAGNOSIS — I77.810 AORTIC ROOT DILATATION: ICD-10-CM

## 2019-03-29 DIAGNOSIS — I10 ESSENTIAL HYPERTENSION: ICD-10-CM

## 2019-03-29 DIAGNOSIS — Z79.01 LONG TERM (CURRENT) USE OF ANTICOAGULANTS: ICD-10-CM

## 2019-03-29 DIAGNOSIS — E66.01 MORBID OBESITY, UNSPECIFIED OBESITY TYPE: ICD-10-CM

## 2019-03-29 DIAGNOSIS — I48.0 PAF (PAROXYSMAL ATRIAL FIBRILLATION): ICD-10-CM

## 2019-03-29 DIAGNOSIS — R60.0 EDEMA OF LEFT LOWER EXTREMITY: Primary | ICD-10-CM

## 2019-03-29 DIAGNOSIS — I87.8 VENOUS STASIS OF LOWER EXTREMITY: ICD-10-CM

## 2019-03-29 DIAGNOSIS — G47.33 OSA (OBSTRUCTIVE SLEEP APNEA): ICD-10-CM

## 2019-03-29 DIAGNOSIS — E78.2 MIXED HYPERLIPIDEMIA: ICD-10-CM

## 2019-03-29 PROCEDURE — 3078F DIAST BP <80 MM HG: CPT | Mod: HCNC,CPTII,S$GLB, | Performed by: INTERNAL MEDICINE

## 2019-03-29 PROCEDURE — 99999 PR PBB SHADOW E&M-EST. PATIENT-LVL III: ICD-10-PCS | Mod: PBBFAC,HCNC,, | Performed by: INTERNAL MEDICINE

## 2019-03-29 PROCEDURE — 93971 EXTREMITY STUDY: CPT | Mod: HCNC,LT

## 2019-03-29 PROCEDURE — 93306 TTE W/DOPPLER COMPLETE: CPT | Mod: 26,HCNC,, | Performed by: INTERNAL MEDICINE

## 2019-03-29 PROCEDURE — 3078F PR MOST RECENT DIASTOLIC BLOOD PRESSURE < 80 MM HG: ICD-10-PCS | Mod: HCNC,CPTII,S$GLB, | Performed by: INTERNAL MEDICINE

## 2019-03-29 PROCEDURE — 1101F PT FALLS ASSESS-DOCD LE1/YR: CPT | Mod: HCNC,CPTII,S$GLB, | Performed by: INTERNAL MEDICINE

## 2019-03-29 PROCEDURE — 99214 OFFICE O/P EST MOD 30 MIN: CPT | Mod: HCNC,S$GLB,, | Performed by: INTERNAL MEDICINE

## 2019-03-29 PROCEDURE — 99499 RISK ADDL DX/OHS AUDIT: ICD-10-PCS | Mod: HCNC,S$GLB,, | Performed by: INTERNAL MEDICINE

## 2019-03-29 PROCEDURE — 3075F SYST BP GE 130 - 139MM HG: CPT | Mod: HCNC,CPTII,S$GLB, | Performed by: INTERNAL MEDICINE

## 2019-03-29 PROCEDURE — 93306 TRANSTHORACIC ECHO (TTE) COMPLETE (CUPID ONLY): ICD-10-PCS | Mod: 26,HCNC,, | Performed by: INTERNAL MEDICINE

## 2019-03-29 PROCEDURE — 3075F PR MOST RECENT SYSTOLIC BLOOD PRESS GE 130-139MM HG: ICD-10-PCS | Mod: HCNC,CPTII,S$GLB, | Performed by: INTERNAL MEDICINE

## 2019-03-29 PROCEDURE — 1101F PR PT FALLS ASSESS DOC 0-1 FALLS W/OUT INJ PAST YR: ICD-10-PCS | Mod: HCNC,CPTII,S$GLB, | Performed by: INTERNAL MEDICINE

## 2019-03-29 PROCEDURE — 93000 ELECTROCARDIOGRAM COMPLETE: CPT | Mod: HCNC,S$GLB,, | Performed by: INTERNAL MEDICINE

## 2019-03-29 PROCEDURE — 99999 PR PBB SHADOW E&M-EST. PATIENT-LVL III: CPT | Mod: PBBFAC,HCNC,, | Performed by: INTERNAL MEDICINE

## 2019-03-29 PROCEDURE — 99214 PR OFFICE/OUTPT VISIT, EST, LEVL IV, 30-39 MIN: ICD-10-PCS | Mod: HCNC,S$GLB,, | Performed by: INTERNAL MEDICINE

## 2019-03-29 PROCEDURE — 93306 TTE W/DOPPLER COMPLETE: CPT | Mod: HCNC

## 2019-03-29 PROCEDURE — 93000 EKG 12-LEAD: ICD-10-PCS | Mod: HCNC,S$GLB,, | Performed by: INTERNAL MEDICINE

## 2019-03-29 PROCEDURE — 93971 EXTREMITY STUDY: CPT | Mod: 26,HCNC,LT, | Performed by: INTERNAL MEDICINE

## 2019-03-29 PROCEDURE — 99499 UNLISTED E&M SERVICE: CPT | Mod: HCNC,S$GLB,, | Performed by: INTERNAL MEDICINE

## 2019-03-29 PROCEDURE — 93971 CV US DOPPLER VENOUS LEG LEFT (CUPID ONLY): ICD-10-PCS | Mod: 26,HCNC,LT, | Performed by: INTERNAL MEDICINE

## 2019-03-29 NOTE — PROGRESS NOTES
INR slightly elevated, will lower dose x 1 today.  She reports starting loratadine which should not interfere with INR/warfarin is also have some HITESH.  Add on INR to labs already scheduled next week.

## 2019-03-29 NOTE — PROGRESS NOTES
CARDIOVASCULAR PROGRESS NOTE    REASON FOR CONSULT:   Alaina Vee is a 78 y.o. female who presents for f/u of AF/AFL.    PCP: Sierra  EP: Prema  HISTORY OF PRESENT ILLNESS:   Last seen Jan 2018.    The patient comes in today at her urgent request.  She reports progressive left lower extremity edema approximately 2 weeks duration.  She initially describes pain of her left great toe and subsequent associated redness and swelling radiating up to left knee.  The pain and redness appears to have resolved but the swelling persists despite use of compression stockings.  She has no left lower extremity symptoms.  She is currently on Coumadin for PAF, and her INR is therapeutic.  She otherwise denies angina or dyspnea.  She has had no palpitations, lightheadedness, dizziness, or syncope.  There has been no PND, orthopnea, melena, hematuria, or claudicant symptoms.    CARDIOVASCULAR HISTORY:   AF/AFL, CHADSVASC 3, s/p ablation (Dr. Lloyd) 6/12/17, DCCV 10/4/17  Aortic root dil 3.9 cm (echo 3/2017)  SULEMA    PAST MEDICAL HISTORY:     Past Medical History:   Diagnosis Date    Arthritis     knees    Atrial fibrillation     Atrial flutter     Back pain     Degenerative disc disease     Depression     General anesthetics causing adverse effect in therapeutic use     pt states sometimes slow to awaken.    GERD (gastroesophageal reflux disease)     Hyperlipidemia     Hypertension     Kidney stone     Obesity     Sleep apnea     does not wear CPAP    Varicosities     Ventral hernia        PAST SURGICAL HISTORY:     Past Surgical History:   Procedure Laterality Date    ABLATION N/A 6/12/2017    [facility] C[facility] LAB    APPENDECTOMY      BREAST BIOPSY Bilateral     1985    breast biopsy, left      CARDIOVERSION N/A 10/4/2017    [facility] CATH LAB    CARDIOVERSION N/A 8/31/2017    [facility] CATH LAB    CHOLECYSTECTOMY       JOINT REPLACEMENT      TKR , right    JOINT REPLACEMENT  2009    TKR  left    LITHOTRIPSY-EXTRACORPOREAL SHOCK WAVE Right 2/27/2017    [facility] OR    [place] EXPLORATORY OF ABDOMEN      TRANSESOPHAGEAL ECHOCARDIOGRAM (SKINNY) N/A 8/31/2017    [facility] CATH LAB    TRANSESOPHAGEAL ECHOCARDIOGRAM (SKINNY) N/A 6/12/2017    Per[facility] by [facility] at [facility] CATH LAB       ALLERGIES AND MEDICATION:   Review of patient's allergies indicates:  No Known Allergies  Previous Medications    BACLOFEN (LIORESAL) 10 MG TABLET        CYANOCOBALAMIN, VITAMIN B-12, (VITAMIN B-12 ORAL)    Take 2,500 mcg by mouth every evening.    DICLOFENAC SODIUM (VOLTAREN) 1 % GEL    Apply 2 g topically once daily.    FISH OIL-OMEGA-3 FATTY ACIDS 300-1,000 MG CAPSULE    Take 1 g by mouth once daily.    FLECAINIDE (TAMBOCOR) 100 MG TAB    Take 1 tablet (100 mg total) by mouth every 12 (twelve) hours.    FLECAINIDE (TAMBOCOR) 100 MG TAB    TAKE ONE TABLET BY MOUTH EVERY 12 HOURS    KETOCONAZOLE (NIZORAL) 2 % CREAM        LISINOPRIL (PRINIVIL,ZESTRIL) 40 MG TABLET    TAKE 1 TABLET ONE TIME DAILY    LORATADINE (CLARITIN) 10 MG TABLET    Take 1 tablet (10 mg total) by mouth once daily.    MULTIVITAMIN (THERAGRAN) PER TABLET    Take 1 tablet by mouth every evening. 1 Tablet Oral Every day    OXYBUTYNIN (DITROPAN) 5 MG TAB    TAKE 1 TABLET THREE TIMES DAILY    SIMVASTATIN (ZOCOR) 40 MG TABLET    Take 40 mg by mouth every evening.    WARFARIN (COUMADIN) 5 MG TABLET    Take 0.5-1 tablets (2.5-5 mg total) by mouth Daily. As directed by coumadin clinic       SOCIAL HISTORY:     Social History     Socioeconomic History    Marital status:      Spouse name: Not on file    Number of children: Not on file    Years of education: Not on file    Highest education level: Not on file   Occupational History    Not on file   Social Needs    Financial resource strain: Not on file     Food insecurity:     Worry: Not on file     Inability: Not on file    Transportation needs:     Medical: Not on file     Non-medical: Not on file   Tobacco Use    Smoking status: Never Smoker    Smokeless tobacco: Never Used   Substance and Sexual Activity    Alcohol use: No    Drug use: No    Sexual activity: Never   Lifestyle    Physical activity:     Days per week: Not on file     Minutes per session: Not on file    Stress: Not on file   Relationships    Social connections:     Talks on phone: Not on file     Gets together: Not on file     Attends [employer] service: Not on file     Active member of club or organization: Not on file     Attends meetings of clubs or organizations: Not on file     Relationship status: Not on file    Intimate partner violence:     Fear of current or ex partner: Not on file     Emotionally abused: Not on file     Physically abused: Not on file     Forced sexual activity: Not on file   Other Topics Concern    Not on file   Social History Narrative    Not on file       FAMILY HISTORY:     Family History   Problem Relation Age of Onset    COPD Mother     Heart disease Sister         half sister    COPD Sister        REVIEW OF SYSTEMS:   Review of Systems   Constitutional: Negative for chills, diaphoresis and fever.   HENT: Negative for nosebleeds.    Eyes: Negative for blurred vision, double vision and photophobia.   Respiratory: Negative for hemoptysis, shortness of breath and wheezing.    Cardiovascular: Positive for leg swelling. Negative for chest pain, palpitations, orthopnea, claudication and PND.   Gastrointestinal: Negative for abdominal pain, blood in stool, heartburn, melena, nausea and vomiting.   Genitourinary: Negative for flank pain and hematuria.   Musculoskeletal: Negative for falls, myalgias and neck pain.   Skin: Negative for rash.   Neurological: Negative for dizziness, seizures, loss of consciousness, weakness and headaches.   Endo/Heme/Allergies:  "Negative for polydipsia. Does not bruise/bleed easily.   Psychiatric/Behavioral: Negative for depression and memory loss. The patient is not nervous/anxious.        PHYSICAL EXAM:     Vitals:    03/29/19 1025   BP: 132/74   Pulse: 71   Resp: 15    Body mass index is 39.64 kg/m².  Weight: 111.4 kg (245 lb 9.5 oz)   Height: 5' 6" (167.6 cm)     Physical Exam   Constitutional: She is oriented to person, place, and time. She appears well-developed and well-nourished. She is cooperative.  Non-toxic appearance. No distress.   HENT:   Head: Normocephalic and atraumatic.   Eyes: Pupils are equal, round, and reactive to light. Conjunctivae and EOM are normal. No scleral icterus.   Neck: Trachea normal and normal range of motion. Neck supple. Normal carotid pulses and no JVD present. Carotid bruit is not present. No neck rigidity. No tracheal deviation and no edema present. No thyromegaly present.   Cardiovascular: Normal rate, regular rhythm, S1 normal and S2 normal. PMI is not displaced. Exam reveals distant heart sounds. Exam reveals no gallop and no friction rub.   No murmur heard.  Pulses:       Carotid pulses are 2+ on the right side, and 2+ on the left side.  Pulmonary/Chest: Effort normal and breath sounds normal. No stridor. No respiratory distress. She has no wheezes. She has no rales. She exhibits no tenderness.   Abdominal: Soft. She exhibits no distension. There is no hepatosplenomegaly.   obese   Musculoskeletal: Normal range of motion. She exhibits edema. She exhibits no tenderness.   Feet:   Right Foot:   Skin Integrity: Negative for ulcer.   Left Foot:   Skin Integrity: Negative for ulcer.   Neurological: She is alert and oriented to person, place, and time. No cranial nerve deficit.   Skin: Skin is warm and dry. No rash noted. No erythema.   Psychiatric: She has a normal mood and affect. Her speech is normal and behavior is normal.   Vitals reviewed.      DATA:   EKG: (personally reviewed tracing)  3/29/19 " SR 71, 1st deg AVB (), low voltage, QRS 88, QTc 449, no change vs 1/16/18 (similar GA, QRS, QTc durations)    Laboratory:  CBC:  Recent Labs   Lab 06/06/17  1635 08/18/17  0838 09/29/17  1110   WHITE BLOOD CELL COUNT 7.11 5.77 6.80   HEMOGLOBIN 13.7 14.6 13.9   HEMATOCRIT 41.7 44.4 41.8   PLATELETS 264 256 263       CHEMISTRIES:  Recent Labs   Lab 05/31/16  1452  09/29/17  1110 09/20/18  1057 01/14/19  1537   GLUCOSE 104   < > 111 H 89 102   SODIUM 141   < > 141 140 141   POTASSIUM 4.9   < > 4.1 4.5 4.2   BUN BLD 24 H   < > 18 21 19   CREATININE 1.1   < > 1.0 1.1 0.9   EGFR IF  58 A   < > >60.0 56 A >60   EGFR IF NON- 50 A   < > 55.2 A 49 A >60   CALCIUM 9.5   < > 9.3 9.9 9.9   MAGNESIUM 2.4  --   --   --   --     < > = values in this interval not displayed.       CARDIAC BIOMARKERS:  Recent Labs   Lab 05/31/16  1452   CPK 49       COAGS:  Recent Labs   Lab 02/13/19  0954 02/28/19  0952 03/13/19  0838   INR 2.0 2.9 H 2.3 H       LIPIDS/LFTS:  Recent Labs   Lab 12/08/16  1300 03/07/17  0920 06/06/17  1635 09/20/18  1057   CHOLESTEROL  --  193 158 221 H   TRIGLYCERIDES  --  156 H 170 H 84   HDL  --  33 L 35 L 50   LDL CHOLESTEROL  --  128.8 89.0 154.2   NON-HDL CHOLESTEROL  --  160 123 171   AST 21  --  13 20   ALT 16  --  11 14     Lab Results   Component Value Date    TSH 1.372 09/20/2018     Cardiovascular Testing:  EVM 10/5/17-11/4/17  Sinus rhythm with first-degree AV block.  Overall, negative event monitor.    SKINNY 8/31/17    1 - Normal left ventricular systolic function.     2 - Left atrial appendage is single lobed with no visualized thrombus.    3 - Aortic root 3.8cm    Echo 3/7/17    1 - Normal left ventricular systolic function (EF 55-60%).     2 - Moderate left atrial enlargement.     3 - Aortic root dilation, 3.9cm    Holter 3/7/17  PREDOMINANT RHYTHM  1. Atrial flutter with ventricular rates varying between 29 and 89 bpm with an average of 55 bpm.   VENTRICULAR  ARRHYTHMIAS  1. There were no PVCs. There was no ventricular ectopic activity.  SUPRA VENTRICULAR ARRHYTHMIAS  1. Atrial flutter was noted throughout the study.   AV CONDUCTION  1. There was no evidence of high grade AV emma filtering.   2. The longest RR interval was 2215 msec.   DIARY  1. The diary was not returned  MISCELLANEOUS  1. There were occasional hookup related artifacts. Overall, the study was of good quality.   2. This was a tape of adequate length (24 hrs).    RADHA 7/14/16  Right lower extremity RADHA: 1.06  Left lower extremity RADHA: 1.08    ASSESSMENT:   # LLE edema  # PAF/AFL, now in SR.  CHADSVASC 3.  S/p AFL ablation 6/2017, s/p SKINNY/DCCV 8/31/17, DCCV 10/4/17, now on flecainide/coumadin.  EKG stable.  HR too low and AR interval too long for BBL.  # HTN, controlled  # HLP, ?on simva 40mg  # BMI 40, up 1 unit vs last OV  # SULEMA ? On CPAP  # Aortic root dil 3.8 cm (SKINNY 8/31/2017)    PLAN:   Cont med rx  Check LLE venous US (LLE edema  Check echo (Ao root dil surveillance)  Cont flecainide, pt feels better in SR  Diet/exercise/weight loss, pt previously declined bariatric evaluation.  Continuation of coumadin for goal INR 2.0-3.0 (pt not on NOAC d/t cost issues)  RTC 2 weeks  Pt to bring pill bottles her next office visit.  She is currently unclear as to which statin she is taking (and her LDL is high).  I will also consider switching her ACE-inhibitor to an ARB medication.    Nahid Hidalgo MD, FACC

## 2019-03-31 LAB
AORTIC ROOT ANNULUS: 3.79 CM
AORTIC VALVE CUSP SEPERATION: 2.35 CM
ASCENDING AORTA: 3.61 CM
AV INDEX (PROSTH): 0.73
AV MEAN GRADIENT: 4.26 MMHG
AV PEAK GRADIENT: 6.97 MMHG
AV VALVE AREA: 3.48 CM2
AV VELOCITY RATIO: 0.79
BSA FOR ECHO PROCEDURE: 2.27 M2
CV ECHO LV RWT: 0.53 CM
DOP CALC AO PEAK VEL: 1.32 M/S
DOP CALC AO VTI: 34.27 CM
DOP CALC LVOT AREA: 4.75 CM2
DOP CALC LVOT DIAMETER: 2.46 CM
DOP CALC LVOT PEAK VEL: 1.04 M/S
DOP CALC LVOT STROKE VOLUME: 119.33 CM3
DOP CALCLVOT PEAK VEL VTI: 25.12 CM
E WAVE DECELERATION TIME: 208.68 MSEC
E/A RATIO: 0.88
E/E' RATIO: 7.38
ECHO LV POSTERIOR WALL: 1.16 CM (ref 0.6–1.1)
FRACTIONAL SHORTENING: 33 % (ref 28–44)
INTERVENTRICULAR SEPTUM: 1.22 CM (ref 0.6–1.1)
IVRT: 0.12 MSEC
LA MAJOR: 6.16 CM
LA MINOR: 6.27 CM
LA WIDTH: 3.71 CM
LEFT ATRIUM SIZE: 3.8 CM
LEFT ATRIUM VOLUME INDEX: 34.2 ML/M2
LEFT ATRIUM VOLUME: 74.47 CM3
LEFT INTERNAL DIMENSION IN SYSTOLE: 2.95 CM (ref 2.1–4)
LEFT VENTRICLE DIASTOLIC VOLUME INDEX: 40.21 ML/M2
LEFT VENTRICLE DIASTOLIC VOLUME: 87.66 ML
LEFT VENTRICLE MASS INDEX: 86.7 G/M2
LEFT VENTRICLE SYSTOLIC VOLUME INDEX: 15.4 ML/M2
LEFT VENTRICLE SYSTOLIC VOLUME: 33.5 ML
LEFT VENTRICULAR INTERNAL DIMENSION IN DIASTOLE: 4.4 CM (ref 3.5–6)
LEFT VENTRICULAR MASS: 189.03 G
LV LATERAL E/E' RATIO: 5.9
LV SEPTAL E/E' RATIO: 9.83
MV PEAK A VEL: 0.67 M/S
MV PEAK E VEL: 0.59 M/S
PISA TR MAX VEL: 1.32 M/S
PULM VEIN S/D RATIO: 1.4
PV PEAK D VEL: 0.42 M/S
PV PEAK S VEL: 0.59 M/S
PV PEAK VELOCITY: 0.92 CM/S
RA MAJOR: 5.42 CM
RA PRESSURE: 3 MMHG
RA WIDTH: 3.86 CM
RIGHT VENTRICULAR END-DIASTOLIC DIMENSION: 2.62 CM
SINUS: 4.07 CM
STJ: 3.25 CM
TDI LATERAL: 0.1
TDI SEPTAL: 0.06
TDI: 0.08
TR MAX PG: 6.97 MMHG
TV REST PULMONARY ARTERY PRESSURE: 10 MMHG

## 2019-04-03 ENCOUNTER — LAB VISIT (OUTPATIENT)
Dept: LAB | Facility: HOSPITAL | Age: 79
End: 2019-04-03
Attending: INTERNAL MEDICINE
Payer: MEDICARE

## 2019-04-03 ENCOUNTER — ANTI-COAG VISIT (OUTPATIENT)
Dept: CARDIOLOGY | Facility: CLINIC | Age: 79
End: 2019-04-03
Payer: MEDICARE

## 2019-04-03 DIAGNOSIS — Z79.01 LONG TERM (CURRENT) USE OF ANTICOAGULANTS: ICD-10-CM

## 2019-04-03 LAB
INR PPP: 2.8 (ref 0.8–1.2)
PROTHROMBIN TIME: 29 SEC (ref 9–12.5)

## 2019-04-03 PROCEDURE — 36415 COLL VENOUS BLD VENIPUNCTURE: CPT | Mod: HCNC,PN

## 2019-04-03 PROCEDURE — 85610 PROTHROMBIN TIME: CPT | Mod: HCNC

## 2019-04-05 ENCOUNTER — OFFICE VISIT (OUTPATIENT)
Dept: CARDIOLOGY | Facility: CLINIC | Age: 79
End: 2019-04-05
Payer: MEDICARE

## 2019-04-05 VITALS
OXYGEN SATURATION: 95 % | HEIGHT: 66 IN | DIASTOLIC BLOOD PRESSURE: 66 MMHG | SYSTOLIC BLOOD PRESSURE: 128 MMHG | RESPIRATION RATE: 15 BRPM | HEART RATE: 72 BPM | BODY MASS INDEX: 40.96 KG/M2 | WEIGHT: 254.88 LBS

## 2019-04-05 DIAGNOSIS — I48.0 PAROXYSMAL ATRIAL FIBRILLATION: ICD-10-CM

## 2019-04-05 DIAGNOSIS — E66.01 MORBID OBESITY, UNSPECIFIED OBESITY TYPE: ICD-10-CM

## 2019-04-05 DIAGNOSIS — G47.33 OSA (OBSTRUCTIVE SLEEP APNEA): ICD-10-CM

## 2019-04-05 DIAGNOSIS — I10 ESSENTIAL HYPERTENSION: Primary | ICD-10-CM

## 2019-04-05 DIAGNOSIS — R60.0 BILATERAL LOWER EXTREMITY EDEMA: ICD-10-CM

## 2019-04-05 DIAGNOSIS — I48.3 TYPICAL ATRIAL FLUTTER: ICD-10-CM

## 2019-04-05 DIAGNOSIS — E78.2 MIXED HYPERLIPIDEMIA: ICD-10-CM

## 2019-04-05 PROCEDURE — 1101F PT FALLS ASSESS-DOCD LE1/YR: CPT | Mod: HCNC,CPTII,S$GLB, | Performed by: INTERNAL MEDICINE

## 2019-04-05 PROCEDURE — 99999 PR PBB SHADOW E&M-EST. PATIENT-LVL III: CPT | Mod: PBBFAC,HCNC,, | Performed by: INTERNAL MEDICINE

## 2019-04-05 PROCEDURE — 99214 PR OFFICE/OUTPT VISIT, EST, LEVL IV, 30-39 MIN: ICD-10-PCS | Mod: HCNC,S$GLB,, | Performed by: INTERNAL MEDICINE

## 2019-04-05 PROCEDURE — 3074F PR MOST RECENT SYSTOLIC BLOOD PRESSURE < 130 MM HG: ICD-10-PCS | Mod: HCNC,CPTII,S$GLB, | Performed by: INTERNAL MEDICINE

## 2019-04-05 PROCEDURE — 3074F SYST BP LT 130 MM HG: CPT | Mod: HCNC,CPTII,S$GLB, | Performed by: INTERNAL MEDICINE

## 2019-04-05 PROCEDURE — 3078F DIAST BP <80 MM HG: CPT | Mod: HCNC,CPTII,S$GLB, | Performed by: INTERNAL MEDICINE

## 2019-04-05 PROCEDURE — 3078F PR MOST RECENT DIASTOLIC BLOOD PRESSURE < 80 MM HG: ICD-10-PCS | Mod: HCNC,CPTII,S$GLB, | Performed by: INTERNAL MEDICINE

## 2019-04-05 PROCEDURE — 99214 OFFICE O/P EST MOD 30 MIN: CPT | Mod: HCNC,S$GLB,, | Performed by: INTERNAL MEDICINE

## 2019-04-05 PROCEDURE — 1101F PR PT FALLS ASSESS DOC 0-1 FALLS W/OUT INJ PAST YR: ICD-10-PCS | Mod: HCNC,CPTII,S$GLB, | Performed by: INTERNAL MEDICINE

## 2019-04-05 PROCEDURE — 99999 PR PBB SHADOW E&M-EST. PATIENT-LVL III: ICD-10-PCS | Mod: PBBFAC,HCNC,, | Performed by: INTERNAL MEDICINE

## 2019-04-05 NOTE — PROGRESS NOTES
CARDIOVASCULAR PROGRESS NOTE    REASON FOR CONSULT:   Alaina Vee is a 78 y.o. female who presents for f/u of AF/AFL.    PCP: Sierra  EP: Prema  HISTORY OF PRESENT ILLNESS:   The patient returns for follow-up.  She continues to complain of persistent left lower extremity edema, but states that it improving.  She tells me that she has the wrong side of compression stockings and still has to  the higher pressure compression stockings as previously suggested.  Unfortunately, she did not bring all of her pill bottles as previously requested, although she did bring in 2 bottles of pills which she tells me she is no longer taking, including lisinopril and lovastatin.  I will remove lisinopril from her medication list below.  She does confirm that she continues to take simvastatin.  She otherwise denies intercurrent angina or dyspnea.  She has had no palpitations, lightheadedness, dizziness, or syncope.  There has been no PND, orthopnea, melena, hematuria, or claudicant symptoms.  She continues to take Coumadin with her INR monitored by the [facility].  There do not appear to be any bleeding complications.    CARDIOVASCULAR HISTORY:   AF/AFL, CHADSVASC 3, s/p ablation (Dr. Lloyd) 6/12/17, DCCV 10/4/17  Aortic root dil 3.9 cm (echo 3/2017)  SULEMA    PAST MEDICAL HISTORY:     Past Medical History:   Diagnosis Date    Arthritis     knees    Atrial fibrillation     Atrial flutter     Back pain     Degenerative disc disease     Depression     General anesthetics causing adverse effect in therapeutic use     pt states sometimes slow to awaken.    GERD (gastroesophageal reflux disease)     Hyperlipidemia     Hypertension     Kidney stone     Obesity     Sleep apnea     does not wear CPAP    Varicosities     Ventral hernia        PAST SURGICAL HISTORY:     Past Surgical History:   Procedure Laterality Date    ABLATION N/A 6/12/2017    [facility] LAB     APPENDECTOMY      BREAST BIOPSY Bilateral     1985    breast biopsy, left      CARDIOVERSION N/A 10/4/2017    [facility] CATH LAB    CARDIOVERSION N/A 8/31/2017    [facility] by [facility] CATH LAB    CHOLECYSTECTOMY      JOINT REPLACEMENT      TKR , right    JOINT REPLACEMENT  2009    TKR  left    LITHOTRIPSY-EXTRACORPOREAL SHOCK WAVE Right 2/27/2017    [facility] OR    [place] EXPLORATORY OF ABDOMEN      TRANSESOPHAGEAL ECHOCARDIOGRAM (SKINNY) N/A 8/31/2017    Performed [facility] CATH LAB    TRANSESOPHAGEAL ECHOCARDIOGRAM (SKINNY) N/A 6/12/2017    Performed by [facility] Lloyd [facility] at [facility] CATH LAB       ALLERGIES AND MEDICATION:   Review of patient's allergies indicates:  No Known Allergies  Previous Medications    BACLOFEN (LIORESAL) 10 MG TABLET        CYANOCOBALAMIN, VITAMIN B-12, (VITAMIN B-12 ORAL)    Take 2,500 mcg by mouth every evening.    DICLOFENAC SODIUM (VOLTAREN) 1 % GEL    Apply 2 g topically once daily.    FISH OIL-OMEGA-3 FATTY ACIDS 300-1,000 MG CAPSULE    Take 1 g by mouth once daily.    FLECAINIDE (TAMBOCOR) 100 MG TAB    Take 1 tablet (100 mg total) by mouth every 12 (twelve) hours.    FLECAINIDE (TAMBOCOR) 100 MG TAB    TAKE ONE TABLET BY MOUTH EVERY 12 HOURS    KETOCONAZOLE (NIZORAL) 2 % CREAM        LISINOPRIL (PRINIVIL,ZESTRIL) 40 MG TABLET    TAKE 1 TABLET ONE TIME DAILY    LORATADINE (CLARITIN) 10 MG TABLET    Take 1 tablet (10 mg total) by mouth once daily.    MULTIVITAMIN (THERAGRAN) PER TABLET    Take 1 tablet by mouth every evening. 1 Tablet Oral Every day    OXYBUTYNIN (DITROPAN) 5 MG TAB    TAKE 1 TABLET THREE TIMES DAILY    SIMVASTATIN (ZOCOR) 40 MG TABLET    Take 40 mg by mouth every evening.    WARFARIN (COUMADIN) 5 MG TABLET    Take 0.5-1 tablets (2.5-5 mg total) by mouth Daily. As directed by coumadin clinic       SOCIAL HISTORY:     Social History     Socioeconomic History     Marital status:      Spouse name: Not on file    Number of children: Not on file    Years of education: Not on file    Highest education level: Not on file   Occupational History    Not on file   Social Needs    Financial resource strain: Not on file    Food insecurity:     Worry: Not on file     Inability: Not on file    Transportation needs:     Medical: Not on file     Non-medical: Not on file   Tobacco Use    Smoking status: Never Smoker    Smokeless tobacco: Never Used   Substance and Sexual Activity    Alcohol use: No    Drug use: No    Sexual activity: Never   Lifestyle    Physical activity:     Days per week: Not on file     Minutes per session: Not on file    Stress: Not on file   Relationships    Social connections:     Talks on phone: Not on file     Gets together: Not on file     Attends Pentecostalism service: Not on file     Active member of club or organization: Not on file     Attends meetings of clubs or organizations: Not on file     Relationship status: Not on file   Other Topics Concern    Not on file   Social History Narrative    Not on file       FAMILY HISTORY:     Family History   Problem Relation Age of Onset    COPD Mother     Heart disease Sister         half sister    COPD Sister        REVIEW OF SYSTEMS:   Review of Systems   Constitutional: Negative for chills, diaphoresis and fever.   HENT: Negative for nosebleeds.    Eyes: Negative for blurred vision, double vision and photophobia.   Respiratory: Negative for hemoptysis, shortness of breath and wheezing.    Cardiovascular: Positive for leg swelling. Negative for chest pain, palpitations, orthopnea, claudication and PND.   Gastrointestinal: Negative for abdominal pain, blood in stool, heartburn, melena, nausea and vomiting.   Genitourinary: Negative for flank pain and hematuria.   Musculoskeletal: Negative for falls, myalgias and neck pain.   Skin: Negative for rash.   Neurological: Negative for dizziness,  "seizures, loss of consciousness, weakness and headaches.   Endo/Heme/Allergies: Negative for polydipsia. Does not bruise/bleed easily.   Psychiatric/Behavioral: Negative for depression and memory loss. The patient is not nervous/anxious.        PHYSICAL EXAM:     Vitals:    04/05/19 0817   BP: 128/66   Pulse: 72   Resp: 15    Body mass index is 41.13 kg/m².  Weight: 115.6 kg (254 lb 13.6 oz)   Height: 5' 6" (167.6 cm)     Physical Exam   Constitutional: She is oriented to person, place, and time. She appears well-developed and well-nourished. She is cooperative.  Non-toxic appearance. No distress.   HENT:   Head: Normocephalic and atraumatic.   Eyes: Pupils are equal, round, and reactive to light. Conjunctivae and EOM are normal. No scleral icterus.   Neck: Trachea normal and normal range of motion. Neck supple. Normal carotid pulses and no JVD present. Carotid bruit is not present. No neck rigidity. No tracheal deviation and no edema present. No thyromegaly present.   Cardiovascular: Normal rate, regular rhythm, S1 normal and S2 normal. PMI is not displaced. Exam reveals distant heart sounds. Exam reveals no gallop and no friction rub.   No murmur heard.  Pulses:       Carotid pulses are 2+ on the right side, and 2+ on the left side.  Pulmonary/Chest: Effort normal and breath sounds normal. No stridor. No respiratory distress. She has no wheezes. She has no rales. She exhibits no tenderness.   Abdominal: Soft. She exhibits no distension. There is no hepatosplenomegaly.   obese   Musculoskeletal: Normal range of motion. She exhibits edema (L>R). She exhibits no tenderness.   Feet:   Right Foot:   Skin Integrity: Negative for ulcer.   Left Foot:   Skin Integrity: Negative for ulcer.   Neurological: She is alert and oriented to person, place, and time. No cranial nerve deficit.   Skin: Skin is warm and dry. No rash noted. No erythema.   Psychiatric: She has a normal mood and affect. Her speech is normal and behavior " is normal.   Vitals reviewed.      DATA:   EKG: (personally reviewed tracing)  3/29/19 SR 71, 1st deg AVB (), low voltage, QRS 88, QTc 449, no change vs 1/16/18 (similar GA, QRS, QTc durations)    Laboratory:  CBC:  Recent Labs   Lab 06/06/17  1635 08/18/17  0838 09/29/17  1110   WHITE BLOOD CELL COUNT 7.11 5.77 6.80   HEMOGLOBIN 13.7 14.6 13.9   HEMATOCRIT 41.7 44.4 41.8   PLATELETS 264 256 263       CHEMISTRIES:  Recent Labs   Lab 05/31/16  1452  09/29/17  1110 09/20/18  1057 01/14/19  1537   GLUCOSE 104   < > 111 H 89 102   SODIUM 141   < > 141 140 141   POTASSIUM 4.9   < > 4.1 4.5 4.2   BUN BLD 24 H   < > 18 21 19   CREATININE 1.1   < > 1.0 1.1 0.9   EGFR IF  58 A   < > >60.0 56 A >60   EGFR IF NON- 50 A   < > 55.2 A 49 A >60   CALCIUM 9.5   < > 9.3 9.9 9.9   MAGNESIUM 2.4  --   --   --   --     < > = values in this interval not displayed.       CARDIAC BIOMARKERS:  Recent Labs   Lab 05/31/16  1452   CPK 49       COAGS:  Recent Labs   Lab 03/13/19  0838 03/29/19  1229 04/03/19  0859   INR 2.3 H 3.2 H 2.8 H       LIPIDS/LFTS:  Recent Labs   Lab 12/08/16  1300 03/07/17  0920 06/06/17  1635 09/20/18  1057   CHOLESTEROL  --  193 158 221 H   TRIGLYCERIDES  --  156 H 170 H 84   HDL  --  33 L 35 L 50   LDL CHOLESTEROL  --  128.8 89.0 154.2   NON-HDL CHOLESTEROL  --  160 123 171   AST 21  --  13 20   ALT 16  --  11 14     Lab Results   Component Value Date    TSH 1.372 09/20/2018     Cardiovascular Testing:  Echo 3/29/19 (Ao root 4.1 cm, increased vs report 2/2017 (3.9cm at that time))  Normal left ventricular systolic function. The estimated ejection fraction is 55%  Concentric left ventricular remodeling.  Indeterminate left ventricular diastolic function.    LLE venous US 3/29/19  No evidence of left lower extremity DVT.     EVM 10/5/17-11/4/17  Sinus rhythm with first-degree AV block.  Overall, negative event monitor.    SKINNY 8/31/17    1 - Normal left ventricular systolic  function.     2 - Left atrial appendage is single lobed with no visualized thrombus.    3 - Aortic root 3.8cm    Holter 3/7/17  PREDOMINANT RHYTHM  1. Atrial flutter with ventricular rates varying between 29 and 89 bpm with an average of 55 bpm.   VENTRICULAR ARRHYTHMIAS  1. There were no PVCs. There was no ventricular ectopic activity.  SUPRA VENTRICULAR ARRHYTHMIAS  1. Atrial flutter was noted throughout the study.   AV CONDUCTION  1. There was no evidence of high grade AV emma filtering.   2. The longest RR interval was 2215 msec.   DIARY  1. The diary was not returned  MISCELLANEOUS  1. There were occasional hookup related artifacts. Overall, the study was of good quality.   2. This was a tape of adequate length (24 hrs).    RADHA 7/14/16  Right lower extremity RADHA: 1.06  Left lower extremity RADHA: 1.08    ASSESSMENT:   # LLE edema, LLE venous US neg for DVT  # PAF/AFL, now in SR.  CHADSVASC 3.  S/p AFL ablation 6/2017, s/p SKINNY/DCCV 8/31/17, DCCV 10/4/17, now on flecainide/coumadin.  EKG stable.  HR too low and NV interval too long for BBL.  # HTN, controlled  # HLP, ?on simva 40mg  # BMI 41, up 1 unit(s) vs last OV  # SULEMA ? On CPAP  # Aortic root dil 3.8->4.1 cm (echo 3/2019)    PLAN:   Cont med rx  Cont flecainide, pt feels better in SR  Diet/exercise/weight loss, pt previously declined bariatric evaluation.  Continuation of coumadin for goal INR 2.0-3.0 (pt not on NOAC d/t cost issues)  RTC 3 months, pt instructed to bring pill bottles to that visit (again)  LE venous US/reflux as contingency, pt will obtain compression stockings in the meantime  Repeat echo 6 months (aortic root dil ) late Spet 2019      Nahid Hidalgo MD, FACC

## 2019-04-11 ENCOUNTER — OFFICE VISIT (OUTPATIENT)
Dept: ORTHOPEDICS | Facility: CLINIC | Age: 79
End: 2019-04-11
Payer: MEDICARE

## 2019-04-11 VITALS
HEART RATE: 70 BPM | BODY MASS INDEX: 41.1 KG/M2 | HEIGHT: 66 IN | WEIGHT: 255.75 LBS | SYSTOLIC BLOOD PRESSURE: 110 MMHG | DIASTOLIC BLOOD PRESSURE: 60 MMHG

## 2019-04-11 DIAGNOSIS — M79.672 LEFT FOOT PAIN: Primary | ICD-10-CM

## 2019-04-11 DIAGNOSIS — M19.079 ARTHRITIS OF MIDFOOT: ICD-10-CM

## 2019-04-11 PROCEDURE — 1101F PR PT FALLS ASSESS DOC 0-1 FALLS W/OUT INJ PAST YR: ICD-10-PCS | Mod: HCNC,CPTII,S$GLB, | Performed by: ORTHOPAEDIC SURGERY

## 2019-04-11 PROCEDURE — 99999 PR PBB SHADOW E&M-EST. PATIENT-LVL IV: ICD-10-PCS | Mod: PBBFAC,HCNC,, | Performed by: ORTHOPAEDIC SURGERY

## 2019-04-11 PROCEDURE — 3078F PR MOST RECENT DIASTOLIC BLOOD PRESSURE < 80 MM HG: ICD-10-PCS | Mod: HCNC,CPTII,S$GLB, | Performed by: ORTHOPAEDIC SURGERY

## 2019-04-11 PROCEDURE — 3078F DIAST BP <80 MM HG: CPT | Mod: HCNC,CPTII,S$GLB, | Performed by: ORTHOPAEDIC SURGERY

## 2019-04-11 PROCEDURE — 99213 OFFICE O/P EST LOW 20 MIN: CPT | Mod: HCNC,S$GLB,, | Performed by: ORTHOPAEDIC SURGERY

## 2019-04-11 PROCEDURE — 3074F PR MOST RECENT SYSTOLIC BLOOD PRESSURE < 130 MM HG: ICD-10-PCS | Mod: HCNC,CPTII,S$GLB, | Performed by: ORTHOPAEDIC SURGERY

## 2019-04-11 PROCEDURE — 99213 PR OFFICE/OUTPT VISIT, EST, LEVL III, 20-29 MIN: ICD-10-PCS | Mod: HCNC,S$GLB,, | Performed by: ORTHOPAEDIC SURGERY

## 2019-04-11 PROCEDURE — 3074F SYST BP LT 130 MM HG: CPT | Mod: HCNC,CPTII,S$GLB, | Performed by: ORTHOPAEDIC SURGERY

## 2019-04-11 PROCEDURE — 1101F PT FALLS ASSESS-DOCD LE1/YR: CPT | Mod: HCNC,CPTII,S$GLB, | Performed by: ORTHOPAEDIC SURGERY

## 2019-04-11 PROCEDURE — 99999 PR PBB SHADOW E&M-EST. PATIENT-LVL IV: CPT | Mod: PBBFAC,HCNC,, | Performed by: ORTHOPAEDIC SURGERY

## 2019-04-11 NOTE — PROGRESS NOTES
Follow up visit    History of Present Illness:   Alaina comes to the office for follow up evaluation of painful left TKA --she continues to complain of swelling and pain around the knee. She did not make an appointment with an adult recon surgeon as recommended previous visit.  She prefers not to guard to [place] because she cares for her ill  [place]  She also complains of left atraumatic dorsal foot pain. She does have a diagnosis of lower extremity venous insufficiency with associated leg swelling. She does not believe that her foot is any more swollen than usual.  She denies any erythema or low soft tissue trauma.  She does state that the leg gets hot and cold.  She denies any numbness or paresthesias.  She is not diabetic.    ROS: unremarkable and no change since last visit    Physical Examination:    NAD  Left foot:  2+ pitting edema to the lower leg and foot  Custom arch supports in shoes  No erythema, soft tissue swelling, joint effusion, abrasions, lacerations around the foot or ankle  She is tender palpation over the dorsal aspect of the 1st ray.  Ltsi s/s/sp/dp/t  + ehl/fhl/ta/gs  2+ DP    Radiographic imaging:  Three views of the left foot show mild 1st TMT joint space narrowing    Assessment/Plan:  1. Left painful total knee  2. 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  1. I again recommended consultation with Dr. Joseph Raymundo with [facility] for further evaluation of her left painful total knee. I did include his name and contact information in her after visit summary  2. Voltaren gel to painful area foot t.i.d.  3. Ice to foot p.r.n.  4. Stiff sole rocker bottom shoes  5. Return clinic as needed    All questions were answered in detail. The patient  verbalized the understanding of the treatment plan and is in full agreement with the treatment plan.

## 2019-04-16 ENCOUNTER — ANTI-COAG VISIT (OUTPATIENT)
Dept: CARDIOLOGY | Facility: CLINIC | Age: 79
End: 2019-04-16
Payer: MEDICARE

## 2019-04-16 ENCOUNTER — OFFICE VISIT (OUTPATIENT)
Dept: FAMILY MEDICINE | Facility: CLINIC | Age: 79
End: 2019-04-16
Payer: MEDICARE

## 2019-04-16 ENCOUNTER — LAB VISIT (OUTPATIENT)
Dept: LAB | Facility: HOSPITAL | Age: 79
End: 2019-04-16
Attending: INTERNAL MEDICINE
Payer: MEDICARE

## 2019-04-16 VITALS
DIASTOLIC BLOOD PRESSURE: 58 MMHG | OXYGEN SATURATION: 96 % | WEIGHT: 254.75 LBS | HEART RATE: 59 BPM | TEMPERATURE: 98 F | BODY MASS INDEX: 40.94 KG/M2 | SYSTOLIC BLOOD PRESSURE: 104 MMHG | HEIGHT: 66 IN | RESPIRATION RATE: 16 BRPM

## 2019-04-16 DIAGNOSIS — N39.46 MIXED INCONTINENCE URGE AND STRESS: ICD-10-CM

## 2019-04-16 DIAGNOSIS — Z79.01 LONG TERM (CURRENT) USE OF ANTICOAGULANTS: ICD-10-CM

## 2019-04-16 DIAGNOSIS — M48.00 CENTRAL STENOSIS OF SPINAL CANAL: ICD-10-CM

## 2019-04-16 DIAGNOSIS — I10 ESSENTIAL HYPERTENSION: ICD-10-CM

## 2019-04-16 DIAGNOSIS — N39.0 RECURRENT UTI: ICD-10-CM

## 2019-04-16 DIAGNOSIS — N39.41 URGE INCONTINENCE OF URINE: ICD-10-CM

## 2019-04-16 DIAGNOSIS — Z96.651 PAIN DUE TO TOTAL RIGHT KNEE REPLACEMENT, SEQUELA: ICD-10-CM

## 2019-04-16 DIAGNOSIS — H52.7 REFRACTIVE ERROR: ICD-10-CM

## 2019-04-16 DIAGNOSIS — R29.898 WEAKNESS OF BOTH HIPS: ICD-10-CM

## 2019-04-16 DIAGNOSIS — K43.9 VENTRAL HERNIA WITHOUT OBSTRUCTION OR GANGRENE: ICD-10-CM

## 2019-04-16 DIAGNOSIS — M54.12 CERVICAL RADICULAR PAIN: ICD-10-CM

## 2019-04-16 DIAGNOSIS — I77.810 AORTIC ROOT DILATATION: ICD-10-CM

## 2019-04-16 DIAGNOSIS — G89.29 CHRONIC BILATERAL LOW BACK PAIN WITHOUT SCIATICA: ICD-10-CM

## 2019-04-16 DIAGNOSIS — M99.03 SEGMENTAL DYSFUNCTION OF LUMBAR REGION: ICD-10-CM

## 2019-04-16 DIAGNOSIS — M54.50 CHRONIC BILATERAL LOW BACK PAIN WITHOUT SCIATICA: ICD-10-CM

## 2019-04-16 DIAGNOSIS — M89.9 DISORDER OF BONE AND CARTILAGE: ICD-10-CM

## 2019-04-16 DIAGNOSIS — K21.9 GASTROESOPHAGEAL REFLUX DISEASE WITHOUT ESOPHAGITIS: ICD-10-CM

## 2019-04-16 DIAGNOSIS — M19.079 ARTHRITIS OF MIDFOOT: ICD-10-CM

## 2019-04-16 DIAGNOSIS — M94.9 DISORDER OF BONE AND CARTILAGE: ICD-10-CM

## 2019-04-16 DIAGNOSIS — E66.01 MORBID OBESITY: ICD-10-CM

## 2019-04-16 DIAGNOSIS — I48.92 ATRIAL FLUTTER, UNSPECIFIED TYPE: ICD-10-CM

## 2019-04-16 DIAGNOSIS — G47.33 OSA (OBSTRUCTIVE SLEEP APNEA): ICD-10-CM

## 2019-04-16 DIAGNOSIS — I48.0 PAF (PAROXYSMAL ATRIAL FIBRILLATION): ICD-10-CM

## 2019-04-16 DIAGNOSIS — T84.84XS PAIN DUE TO TOTAL RIGHT KNEE REPLACEMENT, SEQUELA: ICD-10-CM

## 2019-04-16 DIAGNOSIS — Z98.890 STATUS POST ARTHROSCOPIC SURGERY OF RIGHT KNEE: ICD-10-CM

## 2019-04-16 DIAGNOSIS — H25.13 NUCLEAR SCLEROSIS OF BOTH EYES: ICD-10-CM

## 2019-04-16 DIAGNOSIS — Z00.00 ENCOUNTER FOR PREVENTIVE HEALTH EXAMINATION: Primary | ICD-10-CM

## 2019-04-16 DIAGNOSIS — E78.2 MIXED HYPERLIPIDEMIA: ICD-10-CM

## 2019-04-16 DIAGNOSIS — M51.36 DEGENERATIVE DISC DISEASE, LUMBAR: ICD-10-CM

## 2019-04-16 DIAGNOSIS — I87.8 VENOUS STASIS OF LOWER EXTREMITY: ICD-10-CM

## 2019-04-16 LAB
INR PPP: 3.4 (ref 0.8–1.2)
PROTHROMBIN TIME: 33 SEC (ref 9–12.5)

## 2019-04-16 PROCEDURE — 93793 PR ANTICOAGULANT MGMT FOR PT TAKING WARFARIN: ICD-10-PCS | Mod: S$GLB,,, | Performed by: PHARMACIST

## 2019-04-16 PROCEDURE — 3078F PR MOST RECENT DIASTOLIC BLOOD PRESSURE < 80 MM HG: ICD-10-PCS | Mod: HCNC,CPTII,S$GLB, | Performed by: NURSE PRACTITIONER

## 2019-04-16 PROCEDURE — 99999 PR PBB SHADOW E&M-EST. PATIENT-LVL V: ICD-10-PCS | Mod: PBBFAC,HCNC,, | Performed by: NURSE PRACTITIONER

## 2019-04-16 PROCEDURE — 85610 PROTHROMBIN TIME: CPT | Mod: HCNC

## 2019-04-16 PROCEDURE — 3078F DIAST BP <80 MM HG: CPT | Mod: HCNC,CPTII,S$GLB, | Performed by: NURSE PRACTITIONER

## 2019-04-16 PROCEDURE — 3074F SYST BP LT 130 MM HG: CPT | Mod: HCNC,CPTII,S$GLB, | Performed by: NURSE PRACTITIONER

## 2019-04-16 PROCEDURE — 36415 COLL VENOUS BLD VENIPUNCTURE: CPT | Mod: HCNC,PO

## 2019-04-16 PROCEDURE — 99999 PR PBB SHADOW E&M-EST. PATIENT-LVL V: CPT | Mod: PBBFAC,HCNC,, | Performed by: NURSE PRACTITIONER

## 2019-04-16 PROCEDURE — 93793 ANTICOAG MGMT PT WARFARIN: CPT | Mod: S$GLB,,, | Performed by: PHARMACIST

## 2019-04-16 PROCEDURE — 3074F PR MOST RECENT SYSTOLIC BLOOD PRESSURE < 130 MM HG: ICD-10-PCS | Mod: HCNC,CPTII,S$GLB, | Performed by: NURSE PRACTITIONER

## 2019-04-16 PROCEDURE — G0439 PR MEDICARE ANNUAL WELLNESS SUBSEQUENT VISIT: ICD-10-PCS | Mod: HCNC,S$GLB,, | Performed by: NURSE PRACTITIONER

## 2019-04-16 PROCEDURE — G0439 PPPS, SUBSEQ VISIT: HCPCS | Mod: HCNC,S$GLB,, | Performed by: NURSE PRACTITIONER

## 2019-04-16 NOTE — PROGRESS NOTES
INR is Supratherapeutic today at 3.4.     Patient reports accidentally took coumadin 5mg 4/15. I will lower her coumadin dose at this time.

## 2019-04-16 NOTE — PATIENT INSTRUCTIONS
Counseling and Referral of Other Preventative  (Italic type indicates deductible and co-insurance are waived)    Patient Name: Alaina Vee  Today's Date: 4/16/2019    Health Maintenance       Date Due Completion Date    TETANUS VACCINE 09/04/1960 ---    Zoster Vaccine 09/04/2002 ---    DEXA SCAN 12/11/2015 12/11/2012    Override on 11/16/2010: Done    Mammogram 09/26/2019 9/26/2018    Lipid Panel 09/20/2023 9/20/2018        Orders Placed This Encounter   Procedures    DXA Bone Density Spine And Hip     The following information is provided to all patients.  This information is to help you find resources for any of the problems found today that may be affecting your health:                Living healthy guide: www.[facility].[place].gov      Understanding Diabetes: www.diabetes.org      Eating healthy: www.cdc.gov/healthyweight      [facility] home safety checklist: www.cdc.gov/steadi/patient.html      Agency on Aging: www.goea.[place].[facility]      Alcoholics anonymous (AA): www.aa.org      Physical Activity: www.[place]arm[place].nih.gov/mw1nvtc      Tobacco use: www.quitwithusla.org

## 2019-04-16 NOTE — PROGRESS NOTES
"Alaina Vee presented for a  Medicare AWV and comprehensive Health Risk Assessment today. The following components were reviewed and updated:    · Medical history  · Family History  · Social history  · Allergies and Current Medications  · Health Risk Assessment  · Health Maintenance  · Care Team     ** See Completed Assessments for Annual Wellness Visit within the encounter summary.**       The following assessments were completed:  · Living Situation  · CAGE  · Depression Screening  · Timed Get Up and Go  · Whisper Test  · Cognitive Function Screening  · Nutrition Screening  · ADL Screening  · PAQ Screening      Vitals:    04/16/19 0952 04/16/19 1104   BP: (!) 104/58    BP Location: Right arm    Patient Position: Sitting    BP Method: Large (Manual)    Pulse: 63 (!) 59   Resp: 16    Temp: 98.4 °F (36.9 °C)    TempSrc: Oral    SpO2: (!) 93% 96%   Weight: 115.5 kg (254 lb 11.9 oz)    Height: 5' 6" (1.676 m)      Body mass index is 41.12 kg/m².        Diagnoses and health risks identified today and associated recommendations/orders:    1. Encounter for preventive health examination  Provided Alaina with a 5-10 year written screening schedule and personal prevention plan. Recommendations were developed using the USPSTF age appropriate recommendations. Education, counseling, and referrals were provided as needed. After Visit Summary printed and given to patient which includes a list of additional screenings\tests needed.    2. Disorder of bone and cartilage  - DXA Bone Density Spine And Hip; Future  - Appointment scheduled    3. Pain due to total right knee replacement, sequela  Stable. Continue present management. Followed by Dr. Alan    4. SULEMA (obstructive sleep apnea)  Stable. Continue present management.    5. Arthritis of midfoot  Stable. Continue present management.    6. Status post arthroscopic surgery of right knee  Stable. Continue present management. Followed by Dr. Alan    7. Chronic bilateral low back " pain without sciatica  Stable. Continue present management.    8. Segmental dysfunction of lumbar region  Stable. Continue present management.    9. Weakness of both hips  Stable. Continue present management.    10. Gastroesophageal reflux disease without esophagitis  Stable. Continue present management.    11. Ventral hernia without obstruction or gangrene  Stable. Continue present management.    12. Morbid obesity  Stable. Continue present management. Discussed weight loss strategies.    13. Long term (current) use of anticoagulants  Stable. Continue present management.    14. Mixed incontinence urge and stress  Stable. Continue present management.    15. Recurrent UTI  Stable. Continue present management.    16. Urge incontinence of urine  Stable. Continue present management.    17. Aortic root dilatation  Stable. Continue present management.    18. Atrial flutter, unspecified type  Stable. Continue present management.    19. PAF (paroxysmal atrial fibrillation)  Stable. Continue present management.    20. Venous stasis of lower extremity  Stable. Continue present management.    21. Mixed hyperlipidemia  Stable. Continue present management.    22. Essential hypertension  Stable. Continue present management.    23. Refractive error  Stable. Continue present management.     24. Nuclear sclerosis of both eyes  Stable. Continue present management.    25. Degenerative disc disease, lumbar  Stable. Continue present management.    26. Cervical radicular pain  Stable. Continue present management.    27. Central stenosis of spinal canal  Stable. Continue present management.      Follow up in about 1 year (around 4/16/2020) for Annual Wellness Visit.    Kendal Walton, DNP

## 2019-04-29 ENCOUNTER — OFFICE VISIT (OUTPATIENT)
Dept: UROLOGY | Facility: CLINIC | Age: 79
End: 2019-04-29
Payer: MEDICARE

## 2019-04-29 VITALS
WEIGHT: 251.44 LBS | HEIGHT: 66 IN | BODY MASS INDEX: 40.41 KG/M2 | SYSTOLIC BLOOD PRESSURE: 110 MMHG | DIASTOLIC BLOOD PRESSURE: 80 MMHG

## 2019-04-29 DIAGNOSIS — N20.0 NEPHROLITHIASIS: ICD-10-CM

## 2019-04-29 DIAGNOSIS — N39.46 MIXED INCONTINENCE URGE AND STRESS: ICD-10-CM

## 2019-04-29 DIAGNOSIS — R31.0 GROSS HEMATURIA: Primary | ICD-10-CM

## 2019-04-29 LAB
BILIRUB SERPL-MCNC: NORMAL MG/DL
BLOOD URINE, POC: 250
COLOR, POC UA: YELLOW
GLUCOSE UR QL STRIP: NORMAL
KETONES UR QL STRIP: NORMAL
LEUKOCYTE ESTERASE URINE, POC: NORMAL
NITRITE, POC UA: NORMAL
PH, POC UA: 5
PROTEIN, POC: NORMAL
SPECIFIC GRAVITY, POC UA: 1020
UROBILINOGEN, POC UA: NORMAL

## 2019-04-29 PROCEDURE — 99999 PR PBB SHADOW E&M-EST. PATIENT-LVL IV: CPT | Mod: PBBFAC,HCNC,, | Performed by: NURSE PRACTITIONER

## 2019-04-29 PROCEDURE — 81001 URINALYSIS AUTO W/SCOPE: CPT | Mod: HCNC,S$GLB,, | Performed by: NURSE PRACTITIONER

## 2019-04-29 PROCEDURE — 1101F PT FALLS ASSESS-DOCD LE1/YR: CPT | Mod: HCNC,CPTII,S$GLB, | Performed by: NURSE PRACTITIONER

## 2019-04-29 PROCEDURE — 81001 PR  URINALYSIS, AUTO, W/SCOPE: ICD-10-PCS | Mod: HCNC,S$GLB,, | Performed by: NURSE PRACTITIONER

## 2019-04-29 PROCEDURE — 3074F SYST BP LT 130 MM HG: CPT | Mod: HCNC,CPTII,S$GLB, | Performed by: NURSE PRACTITIONER

## 2019-04-29 PROCEDURE — 99999 PR PBB SHADOW E&M-EST. PATIENT-LVL IV: ICD-10-PCS | Mod: PBBFAC,HCNC,, | Performed by: NURSE PRACTITIONER

## 2019-04-29 PROCEDURE — 99214 PR OFFICE/OUTPT VISIT, EST, LEVL IV, 30-39 MIN: ICD-10-PCS | Mod: 25,HCNC,S$GLB, | Performed by: NURSE PRACTITIONER

## 2019-04-29 PROCEDURE — 3079F PR MOST RECENT DIASTOLIC BLOOD PRESSURE 80-89 MM HG: ICD-10-PCS | Mod: HCNC,CPTII,S$GLB, | Performed by: NURSE PRACTITIONER

## 2019-04-29 PROCEDURE — 99214 OFFICE O/P EST MOD 30 MIN: CPT | Mod: 25,HCNC,S$GLB, | Performed by: NURSE PRACTITIONER

## 2019-04-29 PROCEDURE — 3074F PR MOST RECENT SYSTOLIC BLOOD PRESSURE < 130 MM HG: ICD-10-PCS | Mod: HCNC,CPTII,S$GLB, | Performed by: NURSE PRACTITIONER

## 2019-04-29 PROCEDURE — 3079F DIAST BP 80-89 MM HG: CPT | Mod: HCNC,CPTII,S$GLB, | Performed by: NURSE PRACTITIONER

## 2019-04-29 PROCEDURE — 1101F PR PT FALLS ASSESS DOC 0-1 FALLS W/OUT INJ PAST YR: ICD-10-PCS | Mod: HCNC,CPTII,S$GLB, | Performed by: NURSE PRACTITIONER

## 2019-04-29 NOTE — PROGRESS NOTES
Subjective:       Patient ID: Alaina Vee is a 78 y.o. female who was last seen in this office 1/28/2019    Chief Complaint:   Chief Complaint   Patient presents with    Follow-up     Patient states she suffers with dry mouth and wonders if one of her medications could be causing that..no new issues stated .. everything has been fine     Per Dr. Weaver:    She reports issues with recurrent UTIs. She was treated for UTI in 3/16 (100k E coli, pansensitive) and again in 4/16 (100k E coli). She has urinary frequency associated with UTIs. Nocturia x 1-2. She has urgency and UUI at baseline. Also with fecal urgency/incontinence. She was given Ditropan 5mg BID years ago. Also with RICHARD. She has not noted if this has helped. Denies current dysuria. Denies hematuria. No h/o kidney stones.     She has significant LBP issues. She also reports facial and R shoulder/arm/torso pain.     She was treated for UTI after initial visit. She remains on Ditropan IR not up to TID, declined ER formulations. She reports taking another medication for UI from me but I don't have records of this.     SERG (5/16) showed ruslanley 9mm stone in LLP. PVR 2cc. Cytology was negative but noted uric acid crystals.     CT 7/16 - 7mm L UP, 6mm L LP stones and 8mm R renal pelvis stone.   KUB 7/16 - shows 7mm R stone but difficult to see (unsure if truly the stone)    KUB 1/17 - 7mm R renal stone though hard to see (likely overlying 12th rib)    She started Ditropan XL 15mg previously and had great improvement. She continues to have significant back issues.    She started to develop R flank pain and therefore CT scan ordered. CT showed 8mm stone at R UPJ, visible on KUB. She is now s/p R ESWL. Stone was noted to fragment well. She did not note passing any stone and is still having flank pain.     CT with R LP stone, no obstruction. Recently started on Coumadin for a fib.     KUB 1/18 - two left renal calculi  SERG 1/18 - 3mm and 5mm L calculi - hydro  resolved    100% CaOx mono - stone passed spontaneously. Pain present for 1 hour at that time (R side).     She was evaluated by myself in 12/2018 for gross hematuria. She is now s/p hematuria workup (Ct urogram/cystoscopy/UCx) was essentially negative.     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 She has not had any further occurrences of gross hematuria. Denies dysuria or flank pain. Overall she feels well. No new complaints    ACTIVE MEDICAL ISSUES:  Patient Active Problem List   Diagnosis    Essential hypertension    Ventral hernia    UI (urinary incontinence)    Mixed hyperlipidemia    Venous stasis of lower extremity    GERD (gastroesophageal reflux disease)    Central stenosis of spinal canal    Weakness of both hips    Segmental dysfunction of lumbar region    Recurrent UTI    Mixed incontinence urge and stress    Cervical radicular pain    Morbid obesity    SULEMA (obstructive sleep apnea)    Long term (current) use of anticoagulants    PAF (paroxysmal atrial fibrillation)    Atrial flutter    Degenerative disc disease, lumbar    Chronic bilateral low back pain without sciatica    Nuclear sclerosis of both eyes    Refractive error    Status post arthroscopic surgery of right knee    Painful total knee replacement, right    Aortic root dilatation    Arthritis of midfoot       ALLERGIES AND MEDICATIONS: updated and reviewed.  Review of patient's allergies indicates:   Allergen Reactions    Lipitor [atorvastatin] Other (See Comments)     Current Outpatient Medications   Medication Sig    baclofen (LIORESAL) 10 MG tablet     CYANOCOBALAMIN, VITAMIN B-12, (VITAMIN B-12 ORAL) Take 2,500 mcg by mouth every evening.    diclofenac sodium (VOLTAREN) 1 % Gel Apply 2 g topically once daily.    fish oil-omega-3 fatty acids 300-1,000 mg capsule Take 1 g by mouth once daily.    flecainide (TAMBOCOR) 100 MG Tab Take 1 tablet (100 mg total) by mouth every 12  "(twelve) hours.    ketoconazole (NIZORAL) 2 % cream     loratadine (CLARITIN) 10 mg tablet Take 1 tablet (10 mg total) by mouth once daily.    multivitamin (THERAGRAN) per tablet Take 1 tablet by mouth every evening. 1 Tablet Oral Every day    oxybutynin (DITROPAN) 5 MG Tab TAKE 1 TABLET THREE TIMES DAILY    simvastatin (ZOCOR) 40 MG tablet Take 40 mg by mouth every evening.    warfarin (COUMADIN) 5 MG tablet Take 0.5-1 tablets (2.5-5 mg total) by mouth Daily. As directed by coumadin clinic     No current facility-administered medications for this visit.        Review of Systems   Constitutional: Negative for activity change, chills, fatigue, fever and unexpected weight change.   Eyes: Negative for discharge, redness and visual disturbance.   Respiratory: Negative for cough, shortness of breath and wheezing.    Cardiovascular: Negative for chest pain and leg swelling.   Gastrointestinal: Negative for abdominal distention, abdominal pain, constipation, diarrhea, nausea and vomiting.   Genitourinary: Negative for decreased urine volume, difficulty urinating, dysuria, flank pain, frequency, hematuria, pelvic pain and urgency.   Musculoskeletal: Negative for arthralgias, joint swelling and myalgias.   Skin: Negative for color change and rash.   Neurological: Negative for dizziness and light-headedness.   Psychiatric/Behavioral: Negative for behavioral problems and confusion. The patient is not nervous/anxious.        Objective:      Vitals:    04/29/19 1339   BP: 110/80   Weight: 114 kg (251 lb 7 oz)   Height: 5' 6" (1.676 m)     Physical Exam   Constitutional: She is oriented to person, place, and time. She appears well-developed.   HENT:   Head: Normocephalic and atraumatic.   Nose: Nose normal.   Eyes: Conjunctivae are normal. Right eye exhibits no discharge. Left eye exhibits no discharge.   Neck: Normal range of motion. Neck supple. No tracheal deviation present. No thyromegaly present.   Cardiovascular: " Normal rate and regular rhythm.    Pulmonary/Chest: Effort normal. No respiratory distress. She has no wheezes.   Abdominal: Soft. She exhibits no distension. There is no hepatosplenomegaly. There is no tenderness. There is no CVA tenderness. No hernia.   Genitourinary:   Genitourinary Comments: Patient declined exam   Musculoskeletal: Normal range of motion. She exhibits no edema.   Neurological: She is alert and oriented to person, place, and time.   Skin: Skin is warm and dry. No rash noted. No erythema.     Psychiatric: She has a normal mood and affect. Her behavior is normal. Judgment normal.       Urine dipstick shows trace LE, 250 RBCs.      Assessment:       1. Gross hematuria    2. Nephrolithiasis    3. Mixed incontinence urge and stress          Plan:       1. Gross hematuria   - Discussed etiology and workup of hematuria  -UCx 12/2018--negative  -Cytology--urothelial atypia  -CT uro--b/l non obstructing kidney stones, 1.2 cm RMP cyst  -Cystoscopy 1/2019--normal  -UA today still with microscopic hematuria. Will monitor for now  - POCT urinalysis, dipstick or tablet reag    2. Nephrolithiasis   - s/p R ESWL on 2/27/17 - stone with excellent response   - KUB post-op - residual stone    - CT - no obstructing stones, R LP noted   - SERG/KUB - R hydro resolved. Two stones in L kidney  - X-Ray Abdomen AP 1 View; Future  - US Retroperitoneal Complete (Kidney and; Future    3. Mixed incontinence urge and stress  - Discussed difference of UUI and RICHARD components. Reviewed etiology and workup of each.   - RICHARD: Kegels, PFPT, bulking agent, MUS.   - UUI: Behavioral changes, PFPT, anticholinergics. Botox/InterStim for refractory UUI.   - Doing great with Ditropan XL 15mg--medication changed to Ditropan 5mg PO TID per pharmacy request in 2/2018  - US Retroperitoneal Complete (Kidney and; Future              Follow up in about 6 months (around 10/29/2019) for Review X-ray.

## 2019-04-30 ENCOUNTER — HOSPITAL ENCOUNTER (OUTPATIENT)
Dept: RADIOLOGY | Facility: HOSPITAL | Age: 79
Discharge: HOME | End: 2019-04-30
Attending: NURSE PRACTITIONER
Payer: MEDICARE

## 2019-04-30 ENCOUNTER — ANTI-COAG VISIT (OUTPATIENT)
Dept: CARDIOLOGY | Facility: CLINIC | Age: 79
End: 2019-04-30
Payer: MEDICARE

## 2019-04-30 ENCOUNTER — TELEPHONE (OUTPATIENT)
Dept: FAMILY MEDICINE | Facility: CLINIC | Age: 79
End: 2019-04-30

## 2019-04-30 DIAGNOSIS — M89.9 DISORDER OF BONE AND CARTILAGE: ICD-10-CM

## 2019-04-30 DIAGNOSIS — Z79.01 LONG TERM (CURRENT) USE OF ANTICOAGULANTS: ICD-10-CM

## 2019-04-30 DIAGNOSIS — I87.2 VENOUS INSUFFICIENCY: Primary | ICD-10-CM

## 2019-04-30 DIAGNOSIS — M94.9 DISORDER OF BONE AND CARTILAGE: ICD-10-CM

## 2019-04-30 PROCEDURE — 77080 DEXA BONE DENSITY SPINE HIP: ICD-10-PCS | Mod: 26,HCNC,, | Performed by: RADIOLOGY

## 2019-04-30 PROCEDURE — 93793 ANTICOAG MGMT PT WARFARIN: CPT | Mod: S$GLB,,, | Performed by: PHARMACIST

## 2019-04-30 PROCEDURE — 77080 DXA BONE DENSITY AXIAL: CPT | Mod: TC,HCNC

## 2019-04-30 PROCEDURE — 77080 DXA BONE DENSITY AXIAL: CPT | Mod: 26,HCNC,, | Performed by: RADIOLOGY

## 2019-04-30 PROCEDURE — 93793 PR ANTICOAGULANT MGMT FOR PT TAKING WARFARIN: ICD-10-PCS | Mod: S$GLB,,, | Performed by: PHARMACIST

## 2019-04-30 NOTE — TELEPHONE ENCOUNTER
----- Message from Kendal Walton DNP sent at 4/30/2019 10:50 AM CDT -----  Hi! Could you contact this patient with the following message? Thank you!    Regarding your DEXA scan, there is no evidence of significant bone density loss.

## 2019-05-02 ENCOUNTER — OFFICE VISIT (OUTPATIENT)
Dept: FAMILY MEDICINE | Facility: CLINIC | Age: 79
End: 2019-05-02
Payer: MEDICARE

## 2019-05-02 VITALS
OXYGEN SATURATION: 97 % | TEMPERATURE: 98 F | WEIGHT: 252.63 LBS | SYSTOLIC BLOOD PRESSURE: 132 MMHG | DIASTOLIC BLOOD PRESSURE: 76 MMHG | HEIGHT: 66 IN | BODY MASS INDEX: 40.6 KG/M2 | HEART RATE: 69 BPM | RESPIRATION RATE: 17 BRPM

## 2019-05-02 DIAGNOSIS — R09.82 POSTNASAL DRIP: ICD-10-CM

## 2019-05-02 DIAGNOSIS — Z23 NEED FOR VACCINATION: ICD-10-CM

## 2019-05-02 DIAGNOSIS — I48.3 TYPICAL ATRIAL FLUTTER: ICD-10-CM

## 2019-05-02 DIAGNOSIS — M25.522 CHRONIC ELBOW PAIN, LEFT: Primary | ICD-10-CM

## 2019-05-02 DIAGNOSIS — I48.0 PAF (PAROXYSMAL ATRIAL FIBRILLATION): ICD-10-CM

## 2019-05-02 DIAGNOSIS — I10 ESSENTIAL HYPERTENSION: ICD-10-CM

## 2019-05-02 DIAGNOSIS — R49.9 CHANGE IN VOICE: ICD-10-CM

## 2019-05-02 DIAGNOSIS — G89.29 CHRONIC ELBOW PAIN, LEFT: Primary | ICD-10-CM

## 2019-05-02 PROCEDURE — 1101F PT FALLS ASSESS-DOCD LE1/YR: CPT | Mod: HCNC,CPTII,S$GLB, | Performed by: FAMILY MEDICINE

## 2019-05-02 PROCEDURE — 3075F PR MOST RECENT SYSTOLIC BLOOD PRESS GE 130-139MM HG: ICD-10-PCS | Mod: HCNC,CPTII,S$GLB, | Performed by: FAMILY MEDICINE

## 2019-05-02 PROCEDURE — 99999 PR PBB SHADOW E&M-EST. PATIENT-LVL IV: ICD-10-PCS | Mod: PBBFAC,HCNC,, | Performed by: FAMILY MEDICINE

## 2019-05-02 PROCEDURE — 3078F DIAST BP <80 MM HG: CPT | Mod: HCNC,CPTII,S$GLB, | Performed by: FAMILY MEDICINE

## 2019-05-02 PROCEDURE — 3075F SYST BP GE 130 - 139MM HG: CPT | Mod: HCNC,CPTII,S$GLB, | Performed by: FAMILY MEDICINE

## 2019-05-02 PROCEDURE — 3078F PR MOST RECENT DIASTOLIC BLOOD PRESSURE < 80 MM HG: ICD-10-PCS | Mod: HCNC,CPTII,S$GLB, | Performed by: FAMILY MEDICINE

## 2019-05-02 PROCEDURE — 99214 OFFICE O/P EST MOD 30 MIN: CPT | Mod: HCNC,S$GLB,, | Performed by: FAMILY MEDICINE

## 2019-05-02 PROCEDURE — 1101F PR PT FALLS ASSESS DOC 0-1 FALLS W/OUT INJ PAST YR: ICD-10-PCS | Mod: HCNC,CPTII,S$GLB, | Performed by: FAMILY MEDICINE

## 2019-05-02 PROCEDURE — 99214 PR OFFICE/OUTPT VISIT, EST, LEVL IV, 30-39 MIN: ICD-10-PCS | Mod: HCNC,S$GLB,, | Performed by: FAMILY MEDICINE

## 2019-05-02 PROCEDURE — 99999 PR PBB SHADOW E&M-EST. PATIENT-LVL IV: CPT | Mod: PBBFAC,HCNC,, | Performed by: FAMILY MEDICINE

## 2019-05-02 RX ORDER — ZOSTER VACCINE RECOMBINANT, ADJUVANTED 50 MCG/0.5
0.5 KIT INTRAMUSCULAR ONCE
Qty: 1 EACH | Refills: 1 | Status: SHIPPED | OUTPATIENT
Start: 2019-05-02 | End: 2019-05-02

## 2019-05-03 ENCOUNTER — CLINICAL SUPPORT (OUTPATIENT)
Dept: FAMILY MEDICINE | Facility: CLINIC | Age: 79
End: 2019-05-03
Payer: MEDICARE

## 2019-05-03 DIAGNOSIS — Z23 NEED FOR VACCINATION: Primary | ICD-10-CM

## 2019-05-03 PROCEDURE — 99499 NO LOS: ICD-10-PCS | Mod: HCNC,S$GLB,, | Performed by: FAMILY MEDICINE

## 2019-05-03 PROCEDURE — 90714 TD VACC NO PRESV 7 YRS+ IM: CPT | Mod: HCNC,S$GLB,, | Performed by: FAMILY MEDICINE

## 2019-05-03 PROCEDURE — 90471 IMMUNIZATION ADMIN: CPT | Mod: HCNC,S$GLB,, | Performed by: FAMILY MEDICINE

## 2019-05-03 PROCEDURE — 99499 UNLISTED E&M SERVICE: CPT | Mod: HCNC,S$GLB,, | Performed by: FAMILY MEDICINE

## 2019-05-03 PROCEDURE — 90714 TD VACCINE GREATER THAN OR EQUAL TO 7YO PRESERVATIVE FREE IM: ICD-10-PCS | Mod: HCNC,S$GLB,, | Performed by: FAMILY MEDICINE

## 2019-05-03 PROCEDURE — 90471 TD VACCINE GREATER THAN OR EQUAL TO 7YO PRESERVATIVE FREE IM: ICD-10-PCS | Mod: HCNC,S$GLB,, | Performed by: FAMILY MEDICINE

## 2019-05-08 ENCOUNTER — TELEPHONE (OUTPATIENT)
Dept: FAMILY MEDICINE | Facility: CLINIC | Age: 79
End: 2019-05-08

## 2019-05-09 NOTE — TELEPHONE ENCOUNTER
Please let patient know that her x-ray did show some arthritic changes in the elbow.  If she would like I can put in a referral to see the orthopedic provider.

## 2019-05-10 ENCOUNTER — HOSPITAL ENCOUNTER (OUTPATIENT)
Dept: RADIOLOGY | Facility: HOSPITAL | Age: 79
Discharge: HOME | End: 2019-05-10
Attending: SURGERY
Payer: MEDICARE

## 2019-05-10 DIAGNOSIS — I87.2 VENOUS INSUFFICIENCY: ICD-10-CM

## 2019-05-10 PROCEDURE — 93970 EXTREMITY STUDY: CPT | Mod: TC,HCNC

## 2019-05-10 PROCEDURE — 93970 EXTREMITY STUDY: CPT | Mod: 26,HCNC,, | Performed by: RADIOLOGY

## 2019-05-10 PROCEDURE — 93970 US LOWER EXTREMITY VEINS BILATERAL INSUFFICIENCY: ICD-10-PCS | Mod: 26,HCNC,, | Performed by: RADIOLOGY

## 2019-05-13 ENCOUNTER — ANTI-COAG VISIT (OUTPATIENT)
Dept: CARDIOLOGY | Facility: CLINIC | Age: 79
End: 2019-05-13
Payer: MEDICARE

## 2019-05-13 ENCOUNTER — OFFICE VISIT (OUTPATIENT)
Dept: VASCULAR SURGERY | Facility: CLINIC | Age: 79
End: 2019-05-13
Payer: MEDICARE

## 2019-05-13 ENCOUNTER — LAB VISIT (OUTPATIENT)
Dept: LAB | Facility: HOSPITAL | Age: 79
End: 2019-05-13
Attending: INTERNAL MEDICINE
Payer: MEDICARE

## 2019-05-13 VITALS
HEIGHT: 66 IN | HEART RATE: 62 BPM | SYSTOLIC BLOOD PRESSURE: 110 MMHG | WEIGHT: 249.31 LBS | BODY MASS INDEX: 40.07 KG/M2 | DIASTOLIC BLOOD PRESSURE: 70 MMHG

## 2019-05-13 DIAGNOSIS — Z79.01 LONG TERM (CURRENT) USE OF ANTICOAGULANTS: ICD-10-CM

## 2019-05-13 DIAGNOSIS — Z79.01 LONG TERM (CURRENT) USE OF ANTICOAGULANTS: Primary | ICD-10-CM

## 2019-05-13 DIAGNOSIS — I89.0 LYMPHEDEMA: ICD-10-CM

## 2019-05-13 DIAGNOSIS — R60.0 BILATERAL LEG EDEMA: Primary | ICD-10-CM

## 2019-05-13 LAB
INR PPP: 2.6 (ref 0.8–1.2)
PROTHROMBIN TIME: 27.1 SEC (ref 9–12.5)

## 2019-05-13 PROCEDURE — 93793 PR ANTICOAGULANT MGMT FOR PT TAKING WARFARIN: ICD-10-PCS | Mod: S$GLB,,,

## 2019-05-13 PROCEDURE — 99204 PR OFFICE/OUTPT VISIT, NEW, LEVL IV, 45-59 MIN: ICD-10-PCS | Mod: HCNC,S$GLB,, | Performed by: SURGERY

## 2019-05-13 PROCEDURE — 36415 COLL VENOUS BLD VENIPUNCTURE: CPT | Mod: HCNC,PN

## 2019-05-13 PROCEDURE — 3074F SYST BP LT 130 MM HG: CPT | Mod: HCNC,CPTII,S$GLB, | Performed by: SURGERY

## 2019-05-13 PROCEDURE — 99999 PR PBB SHADOW E&M-EST. PATIENT-LVL III: CPT | Mod: PBBFAC,HCNC,, | Performed by: SURGERY

## 2019-05-13 PROCEDURE — 99204 OFFICE O/P NEW MOD 45 MIN: CPT | Mod: HCNC,S$GLB,, | Performed by: SURGERY

## 2019-05-13 PROCEDURE — 3074F PR MOST RECENT SYSTOLIC BLOOD PRESSURE < 130 MM HG: ICD-10-PCS | Mod: HCNC,CPTII,S$GLB, | Performed by: SURGERY

## 2019-05-13 PROCEDURE — 85610 PROTHROMBIN TIME: CPT | Mod: HCNC

## 2019-05-13 PROCEDURE — 3078F PR MOST RECENT DIASTOLIC BLOOD PRESSURE < 80 MM HG: ICD-10-PCS | Mod: HCNC,CPTII,S$GLB, | Performed by: SURGERY

## 2019-05-13 PROCEDURE — 93793 ANTICOAG MGMT PT WARFARIN: CPT | Mod: S$GLB,,,

## 2019-05-13 PROCEDURE — 1101F PT FALLS ASSESS-DOCD LE1/YR: CPT | Mod: HCNC,CPTII,S$GLB, | Performed by: SURGERY

## 2019-05-13 PROCEDURE — 1101F PR PT FALLS ASSESS DOC 0-1 FALLS W/OUT INJ PAST YR: ICD-10-PCS | Mod: HCNC,CPTII,S$GLB, | Performed by: SURGERY

## 2019-05-13 PROCEDURE — 99999 PR PBB SHADOW E&M-EST. PATIENT-LVL III: ICD-10-PCS | Mod: PBBFAC,HCNC,, | Performed by: SURGERY

## 2019-05-13 PROCEDURE — 3078F DIAST BP <80 MM HG: CPT | Mod: HCNC,CPTII,S$GLB, | Performed by: SURGERY

## 2019-05-13 NOTE — LETTER
May 13, 2019      Joseph Raymundo MD  [address]  [address]  [facility] [address]           [facility]  [address] [facility] [address]  [facility] [address]  Phone: [phone]  Fax: [phone]          Patient: Alaina Vee   MR Number: [id number]   YOB: 1940   Date of Visit: 5/13/2019       Dear Dr. Joseph Raymundo:    Thank you for referring Alaina Vee to me for evaluation. Attached you will find relevant portions of my assessment and plan of care.    If you have questions, please do not hesitate to call me. I look forward to following Alaina Vee along with you.    Sincerely,    Deshawn Busch MD    Enclosure  CC:  No Recipients    If you would like to receive this communication electronically, please contact [email or web address] or [phone] to request more information on [facility] [employer] access.    For providers and/or their staff who would like to refer a patient to [facility], please contact us through our one-stop-shop provider referral line, [facility], at [phone].    If you feel you have received this communication in error or would no longer like to receive these types of communications, please e-mail [email or web address]

## 2019-05-13 NOTE — PATIENT INSTRUCTIONS
Putting on Compression Stockings     Turn the stocking inside-out, then fit it over your toes and heel.          Roll the stocking up your leg.            Once stockings are on, make sure the top of the stocking is about two fingers width below the crease of the knee (or the groin if you wear thigh-high stockings).          Use equipment, such as a stocking benjie, or wear rubber gloves to make it easier to put on compression stockings.         Elastic compression stockings are prescribed to treat many vein problems. Wearing them may be the most important thing you do to manage your symptoms. The stockings fit tightly around your ankle, gradually reducing in pressure as they go up your legs. This helps keep blood flowing to your heart. As a result, swelling is reduced. Your healthcare provider will prescribe stockings at a safe pressure for you. He or she will also tell you how often to wear and remove the stockings. Follow these instructions closely. Also, do not buy or wear compression stockings without first seeing your healthcare provider.  Tips for wear and care  To wear stockings safely and to get the most benefit:  · Wear the length prescribed by your healthcare provider.  · Pull them to the designated height and no farther. Dont let them bunch at the top. This can restrict blood flow and increase swelling.  · Wear the stockings for the amount of time your healthcare provider recommends. Replace them when they start to feel loose. This will likely be every 3 to 6 months.  · Remove them as your healthcare provider directs. When removed, wash your legs. Then check your legs and feet for sores. Call your healthcare provider if you find a sore. Dont put the stockings back on unless your healthcare provider directs.   · Wash the stockings as instructed. They may need to be hand-washed.  Date Last Reviewed: 5/1/2016  © 3393-1954 [employer]. [address] [place] [address], [place] [address]. All rights  reserved. This information is not intended as a substitute for professional medical care. Always follow your healthcare professional's instructions.        Tips for Using Less Salt    Most people with heart problems need to eat less salt (sodium). Reducing the amount of salt you eat may help control your blood pressure. The higher your blood pressure, the greater your risk for heart disease, stroke, blindness, and kidney problems.  At the store  · Make low-salt choices by reading labels carefully. Look for the total amount of sodium per serving.  · Use more fresh food. Buy more fruits and vegetables. Select lean meats, fish, and poultry.  · Use fewer frozen, canned, and packaged foods which often contain a lot of sodium.  · Use plain frozen vegetables without sauces or toppings. These products are often low- or no-sodium.  · Opt for reduced-sodium or no-salt-added versions of canned vegetables and soups.  In the kitchen  · Don't add salt to food when you're cooking. Season with flavorings such as onion, garlic, pepper, salt-free herbal blends, and lemon or lime juice.  · Use a cookbook containing low-salt recipes. It can give you ideas for tasty meals that are healthy for your heart.  · Sprinkle salt-free herbal blends on vegetables and meat.  · Drain and rinse canned foods, such as canned beans and vegetables, before cooking or eating.  Eating out  · Tell the  you're on a low-salt diet. Ask questions about the menu.  · Order fish, chicken, and meat broiled, baked, poached, or grilled without salt, butter, or breading.  · Use lemon, pepper, and salt-free herb mixes to add flavor.  · Choose plain steamed rice, boiled noodles, and baked or boiled potatoes. Top potatoes with chives and a little sour cream.     Beware! Salt goes by many other names. Limit foods with these words listed as ingredients: salt, sodium, soy sauce, baking soda, baking powder, MSG, monosodium, Na (the chemical symbol for sodium). Some  antacids are also high in salt.   Date Last Reviewed: 6/19/2015  © 6543-9755 [employer]. [address] [place] [address], [place] [address]. All rights reserved. This information is not intended as a substitute for professional medical care. Always follow your healthcare professional's instructions.        Low-Salt Diet  This diet removes foods that are high in salt. It also limits the amount of salt you use when cooking. It is most often used for people with high blood pressure, edema (fluid retention), and kidney, liver, or heart disease.  Table salt contains the mineral sodium. Your body needs sodium to work normally. But too much sodium can make your health problems worse. Your healthcare provider is recommending a low-salt (also called low-sodium) diet for you. Your total daily allowance of salt is 1,500 to 2,300 milligrams (mg). It is less than 1 teaspoon of table salt. This means you can have only about 500 to 700 mg of sodium at each meal. People with certain health problems should limit salt intake to the lower end of the recommended range.    When you cook, dont add much salt. If you can cook without using salt, even better. Dont add salt to your food at the table.  When shopping, read food labels. Salt is often called sodium on the label. Choose foods that are salt-free, low salt, or very low salt. Note that foods with reduced salt may not lower your salt intake enough.    Beans, potatoes, and pasta  Ok: Dry beans, split peas, lentils, potatoes, rice, macaroni, pasta, spaghetti without added salt  Avoid: Potato chips, tortilla chips, and similar products  Breads and cereals  Ok: Low-sodium breads, rolls, cereals, and cakes; low-salt crackers, matzo crackers  Avoid: Salted crackers, pretzels, popcorn, Malian toast, pancakes, muffins  Dairy  Ok: Milk, chocolate milk, hot chocolate mix, low-salt cheeses, and yogurt  Avoid: Processed cheese and cheese spreads; Roquefort, Camembert, and cottage cheese;  buttermilk, instant breakfast drink  Desserts  Ok: Ice cream, frozen yogurt, juice bars, gelatin, cookies and pies, sugar, honey, jelly, hard candy  Avoid: Most pies, cakes and cookies prepared or processed with salt; instant pudding  Drinks  Ok: Tea, coffee, fizzy (carbonated) drinks, juices  Avoid: Flavored coffees, electrolyte replacement drinks, sports drinks  Meats  Ok: All fresh meat, fish, poultry, low-salt tuna, eggs, egg substitute  Avoid: Smoked, pickled, brine-cured, or salted meats and fish. This includes live, chipped beef, corned beef, hot dogs, deli meats, ham, kosher meats, salt pork, sausage, canned tuna, salted codfish, smoked salmon, herring, sardines, or anchovies.  Seasonings and spices  Ok: Most seasonings are okay. Good substitutes for salt include: fresh herb blends, hot sauce, lemon, garlic, colbert, vinegar, dry mustard, parsley, cilantro, horseradish, tomato paste, regular margarine, mayonnaise, unsalted butter, cream cheese, vegetable oil, cream, low-salt salad dressing and gravy.  Avoid: Regular ketchup, relishes, pickles, soy sauce, teriyaki sauce, Worcestershire sauce, BBQ sauce, tartar sauce, meat tenderizer, chili sauce, regular gravy, regular salad dressing, salted butter  Soups  Ok: Low-salt soups and broths made with allowed foods  Avoid: Bouillon cubes, soups with smoked or salted meats, regular soup and broth  Vegetables  Ok: Most vegetables are okay; also low-salt tomato and vegetable juices  Avoid: Sauerkraut and other brine-soaked vegetables; pickles and other pickled vegetables; tomato juice, olives  Date Last Reviewed: 8/1/2016  © 8445-9846 [employer]. [address] [place] [address]. All rights reserved. This information is not intended as a substitute for professional medical care. Always follow your healthcare professional's instructions.        Low-Salt Choices  Eating salt (sodium) can make your body retain too much water. Excess water makes  your heart work harder. Canned, packaged, and frozen foods are easy to prepare, but they are often high in sodium. Here are some ideas for low-salt foods you can easily prepare yourself.    For breakfast  · Fruit or 100% fruit juice  · Whole-wheat bread or an English muffin. Compare sodium content on labels.  · Low-fat milk or yogurt  · Unsalted eggs  · Shredded wheat  · Corn tortillas  · Unsalted steamed rice  · Regular (not instant) hot cereal, made without salt  Stay away from:  · Sausage, live, and ham  · Flour tortillas  · Packaged muffins, pancakes, and biscuits  · Instant hot cereals  · Cottage cheese  For lunch and dinner  · Fresh fish, chicken, turkey, or meat--baked, broiled, or roasted without salt  · Dry beans, cooked without salt  · Tofu, stir-fried without salt  · Unsalted fresh fruit and vegetables, or frozen or canned fruit and vegetables with no added salt  Stay away from:  · Lunch or deli meat that is cured or smoked  · Cheese  · Tomato juice and catsup  · Canned vegetables, soups, and fish not labeled as no-salt-added or reduced sodium  · Packaged gravies and sauces  · Olives, pickles, and relish  · Bottled salad dressings  For snacks and desserts  · Yogurt  · Unsalted, air popped popcorn  · Unsalted nuts or seeds  Stay away from:  · Pies and cakes  · Packaged dessert mixes  · Pizza  · Canned and packaged puddings  · Pretzels, chips, crackers, and nuts--unless the label says unsalted  Date Last Reviewed: 6/17/2015  © 4722-4551 [employer]. [address] [place] [address], [place] [address]. All rights reserved. This information is not intended as a substitute for professional medical care. Always follow your healthcare professional's instructions.

## 2019-05-13 NOTE — PROGRESS NOTES
Deshawn Busch MD VI                       [facility]                         05/13/2019    HPI:  Alaina Vee is a 78 y.o. female with   Patient Active Problem List   Diagnosis    Essential hypertension    Ventral hernia    UI (urinary incontinence)    Mixed hyperlipidemia    Venous stasis of lower extremity    GERD (gastroesophageal reflux disease)    Central stenosis of spinal canal    Weakness of both hips    Segmental dysfunction of lumbar region    Recurrent UTI    Mixed incontinence urge and stress    Cervical radicular pain    Morbid obesity    SULEMA (obstructive sleep apnea)    Long term (current) use of anticoagulants    PAF (paroxysmal atrial fibrillation)    Atrial flutter    Degenerative disc disease, lumbar    Chronic bilateral low back pain without sciatica    Nuclear sclerosis of both eyes    Refractive error    Status post arthroscopic surgery of right knee    Painful total knee replacement, right    Aortic root dilatation    Arthritis of midfoot    being managed by PCP and specialists who is here today for evaluation of LLE edema.  Patient states location is L foot and ankle occurring for 3 weeks.  Associated signs and symptoms include pain.  Quality is aching and severity is 4/10.  Symptoms began 3 weeks ago.  Alleviating factors include elevation.  Worsening factors include dependency.  Pt is on Coumadin for PAF s/p ablation in past.  S/p bilateral open knee replacements.    no MI  no Stroke  Tobacco use: denies    Past Medical History:   Diagnosis Date    Arthritis     knees    Atrial fibrillation     Atrial flutter     Back pain     Degenerative disc disease     Depression     General anesthetics causing adverse effect in therapeutic use     pt states sometimes slow to awaken.    GERD (gastroesophageal reflux disease)     Hyperlipidemia     Hypertension     Kidney stone     Obesity     Sleep apnea     does not wear CPAP     Urinary incontinence     Varicosities     Ventral hernia      Past Surgical History:   Procedure Laterality Date    ABLATION N/A 6/12/2017    [facility] C[facility]    APPENDECTOMY      BREAST BIOPSY Bilateral     1985    breast biopsy, left      CARDIOVERSION N/A 10/4/2017    [facility] CATH LAB    CARDIOVERSION N/A 8/31/2017    [facility] C[facility]    CHOLECYSTECTOMY      JOINT REPLACEMENT      TKR , right    JOINT REPLACEMENT  2009    TKR  left    LITHOTRIPSY-EXTRACORPOREAL SHOCK WAVE Right 2/27/2017    [facility]    MS EXPLORATORY OF ABDOMEN      TRANSESOPHAGEAL ECHOCARDIOGRAM (SKINNY) N/A 8/31/2017    [facility] CATH LAB    TRANSESOPHAGEAL ECHOCARDIOGRAM (SKINNY) N/A 6/12/2017    [facility] CATH LAB     Family History   Problem Relation Age of Onset    COPD Mother     Heart disease Sister         half sister    COPD Sister     Parkinsonism Sister      Social History     Socioeconomic History    Marital status:      Spouse name: Not on file    Number of children: Not on file    Years of education: Not on file    Highest education level: Not on file   Occupational History    Not on file   Social Needs    Financial resource strain: Not on file    Food insecurity:     Worry: Not on file     Inability: Not on file    Transportation needs:     Medical: Not on file     Non-medical: Not on file   Tobacco Use    Smoking status: Never Smoker    Smokeless tobacco: Never Used   Substance and Sexual Activity    Alcohol use: No    Drug use: No    Sexual activity: Yes     Partners: Male   Lifestyle    Physical activity:     Days per week: Not on file     Minutes per session: Not on file    Stress: Not on file   Relationships    Social connections:     Talks on phone: Not on file     Gets together: Not on  file     Attends Yarsani service: Not on file     Active member of club or organization: Not on file     Attends meetings of clubs or organizations: Not on file     Relationship status: Not on file   Other Topics Concern    Not on file   Social History Narrative    Not on file       Current Outpatient Medications:     baclofen (LIORESAL) 10 MG tablet, , Disp: , Rfl:     CYANOCOBALAMIN, VITAMIN B-12, (VITAMIN B-12 ORAL), Take 2,500 mcg by mouth every evening., Disp: , Rfl:     diclofenac sodium (VOLTAREN) 1 % Gel, Apply 2 g topically once daily., Disp: 100 g, Rfl: 3    fish oil-omega-3 fatty acids 300-1,000 mg capsule, Take 1 g by mouth once daily., Disp: , Rfl:     flecainide (TAMBOCOR) 100 MG Tab, Take 1 tablet (100 mg total) by mouth every 12 (twelve) hours., Disp: 180 tablet, Rfl: 3    ketoconazole (NIZORAL) 2 % cream, , Disp: , Rfl:     multivitamin (THERAGRAN) per tablet, Take 1 tablet by mouth every evening. 1 Tablet Oral Every day, Disp: , Rfl:     oxybutynin (DITROPAN) 5 MG Tab, TAKE 1 TABLET THREE TIMES DAILY, Disp: 270 tablet, Rfl: 11    simvastatin (ZOCOR) 40 MG tablet, Take 40 mg by mouth every evening., Disp: , Rfl:     warfarin (COUMADIN) 5 MG tablet, Take 0.5-1 tablets (2.5-5 mg total) by mouth Daily. As directed by coumadin clinic, Disp: 90 tablet, Rfl: 3    loratadine (CLARITIN) 10 mg tablet, Take 1 tablet (10 mg total) by mouth once daily., Disp: 30 tablet, Rfl: 0    REVIEW OF SYSTEMS:  General: No fevers or chills; ENT: No sore throat; Allergy and Immunology: no persistent infections; Hematological and Lymphatic: No history of bleeding or easy bruising; Endocrine: negative; Respiratory: no cough, shortness of breath, or wheezing; Cardiovascular: no chest pain or dyspnea on exertion; Gastrointestinal: no abdominal pain/back, change in bowel habits, or bloody stools; Genito-Urinary: no dysuria, trouble voiding, or hematuria; Musculoskeletal: negative; Neurological: no TIA or stroke  symptoms; Psychiatric: no nervousness, anxiety or depression.    PHYSICAL EXAM:   Left Arm BP - Sittin/74 (19 1315)  Pulse: 62         General appearance:  Alert, well-appearing, and in no distress.  Oriented to person, place, and time                    Neurological: Normal speech, no focal findings noted; CN II - XII grossly intact. RLE with sensation to light touch, LLE with sensation to light touch.            Musculoskeletal: Digits/nail without cyanosis/clubbing.  Strength 5/5 BLE.                    Neck: Supple, no significant adenopathy, no carotid bruit can be auscultated                  Chest:  Clear to auscultation, no wheezes, rales or rhonchi, symmetric air entry. No use of accessory muscles               Cardiac: Normal rate and regular rhythm, S1 and S2 normal            Abdomen: Soft, nontender, nondistended, no masses or organomegaly, no hernia     No rebound tenderness noted; bowel sounds normal     Pulsatile aortic mass is non palpable.     No groin adenopathy      Extremities:        2+ R DP pulse, 2+ L DP pulse     1+ RLE edema, 2+ LLE edema    Skin: RLE without ulcer; LLE without ulcer    LAB RESULTS:  No results found for: CBC  Lab Results   Component Value Date    LABPROT 27.1 (H) 2019    INR 2.6 (H) 2019     Lab Results   Component Value Date     2019    K 4.2 2019     2019    CO2 30 (H) 2019     2019    BUN 19 2019    CREATININE 0.9 2019    CALCIUM 9.9 2019    ANIONGAP 9 2019    EGFRNONAA >60 2019     Lab Results   Component Value Date    WBC 6.80 2017    RBC 4.70 2017    HGB 13.9 2017    HCT 41.8 2017    MCV 89 2017    MCH 29.6 2017    MCHC 33.3 2017    RDW 13.9 2017     2017    MPV 9.8 2017    GRAN 4.6 2017    GRAN 67.9 2017    LYMPH 1.4 2017    LYMPH 21.0 2017    MONO 0.5 2017    MONO 7.8  09/29/2017    EOS 0.2 09/29/2017    BASO 0.04 09/29/2017    EOSINOPHIL 2.4 09/29/2017    BASOPHIL 0.6 09/29/2017    DIFFMETHOD Automated 09/29/2017     .  Lab Results   Component Value Date    HGBA1C 5.8 12/08/2016       IMAGING:  All pertinent imaging has been reviewed and interpreted independently.    IMP/PLAN:  78 y.o. female with   Patient Active Problem List   Diagnosis    Essential hypertension    Ventral hernia    UI (urinary incontinence)    Mixed hyperlipidemia    Venous stasis of lower extremity    GERD (gastroesophageal reflux disease)    Central stenosis of spinal canal    Weakness of both hips    Segmental dysfunction of lumbar region    Recurrent UTI    Mixed incontinence urge and stress    Cervical radicular pain    Morbid obesity    SULEMA (obstructive sleep apnea)    Long term (current) use of anticoagulants    PAF (paroxysmal atrial fibrillation)    Atrial flutter    Degenerative disc disease, lumbar    Chronic bilateral low back pain without sciatica    Nuclear sclerosis of both eyes    Refractive error    Status post arthroscopic surgery of right knee    Painful total knee replacement, right    Aortic root dilatation    Arthritis of midfoot    being managed by PCP and specialists who is here today for evaluation of LLE edema.    -LLE edema multifactorial likely due to knee replacement, lymphedema and venous hypertension - recommend compression with Rx stockings, elevation, dietary changes associated with water and sodium intake discussed at length with patient  -R femoral non-occlusive thrombus, unknown of timing and if provoked or unprovoked, appears chronic on US 5/2019 - agree with continuing Coumadin - will refer to Heme for further evaluation/mgmt  -Lymphedema clinic referral  -RTC 3 mo  I spent 20 minutes evaluating this patient and greater than 50% of the time was spent counseling, coordinator care and discussing the plan of care.  All questions were answered and  patient stated understanding with agreement with the above treatment plan.    Deshawn Busch MD RPVI  Vascular and Endovascular Surgery

## 2019-05-21 ENCOUNTER — INITIAL CONSULT (OUTPATIENT)
Dept: HEMATOLOGY/ONCOLOGY | Facility: CLINIC | Age: 79
End: 2019-05-21
Payer: MEDICARE

## 2019-05-21 ENCOUNTER — LAB VISIT (OUTPATIENT)
Dept: LAB | Facility: HOSPITAL | Age: 79
End: 2019-05-21
Attending: INTERNAL MEDICINE
Payer: MEDICARE

## 2019-05-21 VITALS
DIASTOLIC BLOOD PRESSURE: 57 MMHG | BODY MASS INDEX: 41.84 KG/M2 | HEIGHT: 65 IN | SYSTOLIC BLOOD PRESSURE: 106 MMHG | HEART RATE: 60 BPM | OXYGEN SATURATION: 95 % | TEMPERATURE: 98 F | WEIGHT: 251.13 LBS

## 2019-05-21 DIAGNOSIS — I82.411 DVT OF DEEP FEMORAL VEIN, RIGHT: Primary | ICD-10-CM

## 2019-05-21 DIAGNOSIS — I82.411 DVT OF DEEP FEMORAL VEIN, RIGHT: ICD-10-CM

## 2019-05-21 LAB
ALBUMIN SERPL BCP-MCNC: 3.7 G/DL (ref 3.5–5.2)
ALP SERPL-CCNC: 104 U/L (ref 55–135)
ALT SERPL W/O P-5'-P-CCNC: 13 U/L (ref 10–44)
ANION GAP SERPL CALC-SCNC: 6 MMOL/L (ref 8–16)
AST SERPL-CCNC: 16 U/L (ref 10–40)
BASOPHILS # BLD AUTO: 0.03 K/UL (ref 0–0.2)
BASOPHILS NFR BLD: 0.4 % (ref 0–1.9)
BILIRUB SERPL-MCNC: 0.5 MG/DL (ref 0.1–1)
BUN SERPL-MCNC: 20 MG/DL (ref 8–23)
CALCIUM SERPL-MCNC: 9.7 MG/DL (ref 8.7–10.5)
CHLORIDE SERPL-SCNC: 105 MMOL/L (ref 95–110)
CO2 SERPL-SCNC: 29 MMOL/L (ref 23–29)
CREAT SERPL-MCNC: 1 MG/DL (ref 0.5–1.4)
DIFFERENTIAL METHOD: NORMAL
EOSINOPHIL # BLD AUTO: 0.2 K/UL (ref 0–0.5)
EOSINOPHIL NFR BLD: 2.5 % (ref 0–8)
ERYTHROCYTE [DISTWIDTH] IN BLOOD BY AUTOMATED COUNT: 13.2 % (ref 11.5–14.5)
EST. GFR  (AFRICAN AMERICAN): >60 ML/MIN/1.73 M^2
EST. GFR  (NON AFRICAN AMERICAN): 55 ML/MIN/1.73 M^2
FIBRINOGEN PPP-MCNC: 469 MG/DL (ref 182–366)
GLUCOSE SERPL-MCNC: 80 MG/DL (ref 70–110)
HCT VFR BLD AUTO: 43.8 % (ref 37–48.5)
HGB BLD-MCNC: 14.4 G/DL (ref 12–16)
LYMPHOCYTES # BLD AUTO: 2 K/UL (ref 1–4.8)
LYMPHOCYTES NFR BLD: 29.8 % (ref 18–48)
MCH RBC QN AUTO: 30.3 PG (ref 27–31)
MCHC RBC AUTO-ENTMCNC: 32.9 G/DL (ref 32–36)
MCV RBC AUTO: 92 FL (ref 82–98)
MONOCYTES # BLD AUTO: 0.4 K/UL (ref 0.3–1)
MONOCYTES NFR BLD: 6.3 % (ref 4–15)
NEUTROPHILS # BLD AUTO: 4.1 K/UL (ref 1.8–7.7)
NEUTROPHILS NFR BLD: 61 % (ref 38–73)
PLATELET # BLD AUTO: 256 K/UL (ref 150–350)
PMV BLD AUTO: 9.6 FL (ref 9.2–12.9)
POTASSIUM SERPL-SCNC: 4 MMOL/L (ref 3.5–5.1)
PROT SERPL-MCNC: 7.3 G/DL (ref 6–8.4)
RBC # BLD AUTO: 4.75 M/UL (ref 4–5.4)
SODIUM SERPL-SCNC: 140 MMOL/L (ref 136–145)
WBC # BLD AUTO: 6.78 K/UL (ref 3.9–12.7)

## 2019-05-21 PROCEDURE — 83090 ASSAY OF HOMOCYSTEINE: CPT | Mod: HCNC

## 2019-05-21 PROCEDURE — 3078F DIAST BP <80 MM HG: CPT | Mod: HCNC,CPTII,S$GLB, | Performed by: INTERNAL MEDICINE

## 2019-05-21 PROCEDURE — 99205 PR OFFICE/OUTPT VISIT, NEW, LEVL V, 60-74 MIN: ICD-10-PCS | Mod: HCNC,S$GLB,, | Performed by: INTERNAL MEDICINE

## 2019-05-21 PROCEDURE — 99999 PR PBB SHADOW E&M-EST. PATIENT-LVL III: CPT | Mod: PBBFAC,HCNC,, | Performed by: INTERNAL MEDICINE

## 2019-05-21 PROCEDURE — 84165 PROTEIN E-PHORESIS SERUM: CPT | Mod: 26,HCNC,, | Performed by: PATHOLOGY

## 2019-05-21 PROCEDURE — 3078F PR MOST RECENT DIASTOLIC BLOOD PRESSURE < 80 MM HG: ICD-10-PCS | Mod: HCNC,CPTII,S$GLB, | Performed by: INTERNAL MEDICINE

## 2019-05-21 PROCEDURE — 1101F PT FALLS ASSESS-DOCD LE1/YR: CPT | Mod: HCNC,CPTII,S$GLB, | Performed by: INTERNAL MEDICINE

## 2019-05-21 PROCEDURE — 80053 COMPREHEN METABOLIC PANEL: CPT | Mod: HCNC

## 2019-05-21 PROCEDURE — 85300 ANTITHROMBIN III ACTIVITY: CPT | Mod: HCNC

## 2019-05-21 PROCEDURE — 99999 PR PBB SHADOW E&M-EST. PATIENT-LVL III: ICD-10-PCS | Mod: PBBFAC,HCNC,, | Performed by: INTERNAL MEDICINE

## 2019-05-21 PROCEDURE — 99499 RISK ADDL DX/OHS AUDIT: ICD-10-PCS | Mod: HCNC,S$GLB,, | Performed by: INTERNAL MEDICINE

## 2019-05-21 PROCEDURE — 3074F SYST BP LT 130 MM HG: CPT | Mod: HCNC,CPTII,S$GLB, | Performed by: INTERNAL MEDICINE

## 2019-05-21 PROCEDURE — 1101F PR PT FALLS ASSESS DOC 0-1 FALLS W/OUT INJ PAST YR: ICD-10-PCS | Mod: HCNC,CPTII,S$GLB, | Performed by: INTERNAL MEDICINE

## 2019-05-21 PROCEDURE — 85384 FIBRINOGEN ACTIVITY: CPT | Mod: HCNC

## 2019-05-21 PROCEDURE — 86147 CARDIOLIPIN ANTIBODY EA IG: CPT | Mod: HCNC

## 2019-05-21 PROCEDURE — 84165 PROTEIN E-PHORESIS SERUM: CPT | Mod: HCNC

## 2019-05-21 PROCEDURE — 3074F PR MOST RECENT SYSTOLIC BLOOD PRESSURE < 130 MM HG: ICD-10-PCS | Mod: HCNC,CPTII,S$GLB, | Performed by: INTERNAL MEDICINE

## 2019-05-21 PROCEDURE — 99499 UNLISTED E&M SERVICE: CPT | Mod: HCNC,S$GLB,, | Performed by: INTERNAL MEDICINE

## 2019-05-21 PROCEDURE — 84165 PATHOLOGIST INTERPRETATION SPE: ICD-10-PCS | Mod: 26,HCNC,, | Performed by: PATHOLOGY

## 2019-05-21 PROCEDURE — 36415 COLL VENOUS BLD VENIPUNCTURE: CPT | Mod: HCNC

## 2019-05-21 PROCEDURE — 85025 COMPLETE CBC W/AUTO DIFF WBC: CPT | Mod: HCNC

## 2019-05-21 PROCEDURE — 99205 OFFICE O/P NEW HI 60 MIN: CPT | Mod: HCNC,S$GLB,, | Performed by: INTERNAL MEDICINE

## 2019-05-21 NOTE — LETTER
May 21, 2019      Deshawn Busch MD  [address] [facility] [address]  [address]  [facility] [address]           [facility]  [address] [facility] [address]  [facility] [address]  Phone: [phone]          Patient: Alaina Vee   MR Number: [id number]   YOB: 1940   Date of Visit: 5/21/2019       Dear Dr. Deshawn Busch:    Thank you for referring Alaina Vee to me for evaluation. Attached you will find relevant portions of my assessment and plan of care.    If you have questions, please do not hesitate to call me. I look forward to following Alaina Vee along with you.    Sincerely,    Moncho Roche MD    Enclosure  CC:  No Recipients    If you would like to receive this communication electronically, please contact [email or web address] or [phone] to request more information on [employer] access.    For providers and/or their staff who would like to refer a patient to [facility], please contact us through our one-stop-shop provider referral line, [facility], at [phone].    If you feel you have received this communication in error or would no longer like to receive these types of communications, please e-mail [email or web address]

## 2019-05-21 NOTE — PROGRESS NOTES
Chief Complaint :  Right femoral vein thrombus    Hx of Present illness :  Patient is a 78 y.o. year old female who presents to the clinic today for   Oncology evaluation.  Had sx of pain Right leg.  Had further evaluation which revealed DVT.  unprovoked  Patient was on anticoagulation for paroxysmal atrial fibrillation.    Allergies :    Review of patient's allergies indicates:   Allergen Reactions    Celebrex [celecoxib]      Chest tightness    Lipitor [atorvastatin] Other (See Comments)       Occupation :  Retired Cook./    Transfusion :  None    Menstrual & obstetric Hx :   1; Para 1.  Age of menarche:  18  Age of first pregnancy: 27  Lactation history: Yes  Age of menopause: 57  HRT: None    Present Meds :   Medication List with Changes/Refills   Current Medications    BACLOFEN (LIORESAL) 10 MG TABLET        CYANOCOBALAMIN, VITAMIN B-12, (VITAMIN B-12 ORAL)    Take 2,500 mcg by mouth every evening.    DICLOFENAC SODIUM (VOLTAREN) 1 % GEL    Apply 2 g topically once daily.    FISH OIL-OMEGA-3 FATTY ACIDS 300-1,000 MG CAPSULE    Take 1 g by mouth once daily.    FLECAINIDE (TAMBOCOR) 100 MG TAB    Take 1 tablet (100 mg total) by mouth every 12 (twelve) hours.    KETOCONAZOLE (NIZORAL) 2 % CREAM        LORATADINE (CLARITIN) 10 MG TABLET    Take 1 tablet (10 mg total) by mouth once daily.    MULTIVITAMIN (THERAGRAN) PER TABLET    Take 1 tablet by mouth every evening. 1 Tablet Oral Every day    OXYBUTYNIN (DITROPAN) 5 MG TAB    TAKE 1 TABLET THREE TIMES DAILY    SIMVASTATIN (ZOCOR) 40 MG TABLET    Take 40 mg by mouth every evening.    WARFARIN (COUMADIN) 5 MG TABLET    Take 0.5-1 tablets (2.5-5 mg total) by mouth Daily. As directed by coumadin clinic       Past Medical Hx :   reviewed    Past Medical Hx :  Past Medical History:   Diagnosis Date    Arthritis     knees    Atrial fibrillation     Atrial flutter     Back pain     Degenerative disc disease     Depression     General anesthetics causing  adverse effect in therapeutic use     pt states sometimes slow to awaken.    GERD (gastroesophageal reflux disease)     Hyperlipidemia     Hypertension     Kidney stone     Obesity     Sleep apnea     does not wear CPAP    Urinary incontinence     Varicosities     Ventral hernia        Travel Hx :  N/A    Immunization :  Immunization History   Administered Date(s) Administered    Influenza 10/15/2007, 10/17/2008, 10/10/2009, 10/05/2010, 11/14/2011, 10/23/2013    Influenza - High Dose 11/05/2012, 10/22/2014, 12/19/2015, 08/29/2016, 12/11/2017, 09/20/2018    Influenza - Quadrivalent - PF 10/15/2007, 10/10/2009, 10/05/2010, 11/14/2011    Influenza Split 10/23/2013    Pneumococcal Conjugate - 13 Valent 08/29/2016    Pneumococcal Polysaccharide - 23 Valent 02/19/2015    Td - PF (ADULT) 05/03/2019       Family Hx :  Family History   Problem Relation Age of Onset    COPD Mother     Heart disease Sister         half sister    COPD Sister     Parkinsonism Sister        Social Hx :  Social History     Socioeconomic History    Marital status:      Spouse name: Not on file    Number of children: Not on file    Years of education: Not on file    Highest education level: Not on file   Occupational History    Not on file   Social Needs    Financial resource strain: Not on file    Food insecurity:     Worry: Not on file     Inability: Not on file    Transportation needs:     Medical: Not on file     Non-medical: Not on file   Tobacco Use    Smoking status: Never Smoker    Smokeless tobacco: Never Used   Substance and Sexual Activity    Alcohol use: No    Drug use: No    Sexual activity: Yes     Partners: Male   Lifestyle    Physical activity:     Days per week: Not on file     Minutes per session: Not on file    Stress: Not on file   Relationships    Social connections:     Talks on phone: Not on file     Gets together: Not on file     Attends Latter-day service: Not on file     Active  member of club or organization: Not on file     Attends meetings of clubs or organizations: Not on file     Relationship status: Not on file   Other Topics Concern    Not on file   Social History Narrative    Not on file       Surgery :  Appendectomy; cholecystectomy; bilateral total knee replacement. Cardioversion. Cardiac ablation.   Has not had colonoscopy    Symptoms :    Review of Systems   Constitutional: Positive for malaise/fatigue. Negative for chills, fever and weight loss.   HENT: Negative for congestion, hearing loss, nosebleeds, sore throat and tinnitus.    Eyes: Negative for blurred vision, double vision and photophobia.   Respiratory: Negative.  Negative for cough, hemoptysis, sputum production and shortness of breath.    Cardiovascular: Positive for leg swelling. Negative for chest pain, palpitations, orthopnea and claudication.   Gastrointestinal: Positive for heartburn. Negative for abdominal pain, blood in stool, constipation, diarrhea, nausea and vomiting.   Genitourinary: Negative for dysuria, flank pain, frequency, hematuria and urgency.        Followed by  for Kidney stones.   Musculoskeletal: Positive for joint pain. Negative for back pain, falls, myalgias and neck pain.   Neurological: Negative for dizziness, tingling, tremors, sensory change and headaches.   Endo/Heme/Allergies: Negative for environmental allergies and polydipsia. Does not bruise/bleed easily.   Psychiatric/Behavioral: Negative for depression. The patient is nervous/anxious. The patient does not have insomnia.        Physical Exam :   Physical Exam   Constitutional: She is oriented to person, place, and time and well-developed, well-nourished, and in no distress. Vital signs are normal. No distress.   HENT:   Head: Normocephalic and atraumatic.   Right Ear: External ear normal.   Left Ear: External ear normal.   Nose: Nose normal.   Mouth/Throat: Oropharynx is clear and moist. No oropharyngeal exudate.   Eyes:  Pupils are equal, round, and reactive to light. Conjunctivae, EOM and lids are normal. Lids are everted and swept, no foreign bodies found. Right eye exhibits no discharge. Left eye exhibits no discharge. No scleral icterus.   Neck: Trachea normal, normal range of motion and phonation normal. Neck supple. Normal carotid pulses, no hepatojugular reflux and no JVD present. No tracheal tenderness present. Carotid bruit is not present. No tracheal deviation present. No thyroid mass and no thyromegaly present.   Cardiovascular: Normal rate, normal heart sounds, intact distal pulses and normal pulses. An irregularly irregular rhythm present. PMI is not displaced. Exam reveals no gallop and no friction rub.   No murmur heard.  Pulmonary/Chest: Effort normal and breath sounds normal. No stridor. No apnea. No respiratory distress. She has no wheezes. She has no rales. She exhibits no tenderness.   Abdominal: Soft. Normal appearance, normal aorta and bowel sounds are normal. She exhibits no distension and no mass. There is no hepatosplenomegaly. There is no tenderness. There is no rebound, no guarding and no CVA tenderness. No hernia.   Musculoskeletal: Normal range of motion. She exhibits edema. She exhibits no tenderness or deformity.   1+ edema Left leg   Lymphadenopathy:        Head (right side): No submental, no submandibular, no tonsillar, no preauricular, no posterior auricular and no occipital adenopathy present.        Head (left side): No submental, no submandibular, no tonsillar, no preauricular, no posterior auricular and no occipital adenopathy present.     She has no cervical adenopathy.     She has no axillary adenopathy.        Right: No inguinal, no supraclavicular and no epitrochlear adenopathy present.        Left: No inguinal, no supraclavicular and no epitrochlear adenopathy present.   Neurological: She is alert and oriented to person, place, and time. She has normal reflexes. She displays normal  reflexes. No cranial nerve deficit. She exhibits normal muscle tone. Gait normal. Coordination normal. GCS score is 15.   Skin: Skin is warm, dry and intact. No rash noted. She is not diaphoretic. No cyanosis or erythema. No pallor. Nails show no clubbing.   Psychiatric: Mood, memory, affect and judgment normal.   Nursing note and vitals reviewed.        Labs & Imaging :  U/S Right lower extremity 5/10/19  : partially occlusive thrombus right femoral vein.  Leiden factor 5 and Prothrombin mutation done in 2004 - Normal. Lupus anticoagulant was negative in 2004. Protein c and s activity were normal in 2004.        Dx : Thrombus right femoral vein.      Assessment & Plan:  Reviewed with patient, work up ordered for hypercoagulable state. CBC,CMP,SPEP, Fibrinogen, AT 3,  serum Homocysteine, SPEP cardiolipin antibody panel. Re evaluate with results . Other studies when patient is off Coumadin.   Advance Care Planning     Power of   I initiated the process of advance care planning today and explained the importance of this process to the patient.  I introduced the concept of advance directives to the patient, as well. Then the patient received detailed information about the importance of designating a Health Care Power of  (HCPOA). She was also instructed to communicate with this person about their wishes for future healthcare, should she become sick and lose decision-making capacity. The patient has not previously appointed a HCPOA.          Living Will  During this visit, I engaged the patient in the advance care planning process.  The patient and I reviewed the role for advance directives and their purpose in directing future healthcare if the patient's unable to speak for him/herself.  At this point in time, the patient does have full decision-making capacity.  We discussed different extreme health states that she could experience, and reviewed what kind of medical care she would want in those  situations.  The patient communicated that if she were comatose and had little chance of a meaningful recovery, she want machines/life-sustaining treatments used.  In addition to the above preference, other important end-of-life issues for the patient   The patient  HAS NOT completed a living will to reflect these preferences.  The patient   HAS NOT:[id number]} already designated a healthcare power of  to make decisions on her behalf.        Brochures given

## 2019-05-22 LAB — HCYS SERPL-SCNC: 7.4 UMOL/L (ref 4–15.5)

## 2019-05-23 LAB
ALBUMIN SERPL ELPH-MCNC: 3.88 G/DL (ref 3.35–5.55)
ALPHA1 GLOB SERPL ELPH-MCNC: 0.32 G/DL (ref 0.17–0.41)
ALPHA2 GLOB SERPL ELPH-MCNC: 0.83 G/DL (ref 0.43–0.99)
AT III ACT/NOR PPP CHRO: 91 % (ref 83–118)
B-GLOBULIN SERPL ELPH-MCNC: 0.78 G/DL (ref 0.5–1.1)
GAMMA GLOB SERPL ELPH-MCNC: 0.99 G/DL (ref 0.67–1.58)
PATHOLOGIST INTERPRETATION SPE: NORMAL
PROT SERPL-MCNC: 6.8 G/DL (ref 6–8.4)

## 2019-05-24 LAB
CARDIOLIPIN IGG SER IA-ACNC: 19.11 GPL (ref 0–14.99)
CARDIOLIPIN IGM SER IA-ACNC: <9.4 MPL (ref 0–12.49)

## 2019-05-27 ENCOUNTER — ANTI-COAG VISIT (OUTPATIENT)
Dept: CARDIOLOGY | Facility: CLINIC | Age: 79
End: 2019-05-27
Payer: MEDICARE

## 2019-05-27 ENCOUNTER — LAB VISIT (OUTPATIENT)
Dept: LAB | Facility: HOSPITAL | Age: 79
End: 2019-05-27
Attending: INTERNAL MEDICINE
Payer: MEDICARE

## 2019-05-27 DIAGNOSIS — Z79.01 LONG TERM (CURRENT) USE OF ANTICOAGULANTS: ICD-10-CM

## 2019-05-27 LAB
INR PPP: 2.4 (ref 0.8–1.2)
PROTHROMBIN TIME: 25.4 SEC (ref 9–12.5)

## 2019-05-27 PROCEDURE — 93793 PR ANTICOAGULANT MGMT FOR PT TAKING WARFARIN: ICD-10-PCS | Mod: S$GLB,,, | Performed by: PHARMACIST

## 2019-05-27 PROCEDURE — 85610 PROTHROMBIN TIME: CPT | Mod: HCNC

## 2019-05-27 PROCEDURE — 93793 ANTICOAG MGMT PT WARFARIN: CPT | Mod: S$GLB,,, | Performed by: PHARMACIST

## 2019-05-27 PROCEDURE — 36415 COLL VENOUS BLD VENIPUNCTURE: CPT | Mod: HCNC,PN

## 2019-06-04 NOTE — PROGRESS NOTES
Chief Complaint :  Right femoral vein thrombus    Hx of Present illness :  Patient is a 78 y.o. year old female who presents to the clinic today for   Oncology evaluation.  Had sx of pain Right leg.  Had further evaluation which revealed DVT.  unprovoked  Patient was on anticoagulation for paroxysmal atrial fibrillation.    Allergies :    Review of patient's allergies indicates:   Allergen Reactions    Celebrex [celecoxib]      Chest tightness    Lipitor [atorvastatin] Other (See Comments)       Occupation :  Retired Cook./    Transfusion :  None    Menstrual & obstetric Hx :   1; Para 1.  Age of menarche:  18  Age of first pregnancy: 27  Lactation history: Yes  Age of menopause: 57  HRT: None    Present Meds :   Medication List with Changes/Refills   Current Medications    BACLOFEN (LIORESAL) 10 MG TABLET        CYANOCOBALAMIN, VITAMIN B-12, (VITAMIN B-12 ORAL)    Take 2,500 mcg by mouth every evening.    DICLOFENAC SODIUM (VOLTAREN) 1 % GEL    Apply 2 g topically once daily.    FISH OIL-OMEGA-3 FATTY ACIDS 300-1,000 MG CAPSULE    Take 1 g by mouth once daily.    FLECAINIDE (TAMBOCOR) 100 MG TAB    Take 1 tablet (100 mg total) by mouth every 12 (twelve) hours.    KETOCONAZOLE (NIZORAL) 2 % CREAM        LORATADINE (CLARITIN) 10 MG TABLET    Take 1 tablet (10 mg total) by mouth once daily.    MULTIVITAMIN (THERAGRAN) PER TABLET    Take 1 tablet by mouth every evening. 1 Tablet Oral Every day    OXYBUTYNIN (DITROPAN) 5 MG TAB    TAKE 1 TABLET THREE TIMES DAILY    SIMVASTATIN (ZOCOR) 40 MG TABLET    Take 40 mg by mouth every evening.    WARFARIN (COUMADIN) 5 MG TABLET    Take 0.5-1 tablets (2.5-5 mg total) by mouth Daily. As directed by coumadin clinic       Past Medical Hx :   reviewed    Past Medical Hx :  Past Medical History:   Diagnosis Date    Arthritis     knees    Atrial fibrillation     Atrial flutter     Back pain     Degenerative disc disease     Depression     General anesthetics causing  adverse effect in therapeutic use     pt states sometimes slow to awaken.    GERD (gastroesophageal reflux disease)     Hyperlipidemia     Hypertension     Kidney stone     Obesity     Sleep apnea     does not wear CPAP    Urinary incontinence     Varicosities     Ventral hernia        Travel Hx :  N/A    Immunization :  Immunization History   Administered Date(s) Administered    Influenza 10/15/2007, 10/17/2008, 10/10/2009, 10/05/2010, 11/14/2011, 10/23/2013    Influenza - High Dose 11/05/2012, 10/22/2014, 12/19/2015, 08/29/2016, 12/11/2017, 09/20/2018    Influenza - Quadrivalent - PF 10/15/2007, 10/10/2009, 10/05/2010, 11/14/2011    Influenza Split 10/23/2013    Pneumococcal Conjugate - 13 Valent 08/29/2016    Pneumococcal Polysaccharide - 23 Valent 02/19/2015    Td - PF (ADULT) 05/03/2019       Family Hx :  Family History   Problem Relation Age of Onset    COPD Mother     Heart disease Sister         half sister    COPD Sister     Parkinsonism Sister        Social Hx :  Social History     Socioeconomic History    Marital status:      Spouse name: Not on file    Number of children: Not on file    Years of education: Not on file    Highest education level: Not on file   Occupational History    Not on file   Social Needs    Financial resource strain: Not on file    Food insecurity:     Worry: Not on file     Inability: Not on file    Transportation needs:     Medical: Not on file     Non-medical: Not on file   Tobacco Use    Smoking status: Never Smoker    Smokeless tobacco: Never Used   Substance and Sexual Activity    Alcohol use: No    Drug use: No    Sexual activity: Yes     Partners: Male   Lifestyle    Physical activity:     Days per week: Not on file     Minutes per session: Not on file    Stress: Not on file   Relationships    Social connections:     Talks on phone: Not on file     Gets together: Not on file     Attends Hoahaoism service: Not on file     Active  member of club or organization: Not on file     Attends meetings of clubs or organizations: Not on file     Relationship status: Not on file   Other Topics Concern    Not on file   Social History Narrative    Not on file       Surgery :  Appendectomy; cholecystectomy; bilateral total knee replacement. Cardioversion. Cardiac ablation.   Has not had colonoscopy    Symptoms :    Review of Systems   Constitutional: Positive for malaise/fatigue. Negative for chills, fever and weight loss.   HENT: Negative for congestion, hearing loss, nosebleeds, sore throat and tinnitus.    Eyes: Negative for blurred vision, double vision and photophobia.   Respiratory: Negative.  Negative for cough, hemoptysis, sputum production and shortness of breath.    Cardiovascular: Positive for leg swelling. Negative for chest pain, palpitations, orthopnea and claudication.   Gastrointestinal: Positive for heartburn. Negative for abdominal pain, blood in stool, constipation, diarrhea, nausea and vomiting.   Genitourinary: Negative for dysuria, flank pain, frequency, hematuria and urgency.        Followed by  for Kidney stones.   Musculoskeletal: Positive for joint pain. Negative for back pain, falls, myalgias and neck pain.   Neurological: Negative for dizziness, tingling, tremors, sensory change and headaches.   Endo/Heme/Allergies: Negative for environmental allergies and polydipsia. Does not bruise/bleed easily.   Psychiatric/Behavioral: Negative for depression. The patient is nervous/anxious. The patient does not have insomnia.        Physical Exam :   Physical Exam   Constitutional: She is oriented to person, place, and time and well-developed, well-nourished, and in no distress. Vital signs are normal. No distress.   HENT:   Head: Normocephalic and atraumatic.   Right Ear: External ear normal.   Left Ear: External ear normal.   Nose: Nose normal.   Mouth/Throat: Oropharynx is clear and moist. No oropharyngeal exudate.   Eyes:  Pupils are equal, round, and reactive to light. Conjunctivae, EOM and lids are normal. Lids are everted and swept, no foreign bodies found. Right eye exhibits no discharge. Left eye exhibits no discharge. No scleral icterus.   Neck: Trachea normal, normal range of motion and phonation normal. Neck supple. Normal carotid pulses, no hepatojugular reflux and no JVD present. No tracheal tenderness present. Carotid bruit is not present. No tracheal deviation present. No thyroid mass and no thyromegaly present.   Cardiovascular: Normal rate, normal heart sounds, intact distal pulses and normal pulses. An irregularly irregular rhythm present. PMI is not displaced. Exam reveals no gallop and no friction rub.   No murmur heard.  Pulmonary/Chest: Effort normal and breath sounds normal. No stridor. No apnea. No respiratory distress. She has no wheezes. She has no rales. She exhibits no tenderness.   Abdominal: Soft. Normal appearance, normal aorta and bowel sounds are normal. She exhibits no distension and no mass. There is no hepatosplenomegaly. There is no tenderness. There is no rebound, no guarding and no CVA tenderness. No hernia.   Musculoskeletal: Normal range of motion. She exhibits edema. She exhibits no tenderness or deformity.   1+ edema Left leg   Lymphadenopathy:        Head (right side): No submental, no submandibular, no tonsillar, no preauricular, no posterior auricular and no occipital adenopathy present.        Head (left side): No submental, no submandibular, no tonsillar, no preauricular, no posterior auricular and no occipital adenopathy present.     She has no cervical adenopathy.     She has no axillary adenopathy.        Right: No inguinal, no supraclavicular and no epitrochlear adenopathy present.        Left: No inguinal, no supraclavicular and no epitrochlear adenopathy present.   Neurological: She is alert and oriented to person, place, and time. She has normal reflexes. No cranial nerve deficit.  She exhibits normal muscle tone. Gait normal. Coordination normal. GCS score is 15.   Skin: Skin is warm, dry and intact. No rash noted. She is not diaphoretic. No cyanosis or erythema. No pallor. Nails show no clubbing.   Psychiatric: Mood, memory, affect and judgment normal.   Nursing note and vitals reviewed.        Labs & Imaging :  U/S Right lower extremity 5/10/19  : partially occlusive thrombus right femoral vein.  Leiden factor 5 and Prothrombin mutation done in 2004 - Normal. Lupus anticoagulant was negative in 2004. Protein c and s activity were normal in 2004.  05/21/19 : SPEP normal. Fibrinogen : 469.  AT3 and homocysteine normal.  Cardiolipin antibody igG increased at 19.6      Dx : Thrombus right femoral vein.      Assessment & Plan:  Reviewed with patient. Mild Increase in fibrinogen and Cardiolipin antibody. Stay on coumadin. RTC 3 months with repeat Doppler studies Other studies when patient is off Coumadin.   Advance Care Planning     Power of   I initiated the process of advance care planning today and explained the importance of this process to the patient.  I introduced the concept of advance directives to the patient, as well. Then the patient received detailed information about the importance of designating a Health Care Power of  (HCPOA). She was also instructed to communicate with this person about their wishes for future healthcare, should she become sick and lose decision-making capacity. The patient has not previously appointed a HCPOA.          Living Will  During this visit, I engaged the patient in the advance care planning process.  The patient and I reviewed the role for advance directives and their purpose in directing future healthcare if the patient's unable to speak for him/herself.  At this point in time, the patient does have full decision-making capacity.  We discussed different extreme health states that she could experience, and reviewed what kind of medical  care she would want in those situations.  The patient communicated that if she were comatose and had little chance of a meaningful recovery, she want machines/life-sustaining treatments used.  In addition to the above preference, other important end-of-life issues for the patient   The patient  HAS NOT completed a living will to reflect these preferences.  The patient   HAS NOT:[id number]} already designated a healthcare power of  to make decisions on her behalf.        Brochures given

## 2019-06-05 ENCOUNTER — OFFICE VISIT (OUTPATIENT)
Dept: HEMATOLOGY/ONCOLOGY | Facility: CLINIC | Age: 79
End: 2019-06-05
Payer: MEDICARE

## 2019-06-05 VITALS
HEIGHT: 65 IN | OXYGEN SATURATION: 97 % | SYSTOLIC BLOOD PRESSURE: 137 MMHG | BODY MASS INDEX: 42.13 KG/M2 | WEIGHT: 252.88 LBS | HEART RATE: 73 BPM | TEMPERATURE: 98 F | DIASTOLIC BLOOD PRESSURE: 63 MMHG

## 2019-06-05 DIAGNOSIS — I82.411 DVT OF DEEP FEMORAL VEIN, RIGHT: Primary | ICD-10-CM

## 2019-06-05 PROCEDURE — 1101F PR PT FALLS ASSESS DOC 0-1 FALLS W/OUT INJ PAST YR: ICD-10-PCS | Mod: HCNC,CPTII,S$GLB, | Performed by: INTERNAL MEDICINE

## 2019-06-05 PROCEDURE — 3078F DIAST BP <80 MM HG: CPT | Mod: HCNC,CPTII,S$GLB, | Performed by: INTERNAL MEDICINE

## 2019-06-05 PROCEDURE — 3078F PR MOST RECENT DIASTOLIC BLOOD PRESSURE < 80 MM HG: ICD-10-PCS | Mod: HCNC,CPTII,S$GLB, | Performed by: INTERNAL MEDICINE

## 2019-06-05 PROCEDURE — 99213 PR OFFICE/OUTPT VISIT, EST, LEVL III, 20-29 MIN: ICD-10-PCS | Mod: HCNC,S$GLB,, | Performed by: INTERNAL MEDICINE

## 2019-06-05 PROCEDURE — 99999 PR PBB SHADOW E&M-EST. PATIENT-LVL III: CPT | Mod: PBBFAC,HCNC,, | Performed by: INTERNAL MEDICINE

## 2019-06-05 PROCEDURE — 99999 PR PBB SHADOW E&M-EST. PATIENT-LVL III: ICD-10-PCS | Mod: PBBFAC,HCNC,, | Performed by: INTERNAL MEDICINE

## 2019-06-05 PROCEDURE — 3075F SYST BP GE 130 - 139MM HG: CPT | Mod: HCNC,CPTII,S$GLB, | Performed by: INTERNAL MEDICINE

## 2019-06-05 PROCEDURE — 3075F PR MOST RECENT SYSTOLIC BLOOD PRESS GE 130-139MM HG: ICD-10-PCS | Mod: HCNC,CPTII,S$GLB, | Performed by: INTERNAL MEDICINE

## 2019-06-05 PROCEDURE — 99213 OFFICE O/P EST LOW 20 MIN: CPT | Mod: HCNC,S$GLB,, | Performed by: INTERNAL MEDICINE

## 2019-06-05 PROCEDURE — 1101F PT FALLS ASSESS-DOCD LE1/YR: CPT | Mod: HCNC,CPTII,S$GLB, | Performed by: INTERNAL MEDICINE

## 2019-06-10 ENCOUNTER — ANTI-COAG VISIT (OUTPATIENT)
Dept: CARDIOLOGY | Facility: CLINIC | Age: 79
End: 2019-06-10
Payer: MEDICARE

## 2019-06-10 ENCOUNTER — LAB VISIT (OUTPATIENT)
Dept: LAB | Facility: HOSPITAL | Age: 79
End: 2019-06-10
Attending: INTERNAL MEDICINE
Payer: MEDICARE

## 2019-06-10 DIAGNOSIS — Z79.01 LONG TERM (CURRENT) USE OF ANTICOAGULANTS: ICD-10-CM

## 2019-06-10 LAB
INR PPP: 1.5 (ref 0.8–1.2)
PROTHROMBIN TIME: 15.6 SEC (ref 9–12.5)

## 2019-06-10 PROCEDURE — 93793 PR ANTICOAGULANT MGMT FOR PT TAKING WARFARIN: ICD-10-PCS | Mod: S$GLB,,, | Performed by: PHARMACIST

## 2019-06-10 PROCEDURE — 85610 PROTHROMBIN TIME: CPT | Mod: HCNC

## 2019-06-10 PROCEDURE — 36415 COLL VENOUS BLD VENIPUNCTURE: CPT | Mod: HCNC,PN

## 2019-06-10 PROCEDURE — 93793 ANTICOAG MGMT PT WARFARIN: CPT | Mod: S$GLB,,, | Performed by: PHARMACIST

## 2019-06-24 ENCOUNTER — ANTI-COAG VISIT (OUTPATIENT)
Dept: CARDIOLOGY | Facility: CLINIC | Age: 79
End: 2019-06-24
Payer: MEDICARE

## 2019-06-24 ENCOUNTER — LAB VISIT (OUTPATIENT)
Dept: LAB | Facility: HOSPITAL | Age: 79
End: 2019-06-24
Attending: INTERNAL MEDICINE
Payer: MEDICARE

## 2019-06-24 DIAGNOSIS — Z79.01 LONG TERM (CURRENT) USE OF ANTICOAGULANTS: ICD-10-CM

## 2019-06-24 LAB
INR PPP: 3.3 (ref 0.8–1.2)
PROTHROMBIN TIME: 34.9 SEC (ref 9–12.5)

## 2019-06-24 PROCEDURE — 93793 ANTICOAG MGMT PT WARFARIN: CPT | Mod: S$GLB,,, | Performed by: PHARMACIST

## 2019-06-24 PROCEDURE — 36415 COLL VENOUS BLD VENIPUNCTURE: CPT | Mod: HCNC,PN

## 2019-06-24 PROCEDURE — 93793 PR ANTICOAGULANT MGMT FOR PT TAKING WARFARIN: ICD-10-PCS | Mod: S$GLB,,, | Performed by: PHARMACIST

## 2019-06-24 PROCEDURE — 85610 PROTHROMBIN TIME: CPT | Mod: HCNC

## 2019-06-24 NOTE — PROGRESS NOTES
Confirmed taking incorrect dose of coumadin w/ 7.5mg  On MONs; 2.5mg on FRIs and 5mg all others  NO other changes

## 2019-07-01 DIAGNOSIS — I89.0 LYMPHEDEMA: Primary | ICD-10-CM

## 2019-07-08 ENCOUNTER — ANTI-COAG VISIT (OUTPATIENT)
Dept: CARDIOLOGY | Facility: CLINIC | Age: 79
End: 2019-07-08
Payer: MEDICARE

## 2019-07-08 ENCOUNTER — LAB VISIT (OUTPATIENT)
Dept: LAB | Facility: HOSPITAL | Age: 79
End: 2019-07-08
Attending: INTERNAL MEDICINE
Payer: MEDICARE

## 2019-07-08 DIAGNOSIS — Z79.01 LONG TERM (CURRENT) USE OF ANTICOAGULANTS: ICD-10-CM

## 2019-07-08 LAB
INR PPP: 2.1 (ref 0.8–1.2)
PROTHROMBIN TIME: 22.4 SEC (ref 9–12.5)

## 2019-07-08 PROCEDURE — 36415 COLL VENOUS BLD VENIPUNCTURE: CPT | Mod: HCNC,PN

## 2019-07-08 PROCEDURE — 93793 PR ANTICOAGULANT MGMT FOR PT TAKING WARFARIN: ICD-10-PCS | Mod: S$GLB,,, | Performed by: PHARMACIST

## 2019-07-08 PROCEDURE — 93793 ANTICOAG MGMT PT WARFARIN: CPT | Mod: S$GLB,,, | Performed by: PHARMACIST

## 2019-07-08 PROCEDURE — 85610 PROTHROMBIN TIME: CPT | Mod: HCNC

## 2019-07-22 ENCOUNTER — LAB VISIT (OUTPATIENT)
Dept: LAB | Facility: HOSPITAL | Age: 79
End: 2019-07-22
Attending: INTERNAL MEDICINE
Payer: MEDICARE

## 2019-07-22 ENCOUNTER — ANTI-COAG VISIT (OUTPATIENT)
Dept: CARDIOLOGY | Facility: CLINIC | Age: 79
End: 2019-07-22

## 2019-07-22 DIAGNOSIS — Z79.01 LONG TERM (CURRENT) USE OF ANTICOAGULANTS: ICD-10-CM

## 2019-07-22 LAB
INR PPP: 1.9 (ref 0.8–1.2)
PROTHROMBIN TIME: 20.1 SEC (ref 9–12.5)

## 2019-07-22 PROCEDURE — 36415 COLL VENOUS BLD VENIPUNCTURE: CPT | Mod: HCNC,PN

## 2019-07-22 PROCEDURE — 85610 PROTHROMBIN TIME: CPT | Mod: HCNC

## 2019-08-05 ENCOUNTER — ANTI-COAG VISIT (OUTPATIENT)
Dept: CARDIOLOGY | Facility: CLINIC | Age: 79
End: 2019-08-05
Payer: MEDICARE

## 2019-08-05 ENCOUNTER — LAB VISIT (OUTPATIENT)
Dept: LAB | Facility: HOSPITAL | Age: 79
End: 2019-08-05
Attending: INTERNAL MEDICINE
Payer: MEDICARE

## 2019-08-05 DIAGNOSIS — Z79.01 LONG TERM (CURRENT) USE OF ANTICOAGULANTS: ICD-10-CM

## 2019-08-05 LAB
INR PPP: 2.6 (ref 0.8–1.2)
PROTHROMBIN TIME: 26.9 SEC (ref 9–12.5)

## 2019-08-05 PROCEDURE — 85610 PROTHROMBIN TIME: CPT | Mod: HCNC

## 2019-08-05 PROCEDURE — 36415 COLL VENOUS BLD VENIPUNCTURE: CPT | Mod: HCNC,PN

## 2019-08-05 PROCEDURE — 93793 PR ANTICOAGULANT MGMT FOR PT TAKING WARFARIN: ICD-10-PCS | Mod: S$GLB,,, | Performed by: PHARMACIST

## 2019-08-05 PROCEDURE — 93793 ANTICOAG MGMT PT WARFARIN: CPT | Mod: S$GLB,,, | Performed by: PHARMACIST

## 2019-08-05 NOTE — PROGRESS NOTES
Chief Complaint :  Right femoral vein thrombus    Hx of Present illness :  Patient is a 78 y.o. year old female who presents to the clinic today for   Oncology evaluation.  Had sx of pain Right leg.  Had further evaluation which revealed DVT.  unprovoked  Patient was on anticoagulation for paroxysmal atrial fibrillation.    Allergies :    Review of patient's allergies indicates:   Allergen Reactions    Celebrex [celecoxib]      Chest tightness    Lipitor [atorvastatin] Other (See Comments)       Occupation :  Retired Cook./    Transfusion :  None    Menstrual & obstetric Hx :   1; Para 1.  Age of menarche:  18  Age of first pregnancy: 27  Lactation history: Yes  Age of menopause: 57  HRT: None    Present Meds :   Medication List with Changes/Refills   Current Medications    BACLOFEN (LIORESAL) 10 MG TABLET        CYANOCOBALAMIN, VITAMIN B-12, (VITAMIN B-12 ORAL)    Take 2,500 mcg by mouth every evening.    DICLOFENAC SODIUM (VOLTAREN) 1 % GEL    Apply 2 g topically once daily.    FISH OIL-OMEGA-3 FATTY ACIDS 300-1,000 MG CAPSULE    Take 1 g by mouth once daily.    FLECAINIDE (TAMBOCOR) 100 MG TAB    Take 1 tablet (100 mg total) by mouth every 12 (twelve) hours.    KETOCONAZOLE (NIZORAL) 2 % CREAM        LORATADINE (CLARITIN) 10 MG TABLET    Take 1 tablet (10 mg total) by mouth once daily.    MULTIVITAMIN (THERAGRAN) PER TABLET    Take 1 tablet by mouth every evening. 1 Tablet Oral Every day    OXYBUTYNIN (DITROPAN) 5 MG TAB    TAKE 1 TABLET THREE TIMES DAILY    SIMVASTATIN (ZOCOR) 40 MG TABLET    Take 40 mg by mouth every evening.    WARFARIN (COUMADIN) 5 MG TABLET    Take 0.5-1 tablets (2.5-5 mg total) by mouth Daily. As directed by coumadin clinic       Past Medical Hx :   reviewed    Past Medical Hx :  Past Medical History:   Diagnosis Date    Arthritis     knees    Atrial fibrillation     Atrial flutter     Back pain     Degenerative disc disease     Depression     General anesthetics causing  adverse effect in therapeutic use     pt states sometimes slow to awaken.    GERD (gastroesophageal reflux disease)     Hyperlipidemia     Hypertension     Kidney stone     Obesity     Sleep apnea     does not wear CPAP    Urinary incontinence     Varicosities     Ventral hernia        Travel Hx :  N/A    Immunization :  Immunization History   Administered Date(s) Administered    Influenza 10/15/2007, 10/17/2008, 10/10/2009, 10/05/2010, 11/14/2011, 10/23/2013    Influenza - High Dose 11/05/2012, 10/22/2014, 12/19/2015, 08/29/2016, 12/11/2017, 09/20/2018    Influenza - Quadrivalent - PF 10/15/2007, 10/10/2009, 10/05/2010, 11/14/2011    Influenza Split 10/23/2013    Pneumococcal Conjugate - 13 Valent 08/29/2016    Pneumococcal Polysaccharide - 23 Valent 02/19/2015    Td - PF (ADULT) 05/03/2019       Family Hx :  Family History   Problem Relation Age of Onset    COPD Mother     Heart disease Sister         half sister    COPD Sister     Parkinsonism Sister        Social Hx :  Social History     Socioeconomic History    Marital status:      Spouse name: Not on file    Number of children: Not on file    Years of education: Not on file    Highest education level: Not on file   Occupational History    Not on file   Social Needs    Financial resource strain: Not on file    Food insecurity:     Worry: Not on file     Inability: Not on file    Transportation needs:     Medical: Not on file     Non-medical: Not on file   Tobacco Use    Smoking status: Never Smoker    Smokeless tobacco: Never Used   Substance and Sexual Activity    Alcohol use: No    Drug use: No    Sexual activity: Yes     Partners: Male   Lifestyle    Physical activity:     Days per week: Not on file     Minutes per session: Not on file    Stress: Not on file   Relationships    Social connections:     Talks on phone: Not on file     Gets together: Not on file     Attends Oriental orthodox service: Not on file     Active  member of club or organization: Not on file     Attends meetings of clubs or organizations: Not on file     Relationship status: Not on file   Other Topics Concern    Not on file   Social History Narrative    Not on file       Surgery :  Appendectomy; cholecystectomy; bilateral total knee replacement. Cardioversion. Cardiac ablation.   Has not had colonoscopy    Symptoms :    Review of Systems   Constitutional: Positive for malaise/fatigue. Negative for chills, fever and weight loss.   HENT: Negative for congestion, hearing loss, nosebleeds, sore throat and tinnitus.    Eyes: Negative for blurred vision, double vision and photophobia.   Respiratory: Negative.  Negative for cough, hemoptysis, sputum production and shortness of breath.    Cardiovascular: Positive for leg swelling. Negative for chest pain, palpitations, orthopnea and claudication.   Gastrointestinal: Positive for heartburn. Negative for abdominal pain, blood in stool, constipation, diarrhea, nausea and vomiting.   Genitourinary: Negative for dysuria, flank pain, frequency, hematuria and urgency.        Followed by  for Kidney stones.   Musculoskeletal: Positive for joint pain. Negative for back pain, falls, myalgias and neck pain.   Neurological: Negative for dizziness, tingling, tremors, sensory change and headaches.   Endo/Heme/Allergies: Negative for environmental allergies and polydipsia. Does not bruise/bleed easily.   Psychiatric/Behavioral: Negative for depression. The patient is nervous/anxious. The patient does not have insomnia.        Physical Exam :   Physical Exam   Constitutional: She is oriented to person, place, and time and well-developed, well-nourished, and in no distress. Vital signs are normal. No distress.   HENT:   Head: Normocephalic and atraumatic.   Right Ear: External ear normal.   Left Ear: External ear normal.   Nose: Nose normal.   Mouth/Throat: Oropharynx is clear and moist. No oropharyngeal exudate.   Eyes:  Pupils are equal, round, and reactive to light. Conjunctivae, EOM and lids are normal. Lids are everted and swept, no foreign bodies found. Right eye exhibits no discharge. Left eye exhibits no discharge. No scleral icterus.   Neck: Trachea normal, normal range of motion and phonation normal. Neck supple. Normal carotid pulses, no hepatojugular reflux and no JVD present. No tracheal tenderness present. Carotid bruit is not present. No tracheal deviation present. No thyroid mass and no thyromegaly present.   Cardiovascular: Normal rate, normal heart sounds, intact distal pulses and normal pulses. An irregularly irregular rhythm present. PMI is not displaced. Exam reveals no gallop and no friction rub.   No murmur heard.  Pulmonary/Chest: Effort normal and breath sounds normal. No stridor. No apnea. No respiratory distress. She has no wheezes. She has no rales. She exhibits no tenderness.   Abdominal: Soft. Normal appearance, normal aorta and bowel sounds are normal. She exhibits no distension and no mass. There is no hepatosplenomegaly. There is no tenderness. There is no rebound, no guarding and no CVA tenderness. No hernia.   Musculoskeletal: Normal range of motion. She exhibits edema. She exhibits no tenderness or deformity.   1+ edema Left leg. Has antiembolic stockings   Lymphadenopathy:        Head (right side): No submental, no submandibular, no tonsillar, no preauricular, no posterior auricular and no occipital adenopathy present.        Head (left side): No submental, no submandibular, no tonsillar, no preauricular, no posterior auricular and no occipital adenopathy present.     She has no cervical adenopathy.     She has no axillary adenopathy.        Right: No inguinal, no supraclavicular and no epitrochlear adenopathy present.        Left: No inguinal, no supraclavicular and no epitrochlear adenopathy present.   Neurological: She is alert and oriented to person, place, and time. She has normal reflexes.  No cranial nerve deficit. She exhibits normal muscle tone. Gait normal. Coordination normal. GCS score is 15.   Skin: Skin is warm, dry and intact. No rash noted. She is not diaphoretic. No cyanosis or erythema. No pallor. Nails show no clubbing.   Psychiatric: Mood, memory, affect and judgment normal.   Nursing note and vitals reviewed.        Labs & Imaging :  U/S Right lower extremity 5/10/19  : partially occlusive thrombus right femoral vein.  Leiden factor 5 and Prothrombin mutation done in 2004 - Normal. Lupus anticoagulant was negative in 2004. Protein c and s activity were normal in 2004.  05/21/19 : SPEP normal. Fibrinogen : 469.  AT3 and homocysteine normal.  Cardiolipin antibody igG increased at 19.6      Dx : Thrombus right femoral vein.      Assessment & Plan:  Reviewed with patient. Mild Increase in fibrinogen and Cardiolipin antibody. Stay on coumadin. RTC 3 months with repeat Doppler studies. Patient will hold coumadin for ten days. Get blood drawn for coag labs and immediately resume coumadin.  Re evaluate with results. Patient understands and verbalised.  Advance Care Planning     Power of   I initiated the process of advance care planning today and explained the importance of this process to the patient.  I introduced the concept of advance directives to the patient, as well. Then the patient received detailed information about the importance of designating a Health Care Power of  (HCPOA). She was also instructed to communicate with this person about their wishes for future healthcare, should she become sick and lose decision-making capacity. The patient has not previously appointed a HCPOA.          Living Will  During this visit, I engaged the patient in the advance care planning process.  The patient and I reviewed the role for advance directives and their purpose in directing future healthcare if the patient's unable to speak for him/herself.  At this point in time, the patient  does have full decision-making capacity.  We discussed different extreme health states that she could experience, and reviewed what kind of medical care she would want in those situations.  The patient communicated that if she were comatose and had little chance of a meaningful recovery, she want machines/life-sustaining treatments used.  In addition to the above preference, other important end-of-life issues for the patient   The patient  HAS NOT completed a living will to reflect these preferences.  The patient   HAS NOT:[id number]} already designated a healthcare power of  to make decisions on her behalf.        Brochures given

## 2019-08-06 ENCOUNTER — OFFICE VISIT (OUTPATIENT)
Dept: HEMATOLOGY/ONCOLOGY | Facility: CLINIC | Age: 79
End: 2019-08-06
Payer: MEDICARE

## 2019-08-06 VITALS
WEIGHT: 250.44 LBS | HEART RATE: 64 BPM | DIASTOLIC BLOOD PRESSURE: 69 MMHG | SYSTOLIC BLOOD PRESSURE: 148 MMHG | BODY MASS INDEX: 41.73 KG/M2 | OXYGEN SATURATION: 95 % | HEIGHT: 65 IN | TEMPERATURE: 98 F

## 2019-08-06 DIAGNOSIS — I82.411 DVT OF DEEP FEMORAL VEIN, RIGHT: Primary | ICD-10-CM

## 2019-08-06 PROCEDURE — 99213 OFFICE O/P EST LOW 20 MIN: CPT | Mod: HCNC,S$GLB,, | Performed by: INTERNAL MEDICINE

## 2019-08-06 PROCEDURE — 99999 PR PBB SHADOW E&M-EST. PATIENT-LVL III: ICD-10-PCS | Mod: PBBFAC,HCNC,, | Performed by: INTERNAL MEDICINE

## 2019-08-06 PROCEDURE — 99999 PR PBB SHADOW E&M-EST. PATIENT-LVL III: CPT | Mod: PBBFAC,HCNC,, | Performed by: INTERNAL MEDICINE

## 2019-08-06 PROCEDURE — 3078F PR MOST RECENT DIASTOLIC BLOOD PRESSURE < 80 MM HG: ICD-10-PCS | Mod: HCNC,CPTII,S$GLB, | Performed by: INTERNAL MEDICINE

## 2019-08-06 PROCEDURE — 3077F SYST BP >= 140 MM HG: CPT | Mod: HCNC,CPTII,S$GLB, | Performed by: INTERNAL MEDICINE

## 2019-08-06 PROCEDURE — 3077F PR MOST RECENT SYSTOLIC BLOOD PRESSURE >= 140 MM HG: ICD-10-PCS | Mod: HCNC,CPTII,S$GLB, | Performed by: INTERNAL MEDICINE

## 2019-08-06 PROCEDURE — 1101F PT FALLS ASSESS-DOCD LE1/YR: CPT | Mod: HCNC,CPTII,S$GLB, | Performed by: INTERNAL MEDICINE

## 2019-08-06 PROCEDURE — 99499 RISK ADDL DX/OHS AUDIT: ICD-10-PCS | Mod: HCNC,S$GLB,, | Performed by: INTERNAL MEDICINE

## 2019-08-06 PROCEDURE — 99213 PR OFFICE/OUTPT VISIT, EST, LEVL III, 20-29 MIN: ICD-10-PCS | Mod: HCNC,S$GLB,, | Performed by: INTERNAL MEDICINE

## 2019-08-06 PROCEDURE — 99499 UNLISTED E&M SERVICE: CPT | Mod: HCNC,S$GLB,, | Performed by: INTERNAL MEDICINE

## 2019-08-06 PROCEDURE — 3078F DIAST BP <80 MM HG: CPT | Mod: HCNC,CPTII,S$GLB, | Performed by: INTERNAL MEDICINE

## 2019-08-06 PROCEDURE — 1101F PR PT FALLS ASSESS DOC 0-1 FALLS W/OUT INJ PAST YR: ICD-10-PCS | Mod: HCNC,CPTII,S$GLB, | Performed by: INTERNAL MEDICINE

## 2019-08-06 RX ORDER — AMOXICILLIN 500 MG/1
CAPSULE ORAL
Refills: 0 | COMMUNITY
Start: 2019-07-23 | End: 2019-08-19

## 2019-08-06 NOTE — PROGRESS NOTES
"Subjective:       Patient ID: Alaina Vee is a 77 y.o. female who was last seen in this office 10/16/2017    Chief Complaint:   No chief complaint on file.    Nephrolithiasis   Patient presented to clinic with c/o right flank pain without radiation to the abdomen on 10/11/17. Onset of symptoms was abrupt starting 1 day prior to office visit with gradually improving course since that time. She reports moderate relief of right flank pain after taking Percocet.  Patient described the pain as aching and dull, continuous and rated as mild (3/10). The patient has had nausea and vomiting x 1 episode (she reports emesis was mostly water) and no diaphoresis. There has been no fever or chills. The patient is not complaining of dysuria or frequency. Risk factors for urolithiasis: history of stone disease.  Patient is s/p right ESWL on 2/27/17 per Dr. Weaver for 8 mm stone in R kidney.     She underwent CT renal stone study on 10/13/17 d/t c/o R flank pain. CT showed small stone in UVJ with moderate hydro, small stone RLP x2 and non obstructing stones in LUP and LLP. During her last visit with me she reported persistent right flank pain that remained unchanged since onset of symptoms. She elected to undergo trial of passage instead of ureteroscopy.  It was recommended that she also have SERG done.    SERG (10/27/17)-- reduced prominence of right renal pelvis suggestive of improving hydro, RLP stones not visible, non obstructing stones in LUP and LLP noted    Today she reports right flank pain is still present. She states pain is intermittent in nature and just "irritating" at times (3/10 dull throbbing pain).  She tells me that she has not needed the pain medication that was prescribed d/t pain not being "that bad".She denies dysuria, gross hematuria, abdominal pain, fever, or n/v. She is unsure is she has passed any stones    ACTIVE MEDICAL ISSUES:  Patient Active Problem List   Diagnosis    Essential hypertension    " Refill removed- Ventral hernia    UI (urinary incontinence)    Hyperlipidemia    Venous stasis of lower extremity    GERD (gastroesophageal reflux disease)    Low back pain without sciatica    Weakness of both hips    Segmental dysfunction of lumbar region    Recurrent UTI    Mixed incontinence urge and stress    Nephrolithiasis    Cervical radicular pain    Typical atrial flutter    SULEMA (obstructive sleep apnea)    Long term (current) use of anticoagulants    PAF (paroxysmal atrial fibrillation)    Atrial flutter       ALLERGIES AND MEDICATIONS: updated and reviewed.  Review of patient's allergies indicates:   Allergen Reactions    Lipitor [atorvastatin] Other (See Comments)     Current Outpatient Prescriptions   Medication Sig    CYANOCOBALAMIN, VITAMIN B-12, (VITAMIN B-12 ORAL) Take 2,500 mcg by mouth every evening.    diclofenac sodium 1 % Gel Apply 2 g topically once daily.    fish oil-omega-3 fatty acids 300-1,000 mg capsule Take 1 g by mouth once daily.    flecainide (TAMBOCOR) 100 MG Tab Take 1 tablet (100 mg total) by mouth every 12 (twelve) hours.    lisinopril (PRINIVIL,ZESTRIL) 40 MG tablet TAKE 1 TABLET ONE TIME DAILY (Patient taking differently: every evening. TAKE 1 TABLET ONE TIME DAILY)    lovastatin (ALTOPREV) 40 mg 24 hr tablet Take 2 tablets (80 mg total) by mouth every evening.    multivitamin (THERAGRAN) per tablet Take 1 tablet by mouth every evening. 1 Tablet Oral Every day    omeprazole (PRILOSEC) 20 MG capsule Take 1 capsule (20 mg total) by mouth once daily. (Patient taking differently: Take 20 mg by mouth every evening. )    ondansetron (ZOFRAN) 8 MG tablet Take 1 tablet (8 mg total) by mouth every 8 (eight) hours as needed for Nausea.    oxybutynin (DITROPAN XL) 15 MG TR24 TAKE 1 TABLET ONE TIME DAILY (Patient taking differently: TAKE 1 TABLET ONE TIME nightly)    oxycodone-acetaminophen (PERCOCET) 5-325 mg per tablet Take 1 tablet by mouth every 4 (four) hours as needed for  "Pain.    tamsulosin (FLOMAX) 0.4 mg Cp24 Take 1 capsule (0.4 mg total) by mouth once daily.    warfarin (COUMADIN) 5 MG tablet Take 0.5-1 tablets (2.5-5 mg total) by mouth Daily. As directed by coumadin clinic     No current facility-administered medications for this visit.        Review of Systems   Constitutional: Negative for activity change, chills, fatigue, fever and unexpected weight change.   Eyes: Negative for discharge, redness and visual disturbance.   Respiratory: Negative for cough, shortness of breath and wheezing.    Cardiovascular: Negative for chest pain and leg swelling.   Gastrointestinal: Negative for abdominal distention, abdominal pain, constipation, diarrhea, nausea and vomiting.   Genitourinary: Positive for flank pain (right side). Negative for decreased urine volume, difficulty urinating, dysuria, frequency, hematuria and urgency.   Musculoskeletal: Negative for arthralgias, joint swelling and myalgias.   Skin: Negative for color change and rash.   Neurological: Negative for dizziness and light-headedness.   Psychiatric/Behavioral: Negative for behavioral problems and confusion. The patient is not nervous/anxious.        Objective:      Vitals:    10/30/17 1009   BP: 120/78   Resp: 14   Weight: 118.8 kg (261 lb 14.5 oz)   Height: 5' 6" (1.676 m)        Physical Exam   Constitutional: She is oriented to person, place, and time. She appears well-developed.   HENT:   Head: Normocephalic and atraumatic.   Nose: Nose normal.   Eyes: Conjunctivae are normal. Right eye exhibits no discharge. Left eye exhibits no discharge.   Neck: Normal range of motion. Neck supple. No tracheal deviation present. No thyromegaly present.   Cardiovascular: Normal rate and regular rhythm.    Pulmonary/Chest: Effort normal. No respiratory distress. She has no wheezes.   Abdominal: Soft. She exhibits no distension. There is no hepatosplenomegaly. There is no tenderness. There is no CVA tenderness. No hernia. "   Genitourinary:   Genitourinary Comments: Patient declined exam   Musculoskeletal: Normal range of motion. She exhibits no edema.   Neurological: She is alert and oriented to person, place, and time.   Skin: Skin is warm and dry. No rash noted. No erythema.     Psychiatric: She has a normal mood and affect. Her behavior is normal. Judgment normal.       Urine dipstick shows 5-10 RBCs.      Renal ULTRASOUND:     Comparison: CT 10/13/2017    Results:     Right kidney: The kidney measures 10 in length by 5.30 x 4.5 cm in transverse dimensions. There is mild prominence of the right renal pelvis with interval reduced hydronephrosis on the right compared to prior lateral differences in technique. Previous subcentimeter stones in the lower pole right kidney are not seen possibly related to differences in technique.    resistive index measures 0.71        Left kidney: The kidney measures 10.2 in length by 5.2 x 4.7 cm in transverse dimensions.   No hydronephrosis. There is 203 mm nonobstructive calculus in the lower pole of the left kidney correspond to calcification seen on CT..    The resistive index measures 0.76         Spot imaging urinary bladder with bilateral ureteral jets identified   Impression       Prominence of the right renal pelvis although reduced from prior CT suggestive for improving hydronephrosis.    Previous punctate right lower pole renal calyceal stones no longer seen which may be related to differences in technique or resolution.    Subcentimeter left lower pole nonobstructive renal calyceal stone is again seen.    Clinical correlation and followup advised                Electronically signed by: GLORY DUMONT DO  Date: 10/27/17  Time: 11:54          Assessment:       1. Nephrolithiasis    2. Hydronephrosis with urinary obstruction due to renal calculus    3. Right flank pain          Plan:       1. Nephrolithiasis  -s/p R ESWL on 2/27/17, stone with excellent response  -CT(3/14/17)-- allen non  obstructing stones, no hydro  -CT (10/13/17)-- small stone R UVJ w/ hydro; small stone RLP x 2 and non obstructing stones LUP and LLP  -SERG (10/27/17)-- reduced prominence of right renal pelvis suggestive of improving hydro, RLP stones not visible, non obstructing stones in LUP and LLP noted (reviewed with Dr. Weaver)    2. Hydronephrosis with urinary obstruction due to renal calculus  -CT (10/13/17)--- small stone R UVJ. CT reviewed with Dr. Weaver, patient has good possibility stone will pass on its own  -Discussed treatment options--ureteroscopy vs trial of passage, patient would like to proceed with trial of passage at this time  -SERG (10/27/17)-- suggestive of improving hydronephrosis  -Again offered ureteroscopy d/t persistent right flank pain-- patient declined  -Will repeat SERG  -Return precautions discussed with patient. To ER with uncontrollable pain and n/v, fever, or gross hematuria    3. Right flank pain  -Stable, not worsening  -Percocet prn pain               Return in about 2 weeks (around 11/13/2017) for Follow up.

## 2019-08-06 NOTE — PROGRESS NOTES
Patient was given lab result, verified coumadin dosage correctly, reports no changes, Patient was advised to continue same dose of coumadin and given next lab date, verbalized understanding, appointment booked

## 2019-08-06 NOTE — Clinical Note
Venous doppler before next visit. About 20 days before next visit, hold coumadin for ten days, have labs drawn and resume coumadin immediately.

## 2019-08-19 ENCOUNTER — OFFICE VISIT (OUTPATIENT)
Dept: VASCULAR SURGERY | Facility: CLINIC | Age: 79
End: 2019-08-19
Payer: MEDICARE

## 2019-08-19 ENCOUNTER — LAB VISIT (OUTPATIENT)
Dept: LAB | Facility: HOSPITAL | Age: 79
End: 2019-08-19
Payer: MEDICARE

## 2019-08-19 ENCOUNTER — ANTI-COAG VISIT (OUTPATIENT)
Dept: CARDIOLOGY | Facility: CLINIC | Age: 79
End: 2019-08-19
Payer: MEDICARE

## 2019-08-19 VITALS
HEART RATE: 67 BPM | SYSTOLIC BLOOD PRESSURE: 126 MMHG | WEIGHT: 249.69 LBS | DIASTOLIC BLOOD PRESSURE: 78 MMHG | HEIGHT: 65 IN | BODY MASS INDEX: 41.6 KG/M2

## 2019-08-19 DIAGNOSIS — Z79.01 LONG TERM (CURRENT) USE OF ANTICOAGULANTS: ICD-10-CM

## 2019-08-19 DIAGNOSIS — I89.0 LYMPHEDEMA OF BOTH LOWER EXTREMITIES: Primary | ICD-10-CM

## 2019-08-19 LAB
INR PPP: 3 (ref 0.8–1.2)
PROTHROMBIN TIME: 31 SEC (ref 9–12.5)

## 2019-08-19 PROCEDURE — 85610 PROTHROMBIN TIME: CPT | Mod: HCNC

## 2019-08-19 PROCEDURE — 3078F PR MOST RECENT DIASTOLIC BLOOD PRESSURE < 80 MM HG: ICD-10-PCS | Mod: HCNC,CPTII,S$GLB, | Performed by: SURGERY

## 2019-08-19 PROCEDURE — 3078F DIAST BP <80 MM HG: CPT | Mod: HCNC,CPTII,S$GLB, | Performed by: SURGERY

## 2019-08-19 PROCEDURE — 3074F SYST BP LT 130 MM HG: CPT | Mod: HCNC,CPTII,S$GLB, | Performed by: SURGERY

## 2019-08-19 PROCEDURE — 3074F PR MOST RECENT SYSTOLIC BLOOD PRESSURE < 130 MM HG: ICD-10-PCS | Mod: HCNC,CPTII,S$GLB, | Performed by: SURGERY

## 2019-08-19 PROCEDURE — 93793 ANTICOAG MGMT PT WARFARIN: CPT | Mod: S$GLB,,, | Performed by: PHARMACIST

## 2019-08-19 PROCEDURE — 36415 COLL VENOUS BLD VENIPUNCTURE: CPT | Mod: HCNC,PN

## 2019-08-19 PROCEDURE — 1101F PR PT FALLS ASSESS DOC 0-1 FALLS W/OUT INJ PAST YR: ICD-10-PCS | Mod: HCNC,CPTII,S$GLB, | Performed by: SURGERY

## 2019-08-19 PROCEDURE — 99214 OFFICE O/P EST MOD 30 MIN: CPT | Mod: HCNC,S$GLB,, | Performed by: SURGERY

## 2019-08-19 PROCEDURE — 1101F PT FALLS ASSESS-DOCD LE1/YR: CPT | Mod: HCNC,CPTII,S$GLB, | Performed by: SURGERY

## 2019-08-19 PROCEDURE — 99999 PR PBB SHADOW E&M-EST. PATIENT-LVL III: CPT | Mod: PBBFAC,HCNC,, | Performed by: SURGERY

## 2019-08-19 PROCEDURE — 93793 PR ANTICOAGULANT MGMT FOR PT TAKING WARFARIN: ICD-10-PCS | Mod: S$GLB,,, | Performed by: PHARMACIST

## 2019-08-19 PROCEDURE — 99999 PR PBB SHADOW E&M-EST. PATIENT-LVL III: ICD-10-PCS | Mod: PBBFAC,HCNC,, | Performed by: SURGERY

## 2019-08-19 PROCEDURE — 99214 PR OFFICE/OUTPT VISIT, EST, LEVL IV, 30-39 MIN: ICD-10-PCS | Mod: HCNC,S$GLB,, | Performed by: SURGERY

## 2019-08-19 NOTE — LETTER
August 19, 2019        Juanito Butt Jr., MD  [address] [facility] [address]             [facility]  [address]  [facility] [address]  Phone: [phone]  Fax: [phone]   Patient: Alaina Vee   MR Number: [id number]   YOB: 1940   Date of Visit: 8/19/2019       Dear Dr. Butt:    Thank you for referring Alaina Vee to me for evaluation. Below are the relevant portions of my assessment and plan of care.            If you have questions, please do not hesitate to call me. I look forward to following Alaina along with you.    Sincerely,      Deshawn Busch MD           CC  No Recipients

## 2019-08-19 NOTE — PROGRESS NOTES
Deshawn Busch MD VI                       [facility]                         08/19/2019    HPI:  Alaina Vee is a 78 y.o. female with   Patient Active Problem List   Diagnosis    Essential hypertension    Ventral hernia    UI (urinary incontinence)    Mixed hyperlipidemia    Venous stasis of lower extremity    GERD (gastroesophageal reflux disease)    Central stenosis of spinal canal    Weakness of both hips    Segmental dysfunction of lumbar region    Recurrent UTI    Mixed incontinence urge and stress    Cervical radicular pain    Morbid obesity    SULEMA (obstructive sleep apnea)    Long term (current) use of anticoagulants    PAF (paroxysmal atrial fibrillation)    Atrial flutter    Degenerative disc disease, lumbar    Chronic bilateral low back pain without sciatica    Nuclear sclerosis of both eyes    Refractive error    Status post arthroscopic surgery of right knee    Painful total knee replacement, right    Aortic root dilatation    Arthritis of midfoot    DVT of deep femoral vein, right    being managed by PCP and specialists who is here today for evaluation of LLE edema.  Patient states location is L foot and ankle occurring for 3 weeks.  Associated signs and symptoms include pain.  Quality is aching and severity is 4/10.  Symptoms began 3 weeks ago.  Alleviating factors include elevation.  Worsening factors include dependency.  Pt is on Coumadin for PAF s/p ablation in past.  S/p bilateral open knee replacements.    no MI  no Stroke  Tobacco use: denies    Interval History: No Pain. Improved Swelling. Compliant with Compression stockings. +Elevation of legs. Not fully compliant with Low sodium diet. No Excessive water intake. No ulcers    Past Medical History:   Diagnosis Date    Arthritis     knees    Atrial fibrillation     Atrial flutter     Back pain     Degenerative disc disease     Depression     General anesthetics causing  adverse effect in therapeutic use     pt states sometimes slow to awaken.    GERD (gastroesophageal reflux disease)     Hyperlipidemia     Hypertension     Kidney stone     Obesity     Sleep apnea     does not wear CPAP    Urinary incontinence     Varicosities     Ventral hernia      Past Surgical History:   Procedure Laterality Date    ABLATION N/A 6/12/2017    [facility]    APPENDECTOMY      BREAST BIOPSY Bilateral     1985    breast biopsy, left      CARDIOVERSION N/A 10/4/2017    [facility]    CARDIOVERSION N/A 8/31/2017    [facility]    CHOLECYSTECTOMY      JOINT REPLACEMENT      TKR , right    JOINT REPLACEMENT  2009    TKR  left    LITHOTRIPSY-EXTRACORPOREAL SHOCK WAVE Right 2/27/2017    [facility]    FL EXPLORATORY OF ABDOMEN      TRANSESOPHAGEAL ECHOCARDIOGRAM (SKINNY) N/A 8/31/2017    [facility]    TRANSESOPHAGEAL ECHOCARDIOGRAM (SKINNY) N/A 6/12/2017    [facility]     Family History   Problem Relation Age of Onset    COPD Mother     Heart disease Sister         half sister    COPD Sister     Parkinsonism Sister      Social History     Socioeconomic History    Marital status:      Spouse name: Not on file    Number of children: Not on file    Years of education: Not on file    Highest education level: Not on file   Occupational History    Not on file   Social Needs    Financial resource strain: Not on file    Food insecurity:     Worry: Not on file     Inability: Not on file    Transportation needs:     Medical: Not on file     Non-medical: Not on file   Tobacco Use    Smoking status: Never Smoker    Smokeless tobacco: Never Used   Substance and Sexual Activity    Alcohol use: No    Drug use: No    Sexual activity: Yes     Partners: Male    Lifestyle    Physical activity:     Days per week: Not on file     Minutes per session: Not on file    Stress: Not on file   Relationships    Social connections:     Talks on phone: Not on file     Gets together: Not on file     Attends [employer] service: Not on file     Active member of club or organization: Not on file     Attends meetings of clubs or organizations: Not on file     Relationship status: Not on file   Other Topics Concern    Not on file   Social History Narrative    Not on file       Current Outpatient Medications:     CYANOCOBALAMIN, VITAMIN B-12, (VITAMIN B-12 ORAL), Take 2,500 mcg by mouth every evening., Disp: , Rfl:     diclofenac sodium (VOLTAREN) 1 % Gel, Apply 2 g topically once daily., Disp: 100 g, Rfl: 3    fish oil-omega-3 fatty acids 300-1,000 mg capsule, Take 1 g by mouth once daily., Disp: , Rfl:     multivitamin (THERAGRAN) per tablet, Take 1 tablet by mouth every evening. 1 Tablet Oral Every day, Disp: , Rfl:     oxybutynin (DITROPAN) 5 MG Tab, TAKE 1 TABLET THREE TIMES DAILY, Disp: 270 tablet, Rfl: 11    simvastatin (ZOCOR) 40 MG tablet, Take 40 mg by mouth every evening., Disp: , Rfl:     warfarin (COUMADIN) 5 MG tablet, Take 0.5-1 tablets (2.5-5 mg total) by mouth Daily. As directed by coumadin clinic, Disp: 90 tablet, Rfl: 3    flecainide (TAMBOCOR) 100 MG Tab, Take 1 tablet (100 mg total) by mouth every 12 (twelve) hours., Disp: 180 tablet, Rfl: 3    REVIEW OF SYSTEMS:  General: No fevers or chills; ENT: No sore throat; Allergy and Immunology: no persistent infections; Hematological and Lymphatic: No history of bleeding or easy bruising; Endocrine: negative; Respiratory: no cough, shortness of breath, or wheezing; Cardiovascular: no chest pain or dyspnea on exertion; Gastrointestinal: no abdominal pain/back, change in bowel habits, or bloody stools; Genito-Urinary: no dysuria, trouble voiding, or hematuria; Musculoskeletal: negative; Neurological: no TIA or stroke  symptoms; Psychiatric: no nervousness, anxiety or depression.    PHYSICAL EXAM:      Pulse: 67         General appearance:  Alert, well-appearing, and in no distress.  Oriented to person, place, and time                    Neurological: Normal speech, no focal findings noted; CN II - XII grossly intact. RLE with sensation to light touch, LLE with sensation to light touch.            Musculoskeletal: Digits/nail without cyanosis/clubbing.  Strength 5/5 BLE.                    Neck: Supple, no significant adenopathy, no carotid bruit can be auscultated                  Chest:  Clear to auscultation, no wheezes, rales or rhonchi, symmetric air entry. No use of accessory muscles               Cardiac: Normal rate and regular rhythm, S1 and S2 normal            Abdomen: Soft, nontender, nondistended, no masses or organomegaly, no hernia     No rebound tenderness noted; bowel sounds normal     Pulsatile aortic mass is non palpable.     No groin adenopathy      Extremities:        2+ R DP pulse, 2+ L DP pulse     No RLE edema, 1+ LLE edema    Skin: RLE without ulcer; LLE without ulcer    LAB RESULTS:  No results found for: CBC  Lab Results   Component Value Date    LABPROT 31.0 (H) 08/19/2019    INR 3.0 (H) 08/19/2019     Lab Results   Component Value Date     05/21/2019    K 4.0 05/21/2019     05/21/2019    CO2 29 05/21/2019    GLU 80 05/21/2019    BUN 20 05/21/2019    CREATININE 1.0 05/21/2019    CALCIUM 9.7 05/21/2019    ANIONGAP 6 (L) 05/21/2019    EGFRNONAA 55 (A) 05/21/2019     Lab Results   Component Value Date    WBC 6.78 05/21/2019    RBC 4.75 05/21/2019    HGB 14.4 05/21/2019    HCT 43.8 05/21/2019    MCV 92 05/21/2019    MCH 30.3 05/21/2019    MCHC 32.9 05/21/2019    RDW 13.2 05/21/2019     05/21/2019    MPV 9.6 05/21/2019    GRAN 4.1 05/21/2019    GRAN 61.0 05/21/2019    LYMPH 2.0 05/21/2019    LYMPH 29.8 05/21/2019    MONO 0.4 05/21/2019    MONO 6.3 05/21/2019    EOS 0.2 05/21/2019    ISHAAN  0.03 05/21/2019    EOSINOPHIL 2.5 05/21/2019    BASOPHIL 0.4 05/21/2019    DIFFMETHOD Automated 05/21/2019     .  Lab Results   Component Value Date    HGBA1C 5.8 12/08/2016       IMAGING:  All pertinent imaging has been reviewed and interpreted independently.    Chronic R femoral DVT.  No superficial venous reflux.    IMP/PLAN:  78 y.o. female with   Patient Active Problem List   Diagnosis    Essential hypertension    Ventral hernia    UI (urinary incontinence)    Mixed hyperlipidemia    Venous stasis of lower extremity    GERD (gastroesophageal reflux disease)    Central stenosis of spinal canal    Weakness of both hips    Segmental dysfunction of lumbar region    Recurrent UTI    Mixed incontinence urge and stress    Cervical radicular pain    Morbid obesity    SULEMA (obstructive sleep apnea)    Long term (current) use of anticoagulants    PAF (paroxysmal atrial fibrillation)    Atrial flutter    Degenerative disc disease, lumbar    Chronic bilateral low back pain without sciatica    Nuclear sclerosis of both eyes    Refractive error    Status post arthroscopic surgery of right knee    Painful total knee replacement, right    Aortic root dilatation    Arthritis of midfoot    DVT of deep femoral vein, right    being managed by PCP and specialists who is here today for evaluation of LLE edema.    -LLE edema multifactorial likely due to knee replacement, lymphedema and venous hypertension markedly improved with medical therapy - recommend compression with Rx stockings, elevation, dietary changes associated with water and sodium intake discussed at length with patient  -R femoral non-occlusive thrombus, unknown of timing and if provoked or unprovoked, appears chronic on US 5/2019 - agree with continuing Coumadin and following Heme recs  -Cont folllowing Lymphedema clinic recs  -RTC prn    I spent 20 minutes evaluating this patient and greater than 50% of the time was spent counseling,  coordinator care and discussing the plan of care.  All questions were answered and patient stated understanding with agreement with the above treatment plan.    Deshawn Busch MD [facility]  Vascular and Endovascular Surgery

## 2019-08-19 NOTE — PATIENT INSTRUCTIONS
Putting on Compression Stockings     Turn the stocking inside-out, then fit it over your toes and heel.          Roll the stocking up your leg.            Once stockings are on, make sure the top of the stocking is about two fingers width below the crease of the knee (or the groin if you wear thigh-high stockings).          Use equipment, such as a stocking benjie, or wear rubber gloves to make it easier to put on compression stockings.         Elastic compression stockings are prescribed to treat many vein problems. Wearing them may be the most important thing you do to manage your symptoms. The stockings fit tightly around your ankle, gradually reducing in pressure as they go up your legs. This helps keep blood flowing to your heart. As a result, swelling is reduced. Your healthcare provider will prescribe stockings at a safe pressure for you. He or she will also tell you how often to wear and remove the stockings. Follow these instructions closely. Also, do not buy or wear compression stockings without first seeing your healthcare provider.  Tips for wear and care  To wear stockings safely and to get the most benefit:  · Wear the length prescribed by your healthcare provider.  · Pull them to the designated height and no farther. Dont let them bunch at the top. This can restrict blood flow and increase swelling.  · Wear the stockings for the amount of time your healthcare provider recommends. Replace them when they start to feel loose. This will likely be every 3 to 6 months.  · Remove them as your healthcare provider directs. When removed, wash your legs. Then check your legs and feet for sores. Call your healthcare provider if you find a sore. Dont put the stockings back on unless your healthcare provider directs.   · Wash the stockings as instructed. They may need to be hand-washed.  Date Last Reviewed: 5/1/2016  © 9304-4662 [employer]. [address] [place] [address], [place] [address]. All rights  reserved. This information is not intended as a substitute for professional medical care. Always follow your healthcare professional's instructions.        Tips for Using Less Salt    Most people with heart problems need to eat less salt (sodium). Reducing the amount of salt you eat may help control your blood pressure. The higher your blood pressure, the greater your risk for heart disease, stroke, blindness, and kidney problems.  At the store  · Make low-salt choices by reading labels carefully. Look for the total amount of sodium per serving.  · Use more fresh food. Buy more fruits and vegetables. Select lean meats, fish, and poultry.  · Use fewer frozen, canned, and packaged foods which often contain a lot of sodium.  · Use plain frozen vegetables without sauces or toppings. These products are often low- or no-sodium.  · Opt for reduced-sodium or no-salt-added versions of canned vegetables and soups.  In the kitchen  · Don't add salt to food when you're cooking. Season with flavorings such as onion, garlic, pepper, salt-free herbal blends, and lemon or lime juice.  · Use a cookbook containing low-salt recipes. It can give you ideas for tasty meals that are healthy for your heart.  · Sprinkle salt-free herbal blends on vegetables and meat.  · Drain and rinse canned foods, such as canned beans and vegetables, before cooking or eating.  Eating out  · Tell the  you're on a low-salt diet. Ask questions about the menu.  · Order fish, chicken, and meat broiled, baked, poached, or grilled without salt, butter, or breading.  · Use lemon, pepper, and salt-free herb mixes to add flavor.  · Choose plain steamed rice, boiled noodles, and baked or boiled potatoes. Top potatoes with chives and a little sour cream.     Beware! Salt goes by many other names. Limit foods with these words listed as ingredients: salt, sodium, soy sauce, baking soda, baking powder, MSG, monosodium, Na (the chemical symbol for sodium). Some  antacids are also high in salt.   Date Last Reviewed: 6/19/2015  © 9749-8506 [employer]. [address] [place] [address], [place] [address]. All rights reserved. This information is not intended as a substitute for professional medical care. Always follow your healthcare professional's instructions.        Low-Salt Diet  This diet removes foods that are high in salt. It also limits the amount of salt you use when cooking. It is most often used for people with high blood pressure, edema (fluid retention), and kidney, liver, or heart disease.  Table salt contains the mineral sodium. Your body needs sodium to work normally. But too much sodium can make your health problems worse. Your healthcare provider is recommending a low-salt (also called low-sodium) diet for you. Your total daily allowance of salt is 1,500 to 2,300 milligrams (mg). It is less than 1 teaspoon of table salt. This means you can have only about 500 to 700 mg of sodium at each meal. People with certain health problems should limit salt intake to the lower end of the recommended range.    When you cook, dont add much salt. If you can cook without using salt, even better. Dont add salt to your food at the table.  When shopping, read food labels. Salt is often called sodium on the label. Choose foods that are salt-free, low salt, or very low salt. Note that foods with reduced salt may not lower your salt intake enough.    Beans, potatoes, and pasta  Ok: Dry beans, split peas, lentils, potatoes, rice, macaroni, pasta, spaghetti without added salt  Avoid: Potato chips, tortilla chips, and similar products  Breads and cereals  Ok: Low-sodium breads, rolls, cereals, and cakes; low-salt crackers, matzo crackers  Avoid: Salted crackers, pretzels, popcorn, Tanzanian toast, pancakes, muffins  Dairy  Ok: Milk, chocolate milk, hot chocolate mix, low-salt cheeses, and yogurt  Avoid: Processed cheese and cheese spreads; Roquefort, Camembert, and cottage cheese;  buttermilk, instant breakfast drink  Desserts  Ok: Ice cream, frozen yogurt, juice bars, gelatin, cookies and pies, sugar, honey, jelly, hard candy  Avoid: Most pies, cakes and cookies prepared or processed with salt; instant pudding  Drinks  Ok: Tea, coffee, fizzy (carbonated) drinks, juices  Avoid: Flavored coffees, electrolyte replacement drinks, sports drinks  Meats  Ok: All fresh meat, fish, poultry, low-salt tuna, eggs, egg substitute  Avoid: Smoked, pickled, brine-cured, or salted meats and fish. This includes live, chipped beef, corned beef, hot dogs, deli meats, ham, kosher meats, salt pork, sausage, canned tuna, salted codfish, smoked salmon, herring, sardines, or anchovies.  Seasonings and spices  Ok: Most seasonings are okay. Good substitutes for salt include: fresh herb blends, hot sauce, lemon, garlic, colbert, vinegar, dry mustard, parsley, cilantro, horseradish, tomato paste, regular margarine, mayonnaise, unsalted butter, cream cheese, vegetable oil, cream, low-salt salad dressing and gravy.  Avoid: Regular ketchup, relishes, pickles, soy sauce, teriyaki sauce, Worcestershire sauce, BBQ sauce, tartar sauce, meat tenderizer, chili sauce, regular gravy, regular salad dressing, salted butter  Soups  Ok: Low-salt soups and broths made with allowed foods  Avoid: Bouillon cubes, soups with smoked or salted meats, regular soup and broth  Vegetables  Ok: Most vegetables are okay; also low-salt tomato and vegetable juices  Avoid: Sauerkraut and other brine-soaked vegetables; pickles and other pickled vegetables; tomato juice, olives  Date Last Reviewed: 8/1/2016  © 8642-2998 [employer]. [address] [place] [address]. All rights reserved. This information is not intended as a substitute for professional medical care. Always follow your healthcare professional's instructions.        Low-Salt Choices  Eating salt (sodium) can make your body retain too much water. Excess water makes  your heart work harder. Canned, packaged, and frozen foods are easy to prepare, but they are often high in sodium. Here are some ideas for low-salt foods you can easily prepare yourself.    For breakfast  · Fruit or 100% fruit juice  · Whole-wheat bread or an English muffin. Compare sodium content on labels.  · Low-fat milk or yogurt  · Unsalted eggs  · Shredded wheat  · Corn tortillas  · Unsalted steamed rice  · Regular (not instant) hot cereal, made without salt  Stay away from:  · Sausage, live, and ham  · Flour tortillas  · Packaged muffins, pancakes, and biscuits  · Instant hot cereals  · Cottage cheese  For lunch and dinner  · Fresh fish, chicken, turkey, or meat--baked, broiled, or roasted without salt  · Dry beans, cooked without salt  · Tofu, stir-fried without salt  · Unsalted fresh fruit and vegetables, or frozen or canned fruit and vegetables with no added salt  Stay away from:  · Lunch or deli meat that is cured or smoked  · Cheese  · Tomato juice and catsup  · Canned vegetables, soups, and fish not labeled as no-salt-added or reduced sodium  · Packaged gravies and sauces  · Olives, pickles, and relish  · Bottled salad dressings  For snacks and desserts  · Yogurt  · Unsalted, air popped popcorn  · Unsalted nuts or seeds  Stay away from:  · Pies and cakes  · Packaged dessert mixes  · Pizza  · Canned and packaged puddings  · Pretzels, chips, crackers, and nuts--unless the label says unsalted  Date Last Reviewed: 6/17/2015  © 4658-2229 [employer]. [address] [place] [address], [place] [address]. All rights reserved. This information is not intended as a substitute for professional medical care. Always follow your healthcare professional's instructions.

## 2019-09-05 RX ORDER — FLECAINIDE ACETATE 100 MG/1
100 TABLET ORAL EVERY 12 HOURS
Qty: 180 TABLET | Refills: 3 | Status: SHIPPED | OUTPATIENT
Start: 2019-09-05 | End: 2019-09-13 | Stop reason: SDUPTHER

## 2019-09-09 ENCOUNTER — LAB VISIT (OUTPATIENT)
Dept: LAB | Facility: HOSPITAL | Age: 79
End: 2019-09-09
Attending: INTERNAL MEDICINE
Payer: MEDICARE

## 2019-09-09 ENCOUNTER — ANTI-COAG VISIT (OUTPATIENT)
Dept: CARDIOLOGY | Facility: CLINIC | Age: 79
End: 2019-09-09
Payer: MEDICARE

## 2019-09-09 DIAGNOSIS — Z79.01 LONG TERM (CURRENT) USE OF ANTICOAGULANTS: ICD-10-CM

## 2019-09-09 LAB
INR PPP: 3.1 (ref 0.8–1.2)
PROTHROMBIN TIME: 32.4 SEC (ref 9–12.5)

## 2019-09-09 PROCEDURE — 93793 ANTICOAG MGMT PT WARFARIN: CPT | Mod: S$GLB,,, | Performed by: PHARMACIST

## 2019-09-09 PROCEDURE — 36415 COLL VENOUS BLD VENIPUNCTURE: CPT | Mod: HCNC,PN

## 2019-09-09 PROCEDURE — 85610 PROTHROMBIN TIME: CPT | Mod: HCNC

## 2019-09-09 PROCEDURE — 93793 PR ANTICOAGULANT MGMT FOR PT TAKING WARFARIN: ICD-10-PCS | Mod: S$GLB,,, | Performed by: PHARMACIST

## 2019-09-13 ENCOUNTER — TELEPHONE (OUTPATIENT)
Dept: UROLOGY | Facility: CLINIC | Age: 79
End: 2019-09-13

## 2019-09-13 ENCOUNTER — LAB VISIT (OUTPATIENT)
Dept: LAB | Facility: HOSPITAL | Age: 79
End: 2019-09-13
Attending: NURSE PRACTITIONER
Payer: MEDICARE

## 2019-09-13 DIAGNOSIS — R39.89 SUSPECTED UTI: ICD-10-CM

## 2019-09-13 DIAGNOSIS — R39.89 SUSPECTED UTI: Primary | ICD-10-CM

## 2019-09-13 PROCEDURE — 87086 URINE CULTURE/COLONY COUNT: CPT | Mod: HCNC

## 2019-09-13 NOTE — TELEPHONE ENCOUNTER
Patient notified urine culture order was placed, but she does still need to follow up with GYN in regards to vaginal discharge. Offered patient Gynecology information, she states she has one and will make a follow up.

## 2019-09-13 NOTE — TELEPHONE ENCOUNTER
Patient had a brownish discharge this morning prior to urination. She feels that she might have an infection. Informed patient she also needs to follow up with GYN,vaginal discharge is not usually an indication of UTI.

## 2019-09-13 NOTE — TELEPHONE ENCOUNTER
Agree with recommendations. I can place an order for her to drop off a urine specimen at the lab. Please let me know if she agrees to this so order can be placed. Thanks

## 2019-09-14 RX ORDER — FLECAINIDE ACETATE 100 MG/1
100 TABLET ORAL EVERY 12 HOURS
Qty: 180 TABLET | Refills: 1 | Status: SHIPPED | OUTPATIENT
Start: 2019-09-14 | End: 2020-07-02 | Stop reason: SDUPTHER

## 2019-09-15 LAB — BACTERIA UR CULT: NORMAL

## 2019-09-16 ENCOUNTER — TELEPHONE (OUTPATIENT)
Dept: UROLOGY | Facility: CLINIC | Age: 79
End: 2019-09-16

## 2019-09-16 NOTE — TELEPHONE ENCOUNTER
----- Message from Laury Davies sent at 9/16/2019  9:02 AM CDT -----  Contact: Patient   Type: Patient Call Back    Who called: Patient     What is the request in detail: Pt is returning a call she received today.     Can the clinic reply by [facility]? No     Would the patient rather a call back or a response via [facility]? Call back     Best call back number: [phone]

## 2019-09-26 DIAGNOSIS — I48.0 PAF (PAROXYSMAL ATRIAL FIBRILLATION): ICD-10-CM

## 2019-09-26 RX ORDER — WARFARIN SODIUM 5 MG/1
2.5-5 TABLET ORAL DAILY
Qty: 90 TABLET | Refills: 3 | Status: SHIPPED | OUTPATIENT
Start: 2019-09-26 | End: 2021-01-26 | Stop reason: SDUPTHER

## 2019-09-30 ENCOUNTER — LAB VISIT (OUTPATIENT)
Dept: LAB | Facility: HOSPITAL | Age: 79
End: 2019-09-30
Attending: INTERNAL MEDICINE
Payer: MEDICARE

## 2019-09-30 ENCOUNTER — ANTI-COAG VISIT (OUTPATIENT)
Dept: CARDIOLOGY | Facility: CLINIC | Age: 79
End: 2019-09-30
Payer: MEDICARE

## 2019-09-30 DIAGNOSIS — Z79.01 LONG TERM (CURRENT) USE OF ANTICOAGULANTS: ICD-10-CM

## 2019-09-30 LAB
INR PPP: 3 (ref 0.8–1.2)
PROTHROMBIN TIME: 31.7 SEC (ref 9–12.5)

## 2019-09-30 PROCEDURE — 36415 COLL VENOUS BLD VENIPUNCTURE: CPT | Mod: HCNC,PN

## 2019-09-30 PROCEDURE — 93793 PR ANTICOAGULANT MGMT FOR PT TAKING WARFARIN: ICD-10-PCS | Mod: S$GLB,,, | Performed by: PHARMACIST

## 2019-09-30 PROCEDURE — 85610 PROTHROMBIN TIME: CPT | Mod: HCNC

## 2019-09-30 PROCEDURE — 93793 ANTICOAG MGMT PT WARFARIN: CPT | Mod: S$GLB,,, | Performed by: PHARMACIST

## 2019-10-04 ENCOUNTER — PATIENT OUTREACH (OUTPATIENT)
Dept: ADMINISTRATIVE | Facility: OTHER | Age: 79
End: 2019-10-04

## 2019-10-04 DIAGNOSIS — Z12.39 BREAST CANCER SCREENING: Primary | ICD-10-CM

## 2019-10-08 ENCOUNTER — OFFICE VISIT (OUTPATIENT)
Dept: OPTOMETRY | Facility: CLINIC | Age: 79
End: 2019-10-08
Payer: MEDICARE

## 2019-10-08 ENCOUNTER — OFFICE VISIT (OUTPATIENT)
Dept: OPTOMETRY | Facility: CLINIC | Age: 79
End: 2019-10-08
Payer: COMMERCIAL

## 2019-10-08 DIAGNOSIS — H52.7 REFRACTIVE ERROR: ICD-10-CM

## 2019-10-08 DIAGNOSIS — Z46.0 ENCOUNTER FOR FITTING OR ADJUSTMENT OF SPECTACLES OR CONTACT LENSES: Primary | ICD-10-CM

## 2019-10-08 DIAGNOSIS — H25.13 NUCLEAR SCLEROSIS OF BOTH EYES: ICD-10-CM

## 2019-10-08 DIAGNOSIS — Z01.00 ROUTINE EYE EXAM: Primary | ICD-10-CM

## 2019-10-08 PROCEDURE — 99499 UNLISTED E&M SERVICE: CPT | Mod: HCNC,S$GLB,, | Performed by: OPTOMETRIST

## 2019-10-08 PROCEDURE — 92015 PR REFRACTION: ICD-10-PCS | Mod: HCNC,S$GLB,, | Performed by: OPTOMETRIST

## 2019-10-08 PROCEDURE — 92014 COMPRE OPH EXAM EST PT 1/>: CPT | Mod: HCNC,S$GLB,, | Performed by: OPTOMETRIST

## 2019-10-08 PROCEDURE — 99999 PR PBB SHADOW E&M-EST. PATIENT-LVL II: ICD-10-PCS | Mod: PBBFAC,HCNC,, | Performed by: OPTOMETRIST

## 2019-10-08 PROCEDURE — 92014 PR EYE EXAM, EST PATIENT,COMPREHESV: ICD-10-PCS | Mod: HCNC,S$GLB,, | Performed by: OPTOMETRIST

## 2019-10-08 PROCEDURE — 99499 NO LOS: ICD-10-PCS | Mod: HCNC,S$GLB,, | Performed by: OPTOMETRIST

## 2019-10-08 PROCEDURE — 99999 PR PBB SHADOW E&M-EST. PATIENT-LVL II: CPT | Mod: PBBFAC,HCNC,, | Performed by: OPTOMETRIST

## 2019-10-08 PROCEDURE — 92015 DETERMINE REFRACTIVE STATE: CPT | Mod: HCNC,S$GLB,, | Performed by: OPTOMETRIST

## 2019-10-08 PROCEDURE — 92310 CONTACT LENS FITTING OU: CPT | Mod: CSM,,, | Performed by: OPTOMETRIST

## 2019-10-08 PROCEDURE — 92310 PR CONTACT LENS FITTING (NO CHANGE): ICD-10-PCS | Mod: CSM,,, | Performed by: OPTOMETRIST

## 2019-10-08 NOTE — PROGRESS NOTES
Subjective:       Patient ID: Alaina Vee is a 79 y.o. female      Chief Complaint   Patient presents with    Concerns About Ocular Health    Contact Lens Follow Up     History of Present Illness  Dls: 9/13/18 Dr. Boswell     78 y/o female presents today with c/o blurry vision at distance ou x 2 weeks.   Pt wears scls os only.     No tearing  + itching  No burning  No pain  No ha's  No floaters  No flashes    Eye meds  Visine ou prn     Acuvue Johnna. Replaces monthly. Does not sleep in lenses.     Assessment/Plan:     1. Routine eye exam  Eyemed vision    2. Nuclear sclerosis of both eyes  Educated pt on presence of cataracts and effects on vision. No surgery at this time. Recheck in one year.    3. Refractive error  Educated patient on refractive error and discussed lens options. Dispensed updated spectacle Rx. Educated about adaptation period to new specs.    Eyeglass Final Rx     Eyeglass Final Rx       Sphere Cylinder Axis Add    Right -2.75 Sphere  +2.50    Left -0.75 +0.25 180 +2.50    Expiration Date:  10/8/2020                Monovision OD near no lens, OS distance. Contact lens Rx released to pt. Daily wear only advised, replacement monthly with education to risks of extended wear.  Discussed lens care, compliance and solutions. RTC yearly contact lens follow up.     Contact Lens Final Rx     Final Contact Lens Rx       Brand Base Curve Diameter Sphere Cylinder    Right No lens        Left Acuvue Johnna 8.4 14.0 -0.75 Sphere    Expiration Date:  10/8/2020    Replacement:  Monthly    Solutions:  OptiFree PureMoist    Wearing Schedule:  Daily wear                  Follow up in about 1 year (around 10/8/2020).

## 2019-10-22 ENCOUNTER — HOSPITAL ENCOUNTER (OUTPATIENT)
Dept: RADIOLOGY | Facility: HOSPITAL | Age: 79
Discharge: HOME | End: 2019-10-22
Attending: NURSE PRACTITIONER
Payer: MEDICARE

## 2019-10-22 DIAGNOSIS — N20.0 NEPHROLITHIASIS: ICD-10-CM

## 2019-10-22 DIAGNOSIS — N39.46 MIXED INCONTINENCE URGE AND STRESS: ICD-10-CM

## 2019-10-22 PROCEDURE — 76770 US RETROPERITONEAL COMPLETE: ICD-10-PCS | Mod: 26,HCNC,, | Performed by: RADIOLOGY

## 2019-10-22 PROCEDURE — 76770 US EXAM ABDO BACK WALL COMP: CPT | Mod: 26,HCNC,, | Performed by: RADIOLOGY

## 2019-10-22 PROCEDURE — 76770 US EXAM ABDO BACK WALL COMP: CPT | Mod: TC,HCNC

## 2019-10-22 PROCEDURE — 74018 RADEX ABDOMEN 1 VIEW: CPT | Mod: TC,HCNC,FY

## 2019-10-22 PROCEDURE — 74018 RADEX ABDOMEN 1 VIEW: CPT | Mod: 26,HCNC,, | Performed by: RADIOLOGY

## 2019-10-22 PROCEDURE — 74018 XR ABDOMEN AP 1 VIEW: ICD-10-PCS | Mod: 26,HCNC,, | Performed by: RADIOLOGY

## 2019-10-23 ENCOUNTER — HOSPITAL ENCOUNTER (OUTPATIENT)
Dept: RADIOLOGY | Facility: HOSPITAL | Age: 79
Discharge: HOME | End: 2019-10-23
Attending: FAMILY MEDICINE
Payer: MEDICARE

## 2019-10-23 VITALS — WEIGHT: 249 LBS | HEIGHT: 65 IN | BODY MASS INDEX: 41.48 KG/M2

## 2019-10-23 DIAGNOSIS — Z12.31 ENCOUNTER FOR SCREENING MAMMOGRAM FOR BREAST CANCER: ICD-10-CM

## 2019-10-23 PROCEDURE — 77063 BREAST TOMOSYNTHESIS BI: CPT | Mod: TC,HCNC

## 2019-10-23 PROCEDURE — 77067 SCR MAMMO BI INCL CAD: CPT | Mod: 26,HCNC,, | Performed by: RADIOLOGY

## 2019-10-23 PROCEDURE — 77063 MAMMO DIGITAL SCREENING BILAT WITH TOMOSYNTHESIS_CAD: ICD-10-PCS | Mod: 26,HCNC,, | Performed by: RADIOLOGY

## 2019-10-23 PROCEDURE — 77067 MAMMO DIGITAL SCREENING BILAT WITH TOMOSYNTHESIS_CAD: ICD-10-PCS | Mod: 26,HCNC,, | Performed by: RADIOLOGY

## 2019-10-23 PROCEDURE — 77063 BREAST TOMOSYNTHESIS BI: CPT | Mod: 26,HCNC,, | Performed by: RADIOLOGY

## 2019-10-28 ENCOUNTER — HOSPITAL ENCOUNTER (OUTPATIENT)
Dept: RADIOLOGY | Facility: HOSPITAL | Age: 79
Discharge: HOME | End: 2019-10-28
Attending: INTERNAL MEDICINE
Payer: MEDICARE

## 2019-10-28 DIAGNOSIS — I82.411 DVT OF DEEP FEMORAL VEIN, RIGHT: ICD-10-CM

## 2019-10-28 PROCEDURE — 93971 EXTREMITY STUDY: CPT | Mod: TC,HCNC,RT

## 2019-10-28 PROCEDURE — 93971 EXTREMITY STUDY: CPT | Mod: 26,HCNC,RT, | Performed by: RADIOLOGY

## 2019-10-28 PROCEDURE — 93971 US LOWER EXTREMITY VEINS RIGHT: ICD-10-PCS | Mod: 26,HCNC,RT, | Performed by: RADIOLOGY

## 2019-10-29 ENCOUNTER — ANTI-COAG VISIT (OUTPATIENT)
Dept: CARDIOLOGY | Facility: CLINIC | Age: 79
End: 2019-10-29
Payer: MEDICARE

## 2019-10-29 DIAGNOSIS — Z79.01 LONG TERM (CURRENT) USE OF ANTICOAGULANTS: ICD-10-CM

## 2019-10-29 PROCEDURE — 93793 ANTICOAG MGMT PT WARFARIN: CPT | Mod: S$GLB,,, | Performed by: PHARMACIST

## 2019-10-29 PROCEDURE — 93793 PR ANTICOAGULANT MGMT FOR PT TAKING WARFARIN: ICD-10-PCS | Mod: S$GLB,,, | Performed by: PHARMACIST

## 2019-10-29 NOTE — PROGRESS NOTES
Confirmed taking correct dose   Started OTC Biotin 2-3 weeks ago; small Blood clot on R thigh(not new) & has 2nd DR appt scheduled for this 11/6  NO other changes

## 2019-11-04 ENCOUNTER — ANTI-COAG VISIT (OUTPATIENT)
Dept: CARDIOLOGY | Facility: CLINIC | Age: 79
End: 2019-11-04
Payer: MEDICARE

## 2019-11-04 ENCOUNTER — LAB VISIT (OUTPATIENT)
Dept: LAB | Facility: HOSPITAL | Age: 79
End: 2019-11-04
Attending: INTERNAL MEDICINE
Payer: MEDICARE

## 2019-11-04 DIAGNOSIS — Z79.01 LONG TERM (CURRENT) USE OF ANTICOAGULANTS: ICD-10-CM

## 2019-11-04 LAB
INR PPP: 2.8 (ref 0.8–1.2)
PROTHROMBIN TIME: 29.5 SEC (ref 9–12.5)

## 2019-11-04 PROCEDURE — 93793 PR ANTICOAGULANT MGMT FOR PT TAKING WARFARIN: ICD-10-PCS | Mod: S$GLB,,, | Performed by: PHARMACIST

## 2019-11-04 PROCEDURE — 85610 PROTHROMBIN TIME: CPT | Mod: HCNC

## 2019-11-04 PROCEDURE — 93793 ANTICOAG MGMT PT WARFARIN: CPT | Mod: S$GLB,,, | Performed by: PHARMACIST

## 2019-11-04 PROCEDURE — 36415 COLL VENOUS BLD VENIPUNCTURE: CPT | Mod: HCNC,PN

## 2019-11-05 NOTE — PROGRESS NOTES
Chief Complaint :  Right femoral vein thrombus    Hx of Present illness :  Patient is a 79 y.o. year old female who presents to the clinic today for   Oncology evaluation.  Had sx of pain Right leg.  Had further evaluation which revealed DVT.  Unprovoked. Patient was on anticoagulation for paroxysmal atrial fibrillation. Patient was advided to discontinue Coumadin for ten days and repeat coag labs. Patient stated she forgot to stop coumadin as instructed.     Allergies :    Review of patient's allergies indicates:   Allergen Reactions    Celebrex [celecoxib]      Chest tightness    Lipitor [atorvastatin] Other (See Comments)       Occupation :  Retired [employer]    Transfusion :  None    Menstrual & obstetric Hx :   1; Para 1.  Age of menarche:  18  Age of first pregnancy: 27  Lactation history: Yes  Age of menopause: 57  HRT: None    Present Meds :   Medication List with Changes/Refills   Current Medications    CYANOCOBALAMIN, VITAMIN B-12, (VITAMIN B-12 ORAL)    Take 2,500 mcg by mouth every evening.    DICLOFENAC SODIUM (VOLTAREN) 1 % GEL    Apply 2 g topically once daily.    FISH OIL-OMEGA-3 FATTY ACIDS 300-1,000 MG CAPSULE    Take 1 g by mouth once daily.    FLECAINIDE (TAMBOCOR) 100 MG TAB    Take 1 tablet (100 mg total) by mouth every 12 (twelve) hours.    MULTIVITAMIN (THERAGRAN) PER TABLET    Take 1 tablet by mouth every evening. 1 Tablet Oral Every day    OXYBUTYNIN (DITROPAN) 5 MG TAB    TAKE 1 TABLET THREE TIMES DAILY    SIMVASTATIN (ZOCOR) 40 MG TABLET    Take 40 mg by mouth every evening.    WARFARIN (COUMADIN) 5 MG TABLET    Take 0.5-1 tablets (2.5-5 mg total) by mouth Daily. As directed by coumadin clinic       Past Medical Hx :   reviewed    Past Medical Hx :  Past Medical History:   Diagnosis Date    Arthritis     knees    Atrial fibrillation     Atrial flutter     Back pain     Cataract     Degenerative disc disease     Depression     General anesthetics causing adverse effect in  therapeutic use     pt states sometimes slow to awaken.    GERD (gastroesophageal reflux disease)     Hyperlipidemia     Hypertension     Kidney stone     Obesity     Sleep apnea     does not wear CPAP    Urinary incontinence     Varicosities     Ventral hernia        Travel Hx :  N/A    Immunization :  Immunization History   Administered Date(s) Administered    Influenza 10/15/2007, 10/17/2008, 10/10/2009, 10/05/2010, 11/14/2011, 10/23/2013    Influenza - High Dose - PF (65 years and older) 11/05/2012, 10/22/2014, 12/19/2015, 08/29/2016, 12/11/2017, 09/20/2018    Influenza - Quadrivalent - PF (6 months and older) 10/15/2007, 10/10/2009, 10/05/2010, 11/14/2011    Influenza Split 10/15/2007, 10/17/2008, 10/10/2009, 10/05/2010, 11/14/2011, 10/23/2013, 10/23/2013    Pneumococcal Conjugate - 13 Valent 08/29/2016    Pneumococcal Polysaccharide - 23 Valent 02/19/2015    Td - PF (ADULT) 05/03/2019    Zoster Recombinant 05/02/2019, 07/09/2019       Family Hx :  Family History   Problem Relation Age of Onset    COPD Mother     Heart disease Sister         half sister    COPD Sister     No Known Problems Father     Parkinsonism Sister     No Known Problems Brother     No Known Problems Maternal Aunt     No Known Problems Maternal Uncle     No Known Problems Paternal Aunt     No Known Problems Paternal Uncle     No Known Problems Maternal Grandmother     No Known Problems Maternal Grandfather     No Known Problems Paternal Grandmother     No Known Problems Paternal Grandfather     Amblyopia Neg Hx     Blindness Neg Hx     Cancer Neg Hx     Cataracts Neg Hx     Diabetes Neg Hx     Glaucoma Neg Hx     Hypertension Neg Hx     Macular degeneration Neg Hx     Retinal detachment Neg Hx     Strabismus Neg Hx     Stroke Neg Hx     Thyroid disease Neg Hx        Social Hx :  Social History     Socioeconomic History    Marital status:      Spouse name: Not on file    Number of children:  Not on file    Years of education: Not on file    Highest education level: Not on file   Occupational History    Not on file   Social Needs    Financial resource strain: Not on file    Food insecurity:     Worry: Not on file     Inability: Not on file    Transportation needs:     Medical: Not on file     Non-medical: Not on file   Tobacco Use    Smoking status: Never Smoker    Smokeless tobacco: Never Used   Substance and Sexual Activity    Alcohol use: No    Drug use: No    Sexual activity: Yes     Partners: Male   Lifestyle    Physical activity:     Days per week: Not on file     Minutes per session: Not on file    Stress: Not on file   Relationships    Social connections:     Talks on phone: Not on file     Gets together: Not on file     Attends Judaism service: Not on file     Active member of club or organization: Not on file     Attends meetings of clubs or organizations: Not on file     Relationship status: Not on file   Other Topics Concern    Not on file   Social History Narrative    Not on file       Surgery :  Appendectomy; cholecystectomy; bilateral total knee replacement. Cardioversion. Cardiac ablation.   Has not had colonoscopy    Symptoms :    Review of Systems   Constitutional: Positive for malaise/fatigue. Negative for chills, fever and weight loss.   HENT: Negative for congestion, hearing loss, nosebleeds, sore throat and tinnitus.    Eyes: Negative for blurred vision, double vision and photophobia.   Respiratory: Negative.  Negative for cough, sputum production and shortness of breath.    Cardiovascular: Positive for leg swelling. Negative for chest pain, palpitations, orthopnea and claudication.   Gastrointestinal: Positive for heartburn. Negative for abdominal pain, blood in stool, constipation, diarrhea, nausea and vomiting.   Genitourinary: Negative for dysuria, flank pain, frequency, hematuria and urgency.        Followed by  for Kidney stones.   Musculoskeletal:  Positive for joint pain. Negative for back pain, falls, myalgias and neck pain.   Neurological: Negative for dizziness, tingling, tremors, sensory change and headaches.   Endo/Heme/Allergies: Negative for environmental allergies and polydipsia. Does not bruise/bleed easily.   Psychiatric/Behavioral: Negative for depression. The patient is nervous/anxious. The patient does not have insomnia.        Physical Exam :   Physical Exam   Constitutional: She is oriented to person, place, and time and well-developed, well-nourished, and in no distress. Vital signs are normal. No distress.   HENT:   Head: Normocephalic and atraumatic.   Right Ear: External ear normal.   Left Ear: External ear normal.   Nose: Nose normal.   Mouth/Throat: Oropharynx is clear and moist. No oropharyngeal exudate.   Eyes: Pupils are equal, round, and reactive to light. Conjunctivae, EOM and lids are normal. Lids are everted and swept, no foreign bodies found. Right eye exhibits no discharge. Left eye exhibits no discharge. No scleral icterus.   Neck: Trachea normal, normal range of motion and phonation normal. Neck supple. Normal carotid pulses, no hepatojugular reflux and no JVD present. No tracheal tenderness present. Carotid bruit is not present. No tracheal deviation present. No thyroid mass and no thyromegaly present.   Cardiovascular: Normal rate, normal heart sounds, intact distal pulses and normal pulses. An irregularly irregular rhythm present. PMI is not displaced. Exam reveals no gallop and no friction rub.   No murmur heard.  Pulmonary/Chest: Effort normal and breath sounds normal. No stridor. No apnea. No respiratory distress. She has no wheezes. She has no rales. She exhibits no tenderness.   Abdominal: Soft. Normal appearance, normal aorta and bowel sounds are normal. She exhibits no distension and no mass. There is no hepatosplenomegaly. There is no tenderness. There is no rebound, no guarding and no CVA tenderness. No hernia.    Musculoskeletal: Normal range of motion. She exhibits edema. She exhibits no tenderness or deformity.   1+ edema Left leg. Has antiembolic stockings   Lymphadenopathy:        Head (right side): No submental, no submandibular, no tonsillar, no preauricular, no posterior auricular and no occipital adenopathy present.        Head (left side): No submental, no submandibular, no tonsillar, no preauricular, no posterior auricular and no occipital adenopathy present.     She has no cervical adenopathy.     She has no axillary adenopathy.        Right: No inguinal, no supraclavicular and no epitrochlear adenopathy present.        Left: No inguinal, no supraclavicular and no epitrochlear adenopathy present.   Neurological: She is alert and oriented to person, place, and time. She has normal reflexes. No cranial nerve deficit. She exhibits normal muscle tone. Gait normal. Coordination normal. GCS score is 15.   Skin: Skin is warm, dry and intact. No rash noted. She is not diaphoretic. No cyanosis or erythema. No pallor. Nails show no clubbing.   Psychiatric: Mood, memory, affect and judgment normal.   Nursing note and vitals reviewed.        Labs & Imaging :  U/S Right lower extremity 5/10/19  : partially occlusive thrombus right femoral vein.  Leiden factor 5 and Prothrombin mutation done in 2004 - Normal. Lupus anticoagulant was negative in 2004. Protein c and s activity were normal in 2004.  05/21/19 : SPEP normal. Fibrinogen : 469.  AT3 and homocysteine normal.  Cardiolipin antibody igG increased at 19.6  10/28/19 :  Cardiolipin antibody IgG increased at 22.7.  igM normal.  Protein activity 12 %. Protein S activity 41.  Lupus anticoagulant normal;. Hexagonal Phospholipid neutralisation  Positive.    Dx : Thrombus right femoral vein. Hypercoagulable state      Assessment & Plan:  Reviewed with patient. Multiple coag lab abnormalities.  Test was not done as per recommendation. Stay on coumadin. RTC 3 months with repeat   Studies.after holding Coumadin for ten days.  Also get U/S right  Lower extremity   Re evaluate with results. Patient understands and verbalised.  Advance Care Planning     Power of   I initiated the process of advance care planning today and explained the importance of this process to the patient.  I introduced the concept of advance directives to the patient, as well. Then the patient received detailed information about the importance of designating a Health Care Power of  (HCPOA). She was also instructed to communicate with this person about their wishes for future healthcare, should she become sick and lose decision-making capacity. The patient has not previously appointed a HCPOA.          Living Will  During this visit, I engaged the patient in the advance care planning process.  The patient and I reviewed the role for advance directives and their purpose in directing future healthcare if the patient's unable to speak for him/herself.  At this point in time, the patient does have full decision-making capacity.  We discussed different extreme health states that she could experience, and reviewed what kind of medical care she would want in those situations.  The patient communicated that if she were comatose and had little chance of a meaningful recovery, she want machines/life-sustaining treatments used.  In addition to the above preference, other important end-of-life issues for the patient   The patient  HAS NOT completed a living will to reflect these preferences.  The patient   HAS NOT:[id number]} already designated a healthcare power of  to make decisions on her behalf.        Brochures given

## 2019-11-06 ENCOUNTER — OFFICE VISIT (OUTPATIENT)
Dept: HEMATOLOGY/ONCOLOGY | Facility: CLINIC | Age: 79
End: 2019-11-06
Payer: MEDICARE

## 2019-11-06 VITALS
OXYGEN SATURATION: 91 % | SYSTOLIC BLOOD PRESSURE: 116 MMHG | HEART RATE: 66 BPM | HEIGHT: 65 IN | BODY MASS INDEX: 41.07 KG/M2 | DIASTOLIC BLOOD PRESSURE: 62 MMHG | TEMPERATURE: 98 F | WEIGHT: 246.5 LBS

## 2019-11-06 DIAGNOSIS — I82.411 DVT OF DEEP FEMORAL VEIN, RIGHT: Primary | ICD-10-CM

## 2019-11-06 DIAGNOSIS — D68.59 HYPERCOAGULABLE STATE: ICD-10-CM

## 2019-11-06 DIAGNOSIS — Z79.01 LONG TERM (CURRENT) USE OF ANTICOAGULANTS: ICD-10-CM

## 2019-11-06 PROCEDURE — 1101F PT FALLS ASSESS-DOCD LE1/YR: CPT | Mod: HCNC,CPTII,S$GLB, | Performed by: INTERNAL MEDICINE

## 2019-11-06 PROCEDURE — 99213 OFFICE O/P EST LOW 20 MIN: CPT | Mod: HCNC,S$GLB,, | Performed by: INTERNAL MEDICINE

## 2019-11-06 PROCEDURE — 3074F SYST BP LT 130 MM HG: CPT | Mod: HCNC,CPTII,S$GLB, | Performed by: INTERNAL MEDICINE

## 2019-11-06 PROCEDURE — 99499 RISK ADDL DX/OHS AUDIT: ICD-10-PCS | Mod: HCNC,S$GLB,, | Performed by: INTERNAL MEDICINE

## 2019-11-06 PROCEDURE — 1101F PR PT FALLS ASSESS DOC 0-1 FALLS W/OUT INJ PAST YR: ICD-10-PCS | Mod: HCNC,CPTII,S$GLB, | Performed by: INTERNAL MEDICINE

## 2019-11-06 PROCEDURE — 3078F PR MOST RECENT DIASTOLIC BLOOD PRESSURE < 80 MM HG: ICD-10-PCS | Mod: HCNC,CPTII,S$GLB, | Performed by: INTERNAL MEDICINE

## 2019-11-06 PROCEDURE — 99999 PR PBB SHADOW E&M-EST. PATIENT-LVL III: ICD-10-PCS | Mod: PBBFAC,HCNC,, | Performed by: INTERNAL MEDICINE

## 2019-11-06 PROCEDURE — 99213 PR OFFICE/OUTPT VISIT, EST, LEVL III, 20-29 MIN: ICD-10-PCS | Mod: HCNC,S$GLB,, | Performed by: INTERNAL MEDICINE

## 2019-11-06 PROCEDURE — 99499 UNLISTED E&M SERVICE: CPT | Mod: HCNC,S$GLB,, | Performed by: INTERNAL MEDICINE

## 2019-11-06 PROCEDURE — 3078F DIAST BP <80 MM HG: CPT | Mod: HCNC,CPTII,S$GLB, | Performed by: INTERNAL MEDICINE

## 2019-11-06 PROCEDURE — 99999 PR PBB SHADOW E&M-EST. PATIENT-LVL III: CPT | Mod: PBBFAC,HCNC,, | Performed by: INTERNAL MEDICINE

## 2019-11-06 PROCEDURE — 3074F PR MOST RECENT SYSTOLIC BLOOD PRESSURE < 130 MM HG: ICD-10-PCS | Mod: HCNC,CPTII,S$GLB, | Performed by: INTERNAL MEDICINE

## 2019-11-06 NOTE — Clinical Note
Followup in 3 months.  Patient should Coumdin for ten days before getting labs and resume coumadin immediately. U/S right lower extremity before next visit

## 2019-11-13 ENCOUNTER — PATIENT OUTREACH (OUTPATIENT)
Dept: ADMINISTRATIVE | Facility: OTHER | Age: 79
End: 2019-11-13

## 2019-11-18 ENCOUNTER — OFFICE VISIT (OUTPATIENT)
Dept: FAMILY MEDICINE | Facility: CLINIC | Age: 79
End: 2019-11-18
Payer: MEDICARE

## 2019-11-18 ENCOUNTER — LAB VISIT (OUTPATIENT)
Dept: LAB | Facility: HOSPITAL | Age: 79
End: 2019-11-18
Attending: INTERNAL MEDICINE
Payer: MEDICARE

## 2019-11-18 ENCOUNTER — ANTI-COAG VISIT (OUTPATIENT)
Dept: CARDIOLOGY | Facility: CLINIC | Age: 79
End: 2019-11-18
Payer: MEDICARE

## 2019-11-18 VITALS
OXYGEN SATURATION: 95 % | DIASTOLIC BLOOD PRESSURE: 72 MMHG | HEIGHT: 65 IN | HEART RATE: 80 BPM | TEMPERATURE: 98 F | RESPIRATION RATE: 16 BRPM | BODY MASS INDEX: 40.48 KG/M2 | WEIGHT: 242.94 LBS | SYSTOLIC BLOOD PRESSURE: 120 MMHG

## 2019-11-18 DIAGNOSIS — Z23 NEED FOR INFLUENZA VACCINATION: ICD-10-CM

## 2019-11-18 DIAGNOSIS — Z79.01 LONG TERM (CURRENT) USE OF ANTICOAGULANTS: ICD-10-CM

## 2019-11-18 DIAGNOSIS — K21.9 GASTROESOPHAGEAL REFLUX DISEASE WITHOUT ESOPHAGITIS: Primary | ICD-10-CM

## 2019-11-18 LAB
INR PPP: 2.7 (ref 0.8–1.2)
PROTHROMBIN TIME: 28.3 SEC (ref 9–12.5)

## 2019-11-18 PROCEDURE — 99214 PR OFFICE/OUTPT VISIT, EST, LEVL IV, 30-39 MIN: ICD-10-PCS | Mod: 25,HCNC,S$GLB, | Performed by: NURSE PRACTITIONER

## 2019-11-18 PROCEDURE — G0008 ADMIN INFLUENZA VIRUS VAC: HCPCS | Mod: HCNC,S$GLB,, | Performed by: NURSE PRACTITIONER

## 2019-11-18 PROCEDURE — 3074F PR MOST RECENT SYSTOLIC BLOOD PRESSURE < 130 MM HG: ICD-10-PCS | Mod: HCNC,CPTII,S$GLB, | Performed by: NURSE PRACTITIONER

## 2019-11-18 PROCEDURE — 1101F PT FALLS ASSESS-DOCD LE1/YR: CPT | Mod: HCNC,CPTII,S$GLB, | Performed by: NURSE PRACTITIONER

## 2019-11-18 PROCEDURE — 99214 OFFICE O/P EST MOD 30 MIN: CPT | Mod: 25,HCNC,S$GLB, | Performed by: NURSE PRACTITIONER

## 2019-11-18 PROCEDURE — 99999 PR PBB SHADOW E&M-EST. PATIENT-LVL III: CPT | Mod: PBBFAC,HCNC,, | Performed by: NURSE PRACTITIONER

## 2019-11-18 PROCEDURE — 3078F PR MOST RECENT DIASTOLIC BLOOD PRESSURE < 80 MM HG: ICD-10-PCS | Mod: HCNC,CPTII,S$GLB, | Performed by: NURSE PRACTITIONER

## 2019-11-18 PROCEDURE — 3074F SYST BP LT 130 MM HG: CPT | Mod: HCNC,CPTII,S$GLB, | Performed by: NURSE PRACTITIONER

## 2019-11-18 PROCEDURE — 93793 ANTICOAG MGMT PT WARFARIN: CPT | Mod: S$GLB,,, | Performed by: PHARMACIST

## 2019-11-18 PROCEDURE — 93793 PR ANTICOAGULANT MGMT FOR PT TAKING WARFARIN: ICD-10-PCS | Mod: S$GLB,,, | Performed by: PHARMACIST

## 2019-11-18 PROCEDURE — 85610 PROTHROMBIN TIME: CPT | Mod: HCNC

## 2019-11-18 PROCEDURE — 90662 IIV NO PRSV INCREASED AG IM: CPT | Mod: HCNC,S$GLB,, | Performed by: NURSE PRACTITIONER

## 2019-11-18 PROCEDURE — 1101F PR PT FALLS ASSESS DOC 0-1 FALLS W/OUT INJ PAST YR: ICD-10-PCS | Mod: HCNC,CPTII,S$GLB, | Performed by: NURSE PRACTITIONER

## 2019-11-18 PROCEDURE — 99999 PR PBB SHADOW E&M-EST. PATIENT-LVL III: ICD-10-PCS | Mod: PBBFAC,HCNC,, | Performed by: NURSE PRACTITIONER

## 2019-11-18 PROCEDURE — G0008 FLU VACCINE - HIGH DOSE (65+) PRESERVATIVE FREE IM: ICD-10-PCS | Mod: HCNC,S$GLB,, | Performed by: NURSE PRACTITIONER

## 2019-11-18 PROCEDURE — 36415 COLL VENOUS BLD VENIPUNCTURE: CPT | Mod: HCNC,PN

## 2019-11-18 PROCEDURE — 90662 FLU VACCINE - HIGH DOSE (65+) PRESERVATIVE FREE IM: ICD-10-PCS | Mod: HCNC,S$GLB,, | Performed by: NURSE PRACTITIONER

## 2019-11-18 PROCEDURE — 3078F DIAST BP <80 MM HG: CPT | Mod: HCNC,CPTII,S$GLB, | Performed by: NURSE PRACTITIONER

## 2019-11-18 NOTE — PROGRESS NOTES
"Routine Office Visit    Patient Name: Alaina Vee    : 1940  MRN: [id number]    Chief Complaint:  GERD, flu shot    Subjective:  Alaina is a 79 y.o. female who presents today for:    1.  GERD - patient reports today requesting a refill of a GERD medication that she takes p.r.n..  She states that she thinks it is called "ambesol" but is unsure of the name of this medication.  She states that she takes it as needed and may only take it 2-4 times per month when needed.  She denies any nausea, vomiting, diarrhea, or abdominal pain. Denies any sore throat or regurgitating symptoms.  Denies any weight loss or bleeding.  Denies any chest pain, wheezing, shortness of breath, or palpitations.  Denies any black tarry stool or bloody stool.  Denies any constipation.  She states that she does not take any daily medications for her reflux, as it is usually not very bothersome. She is also requesting a flu shot today. Patient is new to me and this is the extent of her concerns.    Past Medical History  Past Medical History:   Diagnosis Date    Arthritis     knees    Atrial fibrillation     Atrial flutter     Back pain     Cataract     Degenerative disc disease     Depression     General anesthetics causing adverse effect in therapeutic use     pt states sometimes slow to awaken.    GERD (gastroesophageal reflux disease)     Hyperlipidemia     Hypertension     Kidney stone     Obesity     Sleep apnea     does not wear CPAP    Urinary incontinence     Varicosities     Ventral hernia        Past Surgical History  Past Surgical History:   Procedure Laterality Date    APPENDECTOMY      BREAST BIOPSY Bilateral     1985    breast biopsy, left      CHOLECYSTECTOMY      JOINT REPLACEMENT      TKR , right    JOINT REPLACEMENT  2009    TKR  left    NV EXPLORATORY OF ABDOMEN         Family History  Family History   Problem Relation Age of Onset    COPD Mother     Heart disease Sister         half sister "    COPD Sister     No Known Problems Father     Parkinsonism Sister     No Known Problems Brother     No Known Problems Maternal Aunt     No Known Problems Maternal Uncle     No Known Problems Paternal Aunt     No Known Problems Paternal Uncle     No Known Problems Maternal Grandmother     No Known Problems Maternal Grandfather     No Known Problems Paternal Grandmother     No Known Problems Paternal Grandfather     Amblyopia Neg Hx     Blindness Neg Hx     Cancer Neg Hx     Cataracts Neg Hx     Diabetes Neg Hx     Glaucoma Neg Hx     Hypertension Neg Hx     Macular degeneration Neg Hx     Retinal detachment Neg Hx     Strabismus Neg Hx     Stroke Neg Hx     Thyroid disease Neg Hx        Social History  Social History     Socioeconomic History    Marital status:      Spouse name: Not on file    Number of children: Not on file    Years of education: Not on file    Highest education level: Not on file   Occupational History    Not on file   Social Needs    Financial resource strain: Not on file    Food insecurity:     Worry: Not on file     Inability: Not on file    Transportation needs:     Medical: Not on file     Non-medical: Not on file   Tobacco Use    Smoking status: Never Smoker    Smokeless tobacco: Never Used   Substance and Sexual Activity    Alcohol use: No    Drug use: No    Sexual activity: Yes     Partners: Male   Lifestyle    Physical activity:     Days per week: Not on file     Minutes per session: Not on file    Stress: Not on file   Relationships    Social connections:     Talks on phone: Not on file     Gets together: Not on file     Attends Spiritism service: Not on file     Active member of club or organization: Not on file     Attends meetings of clubs or organizations: Not on file     Relationship status: Not on file   Other Topics Concern    Not on file   Social History Narrative    Not on file       Current Medications  Current Outpatient  "Medications on File Prior to Visit   Medication Sig Dispense Refill    CYANOCOBALAMIN, VITAMIN B-12, (VITAMIN B-12 ORAL) Take 2,500 mcg by mouth every evening.      diclofenac sodium (VOLTAREN) 1 % Gel Apply 2 g topically once daily. 100 g 3    fish oil-omega-3 fatty acids 300-1,000 mg capsule Take 1 g by mouth once daily.      flecainide (TAMBOCOR) 100 MG Tab Take 1 tablet (100 mg total) by mouth every 12 (twelve) hours. 180 tablet 1    multivitamin (THERAGRAN) per tablet Take 1 tablet by mouth every evening. 1 Tablet Oral Every day      oxybutynin (DITROPAN) 5 MG Tab TAKE 1 TABLET THREE TIMES DAILY 270 tablet 11    warfarin (COUMADIN) 5 MG tablet Take 0.5-1 tablets (2.5-5 mg total) by mouth Daily. As directed by coumadin clinic 90 tablet 3     No current facility-administered medications on file prior to visit.        Allergies   Review of patient's allergies indicates:   Allergen Reactions    Celebrex [celecoxib]      Chest tightness    Lipitor [atorvastatin] Other (See Comments)       Review of Systems (Pertinent positives)  Review of Systems   Constitutional: Negative for chills, diaphoresis, fever, malaise/fatigue and weight loss.   HENT: Negative.    Eyes: Negative.    Respiratory: Negative for cough, hemoptysis, sputum production, shortness of breath and wheezing.    Cardiovascular: Negative for chest pain, palpitations, orthopnea, claudication, leg swelling and PND.   Gastrointestinal: Positive for heartburn. Negative for abdominal pain, blood in stool, constipation, diarrhea, melena, nausea and vomiting.        "occasional heartburn"   Genitourinary: Negative.    Musculoskeletal: Negative.    Skin: Negative.    Neurological: Negative.    Endo/Heme/Allergies: Negative.    Psychiatric/Behavioral: Negative.        /72 (BP Location: Left arm, Patient Position: Sitting, BP Method: Large (Manual))   Pulse 80   Temp 97.8 °F (36.6 °C) (Oral)   Resp 16   Ht 5' 5" (1.651 m)   Wt 110.2 kg (242 lb " 15.2 oz)   LMP  (LMP Unknown)   SpO2 95%   BMI 40.43 kg/m²     Physical Exam   Constitutional: She is oriented to person, place, and time. She appears well-developed and well-nourished.  Non-toxic appearance. She does not have a sickly appearance. She does not appear ill. No distress.   Eyes: Pupils are equal, round, and reactive to light. Conjunctivae and EOM are normal.   Neck: Normal range of motion. Neck supple. No JVD present.   Cardiovascular: Normal rate, regular rhythm, normal heart sounds and intact distal pulses. Exam reveals no gallop and no friction rub.   No murmur heard.  Clinically euvolemic no JVD no lower extremity swelling   Pulmonary/Chest: Effort normal and breath sounds normal. No stridor. No respiratory distress. She has no wheezes. She has no rales. She exhibits no tenderness.   Abdominal: Soft. Bowel sounds are normal. She exhibits no distension and no mass. There is no tenderness. There is no rebound and no guarding. No hernia.   Musculoskeletal: Normal range of motion.   Lymphadenopathy:     She has no cervical adenopathy.   Neurological: She is alert and oriented to person, place, and time. She displays normal reflexes. No cranial nerve deficit or sensory deficit. She exhibits normal muscle tone. Coordination normal.   Skin: Skin is warm and dry. Capillary refill takes less than 2 seconds. She is not diaphoretic.   Vitals reviewed.       Assessment/Plan:  Alaina Vee is a 79 y.o. female who presents today for :    Alaina was seen today for gi problem, immunizations and foot problem.    Diagnoses and all orders for this visit:    Gastroesophageal reflux disease without esophagitis    Need for influenza vaccination  -     Influenza - High Dose (65+) (PF) (IM)     Patient is requesting a refill of reflux medication, however she is unsure which medication she has taken previously to help with the symptoms.  There are no reflux medications on her medication list.  I informed the  patient to go go home and check her pill bottles and to contact us with the name of this medication and we can see if this can be refilled.  She has no alarm signs or symptoms for her reflux.  She states that she only gets heartburn on occasion and denies any sore throat, trouble swallowing, regurgitation symptoms, fevers, chills, abdominal pain, vomiting, or stool changes.  Of note, patient stated while rooming that she had a problem with the heel of her foot, however during my encounter with the patient she did not mention this as a problem.  Will give flu shot today.  Patient is to follow up as needed in the future.  Patient verbalized understanding of instructions.        This office note has been dictated.  This dictation has been generated using M-Modal Fluency Direct dictation; some phonetic errors may occur.   My collaborating physician is Dr. Sameer Fink.

## 2019-12-02 ENCOUNTER — ANTI-COAG VISIT (OUTPATIENT)
Dept: CARDIOLOGY | Facility: CLINIC | Age: 79
End: 2019-12-02
Payer: MEDICARE

## 2019-12-02 ENCOUNTER — LAB VISIT (OUTPATIENT)
Dept: LAB | Facility: HOSPITAL | Age: 79
End: 2019-12-02
Attending: INTERNAL MEDICINE
Payer: MEDICARE

## 2019-12-02 DIAGNOSIS — Z79.01 LONG TERM (CURRENT) USE OF ANTICOAGULANTS: ICD-10-CM

## 2019-12-02 LAB
INR PPP: 1.5 (ref 0.8–1.2)
PROTHROMBIN TIME: 15.4 SEC (ref 9–12.5)

## 2019-12-02 PROCEDURE — 85610 PROTHROMBIN TIME: CPT | Mod: HCNC

## 2019-12-02 PROCEDURE — 93793 PR ANTICOAGULANT MGMT FOR PT TAKING WARFARIN: ICD-10-PCS | Mod: S$GLB,,, | Performed by: PHARMACIST

## 2019-12-02 PROCEDURE — 36415 COLL VENOUS BLD VENIPUNCTURE: CPT | Mod: HCNC,PN

## 2019-12-02 PROCEDURE — 93793 ANTICOAG MGMT PT WARFARIN: CPT | Mod: S$GLB,,, | Performed by: PHARMACIST

## 2019-12-02 NOTE — PROGRESS NOTES
Confirmed taking correct dose of coumadin;  Missed coumadin dose on THURS  Spinach dip FRI;  Taking OTC robitussin   NO other changes

## 2019-12-16 ENCOUNTER — ANTI-COAG VISIT (OUTPATIENT)
Dept: CARDIOLOGY | Facility: CLINIC | Age: 79
End: 2019-12-16
Payer: MEDICARE

## 2019-12-16 ENCOUNTER — LAB VISIT (OUTPATIENT)
Dept: LAB | Facility: HOSPITAL | Age: 79
End: 2019-12-16
Attending: INTERNAL MEDICINE
Payer: MEDICARE

## 2019-12-16 DIAGNOSIS — Z79.01 LONG TERM (CURRENT) USE OF ANTICOAGULANTS: ICD-10-CM

## 2019-12-16 LAB
INR PPP: >10 (ref 0.8–1.2)
PROTHROMBIN TIME: >100 SEC (ref 9–12.5)

## 2019-12-16 PROCEDURE — 85610 PROTHROMBIN TIME: CPT | Mod: HCNC

## 2019-12-16 PROCEDURE — 36415 COLL VENOUS BLD VENIPUNCTURE: CPT | Mod: HCNC,PN

## 2019-12-16 PROCEDURE — 93793 PR ANTICOAGULANT MGMT FOR PT TAKING WARFARIN: ICD-10-PCS | Mod: S$GLB,,, | Performed by: PHARMACIST

## 2019-12-16 PROCEDURE — 93793 ANTICOAG MGMT PT WARFARIN: CPT | Mod: S$GLB,,, | Performed by: PHARMACIST

## 2019-12-16 NOTE — PROGRESS NOTES
Kylee with [facility] lab  called with critical INR greater than 10.0.  I routed to Ernesto Gooden MA to question patient then advise per pharmacist orders.  I notified J. Cordaro, Pharmacist of critical result.

## 2019-12-16 NOTE — PROGRESS NOTES
Confirmed taking correct dose of coumadin.   Bleeding w/ Bladder on & off for the last two weeks; DR mendez for this scheduled 12/16 1:30   NO other changes. Patient will be advised to hold her coumadin until further notice, eat a large serving of dark greens today, and repeat INR tomorrow. She will also be advised to seek urgent care if bleeding continues or worsens.

## 2019-12-16 NOTE — PROGRESS NOTES
Patient advised to *Hold* (Warfarin) today, Eat Large portion of (Greens). Patient verbalized understanding.

## 2019-12-17 ENCOUNTER — ANTI-COAG VISIT (OUTPATIENT)
Dept: CARDIOLOGY | Facility: CLINIC | Age: 79
End: 2019-12-17
Payer: MEDICARE

## 2019-12-17 ENCOUNTER — OFFICE VISIT (OUTPATIENT)
Dept: FAMILY MEDICINE | Facility: CLINIC | Age: 79
End: 2019-12-17
Payer: MEDICARE

## 2019-12-17 ENCOUNTER — HOSPITAL ENCOUNTER (OUTPATIENT)
Dept: RADIOLOGY | Facility: HOSPITAL | Age: 79
Discharge: HOME | End: 2019-12-17
Attending: FAMILY MEDICINE
Payer: MEDICARE

## 2019-12-17 VITALS
TEMPERATURE: 98 F | SYSTOLIC BLOOD PRESSURE: 122 MMHG | HEART RATE: 57 BPM | RESPIRATION RATE: 16 BRPM | OXYGEN SATURATION: 95 % | DIASTOLIC BLOOD PRESSURE: 74 MMHG

## 2019-12-17 DIAGNOSIS — R10.2 PELVIC PAIN: ICD-10-CM

## 2019-12-17 DIAGNOSIS — I48.0 PAF (PAROXYSMAL ATRIAL FIBRILLATION): ICD-10-CM

## 2019-12-17 DIAGNOSIS — N18.30 CHRONIC KIDNEY DISEASE, STAGE 3: ICD-10-CM

## 2019-12-17 DIAGNOSIS — E78.5 DYSLIPIDEMIA: ICD-10-CM

## 2019-12-17 DIAGNOSIS — Z79.01 LONG TERM (CURRENT) USE OF ANTICOAGULANTS: ICD-10-CM

## 2019-12-17 DIAGNOSIS — N89.8 VAGINAL DISCHARGE: Primary | ICD-10-CM

## 2019-12-17 PROCEDURE — 99999 PR PBB SHADOW E&M-EST. PATIENT-LVL III: ICD-10-PCS | Mod: PBBFAC,HCNC,, | Performed by: FAMILY MEDICINE

## 2019-12-17 PROCEDURE — 99214 OFFICE O/P EST MOD 30 MIN: CPT | Mod: HCNC,S$GLB,, | Performed by: FAMILY MEDICINE

## 2019-12-17 PROCEDURE — 74177 CT ABDOMEN PELVIS WITH CONTRAST: ICD-10-PCS | Mod: 26,HCNC,, | Performed by: RADIOLOGY

## 2019-12-17 PROCEDURE — 74177 CT ABD & PELVIS W/CONTRAST: CPT | Mod: 26,HCNC,, | Performed by: RADIOLOGY

## 2019-12-17 PROCEDURE — 99999 PR PBB SHADOW E&M-EST. PATIENT-LVL III: CPT | Mod: PBBFAC,HCNC,, | Performed by: FAMILY MEDICINE

## 2019-12-17 PROCEDURE — 99214 PR OFFICE/OUTPT VISIT, EST, LEVL IV, 30-39 MIN: ICD-10-PCS | Mod: HCNC,S$GLB,, | Performed by: FAMILY MEDICINE

## 2019-12-17 PROCEDURE — 93793 PR ANTICOAGULANT MGMT FOR PT TAKING WARFARIN: ICD-10-PCS | Mod: S$GLB,,, | Performed by: PHARMACIST

## 2019-12-17 PROCEDURE — 93793 ANTICOAG MGMT PT WARFARIN: CPT | Mod: S$GLB,,, | Performed by: PHARMACIST

## 2019-12-17 PROCEDURE — 74177 CT ABD & PELVIS W/CONTRAST: CPT | Mod: TC,HCNC

## 2019-12-17 PROCEDURE — 25500020 PHARM REV CODE 255: Mod: HCNC | Performed by: FAMILY MEDICINE

## 2019-12-17 PROCEDURE — 99499 RISK ADDL DX/OHS AUDIT: ICD-10-PCS | Mod: HCNC,S$GLB,, | Performed by: FAMILY MEDICINE

## 2019-12-17 PROCEDURE — 99499 UNLISTED E&M SERVICE: CPT | Mod: HCNC,S$GLB,, | Performed by: FAMILY MEDICINE

## 2019-12-17 RX ADMIN — IOHEXOL 15 ML: 300 INJECTION, SOLUTION INTRAVENOUS at 05:12

## 2019-12-17 RX ADMIN — IOHEXOL 75 ML: 350 INJECTION, SOLUTION INTRAVENOUS at 05:12

## 2019-12-17 NOTE — PROGRESS NOTES
Routine Office Visit    Patient Name: Alaina Vee    : 1940  MRN: [id number]    Subjective:  Alaina is a 79 y.o. female who presents today for:   Chief Complaint   Patient presents with    Vaginal Discharge     79 year old female comes in with complaint of approximately 2 months of a vaginal discharge. She states that it is almost daily and she feels a gush of fluid run down her legs when she gets up. She states that it is brown in color prior to that it had been happening on and off but not daily. The discharge is associated with a suprapubic and right pelvic pain that feels like a quick stab. It lasts a few seconds. It happens with quick changes in position.   There has been no recent changes in medications. She reports no nausea or vomiting or changes in stool. The patient is on Coumadin for Afib. Her INR yesterday was >10. She was advised to hold her Coumadin.      Past Medical History  Past Medical History:   Diagnosis Date    Arthritis     knees    Atrial fibrillation     Atrial flutter     Back pain     Cataract     Degenerative disc disease     Depression     General anesthetics causing adverse effect in therapeutic use     pt states sometimes slow to awaken.    GERD (gastroesophageal reflux disease)     Hyperlipidemia     Hypertension     Kidney stone     Obesity     Sleep apnea     does not wear CPAP    Urinary incontinence     Varicosities     Ventral hernia        Past Surgical History  Past Surgical History:   Procedure Laterality Date    APPENDECTOMY      BREAST BIOPSY Bilateral     1985    breast biopsy, left      CHOLECYSTECTOMY      JOINT REPLACEMENT      TKR , right    JOINT REPLACEMENT  2009    TKR  left    ND EXPLORATORY OF ABDOMEN          Family History  Family History   Problem Relation Age of Onset    COPD Mother     Heart disease Sister         half sister    COPD Sister     No Known Problems Father     Parkinsonism Sister     No Known Problems  Brother     No Known Problems Maternal Aunt     No Known Problems Maternal Uncle     No Known Problems Paternal Aunt     No Known Problems Paternal Uncle     No Known Problems Maternal Grandmother     No Known Problems Maternal Grandfather     No Known Problems Paternal Grandmother     No Known Problems Paternal Grandfather     Amblyopia Neg Hx     Blindness Neg Hx     Cancer Neg Hx     Cataracts Neg Hx     Diabetes Neg Hx     Glaucoma Neg Hx     Hypertension Neg Hx     Macular degeneration Neg Hx     Retinal detachment Neg Hx     Strabismus Neg Hx     Stroke Neg Hx     Thyroid disease Neg Hx        Social History  Social History     Socioeconomic History    Marital status:      Spouse name: Not on file    Number of children: Not on file    Years of education: Not on file    Highest education level: Not on file   Occupational History    Not on file   Social Needs    Financial resource strain: Not on file    Food insecurity:     Worry: Not on file     Inability: Not on file    Transportation needs:     Medical: Not on file     Non-medical: Not on file   Tobacco Use    Smoking status: Never Smoker    Smokeless tobacco: Never Used   Substance and Sexual Activity    Alcohol use: No    Drug use: No    Sexual activity: Yes     Partners: Male   Lifestyle    Physical activity:     Days per week: Not on file     Minutes per session: Not on file    Stress: Not on file   Relationships    Social connections:     Talks on phone: Not on file     Gets together: Not on file     Attends [employer] service: Not on file     Active member of club or organization: Not on file     Attends meetings of clubs or organizations: Not on file     Relationship status: Not on file   Other Topics Concern    Not on file   Social History Narrative    Not on file       Current Medications  Current Outpatient Medications on File Prior to Visit   Medication Sig Dispense Refill    CYANOCOBALAMIN, VITAMIN  B-12, (VITAMIN B-12 ORAL) Take 2,500 mcg by mouth every evening.      diclofenac sodium (VOLTAREN) 1 % Gel Apply 2 g topically once daily. 100 g 3    fish oil-omega-3 fatty acids 300-1,000 mg capsule Take 1 g by mouth once daily.      flecainide (TAMBOCOR) 100 MG Tab Take 1 tablet (100 mg total) by mouth every 12 (twelve) hours. 180 tablet 1    multivitamin (THERAGRAN) per tablet Take 1 tablet by mouth every evening. 1 Tablet Oral Every day      oxybutynin (DITROPAN) 5 MG Tab TAKE 1 TABLET THREE TIMES DAILY 270 tablet 11    warfarin (COUMADIN) 5 MG tablet Take 0.5-1 tablets (2.5-5 mg total) by mouth Daily. As directed by coumadin clinic 90 tablet 3     No current facility-administered medications on file prior to visit.        Allergies   Review of patient's allergies indicates:   Allergen Reactions    Celebrex [celecoxib]      Chest tightness    Lipitor [atorvastatin] Other (See Comments)       Review of Systems   Constitutional: Negative for fever and unexpected weight change.   Respiratory: Negative for shortness of breath.    Cardiovascular: Negative for chest pain.   Gastrointestinal: Positive for abdominal pain. Negative for constipation and diarrhea.   Genitourinary: Positive for pelvic pain, vaginal discharge and vaginal pain. Negative for dysuria.     /74 (BP Location: Left arm, Patient Position: Sitting, BP Method: Medium (Automatic))   Pulse (!) 57   Temp 98 °F (36.7 °C) (Oral)   Resp 16   LMP  (LMP Unknown)   SpO2 95%     Physical Exam   Constitutional: She appears well-developed and well-nourished.   HENT:   Head: Normocephalic and atraumatic.   Right Ear: External ear normal.   Left Ear: External ear normal.   Nose: Nose normal.   Mouth/Throat: Oropharynx is clear and moist. No oropharyngeal exudate.   Eyes: Pupils are equal, round, and reactive to light. Conjunctivae and EOM are normal. Right eye exhibits no discharge. Left eye exhibits no discharge.   Neck: Normal range of motion.  Neck supple. No tracheal deviation present.   Cardiovascular: Normal rate, regular rhythm, normal heart sounds and intact distal pulses.   No murmur heard.  Pulmonary/Chest: Effort normal and breath sounds normal. She has no wheezes. She has no rales.   Abdominal: Soft. Bowel sounds are normal. She exhibits no mass. There is tenderness in the right lower quadrant and suprapubic area. There is rebound. There is no rigidity, no guarding and no CVA tenderness. A hernia (umbilical, and is reducible) is present.   Musculoskeletal:        Right lower leg: She exhibits edema (1+).        Left lower leg: She exhibits edema (1+ ).   Lymphadenopathy:     She has no cervical adenopathy.   Psychiatric: She has a normal mood and affect.   Vitals reviewed.        Assessment/Plan:  Alaina was seen today for vaginal discharge.    Diagnoses and all orders for this visit:    Vaginal discharge  Appointment with GYN made for patient for tomorrow.  Patient agrees to keep appointment.    Pelvic pain  -     CT Abdomen Pelvis With Contrast; Future  Will get CT given complaint and exam finding, to evaluate for internal pathology    PAF (paroxysmal atrial fibrillation)  Management as per cardio.    Chronic kidney disease, stage 3  -     Comprehensive metabolic panel; Future  -     PTH, intact; Future  Check labs.    Dyslipidemia  -     Lipid panel; Future  Last lipids elevated, will recheck              -Juanito Butt Jr., MD, AAHIVS          This office note has been dictated.  This dictation has been generated using M-Modal Fluency Direct dictation; some phonetic errors may occur.

## 2019-12-17 NOTE — PROGRESS NOTES
Patient reports that she held a  Dose of coumadin  on 12/16  Large serving of Broccoli 12/16; CT scheduled for blood in urine 12/17  NO other changes

## 2019-12-18 ENCOUNTER — TELEPHONE (OUTPATIENT)
Dept: FAMILY MEDICINE | Facility: CLINIC | Age: 79
End: 2019-12-18

## 2019-12-18 ENCOUNTER — OFFICE VISIT (OUTPATIENT)
Dept: OBSTETRICS AND GYNECOLOGY | Facility: CLINIC | Age: 79
End: 2019-12-18
Payer: MEDICARE

## 2019-12-18 VITALS — BODY MASS INDEX: 39.94 KG/M2 | DIASTOLIC BLOOD PRESSURE: 88 MMHG | SYSTOLIC BLOOD PRESSURE: 126 MMHG | WEIGHT: 240 LBS

## 2019-12-18 DIAGNOSIS — Z01.419 ENCOUNTER FOR GYNECOLOGICAL EXAMINATION WITHOUT ABNORMAL FINDING: Primary | ICD-10-CM

## 2019-12-18 DIAGNOSIS — N95.0 PMB (POSTMENOPAUSAL BLEEDING): ICD-10-CM

## 2019-12-18 DIAGNOSIS — Z12.39 SCREENING BREAST EXAMINATION: ICD-10-CM

## 2019-12-18 PROCEDURE — 3079F DIAST BP 80-89 MM HG: CPT | Mod: HCNC,CPTII,S$GLB, | Performed by: OBSTETRICS & GYNECOLOGY

## 2019-12-18 PROCEDURE — 3074F PR MOST RECENT SYSTOLIC BLOOD PRESSURE < 130 MM HG: ICD-10-PCS | Mod: HCNC,CPTII,S$GLB, | Performed by: OBSTETRICS & GYNECOLOGY

## 2019-12-18 PROCEDURE — 99999 PR PBB SHADOW E&M-EST. PATIENT-LVL III: ICD-10-PCS | Mod: PBBFAC,HCNC,, | Performed by: OBSTETRICS & GYNECOLOGY

## 2019-12-18 PROCEDURE — 3079F PR MOST RECENT DIASTOLIC BLOOD PRESSURE 80-89 MM HG: ICD-10-PCS | Mod: HCNC,CPTII,S$GLB, | Performed by: OBSTETRICS & GYNECOLOGY

## 2019-12-18 PROCEDURE — G0101 PR CA SCREEN;PELVIC/BREAST EXAM: ICD-10-PCS | Mod: HCNC,S$GLB,, | Performed by: OBSTETRICS & GYNECOLOGY

## 2019-12-18 PROCEDURE — G0101 CA SCREEN;PELVIC/BREAST EXAM: HCPCS | Mod: HCNC,S$GLB,, | Performed by: OBSTETRICS & GYNECOLOGY

## 2019-12-18 PROCEDURE — 3074F SYST BP LT 130 MM HG: CPT | Mod: HCNC,CPTII,S$GLB, | Performed by: OBSTETRICS & GYNECOLOGY

## 2019-12-18 PROCEDURE — 99999 PR PBB SHADOW E&M-EST. PATIENT-LVL III: CPT | Mod: PBBFAC,HCNC,, | Performed by: OBSTETRICS & GYNECOLOGY

## 2019-12-18 NOTE — PROGRESS NOTES
Subjective:       Patient ID: Alaina Vee is a 79 y.o. female.    Chief Complaint:  Vaginal Discharge      History of Present Illness  HPI  Annual Exam-Postmenopausal  Patient presents for annual exam. The patient is not sexually active. GYN screening history: last pap: was normal and last mammogram: was normal. The patient is not taking hormone replacement therapy. Patient denies post-menopausal vaginal bleeding. The patient wears seatbelts: yes. The patient participates in regular exercise: no. Has the patient ever been transfused or tattooed?: not asked. The patient reports that there is not domestic violence in her life.      Today, reports that she has been having brown vaginal discharge.   Denies pelvic pain.  No vaginal odor.    Is trying to lose weight.       GYN & OB History  No LMP recorded (lmp unknown). Patient is postmenopausal.   Date of Last Pap: No result found    OB History    Para Term  AB Living   1 1 1         SAB TAB Ectopic Multiple Live Births                  # Outcome Date GA Lbr Norman/2nd Weight Sex Delivery Anes PTL Lv   1 Term                Review of Systems  Review of Systems   Constitutional: Positive for fatigue and unexpected weight change.   HENT: Negative.    Eyes: Negative.    Respiratory: Positive for shortness of breath and wheezing.    Cardiovascular: Positive for palpitations.   Gastrointestinal: Positive for abdominal pain, bloating, constipation and diarrhea.   Endocrine: Negative.    Genitourinary: Positive for vaginal discharge.   Musculoskeletal: Positive for arthralgias and back pain.   Integumentary:  Negative.   Neurological: Negative.    Hematological: Negative.    Psychiatric/Behavioral: Positive for depression and sleep disturbance.   Breast: negative.            Objective:    Physical Exam:   Constitutional: She is oriented to person, place, and time. She appears well-nourished.    HENT:   Head: Normocephalic and atraumatic.    Eyes: EOM are  normal. Right eye exhibits normal extraocular motion. Left eye exhibits normal extraocular motion.    Neck: Neck supple. No thyromegaly present.    Cardiovascular: Normal rate.     Pulmonary/Chest: Effort normal. No respiratory distress. Right breast exhibits no mass, no skin change and no tenderness. Left breast exhibits no mass, no skin change and no tenderness. Breasts are symmetrical.        Abdominal: Soft. She exhibits no distension and no mass. There is no tenderness. A hernia (umbilical) is present.     Genitourinary: Vagina normal and uterus normal. Pelvic exam was performed with patient prone. There is no rash or lesion on the right labia. There is no rash or lesion on the left labia. Uterus is not tender. Cervix is normal. Right adnexum displays no tenderness and no fullness. Left adnexum displays no tenderness and no fullness. No bleeding in the vagina. No vaginal discharge found. Labial bartholins normal.Cervix exhibits no motion tenderness and no friability.           Musculoskeletal: Normal range of motion.      Lymphadenopathy:     She has no cervical adenopathy.        Right: No inguinal adenopathy present.        Left: No inguinal adenopathy present.    Neurological: She is oriented to person, place, and time.   Cranial Nerves II-XII grossly intact.    Skin: No rash noted. No erythema.    Psychiatric: She has a normal mood and affect. Her behavior is normal.          Assessment:        1. Encounter for gynecological examination without abnormal finding    2. Screening breast examination    3. PMB (postmenopausal bleeding)                Plan:      1. Encounter for gynecological examination without abnormal finding  - Pap no longer indicated.  Has had normal screening  -   Screening tests as ordered.  - Seat belt use encouraged.  Reviewed ASCCP Pap guidelines and screening recommendations.    Counseling: Obesity Counseling: Weight Watchers, Food diary, Portion control, high calorie drinks, etc,  Importance of adequate sleep and Twenty calories a day is 2 pounds a year and it adds up  Perimenopause/Menopause  Stress management techniques  indications for and frequency of periodic gynecologic exam        2. Screening breast examination  - Self breast exams encouraged     3. PMB (postmenopausal bleeding)  - Pelvic ultrasound ordered to assess EMS as brown discharge could be vaginal bleeding.    - Will call patient with results.  - Patient instructed to call office if she has not heard anything 2 wks after ultrasound.   - US Transvaginal Non OB; Future

## 2019-12-18 NOTE — TELEPHONE ENCOUNTER
I called the number on file.  However  states that patient is currently with another provider and looking in her chart she is seeing the gynecologist.  I informed him that I was calling in regards to her CT scan from yesterday showing the kidney stone.  I informed him that I would call back in about an hour or two

## 2019-12-19 ENCOUNTER — ANTI-COAG VISIT (OUTPATIENT)
Dept: CARDIOLOGY | Facility: CLINIC | Age: 79
End: 2019-12-19
Payer: MEDICARE

## 2019-12-19 ENCOUNTER — TELEPHONE (OUTPATIENT)
Dept: FAMILY MEDICINE | Facility: CLINIC | Age: 79
End: 2019-12-19

## 2019-12-19 ENCOUNTER — LAB VISIT (OUTPATIENT)
Dept: LAB | Facility: HOSPITAL | Age: 79
End: 2019-12-19
Attending: INTERNAL MEDICINE
Payer: MEDICARE

## 2019-12-19 DIAGNOSIS — Q62.11 HYDRONEPHROSIS WITH URETEROPELVIC JUNCTION (UPJ) OBSTRUCTION: Primary | ICD-10-CM

## 2019-12-19 DIAGNOSIS — Z79.01 LONG TERM (CURRENT) USE OF ANTICOAGULANTS: ICD-10-CM

## 2019-12-19 LAB
INR PPP: 1.7 (ref 0.8–1.2)
PROTHROMBIN TIME: 18.1 SEC (ref 9–12.5)

## 2019-12-19 PROCEDURE — 85610 PROTHROMBIN TIME: CPT | Mod: HCNC

## 2019-12-19 PROCEDURE — 93793 ANTICOAG MGMT PT WARFARIN: CPT | Mod: S$GLB,,, | Performed by: PHARMACIST

## 2019-12-19 PROCEDURE — 36415 COLL VENOUS BLD VENIPUNCTURE: CPT | Mod: HCNC,PN

## 2019-12-19 PROCEDURE — 93793 PR ANTICOAGULANT MGMT FOR PT TAKING WARFARIN: ICD-10-PCS | Mod: S$GLB,,, | Performed by: PHARMACIST

## 2019-12-19 NOTE — PROGRESS NOTES
Resumed taking coumadin 2.5mg on WED  Bleeding has stopped w/ bladder as of WED as well  NO other new changes

## 2019-12-19 NOTE — TELEPHONE ENCOUNTER
I once again attempted to reach patient.  However  states that she was not available because she was at a club meeting.  I informed him of results showing a stone requiring urology evaluation.  He states that he will let his wife know.  Referral placed

## 2019-12-23 ENCOUNTER — OFFICE VISIT (OUTPATIENT)
Dept: UROLOGY | Facility: CLINIC | Age: 79
End: 2019-12-23
Payer: MEDICARE

## 2019-12-23 ENCOUNTER — ANTI-COAG VISIT (OUTPATIENT)
Dept: CARDIOLOGY | Facility: CLINIC | Age: 79
End: 2019-12-23
Payer: MEDICARE

## 2019-12-23 ENCOUNTER — LAB VISIT (OUTPATIENT)
Dept: LAB | Facility: HOSPITAL | Age: 79
End: 2019-12-23
Attending: INTERNAL MEDICINE
Payer: MEDICARE

## 2019-12-23 VITALS
BODY MASS INDEX: 39.99 KG/M2 | WEIGHT: 240 LBS | DIASTOLIC BLOOD PRESSURE: 70 MMHG | SYSTOLIC BLOOD PRESSURE: 110 MMHG | HEIGHT: 65 IN

## 2019-12-23 DIAGNOSIS — N13.30 HYDRONEPHROSIS OF RIGHT KIDNEY: ICD-10-CM

## 2019-12-23 DIAGNOSIS — N20.1 RIGHT URETERAL STONE: Primary | ICD-10-CM

## 2019-12-23 DIAGNOSIS — N20.0 NEPHROLITHIASIS: ICD-10-CM

## 2019-12-23 DIAGNOSIS — Z79.01 LONG TERM (CURRENT) USE OF ANTICOAGULANTS: ICD-10-CM

## 2019-12-23 LAB
BILIRUB SERPL-MCNC: NORMAL MG/DL
BLOOD URINE, POC: 50
COLOR, POC UA: YELLOW
GLUCOSE UR QL STRIP: NORMAL
INR PPP: 1.5 (ref 0.8–1.2)
KETONES UR QL STRIP: NORMAL
LEUKOCYTE ESTERASE URINE, POC: NORMAL
NITRITE, POC UA: NORMAL
PH, POC UA: 6
PROTEIN, POC: NORMAL
PROTHROMBIN TIME: 16 SEC (ref 9–12.5)
SPECIFIC GRAVITY, POC UA: 1010
UROBILINOGEN, POC UA: NORMAL

## 2019-12-23 PROCEDURE — 1159F PR MEDICATION LIST DOCUMENTED IN MEDICAL RECORD: ICD-10-PCS | Mod: HCNC,S$GLB,, | Performed by: NURSE PRACTITIONER

## 2019-12-23 PROCEDURE — 81001 POCT URINALYSIS, DIPSTICK OR TABLET REAGENT, AUTOMATED, WITH MICROSCOP: ICD-10-PCS | Mod: HCNC,S$GLB,, | Performed by: NURSE PRACTITIONER

## 2019-12-23 PROCEDURE — 1101F PT FALLS ASSESS-DOCD LE1/YR: CPT | Mod: HCNC,CPTII,S$GLB, | Performed by: NURSE PRACTITIONER

## 2019-12-23 PROCEDURE — 99214 OFFICE O/P EST MOD 30 MIN: CPT | Mod: HCNC,25,S$GLB, | Performed by: NURSE PRACTITIONER

## 2019-12-23 PROCEDURE — 36415 COLL VENOUS BLD VENIPUNCTURE: CPT | Mod: HCNC,PN

## 2019-12-23 PROCEDURE — 1101F PR PT FALLS ASSESS DOC 0-1 FALLS W/OUT INJ PAST YR: ICD-10-PCS | Mod: HCNC,CPTII,S$GLB, | Performed by: NURSE PRACTITIONER

## 2019-12-23 PROCEDURE — 99999 PR PBB SHADOW E&M-EST. PATIENT-LVL III: ICD-10-PCS | Mod: PBBFAC,HCNC,, | Performed by: NURSE PRACTITIONER

## 2019-12-23 PROCEDURE — 93793 PR ANTICOAGULANT MGMT FOR PT TAKING WARFARIN: ICD-10-PCS | Mod: S$GLB,,,

## 2019-12-23 PROCEDURE — 1159F MED LIST DOCD IN RCRD: CPT | Mod: HCNC,S$GLB,, | Performed by: NURSE PRACTITIONER

## 2019-12-23 PROCEDURE — 1126F AMNT PAIN NOTED NONE PRSNT: CPT | Mod: HCNC,S$GLB,, | Performed by: NURSE PRACTITIONER

## 2019-12-23 PROCEDURE — 99999 PR PBB SHADOW E&M-EST. PATIENT-LVL III: CPT | Mod: PBBFAC,HCNC,, | Performed by: NURSE PRACTITIONER

## 2019-12-23 PROCEDURE — 3074F PR MOST RECENT SYSTOLIC BLOOD PRESSURE < 130 MM HG: ICD-10-PCS | Mod: HCNC,CPTII,S$GLB, | Performed by: NURSE PRACTITIONER

## 2019-12-23 PROCEDURE — 87086 URINE CULTURE/COLONY COUNT: CPT | Mod: HCNC

## 2019-12-23 PROCEDURE — 99214 PR OFFICE/OUTPT VISIT, EST, LEVL IV, 30-39 MIN: ICD-10-PCS | Mod: HCNC,25,S$GLB, | Performed by: NURSE PRACTITIONER

## 2019-12-23 PROCEDURE — 3074F SYST BP LT 130 MM HG: CPT | Mod: HCNC,CPTII,S$GLB, | Performed by: NURSE PRACTITIONER

## 2019-12-23 PROCEDURE — 81001 URINALYSIS AUTO W/SCOPE: CPT | Mod: HCNC,S$GLB,, | Performed by: NURSE PRACTITIONER

## 2019-12-23 PROCEDURE — 3078F PR MOST RECENT DIASTOLIC BLOOD PRESSURE < 80 MM HG: ICD-10-PCS | Mod: HCNC,CPTII,S$GLB, | Performed by: NURSE PRACTITIONER

## 2019-12-23 PROCEDURE — 93793 ANTICOAG MGMT PT WARFARIN: CPT | Mod: S$GLB,,,

## 2019-12-23 PROCEDURE — 3078F DIAST BP <80 MM HG: CPT | Mod: HCNC,CPTII,S$GLB, | Performed by: NURSE PRACTITIONER

## 2019-12-23 PROCEDURE — 85610 PROTHROMBIN TIME: CPT | Mod: HCNC

## 2019-12-23 PROCEDURE — 1126F PR PAIN SEVERITY QUANTIFIED, NO PAIN PRESENT: ICD-10-PCS | Mod: HCNC,S$GLB,, | Performed by: NURSE PRACTITIONER

## 2019-12-23 NOTE — LETTER
December 23, 2019      Juanito Butt Jr., MD  [address] [facility]  [place] [address]           [facility]  [address]  [facility] [place] [address]  Phone: [phone]  Fax: [phone]          Patient: Alaina Vee   MR Number: [id number]   YOB: 1940   Date of Visit: 12/23/2019       Dear Dr. Juanito Butt Jr.:    Thank you for referring Alaina Vee to me for evaluation. Attached you will find relevant portions of my assessment and plan of care.    If you have questions, please do not hesitate to call me. I look forward to following Alaina Vee along with you.    Sincerely,    Mary Gomez, JACIEL    Enclosure  CC:  No Recipients    If you would like to receive this communication electronically, please contact [email or web address] or [phone] to request more information on [employer] Link access.    For providers and/or their staff who would like to refer a patient to [facility], please contact us through our one-stop-shop provider referral line, [facility], at [phone].    If you feel you have received this communication in error or would no longer like to receive these types of communications, please e-mail [email or web address]

## 2019-12-23 NOTE — PROGRESS NOTES
Subjective:       Patient ID: Alaina Vee is a 79 y.o. female who was last seen in this office 4/29/19    Chief Complaint:   Chief Complaint   Patient presents with    Other     Pt. states she has been ahving a discharge for about a month and got a call telling her she had an appt. with us.. she saying discharged is brown and she has Kidney stones as well ...      She reports issues with recurrent UTIs. She was treated for UTI in 3/16 (100k E coli, pansensitive) and again in 4/16 (100k E coli). She has urinary frequency associated with UTIs. Nocturia x 1-2. She has urgency and UUI at baseline. Also with fecal urgency/incontinence. She was given Ditropan 5mg BID years ago. Also with RICHARD. She has not noted if this has helped. Denies current dysuria. Denies hematuria. No h/o kidney stones.     She has significant LBP issues. She also reports facial and R shoulder/arm/torso pain.     She was treated for UTI after initial visit. She remains on Ditropan IR not up to TID, declined ER formulations. She reports taking another medication for UI from me but I don't have records of this.     SERG (5/16) showed ruslanley 9mm stone in LLP. PVR 2cc. Cytology was negative but noted uric acid crystals.     CT 7/16 - 7mm L UP, 6mm L LP stones and 8mm R renal pelvis stone.   KUB 7/16 - shows 7mm R stone but difficult to see (unsure if truly the stone)    KUB 1/17 - 7mm R renal stone though hard to see (likely overlying 12th rib)    Doing great with Ditropan XL 15mg--medication changed to Ditropan 5mg PO TID per pharmacy request in 2/2018    She started to develop R flank pain and therefore CT scan ordered. CT showed 8mm stone at R UPJ, visible on KUB. She is now s/p R ESWL. Stone was noted to fragment well. She did not note passing any stone and is still having flank pain.     CT with R LP stone, no obstruction. Recently started on Coumadin for a fib.     KUB 1/18 - two left renal calculi  SERG 1/18 - 3mm and 5mm L calculi -  hydro resolved    100% CaOx mono - stone passed spontaneously. Pain present for 1 hour at that time (R side).     She was evaluated in 12/2018 for gross hematuria. She is now s/p hematuria workup (Ct urogram/cystoscopy/UCx) was essentially negative. She has not had any further occurrences of gross hematuria.    Cytology 12/2018--urothelial atypia    12/23/19  CT scan ordered last week by PCP due to pelvic pain and vaginal discharge. Imaging showed 8 mm R UPJ obstructing stone, bilateral non obstructing stones. She has also seen GYN for further evaluation of vaginal discharge--pelvic ultrasound ordered but not done yet. She is not having any flank pain. She was unaware that she had an obstructing stone. Denies fever, gross hematuria or n/v.    Cr 12/17/19--1.1    ACTIVE MEDICAL ISSUES:  Patient Active Problem List   Diagnosis    Essential hypertension    Ventral hernia    UI (urinary incontinence)    Mixed hyperlipidemia    Venous stasis of lower extremity    GERD (gastroesophageal reflux disease)    Central stenosis of spinal canal    Weakness of both hips    Segmental dysfunction of lumbar region    Recurrent UTI    Mixed incontinence urge and stress    Cervical radicular pain    Morbid obesity    SULEMA (obstructive sleep apnea)    Long term (current) use of anticoagulants    PAF (paroxysmal atrial fibrillation)    Atrial flutter    Degenerative disc disease, lumbar    Chronic bilateral low back pain without sciatica    Nuclear sclerosis of both eyes    Refractive error    Status post arthroscopic surgery of right knee    Painful total knee replacement, right    Aortic root dilatation    Arthritis of midfoot    DVT of deep femoral vein, right    Hypercoagulable state       ALLERGIES AND MEDICATIONS: updated and reviewed.  Review of patient's allergies indicates:   Allergen Reactions    Celebrex [celecoxib]      Chest tightness    Lipitor [atorvastatin] Other (See Comments)     Current  "Outpatient Medications   Medication Sig    CYANOCOBALAMIN, VITAMIN B-12, (VITAMIN B-12 ORAL) Take 2,500 mcg by mouth every evening.    diclofenac sodium (VOLTAREN) 1 % Gel Apply 2 g topically once daily.    fish oil-omega-3 fatty acids 300-1,000 mg capsule Take 1 g by mouth once daily.    flecainide (TAMBOCOR) 100 MG Tab Take 1 tablet (100 mg total) by mouth every 12 (twelve) hours.    multivitamin (THERAGRAN) per tablet Take 1 tablet by mouth every evening. 1 Tablet Oral Every day    oxybutynin (DITROPAN) 5 MG Tab TAKE 1 TABLET THREE TIMES DAILY    warfarin (COUMADIN) 5 MG tablet Take 0.5-1 tablets (2.5-5 mg total) by mouth Daily. As directed by coumadin clinic     No current facility-administered medications for this visit.        Review of Systems   Constitutional: Negative for activity change, chills, fatigue, fever and unexpected weight change.   Eyes: Negative for discharge, redness and visual disturbance.   Respiratory: Negative for cough, shortness of breath and wheezing.    Cardiovascular: Negative for chest pain and leg swelling.   Gastrointestinal: Negative for abdominal distention, abdominal pain, constipation, diarrhea, nausea and vomiting.   Genitourinary: Negative for decreased urine volume, difficulty urinating, dysuria, flank pain, frequency, hematuria, pelvic pain and urgency.   Musculoskeletal: Negative for arthralgias, joint swelling and myalgias.   Skin: Negative for color change and rash.   Neurological: Negative for dizziness and light-headedness.   Psychiatric/Behavioral: Negative for behavioral problems and confusion. The patient is not nervous/anxious.        Objective:      Vitals:    12/23/19 1005   BP: 110/70   Weight: 108.9 kg (240 lb)   Height: 5' 5" (1.651 m)     Physical Exam   Constitutional: She is oriented to person, place, and time. She appears well-developed.   HENT:   Head: Normocephalic and atraumatic.   Nose: Nose normal.   Eyes: Conjunctivae are normal. Right eye " exhibits no discharge. Left eye exhibits no discharge.   Neck: Normal range of motion. Neck supple. No tracheal deviation present. No thyromegaly present.   Cardiovascular: Normal rate and regular rhythm.    Pulmonary/Chest: Effort normal. No respiratory distress. She has no wheezes.   Abdominal: Soft. She exhibits no distension. There is no hepatosplenomegaly. There is no tenderness. There is no CVA tenderness. No hernia.   Genitourinary:   Genitourinary Comments: Patient declined exam   Musculoskeletal: Normal range of motion. She exhibits no edema.   Neurological: She is alert and oriented to person, place, and time.   Skin: Skin is warm and dry. No rash noted. No erythema.     Psychiatric: She has a normal mood and affect. Her behavior is normal. Judgment normal.       Urine dipstick shows 50 RBCs.    Narrative     EXAMINATION:  CT ABDOMEN PELVIS WITH CONTRAST    CLINICAL HISTORY:  Pelvic pain; Pelvic and perineal pain    TECHNIQUE:  Low dose axial images, sagittal and coronal reformations were obtained from the lung bases to the pubic symphysis following the IV administration of 75 mL of Omnipaque 350 .  Oral contrast was not given.    COMPARISON:  Renal ultrasound 10/22/2019 and CT urogram 01/14/2019    FINDINGS:  Imaged lung bases show mild dependent atelectasis and scattered bandlike opacities consistent with platelike scarring versus atelectasis.  Base of the heart is within normal limits.    Cholecystectomy.  No biliary ductal dilatation.  Liver is enlarged without focal process seen.  Pancreas, spleen, stomach, duodenum and bilateral adrenal glands are within normal limits.    Bilateral kidneys are stable in size, shape and location with relatively symmetric normal enhancement.  Mild right-sided hydronephrosis secondary to a 8 mm calculus at the ureteropelvic junction (UPJ.  Additional punctate nephrolith at the right renal lower pole.  A few small nephroliths at the left kidney which appear unchanged.   No left hydronephrosis.  Right renal lower pole subcentimeter hypoattenuating cortical focus which is too small to characterize, not significantly changed.  Ureters are otherwise normal in course and caliber.  Urinary bladder is suboptimally distended.  Uterus and bilateral adnexal regions are within normal limits.  Pelvic phleboliths noted.    No ascites, free air or lymphadenopathy.  Abdominal aorta is minimally atherosclerotic and ectatic without aneurysm or dissection.    Small fat containing umbilical hernia.  Small fat containing left inguinal hernia.    Appendix is not identified; however, no pericecal inflammatory change.  Terminal ileum is within normal limits.  Multiple scattered colonic diverticula without focal diverticulitis.  No evidence of bowel obstruction or inflammation.  No pneumatosis or portal venous gas.    Osseous structures appear grossly stable without acute process seen.   Impression       Mild right-sided hydronephrosis secondary to an 8 mm calculus at the UPJ.  Additional bilateral nephroliths.    Hepatomegaly.    Cholecystectomy.    Diverticulosis coli without diverticulitis.    Grossly stable several additional findings as above.      Electronically signed by: Miky Redmond MD  Date: 12/17/2019  Time: 18:56     Reviewed with patient    Assessment:       1. Right ureteral stone    2. Hydronephrosis of right kidney    3. Nephrolithiasis          Plan:       1. Right ureteral stone  -Recent CT scan--8 mm R UPJ stone with mild hydronephrosis  -Discussed treatment of ureteral stone with ureteroscopy. Patient agrees to proceed with this  -Will contact patient at later time with possible dates for procedure. She will need medical clearance to stop Coumadin prior to procedure. She verbalizes an understanding  -Discussed red flags with patient. Cautioned patient to present to ED with uncontrolled pain, n/v or fever  - POCT urinalysis, dipstick or tablet reag  - Urine culture; Future  - Urine  culture    2. Hydronephrosis of right kidney  -secondary to R UPJ stone    3. Nephrolithiasis   - s/p R ESWL on 2/27/17 - stone with excellent response   - KUB post-op - residual stone    - CT - no obstructing stones, R LP noted   - SERG/KUB - R hydro resolved. Two stones in L kidney  -CT 12/2019--bilateral non obstructing stones        Follow up in about 4 weeks (around 1/20/2020).

## 2019-12-24 NOTE — PROGRESS NOTES
INR not at goal - will boost only slightly 2/2 recent high INR and bleeding. Medications, chart, and patient findings reviewed. See calendar for adjustments to dose and follow up plan.

## 2019-12-25 LAB — BACTERIA UR CULT: NORMAL

## 2019-12-26 ENCOUNTER — TELEPHONE (OUTPATIENT)
Dept: UROLOGY | Facility: CLINIC | Age: 79
End: 2019-12-26

## 2019-12-26 DIAGNOSIS — N20.1 RIGHT URETERAL STONE: Primary | ICD-10-CM

## 2019-12-26 DIAGNOSIS — N20.1 RIGHT URETERAL CALCULUS: ICD-10-CM

## 2019-12-26 DIAGNOSIS — N13.30 HYDRONEPHROSIS OF RIGHT KIDNEY: ICD-10-CM

## 2019-12-26 NOTE — TELEPHONE ENCOUNTER
Pt with 8mm R UPJ stone. She saw Mary recently to discuss treatment. Option for procedure date is Fri 1/3/20 for R ureteroscopy, laser litho, stent. Please check with pt and I can place orders if that date works for her.     She will need medical or cardiac clearance to stop Coumadin prior to procedure (needs to stop 5 days prior to procedure).     She may need longer to obtain clearance - if so could also consider 1/10/20 for procedure.

## 2019-12-26 NOTE — H&P (VIEW-ONLY)
Subjective:       Patient ID: Alaina Vee is a 79 y.o. female who was last seen in this office 4/29/19    Chief Complaint:   No chief complaint on file.    She reports issues with recurrent UTIs. She was treated for UTI in 3/16 (100k E coli, pansensitive) and again in 4/16 (100k E coli). She has urinary frequency associated with UTIs. Nocturia x 1-2. She has urgency and UUI at baseline. Also with fecal urgency/incontinence. She was given Ditropan 5mg BID years ago. Also with RICHARD. She has not noted if this has helped. Denies current dysuria. Denies hematuria. No h/o kidney stones.     She has significant LBP issues. She also reports facial and R shoulder/arm/torso pain.     She was treated for UTI after initial visit. She remains on Ditropan IR not up to TID, declined ER formulations. She reports taking another medication for UI from me but I don't have records of this.     SERG (5/16) showed ruslanley 9mm stone in LLP. PVR 2cc. Cytology was negative but noted uric acid crystals.     CT 7/16 - 7mm L UP, 6mm L LP stones and 8mm R renal pelvis stone.   KUB 7/16 - shows 7mm R stone but difficult to see (unsure if truly the stone)    KUB 1/17 - 7mm R renal stone though hard to see (likely overlying 12th rib)    Doing great with Ditropan XL 15mg--medication changed to Ditropan 5mg PO TID per pharmacy request in 2/2018    She started to develop R flank pain and therefore CT scan ordered. CT showed 8mm stone at R UPJ, visible on KUB. She is now s/p R ESWL. Stone was noted to fragment well. She did not note passing any stone and is still having flank pain.     CT with R LP stone, no obstruction. Recently started on Coumadin for a fib.     KUB 1/18 - two left renal calculi  SERG 1/18 - 3mm and 5mm L calculi - hydro resolved    100% CaOx mono - stone passed spontaneously. Pain present for 1 hour at that time (R side).     She was evaluated in 12/2018 for gross hematuria. She is now s/p hematuria workup (Ct  urogram/cystoscopy/UCx) was essentially negative. She has not had any further occurrences of gross hematuria.    Cytology 12/2018--urothelial atypia    12/23/19  CT scan ordered last week by PCP due to pelvic pain and vaginal discharge. Imaging showed 8 mm R UPJ obstructing stone, bilateral non obstructing stones. She has also seen GYN for further evaluation of vaginal discharge--pelvic ultrasound ordered but not done yet. She is not having any flank pain. She was unaware that she had an obstructing stone. Denies fever, gross hematuria or n/v.    Cr 12/17/19--1.1    ACTIVE MEDICAL ISSUES:  Patient Active Problem List   Diagnosis    Essential hypertension    Ventral hernia    UI (urinary incontinence)    Mixed hyperlipidemia    Venous stasis of lower extremity    GERD (gastroesophageal reflux disease)    Central stenosis of spinal canal    Weakness of both hips    Segmental dysfunction of lumbar region    Recurrent UTI    Mixed incontinence urge and stress    Cervical radicular pain    Morbid obesity    SULEMA (obstructive sleep apnea)    Long term (current) use of anticoagulants    PAF (paroxysmal atrial fibrillation)    Atrial flutter    Degenerative disc disease, lumbar    Chronic bilateral low back pain without sciatica    Nuclear sclerosis of both eyes    Refractive error    Status post arthroscopic surgery of right knee    Painful total knee replacement, right    Aortic root dilatation    Arthritis of midfoot    DVT of deep femoral vein, right    Hypercoagulable state       ALLERGIES AND MEDICATIONS: updated and reviewed.  Review of patient's allergies indicates:   Allergen Reactions    Celebrex [celecoxib]      Chest tightness    Lipitor [atorvastatin] Other (See Comments)     Current Outpatient Medications   Medication Sig    CYANOCOBALAMIN, VITAMIN B-12, (VITAMIN B-12 ORAL) Take 2,500 mcg by mouth every evening.    diclofenac sodium (VOLTAREN) 1 % Gel Apply 2 g topically once  daily.    fish oil-omega-3 fatty acids 300-1,000 mg capsule Take 1 g by mouth once daily.    flecainide (TAMBOCOR) 100 MG Tab Take 1 tablet (100 mg total) by mouth every 12 (twelve) hours.    multivitamin (THERAGRAN) per tablet Take 1 tablet by mouth every evening. 1 Tablet Oral Every day    oxybutynin (DITROPAN) 5 MG Tab TAKE 1 TABLET THREE TIMES DAILY    warfarin (COUMADIN) 5 MG tablet Take 0.5-1 tablets (2.5-5 mg total) by mouth Daily. As directed by coumadin clinic     No current facility-administered medications for this visit.        Review of Systems   Constitutional: Negative for activity change, chills, fatigue, fever and unexpected weight change.   Eyes: Negative for discharge, redness and visual disturbance.   Respiratory: Negative for cough, shortness of breath and wheezing.    Cardiovascular: Negative for chest pain and leg swelling.   Gastrointestinal: Negative for abdominal distention, abdominal pain, constipation, diarrhea, nausea and vomiting.   Genitourinary: Negative for decreased urine volume, difficulty urinating, dysuria, flank pain, frequency, hematuria, pelvic pain and urgency.   Musculoskeletal: Negative for arthralgias, joint swelling and myalgias.   Skin: Negative for color change and rash.   Neurological: Negative for dizziness and light-headedness.   Psychiatric/Behavioral: Negative for behavioral problems and confusion. The patient is not nervous/anxious.        Objective:      There were no vitals filed for this visit.  Physical Exam   Constitutional: She is oriented to person, place, and time. She appears well-developed.   HENT:   Head: Normocephalic and atraumatic.   Nose: Nose normal.   Eyes: Conjunctivae are normal. Right eye exhibits no discharge. Left eye exhibits no discharge.   Neck: Normal range of motion. Neck supple. No tracheal deviation present. No thyromegaly present.   Cardiovascular: Normal rate and regular rhythm.    Pulmonary/Chest: Effort normal. No respiratory  distress. She has no wheezes.   Abdominal: Soft. She exhibits no distension. There is no hepatosplenomegaly. There is no tenderness. There is no CVA tenderness. No hernia.   Genitourinary:   Genitourinary Comments: Patient declined exam   Musculoskeletal: Normal range of motion. She exhibits no edema.   Neurological: She is alert and oriented to person, place, and time.   Skin: Skin is warm and dry. No rash noted. No erythema.     Psychiatric: She has a normal mood and affect. Her behavior is normal. Judgment normal.       Urine dipstick shows 50 RBCs.    Narrative     EXAMINATION:  CT ABDOMEN PELVIS WITH CONTRAST    CLINICAL HISTORY:  Pelvic pain; Pelvic and perineal pain    TECHNIQUE:  Low dose axial images, sagittal and coronal reformations were obtained from the lung bases to the pubic symphysis following the IV administration of 75 mL of Omnipaque 350 .  Oral contrast was not given.    COMPARISON:  Renal ultrasound 10/22/2019 and CT urogram 01/14/2019    FINDINGS:  Imaged lung bases show mild dependent atelectasis and scattered bandlike opacities consistent with platelike scarring versus atelectasis.  Base of the heart is within normal limits.    Cholecystectomy.  No biliary ductal dilatation.  Liver is enlarged without focal process seen.  Pancreas, spleen, stomach, duodenum and bilateral adrenal glands are within normal limits.    Bilateral kidneys are stable in size, shape and location with relatively symmetric normal enhancement.  Mild right-sided hydronephrosis secondary to a 8 mm calculus at the ureteropelvic junction (UPJ.  Additional punctate nephrolith at the right renal lower pole.  A few small nephroliths at the left kidney which appear unchanged.  No left hydronephrosis.  Right renal lower pole subcentimeter hypoattenuating cortical focus which is too small to characterize, not significantly changed.  Ureters are otherwise normal in course and caliber.  Urinary bladder is suboptimally distended.   Uterus and bilateral adnexal regions are within normal limits.  Pelvic phleboliths noted.    No ascites, free air or lymphadenopathy.  Abdominal aorta is minimally atherosclerotic and ectatic without aneurysm or dissection.    Small fat containing umbilical hernia.  Small fat containing left inguinal hernia.    Appendix is not identified; however, no pericecal inflammatory change.  Terminal ileum is within normal limits.  Multiple scattered colonic diverticula without focal diverticulitis.  No evidence of bowel obstruction or inflammation.  No pneumatosis or portal venous gas.    Osseous structures appear grossly stable without acute process seen.   Impression       Mild right-sided hydronephrosis secondary to an 8 mm calculus at the UPJ.  Additional bilateral nephroliths.    Hepatomegaly.    Cholecystectomy.    Diverticulosis coli without diverticulitis.    Grossly stable several additional findings as above.      Electronically signed by: Miky Redmond MD  Date: 12/17/2019  Time: 18:56     Reviewed with patient    Assessment:       No diagnosis found.      Plan:       1. Right ureteral stone  -Recent CT scan--8 mm R UPJ stone with mild hydronephrosis  -Discussed treatment of ureteral stone with ureteroscopy. Patient agrees to proceed with this  -Will contact patient at later time with possible dates for procedure. She will need medical clearance to stop Coumadin prior to procedure. She verbalizes an understanding  -Discussed red flags with patient. Cautioned patient to present to ED with uncontrolled pain, n/v or fever  - POCT urinalysis, dipstick or tablet reag  - Urine culture; Future  - Urine culture    2. Hydronephrosis of right kidney  -secondary to R UPJ stone    3. Nephrolithiasis   - s/p R ESWL on 2/27/17 - stone with excellent response   - KUB post-op - residual stone    - CT - no obstructing stones, R LP noted   - SERG/KUB - R hydro resolved. Two stones in L kidney  -CT 12/2019--bilateral non obstructing  stones        No follow-ups on file.

## 2019-12-30 ENCOUNTER — LAB VISIT (OUTPATIENT)
Dept: LAB | Facility: HOSPITAL | Age: 79
End: 2019-12-30
Attending: INTERNAL MEDICINE
Payer: MEDICARE

## 2019-12-30 ENCOUNTER — ANTI-COAG VISIT (OUTPATIENT)
Dept: CARDIOLOGY | Facility: CLINIC | Age: 79
End: 2019-12-30

## 2019-12-30 DIAGNOSIS — Z79.01 LONG TERM (CURRENT) USE OF ANTICOAGULANTS: ICD-10-CM

## 2019-12-30 LAB
INR PPP: 2.1 (ref 0.8–1.2)
PROTHROMBIN TIME: 22.5 SEC (ref 9–12.5)

## 2019-12-30 PROCEDURE — 85610 PROTHROMBIN TIME: CPT | Mod: HCNC

## 2019-12-30 PROCEDURE — 36415 COLL VENOUS BLD VENIPUNCTURE: CPT | Mod: HCNC,PN

## 2020-01-02 ENCOUNTER — HOSPITAL ENCOUNTER (OUTPATIENT)
Dept: RADIOLOGY | Facility: HOSPITAL | Age: 80
Discharge: HOME | End: 2020-01-02
Attending: OBSTETRICS & GYNECOLOGY
Payer: MEDICARE

## 2020-01-02 DIAGNOSIS — N95.0 PMB (POSTMENOPAUSAL BLEEDING): ICD-10-CM

## 2020-01-02 PROCEDURE — 76830 TRANSVAGINAL US NON-OB: CPT | Mod: TC,HCNC

## 2020-01-02 PROCEDURE — 76830 TRANSVAGINAL US NON-OB: CPT | Mod: 26,HCNC,, | Performed by: RADIOLOGY

## 2020-01-02 PROCEDURE — 76856 US PELVIS COMP WITH TRANSVAG NON-OB (XPD): ICD-10-PCS | Mod: 26,HCNC,, | Performed by: RADIOLOGY

## 2020-01-02 PROCEDURE — 76856 US EXAM PELVIC COMPLETE: CPT | Mod: 26,HCNC,, | Performed by: RADIOLOGY

## 2020-01-02 PROCEDURE — 76830 US PELVIS COMP WITH TRANSVAG NON-OB (XPD): ICD-10-PCS | Mod: 26,HCNC,, | Performed by: RADIOLOGY

## 2020-01-06 ENCOUNTER — HOSPITAL ENCOUNTER (OUTPATIENT)
Dept: PREADMISSION TESTING | Facility: HOSPITAL | Age: 80
Discharge: HOME | End: 2020-01-06
Attending: UROLOGY
Payer: MEDICARE

## 2020-01-06 ENCOUNTER — OFFICE VISIT (OUTPATIENT)
Dept: CARDIOLOGY | Facility: CLINIC | Age: 80
End: 2020-01-06
Payer: MEDICARE

## 2020-01-06 ENCOUNTER — ANTI-COAG VISIT (OUTPATIENT)
Dept: CARDIOLOGY | Facility: CLINIC | Age: 80
End: 2020-01-06
Payer: MEDICARE

## 2020-01-06 VITALS
DIASTOLIC BLOOD PRESSURE: 76 MMHG | WEIGHT: 241.63 LBS | OXYGEN SATURATION: 95 % | HEIGHT: 65 IN | HEART RATE: 68 BPM | RESPIRATION RATE: 15 BRPM | BODY MASS INDEX: 40.26 KG/M2 | SYSTOLIC BLOOD PRESSURE: 132 MMHG

## 2020-01-06 VITALS — WEIGHT: 241.63 LBS | HEIGHT: 65 IN | BODY MASS INDEX: 40.26 KG/M2

## 2020-01-06 DIAGNOSIS — I10 ESSENTIAL HYPERTENSION: ICD-10-CM

## 2020-01-06 DIAGNOSIS — Z01.810 PREOP CARDIOVASCULAR EXAM: Primary | ICD-10-CM

## 2020-01-06 DIAGNOSIS — Z01.818 PREOP TESTING: Primary | ICD-10-CM

## 2020-01-06 DIAGNOSIS — I48.0 PAF (PAROXYSMAL ATRIAL FIBRILLATION): ICD-10-CM

## 2020-01-06 DIAGNOSIS — E78.2 MIXED HYPERLIPIDEMIA: ICD-10-CM

## 2020-01-06 DIAGNOSIS — Z79.01 LONG TERM (CURRENT) USE OF ANTICOAGULANTS: ICD-10-CM

## 2020-01-06 DIAGNOSIS — I77.810 AORTIC ROOT DILATATION: ICD-10-CM

## 2020-01-06 LAB
BASOPHILS # BLD AUTO: 0.09 K/UL (ref 0–0.2)
BASOPHILS NFR BLD: 1.2 % (ref 0–1.9)
DIFFERENTIAL METHOD: NORMAL
EOSINOPHIL # BLD AUTO: 0.2 K/UL (ref 0–0.5)
EOSINOPHIL NFR BLD: 2.5 % (ref 0–8)
ERYTHROCYTE [DISTWIDTH] IN BLOOD BY AUTOMATED COUNT: 13.2 % (ref 11.5–14.5)
HCT VFR BLD AUTO: 43.5 % (ref 37–48.5)
HGB BLD-MCNC: 14.2 G/DL (ref 12–16)
IMM GRANULOCYTES # BLD AUTO: 0.02 K/UL (ref 0–0.04)
IMM GRANULOCYTES NFR BLD AUTO: 0.3 % (ref 0–0.5)
LYMPHOCYTES # BLD AUTO: 1.5 K/UL (ref 1–4.8)
LYMPHOCYTES NFR BLD: 20.1 % (ref 18–48)
MCH RBC QN AUTO: 30 PG (ref 27–31)
MCHC RBC AUTO-ENTMCNC: 32.6 G/DL (ref 32–36)
MCV RBC AUTO: 92 FL (ref 82–98)
MONOCYTES # BLD AUTO: 0.5 K/UL (ref 0.3–1)
MONOCYTES NFR BLD: 6.7 % (ref 4–15)
NEUTROPHILS # BLD AUTO: 5.1 K/UL (ref 1.8–7.7)
NEUTROPHILS NFR BLD: 69.2 % (ref 38–73)
NRBC BLD-RTO: 0 /100 WBC
PLATELET # BLD AUTO: 248 K/UL (ref 150–350)
PMV BLD AUTO: 10.2 FL (ref 9.2–12.9)
RBC # BLD AUTO: 4.74 M/UL (ref 4–5.4)
WBC # BLD AUTO: 7.31 K/UL (ref 3.9–12.7)

## 2020-01-06 PROCEDURE — 3075F PR MOST RECENT SYSTOLIC BLOOD PRESS GE 130-139MM HG: ICD-10-PCS | Mod: HCNC,CPTII,S$GLB, | Performed by: INTERNAL MEDICINE

## 2020-01-06 PROCEDURE — 85025 COMPLETE CBC W/AUTO DIFF WBC: CPT | Mod: HCNC

## 2020-01-06 PROCEDURE — 1126F AMNT PAIN NOTED NONE PRSNT: CPT | Mod: HCNC,S$GLB,, | Performed by: INTERNAL MEDICINE

## 2020-01-06 PROCEDURE — 3078F PR MOST RECENT DIASTOLIC BLOOD PRESSURE < 80 MM HG: ICD-10-PCS | Mod: HCNC,CPTII,S$GLB, | Performed by: INTERNAL MEDICINE

## 2020-01-06 PROCEDURE — 1101F PT FALLS ASSESS-DOCD LE1/YR: CPT | Mod: HCNC,CPTII,S$GLB, | Performed by: INTERNAL MEDICINE

## 2020-01-06 PROCEDURE — 93000 EKG 12-LEAD: ICD-10-PCS | Mod: HCNC,S$GLB,, | Performed by: INTERNAL MEDICINE

## 2020-01-06 PROCEDURE — 99999 PR PBB SHADOW E&M-EST. PATIENT-LVL III: CPT | Mod: PBBFAC,HCNC,, | Performed by: INTERNAL MEDICINE

## 2020-01-06 PROCEDURE — 93000 ELECTROCARDIOGRAM COMPLETE: CPT | Mod: HCNC,S$GLB,, | Performed by: INTERNAL MEDICINE

## 2020-01-06 PROCEDURE — 3078F DIAST BP <80 MM HG: CPT | Mod: HCNC,CPTII,S$GLB, | Performed by: INTERNAL MEDICINE

## 2020-01-06 PROCEDURE — 1126F PR PAIN SEVERITY QUANTIFIED, NO PAIN PRESENT: ICD-10-PCS | Mod: HCNC,S$GLB,, | Performed by: INTERNAL MEDICINE

## 2020-01-06 PROCEDURE — 1159F MED LIST DOCD IN RCRD: CPT | Mod: HCNC,S$GLB,, | Performed by: INTERNAL MEDICINE

## 2020-01-06 PROCEDURE — 99999 PR PBB SHADOW E&M-EST. PATIENT-LVL III: ICD-10-PCS | Mod: PBBFAC,HCNC,, | Performed by: INTERNAL MEDICINE

## 2020-01-06 PROCEDURE — 1159F PR MEDICATION LIST DOCUMENTED IN MEDICAL RECORD: ICD-10-PCS | Mod: HCNC,S$GLB,, | Performed by: INTERNAL MEDICINE

## 2020-01-06 PROCEDURE — 36415 COLL VENOUS BLD VENIPUNCTURE: CPT | Mod: HCNC

## 2020-01-06 PROCEDURE — 1101F PR PT FALLS ASSESS DOC 0-1 FALLS W/OUT INJ PAST YR: ICD-10-PCS | Mod: HCNC,CPTII,S$GLB, | Performed by: INTERNAL MEDICINE

## 2020-01-06 PROCEDURE — 99214 OFFICE O/P EST MOD 30 MIN: CPT | Mod: 25,HCNC,S$GLB, | Performed by: INTERNAL MEDICINE

## 2020-01-06 PROCEDURE — 3075F SYST BP GE 130 - 139MM HG: CPT | Mod: HCNC,CPTII,S$GLB, | Performed by: INTERNAL MEDICINE

## 2020-01-06 PROCEDURE — 99214 PR OFFICE/OUTPT VISIT, EST, LEVL IV, 30-39 MIN: ICD-10-PCS | Mod: 25,HCNC,S$GLB, | Performed by: INTERNAL MEDICINE

## 2020-01-06 NOTE — PROGRESS NOTES
CARDIOVASCULAR PROGRESS NOTE    REASON FOR CONSULT:   Alaina Vee is a 79 y.o. female who presents for f/u of AF/AFL.    PCP: Sierra  EP: Prema  Uro: ezequiel  Heme: Melchor  HISTORY OF PRESENT ILLNESS:   The patient returns for follow-up.  She reports generally stable status without angina or dyspnea.  She apparently does have some hematuria, and was found to have a kidney stone for which ureteroscopy is planned in the near future.  Preoperative cardiac risk stratification has been requested by her urologist, Dr. Weaver.  The patient was able to climb a flight of stairs in the office today without symptoms or limitations.  She otherwise denies angina or dyspnea.  There has been no palpitations, lightheadedness, dizziness, or syncope.  She denies PND, orthopnea, or melena.  She is currently holding her Coumadin in preparation for the ureteroscopy.    CARDIOVASCULAR HISTORY:   AF/AFL, CHADSVASC 3, s/p ablation (Dr. Lloyd) 6/12/17, DCCV 10/4/17  Aortic root dil 3.9 cm (echo 3/2017)  SULEMA    PAST MEDICAL HISTORY:     Past Medical History:   Diagnosis Date    Arthritis     knees    Atrial fibrillation     Atrial flutter     Back pain     Cataract     Degenerative disc disease     Depression     General anesthetics causing adverse effect in therapeutic use     pt states sometimes slow to awaken.    GERD (gastroesophageal reflux disease)     Hyperlipidemia     Hypertension     Kidney stone     Obesity     Sleep apnea     does not wear CPAP    Urinary incontinence     Varicosities     Ventral hernia        PAST SURGICAL HISTORY:     Past Surgical History:   Procedure Laterality Date    APPENDECTOMY      BREAST BIOPSY Bilateral     1985    breast biopsy, left      CHOLECYSTECTOMY      JOINT REPLACEMENT      TKR , right    JOINT REPLACEMENT  2009    TKR  left    SC EXPLORATORY OF ABDOMEN         ALLERGIES AND MEDICATION:   Review of patient's allergies indicates:  No Known  Allergies  Previous Medications    CYANOCOBALAMIN, VITAMIN B-12, (VITAMIN B-12 ORAL)    Take 2,500 mcg by mouth every evening.    DICLOFENAC SODIUM (VOLTAREN) 1 % GEL    Apply 2 g topically once daily.    FISH OIL-OMEGA-3 FATTY ACIDS 300-1,000 MG CAPSULE    Take 1 g by mouth once daily.    FLECAINIDE (TAMBOCOR) 100 MG TAB    Take 1 tablet (100 mg total) by mouth every 12 (twelve) hours.    MULTIVITAMIN (THERAGRAN) PER TABLET    Take 1 tablet by mouth every evening. 1 Tablet Oral Every day    OXYBUTYNIN (DITROPAN) 5 MG TAB    TAKE 1 TABLET THREE TIMES DAILY    WARFARIN (COUMADIN) 5 MG TABLET    Take 0.5-1 tablets (2.5-5 mg total) by mouth Daily. As directed by coumadin clinic       SOCIAL HISTORY:     Social History     Socioeconomic History    Marital status:      Spouse name: Not on file    Number of children: Not on file    Years of education: Not on file    Highest education level: Not on file   Occupational History    Not on file   Social Needs    Financial resource strain: Not on file    Food insecurity:     Worry: Not on file     Inability: Not on file    Transportation needs:     Medical: Not on file     Non-medical: Not on file   Tobacco Use    Smoking status: Never Smoker    Smokeless tobacco: Never Used   Substance and Sexual Activity    Alcohol use: No    Drug use: No    Sexual activity: Yes     Partners: Male   Lifestyle    Physical activity:     Days per week: Not on file     Minutes per session: Not on file    Stress: Not on file   Relationships    Social connections:     Talks on phone: Not on file     Gets together: Not on file     Attends Gnosticist service: Not on file     Active member of club or organization: Not on file     Attends meetings of clubs or organizations: Not on file     Relationship status: Not on file   Other Topics Concern    Not on file   Social History Narrative    Not on file       FAMILY HISTORY:     Family History   Problem Relation Age of Onset     "COPD Mother     Heart disease Sister         half sister    COPD Sister     No Known Problems Father     Parkinsonism Sister     No Known Problems Brother     No Known Problems Maternal Aunt     No Known Problems Maternal Uncle     No Known Problems Paternal Aunt     No Known Problems Paternal Uncle     No Known Problems Maternal Grandmother     No Known Problems Maternal Grandfather     No Known Problems Paternal Grandmother     No Known Problems Paternal Grandfather     Amblyopia Neg Hx     Blindness Neg Hx     Cancer Neg Hx     Cataracts Neg Hx     Diabetes Neg Hx     Glaucoma Neg Hx     Hypertension Neg Hx     Macular degeneration Neg Hx     Retinal detachment Neg Hx     Strabismus Neg Hx     Stroke Neg Hx     Thyroid disease Neg Hx        REVIEW OF SYSTEMS:   Review of Systems   Constitutional: Negative for chills, diaphoresis and fever.   HENT: Negative for nosebleeds.    Eyes: Negative for blurred vision, double vision and photophobia.   Respiratory: Negative for hemoptysis, shortness of breath and wheezing.    Cardiovascular: Positive for leg swelling. Negative for chest pain, palpitations, orthopnea, claudication and PND.   Gastrointestinal: Negative for abdominal pain, blood in stool, heartburn, melena, nausea and vomiting.   Genitourinary: Negative for flank pain and hematuria.   Musculoskeletal: Negative for falls, myalgias and neck pain.   Skin: Negative for rash.   Neurological: Negative for dizziness, seizures, loss of consciousness, weakness and headaches.   Endo/Heme/Allergies: Negative for polydipsia. Does not bruise/bleed easily.   Psychiatric/Behavioral: Negative for depression and memory loss. The patient is not nervous/anxious.        PHYSICAL EXAM:     Vitals:    01/06/20 1435   BP: 132/76   Pulse: 68   Resp: 15    Body mass index is 40.21 kg/m².  Weight: 109.6 kg (241 lb 10 oz)   Height: 5' 5" (165.1 cm)     Physical Exam   Constitutional: She is oriented to person, " place, and time. She appears well-developed and well-nourished. She is cooperative.  Non-toxic appearance. No distress.   HENT:   Head: Normocephalic and atraumatic.   Eyes: Pupils are equal, round, and reactive to light. Conjunctivae and EOM are normal. No scleral icterus.   Neck: Trachea normal and normal range of motion. Neck supple. Normal carotid pulses and no JVD present. Carotid bruit is not present. No neck rigidity. No tracheal deviation and no edema present. No thyromegaly present.   Cardiovascular: Normal rate, regular rhythm, S1 normal and S2 normal. PMI is not displaced. Exam reveals distant heart sounds. Exam reveals no gallop and no friction rub.   No murmur heard.  Pulses:       Carotid pulses are 2+ on the right side, and 2+ on the left side.  Pulmonary/Chest: Effort normal and breath sounds normal. No stridor. No respiratory distress. She has no wheezes. She has no rales. She exhibits no tenderness.   Abdominal: Soft. She exhibits no distension. There is no hepatosplenomegaly.   obese   Musculoskeletal: Normal range of motion. She exhibits edema (L>R). She exhibits no tenderness.   Feet:   Right Foot:   Skin Integrity: Negative for ulcer.   Left Foot:   Skin Integrity: Negative for ulcer.   Neurological: She is alert and oriented to person, place, and time. No cranial nerve deficit.   Skin: Skin is warm and dry. No rash noted. No erythema.   Psychiatric: She has a normal mood and affect. Her speech is normal and behavior is normal.   Vitals reviewed.      DATA:   EKG: (personally reviewed tracing(s))  1/6/20 SR 68, 1st deg AVB (), QRS 86, QTc 442, similar to 3/29/19    Laboratory:  CBC:  Recent Labs   Lab 08/18/17  0838 09/29/17  1110 05/21/19  1555   WBC 5.77 6.80 6.78   Hemoglobin 14.6 13.9 14.4   Hematocrit 44.4 41.8 43.8   Platelets 256 263 256       CHEMISTRIES:  Recent Labs   Lab 01/14/19  1537 05/21/19  1555 12/17/19  1031   Glucose 102 80 94   Sodium 141 140 140   Potassium 4.2 4.0  4.1   BUN, Bld 19 20 14   Creatinine 0.9 1.0 1.1   eGFR if  >60 >60 56 A   eGFR if non  >60 55 A 49 A   Calcium 9.9 9.7 9.9       CARDIAC BIOMARKERS:        COAGS:  Recent Labs   Lab 12/19/19  0840 12/23/19  0849 12/30/19  0833   INR 1.7 H 1.5 H 2.1 H       LIPIDS/LFTS:  Recent Labs   Lab 06/06/17  1635 09/20/18  1057 05/21/19  1555 12/17/19  1031   Cholesterol 158 221 H  --  259 H   Triglycerides 170 H 84  --  149   HDL 35 L 50  --  41   LDL Cholesterol 89.0 154.2  --  188.2 H   Non-HDL Cholesterol 123 171  --  218   AST 13 20 16 17   ALT 11 14 13 11     Lab Results   Component Value Date    TSH 1.372 09/20/2018     Cardiovascular Testing:  RLE venous US 10/28/19  No DVT seen on the current study.  The previously noted nonocclusive thrombus in the right common femoral vein not visualized on the current study    LE venous US/reflux 5/20/19  Partially occlusive thrombus in the right femoral vein.  Patient currently on blood thinners.  No evidence of hemodynamically significant venous reflux (>500 ms) in the left greater and lesser saphenous veins.    Echo 3/29/19 (Ao root 4.1 cm, increased vs report 2/2017 (3.9cm at that time))  Normal left ventricular systolic function. The estimated ejection fraction is 55%  Concentric left ventricular remodeling.  Indeterminate left ventricular diastolic function.    LLE venous US 3/29/19  No evidence of left lower extremity DVT.     EVM 10/5/17-11/4/17  Sinus rhythm with first-degree AV block.  Overall, negative event monitor.    SKINNY 8/31/17    1 - Normal left ventricular systolic function.     2 - Left atrial appendage is single lobed with no visualized thrombus.    3 - Aortic root 3.8cm    Holter 3/7/17  PREDOMINANT RHYTHM  1. Atrial flutter with ventricular rates varying between 29 and 89 bpm with an average of 55 bpm.   VENTRICULAR ARRHYTHMIAS  1. There were no PVCs. There was no ventricular ectopic activity.  SUPRA VENTRICULAR ARRHYTHMIAS  1.  Atrial flutter was noted throughout the study.   AV CONDUCTION  1. There was no evidence of high grade AV emma filtering.   2. The longest RR interval was 2215 msec.   DIARY  1. The diary was not returned  MISCELLANEOUS  1. There were occasional hookup related artifacts. Overall, the study was of good quality.   2. This was a tape of adequate length (24 hrs).    RADHA 7/14/16  Right lower extremity RADHA: 1.06  Left lower extremity RADHA: 1.08    ASSESSMENT:   # Preop for ureteroscopy.  The pt demonstrated good exercise capacity in the office today.  # Hx RLE DVT, now resolved  # PAF/AFL, now in SR.  CHADSVASC 3.  S/p AFL ablation 6/2017, s/p SKINNY/DCCV 8/31/17, DCCV 10/4/17, now on flecainide/coumadin.  EKG stable.  HR too low and MS interval too long for BBL.  # HTN, controlled  # HLP, ?on simva 40mg  # BMI 41, up 1 unit(s) vs last OV  # SULEMA ? On CPAP  # Aortic root dil 3.8->4.1 cm (echo 3/2019)    PLAN:   Cont med rx  Cont flecainide, pt feels better in SR  Diet/exercise/weight loss, pt previously declined bariatric evaluation.  Continuation of coumadin for goal INR 2.0-3.0 (pt not on NOAC d/t cost issues), OK to hold for surgery and hypercoag testing as planned.  RTC 3 months with echo, this does not need to hold up her surgery      Nahid Hidalgo MD, FACC

## 2020-01-06 NOTE — DISCHARGE INSTRUCTIONS
"Your procedure  is scheduled for _1/10/2020_________.    Call [phone] between 2pm and 5pm on _1/9/2020______to find out your arrival time for the day of surgery.    Report to Same Day Surgery Unit at ____ AM on the 2nd floor of the hospital.  Use the front entrance of the hospital.  The front doors of the hospital open promptly at 5:30am.  If you need wheelchair assistance, call [phone] from your cell phone, or call "0" from the courtesy phone in the lobby.    Important instructions:   Do not eat or drink after 12 midnight, including water.  It is okay to brush your teeth.  Do not have gum, candy or mints.     Take only these medications with a small swallow of water on the morning of your surgery ___none___________      Stop taking Coumadin, Aspirin, Ibuprofen, Motrin and Aleve , Fish oil, and Vitamin E for at least 7 days before your surgery. You may use Tylenol unless otherwise instructed by your doctor.           Please shower the night before and the morning of your surgery.       Do not wear make- up, including mascara.     You may wear deodorant only.      Do not wear powder, body lotion or perfume/cologne.     Do not wear any jewelry or have any metal on your body.     You will be asked to remove any dentures or partials for the procedure.     Please bring any documents given to you by your doctor.     If you are going home on the same day of surgery, you must arrange for a family member or a friend to drive you home.  Public transportation is prohibited.  You will not be able to drive home if you were given anesthesia or sedation.     Wear loose fitting clothes allowing for bandages.     Please leave money and valuables home.       You may bring your cell phone.     Call the doctor if fever or illness should occur before your surgery.    Call [phone] to contact us here if needed.  "

## 2020-01-07 NOTE — PROGRESS NOTES
"Per patient, kidney stone removal scheduled on 1/10; DR Hidalgo advise to hold dose 1/6-1/9    Per notation from Dr Hidalgo on 1/6/20, "OK to hold for surgery and hypercoag testing as planned."    When I questioned patient about holdiong for the hypercoag testing she replied that her blood clot dissoved and she may not have the hypercoag test. We will ask that she keep us informed of this matter since coumadin will need to be held prior to this as well.   "

## 2020-01-09 ENCOUNTER — ANESTHESIA EVENT (OUTPATIENT)
Dept: SURGERY | Facility: HOSPITAL | Age: 80
End: 2020-01-09
Payer: MEDICARE

## 2020-01-10 ENCOUNTER — HOSPITAL ENCOUNTER (OUTPATIENT)
Facility: HOSPITAL | Age: 80
Discharge: HOME | End: 2020-01-10
Attending: UROLOGY | Admitting: UROLOGY
Payer: MEDICARE

## 2020-01-10 ENCOUNTER — TELEPHONE (OUTPATIENT)
Dept: UROLOGY | Facility: CLINIC | Age: 80
End: 2020-01-10

## 2020-01-10 ENCOUNTER — ANESTHESIA (OUTPATIENT)
Dept: SURGERY | Facility: HOSPITAL | Age: 80
End: 2020-01-10
Payer: MEDICARE

## 2020-01-10 VITALS
BODY MASS INDEX: 40.26 KG/M2 | HEART RATE: 63 BPM | RESPIRATION RATE: 20 BRPM | SYSTOLIC BLOOD PRESSURE: 148 MMHG | OXYGEN SATURATION: 96 % | HEIGHT: 65 IN | WEIGHT: 241.63 LBS | DIASTOLIC BLOOD PRESSURE: 75 MMHG | TEMPERATURE: 97 F

## 2020-01-10 DIAGNOSIS — N13.30 HYDRONEPHROSIS OF RIGHT KIDNEY: ICD-10-CM

## 2020-01-10 DIAGNOSIS — N20.1 RIGHT URETERAL CALCULUS: Primary | ICD-10-CM

## 2020-01-10 DIAGNOSIS — N20.1 RIGHT URETERAL STONE: ICD-10-CM

## 2020-01-10 LAB
INR PPP: 1.3 (ref 0.8–1.2)
PROTHROMBIN TIME: 13.4 SEC (ref 9–12.5)

## 2020-01-10 PROCEDURE — 52351 CYSTOURETERO & OR PYELOSCOPE: CPT | Mod: HCNC,RT,, | Performed by: UROLOGY

## 2020-01-10 PROCEDURE — D9220A PRA ANESTHESIA: Mod: HCNC,ANES,, | Performed by: ANESTHESIOLOGY

## 2020-01-10 PROCEDURE — 63600175 PHARM REV CODE 636 W HCPCS: Mod: HCNC | Performed by: ANESTHESIOLOGY

## 2020-01-10 PROCEDURE — C1769 GUIDE WIRE: HCPCS | Mod: HCNC | Performed by: UROLOGY

## 2020-01-10 PROCEDURE — C1894 INTRO/SHEATH, NON-LASER: HCPCS | Mod: HCNC | Performed by: UROLOGY

## 2020-01-10 PROCEDURE — 25000003 PHARM REV CODE 250: Mod: HCNC | Performed by: ANESTHESIOLOGY

## 2020-01-10 PROCEDURE — 74420 UROGRAPHY RTRGR +-KUB: CPT | Mod: 26,HCNC,, | Performed by: UROLOGY

## 2020-01-10 PROCEDURE — 76000 PR  FLUOROSCOPE EXAMINATION: ICD-10-PCS | Mod: 26,59,HCNC, | Performed by: UROLOGY

## 2020-01-10 PROCEDURE — D9220A PRA ANESTHESIA: Mod: HCNC,CRNA,, | Performed by: NURSE ANESTHETIST, CERTIFIED REGISTERED

## 2020-01-10 PROCEDURE — 25500020 PHARM REV CODE 255: Mod: HCNC | Performed by: UROLOGY

## 2020-01-10 PROCEDURE — C2617 STENT, NON-COR, TEM W/O DEL: HCPCS | Mod: HCNC | Performed by: UROLOGY

## 2020-01-10 PROCEDURE — 71000039 HC RECOVERY, EACH ADD'L HOUR: Mod: HCNC | Performed by: UROLOGY

## 2020-01-10 PROCEDURE — 63600175 PHARM REV CODE 636 W HCPCS: Mod: HCNC | Performed by: NURSE ANESTHETIST, CERTIFIED REGISTERED

## 2020-01-10 PROCEDURE — 36415 COLL VENOUS BLD VENIPUNCTURE: CPT | Mod: HCNC

## 2020-01-10 PROCEDURE — 76000 FLUOROSCOPY <1 HR PHYS/QHP: CPT | Mod: 26,59,HCNC, | Performed by: UROLOGY

## 2020-01-10 PROCEDURE — 71000033 HC RECOVERY, INTIAL HOUR: Mod: HCNC | Performed by: UROLOGY

## 2020-01-10 PROCEDURE — 52351 PR CYSTO/URETERO/PYELOSCOPY, DX: ICD-10-PCS | Mod: HCNC,RT,, | Performed by: UROLOGY

## 2020-01-10 PROCEDURE — 71000015 HC POSTOP RECOV 1ST HR: Mod: HCNC | Performed by: UROLOGY

## 2020-01-10 PROCEDURE — 25000003 PHARM REV CODE 250: Mod: HCNC | Performed by: NURSE ANESTHETIST, CERTIFIED REGISTERED

## 2020-01-10 PROCEDURE — 74420 PR  X-RAY RETROGRADE PYELOGRAM: ICD-10-PCS | Mod: 26,HCNC,, | Performed by: UROLOGY

## 2020-01-10 PROCEDURE — 52332 CYSTOSCOPY AND TREATMENT: CPT | Mod: 51,HCNC,RT, | Performed by: UROLOGY

## 2020-01-10 PROCEDURE — 36000706: Mod: HCNC | Performed by: UROLOGY

## 2020-01-10 PROCEDURE — 85610 PROTHROMBIN TIME: CPT | Mod: HCNC

## 2020-01-10 PROCEDURE — 52332 PR CYSTOSCOPY,INSERT URETERAL STENT: ICD-10-PCS | Mod: 51,HCNC,RT, | Performed by: UROLOGY

## 2020-01-10 PROCEDURE — D9220A PRA ANESTHESIA: ICD-10-PCS | Mod: HCNC,CRNA,, | Performed by: NURSE ANESTHETIST, CERTIFIED REGISTERED

## 2020-01-10 PROCEDURE — D9220A PRA ANESTHESIA: ICD-10-PCS | Mod: HCNC,ANES,, | Performed by: ANESTHESIOLOGY

## 2020-01-10 PROCEDURE — 37000009 HC ANESTHESIA EA ADD 15 MINS: Mod: HCNC | Performed by: UROLOGY

## 2020-01-10 PROCEDURE — 71000016 HC POSTOP RECOV ADDL HR: Mod: HCNC | Performed by: UROLOGY

## 2020-01-10 PROCEDURE — C1758 CATHETER, URETERAL: HCPCS | Mod: HCNC | Performed by: UROLOGY

## 2020-01-10 PROCEDURE — 63600175 PHARM REV CODE 636 W HCPCS: Mod: HCNC | Performed by: UROLOGY

## 2020-01-10 PROCEDURE — 36000707: Mod: HCNC | Performed by: UROLOGY

## 2020-01-10 PROCEDURE — 37000008 HC ANESTHESIA 1ST 15 MINUTES: Mod: HCNC | Performed by: UROLOGY

## 2020-01-10 DEVICE — STENT URET PERCUFLEX 6FR 24CM
Type: IMPLANTABLE DEVICE | Site: URETER | Status: NON-FUNCTIONAL
Removed: 2020-01-31

## 2020-01-10 RX ORDER — SODIUM CHLORIDE 9 MG/ML
INJECTION, SOLUTION INTRAVENOUS CONTINUOUS
Status: DISCONTINUED | OUTPATIENT
Start: 2020-01-10 | End: 2020-01-10 | Stop reason: HOSPADM

## 2020-01-10 RX ORDER — LIDOCAINE HCL/PF 100 MG/5ML
SYRINGE (ML) INTRAVENOUS
Status: DISCONTINUED | OUTPATIENT
Start: 2020-01-10 | End: 2020-01-10

## 2020-01-10 RX ORDER — PHENYLEPHRINE HYDROCHLORIDE 10 MG/ML
INJECTION INTRAVENOUS
Status: DISCONTINUED | OUTPATIENT
Start: 2020-01-10 | End: 2020-01-10

## 2020-01-10 RX ORDER — FENTANYL CITRATE 50 UG/ML
INJECTION, SOLUTION INTRAMUSCULAR; INTRAVENOUS
Status: DISCONTINUED | OUTPATIENT
Start: 2020-01-10 | End: 2020-01-10

## 2020-01-10 RX ORDER — ACETAMINOPHEN 325 MG/1
650 TABLET ORAL EVERY 4 HOURS PRN
Status: DISCONTINUED | OUTPATIENT
Start: 2020-01-10 | End: 2020-01-10 | Stop reason: HOSPADM

## 2020-01-10 RX ORDER — PHENAZOPYRIDINE HYDROCHLORIDE 100 MG/1
200 TABLET, FILM COATED ORAL 3 TIMES DAILY PRN
Status: DISCONTINUED | OUTPATIENT
Start: 2020-01-10 | End: 2020-01-10 | Stop reason: HOSPADM

## 2020-01-10 RX ORDER — ACETAMINOPHEN 10 MG/ML
1000 INJECTION, SOLUTION INTRAVENOUS ONCE
Status: COMPLETED | OUTPATIENT
Start: 2020-01-10 | End: 2020-01-10

## 2020-01-10 RX ORDER — METOCLOPRAMIDE HYDROCHLORIDE 5 MG/ML
INJECTION INTRAMUSCULAR; INTRAVENOUS
Status: DISCONTINUED | OUTPATIENT
Start: 2020-01-10 | End: 2020-01-10

## 2020-01-10 RX ORDER — ONDANSETRON 2 MG/ML
INJECTION INTRAMUSCULAR; INTRAVENOUS
Status: DISCONTINUED | OUTPATIENT
Start: 2020-01-10 | End: 2020-01-10

## 2020-01-10 RX ORDER — HYDROCODONE BITARTRATE AND ACETAMINOPHEN 5; 325 MG/1; MG/1
1 TABLET ORAL EVERY 4 HOURS PRN
Status: DISCONTINUED | OUTPATIENT
Start: 2020-01-10 | End: 2020-01-10 | Stop reason: HOSPADM

## 2020-01-10 RX ORDER — HYDROMORPHONE HYDROCHLORIDE 2 MG/ML
0.2 INJECTION, SOLUTION INTRAMUSCULAR; INTRAVENOUS; SUBCUTANEOUS EVERY 5 MIN PRN
Status: DISCONTINUED | OUTPATIENT
Start: 2020-01-10 | End: 2020-01-10 | Stop reason: HOSPADM

## 2020-01-10 RX ORDER — GLYCOPYRROLATE 0.2 MG/ML
INJECTION INTRAMUSCULAR; INTRAVENOUS
Status: DISCONTINUED | OUTPATIENT
Start: 2020-01-10 | End: 2020-01-10

## 2020-01-10 RX ORDER — ONDANSETRON 2 MG/ML
4 INJECTION INTRAMUSCULAR; INTRAVENOUS DAILY PRN
Status: DISCONTINUED | OUTPATIENT
Start: 2020-01-10 | End: 2020-01-10 | Stop reason: HOSPADM

## 2020-01-10 RX ORDER — SUCCINYLCHOLINE CHLORIDE 20 MG/ML
INJECTION INTRAMUSCULAR; INTRAVENOUS
Status: DISCONTINUED | OUTPATIENT
Start: 2020-01-10 | End: 2020-01-10

## 2020-01-10 RX ORDER — ROCURONIUM BROMIDE 10 MG/ML
INJECTION, SOLUTION INTRAVENOUS
Status: DISCONTINUED | OUTPATIENT
Start: 2020-01-10 | End: 2020-01-10

## 2020-01-10 RX ORDER — PHENAZOPYRIDINE HYDROCHLORIDE 100 MG/1
200 TABLET, FILM COATED ORAL
Qty: 18 TABLET | Refills: 0 | Status: SHIPPED | OUTPATIENT
Start: 2020-01-10 | End: 2020-01-28 | Stop reason: CLARIF

## 2020-01-10 RX ORDER — ONDANSETRON 2 MG/ML
4 INJECTION INTRAMUSCULAR; INTRAVENOUS EVERY 12 HOURS PRN
Status: DISCONTINUED | OUTPATIENT
Start: 2020-01-10 | End: 2020-01-10 | Stop reason: HOSPADM

## 2020-01-10 RX ORDER — PROPOFOL 10 MG/ML
VIAL (ML) INTRAVENOUS
Status: DISCONTINUED | OUTPATIENT
Start: 2020-01-10 | End: 2020-01-10

## 2020-01-10 RX ORDER — SODIUM CHLORIDE 0.9 % (FLUSH) 0.9 %
10 SYRINGE (ML) INJECTION
Status: DISCONTINUED | OUTPATIENT
Start: 2020-01-10 | End: 2020-01-10 | Stop reason: HOSPADM

## 2020-01-10 RX ORDER — LIDOCAINE HYDROCHLORIDE 10 MG/ML
1 INJECTION, SOLUTION EPIDURAL; INFILTRATION; INTRACAUDAL; PERINEURAL ONCE
Status: COMPLETED | OUTPATIENT
Start: 2020-01-10 | End: 2020-01-10

## 2020-01-10 RX ORDER — HYDROCODONE BITARTRATE AND ACETAMINOPHEN 5; 325 MG/1; MG/1
1 TABLET ORAL EVERY 6 HOURS PRN
Qty: 6 TABLET | Refills: 0 | Status: SHIPPED | OUTPATIENT
Start: 2020-01-10 | End: 2020-01-28 | Stop reason: CLARIF

## 2020-01-10 RX ORDER — CEPHALEXIN 500 MG/1
500 CAPSULE ORAL EVERY 12 HOURS
Qty: 4 CAPSULE | Refills: 0 | Status: SHIPPED | OUTPATIENT
Start: 2020-01-10 | End: 2020-01-28 | Stop reason: CLARIF

## 2020-01-10 RX ORDER — CEFAZOLIN SODIUM 1 G/50ML
1 SOLUTION INTRAVENOUS
Status: COMPLETED | OUTPATIENT
Start: 2020-01-10 | End: 2020-01-10

## 2020-01-10 RX ORDER — NEOSTIGMINE METHYLSULFATE 1 MG/ML
INJECTION, SOLUTION INTRAVENOUS
Status: DISCONTINUED | OUTPATIENT
Start: 2020-01-10 | End: 2020-01-10

## 2020-01-10 RX ADMIN — NEOSTIGMINE METHYLSULFATE 2.5 MG: 1 INJECTION INTRAVENOUS at 09:01

## 2020-01-10 RX ADMIN — SODIUM CHLORIDE: 0.9 INJECTION, SOLUTION INTRAVENOUS at 07:01

## 2020-01-10 RX ADMIN — ACETAMINOPHEN 1000 MG: 10 INJECTION, SOLUTION INTRAVENOUS at 09:01

## 2020-01-10 RX ADMIN — FENTANYL CITRATE 50 MCG: 50 INJECTION INTRAMUSCULAR; INTRAVENOUS at 08:01

## 2020-01-10 RX ADMIN — SUCCINYLCHOLINE CHLORIDE 100 MG: 20 INJECTION, SOLUTION INTRAMUSCULAR; INTRAVENOUS at 08:01

## 2020-01-10 RX ADMIN — GLYCOPYRROLATE 0.1 MG: 0.2 INJECTION, SOLUTION INTRAMUSCULAR; INTRAVENOUS at 08:01

## 2020-01-10 RX ADMIN — LIDOCAINE HYDROCHLORIDE 100 MG: 20 INJECTION, SOLUTION INTRAVENOUS at 08:01

## 2020-01-10 RX ADMIN — LIDOCAINE HYDROCHLORIDE: 10 INJECTION, SOLUTION EPIDURAL; INFILTRATION; INTRACAUDAL; PERINEURAL at 07:01

## 2020-01-10 RX ADMIN — PHENYLEPHRINE HYDROCHLORIDE 200 MCG: 10 INJECTION INTRAVENOUS at 08:01

## 2020-01-10 RX ADMIN — ROCURONIUM BROMIDE 25 MG: 10 INJECTION, SOLUTION INTRAVENOUS at 08:01

## 2020-01-10 RX ADMIN — CEFAZOLIN SODIUM 1 G: 1 SOLUTION INTRAVENOUS at 08:01

## 2020-01-10 RX ADMIN — PROPOFOL 100 MG: 10 INJECTION, EMULSION INTRAVENOUS at 08:01

## 2020-01-10 RX ADMIN — PROPOFOL 50 MG: 10 INJECTION, EMULSION INTRAVENOUS at 08:01

## 2020-01-10 RX ADMIN — ROCURONIUM BROMIDE 5 MG: 10 INJECTION, SOLUTION INTRAVENOUS at 08:01

## 2020-01-10 RX ADMIN — ONDANSETRON 4 MG: 2 INJECTION, SOLUTION INTRAMUSCULAR; INTRAVENOUS at 08:01

## 2020-01-10 RX ADMIN — GLYCOPYRROLATE 0.4 MG: 0.2 INJECTION, SOLUTION INTRAMUSCULAR; INTRAVENOUS at 09:01

## 2020-01-10 RX ADMIN — METOCLOPRAMIDE 10 MG: 5 INJECTION, SOLUTION INTRAMUSCULAR; INTRAVENOUS at 08:01

## 2020-01-10 NOTE — OP NOTE
[facility]  Surgery Department  Urology Operative Note    SUMMARY     Date of Procedure: 1/10/2020     Surgeon(s) and Role:     * Yvonne Weaver MD - Primary    Assisting Surgeon: None    Pre-Operative Diagnosis: Right ureteral stone [N20.1]  Hydronephrosis of right kidney [N13.30]    Post-Operative Diagnosis: Post-Op Diagnosis Codes:     * Right ureteral stone [N20.1]     * Hydronephrosis of right kidney [N13.30]    Procedure: Procedure(s) (LRB):  Cystoscopy, right retrograde pyelogram, ureteroscopy, placement of ureteral stent (Right)    Anesthesia: Choice    Indication for Procedure: 80yo F with recurrence nephrolithiasis.  She underwent CT scan recently that showed likely 2 stones in her right proximal ureter and UPJ causing mild to moderate right hydronephrosis.  She presents today for treatment of her stones.  She has a known 1 cm left lower pole stone as well that will not be addressed today.    Description of Procedure: The patient was brought to operating room and placed under general anesthesia. Full time out procedures were performed identifying correct patient, procedure and laterality. Appropriate antibiotics with Ancef were given prior to commencement of surgery. The patient was placed in dorsal lithotomy position and prepped and draped in the usual sterile fashion.    A 22Fr rigid cystoscopy was placed per urethral and passed into the bladder. Cystoscopy did not reveal any abnormality of the bladder.  film was obtained.  Left renal stone was visualized as a radiopaque calculus.  The expected right proximal ureteral stones were difficult to visualize.  There is a calcification in the pelvis consistent with either distal ureteral stone or phlebolith.  A 5 Bangladeshi open-ended ureteral catheter was then used to perform a retrograde pyelogram on the right side.  Full-strength Omnipaque solution was used.  Normal caliber ureter was noted throughout up to the level of the kidney.   The pelvic calcification was noted to be a phlebolith as this was not involved with the ureter.  The UPJ was carefully inspected and the stones were not able to be easily visualized.      The right ureteral orifice was identified and a Sensor wire was advanced into the renal pelvis under fluoroscopic guidance without difficulty.  An attempt was made to pass the ureteral scope over the wire to avoid ureteral access sheath placement but was unsuccessful.  The ureteral scope was unable to pass through the distal aspect of the ureter.  A 2nd wire was placed to serve as a safety wire.    A 11/13 Fr 36 cm ureteral access sheath was then placed over the wire and into the proximal ureter under fluoroscopic guidance. Flexible ureteroscopy was then passed into the ureteral access sheath.  The ureteral scope was navigated through the proximal ureter up to the level of the UPJ.  There was noted to be some narrowing in this location.  Attempts were made to bypass this area with the ureteral scope that were unsuccessful.  A wire was then placed through the ureteral scope and attempts to bypass this area.  Again this was unsuccessful.  There was noted to be some thinning of the ureter even with this mild manipulation and therefore decision was made to place a stent for delayed ureteroscopy in the near future.      The ureteral access sheath was removed under direct vision with no injuries or additional stones identified. Under cystoscopic and fluoroscopic guidance, the guidewire was then exchanged for a 6x24 double-J ureteral stent. Good coils were confirmed within the renal pelvis and the bladder using fluoroscopy and cytoscopy. The string was removed from the stent prior to placement. The bladder was then emptied and the cystoscope removed. The patient was then awakened and transferred to PACU in stable condition.     She will return to clinic next week to arrange for second-look ureteroscopy.    Findings:  Narrow right UPJ  with inability to pass the ureteral scope through this area safely.  Stent without string placed for passive dilation and plan delayed second-look ureteroscopy.    Complications: No    Estimated Blood Loss (EBL):  5 cc    Drains:  6 French by 24 cm double-J ureteral stent in the right ureter, no string           Implants:   Implant Name Type Inv. Item Serial No.  Lot No. LRB No. Used   STENT URET PERCUFLEX 6FR 24CM - [id number]  STENT URET PERCUFLEX 6FR 24CM  [employer] [id number] Right 1       Specimens:   Specimen (12h ago, onward)    None                  Condition: Good    Disposition: PACU - hemodynamically stable.    Attestation: I was present and scrubbed for the entire procedure.    Discharge Note    SUMMARY     Admit Date: 1/10/2020    Discharge Date and Time:  01/10/2020 9:19 AM    Hospital Course (synopsis of major diagnoses, care, treatment, and services provided during the course of the hospital stay): Uncomplicated URS/LL/stent.      Final Diagnosis: Post-Op Diagnosis Codes:     * Right ureteral stone [N20.1]     * Hydronephrosis of right kidney [N13.30]    Disposition: Home or Self Care    Follow Up/Patient Instructions:     Medications:  Reconciled Home Medications:      Medication List      START taking these medications    cephALEXin 500 MG capsule  Commonly known as:  KEFLEX  Take 1 capsule (500 mg total) by mouth every 12 (twelve) hours.     HYDROcodone-acetaminophen 5-325 mg per tablet  Commonly known as:  NORCO  Take 1 tablet by mouth every 6 (six) hours as needed for Pain.     phenazopyridine 100 MG tablet  Commonly known as:  PYRIDIUM  Take 2 tablets (200 mg total) by mouth 3 (three) times daily with meals.        CONTINUE taking these medications    diclofenac sodium 1 % Gel  Commonly known as:  VOLTAREN  Apply 2 g topically once daily.     fish oil-omega-3 fatty acids 300-1,000 mg capsule  Take 1 g by mouth once daily.     flecainide 100 MG Tab  Commonly known as:   TAMBOCOR  Take 1 tablet (100 mg total) by mouth every 12 (twelve) hours.     multivitamin per tablet  Commonly known as:  THERAGRAN  Take 1 tablet by mouth every evening. 1 Tablet Oral Every day     oxybutynin 5 MG Tab  Commonly known as:  DITROPAN  TAKE 1 TABLET THREE TIMES DAILY     VITAMIN B-12 ORAL  Take 2,500 mcg by mouth every evening.     warfarin 5 MG tablet  Commonly known as:  COUMADIN  Take 0.5-1 tablets (2.5-5 mg total) by mouth Daily. As directed by coumadin clinic          Discharge Procedure Orders   Diet general     Call MD for:  temperature >100.4     Call MD for:  persistent nausea and vomiting     Call MD for:  severe uncontrolled pain     Call MD for:  difficulty breathing, headache or visual disturbances     No dressing needed     Activity as tolerated     Follow-up Information     Yvonne Weaver MD In 3 weeks.    Specialty:  Urology  Why:  For post-op follow up  Contact information:  [address]  [address]  [facility] [address]  [phone]

## 2020-01-10 NOTE — ANESTHESIA PREPROCEDURE EVALUATION
01/10/2020  Alaina Vee is a 79 y.o., female.    Pre-operative evaluation for Procedure(s) (LRB):  Cystoscopy, possible retrograde pyelogram, ureteroscopy with laser lithotripsy, placement of ureteral stent (Right)    Alaina Vee is a 79 y.o. female     Denies CP/SOB/MI/GERD/CVA/URI symptoms.  METS > 4  NPO > 8    Patient Active Problem List   Diagnosis    Essential hypertension    Ventral hernia    UI (urinary incontinence)    Mixed hyperlipidemia    Venous stasis of lower extremity    GERD (gastroesophageal reflux disease)    Central stenosis of spinal canal    Weakness of both hips    Segmental dysfunction of lumbar region    Recurrent UTI    Mixed incontinence urge and stress    Cervical radicular pain    Morbid obesity    SULEMA (obstructive sleep apnea)    Long term (current) use of anticoagulants    PAF (paroxysmal atrial fibrillation)    Atrial flutter    Degenerative disc disease, lumbar    Chronic bilateral low back pain without sciatica    Nuclear sclerosis of both eyes    Refractive error    Status post arthroscopic surgery of right knee    Painful total knee replacement, right    Aortic root dilatation    Arthritis of midfoot    DVT of deep femoral vein, right    Hypercoagulable state    Right ureteral calculus       Review of patient's allergies indicates:   Allergen Reactions    Celebrex [celecoxib]      Chest tightness    Lipitor [atorvastatin] Other (See Comments)       No current facility-administered medications on file prior to encounter.      Current Outpatient Medications on File Prior to Encounter   Medication Sig Dispense Refill    CYANOCOBALAMIN, VITAMIN B-12, (VITAMIN B-12 ORAL) Take 2,500 mcg by mouth every evening.      flecainide (TAMBOCOR) 100 MG Tab Take 1 tablet (100 mg total) by mouth every 12 (twelve) hours. 180 tablet 1     oxybutynin (DITROPAN) 5 MG Tab TAKE 1 TABLET THREE TIMES DAILY 270 tablet 11    diclofenac sodium (VOLTAREN) 1 % Gel Apply 2 g topically once daily. 100 g 3    fish oil-omega-3 fatty acids 300-1,000 mg capsule Take 1 g by mouth once daily.      multivitamin (THERAGRAN) per tablet Take 1 tablet by mouth every evening. 1 Tablet Oral Every day      warfarin (COUMADIN) 5 MG tablet Take 0.5-1 tablets (2.5-5 mg total) by mouth Daily. As directed by coumadin clinic 90 tablet 3       Past Surgical History:   Procedure Laterality Date    APPENDECTOMY      BREAST BIOPSY Bilateral     1985    breast biopsy, left      CHOLECYSTECTOMY      JOINT REPLACEMENT      TKR , right    JOINT REPLACEMENT  2009    TKR  left    GA EXPLORATORY OF ABDOMEN         Social History     Socioeconomic History    Marital status:      Spouse name: Not on file    Number of children: Not on file    Years of education: Not on file    Highest education level: Not on file   Occupational History    Not on file   Social Needs    Financial resource strain: Not on file    Food insecurity:     Worry: Not on file     Inability: Not on file    Transportation needs:     Medical: Not on file     Non-medical: Not on file   Tobacco Use    Smoking status: Never Smoker    Smokeless tobacco: Never Used   Substance and Sexual Activity    Alcohol use: No    Drug use: No    Sexual activity: Yes     Partners: Male   Lifestyle    Physical activity:     Days per week: Not on file     Minutes per session: Not on file    Stress: Not on file   Relationships    Social connections:     Talks on phone: Not on file     Gets together: Not on file     Attends Christianity service: Not on file     Active member of club or organization: Not on file     Attends meetings of clubs or organizations: Not on file     Relationship status: Not on file   Other Topics Concern    Not on file   Social History Narrative    Not on file         CBC: No results for  input(s): WBC, RBC, HGB, HCT, PLT, MCV, MCH, MCHC in the last 72 hours.    CMP: No results for input(s): NA, K, CL, CO2, BUN, CREATININE, GLU, MG, PHOS, CALCIUM, ALBUMIN, PROT, ALKPHOS, ALT, AST, BILITOT in the last 72 hours.    INR  Recent Labs     01/10/20  0720   INR 1.3*         Vitals:    01/10/20 0700   BP: (!) 140/61   Pulse: 61   Resp: 18   Temp: 36.7 °C (98.1 °F)     See nursing charting for additional vital signs      Diagnostic Studies:  Results for NORMA FERRERA (MRN [id number]) as of 1/10/2020 07:54   Ref. Range 1/6/2020 17:22   WBC Latest Ref Range: 3.90 - 12.70 K/uL 7.31   RBC Latest Ref Range: 4.00 - 5.40 M/uL 4.74   Hemoglobin Latest Ref Range: 12.0 - 16.0 g/dL 14.2   Hematocrit Latest Ref Range: 37.0 - 48.5 % 43.5   MCV Latest Ref Range: 82 - 98 fL 92   MCH Latest Ref Range: 27.0 - 31.0 pg 30.0   MCHC Latest Ref Range: 32.0 - 36.0 g/dL 32.6   RDW Latest Ref Range: 11.5 - 14.5 % 13.2   Platelets Latest Ref Range: 150 - 350 K/uL 248   MPV Latest Ref Range: 9.2 - 12.9 fL 10.2   Gran% Latest Ref Range: 38.0 - 73.0 % 69.2   Gran # (ANC) Latest Ref Range: 1.8 - 7.7 K/uL 5.1   Lymph% Latest Ref Range: 18.0 - 48.0 % 20.1   Lymph # Latest Ref Range: 1.0 - 4.8 K/uL 1.5   Mono% Latest Ref Range: 4.0 - 15.0 % 6.7   Mono # Latest Ref Range: 0.3 - 1.0 K/uL 0.5   Eosinophil% Latest Ref Range: 0.0 - 8.0 % 2.5   Eos # Latest Ref Range: 0.0 - 0.5 K/uL 0.2   Basophil% Latest Ref Range: 0.0 - 1.9 % 1.2   Baso # Latest Ref Range: 0.00 - 0.20 K/uL 0.09   nRBC Latest Ref Range: 0 /100 WBC 0   Differential Method Unknown Automated   Immature Grans (Abs) Latest Ref Range: 0.00 - 0.04 K/uL 0.02   Immature Granulocytes Latest Ref Range: 0.0 - 0.5 % 0.3     Results for NORMA FERRERA (MRN [id number]) as of 1/10/2020 07:54   Ref. Range 12/17/2019 10:31   Sodium Latest Ref Range: 136 - 145 mmol/L 140   Potassium Latest Ref Range: 3.5 - 5.1 mmol/L 4.1   Chloride Latest Ref Range: 95 - 110 mmol/L 105   CO2 Latest Ref  Range: 23 - 29 mmol/L 29   Anion Gap Latest Ref Range: 8 - 16 mmol/L 6 (L)   BUN, Bld Latest Ref Range: 8 - 23 mg/dL 14   Creatinine Latest Ref Range: 0.5 - 1.4 mg/dL 1.1   eGFR if non African American Latest Ref Range: >60 mL/min/1.73 m^2 49 (A)   eGFR if African American Latest Ref Range: >60 mL/min/1.73 m^2 56 (A)   Glucose Latest Ref Range: 70 - 110 mg/dL 94   Calcium Latest Ref Range: 8.7 - 10.5 mg/dL 9.9   Alkaline Phosphatase Latest Ref Range: 55 - 135 U/L 86   PROTEIN TOTAL Latest Ref Range: 6.0 - 8.4 g/dL 7.4   Albumin Latest Ref Range: 3.5 - 5.2 g/dL 3.6   BILIRUBIN TOTAL Latest Ref Range: 0.1 - 1.0 mg/dL 0.8   AST Latest Ref Range: 10 - 40 U/L 17   ALT Latest Ref Range: 10 - 44 U/L 11   Triglycerides Latest Ref Range: 30 - 150 mg/dL 149       EKG (1/6/20):  Sinus rhythm with 1st degree A-V block  Otherwise normal ECG    2D Echo:  Results for orders placed or performed during the hospital encounter of 03/07/17   2D Echo w/ Color Flow Doppler   Result Value Ref Range    QEF 55 55 - 65    Mitral Valve Regurgitation TRIVIAL     Diastolic Dysfunction Yes (A)     Est. PA Systolic Pressure 16.1     Pericardial Effusion NONE     Tricuspid Valve Regurgitation TRIVIAL      TTE (3/29/19):  · Normal left ventricular systolic function. The estimated ejection fraction is 55%  · Concentric left ventricular remodeling.  · Indeterminate left ventricular diastolic function.    Anesthesia Evaluation    I have reviewed the Patient Summary Reports.    I have reviewed the Nursing Notes.      Review of Systems  Anesthesia Hx:  No problems with previous Anesthesia   Denies Personal Hx of Anesthesia complications.   Social:  No Alcohol Use, Non-Smoker    Cardiovascular:   Exercise tolerance: good Hypertension, well controlled hyperlipidemia ECG has been reviewed. h/o RLE DVT   Pulmonary:   Sleep Apnea    Renal/:   renal calculi    Hepatic/GI:  Hepatic/GI Normal    Musculoskeletal:  Spine Disorders: lumbar and cervical Chronic  Pain        Physical Exam  General:  Morbid Obesity    Airway/Jaw/Neck:   MP3, TMD <3FB, poor dentician     Chest/Lungs:  Chest/Lungs Clear    Heart/Vascular:  Heart Findings: Normal            Anesthesia Plan  Type of Anesthesia, risks & benefits discussed:  Anesthesia Type:  general  Patient's Preference:   Intra-op Monitoring Plan: standard ASA monitors  Intra-op Monitoring Plan Comments:   Post Op Pain Control Plan: multimodal analgesia, IV/PO Opioids PRN and per primary service following discharge from PACU  Post Op Pain Control Plan Comments:   Induction:   IV  Beta Blocker:  Patient is on a Beta-Blocker and has received one dose within the past 24 hours (No further documentation required).       Informed Consent: Patient understands risks and agrees with Anesthesia plan.  Questions answered. Anesthesia consent signed with patient.  ASA Score: 3     Day of Surgery Review of History & Physical:  There are no significant changes.          Ready For Surgery From Anesthesia Perspective.

## 2020-01-10 NOTE — DISCHARGE INSTRUCTIONS
Restart Coumadin on Sunday 1/12/20 if urine is clear or just light pink.    Expect blood and/or burning with urination. Drink plenty of fluids.    BATHING/DRESSING:  ? Ok to shower tomorrow    ACTIVITY LEVEL: If you have received sedation or an anesthetic, you may feel sleepy for several hours. Rest until you are more awake. Gradually resume your normal activities.    No heavy lifting.      DIET: You may resume your home diet. If nausea is present, increase your diet gradually with fluids and bland foods.  Medications: Pain medication should be taken only if needed and as directed. If antibiotics are prescribed, the medication should be taken until completed. You will be given an updated list of you medications.  ? No driving, alcoholic beverages or signing legal documents for next 24 hours or while taking pain medication    CALL THE DOCTOR:    For any obvious bleeding (some dried blood over the incision is normal).    Some blood in your urine is normal.     Redness, swelling, foul smell around incision or fever over 101.   Shortness of breath, Coughing Up Bloody Sputum, or Pains or Swelling in your Calves..   Persistent pain or nausea not relieved by medication.   Problems urinating - unable to urinate or heavy bleeding (with our without clots) in urine.    If any unusual problems or difficulties occur contact your doctor. If you cannot contact your doctor but feel your signs and symptoms warrant a physicians attention return to the emergency room.    Fall Prevention  Millions of people fall every year and injure themselves. You may have had anesthesia or sedation which may increase your risk of falling. You may have health issues that put you at an increased risk of falling.     Here are ways to reduce your risk of falling.  ·   · Make your home safe by keeping walkways clear of objects you may trip over.  · Use non-slip pads under rugs. Do not use area rugs or small throw rugs.  · Use non-slip mats in  bathtubs and showers.  · Install handrails and lights on staircases.  · Do not walk in poorly lit areas.  · Do not stand on chairs or wobbly ladders.  · Use caution when reaching overhead or looking upward. This position can cause a loss of balance.  · Be sure your shoes fit properly, have non-slip bottoms and are in good condition.   · Wear shoes both inside and out. Avoid going barefoot or wearing slippers.  · Be cautious when going up and down stairs, curbs, and when walking on uneven sidewalks.  · If your balance is poor, consider using a cane or walker.  · If your fall was related to alcohol use, stop or limit alcohol intake.   · If your fall was related to use of sleeping medicines, talk to your doctor about this. You may need to reduce your dosage at bedtime if you awaken during the night to go to the bathroom.    · To reduce the need for nighttime bathroom trips:  ¨ Avoid drinking fluids for several hours before going to bed  ¨ Empty your bladder before going to bed  ¨ Men can keep a urinal at the bedside  · Stay as active as you can. Balance, flexibility, strength, and endurance all come from exercise. They all play a role in preventing falls. Ask your healthcare provider which types of activity are right for you.  · Get your vision checked on a regular basis.  · If you have pets, know where they are before you stand up or walk so you don't trip over them.  · Use night lights.

## 2020-01-10 NOTE — TRANSFER OF CARE
"Anesthesia Transfer of Care Note    Patient: Alaina Vee    Procedure(s) Performed: Procedure(s) (LRB):  CYSTOURETEROSCOPY, WITH RETROGRADE PYELOGRAM AND URETERAL STENT INSERTION (Right)    Patient location: PACU    Anesthesia Type: general    Transport from OR: Transported from OR on room air with adequate spontaneous ventilation    Post pain: adequate analgesia    Post assessment: no apparent anesthetic complications and tolerated procedure well    Post vital signs: stable    Level of consciousness: awake, alert and oriented    Nausea/Vomiting: no nausea/vomiting    Complications: none    Transfer of care protocol was followed      Last vitals:   Visit Vitals  BP (!) 146/74   Pulse 62   Temp 36.4 °C (97.5 °F) (Oral)   Resp 16   Ht 5' 5" (1.651 m)   Wt 109.6 kg (241 lb 10 oz)   LMP  (LMP Unknown)   SpO2 (!) 90%   Breastfeeding? No   BMI 40.21 kg/m²     "

## 2020-01-10 NOTE — TELEPHONE ENCOUNTER
Spoke to pt appt with natalie pratt schedule for 01/21/2020 as pt not able to come in next week.-isaura

## 2020-01-10 NOTE — INTERVAL H&P NOTE
The patient has been examined and the H&P has been reviewed:    I concur with the findings and no changes have occurred since H&P was written. INR 1.3 today.    Anesthesia/Surgery risks, benefits and alternative options discussed and understood by patient/family.          Active Hospital Problems    Diagnosis  POA    Right ureteral calculus [N20.1]  Yes      Resolved Hospital Problems   No resolved problems to display.

## 2020-01-10 NOTE — PLAN OF CARE
Preop plan of care reviewed.  Questions encouraged and questions answered. Pt verbalized readiness to proceed.

## 2020-01-11 NOTE — ANESTHESIA POSTPROCEDURE EVALUATION
Anesthesia Post Evaluation    Patient: Alaina Vee    Procedure(s) Performed: Procedure(s) (LRB):  CYSTOURETEROSCOPY, WITH RETROGRADE PYELOGRAM AND URETERAL STENT INSERTION (Right)    Final Anesthesia Type: general    Patient location during evaluation: PACU  Patient participation: Yes- Able to Participate  Level of consciousness: awake and alert and oriented  Post-procedure vital signs: reviewed and stable  Pain management: adequate  Airway patency: patent    PONV status at discharge: No PONV  Anesthetic complications: no      Cardiovascular status: hemodynamically stable and blood pressure returned to baseline  Respiratory status: spontaneous ventilation, room air and unassisted  Hydration status: euvolemic  Follow-up not needed.          Vitals Value Taken Time   /75 1/10/2020 11:30 AM   Temp 36.3 °C (97.4 °F) 1/10/2020 11:30 AM   Pulse 63 1/10/2020 11:30 AM   Resp 20 1/10/2020 11:30 AM   SpO2 96 % 1/10/2020 11:30 AM         Event Time     Out of Recovery 10:55:38          Pain/Rajeev Score: Pain Rating Prior to Med Admin: 0 (1/10/2020  9:39 AM)  Rajeev Score: 10 (1/10/2020 11:35 AM)

## 2020-01-16 ENCOUNTER — LAB VISIT (OUTPATIENT)
Dept: LAB | Facility: HOSPITAL | Age: 80
End: 2020-01-16
Attending: INTERNAL MEDICINE
Payer: MEDICARE

## 2020-01-16 ENCOUNTER — ANTI-COAG VISIT (OUTPATIENT)
Dept: CARDIOLOGY | Facility: CLINIC | Age: 80
End: 2020-01-16
Payer: MEDICARE

## 2020-01-16 DIAGNOSIS — Z79.01 LONG TERM (CURRENT) USE OF ANTICOAGULANTS: ICD-10-CM

## 2020-01-16 LAB
INR PPP: 1.5 (ref 0.8–1.2)
PROTHROMBIN TIME: 15.9 SEC (ref 9–12.5)

## 2020-01-16 PROCEDURE — 85610 PROTHROMBIN TIME: CPT | Mod: HCNC

## 2020-01-16 PROCEDURE — 93793 PR ANTICOAGULANT MGMT FOR PT TAKING WARFARIN: ICD-10-PCS | Mod: S$GLB,,, | Performed by: PHARMACIST

## 2020-01-16 PROCEDURE — 36415 COLL VENOUS BLD VENIPUNCTURE: CPT | Mod: HCNC,PN

## 2020-01-16 PROCEDURE — 93793 ANTICOAG MGMT PT WARFARIN: CPT | Mod: S$GLB,,, | Performed by: PHARMACIST

## 2020-01-16 NOTE — PROGRESS NOTES
DR advised pt to resume dose on 1/12.  ((0mg 1/10 & 1/11)); then resumed correct dose on 1/12 unsuccessful w/ kidney stone removal on 1/10; this Procedure will be rescheduled soon. Pt *Held* Coumadin last week for 5 days. Also confirmed incorrect dosage of Coumadin (5mg daily). No (Kidney Stone) removal for now.

## 2020-01-17 ENCOUNTER — TELEPHONE (OUTPATIENT)
Dept: HEMATOLOGY/ONCOLOGY | Facility: CLINIC | Age: 80
End: 2020-01-17

## 2020-01-17 NOTE — TELEPHONE ENCOUNTER
I called the patient as per Dr Ceron the patient was instructed that she will still need to have her Lab work and ultrasound done that he ordered. The patient was instructed to hold her coumadin for 10 day prior to getting her labs drawn. The patient stated that she will need to talk to Dr Hidalgo and she will get back to me. Dr Ceron notified.

## 2020-01-20 NOTE — PROGRESS NOTES
MA notes reviewed - see calendar for plan. CHADSvasc=6 (no h/o CVA). Appears this hold was already approved by Dr. Hidalgo.

## 2020-01-20 NOTE — PROGRESS NOTES
Patient called to report that she needs to hold coumadin for 10 days starting today, (see phone note dated 1/17 from Hem/Onc ), having blood work and vascular ultrasound R-leg on 1/30/20, next INR for coumadin clinic is due 1/21, patient's call back # [phone]--states can leave a message

## 2020-01-21 ENCOUNTER — OFFICE VISIT (OUTPATIENT)
Dept: UROLOGY | Facility: CLINIC | Age: 80
End: 2020-01-21
Payer: MEDICARE

## 2020-01-21 VITALS
HEIGHT: 65 IN | WEIGHT: 241 LBS | DIASTOLIC BLOOD PRESSURE: 80 MMHG | SYSTOLIC BLOOD PRESSURE: 127 MMHG | BODY MASS INDEX: 40.15 KG/M2

## 2020-01-21 DIAGNOSIS — N20.1 RIGHT URETERAL CALCULUS: Primary | ICD-10-CM

## 2020-01-21 DIAGNOSIS — N13.30 HYDRONEPHROSIS OF RIGHT KIDNEY: ICD-10-CM

## 2020-01-21 DIAGNOSIS — N20.0 NEPHROLITHIASIS: ICD-10-CM

## 2020-01-21 LAB
BILIRUB SERPL-MCNC: NORMAL MG/DL
BLOOD URINE, POC: 250
COLOR, POC UA: NORMAL
GLUCOSE UR QL STRIP: NORMAL
KETONES UR QL STRIP: NORMAL
LEUKOCYTE ESTERASE URINE, POC: NORMAL
NITRITE, POC UA: NORMAL
PH, POC UA: 5
PROTEIN, POC: 30
SPECIFIC GRAVITY, POC UA: 1015
UROBILINOGEN, POC UA: NORMAL

## 2020-01-21 PROCEDURE — 99999 PR PBB SHADOW E&M-EST. PATIENT-LVL III: CPT | Mod: PBBFAC,HCNC,, | Performed by: NURSE PRACTITIONER

## 2020-01-21 PROCEDURE — 81001 PR  URINALYSIS, AUTO, W/SCOPE: ICD-10-PCS | Mod: HCNC,S$GLB,, | Performed by: NURSE PRACTITIONER

## 2020-01-21 PROCEDURE — 1101F PR PT FALLS ASSESS DOC 0-1 FALLS W/OUT INJ PAST YR: ICD-10-PCS | Mod: HCNC,CPTII,S$GLB, | Performed by: NURSE PRACTITIONER

## 2020-01-21 PROCEDURE — 3079F PR MOST RECENT DIASTOLIC BLOOD PRESSURE 80-89 MM HG: ICD-10-PCS | Mod: HCNC,CPTII,S$GLB, | Performed by: NURSE PRACTITIONER

## 2020-01-21 PROCEDURE — 1101F PT FALLS ASSESS-DOCD LE1/YR: CPT | Mod: HCNC,CPTII,S$GLB, | Performed by: NURSE PRACTITIONER

## 2020-01-21 PROCEDURE — 1159F PR MEDICATION LIST DOCUMENTED IN MEDICAL RECORD: ICD-10-PCS | Mod: HCNC,S$GLB,, | Performed by: NURSE PRACTITIONER

## 2020-01-21 PROCEDURE — 1159F MED LIST DOCD IN RCRD: CPT | Mod: HCNC,S$GLB,, | Performed by: NURSE PRACTITIONER

## 2020-01-21 PROCEDURE — 3079F DIAST BP 80-89 MM HG: CPT | Mod: HCNC,CPTII,S$GLB, | Performed by: NURSE PRACTITIONER

## 2020-01-21 PROCEDURE — 1126F PR PAIN SEVERITY QUANTIFIED, NO PAIN PRESENT: ICD-10-PCS | Mod: HCNC,S$GLB,, | Performed by: NURSE PRACTITIONER

## 2020-01-21 PROCEDURE — 87086 URINE CULTURE/COLONY COUNT: CPT | Mod: HCNC

## 2020-01-21 PROCEDURE — 99999 PR PBB SHADOW E&M-EST. PATIENT-LVL III: ICD-10-PCS | Mod: PBBFAC,HCNC,, | Performed by: NURSE PRACTITIONER

## 2020-01-21 PROCEDURE — 3074F PR MOST RECENT SYSTOLIC BLOOD PRESSURE < 130 MM HG: ICD-10-PCS | Mod: HCNC,CPTII,S$GLB, | Performed by: NURSE PRACTITIONER

## 2020-01-21 PROCEDURE — 81001 URINALYSIS AUTO W/SCOPE: CPT | Mod: HCNC,S$GLB,, | Performed by: NURSE PRACTITIONER

## 2020-01-21 PROCEDURE — 1126F AMNT PAIN NOTED NONE PRSNT: CPT | Mod: HCNC,S$GLB,, | Performed by: NURSE PRACTITIONER

## 2020-01-21 PROCEDURE — 99214 PR OFFICE/OUTPT VISIT, EST, LEVL IV, 30-39 MIN: ICD-10-PCS | Mod: HCNC,25,S$GLB, | Performed by: NURSE PRACTITIONER

## 2020-01-21 PROCEDURE — 3074F SYST BP LT 130 MM HG: CPT | Mod: HCNC,CPTII,S$GLB, | Performed by: NURSE PRACTITIONER

## 2020-01-21 PROCEDURE — 99214 OFFICE O/P EST MOD 30 MIN: CPT | Mod: HCNC,25,S$GLB, | Performed by: NURSE PRACTITIONER

## 2020-01-21 NOTE — H&P
Subjective:       Patient ID: Alaina Vee is a 79 y.o. female who was last seen in this office 12/23/2019    Chief Complaint:   Chief Complaint   Patient presents with    Follow-up     Pt. is here to sign consents today.. states no other issues      She reports issues with recurrent UTIs. She was treated for UTI in 3/16 (100k E coli, pansensitive) and again in 4/16 (100k E coli). She has urinary frequency associated with UTIs. Nocturia x 1-2. She has urgency and UUI at baseline. Also with fecal urgency/incontinence. She was given Ditropan 5mg BID years ago. Also with RICHARD. She has not noted if this has helped. Denies current dysuria. Denies hematuria. No h/o kidney stones.     She has significant LBP issues. She also reports facial and R shoulder/arm/torso pain.     She was treated for UTI after initial visit. She remains on Ditropan IR not up to TID, declined ER formulations. She reports taking another medication for UI from me but I don't have records of this.     SERG (5/16) showed ruslanley 9mm stone in LLP. PVR 2cc. Cytology was negative but noted uric acid crystals.     CT 7/16 - 7mm L UP, 6mm L LP stones and 8mm R renal pelvis stone.   KUB 7/16 - shows 7mm R stone but difficult to see (unsure if truly the stone)    KUB 1/17 - 7mm R renal stone though hard to see (likely overlying 12th rib)    Doing great with Ditropan XL 15mg--medication changed to Ditropan 5mg PO TID per pharmacy request in 2/2018    She started to develop R flank pain and therefore CT scan ordered. CT showed 8mm stone at R UPJ, visible on KUB. She is now s/p R ESWL. Stone was noted to fragment well. She did not note passing any stone and is still having flank pain.     CT with R LP stone, no obstruction. Recently started on Coumadin for a fib.     KUB 1/18 - two left renal calculi  SERG 1/18 - 3mm and 5mm L calculi - hydro resolved    100% CaOx mono - stone passed spontaneously. Pain present for 1 hour at that time (R side).     She was  evaluated in 12/2018 for gross hematuria. She is now s/p hematuria workup (Ct urogram/cystoscopy/UCx) was essentially negative. She has not had any further occurrences of gross hematuria.    Cytology 12/2018--urothelial atypia    12/23/19  CT scan ordered last week by PCP due to pelvic pain and vaginal discharge. Imaging showed 8 mm R UPJ obstructing stone, bilateral non obstructing stones. She has also seen GYN for further evaluation of vaginal discharge--pelvic ultrasound ordered but not done yet. She is not having any flank pain. She was unaware that she had an obstructing stone. Denies fever, gross hematuria or n/v.    Cr 12/17/19--1.1    1/21/20:  She is now s/p cystoscopy/R RPG/R ureteroscopy on 1/10/20 by Dr. Weaver. She was noted to have a narrow right UPJ. Dr Weaver unable to pass ureteral scope through UPJ safely therefore stent without string placed at that time.  She is here today to schedule second-look ureteroscopy for stone treatment. She is tolerating her stent. Denies flank pain, fever or gross hematuria. No new complaints    ACTIVE MEDICAL ISSUES:  Patient Active Problem List   Diagnosis    Essential hypertension    Ventral hernia    UI (urinary incontinence)    Mixed hyperlipidemia    Venous stasis of lower extremity    GERD (gastroesophageal reflux disease)    Central stenosis of spinal canal    Weakness of both hips    Segmental dysfunction of lumbar region    Recurrent UTI    Mixed incontinence urge and stress    Cervical radicular pain    Morbid obesity    SULEMA (obstructive sleep apnea)    Long term (current) use of anticoagulants    PAF (paroxysmal atrial fibrillation)    Atrial flutter    Degenerative disc disease, lumbar    Chronic bilateral low back pain without sciatica    Nuclear sclerosis of both eyes    Refractive error    Status post arthroscopic surgery of right knee    Painful total knee replacement, right    Aortic root dilatation    Arthritis of midfoot     DVT of deep femoral vein, right    Hypercoagulable state    Right ureteral calculus    Hydronephrosis of right kidney       ALLERGIES AND MEDICATIONS: updated and reviewed.  Review of patient's allergies indicates:   Allergen Reactions    Celebrex [celecoxib]      Chest tightness    Lipitor [atorvastatin] Other (See Comments)     Current Outpatient Medications   Medication Sig    cephALEXin (KEFLEX) 500 MG capsule Take 1 capsule (500 mg total) by mouth every 12 (twelve) hours.    CYANOCOBALAMIN, VITAMIN B-12, (VITAMIN B-12 ORAL) Take 2,500 mcg by mouth every evening.    diclofenac sodium (VOLTAREN) 1 % Gel Apply 2 g topically once daily.    fish oil-omega-3 fatty acids 300-1,000 mg capsule Take 1 g by mouth once daily.    flecainide (TAMBOCOR) 100 MG Tab Take 1 tablet (100 mg total) by mouth every 12 (twelve) hours.    HYDROcodone-acetaminophen (NORCO) 5-325 mg per tablet Take 1 tablet by mouth every 6 (six) hours as needed for Pain.    multivitamin (THERAGRAN) per tablet Take 1 tablet by mouth every evening. 1 Tablet Oral Every day    oxybutynin (DITROPAN) 5 MG Tab TAKE 1 TABLET THREE TIMES DAILY    phenazopyridine (PYRIDIUM) 100 MG tablet Take 2 tablets (200 mg total) by mouth 3 (three) times daily with meals.    warfarin (COUMADIN) 5 MG tablet Take 0.5-1 tablets (2.5-5 mg total) by mouth Daily. As directed by coumadin clinic     No current facility-administered medications for this visit.        Review of Systems   Constitutional: Negative for activity change, chills, fatigue, fever and unexpected weight change.   Eyes: Negative for discharge, redness and visual disturbance.   Respiratory: Negative for cough, shortness of breath and wheezing.    Cardiovascular: Negative for chest pain and leg swelling.   Gastrointestinal: Negative for abdominal distention, abdominal pain, constipation, diarrhea, nausea and vomiting.   Genitourinary: Negative for decreased urine volume, difficulty urinating,  "dysuria, flank pain, frequency, hematuria, pelvic pain and urgency.   Musculoskeletal: Negative for arthralgias, joint swelling and myalgias.   Skin: Negative for color change and rash.   Neurological: Negative for dizziness and light-headedness.   Psychiatric/Behavioral: Negative for behavioral problems and confusion. The patient is not nervous/anxious.        Objective:      Vitals:    01/21/20 0924   BP: 127/80   Weight: 109.3 kg (241 lb)   Height: 5' 5" (1.651 m)     Physical Exam   Constitutional: She is oriented to person, place, and time. She appears well-developed.   HENT:   Head: Normocephalic and atraumatic.   Nose: Nose normal.   Eyes: Conjunctivae are normal. Right eye exhibits no discharge. Left eye exhibits no discharge.   Neck: Normal range of motion. Neck supple. No tracheal deviation present. No thyromegaly present.   Cardiovascular: Normal rate and regular rhythm.    Pulmonary/Chest: Effort normal. No respiratory distress. She has no wheezes.   Abdominal: Soft. She exhibits no distension. There is no hepatosplenomegaly. There is no tenderness. There is no CVA tenderness. No hernia.   Genitourinary:   Genitourinary Comments: Patient declined exam   Musculoskeletal: Normal range of motion. She exhibits no edema.   Neurological: She is alert and oriented to person, place, and time.   Skin: Skin is warm and dry. No rash noted. No erythema.     Psychiatric: She has a normal mood and affect. Her behavior is normal. Judgment normal.       Urine dipstick shows ++LE, +protein, 250 RBCs.     Assessment:       1. Right ureteral calculus    2. Hydronephrosis of right kidney    3. Nephrolithiasis          Plan:       1. Right ureteral calculus   -CT scan 12/2019--8 mm R UPJ stone with mild hydronephrosis  -s/p cysto/R RPG/R URS/stent placement on 1/10/20--narrow R UPJ (stent only placed)  -Plan for second- look right ureteroscopy on Friday 1/31/20  -Patient instructed to stop Coumadin 5 days prior to procedure " (prior clearance by cardiology on 1/6/20)  - POCT urinalysis, dipstick or tablet reag  - Urine culture    2. Hydronephrosis of right kidney  -secondary to R UPJ stone  -s/p stent placement on 1/10/20    3. Nephrolithiasis   - s/p R ESWL on 2/27/17 - stone with excellent response   - KUB post-op - residual stone    - CT - no obstructing stones, R LP noted   - SERG/KUB - R hydro resolved. Two stones in L kidney  -CT 12/2019--bilateral non obstructing stones        Follow up in about 4 weeks (around 2/18/2020) for Follow up.

## 2020-01-21 NOTE — PROGRESS NOTES
Patient, Alaina Vee (MRN #[id number]), presented with a recorded BMI of 40.1 kg/m^2 consistent with the definition of morbid obesity (ICD-10 E66.01). The patient's morbid obesity was monitored, evaluated, addressed and/or treated. This addendum to the medical record is made on 01/21/2020.

## 2020-01-23 ENCOUNTER — TELEPHONE (OUTPATIENT)
Dept: UROLOGY | Facility: CLINIC | Age: 80
End: 2020-01-23

## 2020-01-23 LAB — BACTERIA UR CULT: NORMAL

## 2020-01-23 NOTE — TELEPHONE ENCOUNTER
Lt message pt will need to call pre-op dept today to set up appt for procedure on 01/31/2020. isaura

## 2020-01-28 ENCOUNTER — HOSPITAL ENCOUNTER (OUTPATIENT)
Dept: PREADMISSION TESTING | Facility: HOSPITAL | Age: 80
Discharge: HOME | End: 2020-01-28
Attending: UROLOGY
Payer: MEDICARE

## 2020-01-28 VITALS
RESPIRATION RATE: 18 BRPM | BODY MASS INDEX: 39.89 KG/M2 | SYSTOLIC BLOOD PRESSURE: 134 MMHG | HEIGHT: 65 IN | HEART RATE: 62 BPM | OXYGEN SATURATION: 95 % | DIASTOLIC BLOOD PRESSURE: 72 MMHG | TEMPERATURE: 98 F | WEIGHT: 239.44 LBS

## 2020-01-28 DIAGNOSIS — Z01.818 PREOP TESTING: Primary | ICD-10-CM

## 2020-01-28 NOTE — DISCHARGE INSTRUCTIONS
"Your procedure  is scheduled for _1/31/2020_________.    Call [phone] between 2pm and 5pm on __1/30/2020_____to find out your arrival time for the day of surgery.    Report to Same Day Surgery Unit at ____ AM on the 2nd floor of the hospital.  Use the front entrance of the hospital.  The front doors of the hospital open promptly at 5:30am.  If you need wheelchair assistance, call [phone] from your cell phone, or call "0" from the courtesy phone in the lobby.    Important instructions:   Do not eat or drink after 12 midnight, including water.  It is okay to brush your teeth.  Do not have gum, candy or mints.     Take only these medications with a small swallow of water on the morning of your surgery __flecainide____________                       Continue to hold coumadin as directed by your doctor.    Stop taking Aspirin, Ibuprofen, Motrin and Aleve , Fish oil, and Vitamin E for at least 7 days before your surgery. You may use Tylenol unless otherwise instructed by your doctor.         Please shower the night before and the morning of your surgery.       Do not wear make- up, including mascara.     You may wear deodorant only.      Do not wear powder, body lotion or perfume/cologne.     Do not wear any jewelry or have any metal on your body.     You will be asked to remove any dentures or partials for the procedure.     Please bring any documents given to you by your doctor.     If you are going home on the same day of surgery, you must arrange for a family member or a friend to drive you home.  Public transportation is prohibited.  You will not be able to drive home if you were given anesthesia or sedation.     Wear loose fitting clothes allowing for bandages.     Please leave money and valuables home.       You may bring your cell phone.     Call the doctor if fever or illness should occur before your surgery.    Call [phone] to contact us here if needed.  "

## 2020-01-30 ENCOUNTER — ANESTHESIA EVENT (OUTPATIENT)
Dept: SURGERY | Facility: HOSPITAL | Age: 80
End: 2020-01-30
Payer: MEDICARE

## 2020-01-30 ENCOUNTER — HOSPITAL ENCOUNTER (OUTPATIENT)
Dept: RADIOLOGY | Facility: HOSPITAL | Age: 80
Discharge: HOME | End: 2020-01-30
Attending: INTERNAL MEDICINE
Payer: MEDICARE

## 2020-01-30 DIAGNOSIS — Z79.01 LONG TERM (CURRENT) USE OF ANTICOAGULANTS: ICD-10-CM

## 2020-01-30 DIAGNOSIS — I82.411 DVT OF DEEP FEMORAL VEIN, RIGHT: ICD-10-CM

## 2020-01-30 DIAGNOSIS — D68.59 HYPERCOAGULABLE STATE: ICD-10-CM

## 2020-01-30 PROCEDURE — 93971 EXTREMITY STUDY: CPT | Mod: TC,HCNC,RT

## 2020-01-30 PROCEDURE — 93971 EXTREMITY STUDY: CPT | Mod: 26,HCNC,RT, | Performed by: RADIOLOGY

## 2020-01-30 PROCEDURE — 93971 US LOWER EXTREMITY VEINS RIGHT: ICD-10-PCS | Mod: 26,HCNC,RT, | Performed by: RADIOLOGY

## 2020-01-31 ENCOUNTER — TELEPHONE (OUTPATIENT)
Dept: UROLOGY | Facility: CLINIC | Age: 80
End: 2020-01-31

## 2020-01-31 ENCOUNTER — HOSPITAL ENCOUNTER (OUTPATIENT)
Facility: HOSPITAL | Age: 80
Discharge: HOME | End: 2020-01-31
Attending: UROLOGY | Admitting: UROLOGY
Payer: MEDICARE

## 2020-01-31 ENCOUNTER — ANESTHESIA (OUTPATIENT)
Dept: SURGERY | Facility: HOSPITAL | Age: 80
End: 2020-01-31
Payer: MEDICARE

## 2020-01-31 VITALS
DIASTOLIC BLOOD PRESSURE: 70 MMHG | HEART RATE: 63 BPM | RESPIRATION RATE: 16 BRPM | TEMPERATURE: 97 F | OXYGEN SATURATION: 95 % | SYSTOLIC BLOOD PRESSURE: 134 MMHG | BODY MASS INDEX: 39.84 KG/M2 | WEIGHT: 239.44 LBS

## 2020-01-31 DIAGNOSIS — N20.1 RIGHT URETERAL CALCULUS: Primary | ICD-10-CM

## 2020-01-31 DIAGNOSIS — N20.1 RIGHT URETERAL CALCULUS: ICD-10-CM

## 2020-01-31 LAB
INR PPP: 1 (ref 0.8–1.2)
PROTHROMBIN TIME: 10.4 SEC (ref 9–12.5)

## 2020-01-31 PROCEDURE — 37000009 HC ANESTHESIA EA ADD 15 MINS: Mod: HCNC | Performed by: UROLOGY

## 2020-01-31 PROCEDURE — 25000003 PHARM REV CODE 250: Mod: HCNC | Performed by: NURSE ANESTHETIST, CERTIFIED REGISTERED

## 2020-01-31 PROCEDURE — D9220A PRA ANESTHESIA: Mod: HCNC,CRNA,, | Performed by: NURSE ANESTHETIST, CERTIFIED REGISTERED

## 2020-01-31 PROCEDURE — C1773 RET DEV, INSERTABLE: HCPCS | Mod: HCNC | Performed by: UROLOGY

## 2020-01-31 PROCEDURE — 63600175 PHARM REV CODE 636 W HCPCS: Mod: HCNC | Performed by: UROLOGY

## 2020-01-31 PROCEDURE — 27201423 OPTIME MED/SURG SUP & DEVICES STERILE SUPPLY: Mod: HCNC | Performed by: UROLOGY

## 2020-01-31 PROCEDURE — 88300 SURGICAL PATH GROSS: CPT | Mod: 59,HCNC | Performed by: PATHOLOGY

## 2020-01-31 PROCEDURE — C2617 STENT, NON-COR, TEM W/O DEL: HCPCS | Mod: HCNC | Performed by: UROLOGY

## 2020-01-31 PROCEDURE — D9220A PRA ANESTHESIA: ICD-10-PCS | Mod: HCNC,ANES,, | Performed by: ANESTHESIOLOGY

## 2020-01-31 PROCEDURE — D9220A PRA ANESTHESIA: Mod: HCNC,ANES,, | Performed by: ANESTHESIOLOGY

## 2020-01-31 PROCEDURE — 63600175 PHARM REV CODE 636 W HCPCS: Mod: HCNC | Performed by: ANESTHESIOLOGY

## 2020-01-31 PROCEDURE — 25500020 PHARM REV CODE 255: Mod: HCNC | Performed by: UROLOGY

## 2020-01-31 PROCEDURE — 37000008 HC ANESTHESIA 1ST 15 MINUTES: Mod: HCNC | Performed by: UROLOGY

## 2020-01-31 PROCEDURE — 52356 PR CYSTO/URETERO W/LITHOTRIPSY: ICD-10-PCS | Mod: HCNC,RT,, | Performed by: UROLOGY

## 2020-01-31 PROCEDURE — C1758 CATHETER, URETERAL: HCPCS | Mod: HCNC | Performed by: UROLOGY

## 2020-01-31 PROCEDURE — 71000039 HC RECOVERY, EACH ADD'L HOUR: Mod: HCNC | Performed by: UROLOGY

## 2020-01-31 PROCEDURE — 82365 CALCULUS SPECTROSCOPY: CPT | Mod: HCNC

## 2020-01-31 PROCEDURE — 71000033 HC RECOVERY, INTIAL HOUR: Mod: HCNC | Performed by: UROLOGY

## 2020-01-31 PROCEDURE — 85610 PROTHROMBIN TIME: CPT | Mod: HCNC

## 2020-01-31 PROCEDURE — 63600175 PHARM REV CODE 636 W HCPCS: Mod: HCNC | Performed by: NURSE ANESTHETIST, CERTIFIED REGISTERED

## 2020-01-31 PROCEDURE — 88300 SURGICAL PATH GROSS: CPT | Mod: 26,HCNC,, | Performed by: PATHOLOGY

## 2020-01-31 PROCEDURE — C1769 GUIDE WIRE: HCPCS | Mod: HCNC | Performed by: UROLOGY

## 2020-01-31 PROCEDURE — 36000706: Mod: HCNC | Performed by: UROLOGY

## 2020-01-31 PROCEDURE — C1894 INTRO/SHEATH, NON-LASER: HCPCS | Mod: HCNC | Performed by: UROLOGY

## 2020-01-31 PROCEDURE — 36415 COLL VENOUS BLD VENIPUNCTURE: CPT | Mod: HCNC

## 2020-01-31 PROCEDURE — 52356 CYSTO/URETERO W/LITHOTRIPSY: CPT | Mod: HCNC,RT,, | Performed by: UROLOGY

## 2020-01-31 PROCEDURE — 25000003 PHARM REV CODE 250: Mod: HCNC | Performed by: UROLOGY

## 2020-01-31 PROCEDURE — 88300 PR  SURG PATH,GROSS,LEVEL I: ICD-10-PCS | Mod: 26,HCNC,, | Performed by: PATHOLOGY

## 2020-01-31 PROCEDURE — D9220A PRA ANESTHESIA: ICD-10-PCS | Mod: HCNC,CRNA,, | Performed by: NURSE ANESTHETIST, CERTIFIED REGISTERED

## 2020-01-31 PROCEDURE — 36000707: Mod: HCNC | Performed by: UROLOGY

## 2020-01-31 PROCEDURE — 71000015 HC POSTOP RECOV 1ST HR: Mod: HCNC | Performed by: UROLOGY

## 2020-01-31 DEVICE — STENT URET PERCUFLEX 6FR 24CM: Type: IMPLANTABLE DEVICE | Site: URETER | Status: FUNCTIONAL

## 2020-01-31 RX ORDER — ACETAMINOPHEN 325 MG/1
650 TABLET ORAL EVERY 4 HOURS PRN
Status: DISCONTINUED | OUTPATIENT
Start: 2020-01-31 | End: 2020-01-31 | Stop reason: HOSPADM

## 2020-01-31 RX ORDER — PHENYLEPHRINE HYDROCHLORIDE 10 MG/ML
INJECTION INTRAVENOUS
Status: DISCONTINUED | OUTPATIENT
Start: 2020-01-31 | End: 2020-01-31

## 2020-01-31 RX ORDER — HYDROMORPHONE HYDROCHLORIDE 2 MG/ML
0.5 INJECTION, SOLUTION INTRAMUSCULAR; INTRAVENOUS; SUBCUTANEOUS EVERY 5 MIN PRN
Status: DISCONTINUED | OUTPATIENT
Start: 2020-01-31 | End: 2020-01-31 | Stop reason: HOSPADM

## 2020-01-31 RX ORDER — ONDANSETRON 2 MG/ML
4 INJECTION INTRAMUSCULAR; INTRAVENOUS EVERY 12 HOURS PRN
Status: DISCONTINUED | OUTPATIENT
Start: 2020-01-31 | End: 2020-01-31 | Stop reason: HOSPADM

## 2020-01-31 RX ORDER — SODIUM CHLORIDE, SODIUM LACTATE, POTASSIUM CHLORIDE, CALCIUM CHLORIDE 600; 310; 30; 20 MG/100ML; MG/100ML; MG/100ML; MG/100ML
INJECTION, SOLUTION INTRAVENOUS CONTINUOUS
Status: DISCONTINUED | OUTPATIENT
Start: 2020-01-31 | End: 2020-01-31 | Stop reason: HOSPADM

## 2020-01-31 RX ORDER — CEFAZOLIN SODIUM 2 G/50ML
2 SOLUTION INTRAVENOUS
Status: COMPLETED | OUTPATIENT
Start: 2020-01-31 | End: 2020-01-31

## 2020-01-31 RX ORDER — HYDROCODONE BITARTRATE AND ACETAMINOPHEN 5; 325 MG/1; MG/1
1 TABLET ORAL EVERY 4 HOURS PRN
Status: DISCONTINUED | OUTPATIENT
Start: 2020-01-31 | End: 2020-01-31 | Stop reason: HOSPADM

## 2020-01-31 RX ORDER — HYDROMORPHONE HYDROCHLORIDE 2 MG/ML
0.2 INJECTION, SOLUTION INTRAMUSCULAR; INTRAVENOUS; SUBCUTANEOUS EVERY 5 MIN PRN
Status: DISCONTINUED | OUTPATIENT
Start: 2020-01-31 | End: 2020-01-31 | Stop reason: HOSPADM

## 2020-01-31 RX ORDER — DEXAMETHASONE SODIUM PHOSPHATE 4 MG/ML
INJECTION, SOLUTION INTRA-ARTICULAR; INTRALESIONAL; INTRAMUSCULAR; INTRAVENOUS; SOFT TISSUE
Status: DISCONTINUED | OUTPATIENT
Start: 2020-01-31 | End: 2020-01-31

## 2020-01-31 RX ORDER — PHENAZOPYRIDINE HYDROCHLORIDE 100 MG/1
200 TABLET, FILM COATED ORAL
Qty: 18 TABLET | Refills: 0 | Status: SHIPPED | OUTPATIENT
Start: 2020-01-31 | End: 2020-02-06

## 2020-01-31 RX ORDER — PROPOFOL 10 MG/ML
VIAL (ML) INTRAVENOUS
Status: DISCONTINUED | OUTPATIENT
Start: 2020-01-31 | End: 2020-01-31

## 2020-01-31 RX ORDER — SUCCINYLCHOLINE CHLORIDE 20 MG/ML
INJECTION INTRAMUSCULAR; INTRAVENOUS
Status: DISCONTINUED | OUTPATIENT
Start: 2020-01-31 | End: 2020-01-31

## 2020-01-31 RX ORDER — HYDROCODONE BITARTRATE AND ACETAMINOPHEN 5; 325 MG/1; MG/1
1 TABLET ORAL EVERY 6 HOURS PRN
Qty: 6 TABLET | Refills: 0 | Status: SHIPPED | OUTPATIENT
Start: 2020-01-31 | End: 2020-02-06

## 2020-01-31 RX ORDER — PHENAZOPYRIDINE HYDROCHLORIDE 100 MG/1
200 TABLET, FILM COATED ORAL 3 TIMES DAILY PRN
Status: DISCONTINUED | OUTPATIENT
Start: 2020-01-31 | End: 2020-01-31 | Stop reason: HOSPADM

## 2020-01-31 RX ORDER — SODIUM CHLORIDE 0.9 % (FLUSH) 0.9 %
3 SYRINGE (ML) INJECTION
Status: DISCONTINUED | OUTPATIENT
Start: 2020-01-31 | End: 2020-01-31 | Stop reason: HOSPADM

## 2020-01-31 RX ORDER — GLYCOPYRROLATE 0.2 MG/ML
INJECTION INTRAMUSCULAR; INTRAVENOUS
Status: DISCONTINUED | OUTPATIENT
Start: 2020-01-31 | End: 2020-01-31

## 2020-01-31 RX ORDER — NEOSTIGMINE METHYLSULFATE 1 MG/ML
INJECTION, SOLUTION INTRAVENOUS
Status: DISCONTINUED | OUTPATIENT
Start: 2020-01-31 | End: 2020-01-31

## 2020-01-31 RX ORDER — CEPHALEXIN 500 MG/1
500 CAPSULE ORAL EVERY 12 HOURS
Qty: 4 CAPSULE | Refills: 0 | Status: SHIPPED | OUTPATIENT
Start: 2020-01-31 | End: 2020-02-06

## 2020-01-31 RX ORDER — FENTANYL CITRATE 50 UG/ML
INJECTION, SOLUTION INTRAMUSCULAR; INTRAVENOUS
Status: DISCONTINUED | OUTPATIENT
Start: 2020-01-31 | End: 2020-01-31

## 2020-01-31 RX ORDER — LIDOCAINE HCL/PF 100 MG/5ML
SYRINGE (ML) INTRAVENOUS
Status: DISCONTINUED | OUTPATIENT
Start: 2020-01-31 | End: 2020-01-31

## 2020-01-31 RX ORDER — ONDANSETRON 2 MG/ML
INJECTION INTRAMUSCULAR; INTRAVENOUS
Status: DISCONTINUED | OUTPATIENT
Start: 2020-01-31 | End: 2020-01-31

## 2020-01-31 RX ORDER — ROCURONIUM BROMIDE 10 MG/ML
INJECTION, SOLUTION INTRAVENOUS
Status: DISCONTINUED | OUTPATIENT
Start: 2020-01-31 | End: 2020-01-31

## 2020-01-31 RX ADMIN — CEFAZOLIN SODIUM 2 G: 2 SOLUTION INTRAVENOUS at 10:01

## 2020-01-31 RX ADMIN — PHENYLEPHRINE HYDROCHLORIDE 100 MCG: 10 INJECTION INTRAVENOUS at 11:01

## 2020-01-31 RX ADMIN — FENTANYL CITRATE 50 MCG: 50 INJECTION INTRAMUSCULAR; INTRAVENOUS at 10:01

## 2020-01-31 RX ADMIN — DEXAMETHASONE SODIUM PHOSPHATE 8 MG: 4 INJECTION, SOLUTION INTRAMUSCULAR; INTRAVENOUS at 10:01

## 2020-01-31 RX ADMIN — ROCURONIUM BROMIDE 10 MG: 10 INJECTION, SOLUTION INTRAVENOUS at 10:01

## 2020-01-31 RX ADMIN — PHENYLEPHRINE HYDROCHLORIDE 100 MCG: 10 INJECTION INTRAVENOUS at 10:01

## 2020-01-31 RX ADMIN — ONDANSETRON 4 MG: 2 INJECTION, SOLUTION INTRAMUSCULAR; INTRAVENOUS at 11:01

## 2020-01-31 RX ADMIN — PROPOFOL 150 MG: 10 INJECTION, EMULSION INTRAVENOUS at 10:01

## 2020-01-31 RX ADMIN — SODIUM CHLORIDE, SODIUM LACTATE, POTASSIUM CHLORIDE, AND CALCIUM CHLORIDE: .6; .31; .03; .02 INJECTION, SOLUTION INTRAVENOUS at 10:01

## 2020-01-31 RX ADMIN — NEOSTIGMINE METHYLSULFATE 3 MG: 1 INJECTION INTRAVENOUS at 11:01

## 2020-01-31 RX ADMIN — SUCCINYLCHOLINE CHLORIDE 160 MG: 20 INJECTION, SOLUTION INTRAMUSCULAR; INTRAVENOUS at 10:01

## 2020-01-31 RX ADMIN — SODIUM CHLORIDE, SODIUM LACTATE, POTASSIUM CHLORIDE, AND CALCIUM CHLORIDE: .6; .31; .03; .02 INJECTION, SOLUTION INTRAVENOUS at 11:01

## 2020-01-31 RX ADMIN — PHENAZOPYRIDINE HYDROCHLORIDE 200 MG: 100 TABLET ORAL at 12:01

## 2020-01-31 RX ADMIN — LIDOCAINE HYDROCHLORIDE 100 MG: 20 INJECTION, SOLUTION INTRAVENOUS at 10:01

## 2020-01-31 RX ADMIN — GLYCOPYRROLATE 0.4 MG: 0.2 INJECTION, SOLUTION INTRAMUSCULAR; INTRAVENOUS at 11:01

## 2020-01-31 RX ADMIN — ROCURONIUM BROMIDE 10 MG: 10 INJECTION, SOLUTION INTRAVENOUS at 11:01

## 2020-01-31 RX ADMIN — HYDROMORPHONE HYDROCHLORIDE 0.2 MG: 2 INJECTION, SOLUTION INTRAMUSCULAR; INTRAVENOUS; SUBCUTANEOUS at 12:01

## 2020-01-31 RX ADMIN — GLYCOPYRROLATE 0.1 MG: 0.2 INJECTION, SOLUTION INTRAMUSCULAR; INTRAVENOUS at 11:01

## 2020-01-31 NOTE — OP NOTE
[facility]  Surgery Department  Urology Operative Note    SUMMARY     Date of Procedure: 1/31/2020     Surgeon(s) and Role:     * Yvonne Weaver MD - Primary    Assisting Surgeon: None    Pre-Operative Diagnosis: Right ureteral calculus [N20.1]    Post-Operative Diagnosis: Post-Op Diagnosis Codes:     * Right ureteral calculus [N20.1]    Procedure: Procedure(s) (LRB):  Cystoscopy, ureteroscopy with laser lithotripsy, stone extraction, exchange of ureteral stent (Right)    Anesthesia: Choice    Indication for Procedure:  79-year-old female who initially presented with 2 proximal ureteral stones.  She underwent attempted ureteroscopy approximately 2-3 weeks ago but was unable to reach the stones.  She was stented and returns now for definitive treatment of her stones.    Description of Procedure: The patient was brought to operating room and placed under general anesthesia. Full time out procedures were performed identifying correct patient, procedure and laterality. Appropriate antibiotics with Ancef were given prior to commencement of surgery. The patient was placed in dorsal lithotomy position and prepped and draped in the usual sterile fashion.    A 22Fr rigid cystoscopy was placed per urethral and passed into the bladder. Cystoscopy did not reveal any abnormality of the bladder.  film was obtained.  Previously placed stent was noted in the right ureter.  The stent was noted to be emanating from the right ureteral orifice was grasped with flexible graspers and brought out to the urethral meatus.  A Sensor wire was passed the lumen of the stent up to level of the kidney as confirmed on fluoroscopy 2nd wire was placed alongside the 1st.  One wire was secured to the drapes to serve as a safety wire.    A 11/13 Fr 36 cm ureteral access sheath was then placed over the wire and into the proximal ureter under fluoroscopic guidance. Flexible ureteroscopy was then passed into the ureteral  "access sheath to the level of the stone. The stones were identified within the right lower poles. Laser lithotripsy was then performed using the Holmium laser and the stone was fragmented into multiple pieces. Any large, potentially obstructing fragments were extracted using the Zero-tipped Nitinol basket. These fragments were collected and submitted as a specimen.  Visualization was difficult due to stone dusting hematuria.    The entire renal pelvis was then inspected and all calices were noted to be void of any large stone fragments. The ureteral access  sheath was removed under direct vision with no additional stones identified. Ureter was noted to be thin in the mid to proximal region likely due to passage of the ureteral access sheath.  For this reason, the decision was made to place a stent without a string to allow for ureteral healing.     Under cystoscopic and fluoroscopic guidance, the guidewire was then exchanged for a 6x24 double-J ureteral stent. Good coils were confirmed within the renal pelvis and the bladder using fluoroscopy and cytoscopy. The string was removed from the stent prior to placement. The bladder was then emptied and the cystoscope removed. The patient was then awakened and transferred to PACU in stable condition.     She will "return to clinic In 3-4 weeks to have the stent removed.    Findings:  3 stones noted in the right lower pole of the kidney.  Stones were fragmented and removed.  Thinning of the ureter was noted with the ureteral access sheath was passed.  For this reason, stent without string was placed.    Complications: No    Estimated Blood Loss (EBL):  5 cc    Drains:  6 Albanian by 24 cm double-J ureteral stent in the right ureter, no string           Implants:   Implant Name Type Inv. Item Serial No.  Lot No. LRB No. Used   STENT URET PERCUFLEX 6FR 24CM - [id number]  STENT URET PERCUFLEX 6FR 24CM  [employer] [id number] Right 1   STENT URET PERCUFLEX 6FR " 24CM - [id number]  STENT URET PERCUFLEX 6FR 24CM  [email or web address] [id number] Right 1       Specimens:  Ureteral stent, right ureteral stones  Specimen (12h ago, onward)    None                  Condition: Good    Disposition: PACU - hemodynamically stable.    Attestation: I was present and scrubbed for the entire procedure.    Discharge Note    SUMMARY     Admit Date: 1/31/2020    Discharge Date and Time:  01/31/2020 11:50 AM    Hospital Course (synopsis of major diagnoses, care, treatment, and services provided during the course of the hospital stay): Uncomplicated URS/LL/stent.      Final Diagnosis: Post-Op Diagnosis Codes:     * Right ureteral calculus [N20.1]    Disposition: Home or Self Care    Follow Up/Patient Instructions:     Medications:  Reconciled Home Medications:      Medication List      START taking these medications    cephALEXin 500 MG capsule  Commonly known as:  KEFLEX  Take 1 capsule (500 mg total) by mouth every 12 (twelve) hours.     HYDROcodone-acetaminophen 5-325 mg per tablet  Commonly known as:  NORCO  Take 1 tablet by mouth every 6 (six) hours as needed for Pain.     phenazopyridine 100 MG tablet  Commonly known as:  PYRIDIUM  Take 2 tablets (200 mg total) by mouth 3 (three) times daily with meals.        CONTINUE taking these medications    diclofenac sodium 1 % Gel  Commonly known as:  VOLTAREN  Apply 2 g topically once daily.     fish oil-omega-3 fatty acids 300-1,000 mg capsule  Take 1 g by mouth once daily.     flecainide 100 MG Tab  Commonly known as:  TAMBOCOR  Take 1 tablet (100 mg total) by mouth every 12 (twelve) hours.     multivitamin per tablet  Commonly known as:  THERAGRAN  Take 1 tablet by mouth every evening. 1 Tablet Oral Every day     oxybutynin 5 MG Tab  Commonly known as:  DITROPAN  TAKE 1 TABLET THREE TIMES DAILY     VITAMIN B-12 ORAL  Take 2,500 mcg by mouth every evening.     warfarin 5 MG tablet  Commonly known as:  COUMADIN  Take 0.5-1 tablets (2.5-5 mg total) by  mouth Daily. As directed by coumadin clinic          Discharge Procedure Orders   Diet general     Call MD for:  temperature >100.4     Call MD for:  persistent nausea and vomiting     Call MD for:  severe uncontrolled pain     Call MD for:  difficulty breathing, headache or visual disturbances     No dressing needed     Activity as tolerated     Follow-up Information     Yvonne Weaver MD In 3 weeks.    Specialty:  Urology  Why:  For post-op follow up  Contact information:  [address]  [address]  [facility] [address] [phone]

## 2020-01-31 NOTE — ANESTHESIA POSTPROCEDURE EVALUATION
Anesthesia Post Evaluation    Patient: Alaina Vee    Procedure(s) Performed: Procedure(s) (LRB):  Cystoscopy, possible retrograde pyelogram, ureteroscopy with laser lithotripsy, ureteral stent exchange (Right)    Final Anesthesia Type: general    Patient location during evaluation: PACU  Patient participation: Yes- Able to Participate  Level of consciousness: sedated  Post-procedure vital signs: reviewed and stable  Pain management: adequate  Airway patency: patent    PONV status at discharge: No PONV  Anesthetic complications: no      Cardiovascular status: blood pressure returned to baseline  Respiratory status: unassisted  Hydration status: euvolemic  Follow-up not needed.          Vitals Value Taken Time   /67 1/31/2020 12:17 PM   Temp 36.3 °C (97.3 °F) 1/31/2020 12:00 PM   Pulse 60 1/31/2020 12:17 PM   Resp 25 1/31/2020 12:17 PM   SpO2 96 % 1/31/2020 12:17 PM   Vitals shown include unvalidated device data.      No case tracking events are documented in the log.      Pain/Rajeev Score: Pain Rating Prior to Med Admin: 0 (1/31/2020 11:56 AM)  Pain Rating Post Med Admin: 0 (1/31/2020 11:56 AM)  Rajeev Score: 8 (1/31/2020 11:56 AM)

## 2020-01-31 NOTE — TELEPHONE ENCOUNTER
----- Message from Yvonne Weaver MD sent at 1/31/2020 11:51 AM CST -----  Pt is s/p ureteroscopy today. She will need her stent removed in clinic in about 4 weeks. Please schedule cysto/stent removal at that time. Thanks.

## 2020-01-31 NOTE — ANESTHESIA PREPROCEDURE EVALUATION
01/31/2020  Alaina Vee is a 79 y.o., female.    Anesthesia Evaluation    I have reviewed the Patient Summary Reports.     I have reviewed the Medications.     Review of Systems  Cardiovascular:   Hypertension    Pulmonary:   Sleep Apnea    Renal/:   Chronic Renal Disease    Hepatic/GI:   GERD    Musculoskeletal:   Arthritis     Psych:   Psychiatric History          Physical Exam  General:  Well nourished, Acute Intoxication    Airway/Jaw/Neck:  Airway Findings: Mouth Opening: Normal Tongue: Normal  General Airway Assessment: Adult      Dental:  Dental Findings:   Chest/Lungs:  Chest/Lungs Findings: Clear to auscultation     Heart/Vascular:  Heart Findings: Rate: Normal  Rhythm: Regular Rhythm         Echocardiography Findings     Left Ventricle Normal ejection fraction at 55%. Normal left ventricular cavity size. Concentric remodeling observed. Indeterminate left ventricular diastolic function.   Right Ventricle Normal cavity size, wall thickness and ejection fraction. Wall motion normal.   Left Atrium Normal atrial size. .   Right Atrium Normal atrial size.   Aortic Valve The valve is trileaflet.   Mitral Valve Trace regurgitation.   Tricuspid Valve Trace regurgitation. The estimated PA systolic pressure is 10 mmHg.   IVC/SVC Normal central venous pressure (3 mm Hg).   Pericardium No pericardial effusion.         Anesthesia Plan  Type of Anesthesia, risks & benefits discussed:  Anesthesia Type:  general  Patient's Preference:   Intra-op Monitoring Plan: standard ASA monitors  Intra-op Monitoring Plan Comments:   Post Op Pain Control Plan:   Post Op Pain Control Plan Comments:   Induction:   IV  Beta Blocker:         Informed Consent: Patient understands risks and agrees with Anesthesia plan.  Questions answered. Anesthesia consent signed with patient.  ASA Score: 3     Day of Surgery Review of  History & Physical:            Ready For Surgery From Anesthesia Perspective.

## 2020-01-31 NOTE — TRANSFER OF CARE
Anesthesia Transfer of Care Note    Patient: Alaina Vee    Procedure(s) Performed: Procedure(s) (LRB):  Cystoscopy, possible retrograde pyelogram, ureteroscopy with laser lithotripsy, ureteral stent exchange (Right)    Patient location: PACU    Anesthesia Type: general    Transport from OR: Transported from OR on room air with adequate spontaneous ventilation    Post pain: adequate analgesia    Post assessment: no apparent anesthetic complications and tolerated procedure well    Post vital signs: stable    Level of consciousness: awake and alert    Nausea/Vomiting: no nausea/vomiting    Complications: none    Transfer of care protocol was followed      Last vitals:   Visit Vitals  BP (!) 163/79 (BP Location: Left arm, Patient Position: Lying)   Pulse 64   Temp 36.3 °C (97.3 °F) (Oral)   Resp 18   Wt 108.6 kg (239 lb 6.7 oz)   LMP  (LMP Unknown)   SpO2 100%   Breastfeeding? No   BMI 39.84 kg/m²

## 2020-01-31 NOTE — PLAN OF CARE
Transferred to same day surgery. VSS, AA&OX4. Pain improved and denies nausea. Tolerating ice water.

## 2020-01-31 NOTE — DISCHARGE INSTRUCTIONS
Okay to restart Coumadin on Sunday if urine is yellow or light pink. Please continue to hold Coumadin if urine is dark pink or red and please call Dr. Weaver's office on Monday.     Expect blood and/or burning with urination. Drink plenty of fluids.    Due to fragile and thin ureter noted in surgery, recommend to return in about 4 weeks for stent removal in clinic.     ACTIVITY LEVEL: If you have received sedation or an anesthetic, you may feel sleepy for several hours. Rest until you are more awake. Gradually resume your normal activities.       DIET: You may resume your home diet. If nausea is present, increase your diet gradually with fluids and bland foods.      Medications: Pain medication should be taken only if needed and as directed. If antibiotics are prescribed, the medication should be taken until completed. You will be given an updated list of you medications.  ? No driving, alcoholic beverages or signing legal documents for next 24 hours or while taking pain medication    CALL THE DOCTOR:     · Fever over 101°F  · Severe pain that doesnt go away with medication.  · Upset stomach and vomiting that is persistent.  · Problems urinating-unable to urinate or heavy bleeding (with or without clots)    Fall Prevention  Millions of people fall every year and injure themselves. You may have had anesthesia or sedation which may increase your risk of falling. You may have health issues that put you at an increased risk of falling.     Here are ways to reduce your risk of falling.    Make your home safe by keeping walkways clear of objects you may trip over.  Use non-slip pads under rugs. Do not use area rugs or small throw rugs.  Use non-slip mats in bathtubs and showers.  Install handrails and lights on staircases.  Do not walk in poorly lit areas.  Do not stand on chairs or wobbly ladders.  Use caution when reaching overhead or looking upward. This position can cause a loss of balance.  Be sure your shoes fit  properly, have non-slip bottoms and are in good condition.   Wear shoes both inside and out. Avoid going barefoot or wearing slippers.  Be cautious when going up and down stairs, curbs, and when walking on uneven sidewalks.  If your balance is poor, consider using a cane or walker.  If your fall was related to alcohol use, stop or limit alcohol intake.   If your fall was related to use of sleeping medicines, talk to your doctor about this. You may need to reduce your dosage at bedtime if you awaken during the night to go to the bathroom.    To reduce the need for nighttime bathroom trips:  Avoid drinking fluids for several hours before going to bed  Empty your bladder before going to bed  Men can keep a urinal at the bedside  Stay as active as you can. Balance, flexibility, strength, and endurance all come from exercise. They all play a role in preventing falls. Ask your healthcare provider which types of activity are right for you.  Get your vision checked on a regular basis.  If you have pets, know where they are before you stand up or walk so you don't trip over them.  Use night lights.

## 2020-02-03 ENCOUNTER — LAB VISIT (OUTPATIENT)
Dept: LAB | Facility: HOSPITAL | Age: 80
End: 2020-02-03
Attending: INTERNAL MEDICINE
Payer: MEDICARE

## 2020-02-03 ENCOUNTER — ANTI-COAG VISIT (OUTPATIENT)
Dept: CARDIOLOGY | Facility: CLINIC | Age: 80
End: 2020-02-03
Payer: MEDICARE

## 2020-02-03 DIAGNOSIS — Z79.01 LONG TERM (CURRENT) USE OF ANTICOAGULANTS: ICD-10-CM

## 2020-02-03 LAB
INR PPP: 1 (ref 0.8–1.2)
PROTHROMBIN TIME: 10.7 SEC (ref 9–12.5)

## 2020-02-03 PROCEDURE — 85610 PROTHROMBIN TIME: CPT | Mod: HCNC

## 2020-02-03 PROCEDURE — 93793 PR ANTICOAGULANT MGMT FOR PT TAKING WARFARIN: ICD-10-PCS | Mod: S$GLB,,, | Performed by: PHARMACIST

## 2020-02-03 PROCEDURE — 93793 ANTICOAG MGMT PT WARFARIN: CPT | Mod: S$GLB,,, | Performed by: PHARMACIST

## 2020-02-03 PROCEDURE — 36415 COLL VENOUS BLD VENIPUNCTURE: CPT | Mod: HCNC,PN

## 2020-02-03 NOTE — PROGRESS NOTES
States did not resume coumadin dose as planned;  States DR advised to resumed 10mg on 2/2, no other changes

## 2020-02-04 LAB
FINAL PATHOLOGIC DIAGNOSIS: NORMAL
GROSS: NORMAL

## 2020-02-05 LAB
COMPN STONE: NORMAL
SPECIMEN SOURCE: NORMAL
STONE ANALYSIS IR-IMP: NORMAL

## 2020-02-05 NOTE — PROGRESS NOTES
Chief Complaint :  Right femoral vein thrombus    Hx of Present illness :  Patient is a 79 y.o. year old female who presents to the clinic today for   Oncology evaluation.  Had sx of pain Right leg.  Had further evaluation which revealed DVT.  Unprovoked. Patient was on anticoagulation for paroxysmal atrial fibrillation. Patient  Feels well; in good spirits    Allergies :    Review of patient's allergies indicates:   Allergen Reactions    Celebrex [celecoxib]      Chest tightness    Lipitor [atorvastatin] Other (See Comments)       Occupation :  Retired Cook./    Transfusion :  None    Menstrual & obstetric Hx :   1; Para 1.  Age of menarche:  18  Age of first pregnancy: 27  Lactation history: Yes  Age of menopause: 57  HRT: None    Present Meds :   Medication List with Changes/Refills   Current Medications    CEPHALEXIN (KEFLEX) 500 MG CAPSULE    Take 1 capsule (500 mg total) by mouth every 12 (twelve) hours.    CYANOCOBALAMIN, VITAMIN B-12, (VITAMIN B-12 ORAL)    Take 2,500 mcg by mouth every evening.    DICLOFENAC SODIUM (VOLTAREN) 1 % GEL    Apply 2 g topically once daily.    FISH OIL-OMEGA-3 FATTY ACIDS 300-1,000 MG CAPSULE    Take 1 g by mouth once daily.    FLECAINIDE (TAMBOCOR) 100 MG TAB    Take 1 tablet (100 mg total) by mouth every 12 (twelve) hours.    HYDROCODONE-ACETAMINOPHEN (NORCO) 5-325 MG PER TABLET    Take 1 tablet by mouth every 6 (six) hours as needed for Pain.    MULTIVITAMIN (THERAGRAN) PER TABLET    Take 1 tablet by mouth every evening. 1 Tablet Oral Every day    OXYBUTYNIN (DITROPAN) 5 MG TAB    TAKE 1 TABLET THREE TIMES DAILY    PHENAZOPYRIDINE (PYRIDIUM) 100 MG TABLET    Take 2 tablets (200 mg total) by mouth 3 (three) times daily with meals.    WARFARIN (COUMADIN) 5 MG TABLET    Take 0.5-1 tablets (2.5-5 mg total) by mouth Daily. As directed by coumadin clinic       Past Medical Hx :   reviewed    Past Medical Hx :  Past Medical History:   Diagnosis Date    Arthritis     knees     Atrial fibrillation     Atrial flutter     Back pain     Cataract     Degenerative disc disease     Depression     General anesthetics causing adverse effect in therapeutic use     pt states sometimes slow to awaken.    GERD (gastroesophageal reflux disease)     Hyperlipidemia     Hypertension     Kidney stone     Obesity     Sleep apnea     does not wear CPAP    Urinary incontinence     Varicosities     Ventral hernia        Travel Hx :  N/A    Immunization :  Immunization History   Administered Date(s) Administered    Influenza 10/15/2007, 10/17/2008, 10/10/2009, 10/05/2010, 11/14/2011, 10/23/2013    Influenza - High Dose - PF (65 years and older) 11/05/2012, 10/22/2014, 12/19/2015, 08/29/2016, 12/11/2017, 09/20/2018, 11/18/2019    Influenza - Quadrivalent - PF (6 months and older) 10/15/2007, 10/10/2009, 10/05/2010, 11/14/2011    Influenza Split 10/15/2007, 10/17/2008, 10/10/2009, 10/05/2010, 11/14/2011, 10/23/2013, 10/23/2013    Pneumococcal Conjugate - 13 Valent 08/29/2016    Pneumococcal Polysaccharide - 23 Valent 02/19/2015    Td - PF (ADULT) 05/03/2019    Zoster Recombinant 05/02/2019, 07/09/2019       Family Hx :  Family History   Problem Relation Age of Onset    COPD Mother     Heart disease Sister         half sister    COPD Sister     No Known Problems Father     Parkinsonism Sister     No Known Problems Brother     No Known Problems Maternal Aunt     No Known Problems Maternal Uncle     No Known Problems Paternal Aunt     No Known Problems Paternal Uncle     No Known Problems Maternal Grandmother     No Known Problems Maternal Grandfather     No Known Problems Paternal Grandmother     No Known Problems Paternal Grandfather     Amblyopia Neg Hx     Blindness Neg Hx     Cancer Neg Hx     Cataracts Neg Hx     Diabetes Neg Hx     Glaucoma Neg Hx     Hypertension Neg Hx     Macular degeneration Neg Hx     Retinal detachment Neg Hx     Strabismus Neg Hx      Stroke Neg Hx     Thyroid disease Neg Hx        Social Hx :  Social History     Socioeconomic History    Marital status:      Spouse name: Not on file    Number of children: Not on file    Years of education: Not on file    Highest education level: Not on file   Occupational History    Not on file   Social Needs    Financial resource strain: Not on file    Food insecurity:     Worry: Not on file     Inability: Not on file    Transportation needs:     Medical: Not on file     Non-medical: Not on file   Tobacco Use    Smoking status: Never Smoker    Smokeless tobacco: Never Used   Substance and Sexual Activity    Alcohol use: No    Drug use: No    Sexual activity: Yes     Partners: Male   Lifestyle    Physical activity:     Days per week: Not on file     Minutes per session: Not on file    Stress: Not on file   Relationships    Social connections:     Talks on phone: Not on file     Gets together: Not on file     Attends Tenriism service: Not on file     Active member of club or organization: Not on file     Attends meetings of clubs or organizations: Not on file     Relationship status: Not on file   Other Topics Concern    Not on file   Social History Narrative    Not on file       Surgery :  Appendectomy; cholecystectomy; bilateral total knee replacement. Cardioversion. Cardiac ablation.   Has not had colonoscopy    Symptoms :    Review of Systems   Constitutional: Positive for malaise/fatigue. Negative for chills, fever and weight loss.   HENT: Negative for congestion, hearing loss, nosebleeds, sore throat and tinnitus.    Eyes: Negative for blurred vision, double vision and photophobia.   Respiratory: Negative.  Negative for cough, sputum production and shortness of breath.    Cardiovascular: Positive for leg swelling. Negative for chest pain, palpitations, orthopnea and claudication.   Gastrointestinal: Positive for heartburn. Negative for abdominal pain, blood in stool, constipation,  diarrhea, nausea and vomiting.   Genitourinary: Negative for dysuria, flank pain, frequency, hematuria and urgency.        Followed by  for Kidney stones.   Musculoskeletal: Positive for joint pain. Negative for back pain, falls, myalgias and neck pain.   Neurological: Negative for dizziness, tingling, tremors, sensory change and headaches.   Endo/Heme/Allergies: Negative for environmental allergies and polydipsia. Does not bruise/bleed easily.   Psychiatric/Behavioral: Negative for depression. The patient is nervous/anxious. The patient does not have insomnia.        Physical Exam :   Physical Exam   Constitutional: She is oriented to person, place, and time and well-developed, well-nourished, and in no distress. Vital signs are normal. No distress.   HENT:   Head: Normocephalic and atraumatic.   Right Ear: External ear normal.   Left Ear: External ear normal.   Nose: Nose normal.   Mouth/Throat: Oropharynx is clear and moist. No oropharyngeal exudate.   Eyes: Pupils are equal, round, and reactive to light. Conjunctivae, EOM and lids are normal. Lids are everted and swept, no foreign bodies found. Right eye exhibits no discharge. Left eye exhibits no discharge. No scleral icterus.   Neck: Trachea normal, normal range of motion and phonation normal. Neck supple. Normal carotid pulses, no hepatojugular reflux and no JVD present. No tracheal tenderness present. Carotid bruit is not present. No tracheal deviation present. No thyroid mass and no thyromegaly present.   Cardiovascular: Normal rate, normal heart sounds, intact distal pulses and normal pulses. An irregularly irregular rhythm present. PMI is not displaced. Exam reveals no gallop and no friction rub.   No murmur heard.  Pulmonary/Chest: Effort normal and breath sounds normal. No stridor. No apnea. No respiratory distress. She has no wheezes. She has no rales. She exhibits no tenderness.   Abdominal: Soft. Normal appearance, normal aorta and bowel  sounds are normal. She exhibits no distension and no mass. There is no hepatosplenomegaly. There is no tenderness. There is no rebound, no guarding and no CVA tenderness. No hernia.   Musculoskeletal: Normal range of motion. She exhibits edema. She exhibits no tenderness or deformity.   1+ edema Left leg. Has antiembolic stockings   Lymphadenopathy:        Head (right side): No submental, no submandibular, no tonsillar, no preauricular, no posterior auricular and no occipital adenopathy present.        Head (left side): No submental, no submandibular, no tonsillar, no preauricular, no posterior auricular and no occipital adenopathy present.     She has no cervical adenopathy.     She has no axillary adenopathy.        Right: No inguinal, no supraclavicular and no epitrochlear adenopathy present.        Left: No inguinal, no supraclavicular and no epitrochlear adenopathy present.   Neurological: She is alert and oriented to person, place, and time. She has normal reflexes. No cranial nerve deficit. She exhibits normal muscle tone. Gait normal. Coordination normal. GCS score is 15.   Skin: Skin is warm, dry and intact. No rash noted. She is not diaphoretic. No cyanosis or erythema. No pallor. Nails show no clubbing.   Psychiatric: Mood, memory, affect and judgment normal.   Nursing note and vitals reviewed.      Labs & Imaging :  01/30/2020 : Hexagonal Phospholipid Neutralisation negative. Protein C and S activity normal. NFBS 137; Cr.1.0; Ca 9.6. Bili 0.5; Liver enzymes normal. eGFR 54. Hgb 14.2; hct 43.5; MCV 92. Platelets 242,000 ANC 5,600Lupus Anticoagulant pending  U/S right lower extremity 1/30/2020 : no evidence of DVT.   10/28/19 :  Cardiolipin antibody IgG increased at 22.7.  igM normal.  Protein activity 12 %. Protein S activity 41.  Lupus anticoagulant normal;. Hexagonal Phospholipid neutralisation  Positive.    Dx : Thrombus right femoral vein. Hypercoagulable state      Assessment & Plan:  Reviewed with  patient.   Re evaluate with results. Patient understands and verbalised.  Advance Care Planning     Power of   I initiated the process of advance care planning today and explained the importance of this process to the patient.  I introduced the concept of advance directives to the patient, as well. Then the patient received detailed information about the importance of designating a Health Care Power of  (HCPOA). She was also instructed to communicate with this person about their wishes for future healthcare, should she become sick and lose decision-making capacity. The patient has not previously appointed a HCPOA.          Living Will  During this visit, I engaged the patient in the advance care planning process.  The patient and I reviewed the role for advance directives and their purpose in directing future healthcare if the patient's unable to speak for him/herself.  At this point in time, the patient does have full decision-making capacity.  We discussed different extreme health states that she could experience, and reviewed what kind of medical care she would want in those situations.  The patient communicated that if she were comatose and had little chance of a meaningful recovery, she want machines/life-sustaining treatments used.  In addition to the above preference, other important end-of-life issues for the patient   The patient  HAS NOT completed a living will to reflect these preferences.  The patient   HAS NOT:[id number]} already designated a healthcare power of  to make decisions on her behalf.        Brochures given

## 2020-02-06 ENCOUNTER — LAB VISIT (OUTPATIENT)
Dept: LAB | Facility: HOSPITAL | Age: 80
End: 2020-02-06
Attending: INTERNAL MEDICINE
Payer: MEDICARE

## 2020-02-06 ENCOUNTER — OFFICE VISIT (OUTPATIENT)
Dept: HEMATOLOGY/ONCOLOGY | Facility: CLINIC | Age: 80
End: 2020-02-06
Payer: MEDICARE

## 2020-02-06 ENCOUNTER — ANTI-COAG VISIT (OUTPATIENT)
Dept: CARDIOLOGY | Facility: CLINIC | Age: 80
End: 2020-02-06
Payer: MEDICARE

## 2020-02-06 VITALS
TEMPERATURE: 99 F | WEIGHT: 239.88 LBS | OXYGEN SATURATION: 95 % | HEART RATE: 81 BPM | DIASTOLIC BLOOD PRESSURE: 74 MMHG | BODY MASS INDEX: 39.91 KG/M2 | SYSTOLIC BLOOD PRESSURE: 154 MMHG

## 2020-02-06 DIAGNOSIS — I82.411 DVT OF DEEP FEMORAL VEIN, RIGHT: ICD-10-CM

## 2020-02-06 DIAGNOSIS — Z79.01 LONG TERM (CURRENT) USE OF ANTICOAGULANTS: ICD-10-CM

## 2020-02-06 DIAGNOSIS — D68.59 HYPERCOAGULABLE STATE: Primary | ICD-10-CM

## 2020-02-06 LAB
INR PPP: 1.5 (ref 0.8–1.2)
PROTHROMBIN TIME: 16.2 SEC (ref 9–12.5)

## 2020-02-06 PROCEDURE — 36415 COLL VENOUS BLD VENIPUNCTURE: CPT | Mod: HCNC,PN

## 2020-02-06 PROCEDURE — 1101F PR PT FALLS ASSESS DOC 0-1 FALLS W/OUT INJ PAST YR: ICD-10-PCS | Mod: HCNC,CPTII,S$GLB, | Performed by: INTERNAL MEDICINE

## 2020-02-06 PROCEDURE — 3077F PR MOST RECENT SYSTOLIC BLOOD PRESSURE >= 140 MM HG: ICD-10-PCS | Mod: HCNC,CPTII,S$GLB, | Performed by: INTERNAL MEDICINE

## 2020-02-06 PROCEDURE — 1126F AMNT PAIN NOTED NONE PRSNT: CPT | Mod: HCNC,S$GLB,, | Performed by: INTERNAL MEDICINE

## 2020-02-06 PROCEDURE — 99499 UNLISTED E&M SERVICE: CPT | Mod: S$GLB,,, | Performed by: INTERNAL MEDICINE

## 2020-02-06 PROCEDURE — 1159F PR MEDICATION LIST DOCUMENTED IN MEDICAL RECORD: ICD-10-PCS | Mod: HCNC,S$GLB,, | Performed by: INTERNAL MEDICINE

## 2020-02-06 PROCEDURE — 3078F PR MOST RECENT DIASTOLIC BLOOD PRESSURE < 80 MM HG: ICD-10-PCS | Mod: HCNC,CPTII,S$GLB, | Performed by: INTERNAL MEDICINE

## 2020-02-06 PROCEDURE — 99213 OFFICE O/P EST LOW 20 MIN: CPT | Mod: HCNC,S$GLB,, | Performed by: INTERNAL MEDICINE

## 2020-02-06 PROCEDURE — 3077F SYST BP >= 140 MM HG: CPT | Mod: HCNC,CPTII,S$GLB, | Performed by: INTERNAL MEDICINE

## 2020-02-06 PROCEDURE — 99213 PR OFFICE/OUTPT VISIT, EST, LEVL III, 20-29 MIN: ICD-10-PCS | Mod: HCNC,S$GLB,, | Performed by: INTERNAL MEDICINE

## 2020-02-06 PROCEDURE — 99999 PR PBB SHADOW E&M-EST. PATIENT-LVL III: CPT | Mod: PBBFAC,HCNC,, | Performed by: INTERNAL MEDICINE

## 2020-02-06 PROCEDURE — 93793 ANTICOAG MGMT PT WARFARIN: CPT | Mod: S$GLB,,, | Performed by: PHARMACIST

## 2020-02-06 PROCEDURE — 1159F MED LIST DOCD IN RCRD: CPT | Mod: HCNC,S$GLB,, | Performed by: INTERNAL MEDICINE

## 2020-02-06 PROCEDURE — 93793 PR ANTICOAGULANT MGMT FOR PT TAKING WARFARIN: ICD-10-PCS | Mod: S$GLB,,, | Performed by: PHARMACIST

## 2020-02-06 PROCEDURE — 1101F PT FALLS ASSESS-DOCD LE1/YR: CPT | Mod: HCNC,CPTII,S$GLB, | Performed by: INTERNAL MEDICINE

## 2020-02-06 PROCEDURE — 99999 PR PBB SHADOW E&M-EST. PATIENT-LVL III: ICD-10-PCS | Mod: PBBFAC,HCNC,, | Performed by: INTERNAL MEDICINE

## 2020-02-06 PROCEDURE — 3078F DIAST BP <80 MM HG: CPT | Mod: HCNC,CPTII,S$GLB, | Performed by: INTERNAL MEDICINE

## 2020-02-06 PROCEDURE — 85610 PROTHROMBIN TIME: CPT | Mod: HCNC

## 2020-02-06 PROCEDURE — 1126F PR PAIN SEVERITY QUANTIFIED, NO PAIN PRESENT: ICD-10-PCS | Mod: HCNC,S$GLB,, | Performed by: INTERNAL MEDICINE

## 2020-02-06 PROCEDURE — 99499 RISK ADDL DX/OHS AUDIT: ICD-10-PCS | Mod: S$GLB,,, | Performed by: INTERNAL MEDICINE

## 2020-02-07 ENCOUNTER — TELEPHONE (OUTPATIENT)
Dept: HEMATOLOGY/ONCOLOGY | Facility: CLINIC | Age: 80
End: 2020-02-07

## 2020-02-07 NOTE — TELEPHONE ENCOUNTER
Lupus anticoagulant weakly positive. Called patient. Not available. Left message. Stay on coumadin. Labs in 12 weeks. Stay off coumadin for ten days. Get labs and resume coumadin

## 2020-02-13 ENCOUNTER — ANTI-COAG VISIT (OUTPATIENT)
Dept: CARDIOLOGY | Facility: CLINIC | Age: 80
End: 2020-02-13
Payer: MEDICARE

## 2020-02-13 ENCOUNTER — LAB VISIT (OUTPATIENT)
Dept: LAB | Facility: HOSPITAL | Age: 80
End: 2020-02-13
Attending: INTERNAL MEDICINE
Payer: MEDICARE

## 2020-02-13 DIAGNOSIS — Z79.01 LONG TERM (CURRENT) USE OF ANTICOAGULANTS: ICD-10-CM

## 2020-02-13 LAB
INR PPP: >10
INR PPP: >10 (ref 0.8–1.2)
PROTHROMBIN TIME: >100 SEC (ref 9–12.5)

## 2020-02-13 PROCEDURE — 85610 PROTHROMBIN TIME: CPT | Mod: HCNC

## 2020-02-13 PROCEDURE — 93793 PR ANTICOAGULANT MGMT FOR PT TAKING WARFARIN: ICD-10-PCS | Mod: S$GLB,,, | Performed by: PHARMACIST

## 2020-02-13 PROCEDURE — 36415 COLL VENOUS BLD VENIPUNCTURE: CPT | Mod: HCNC,PN

## 2020-02-13 PROCEDURE — 93793 ANTICOAG MGMT PT WARFARIN: CPT | Mod: S$GLB,,, | Performed by: PHARMACIST

## 2020-02-13 NOTE — PROGRESS NOTES
Confirmed taking correct dose of coumadin but accidentally took 7.5mg on WED  Cucumber not peeled w/ a salad on WED;  States having slight discharge w/ bleeding from vagina   NO other changes (fever, diarrhea, new meds, any OTC meds, alcohol intake, etc)  We will have patient eat a large portion of dark greens today, hold her coumadin, and repeat her INR tomorrow AM STAT. She will be advised to go to ER if any bleeding occurs.

## 2020-02-14 ENCOUNTER — LAB VISIT (OUTPATIENT)
Dept: LAB | Facility: HOSPITAL | Age: 80
End: 2020-02-14
Attending: INTERNAL MEDICINE
Payer: MEDICARE

## 2020-02-14 ENCOUNTER — ANTI-COAG VISIT (OUTPATIENT)
Dept: CARDIOLOGY | Facility: CLINIC | Age: 80
End: 2020-02-14
Payer: MEDICARE

## 2020-02-14 DIAGNOSIS — Z79.01 LONG TERM (CURRENT) USE OF ANTICOAGULANTS: ICD-10-CM

## 2020-02-14 LAB
INR PPP: 1.9 (ref 0.8–1.2)
PROTHROMBIN TIME: 20.8 SEC (ref 9–12.5)

## 2020-02-14 PROCEDURE — 85610 PROTHROMBIN TIME: CPT | Mod: HCNC

## 2020-02-14 PROCEDURE — 93793 PR ANTICOAGULANT MGMT FOR PT TAKING WARFARIN: ICD-10-PCS | Mod: S$GLB,,, | Performed by: PHARMACIST

## 2020-02-14 PROCEDURE — 93793 ANTICOAG MGMT PT WARFARIN: CPT | Mod: S$GLB,,, | Performed by: PHARMACIST

## 2020-02-14 PROCEDURE — 36415 COLL VENOUS BLD VENIPUNCTURE: CPT | Mod: HCNC,PN

## 2020-02-14 NOTE — PROGRESS NOTES
Confirmed holding coumadin dose THURS  Reports eating a large portion of broccoli THURS  NO other new changes

## 2020-02-18 ENCOUNTER — TELEPHONE (OUTPATIENT)
Dept: UROLOGY | Facility: CLINIC | Age: 80
End: 2020-02-18

## 2020-02-18 NOTE — TELEPHONE ENCOUNTER
Patient had concerns she still has slight pain when voiding and see lite pink in her urine. Patient advised that this is normal healing following stone removal. She was advised to increase her fluid intake. Patient verbalized understanding.

## 2020-02-18 NOTE — TELEPHONE ENCOUNTER
----- Message from Audelia Mera sent at 2/18/2020 11:02 AM CST -----  Contact: Self/  [phone]  Type: Patient Call Back    Who called:  Patient    What is the request in detail:  Patient is needing staff to give her a call, she's having a discharge from her procedure.  Thank you    Would the patient rather a call back or a response via [facility]?   Call back    Best call back number: [phone]

## 2020-02-19 ENCOUNTER — LAB VISIT (OUTPATIENT)
Dept: LAB | Facility: HOSPITAL | Age: 80
End: 2020-02-19
Attending: INTERNAL MEDICINE
Payer: MEDICARE

## 2020-02-19 ENCOUNTER — ANTI-COAG VISIT (OUTPATIENT)
Dept: CARDIOLOGY | Facility: CLINIC | Age: 80
End: 2020-02-19
Payer: MEDICARE

## 2020-02-19 DIAGNOSIS — Z79.01 LONG TERM (CURRENT) USE OF ANTICOAGULANTS: ICD-10-CM

## 2020-02-19 LAB
INR PPP: 1.9 (ref 0.8–1.2)
PROTHROMBIN TIME: 21 SEC (ref 9–12.5)

## 2020-02-19 PROCEDURE — 93793 ANTICOAG MGMT PT WARFARIN: CPT | Mod: S$GLB,,, | Performed by: PHARMACIST

## 2020-02-19 PROCEDURE — 36415 COLL VENOUS BLD VENIPUNCTURE: CPT | Mod: HCNC,PN

## 2020-02-19 PROCEDURE — 85610 PROTHROMBIN TIME: CPT | Mod: HCNC

## 2020-02-19 PROCEDURE — 93793 PR ANTICOAGULANT MGMT FOR PT TAKING WARFARIN: ICD-10-PCS | Mod: S$GLB,,, | Performed by: PHARMACIST

## 2020-02-19 NOTE — PROGRESS NOTES
Patient denies any changes in diet, medications, or health that would effect warfarin therapy.

## 2020-02-26 ENCOUNTER — ANTI-COAG VISIT (OUTPATIENT)
Dept: CARDIOLOGY | Facility: CLINIC | Age: 80
End: 2020-02-26
Payer: MEDICARE

## 2020-02-26 ENCOUNTER — LAB VISIT (OUTPATIENT)
Dept: LAB | Facility: HOSPITAL | Age: 80
End: 2020-02-26
Attending: INTERNAL MEDICINE
Payer: MEDICARE

## 2020-02-26 DIAGNOSIS — Z79.01 LONG TERM (CURRENT) USE OF ANTICOAGULANTS: ICD-10-CM

## 2020-02-26 LAB
INR PPP: 2.1 (ref 0.8–1.2)
PROTHROMBIN TIME: 23 SEC (ref 9–12.5)

## 2020-02-26 PROCEDURE — 93793 ANTICOAG MGMT PT WARFARIN: CPT | Mod: S$GLB,,, | Performed by: PHARMACIST

## 2020-02-26 PROCEDURE — 85610 PROTHROMBIN TIME: CPT | Mod: HCNC

## 2020-02-26 PROCEDURE — 93793 PR ANTICOAGULANT MGMT FOR PT TAKING WARFARIN: ICD-10-PCS | Mod: S$GLB,,, | Performed by: PHARMACIST

## 2020-02-26 PROCEDURE — 36415 COLL VENOUS BLD VENIPUNCTURE: CPT | Mod: HCNC,PN

## 2020-03-04 ENCOUNTER — TELEPHONE (OUTPATIENT)
Dept: UROLOGY | Facility: CLINIC | Age: 80
End: 2020-03-04

## 2020-03-04 ENCOUNTER — ANTI-COAG VISIT (OUTPATIENT)
Dept: CARDIOLOGY | Facility: CLINIC | Age: 80
End: 2020-03-04
Payer: MEDICARE

## 2020-03-04 ENCOUNTER — LAB VISIT (OUTPATIENT)
Dept: LAB | Facility: HOSPITAL | Age: 80
End: 2020-03-04
Attending: INTERNAL MEDICINE
Payer: MEDICARE

## 2020-03-04 ENCOUNTER — OFFICE VISIT (OUTPATIENT)
Dept: FAMILY MEDICINE | Facility: CLINIC | Age: 80
End: 2020-03-04
Payer: MEDICARE

## 2020-03-04 VITALS
WEIGHT: 240.88 LBS | SYSTOLIC BLOOD PRESSURE: 128 MMHG | BODY MASS INDEX: 40.13 KG/M2 | HEIGHT: 65 IN | TEMPERATURE: 99 F | OXYGEN SATURATION: 95 % | DIASTOLIC BLOOD PRESSURE: 80 MMHG | HEART RATE: 69 BPM

## 2020-03-04 DIAGNOSIS — Z79.01 LONG TERM (CURRENT) USE OF ANTICOAGULANTS: ICD-10-CM

## 2020-03-04 DIAGNOSIS — I77.810 AORTIC ROOT DILATATION: ICD-10-CM

## 2020-03-04 DIAGNOSIS — I48.3 TYPICAL ATRIAL FLUTTER: ICD-10-CM

## 2020-03-04 DIAGNOSIS — E66.01 MORBID OBESITY: ICD-10-CM

## 2020-03-04 DIAGNOSIS — I82.411 DVT OF DEEP FEMORAL VEIN, RIGHT: ICD-10-CM

## 2020-03-04 DIAGNOSIS — D68.59 HYPERCOAGULABLE STATE: ICD-10-CM

## 2020-03-04 DIAGNOSIS — I48.0 PAF (PAROXYSMAL ATRIAL FIBRILLATION): ICD-10-CM

## 2020-03-04 DIAGNOSIS — N39.41 URGE INCONTINENCE OF URINE: ICD-10-CM

## 2020-03-04 DIAGNOSIS — I70.0 AORTIC ATHEROSCLEROSIS: ICD-10-CM

## 2020-03-04 DIAGNOSIS — N18.30 CHRONIC KIDNEY DISEASE, STAGE III (MODERATE): ICD-10-CM

## 2020-03-04 DIAGNOSIS — I10 ESSENTIAL HYPERTENSION: ICD-10-CM

## 2020-03-04 DIAGNOSIS — Z00.00 ENCOUNTER FOR PREVENTIVE HEALTH EXAMINATION: Primary | ICD-10-CM

## 2020-03-04 PROBLEM — T84.84XA PAINFUL TOTAL KNEE REPLACEMENT, RIGHT: Status: RESOLVED | Noted: 2019-01-28 | Resolved: 2020-03-04

## 2020-03-04 PROBLEM — Z96.651 PAINFUL TOTAL KNEE REPLACEMENT, RIGHT: Status: RESOLVED | Noted: 2019-01-28 | Resolved: 2020-03-04

## 2020-03-04 LAB
INR PPP: 2.4 (ref 0.8–1.2)
PROTHROMBIN TIME: 27 SEC (ref 9–12.5)

## 2020-03-04 PROCEDURE — 3079F DIAST BP 80-89 MM HG: CPT | Mod: HCNC,CPTII,S$GLB, | Performed by: NURSE PRACTITIONER

## 2020-03-04 PROCEDURE — 99999 PR PBB SHADOW E&M-EST. PATIENT-LVL IV: ICD-10-PCS | Mod: PBBFAC,HCNC,, | Performed by: NURSE PRACTITIONER

## 2020-03-04 PROCEDURE — 99499 RISK ADDL DX/OHS AUDIT: ICD-10-PCS | Mod: S$GLB,,, | Performed by: NURSE PRACTITIONER

## 2020-03-04 PROCEDURE — 93793 PR ANTICOAGULANT MGMT FOR PT TAKING WARFARIN: ICD-10-PCS | Mod: S$GLB,,, | Performed by: PHARMACIST

## 2020-03-04 PROCEDURE — G0439 PPPS, SUBSEQ VISIT: HCPCS | Mod: HCNC,S$GLB,, | Performed by: NURSE PRACTITIONER

## 2020-03-04 PROCEDURE — 3074F PR MOST RECENT SYSTOLIC BLOOD PRESSURE < 130 MM HG: ICD-10-PCS | Mod: HCNC,CPTII,S$GLB, | Performed by: NURSE PRACTITIONER

## 2020-03-04 PROCEDURE — 3074F SYST BP LT 130 MM HG: CPT | Mod: HCNC,CPTII,S$GLB, | Performed by: NURSE PRACTITIONER

## 2020-03-04 PROCEDURE — 36415 COLL VENOUS BLD VENIPUNCTURE: CPT | Mod: HCNC,PO

## 2020-03-04 PROCEDURE — G0439 PR MEDICARE ANNUAL WELLNESS SUBSEQUENT VISIT: ICD-10-PCS | Mod: HCNC,S$GLB,, | Performed by: NURSE PRACTITIONER

## 2020-03-04 PROCEDURE — 93793 ANTICOAG MGMT PT WARFARIN: CPT | Mod: S$GLB,,, | Performed by: PHARMACIST

## 2020-03-04 PROCEDURE — 99999 PR PBB SHADOW E&M-EST. PATIENT-LVL IV: CPT | Mod: PBBFAC,HCNC,, | Performed by: NURSE PRACTITIONER

## 2020-03-04 PROCEDURE — 85610 PROTHROMBIN TIME: CPT | Mod: HCNC

## 2020-03-04 PROCEDURE — 3079F PR MOST RECENT DIASTOLIC BLOOD PRESSURE 80-89 MM HG: ICD-10-PCS | Mod: HCNC,CPTII,S$GLB, | Performed by: NURSE PRACTITIONER

## 2020-03-04 PROCEDURE — 99499 UNLISTED E&M SERVICE: CPT | Mod: S$GLB,,, | Performed by: NURSE PRACTITIONER

## 2020-03-04 NOTE — TELEPHONE ENCOUNTER
Pt was scheduled for cysto/stent removal tomorrow in clinic. Appt type has now been changed to post-op visit for unknown reason. This needs to be changed to procedure appt for cystoscopy.     I also see a note from today that she wanted to change appt times. The 9am appointment slot is blocked for some reason - there is no pt scheduled there. If she would rather that appt time, then she can be accommodated. There is also appt slots at 1pm blocked without a scheduled pt.

## 2020-03-04 NOTE — PROGRESS NOTES
"Alaina Vee presented for a  Medicare AWV and comprehensive Health Risk Assessment today. The following components were reviewed and updated:    · Medical history  · Family History  · Social history  · Allergies and Current Medications  · Health Risk Assessment  · Health Maintenance  · Care Team     ** See Completed Assessments for Annual Wellness Visit within the encounter summary.**       The following assessments were completed:  · Living Situation  · CAGE  · Depression Screening  · Timed Get Up and Go  · Whisper Test  · Cognitive Function Screening  ·   ·   · Nutrition Screening  · ADL Screening  · PAQ Screening    Vitals:    03/04/20 0904   BP: 128/80   BP Location: Left arm   Patient Position: Sitting   BP Method: Large (Manual)   Pulse: 69   Temp: 98.5 °F (36.9 °C)   TempSrc: Oral   SpO2: 95%   Weight: 109.2 kg (240 lb 13.6 oz)   Height: 5' 5" (1.651 m)     Body mass index is 40.08 kg/m².  Physical Exam   Cardiovascular: Normal rate, regular rhythm and normal heart sounds.   Pulmonary/Chest: Effort normal and breath sounds normal.   Musculoskeletal: She exhibits no edema (patient wearing compression support bilaterally ).   Neurological: She is alert.   Skin: Skin is warm. Capillary refill takes less than 2 seconds.   Psychiatric: She has a normal mood and affect. Her behavior is normal. Thought content normal.         Diagnoses and health risks identified today and associated recommendations/orders:    1. Encounter for preventive health examination  Education provided about preventive health examinations and procedures; addressed and discussed patient's health concerns. Additionally, reviewed medical record for risk factors and documented the results during this encounter.    2. Morbid obesity  Informed of [employer] ([employer]s program and membership access to local fitness facilities. Reminded patient to review medical insurance benefits which may include access to fitness centers and exercise classes. "  We discussed diet and exercise for weight loss.    3. Typical atrial flutter  Stable, patient evaluated/monitored by [facility] cardiology dept; continue as advised.     4. PAF (paroxysmal atrial fibrillation)  Stable, patient evaluated/monitored by [facility] cardiology dept; continue as advised.     5. Hypercoagulable state  Stable, patient evaluated/monitored by [facility] hematology/oncology dept; continue as advised.     6. DVT of deep femoral vein, right  Stable, patient evaluated/monitored by [facility] hematology/oncology dept; continue as advised.     7. Chronic kidney disease, stage III (moderate)  Stable and monitored. Continue current treatment plan as previously prescribed.     8. Aortic root dilatation  Stable and monitored. Continue current treatment plan as previously prescribed.     9. Aortic atherosclerosis  Stable and monitored. Continue current treatment plan as previously prescribed.     10. Essential hypertension  Presently at goal. Continue as advised regarding dietary and lifestyle modifications.     11. Urge incontinence of urine  Stable, patient evaluated/monitored by [facility] urology dept; continue as advised.     Reviewed health maintenance, educated about recommended examinations, procedures (labs & images), and immunizations.     Provided Alaina with a 5-10 year written screening schedule and personal prevention plan. Recommendations were developed using the USPSTF age appropriate recommendations. Education, counseling, and referrals were provided as needed. After Visit Summary printed and given to patient which includes a list of additional screenings\tests needed.    Follow up in about 1 year (around 3/4/2021) for assessment .    Brenden Caldwell Jr, NP  I offered to discuss end of life issues, including information on how to make advance directives that the patient could use to name someone who would make medical decisions on their behalf if they became too ill to make  themselves.    ___Patient declined  _X_Patient is interested, I provided paper work and offered to discuss.

## 2020-03-04 NOTE — TELEPHONE ENCOUNTER
Pt called back about appt time, notified her that there are no earlier or later times for tomorrow. Pt will keep appt at 2:40pm -Sybil

## 2020-03-04 NOTE — PATIENT INSTRUCTIONS
Counseling and Referral of Other Preventative  (Italic type indicates deductible and co-insurance are waived)    Patient Name: Alaina Vee  Today's Date: 3/4/2020    Health Maintenance       Date Due Completion Date    DEXA SCAN 04/30/2022 4/30/2019    Override on 11/16/2010: Done    Lipid Panel 12/17/2024 12/17/2019    TETANUS VACCINE 05/03/2029 5/3/2019        No orders of the defined types were placed in this encounter.    The following information is provided to all patients.  This information is to help you find resources for any of the problems found today that may be affecting your health:                Living healthy guide: www.[facility].[place].[facility]      Understanding Diabetes: www.diabetes.org      Eating healthy: www.cdc.gov/healthyweight      CDC home safety checklist: www.cdc.gov/steadi/patient.html      Agency on Aging: www.goea.[place].[facility]      Alcoholics anonymous (AA): www.aa.org      Physical Activity: www.[place].nih.gov/at1yqhg      Tobacco use: www.quitwithusla.org

## 2020-03-05 ENCOUNTER — PROCEDURE VISIT (OUTPATIENT)
Dept: UROLOGY | Facility: CLINIC | Age: 80
End: 2020-03-05
Payer: MEDICARE

## 2020-03-05 DIAGNOSIS — N20.1 RIGHT URETERAL CALCULUS: Primary | ICD-10-CM

## 2020-03-05 DIAGNOSIS — N13.30 HYDRONEPHROSIS OF RIGHT KIDNEY: ICD-10-CM

## 2020-03-05 DIAGNOSIS — N20.0 NEPHROLITHIASIS: ICD-10-CM

## 2020-03-05 PROCEDURE — 52310 CYSTOSCOPY: ICD-10-PCS | Mod: HCNC,S$GLB,, | Performed by: UROLOGY

## 2020-03-05 PROCEDURE — 52310 CYSTOSCOPY AND TREATMENT: CPT | Mod: HCNC,S$GLB,, | Performed by: UROLOGY

## 2020-03-05 RX ORDER — CEPHALEXIN 500 MG/1
500 CAPSULE ORAL EVERY 8 HOURS
Qty: 6 CAPSULE | Refills: 0 | Status: SHIPPED | OUTPATIENT
Start: 2020-03-05 | End: 2020-03-07

## 2020-03-05 NOTE — PROCEDURES
"Cystoscopy  Date/Time: 3/5/2020 9:00 AM  Performed by: Yvonne Weaver MD  Authorized by: Yvonne Weaver MD     Consent Done?:  Yes (Written)  Time out: Immediately prior to procedure a "time out" was called to verify the correct patient, procedure, equipment, support staff and site/side marked as required.    Indications comment:  Nephrolithiasis  Position:  Dorsal lithotomy  Anesthesia:  Lidocaine jelly  Patient sedated?: No    Preparation: Patient was prepped and draped in usual sterile fashion      Scope type:  Flexible cystoscope  Stent inserted: No    Stent removed: Yes         Stent encrusted: Yes (Mild encrustation)    External exam normal: Yes    Digital exam performed: No    Urethra normal: Yes  Bladder neck normal: Bladder neck normal   Bladder normal: Yes (Edema around R UO from stent)      Patient tolerance:  Patient tolerated the procedure well with no immediate complications     R ureteral stent removed.  Keflex x 2 days  SERG 3 weeks      "

## 2020-03-11 ENCOUNTER — ANTI-COAG VISIT (OUTPATIENT)
Dept: CARDIOLOGY | Facility: CLINIC | Age: 80
End: 2020-03-11
Payer: MEDICARE

## 2020-03-11 ENCOUNTER — LAB VISIT (OUTPATIENT)
Dept: LAB | Facility: HOSPITAL | Age: 80
End: 2020-03-11
Attending: INTERNAL MEDICINE
Payer: MEDICARE

## 2020-03-11 DIAGNOSIS — Z79.01 LONG TERM (CURRENT) USE OF ANTICOAGULANTS: ICD-10-CM

## 2020-03-11 LAB
INR PPP: 2.9 (ref 0.8–1.2)
PROTHROMBIN TIME: 32.6 SEC (ref 9–12.5)

## 2020-03-11 PROCEDURE — 36415 COLL VENOUS BLD VENIPUNCTURE: CPT | Mod: HCNC,PN

## 2020-03-11 PROCEDURE — 93793 ANTICOAG MGMT PT WARFARIN: CPT | Mod: S$GLB,,, | Performed by: PHARMACIST

## 2020-03-11 PROCEDURE — 93793 PR ANTICOAGULANT MGMT FOR PT TAKING WARFARIN: ICD-10-PCS | Mod: S$GLB,,, | Performed by: PHARMACIST

## 2020-03-11 PROCEDURE — 85610 PROTHROMBIN TIME: CPT | Mod: HCNC

## 2020-03-18 ENCOUNTER — LAB VISIT (OUTPATIENT)
Dept: LAB | Facility: HOSPITAL | Age: 80
End: 2020-03-18
Attending: INTERNAL MEDICINE
Payer: MEDICARE

## 2020-03-18 ENCOUNTER — ANTI-COAG VISIT (OUTPATIENT)
Dept: CARDIOLOGY | Facility: CLINIC | Age: 80
End: 2020-03-18
Payer: MEDICARE

## 2020-03-18 DIAGNOSIS — Z79.01 LONG TERM (CURRENT) USE OF ANTICOAGULANTS: ICD-10-CM

## 2020-03-18 LAB
INR PPP: 2.5 (ref 0.8–1.2)
PROTHROMBIN TIME: 27.3 SEC (ref 9–12.5)

## 2020-03-18 PROCEDURE — 85610 PROTHROMBIN TIME: CPT | Mod: HCNC

## 2020-03-18 PROCEDURE — 36415 COLL VENOUS BLD VENIPUNCTURE: CPT | Mod: HCNC

## 2020-03-18 PROCEDURE — 93793 PR ANTICOAGULANT MGMT FOR PT TAKING WARFARIN: ICD-10-PCS | Mod: S$GLB,,, | Performed by: PHARMACIST

## 2020-03-18 PROCEDURE — 93793 ANTICOAG MGMT PT WARFARIN: CPT | Mod: S$GLB,,, | Performed by: PHARMACIST

## 2020-04-08 ENCOUNTER — LAB VISIT (OUTPATIENT)
Dept: LAB | Facility: HOSPITAL | Age: 80
End: 2020-04-08
Attending: INTERNAL MEDICINE
Payer: MEDICARE

## 2020-04-08 ENCOUNTER — ANTI-COAG VISIT (OUTPATIENT)
Dept: CARDIOLOGY | Facility: CLINIC | Age: 80
End: 2020-04-08
Payer: MEDICARE

## 2020-04-08 DIAGNOSIS — Z79.01 LONG TERM (CURRENT) USE OF ANTICOAGULANTS: ICD-10-CM

## 2020-04-08 LAB
INR PPP: 3.4 (ref 0.8–1.2)
PROTHROMBIN TIME: 37.3 SEC (ref 9–12.5)

## 2020-04-08 PROCEDURE — 85610 PROTHROMBIN TIME: CPT | Mod: HCNC

## 2020-04-08 PROCEDURE — 36415 COLL VENOUS BLD VENIPUNCTURE: CPT | Mod: HCNC

## 2020-04-08 PROCEDURE — 93793 PR ANTICOAGULANT MGMT FOR PT TAKING WARFARIN: ICD-10-PCS | Mod: S$GLB,,, | Performed by: PHARMACIST

## 2020-04-08 PROCEDURE — 93793 ANTICOAG MGMT PT WARFARIN: CPT | Mod: S$GLB,,, | Performed by: PHARMACIST

## 2020-05-06 ENCOUNTER — LAB VISIT (OUTPATIENT)
Dept: LAB | Facility: HOSPITAL | Age: 80
End: 2020-05-06
Attending: INTERNAL MEDICINE
Payer: MEDICARE

## 2020-05-06 DIAGNOSIS — D68.59 HYPERCOAGULABLE STATE: ICD-10-CM

## 2020-05-06 PROCEDURE — 85613 RUSSELL VIPER VENOM DILUTED: CPT | Mod: HCNC

## 2020-05-06 PROCEDURE — 85598 HEXAGNAL PHOSPH PLTLT NEUTRL: CPT | Mod: HCNC

## 2020-05-06 PROCEDURE — 36415 COLL VENOUS BLD VENIPUNCTURE: CPT | Mod: HCNC

## 2020-05-11 ENCOUNTER — HOSPITAL ENCOUNTER (OUTPATIENT)
Dept: RADIOLOGY | Facility: HOSPITAL | Age: 80
Discharge: HOME | End: 2020-05-11
Attending: UROLOGY
Payer: MEDICARE

## 2020-05-11 ENCOUNTER — ANTI-COAG VISIT (OUTPATIENT)
Dept: CARDIOLOGY | Facility: CLINIC | Age: 80
End: 2020-05-11
Payer: MEDICARE

## 2020-05-11 DIAGNOSIS — N20.0 NEPHROLITHIASIS: ICD-10-CM

## 2020-05-11 DIAGNOSIS — Z79.01 LONG TERM (CURRENT) USE OF ANTICOAGULANTS: ICD-10-CM

## 2020-05-11 DIAGNOSIS — N13.30 HYDRONEPHROSIS OF RIGHT KIDNEY: ICD-10-CM

## 2020-05-11 DIAGNOSIS — N20.1 RIGHT URETERAL CALCULUS: ICD-10-CM

## 2020-05-11 LAB — APTT HEX PL PPP: NEGATIVE S

## 2020-05-11 PROCEDURE — 76770 US EXAM ABDO BACK WALL COMP: CPT | Mod: 26,HCNC,, | Performed by: RADIOLOGY

## 2020-05-11 PROCEDURE — 76770 US RETROPERITONEAL COMPLETE: ICD-10-PCS | Mod: 26,HCNC,, | Performed by: RADIOLOGY

## 2020-05-11 PROCEDURE — 76770 US EXAM ABDO BACK WALL COMP: CPT | Mod: TC,HCNC

## 2020-05-11 PROCEDURE — 93793 ANTICOAG MGMT PT WARFARIN: CPT | Mod: S$GLB,,,

## 2020-05-11 PROCEDURE — 93793 PR ANTICOAGULANT MGMT FOR PT TAKING WARFARIN: ICD-10-PCS | Mod: S$GLB,,,

## 2020-05-12 LAB — LA PPP-IMP: NEGATIVE

## 2020-05-15 ENCOUNTER — OFFICE VISIT (OUTPATIENT)
Dept: UROLOGY | Facility: CLINIC | Age: 80
End: 2020-05-15
Payer: MEDICARE

## 2020-05-15 VITALS
WEIGHT: 238.31 LBS | DIASTOLIC BLOOD PRESSURE: 76 MMHG | BODY MASS INDEX: 38.3 KG/M2 | SYSTOLIC BLOOD PRESSURE: 136 MMHG | HEIGHT: 66 IN

## 2020-05-15 DIAGNOSIS — N20.1 RIGHT URETERAL CALCULUS: Primary | ICD-10-CM

## 2020-05-15 DIAGNOSIS — N20.0 NEPHROLITHIASIS: ICD-10-CM

## 2020-05-15 DIAGNOSIS — N13.30 HYDRONEPHROSIS OF RIGHT KIDNEY: ICD-10-CM

## 2020-05-15 DIAGNOSIS — N39.46 MIXED INCONTINENCE URGE AND STRESS: ICD-10-CM

## 2020-05-15 PROCEDURE — 3075F SYST BP GE 130 - 139MM HG: CPT | Mod: HCNC,CPTII,S$GLB, | Performed by: NURSE PRACTITIONER

## 2020-05-15 PROCEDURE — 99214 OFFICE O/P EST MOD 30 MIN: CPT | Mod: HCNC,S$GLB,, | Performed by: NURSE PRACTITIONER

## 2020-05-15 PROCEDURE — 3078F DIAST BP <80 MM HG: CPT | Mod: HCNC,CPTII,S$GLB, | Performed by: NURSE PRACTITIONER

## 2020-05-15 PROCEDURE — 1101F PR PT FALLS ASSESS DOC 0-1 FALLS W/OUT INJ PAST YR: ICD-10-PCS | Mod: HCNC,CPTII,S$GLB, | Performed by: NURSE PRACTITIONER

## 2020-05-15 PROCEDURE — 99999 PR PBB SHADOW E&M-EST. PATIENT-LVL IV: ICD-10-PCS | Mod: PBBFAC,HCNC,, | Performed by: NURSE PRACTITIONER

## 2020-05-15 PROCEDURE — 99999 PR PBB SHADOW E&M-EST. PATIENT-LVL IV: CPT | Mod: PBBFAC,HCNC,, | Performed by: NURSE PRACTITIONER

## 2020-05-15 PROCEDURE — 1101F PT FALLS ASSESS-DOCD LE1/YR: CPT | Mod: HCNC,CPTII,S$GLB, | Performed by: NURSE PRACTITIONER

## 2020-05-15 PROCEDURE — 3075F PR MOST RECENT SYSTOLIC BLOOD PRESS GE 130-139MM HG: ICD-10-PCS | Mod: HCNC,CPTII,S$GLB, | Performed by: NURSE PRACTITIONER

## 2020-05-15 PROCEDURE — 1159F MED LIST DOCD IN RCRD: CPT | Mod: HCNC,S$GLB,, | Performed by: NURSE PRACTITIONER

## 2020-05-15 PROCEDURE — 3078F PR MOST RECENT DIASTOLIC BLOOD PRESSURE < 80 MM HG: ICD-10-PCS | Mod: HCNC,CPTII,S$GLB, | Performed by: NURSE PRACTITIONER

## 2020-05-15 PROCEDURE — 99214 PR OFFICE/OUTPT VISIT, EST, LEVL IV, 30-39 MIN: ICD-10-PCS | Mod: HCNC,S$GLB,, | Performed by: NURSE PRACTITIONER

## 2020-05-15 PROCEDURE — 1126F AMNT PAIN NOTED NONE PRSNT: CPT | Mod: HCNC,S$GLB,, | Performed by: NURSE PRACTITIONER

## 2020-05-15 PROCEDURE — 1126F PR PAIN SEVERITY QUANTIFIED, NO PAIN PRESENT: ICD-10-PCS | Mod: HCNC,S$GLB,, | Performed by: NURSE PRACTITIONER

## 2020-05-15 PROCEDURE — 1159F PR MEDICATION LIST DOCUMENTED IN MEDICAL RECORD: ICD-10-PCS | Mod: HCNC,S$GLB,, | Performed by: NURSE PRACTITIONER

## 2020-05-15 RX ORDER — OXYBUTYNIN CHLORIDE 5 MG/1
5 TABLET ORAL 3 TIMES DAILY
Qty: 270 TABLET | Refills: 3 | Status: SHIPPED | OUTPATIENT
Start: 2020-05-15 | End: 2021-11-02 | Stop reason: SDUPTHER

## 2020-05-15 RX ORDER — OXYBUTYNIN CHLORIDE 5 MG/1
5 TABLET ORAL 3 TIMES DAILY
Qty: 90 TABLET | Refills: 0 | Status: SHIPPED | OUTPATIENT
Start: 2020-05-15 | End: 2021-05-15

## 2020-05-15 NOTE — PROGRESS NOTES
Subjective:       Patient ID: Alaina Vee is a 79 y.o. female who was last seen in this office 3/5/2020    Chief Complaint:   Chief Complaint   Patient presents with    Follow-up     1 month follow up with US       She reports issues with recurrent UTIs. She was treated for UTI in 3/16 (100k E coli, pansensitive) and again in 4/16 (100k E coli). She has urinary frequency associated with UTIs. Nocturia x 1-2. She has urgency and UUI at baseline. Also with fecal urgency/incontinence. She was given Ditropan 5mg BID years ago. Also with RICHARD. She has not noted if this has helped. Denies current dysuria. Denies hematuria. No h/o kidney stones.     She has significant LBP issues. She also reports facial and R shoulder/arm/torso pain.     She was treated for UTI after initial visit. She remains on Ditropan IR not up to TID, declined ER formulations. She reports taking another medication for UI from me but I don't have records of this.     SERG (5/16) showed rusalnley 9mm stone in LLP. PVR 2cc. Cytology was negative but noted uric acid crystals.     CT 7/16 - 7mm L UP, 6mm L LP stones and 8mm R renal pelvis stone.   KUB 7/16 - shows 7mm R stone but difficult to see (unsure if truly the stone)    KUB 1/17 - 7mm R renal stone though hard to see (likely overlying 12th rib)    Doing great with Ditropan XL 15mg--medication changed to Ditropan 5mg PO TID per pharmacy request in 2/2018    She started to develop R flank pain and therefore CT scan ordered. CT showed 8mm stone at R UPJ, visible on KUB. She is now s/p R ESWL. Stone was noted to fragment well. She did not note passing any stone and is still having flank pain.     CT with R LP stone, no obstruction. Recently started on Coumadin for a fib.     KUB 1/18 - two left renal calculi  SERG 1/18 - 3mm and 5mm L calculi - hydro resolved    100% CaOx mono - stone passed spontaneously. Pain present for 1 hour at that time (R side).     She was evaluated in 12/2018 for gross  hematuria. She is now s/p hematuria workup (Ct urogram/cystoscopy/UCx) was essentially negative. She has not had any further occurrences of gross hematuria.    Cytology 12/2018--urothelial atypia    She had a CT scan done in 12/2019 that showed likely 2 stones in her right proximal ureter and UPJ causing mild to moderate hydronephrosis. She underwent attempted ureteroscopy on 1/10/20 by Dr Weaver but was unable to reach stones. Therefore, she was only stented at that time    She later underwent repeat ureteroscopy with stone extraction/stent exchange on 1/31/20. Office cystoscopy for stent removal done on 3/5/20. She returns today with a renal ultrasound. Denies flank pain or gross hematuria. Overall she feels well. No new complaints    Stone analysis: 100% Ca Ox mono    ACTIVE MEDICAL ISSUES:  Patient Active Problem List   Diagnosis    Essential hypertension    Ventral hernia    UI (urinary incontinence)    Mixed hyperlipidemia    Venous stasis of lower extremity    GERD (gastroesophageal reflux disease)    Central stenosis of spinal canal    Weakness of both hips    Segmental dysfunction of lumbar region    Recurrent UTI    Mixed incontinence urge and stress    Cervical radicular pain    Morbid obesity    SULEMA (obstructive sleep apnea)    Long term (current) use of anticoagulants    PAF (paroxysmal atrial fibrillation)    Atrial flutter    Degenerative disc disease, lumbar    Chronic bilateral low back pain without sciatica    Nuclear sclerosis of both eyes    Refractive error    Status post arthroscopic surgery of right knee    Aortic root dilatation    Arthritis of midfoot    DVT of deep femoral vein, right    Hypercoagulable state    Right ureteral calculus    Hydronephrosis of right kidney    Chronic kidney disease, stage III (moderate)    Aortic atherosclerosis       ALLERGIES AND MEDICATIONS: updated and reviewed.  Review of patient's allergies indicates:   Allergen Reactions     Celebrex [celecoxib]      Chest tightness    Lipitor [atorvastatin] Other (See Comments)     Current Outpatient Medications   Medication Sig    CYANOCOBALAMIN, VITAMIN B-12, (VITAMIN B-12 ORAL) Take 2,500 mcg by mouth every evening.    diclofenac sodium (VOLTAREN) 1 % Gel Apply 2 g topically once daily.    fish oil-omega-3 fatty acids 300-1,000 mg capsule Take 1 g by mouth once daily.    flecainide (TAMBOCOR) 100 MG Tab Take 1 tablet (100 mg total) by mouth every 12 (twelve) hours.    multivitamin (THERAGRAN) per tablet Take 1 tablet by mouth every evening. 1 Tablet Oral Every day    oxybutynin (DITROPAN) 5 MG Tab Take 1 tablet (5 mg total) by mouth 3 (three) times daily.    warfarin (COUMADIN) 5 MG tablet Take 0.5-1 tablets (2.5-5 mg total) by mouth Daily. As directed by coumadin clinic    oxybutynin (DITROPAN) 5 MG Tab Take 1 tablet (5 mg total) by mouth 3 (three) times daily.     No current facility-administered medications for this visit.        Review of Systems   Constitutional: Negative for activity change, chills, fatigue, fever and unexpected weight change.   Eyes: Negative for discharge, redness and visual disturbance.   Respiratory: Negative for cough, shortness of breath and wheezing.    Cardiovascular: Negative for chest pain and leg swelling.   Gastrointestinal: Negative for abdominal distention, abdominal pain, constipation, diarrhea, nausea and vomiting.   Genitourinary: Negative for decreased urine volume, difficulty urinating, dysuria, flank pain, frequency, hematuria, pelvic pain and urgency.   Musculoskeletal: Negative for arthralgias, joint swelling and myalgias.   Skin: Negative for color change and rash.   Neurological: Negative for dizziness and light-headedness.   Psychiatric/Behavioral: Negative for behavioral problems and confusion. The patient is not nervous/anxious.        Objective:      Vitals:    05/15/20 1458   BP: 136/76   Weight: 108.1 kg (238 lb 5.1 oz)   Height: 5'  "6" (1.676 m)     Physical Exam   Constitutional: She is oriented to person, place, and time. She appears well-developed.   HENT:   Head: Normocephalic and atraumatic.   Nose: Nose normal.   Eyes: Conjunctivae are normal. Right eye exhibits no discharge. Left eye exhibits no discharge.   Neck: Normal range of motion. Neck supple. No tracheal deviation present. No thyromegaly present.   Cardiovascular: Normal rate and regular rhythm.    Pulmonary/Chest: Effort normal. No respiratory distress. She has no wheezes.   Abdominal: Soft. She exhibits no distension. There is no hepatosplenomegaly. There is no tenderness. There is no CVA tenderness. No hernia.   Genitourinary:   Genitourinary Comments: Patient declined exam   Musculoskeletal: Normal range of motion. She exhibits no edema.   Neurological: She is alert and oriented to person, place, and time.   Skin: Skin is warm and dry. No rash noted. No erythema.     Psychiatric: She has a normal mood and affect. Her behavior is normal. Judgment normal.       Urine dipstick shows not done.     Narrative     EXAMINATION:  US RETROPERITONEAL COMPLETE    CLINICAL HISTORY:  nephrolithiasis; Calculus of ureter    TECHNIQUE:  Ultrasound of the kidneys and urinary bladder was performed including color flow and Doppler evaluation of the kidneys.    COMPARISON:  10/22/2019    FINDINGS:  Right kidney: The right kidney measures 11 cm. No cortical thinning or loss of corticomedullary distinction. Resistive index measures 0.7.  1.6 cm lower pole cyst.  No solid mass or hydronephrosis.  No stones.    Left kidney: The left kidney measures 10.7 cm. No cortical thinning or loss of corticomedullary distinction.  Resistive index measures 0.65.  No solid mass or hydronephrosis.  Nonobstructing stones measuring 1 cm and 0.5 cm in the upper and lower pole are noted.    The bladder is partially distended at the time of scanning and has an unremarkable appearance.   Impression       Nonobstructing " left nephrolithiasis    Right renal cyst      Electronically signed by: Jose A Triplett Jr  Date: 05/11/2020  Time: 09:56    Encounter     View Encounter        Reviewed with patient    Assessment:       1. Right ureteral calculus    2. Hydronephrosis of right kidney    3. Nephrolithiasis    4. Mixed incontinence urge and stress          Plan:       1. Right ureteral calculus  -Attempted ureteroscopy 1/10/20--only stent placed  -Repeat ureteroscopy/stone extraction 1/31/20  -Stone analysis as above    2. Hydronephrosis of right kidney  -s/p stent placement on 1/10/20  -s/p stent removal 3/5/20  -SERG--no hydronephrosis    3. Nephrolithiasis   - s/p R ESWL on 2/27/17 - stone with excellent response   - KUB post-op - residual stone    - CT - no obstructing stones, R LP noted   - SERG/KUB - R hydro resolved. Two stones in L kidney  -CT 12/2019--bilateral non obstructing stones  -SERG 5/2020--non obstructing left renal stones  - X-Ray Abdomen AP 1 View; Future    4. Mixed incontinence urge and stress  - Discussed difference of UUI and RICHARD components. Reviewed etiology and workup of each.   - RICHARD: Kegels, PFPT, bulking agent, MUS.   - UUI: Behavioral changes, PFPT, anticholinergics. Botox/InterStim for refractory UUI.   - Doing great with Ditropan 5 mg TID--Rx refilled today  - oxybutynin (DITROPAN) 5 MG Tab; Take 1 tablet (5 mg total) by mouth 3 (three) times daily.  Dispense: 270 tablet; Refill: 3        Follow up in about 6 months (around 11/15/2020) for Review X-ray.

## 2020-05-18 RX ORDER — OXYBUTYNIN CHLORIDE 5 MG/1
TABLET ORAL
Qty: 270 TABLET | Refills: 11 | OUTPATIENT
Start: 2020-05-18

## 2020-06-08 ENCOUNTER — ANTI-COAG VISIT (OUTPATIENT)
Dept: CARDIOLOGY | Facility: CLINIC | Age: 80
End: 2020-06-08
Payer: MEDICARE

## 2020-06-08 ENCOUNTER — HOSPITAL ENCOUNTER (OUTPATIENT)
Dept: CARDIOLOGY | Facility: HOSPITAL | Age: 80
Discharge: HOME | End: 2020-06-08
Attending: INTERNAL MEDICINE
Payer: MEDICARE

## 2020-06-08 DIAGNOSIS — I77.810 AORTIC ROOT DILATATION: ICD-10-CM

## 2020-06-08 DIAGNOSIS — I48.0 PAF (PAROXYSMAL ATRIAL FIBRILLATION): ICD-10-CM

## 2020-06-08 DIAGNOSIS — Z79.01 LONG TERM (CURRENT) USE OF ANTICOAGULANTS: ICD-10-CM

## 2020-06-08 LAB
AORTIC ROOT ANNULUS: 3.02 CM
AORTIC VALVE CUSP SEPERATION: 2.18 CM
ASCENDING AORTA: 3.62 CM
AV INDEX (PROSTH): 1.03
AV MEAN GRADIENT: 3 MMHG
AV PEAK GRADIENT: 6 MMHG
AV VALVE AREA: 4.87 CM2
AV VELOCITY RATIO: 0.89
CV ECHO LV RWT: 0.5 CM
DOP CALC AO PEAK VEL: 1.24 M/S
DOP CALC AO VTI: 24.96 CM
DOP CALC LVOT AREA: 4.8 CM2
DOP CALC LVOT DIAMETER: 2.46 CM
DOP CALC LVOT PEAK VEL: 1.1 M/S
DOP CALC LVOT STROKE VOLUME: 121.61 CM3
DOP CALCLVOT PEAK VEL VTI: 25.6 CM
E WAVE DECELERATION TIME: 244.33 MSEC
E/A RATIO: 0.73
E/E' RATIO: 7.86 M/S
ECHO LV POSTERIOR WALL: 1.06 CM (ref 0.6–1.1)
FRACTIONAL SHORTENING: 68 % (ref 28–44)
INTERVENTRICULAR SEPTUM: 1.28 CM (ref 0.6–1.1)
IVRT: 110.73 MSEC
LA MAJOR: 5.52 CM
LA MINOR: 6.19 CM
LA WIDTH: 4.46 CM
LEFT ATRIUM SIZE: 3.87 CM
LEFT ATRIUM VOLUME: 85.62 CM3
LEFT INTERNAL DIMENSION IN SYSTOLE: 1.37 CM (ref 2.1–4)
LEFT VENTRICLE DIASTOLIC VOLUME: 80.23 ML
LEFT VENTRICLE SYSTOLIC VOLUME: 4.73 ML
LEFT VENTRICULAR INTERNAL DIMENSION IN DIASTOLE: 4.24 CM (ref 3.5–6)
LEFT VENTRICULAR MASS: 174.21 G
LV LATERAL E/E' RATIO: 7.86 M/S
LV SEPTAL E/E' RATIO: 7.86 M/S
MV PEAK A VEL: 0.75 M/S
MV PEAK E VEL: 0.55 M/S
PISA TR MAX VEL: 0.91 M/S
PULM VEIN S/D RATIO: 1.38
PV PEAK D VEL: 0.42 M/S
PV PEAK S VEL: 0.58 M/S
PV PEAK VELOCITY: 0.96 CM/S
RA MAJOR: 4.94 CM
RA WIDTH: 3.83 CM
RIGHT VENTRICULAR END-DIASTOLIC DIMENSION: 2.84 CM
RV TISSUE DOPPLER FREE WALL SYSTOLIC VELOCITY 1 (APICAL 4 CHAMBER VIEW): 12 CM/S
SINUS: 4.17 CM
STJ: 3.38 CM
TDI LATERAL: 0.07 M/S
TDI SEPTAL: 0.07 M/S
TDI: 0.07 M/S
TR MAX PG: 3 MMHG
TRICUSPID ANNULAR PLANE SYSTOLIC EXCURSION: 1.84 CM

## 2020-06-08 PROCEDURE — 93793 PR ANTICOAGULANT MGMT FOR PT TAKING WARFARIN: ICD-10-PCS | Mod: S$GLB,,, | Performed by: PHARMACIST

## 2020-06-08 PROCEDURE — 93306 TTE W/DOPPLER COMPLETE: CPT | Mod: 26,HCNC,, | Performed by: INTERNAL MEDICINE

## 2020-06-08 PROCEDURE — 93306 ECHO (CUPID ONLY): ICD-10-PCS | Mod: 26,HCNC,, | Performed by: INTERNAL MEDICINE

## 2020-06-08 PROCEDURE — 93793 ANTICOAG MGMT PT WARFARIN: CPT | Mod: S$GLB,,, | Performed by: PHARMACIST

## 2020-06-08 PROCEDURE — 93306 TTE W/DOPPLER COMPLETE: CPT | Mod: HCNC

## 2020-06-11 ENCOUNTER — OFFICE VISIT (OUTPATIENT)
Dept: CARDIOLOGY | Facility: CLINIC | Age: 80
End: 2020-06-11
Payer: MEDICARE

## 2020-06-11 VITALS
SYSTOLIC BLOOD PRESSURE: 134 MMHG | DIASTOLIC BLOOD PRESSURE: 76 MMHG | RESPIRATION RATE: 16 BRPM | OXYGEN SATURATION: 97 % | BODY MASS INDEX: 38.43 KG/M2 | WEIGHT: 238.13 LBS | HEART RATE: 72 BPM

## 2020-06-11 DIAGNOSIS — E78.2 MIXED HYPERLIPIDEMIA: ICD-10-CM

## 2020-06-11 DIAGNOSIS — E66.9 NON MORBID OBESITY, UNSPECIFIED OBESITY TYPE: ICD-10-CM

## 2020-06-11 DIAGNOSIS — I48.0 PAF (PAROXYSMAL ATRIAL FIBRILLATION): Primary | ICD-10-CM

## 2020-06-11 DIAGNOSIS — I10 ESSENTIAL HYPERTENSION: ICD-10-CM

## 2020-06-11 DIAGNOSIS — G47.33 OSA (OBSTRUCTIVE SLEEP APNEA): ICD-10-CM

## 2020-06-11 DIAGNOSIS — I77.810 AORTIC ROOT DILATATION: ICD-10-CM

## 2020-06-11 PROCEDURE — 1126F AMNT PAIN NOTED NONE PRSNT: CPT | Mod: HCNC,S$GLB,, | Performed by: INTERNAL MEDICINE

## 2020-06-11 PROCEDURE — 99214 PR OFFICE/OUTPT VISIT, EST, LEVL IV, 30-39 MIN: ICD-10-PCS | Mod: HCNC,S$GLB,, | Performed by: INTERNAL MEDICINE

## 2020-06-11 PROCEDURE — 1101F PT FALLS ASSESS-DOCD LE1/YR: CPT | Mod: HCNC,CPTII,S$GLB, | Performed by: INTERNAL MEDICINE

## 2020-06-11 PROCEDURE — 3075F SYST BP GE 130 - 139MM HG: CPT | Mod: HCNC,CPTII,S$GLB, | Performed by: INTERNAL MEDICINE

## 2020-06-11 PROCEDURE — 93000 ELECTROCARDIOGRAM COMPLETE: CPT | Mod: HCNC,S$GLB,, | Performed by: INTERNAL MEDICINE

## 2020-06-11 PROCEDURE — 1159F PR MEDICATION LIST DOCUMENTED IN MEDICAL RECORD: ICD-10-PCS | Mod: HCNC,S$GLB,, | Performed by: INTERNAL MEDICINE

## 2020-06-11 PROCEDURE — 93000 EKG 12-LEAD: ICD-10-PCS | Mod: HCNC,S$GLB,, | Performed by: INTERNAL MEDICINE

## 2020-06-11 PROCEDURE — 99499 UNLISTED E&M SERVICE: CPT | Mod: HCNC,S$GLB,, | Performed by: INTERNAL MEDICINE

## 2020-06-11 PROCEDURE — 99999 PR PBB SHADOW E&M-EST. PATIENT-LVL III: ICD-10-PCS | Mod: PBBFAC,HCNC,, | Performed by: INTERNAL MEDICINE

## 2020-06-11 PROCEDURE — 99999 PR PBB SHADOW E&M-EST. PATIENT-LVL III: CPT | Mod: PBBFAC,HCNC,, | Performed by: INTERNAL MEDICINE

## 2020-06-11 PROCEDURE — 99214 OFFICE O/P EST MOD 30 MIN: CPT | Mod: HCNC,S$GLB,, | Performed by: INTERNAL MEDICINE

## 2020-06-11 PROCEDURE — 3078F PR MOST RECENT DIASTOLIC BLOOD PRESSURE < 80 MM HG: ICD-10-PCS | Mod: HCNC,CPTII,S$GLB, | Performed by: INTERNAL MEDICINE

## 2020-06-11 PROCEDURE — 1101F PR PT FALLS ASSESS DOC 0-1 FALLS W/OUT INJ PAST YR: ICD-10-PCS | Mod: HCNC,CPTII,S$GLB, | Performed by: INTERNAL MEDICINE

## 2020-06-11 PROCEDURE — 1126F PR PAIN SEVERITY QUANTIFIED, NO PAIN PRESENT: ICD-10-PCS | Mod: HCNC,S$GLB,, | Performed by: INTERNAL MEDICINE

## 2020-06-11 PROCEDURE — 1159F MED LIST DOCD IN RCRD: CPT | Mod: HCNC,S$GLB,, | Performed by: INTERNAL MEDICINE

## 2020-06-11 PROCEDURE — 99499 RISK ADDL DX/OHS AUDIT: ICD-10-PCS | Mod: HCNC,S$GLB,, | Performed by: INTERNAL MEDICINE

## 2020-06-11 PROCEDURE — 3078F DIAST BP <80 MM HG: CPT | Mod: HCNC,CPTII,S$GLB, | Performed by: INTERNAL MEDICINE

## 2020-06-11 PROCEDURE — 3075F PR MOST RECENT SYSTOLIC BLOOD PRESS GE 130-139MM HG: ICD-10-PCS | Mod: HCNC,CPTII,S$GLB, | Performed by: INTERNAL MEDICINE

## 2020-06-11 RX ORDER — ROSUVASTATIN CALCIUM 10 MG/1
10 TABLET, COATED ORAL NIGHTLY
Qty: 90 TABLET | Refills: 3 | Status: SHIPPED | OUTPATIENT
Start: 2020-06-11 | End: 2020-12-01 | Stop reason: SDUPTHER

## 2020-06-11 NOTE — PROGRESS NOTES
CARDIOVASCULAR PROGRESS NOTE    REASON FOR CONSULT:   Alaina Vee is a 79 y.o. female who presents for f/u of AF/AFL.    PCP: Daquan  EP: Prema  Uro: Meg  Heme: Melchor  HISTORY OF PRESENT ILLNESS:   The patient returns for follow-up.  She reports generally stable status without angina or dyspnea.  There has been no palpitations or syncope.  She denies PND, orthopnea, lower extremity edema.  There has been no melena, hematuria, or claudicant symptoms.  She is tolerating her Coumadin anticoagulation.  She tells me she is purposely losing some weight and is down 3 units of her BMI.  She also tells me that she previously had a CPAP, but had to return it to the manufacture due to her wrist with her  regarding its use.    CARDIOVASCULAR HISTORY:   AF/AFL, CHADSVASC 3, s/p ablation (Dr. Lloyd) 6/12/17, DCCV 10/4/17  Aortic root dil 4.1 cm (echo 6/2020)  SULEMA    PAST MEDICAL HISTORY:     Past Medical History:   Diagnosis Date    Arthritis     knees    Atrial fibrillation     Atrial flutter     Back pain     Cataract     Degenerative disc disease     Depression     General anesthetics causing adverse effect in therapeutic use     pt states sometimes slow to awaken.    GERD (gastroesophageal reflux disease)     Hyperlipidemia     Hypertension     Kidney stone     Obesity     Sleep apnea     does not wear CPAP    Urinary incontinence     Varicosities     Ventral hernia        PAST SURGICAL HISTORY:     Past Surgical History:   Procedure Laterality Date    APPENDECTOMY      BREAST BIOPSY Bilateral     1985    breast biopsy, left      CHOLECYSTECTOMY      CYSTOURETEROSCOPY WITH RETROGRADE PYELOGRAPHY AND INSERTION OF STENT INTO URETER Right 1/10/2020    Procedure: CYSTOURETEROSCOPY, WITH RETROGRADE PYELOGRAM AND URETERAL STENT INSERTION;  Surgeon: Yvonne Weaver MD;  Location: [facility];  Service: Urology;  Laterality: Right;    JOINT REPLACEMENT      TKR , right    JOINT  REPLACEMENT  2009    TKR  left    [place] EXPLORATORY OF ABDOMEN      URETEROSCOPIC REMOVAL OF URETERIC CALCULUS Right 1/31/2020    Procedure: Cystoscopy, possible retrograde pyelogram, ureteroscopy with laser lithotripsy, ureteral stent exchange;  Surgeon: Yvonne Weaver MD;  Location: [facility];  Service: Urology;  Laterality: Right;       ALLERGIES AND MEDICATION:   Review of patient's allergies indicates:  No Known Allergies  Previous Medications    CYANOCOBALAMIN, VITAMIN B-12, (VITAMIN B-12 ORAL)    Take 2,500 mcg by mouth every evening.    DICLOFENAC SODIUM (VOLTAREN) 1 % GEL    Apply 2 g topically once daily.    FISH OIL-OMEGA-3 FATTY ACIDS 300-1,000 MG CAPSULE    Take 1 g by mouth once daily.    FLECAINIDE (TAMBOCOR) 100 MG TAB    Take 1 tablet (100 mg total) by mouth every 12 (twelve) hours.    MULTIVITAMIN (THERAGRAN) PER TABLET    Take 1 tablet by mouth every evening. 1 Tablet Oral Every day    OXYBUTYNIN (DITROPAN) 5 MG TAB    Take 1 tablet (5 mg total) by mouth 3 (three) times daily.    OXYBUTYNIN (DITROPAN) 5 MG TAB    Take 1 tablet (5 mg total) by mouth 3 (three) times daily.    WARFARIN (COUMADIN) 5 MG TABLET    Take 0.5-1 tablets (2.5-5 mg total) by mouth Daily. As directed by coumadin clinic       SOCIAL HISTORY:     Social History     Socioeconomic History    Marital status:      Spouse name: Not on file    Number of children: Not on file    Years of education: Not on file    Highest education level: Not on file   Occupational History    Not on file   Social Needs    Financial resource strain: Not on file    Food insecurity:     Worry: Not on file     Inability: Not on file    Transportation needs:     Medical: Not on file     Non-medical: Not on file   Tobacco Use    Smoking status: Never Smoker    Smokeless tobacco: Never Used   Substance and Sexual Activity    Alcohol use: No    Drug use: No    Sexual activity: Yes     Partners: Male   Lifestyle    Physical activity:      Days per week: Not on file     Minutes per session: Not on file    Stress: Not on file   Relationships    Social connections:     Talks on phone: Not on file     Gets together: Not on file     Attends Synagogue service: Not on file     Active member of club or organization: Not on file     Attends meetings of clubs or organizations: Not on file     Relationship status: Not on file   Other Topics Concern    Not on file   Social History Narrative    Not on file       FAMILY HISTORY:     Family History   Problem Relation Age of Onset    COPD Mother     Heart disease Sister         half sister    COPD Sister     No Known Problems Father     Parkinsonism Sister     No Known Problems Brother     No Known Problems Maternal Aunt     No Known Problems Maternal Uncle     No Known Problems Paternal Aunt     No Known Problems Paternal Uncle     No Known Problems Maternal Grandmother     No Known Problems Maternal Grandfather     No Known Problems Paternal Grandmother     No Known Problems Paternal Grandfather     Amblyopia Neg Hx     Blindness Neg Hx     Cancer Neg Hx     Cataracts Neg Hx     Diabetes Neg Hx     Glaucoma Neg Hx     Hypertension Neg Hx     Macular degeneration Neg Hx     Retinal detachment Neg Hx     Strabismus Neg Hx     Stroke Neg Hx     Thyroid disease Neg Hx        REVIEW OF SYSTEMS:   Review of Systems   Constitutional: Negative for chills, diaphoresis and fever.   HENT: Negative for nosebleeds.    Eyes: Negative for blurred vision, double vision and photophobia.   Respiratory: Negative for hemoptysis, shortness of breath and wheezing.    Cardiovascular: Negative for chest pain, palpitations, orthopnea, claudication, leg swelling and PND.   Gastrointestinal: Negative for abdominal pain, blood in stool, heartburn, melena, nausea and vomiting.   Genitourinary: Negative for flank pain and hematuria.   Musculoskeletal: Negative for falls, myalgias and neck pain.   Skin: Negative  for rash.   Neurological: Negative for dizziness, seizures, loss of consciousness, weakness and headaches.   Endo/Heme/Allergies: Negative for polydipsia. Does not bruise/bleed easily.   Psychiatric/Behavioral: Negative for depression and memory loss. The patient is not nervous/anxious.        PHYSICAL EXAM:     Vitals:    06/11/20 1324   BP: 134/76   Pulse: 72   Resp: 16    Body mass index is 38.43 kg/m².  Weight: 108 kg (238 lb 1.6 oz)         Physical Exam   Constitutional: She is oriented to person, place, and time. She appears well-developed and well-nourished. She is cooperative.  Non-toxic appearance. No distress.   HENT:   Head: Normocephalic and atraumatic.   Eyes: Pupils are equal, round, and reactive to light. Conjunctivae and EOM are normal. No scleral icterus.   Neck: Trachea normal and normal range of motion. Neck supple. Normal carotid pulses and no JVD present. Carotid bruit is not present. No neck rigidity. No tracheal deviation and no edema present. No thyromegaly present.   Cardiovascular: Normal rate, regular rhythm, S1 normal and S2 normal. PMI is not displaced. Exam reveals distant heart sounds. Exam reveals no gallop and no friction rub.   No murmur heard.  Pulses:       Carotid pulses are 2+ on the right side, and 2+ on the left side.  Pulmonary/Chest: Effort normal and breath sounds normal. No stridor. No respiratory distress. She has no wheezes. She has no rales. She exhibits no tenderness.   Abdominal: Soft. She exhibits no distension. There is no hepatosplenomegaly.   obese   Musculoskeletal: Normal range of motion. She exhibits no edema or tenderness.   Feet:   Right Foot:   Skin Integrity: Negative for ulcer.   Left Foot:   Skin Integrity: Negative for ulcer.   Neurological: She is alert and oriented to person, place, and time. No cranial nerve deficit.   Skin: Skin is warm and dry. No rash noted. No erythema.   Psychiatric: She has a normal mood and affect. Her speech is normal and  behavior is normal.   Vitals reviewed.      DATA:   EKG: (personally reviewed tracing(s))  6/11/20 SR 64, 1st deg AVB (), QRS 86, , similar to 1/6/20    Laboratory:  CBC:  Recent Labs   Lab 09/29/17  1110 05/21/19  1555 01/06/20  1722   WBC 6.80 6.78 7.31   Hemoglobin 13.9 14.4 14.2   Hematocrit 41.8 43.8 43.5   Platelets 263 256 248       CHEMISTRIES:  Recent Labs   Lab 05/21/19  1555 12/17/19  1031 01/30/20  1008   Glucose 80 94 137 H   Sodium 140 140 141   Potassium 4.0 4.1 4.4   BUN, Bld 20 14 13   Creatinine 1.0 1.1 1.0   eGFR if African American >60 56 A >60   eGFR if non  55 A 49 A 54 A   Calcium 9.7 9.9 9.5       CARDIAC BIOMARKERS:        COAGS:  Recent Labs   Lab 04/08/20  0830 05/11/20  0957 06/08/20  0834   INR 3.4 H 2.3 H 2.2 H       LIPIDS/LFTS:  Recent Labs   Lab 09/20/18  1057 05/21/19  1555 12/17/19  1031 01/30/20  1008   Cholesterol 221 H  --  259 H  --    Triglycerides 84  --  149  --    HDL 50  --  41  --    LDL Cholesterol 154.2  --  188.2 H  --    Non-HDL Cholesterol 171  --  218  --    AST 20 16 17 16   ALT 14 13 11 14     Lab Results   Component Value Date    TSH 1.372 09/20/2018     Cardiovascular Testing:  Echo 6/8/20 (Ao root 4.2cm, stable vs 3/2019 (4.1cm on that exam))  · Normal left ventricular systolic function. The estimated ejection fraction is 60%.  · Concentric left ventricular hypertrophy.  · Normal LV diastolic function.  · Normal right ventricular systolic function.  · Mild left atrial enlargement.  · No pulmonary hypertension present.    RLE venous US 10/28/19  No DVT seen on the current study.  The previously noted nonocclusive thrombus in the right common femoral vein not visualized on the current study    LE venous US/reflux 5/20/19  Partially occlusive thrombus in the right femoral vein.  Patient currently on blood thinners.  No evidence of hemodynamically significant venous reflux (>500 ms) in the left greater and lesser saphenous veins.    LLE  venous US 3/29/19  No evidence of left lower extremity DVT.     EVM 10/5/17-11/4/17  Sinus rhythm with first-degree AV block.  Overall, negative event monitor.    SKINNY 8/31/17    1 - Normal left ventricular systolic function.     2 - Left atrial appendage is single lobed with no visualized thrombus.    3 - Aortic root 3.8cm    Holter 3/7/17  PREDOMINANT RHYTHM  1. Atrial flutter with ventricular rates varying between 29 and 89 bpm with an average of 55 bpm.   VENTRICULAR ARRHYTHMIAS  1. There were no PVCs. There was no ventricular ectopic activity.  SUPRA VENTRICULAR ARRHYTHMIAS  1. Atrial flutter was noted throughout the study.   AV CONDUCTION  1. There was no evidence of high grade AV emma filtering.   2. The longest RR interval was 2215 msec.   DIARY  1. The diary was not returned  MISCELLANEOUS  1. There were occasional hookup related artifacts. Overall, the study was of good quality.   2. This was a tape of adequate length (24 hrs).    RADHA 7/14/16  Right lower extremity RADHA: 1.06  Left lower extremity RADHA: 1.08    ASSESSMENT:   # PAF/AFL, now in SR.  CHADSVASC 3.  S/p AFL ablation 6/2017, s/p SKINNY/DCCV 8/31/17, DCCV 10/4/17, now on flecainide/coumadin.  EKG stable.  HR too low and ID interval too long for BBL.  # HTN, controlled  # HLP, not on statin (prev intol of atorva)  # Hx RLE DVT, now resolved  # BMI 38, down 3 unit(s) vs last OV  # SULEMA no longer on CPAP  # Aortic root dil 3.8->4.1->4.2 cm (echo 6/2020)    PLAN:   Cont med rx  Cont flecainide, pt feels better in SR  Start rosuva 10mg qhs  Consider resuming CPAP if pt/ able to work out it's use  Diet/exercise/weight loss, pt previously declined bariatric evaluation.  Continuation of coumadin for goal INR 2.0-3.0 (pt not on NOAC d/t cost issues), OK to hold for surgery and hypercoag testing as planned.  RTC 3 months with lipids/LFT (Sept 2020).  Pt declines virtual visit (no smartphone).  Repeat surveillance echo 1 year (6/2020)      Nahid HERNANDEZ  MD [place], FACC

## 2020-07-02 RX ORDER — FLECAINIDE ACETATE 100 MG/1
100 TABLET ORAL EVERY 12 HOURS
Qty: 180 TABLET | Refills: 3 | Status: SHIPPED | OUTPATIENT
Start: 2020-07-02 | End: 2020-09-28 | Stop reason: SDUPTHER

## 2020-07-13 ENCOUNTER — LAB VISIT (OUTPATIENT)
Dept: LAB | Facility: HOSPITAL | Age: 80
End: 2020-07-13
Attending: INTERNAL MEDICINE
Payer: MEDICARE

## 2020-07-13 ENCOUNTER — ANTI-COAG VISIT (OUTPATIENT)
Dept: CARDIOLOGY | Facility: CLINIC | Age: 80
End: 2020-07-13
Payer: MEDICARE

## 2020-07-13 DIAGNOSIS — Z79.01 LONG TERM (CURRENT) USE OF ANTICOAGULANTS: ICD-10-CM

## 2020-07-13 LAB
INR PPP: 3.7 (ref 0.8–1.2)
PROTHROMBIN TIME: 40.8 SEC (ref 9–12.5)

## 2020-07-13 PROCEDURE — 36415 COLL VENOUS BLD VENIPUNCTURE: CPT | Mod: HCNC,PN

## 2020-07-13 PROCEDURE — 93793 ANTICOAG MGMT PT WARFARIN: CPT | Mod: S$GLB,,, | Performed by: PHARMACIST

## 2020-07-13 PROCEDURE — 85610 PROTHROMBIN TIME: CPT | Mod: HCNC

## 2020-07-13 PROCEDURE — 93793 PR ANTICOAGULANT MGMT FOR PT TAKING WARFARIN: ICD-10-PCS | Mod: S$GLB,,, | Performed by: PHARMACIST

## 2020-07-14 ENCOUNTER — ANTI-COAG VISIT (OUTPATIENT)
Dept: CARDIOLOGY | Facility: CLINIC | Age: 80
End: 2020-07-14
Payer: MEDICARE

## 2020-07-14 ENCOUNTER — OFFICE VISIT (OUTPATIENT)
Dept: FAMILY MEDICINE | Facility: CLINIC | Age: 80
End: 2020-07-14
Payer: MEDICARE

## 2020-07-14 VITALS
WEIGHT: 237.88 LBS | TEMPERATURE: 100 F | DIASTOLIC BLOOD PRESSURE: 66 MMHG | OXYGEN SATURATION: 94 % | HEART RATE: 73 BPM | BODY MASS INDEX: 38.39 KG/M2 | SYSTOLIC BLOOD PRESSURE: 132 MMHG

## 2020-07-14 DIAGNOSIS — R73.9 HYPERGLYCEMIA: ICD-10-CM

## 2020-07-14 DIAGNOSIS — I10 ESSENTIAL HYPERTENSION: Primary | ICD-10-CM

## 2020-07-14 DIAGNOSIS — I70.0 AORTIC ATHEROSCLEROSIS: ICD-10-CM

## 2020-07-14 DIAGNOSIS — Z79.01 LONG TERM (CURRENT) USE OF ANTICOAGULANTS: ICD-10-CM

## 2020-07-14 DIAGNOSIS — E78.2 MIXED HYPERLIPIDEMIA: ICD-10-CM

## 2020-07-14 DIAGNOSIS — B37.0 ORAL CANDIDA: ICD-10-CM

## 2020-07-14 DIAGNOSIS — I48.0 PAROXYSMAL ATRIAL FIBRILLATION: ICD-10-CM

## 2020-07-14 DIAGNOSIS — E66.01 MORBID OBESITY: ICD-10-CM

## 2020-07-14 DIAGNOSIS — N18.30 CHRONIC KIDNEY DISEASE, STAGE III (MODERATE): ICD-10-CM

## 2020-07-14 PROCEDURE — 99999 PR PBB SHADOW E&M-EST. PATIENT-LVL III: CPT | Mod: PBBFAC,HCNC,, | Performed by: FAMILY MEDICINE

## 2020-07-14 PROCEDURE — 3078F PR MOST RECENT DIASTOLIC BLOOD PRESSURE < 80 MM HG: ICD-10-PCS | Mod: HCNC,CPTII,S$GLB, | Performed by: FAMILY MEDICINE

## 2020-07-14 PROCEDURE — 99214 OFFICE O/P EST MOD 30 MIN: CPT | Mod: HCNC,S$GLB,, | Performed by: FAMILY MEDICINE

## 2020-07-14 PROCEDURE — 3075F SYST BP GE 130 - 139MM HG: CPT | Mod: HCNC,CPTII,S$GLB, | Performed by: FAMILY MEDICINE

## 2020-07-14 PROCEDURE — 93793 PR ANTICOAGULANT MGMT FOR PT TAKING WARFARIN: ICD-10-PCS | Mod: S$GLB,,, | Performed by: PHARMACIST

## 2020-07-14 PROCEDURE — 1101F PR PT FALLS ASSESS DOC 0-1 FALLS W/OUT INJ PAST YR: ICD-10-PCS | Mod: HCNC,CPTII,S$GLB, | Performed by: FAMILY MEDICINE

## 2020-07-14 PROCEDURE — 93793 ANTICOAG MGMT PT WARFARIN: CPT | Mod: S$GLB,,, | Performed by: PHARMACIST

## 2020-07-14 PROCEDURE — 1159F PR MEDICATION LIST DOCUMENTED IN MEDICAL RECORD: ICD-10-PCS | Mod: HCNC,S$GLB,, | Performed by: FAMILY MEDICINE

## 2020-07-14 PROCEDURE — 99999 PR PBB SHADOW E&M-EST. PATIENT-LVL III: ICD-10-PCS | Mod: PBBFAC,HCNC,, | Performed by: FAMILY MEDICINE

## 2020-07-14 PROCEDURE — 99499 RISK ADDL DX/OHS AUDIT: ICD-10-PCS | Mod: HCNC,S$GLB,, | Performed by: FAMILY MEDICINE

## 2020-07-14 PROCEDURE — 3075F PR MOST RECENT SYSTOLIC BLOOD PRESS GE 130-139MM HG: ICD-10-PCS | Mod: HCNC,CPTII,S$GLB, | Performed by: FAMILY MEDICINE

## 2020-07-14 PROCEDURE — 3078F DIAST BP <80 MM HG: CPT | Mod: HCNC,CPTII,S$GLB, | Performed by: FAMILY MEDICINE

## 2020-07-14 PROCEDURE — 1101F PT FALLS ASSESS-DOCD LE1/YR: CPT | Mod: HCNC,CPTII,S$GLB, | Performed by: FAMILY MEDICINE

## 2020-07-14 PROCEDURE — 99499 UNLISTED E&M SERVICE: CPT | Mod: HCNC,S$GLB,, | Performed by: FAMILY MEDICINE

## 2020-07-14 PROCEDURE — 99214 PR OFFICE/OUTPT VISIT, EST, LEVL IV, 30-39 MIN: ICD-10-PCS | Mod: HCNC,S$GLB,, | Performed by: FAMILY MEDICINE

## 2020-07-14 PROCEDURE — 1159F MED LIST DOCD IN RCRD: CPT | Mod: HCNC,S$GLB,, | Performed by: FAMILY MEDICINE

## 2020-07-14 RX ORDER — NYSTATIN 100000 [USP'U]/ML
5 SUSPENSION ORAL 4 TIMES DAILY
Qty: 200 ML | Refills: 0 | Status: SHIPPED | OUTPATIENT
Start: 2020-07-14 | End: 2020-07-24

## 2020-07-14 NOTE — PROGRESS NOTES
I attempted to reach patient due to her supra therapuetic INR of >10 dropping in just before 5pm. I left several messages on the home number, which has a voicemail ) Advised patient call me on my cell no matter how late and also advised she hold coumadin and seek urgent care if acute bleeding occurs) again asvised she call me this evening when she receives the voicemail. I will continue to try to reach patient after hours as well.

## 2020-07-15 NOTE — PROGRESS NOTES
I was able to reach patient yesterday evening prior to her taking her coumadin. She denied bleeding or bruising issues and reported no changes in meds. She was advised to hold her coumadin 7/14 and 7/15 and eat a serving of dark greens. We will repeat her INR 7/16. Patient was re-educated on situations that would require placing a call to the coumadin clinic, including bleeding or unusual bruising issues, changes in health, diet or medications, upcoming procedures that require warfarin interruption, and missed coumadin dose(s). Patient expressed understanding that avoidance of consistency with these parameters could cause fluctuations in INR, leading to more frequent visits and increase risk of adverse events.

## 2020-07-16 ENCOUNTER — ANTI-COAG VISIT (OUTPATIENT)
Dept: CARDIOLOGY | Facility: CLINIC | Age: 80
End: 2020-07-16
Payer: MEDICARE

## 2020-07-16 ENCOUNTER — LAB VISIT (OUTPATIENT)
Dept: LAB | Facility: HOSPITAL | Age: 80
End: 2020-07-16
Attending: INTERNAL MEDICINE
Payer: MEDICARE

## 2020-07-16 DIAGNOSIS — Z79.01 LONG TERM (CURRENT) USE OF ANTICOAGULANTS: ICD-10-CM

## 2020-07-16 LAB
INR PPP: 5.4
INR PPP: 5.4 (ref 0.8–1.2)
PROTHROMBIN TIME: 59.6 SEC (ref 9–12.5)

## 2020-07-16 PROCEDURE — 93793 PR ANTICOAGULANT MGMT FOR PT TAKING WARFARIN: ICD-10-PCS | Mod: S$GLB,,, | Performed by: PHARMACIST

## 2020-07-16 PROCEDURE — 93793 ANTICOAG MGMT PT WARFARIN: CPT | Mod: S$GLB,,, | Performed by: PHARMACIST

## 2020-07-16 PROCEDURE — 36415 COLL VENOUS BLD VENIPUNCTURE: CPT | Mod: HCNC,PN

## 2020-07-16 PROCEDURE — 85610 PROTHROMBIN TIME: CPT | Mod: HCNC

## 2020-07-20 ENCOUNTER — LAB VISIT (OUTPATIENT)
Dept: LAB | Facility: HOSPITAL | Age: 80
End: 2020-07-20
Attending: INTERNAL MEDICINE
Payer: MEDICARE

## 2020-07-20 ENCOUNTER — ANTI-COAG VISIT (OUTPATIENT)
Dept: CARDIOLOGY | Facility: CLINIC | Age: 80
End: 2020-07-20
Payer: MEDICARE

## 2020-07-20 DIAGNOSIS — Z79.01 LONG TERM (CURRENT) USE OF ANTICOAGULANTS: ICD-10-CM

## 2020-07-20 LAB
INR PPP: 1 (ref 0.8–1.2)
PROTHROMBIN TIME: 11.2 SEC (ref 9–12.5)

## 2020-07-20 PROCEDURE — 93793 PR ANTICOAGULANT MGMT FOR PT TAKING WARFARIN: ICD-10-PCS | Mod: S$GLB,,, | Performed by: PHARMACIST

## 2020-07-20 PROCEDURE — 93793 ANTICOAG MGMT PT WARFARIN: CPT | Mod: S$GLB,,, | Performed by: PHARMACIST

## 2020-07-20 PROCEDURE — 36415 COLL VENOUS BLD VENIPUNCTURE: CPT | Mod: HCNC,PN

## 2020-07-20 PROCEDURE — 85610 PROTHROMBIN TIME: CPT | Mod: HCNC

## 2020-07-23 ENCOUNTER — ANTI-COAG VISIT (OUTPATIENT)
Dept: CARDIOLOGY | Facility: CLINIC | Age: 80
End: 2020-07-23
Payer: MEDICARE

## 2020-07-23 ENCOUNTER — LAB VISIT (OUTPATIENT)
Dept: LAB | Facility: HOSPITAL | Age: 80
End: 2020-07-23
Attending: INTERNAL MEDICINE
Payer: MEDICARE

## 2020-07-23 DIAGNOSIS — Z79.01 LONG TERM (CURRENT) USE OF ANTICOAGULANTS: ICD-10-CM

## 2020-07-23 LAB
INR PPP: 1.1 (ref 0.8–1.2)
PROTHROMBIN TIME: 12 SEC (ref 9–12.5)

## 2020-07-23 PROCEDURE — 93793 ANTICOAG MGMT PT WARFARIN: CPT | Mod: S$GLB,,, | Performed by: PHARMACIST

## 2020-07-23 PROCEDURE — 36415 COLL VENOUS BLD VENIPUNCTURE: CPT | Mod: HCNC,PN

## 2020-07-23 PROCEDURE — 85610 PROTHROMBIN TIME: CPT | Mod: HCNC

## 2020-07-23 PROCEDURE — 93793 PR ANTICOAGULANT MGMT FOR PT TAKING WARFARIN: ICD-10-PCS | Mod: S$GLB,,, | Performed by: PHARMACIST

## 2020-07-23 NOTE — PROGRESS NOTES
Confirmed taking correct dose of coumadin  NO greens or any dietary supplements   NO other changes

## 2020-07-27 NOTE — PROGRESS NOTES
Subjective:       Patient ID: Alaina Vee is a 79 y.o. female.    Chief Complaint: Follow-up    HPI   79 year old female with hypertension, hyperlipidemia, CKD-3, and atrial fibrillation comes in for routine follow up. She reports doing well overall. She reports that her only acute concern is white film on her tongue. It has been present for several days. No bleeding. No pain. No weight loss reported. No swollen glands. No fevers or chills.     Review of Systems   Constitutional: Negative for chills and fever.   HENT: Negative for facial swelling, sinus pressure/congestion and sore throat.    Respiratory: Negative for shortness of breath.    Cardiovascular: Negative for chest pain and leg swelling.   Gastrointestinal: Negative for abdominal pain, blood in stool, constipation, diarrhea and fecal incontinence.         Objective:     /66 (BP Location: Left arm, Patient Position: Sitting, BP Method: Medium (Manual))   Pulse 73   Temp 99.9 °F (37.7 °C) (Skin)   Wt 107.9 kg (237 lb 14 oz)   LMP  (LMP Unknown)   SpO2 (!) 94%   BMI 38.39 kg/m²     Physical Exam  Vitals signs reviewed.   Constitutional:       Appearance: She is well-developed.   HENT:      Head: Normocephalic and atraumatic.      Right Ear: External ear normal.      Left Ear: External ear normal.      Nose: Nose normal.      Mouth/Throat:      Pharynx: No oropharyngeal exudate.   Eyes:      General:         Right eye: No discharge.         Left eye: No discharge.      Conjunctiva/sclera: Conjunctivae normal.      Pupils: Pupils are equal, round, and reactive to light.   Neck:      Musculoskeletal: Normal range of motion and neck supple.      Trachea: No tracheal deviation.   Cardiovascular:      Rate and Rhythm: Normal rate and regular rhythm.      Heart sounds: Normal heart sounds. No murmur.   Pulmonary:      Effort: Pulmonary effort is normal.      Breath sounds: Normal breath sounds. No wheezing or rales.   Abdominal:      General:  Bowel sounds are normal.      Palpations: Abdomen is soft. There is no mass.      Tenderness: There is no abdominal tenderness.   Lymphadenopathy:      Cervical: No cervical adenopathy.         Assessment:       1. Essential hypertension    2. Mixed hyperlipidemia    3. Chronic kidney disease, stage III (moderate)    4. Paroxysmal atrial fibrillation    5. Hyperglycemia    6. Oral candida    7. Aortic atherosclerosis    8. Morbid obesity        Plan:       Alaina was seen today for follow-up.    Diagnoses and all orders for this visit:    Essential hypertension  -     Comprehensive metabolic panel; Future  -     CBC auto differential; Future  Check renal function  Continue current regimen    Mixed hyperlipidemia  -     Comprehensive metabolic panel; Future  -     Lipid Panel; Future  Check fasting lipids.    Chronic kidney disease, stage III (moderate)  -     Comprehensive metabolic panel; Future  -     CBC auto differential; Future  Check kidney function    Paroxysmal atrial fibrillation  -     CBC auto differential; Future  Rate and rhythm controlled today    Hyperglycemia  -     Hemoglobin A1C; Future    Oral candida  -     nystatin (MYCOSTATIN) 100,000 unit/mL suspension; Take 5 mLs (500,000 Units total) by mouth 4 (four) times daily. Swish for 30 seconds then swallow for 10 days  Short course of Mycostatin swish and swallow    Aortic atherosclerosis  -     Comprehensive metabolic panel; Future  -     Lipid Panel; Future  On statin    Morbid obesity  Importance of weight management discussed

## 2020-07-29 ENCOUNTER — ANTI-COAG VISIT (OUTPATIENT)
Dept: CARDIOLOGY | Facility: CLINIC | Age: 80
End: 2020-07-29
Payer: MEDICARE

## 2020-07-29 ENCOUNTER — LAB VISIT (OUTPATIENT)
Dept: LAB | Facility: HOSPITAL | Age: 80
End: 2020-07-29
Attending: INTERNAL MEDICINE
Payer: MEDICARE

## 2020-07-29 DIAGNOSIS — Z79.01 LONG TERM (CURRENT) USE OF ANTICOAGULANTS: ICD-10-CM

## 2020-07-29 LAB
INR PPP: 1.8 (ref 0.8–1.2)
PROTHROMBIN TIME: 19.9 SEC (ref 9–12.5)

## 2020-07-29 PROCEDURE — 93793 PR ANTICOAGULANT MGMT FOR PT TAKING WARFARIN: ICD-10-PCS | Mod: S$GLB,,,

## 2020-07-29 PROCEDURE — 93793 ANTICOAG MGMT PT WARFARIN: CPT | Mod: S$GLB,,,

## 2020-07-29 PROCEDURE — 85610 PROTHROMBIN TIME: CPT | Mod: HCNC

## 2020-07-29 PROCEDURE — 36415 COLL VENOUS BLD VENIPUNCTURE: CPT | Mod: HCNC,PN

## 2020-07-29 NOTE — PROGRESS NOTES
INR 1.8. Medications, chart, and patient findings reviewed. Plan to continue new dose and repeat INR on Monday 8/3. See calendar.

## 2020-07-29 NOTE — PROGRESS NOTES
Care plan made by MAGDALENA Youngblood. I have reviewed the chart and agree with her assessment.

## 2020-08-03 ENCOUNTER — ANTI-COAG VISIT (OUTPATIENT)
Dept: CARDIOLOGY | Facility: CLINIC | Age: 80
End: 2020-08-03
Payer: MEDICARE

## 2020-08-03 ENCOUNTER — LAB VISIT (OUTPATIENT)
Dept: LAB | Facility: HOSPITAL | Age: 80
End: 2020-08-03
Attending: INTERNAL MEDICINE
Payer: MEDICARE

## 2020-08-03 DIAGNOSIS — Z79.01 LONG TERM (CURRENT) USE OF ANTICOAGULANTS: ICD-10-CM

## 2020-08-03 LAB
INR PPP: 2.4 (ref 0.8–1.2)
PROTHROMBIN TIME: 27 SEC (ref 9–12.5)

## 2020-08-03 PROCEDURE — 93793 ANTICOAG MGMT PT WARFARIN: CPT | Mod: S$GLB,,, | Performed by: PHARMACIST

## 2020-08-03 PROCEDURE — 85610 PROTHROMBIN TIME: CPT | Mod: HCNC

## 2020-08-03 PROCEDURE — 36415 COLL VENOUS BLD VENIPUNCTURE: CPT | Mod: HCNC,PN

## 2020-08-03 PROCEDURE — 93793 PR ANTICOAGULANT MGMT FOR PT TAKING WARFARIN: ICD-10-PCS | Mod: S$GLB,,, | Performed by: PHARMACIST

## 2020-08-10 ENCOUNTER — TELEPHONE (OUTPATIENT)
Dept: FAMILY MEDICINE | Facility: CLINIC | Age: 80
End: 2020-08-10

## 2020-08-10 ENCOUNTER — ANTI-COAG VISIT (OUTPATIENT)
Dept: CARDIOLOGY | Facility: CLINIC | Age: 80
End: 2020-08-10
Payer: MEDICARE

## 2020-08-10 ENCOUNTER — LAB VISIT (OUTPATIENT)
Dept: LAB | Facility: HOSPITAL | Age: 80
End: 2020-08-10
Attending: INTERNAL MEDICINE
Payer: MEDICARE

## 2020-08-10 DIAGNOSIS — Z79.01 LONG TERM (CURRENT) USE OF ANTICOAGULANTS: ICD-10-CM

## 2020-08-10 LAB
INR PPP: 2.7 (ref 0.8–1.2)
PROTHROMBIN TIME: 29.9 SEC (ref 9–12.5)

## 2020-08-10 PROCEDURE — 93793 ANTICOAG MGMT PT WARFARIN: CPT | Mod: S$GLB,,, | Performed by: PHARMACIST

## 2020-08-10 PROCEDURE — 36415 COLL VENOUS BLD VENIPUNCTURE: CPT | Mod: HCNC,PN

## 2020-08-10 PROCEDURE — 93793 PR ANTICOAGULANT MGMT FOR PT TAKING WARFARIN: ICD-10-PCS | Mod: S$GLB,,, | Performed by: PHARMACIST

## 2020-08-10 PROCEDURE — 85610 PROTHROMBIN TIME: CPT | Mod: HCNC

## 2020-08-14 DIAGNOSIS — Z11.59 NEED FOR HEPATITIS C SCREENING TEST: ICD-10-CM

## 2020-08-17 ENCOUNTER — LAB VISIT (OUTPATIENT)
Dept: LAB | Facility: HOSPITAL | Age: 80
End: 2020-08-17
Attending: INTERNAL MEDICINE
Payer: MEDICARE

## 2020-08-17 ENCOUNTER — ANTI-COAG VISIT (OUTPATIENT)
Dept: CARDIOLOGY | Facility: CLINIC | Age: 80
End: 2020-08-17
Payer: MEDICARE

## 2020-08-17 DIAGNOSIS — Z79.01 LONG TERM (CURRENT) USE OF ANTICOAGULANTS: ICD-10-CM

## 2020-08-17 LAB
INR PPP: 2.3 (ref 0.8–1.2)
PROTHROMBIN TIME: 25.8 SEC (ref 9–12.5)

## 2020-08-17 PROCEDURE — 36415 COLL VENOUS BLD VENIPUNCTURE: CPT | Mod: HCNC,PN

## 2020-08-17 PROCEDURE — 93793 PR ANTICOAGULANT MGMT FOR PT TAKING WARFARIN: ICD-10-PCS | Mod: S$GLB,,, | Performed by: PHARMACIST

## 2020-08-17 PROCEDURE — 85610 PROTHROMBIN TIME: CPT | Mod: HCNC

## 2020-08-17 PROCEDURE — 93793 ANTICOAG MGMT PT WARFARIN: CPT | Mod: S$GLB,,, | Performed by: PHARMACIST

## 2020-08-24 ENCOUNTER — ANTI-COAG VISIT (OUTPATIENT)
Dept: CARDIOLOGY | Facility: CLINIC | Age: 80
End: 2020-08-24
Payer: MEDICARE

## 2020-08-24 ENCOUNTER — LAB VISIT (OUTPATIENT)
Dept: LAB | Facility: HOSPITAL | Age: 80
End: 2020-08-24
Attending: INTERNAL MEDICINE
Payer: MEDICARE

## 2020-08-24 DIAGNOSIS — Z79.01 LONG TERM (CURRENT) USE OF ANTICOAGULANTS: ICD-10-CM

## 2020-08-24 LAB
INR PPP: 2.6 (ref 0.8–1.2)
PROTHROMBIN TIME: 28.5 SEC (ref 9–12.5)

## 2020-08-24 PROCEDURE — 36415 COLL VENOUS BLD VENIPUNCTURE: CPT | Mod: HCNC,PN

## 2020-08-24 PROCEDURE — 85610 PROTHROMBIN TIME: CPT | Mod: HCNC

## 2020-08-24 PROCEDURE — 93793 ANTICOAG MGMT PT WARFARIN: CPT | Mod: S$GLB,,, | Performed by: PHARMACIST

## 2020-08-24 PROCEDURE — 93793 PR ANTICOAGULANT MGMT FOR PT TAKING WARFARIN: ICD-10-PCS | Mod: S$GLB,,, | Performed by: PHARMACIST

## 2020-09-09 ENCOUNTER — ANTI-COAG VISIT (OUTPATIENT)
Dept: CARDIOLOGY | Facility: CLINIC | Age: 80
End: 2020-09-09
Payer: MEDICARE

## 2020-09-09 ENCOUNTER — LAB VISIT (OUTPATIENT)
Dept: LAB | Facility: HOSPITAL | Age: 80
End: 2020-09-09
Attending: INTERNAL MEDICINE
Payer: MEDICARE

## 2020-09-09 DIAGNOSIS — Z79.01 LONG TERM (CURRENT) USE OF ANTICOAGULANTS: ICD-10-CM

## 2020-09-09 LAB
INR PPP: 2.4 (ref 0.8–1.2)
PROTHROMBIN TIME: 26.6 SEC (ref 9–12.5)

## 2020-09-09 PROCEDURE — 36415 COLL VENOUS BLD VENIPUNCTURE: CPT | Mod: HCNC,PN

## 2020-09-09 PROCEDURE — 93793 PR ANTICOAGULANT MGMT FOR PT TAKING WARFARIN: ICD-10-PCS | Mod: S$GLB,,, | Performed by: PHARMACIST

## 2020-09-09 PROCEDURE — 85610 PROTHROMBIN TIME: CPT | Mod: HCNC

## 2020-09-09 PROCEDURE — 93793 ANTICOAG MGMT PT WARFARIN: CPT | Mod: S$GLB,,, | Performed by: PHARMACIST

## 2020-09-21 ENCOUNTER — ANTI-COAG VISIT (OUTPATIENT)
Dept: CARDIOLOGY | Facility: CLINIC | Age: 80
End: 2020-09-21
Payer: MEDICARE

## 2020-09-21 ENCOUNTER — LAB VISIT (OUTPATIENT)
Dept: LAB | Facility: HOSPITAL | Age: 80
End: 2020-09-21
Attending: INTERNAL MEDICINE
Payer: MEDICARE

## 2020-09-21 DIAGNOSIS — Z79.01 LONG TERM (CURRENT) USE OF ANTICOAGULANTS: ICD-10-CM

## 2020-09-21 LAB
INR PPP: 2.4 (ref 0.8–1.2)
PROTHROMBIN TIME: 26.4 SEC (ref 9–12.5)

## 2020-09-21 PROCEDURE — 93793 PR ANTICOAGULANT MGMT FOR PT TAKING WARFARIN: ICD-10-PCS | Mod: S$GLB,,, | Performed by: PHARMACIST

## 2020-09-21 PROCEDURE — 93793 ANTICOAG MGMT PT WARFARIN: CPT | Mod: S$GLB,,, | Performed by: PHARMACIST

## 2020-09-21 PROCEDURE — 85610 PROTHROMBIN TIME: CPT | Mod: HCNC

## 2020-09-21 PROCEDURE — 36415 COLL VENOUS BLD VENIPUNCTURE: CPT | Mod: HCNC,PN

## 2020-09-30 RX ORDER — FLECAINIDE ACETATE 100 MG/1
100 TABLET ORAL EVERY 12 HOURS
Qty: 180 TABLET | Refills: 3 | Status: SHIPPED | OUTPATIENT
Start: 2020-09-30 | End: 2021-11-17

## 2020-10-05 ENCOUNTER — LAB VISIT (OUTPATIENT)
Dept: LAB | Facility: HOSPITAL | Age: 80
End: 2020-10-05
Payer: MEDICARE

## 2020-10-05 ENCOUNTER — ANTI-COAG VISIT (OUTPATIENT)
Dept: CARDIOLOGY | Facility: CLINIC | Age: 80
End: 2020-10-05
Payer: MEDICARE

## 2020-10-05 DIAGNOSIS — Z79.01 LONG TERM (CURRENT) USE OF ANTICOAGULANTS: ICD-10-CM

## 2020-10-05 LAB
INR PPP: 3.8 (ref 0.8–1.2)
PROTHROMBIN TIME: 42.5 SEC (ref 9–12.5)

## 2020-10-05 PROCEDURE — 93793 PR ANTICOAGULANT MGMT FOR PT TAKING WARFARIN: ICD-10-PCS | Mod: S$GLB,,, | Performed by: PHARMACIST

## 2020-10-05 PROCEDURE — 36415 COLL VENOUS BLD VENIPUNCTURE: CPT | Mod: HCNC,PN

## 2020-10-05 PROCEDURE — 85610 PROTHROMBIN TIME: CPT | Mod: HCNC

## 2020-10-05 PROCEDURE — 93793 ANTICOAG MGMT PT WARFARIN: CPT | Mod: S$GLB,,, | Performed by: PHARMACIST

## 2020-10-13 ENCOUNTER — TELEPHONE (OUTPATIENT)
Dept: FAMILY MEDICINE | Facility: CLINIC | Age: 80
End: 2020-10-13

## 2020-10-13 DIAGNOSIS — Z12.31 VISIT FOR SCREENING MAMMOGRAM: Primary | ICD-10-CM

## 2020-10-13 NOTE — TELEPHONE ENCOUNTER
----- Message from Portia Bowling sent at 10/13/2020  2:30 PM CDT -----  Type:  Mammogram    Caller is requesting to schedule their annual mammogram appointment.  Order is not listed in EPIC.  Please enter order and contact patient to schedule.  Name of Caller: Self    Where would they like the mammogram performed? [facility]    Would the patient rather a call back or a response via [facility]? Call    Best Call Back Number: [phone]

## 2020-10-19 ENCOUNTER — ANTI-COAG VISIT (OUTPATIENT)
Dept: CARDIOLOGY | Facility: CLINIC | Age: 80
End: 2020-10-19
Payer: MEDICARE

## 2020-10-19 ENCOUNTER — LAB VISIT (OUTPATIENT)
Dept: LAB | Facility: HOSPITAL | Age: 80
End: 2020-10-19
Attending: INTERNAL MEDICINE
Payer: MEDICARE

## 2020-10-19 DIAGNOSIS — Z79.01 LONG TERM (CURRENT) USE OF ANTICOAGULANTS: ICD-10-CM

## 2020-10-19 LAB
INR PPP: 3.1 (ref 0.8–1.2)
PROTHROMBIN TIME: 34.8 SEC (ref 9–12.5)

## 2020-10-19 PROCEDURE — 93793 ANTICOAG MGMT PT WARFARIN: CPT | Mod: S$GLB,,, | Performed by: PHARMACIST

## 2020-10-19 PROCEDURE — 93793 PR ANTICOAGULANT MGMT FOR PT TAKING WARFARIN: ICD-10-PCS | Mod: S$GLB,,, | Performed by: PHARMACIST

## 2020-10-19 PROCEDURE — 36415 COLL VENOUS BLD VENIPUNCTURE: CPT | Mod: HCNC,PN

## 2020-10-19 PROCEDURE — 85610 PROTHROMBIN TIME: CPT | Mod: HCNC

## 2020-10-19 NOTE — PROGRESS NOTES
Patient denies any changes in diet, medications, or health that would effect warfarin therapy.

## 2020-10-27 ENCOUNTER — HOSPITAL ENCOUNTER (OUTPATIENT)
Dept: RADIOLOGY | Facility: HOSPITAL | Age: 80
Discharge: HOME | End: 2020-10-27
Attending: FAMILY MEDICINE
Payer: MEDICARE

## 2020-10-27 DIAGNOSIS — Z12.31 VISIT FOR SCREENING MAMMOGRAM: ICD-10-CM

## 2020-10-27 PROCEDURE — 77063 MAMMO DIGITAL SCREENING BILAT WITH TOMO: ICD-10-PCS | Mod: 26,HCNC,, | Performed by: RADIOLOGY

## 2020-10-27 PROCEDURE — 77067 SCR MAMMO BI INCL CAD: CPT | Mod: 26,HCNC,, | Performed by: RADIOLOGY

## 2020-10-27 PROCEDURE — 77063 BREAST TOMOSYNTHESIS BI: CPT | Mod: 26,HCNC,, | Performed by: RADIOLOGY

## 2020-10-27 PROCEDURE — 77067 SCR MAMMO BI INCL CAD: CPT | Mod: TC,HCNC

## 2020-10-27 PROCEDURE — 77067 MAMMO DIGITAL SCREENING BILAT WITH TOMO: ICD-10-PCS | Mod: 26,HCNC,, | Performed by: RADIOLOGY

## 2020-11-05 ENCOUNTER — ANTI-COAG VISIT (OUTPATIENT)
Dept: CARDIOLOGY | Facility: CLINIC | Age: 80
End: 2020-11-05
Payer: MEDICARE

## 2020-11-05 ENCOUNTER — LAB VISIT (OUTPATIENT)
Dept: LAB | Facility: HOSPITAL | Age: 80
End: 2020-11-05
Attending: INTERNAL MEDICINE
Payer: MEDICARE

## 2020-11-05 DIAGNOSIS — Z79.01 LONG TERM (CURRENT) USE OF ANTICOAGULANTS: ICD-10-CM

## 2020-11-05 LAB
INR PPP: 3.5 (ref 0.8–1.2)
PROTHROMBIN TIME: 39.3 SEC (ref 9–12.5)

## 2020-11-05 PROCEDURE — 85610 PROTHROMBIN TIME: CPT | Mod: HCNC

## 2020-11-05 PROCEDURE — 36415 COLL VENOUS BLD VENIPUNCTURE: CPT | Mod: HCNC

## 2020-11-05 PROCEDURE — 93793 ANTICOAG MGMT PT WARFARIN: CPT | Mod: S$GLB,,,

## 2020-11-05 PROCEDURE — 93793 PR ANTICOAGULANT MGMT FOR PT TAKING WARFARIN: ICD-10-PCS | Mod: S$GLB,,,

## 2020-11-09 ENCOUNTER — CLINICAL SUPPORT (OUTPATIENT)
Dept: FAMILY MEDICINE | Facility: CLINIC | Age: 80
End: 2020-11-09
Payer: MEDICARE

## 2020-11-09 DIAGNOSIS — Z23 NEED FOR INFLUENZA VACCINATION: Primary | ICD-10-CM

## 2020-11-09 PROCEDURE — G0008 ADMIN INFLUENZA VIRUS VAC: HCPCS | Mod: HCNC,S$GLB,, | Performed by: FAMILY MEDICINE

## 2020-11-09 PROCEDURE — 90694 VACC AIIV4 NO PRSRV 0.5ML IM: CPT | Mod: HCNC,S$GLB,, | Performed by: FAMILY MEDICINE

## 2020-11-09 PROCEDURE — 90694 FLU VACCINE - QUADRIVALENT - ADJUVANTED: ICD-10-PCS | Mod: HCNC,S$GLB,, | Performed by: FAMILY MEDICINE

## 2020-11-09 PROCEDURE — 99499 NO LOS: ICD-10-PCS | Mod: HCNC,S$GLB,, | Performed by: FAMILY MEDICINE

## 2020-11-09 PROCEDURE — G0008 FLU VACCINE - QUADRIVALENT - ADJUVANTED: ICD-10-PCS | Mod: HCNC,S$GLB,, | Performed by: FAMILY MEDICINE

## 2020-11-09 PROCEDURE — 99499 UNLISTED E&M SERVICE: CPT | Mod: HCNC,S$GLB,, | Performed by: FAMILY MEDICINE

## 2020-11-11 ENCOUNTER — OFFICE VISIT (OUTPATIENT)
Dept: OPTOMETRY | Facility: CLINIC | Age: 80
End: 2020-11-11
Payer: MEDICARE

## 2020-11-11 ENCOUNTER — OFFICE VISIT (OUTPATIENT)
Dept: OPTOMETRY | Facility: CLINIC | Age: 80
End: 2020-11-11
Payer: COMMERCIAL

## 2020-11-11 DIAGNOSIS — H25.13 NUCLEAR SCLEROSIS OF BOTH EYES: ICD-10-CM

## 2020-11-11 DIAGNOSIS — H10.13 ALLERGIC CONJUNCTIVITIS, BILATERAL: ICD-10-CM

## 2020-11-11 DIAGNOSIS — Z01.00 ROUTINE EYE EXAM: Primary | ICD-10-CM

## 2020-11-11 DIAGNOSIS — H52.7 REFRACTIVE ERROR: ICD-10-CM

## 2020-11-11 DIAGNOSIS — Z46.0 ENCOUNTER FOR FITTING OR ADJUSTMENT OF SPECTACLES OR CONTACT LENSES: Primary | ICD-10-CM

## 2020-11-11 PROCEDURE — 99999 PR PBB SHADOW E&M-EST. PATIENT-LVL III: ICD-10-PCS | Mod: PBBFAC,HCNC,, | Performed by: OPTOMETRIST

## 2020-11-11 PROCEDURE — 92310 CONTACT LENS FITTING OU: CPT | Mod: CSM,,, | Performed by: OPTOMETRIST

## 2020-11-11 PROCEDURE — 92014 COMPRE OPH EXAM EST PT 1/>: CPT | Mod: HCNC,S$GLB,, | Performed by: OPTOMETRIST

## 2020-11-11 PROCEDURE — 92014 PR EYE EXAM, EST PATIENT,COMPREHESV: ICD-10-PCS | Mod: HCNC,S$GLB,, | Performed by: OPTOMETRIST

## 2020-11-11 PROCEDURE — 99999 PR PBB SHADOW E&M-EST. PATIENT-LVL III: CPT | Mod: PBBFAC,HCNC,, | Performed by: OPTOMETRIST

## 2020-11-11 PROCEDURE — 99499 UNLISTED E&M SERVICE: CPT | Mod: HCNC,S$GLB,, | Performed by: OPTOMETRIST

## 2020-11-11 PROCEDURE — 92015 PR REFRACTION: ICD-10-PCS | Mod: HCNC,S$GLB,, | Performed by: OPTOMETRIST

## 2020-11-11 PROCEDURE — 92015 DETERMINE REFRACTIVE STATE: CPT | Mod: HCNC,S$GLB,, | Performed by: OPTOMETRIST

## 2020-11-11 PROCEDURE — 92310 PR CONTACT LENS FITTING (NO CHANGE): ICD-10-PCS | Mod: CSM,,, | Performed by: OPTOMETRIST

## 2020-11-11 PROCEDURE — 99499 NO LOS: ICD-10-PCS | Mod: HCNC,S$GLB,, | Performed by: OPTOMETRIST

## 2020-11-12 ENCOUNTER — HOSPITAL ENCOUNTER (OUTPATIENT)
Dept: RADIOLOGY | Facility: HOSPITAL | Age: 80
Discharge: HOME | End: 2020-11-12
Attending: NURSE PRACTITIONER
Payer: MEDICARE

## 2020-11-12 DIAGNOSIS — N20.0 NEPHROLITHIASIS: ICD-10-CM

## 2020-11-12 PROCEDURE — 74018 RADEX ABDOMEN 1 VIEW: CPT | Mod: 26,HCNC,, | Performed by: RADIOLOGY

## 2020-11-12 PROCEDURE — 74018 RADEX ABDOMEN 1 VIEW: CPT | Mod: TC,HCNC,FY

## 2020-11-12 PROCEDURE — 74018 XR ABDOMEN AP 1 VIEW: ICD-10-PCS | Mod: 26,HCNC,, | Performed by: RADIOLOGY

## 2020-11-16 ENCOUNTER — LAB VISIT (OUTPATIENT)
Dept: LAB | Facility: HOSPITAL | Age: 80
End: 2020-11-16
Attending: INTERNAL MEDICINE
Payer: MEDICARE

## 2020-11-16 ENCOUNTER — ANTI-COAG VISIT (OUTPATIENT)
Dept: CARDIOLOGY | Facility: CLINIC | Age: 80
End: 2020-11-16
Payer: MEDICARE

## 2020-11-16 DIAGNOSIS — Z79.01 LONG TERM (CURRENT) USE OF ANTICOAGULANTS: ICD-10-CM

## 2020-11-16 LAB
INR PPP: 3.1 (ref 0.8–1.2)
PROTHROMBIN TIME: 34.6 SEC (ref 9–12.5)

## 2020-11-16 PROCEDURE — 36415 COLL VENOUS BLD VENIPUNCTURE: CPT | Mod: HCNC,PN

## 2020-11-16 PROCEDURE — 93793 ANTICOAG MGMT PT WARFARIN: CPT | Mod: S$GLB,,, | Performed by: PHARMACIST

## 2020-11-16 PROCEDURE — 85610 PROTHROMBIN TIME: CPT | Mod: HCNC

## 2020-11-16 PROCEDURE — 93793 PR ANTICOAGULANT MGMT FOR PT TAKING WARFARIN: ICD-10-PCS | Mod: S$GLB,,, | Performed by: PHARMACIST

## 2020-11-30 ENCOUNTER — LAB VISIT (OUTPATIENT)
Dept: LAB | Facility: HOSPITAL | Age: 80
End: 2020-11-30
Attending: INTERNAL MEDICINE
Payer: MEDICARE

## 2020-11-30 ENCOUNTER — ANTI-COAG VISIT (OUTPATIENT)
Dept: CARDIOLOGY | Facility: CLINIC | Age: 80
End: 2020-11-30
Payer: MEDICARE

## 2020-11-30 DIAGNOSIS — Z79.01 LONG TERM (CURRENT) USE OF ANTICOAGULANTS: ICD-10-CM

## 2020-11-30 LAB
INR PPP: 2.1 (ref 0.8–1.2)
PROTHROMBIN TIME: 23 SEC (ref 9–12.5)

## 2020-11-30 PROCEDURE — 93793 PR ANTICOAGULANT MGMT FOR PT TAKING WARFARIN: ICD-10-PCS | Mod: S$GLB,,, | Performed by: PHARMACIST

## 2020-11-30 PROCEDURE — 36415 COLL VENOUS BLD VENIPUNCTURE: CPT | Mod: HCNC,PN

## 2020-11-30 PROCEDURE — 85610 PROTHROMBIN TIME: CPT | Mod: HCNC

## 2020-11-30 PROCEDURE — 93793 ANTICOAG MGMT PT WARFARIN: CPT | Mod: S$GLB,,, | Performed by: PHARMACIST

## 2020-12-01 ENCOUNTER — OFFICE VISIT (OUTPATIENT)
Dept: FAMILY MEDICINE | Facility: CLINIC | Age: 80
End: 2020-12-01
Payer: MEDICARE

## 2020-12-01 VITALS
HEART RATE: 71 BPM | WEIGHT: 245.56 LBS | TEMPERATURE: 98 F | OXYGEN SATURATION: 96 % | BODY MASS INDEX: 39.46 KG/M2 | RESPIRATION RATE: 18 BRPM | SYSTOLIC BLOOD PRESSURE: 121 MMHG | HEIGHT: 66 IN | DIASTOLIC BLOOD PRESSURE: 62 MMHG

## 2020-12-01 DIAGNOSIS — M54.32 LEFT SCIATIC NERVE PAIN: Primary | ICD-10-CM

## 2020-12-01 DIAGNOSIS — E78.2 MIXED HYPERLIPIDEMIA: ICD-10-CM

## 2020-12-01 DIAGNOSIS — Z11.59 NEED FOR HEPATITIS C SCREENING TEST: ICD-10-CM

## 2020-12-01 DIAGNOSIS — Z12.11 SCREENING FOR COLORECTAL CANCER: ICD-10-CM

## 2020-12-01 DIAGNOSIS — Z12.12 SCREENING FOR COLORECTAL CANCER: ICD-10-CM

## 2020-12-01 DIAGNOSIS — I10 ESSENTIAL HYPERTENSION: ICD-10-CM

## 2020-12-01 DIAGNOSIS — I48.0 PAROXYSMAL ATRIAL FIBRILLATION: ICD-10-CM

## 2020-12-01 PROCEDURE — 99214 OFFICE O/P EST MOD 30 MIN: CPT | Mod: HCNC,S$GLB,, | Performed by: FAMILY MEDICINE

## 2020-12-01 PROCEDURE — 1101F PR PT FALLS ASSESS DOC 0-1 FALLS W/OUT INJ PAST YR: ICD-10-PCS | Mod: HCNC,CPTII,S$GLB, | Performed by: FAMILY MEDICINE

## 2020-12-01 PROCEDURE — 99999 PR PBB SHADOW E&M-EST. PATIENT-LVL IV: ICD-10-PCS | Mod: PBBFAC,HCNC,, | Performed by: FAMILY MEDICINE

## 2020-12-01 PROCEDURE — 1101F PT FALLS ASSESS-DOCD LE1/YR: CPT | Mod: HCNC,CPTII,S$GLB, | Performed by: FAMILY MEDICINE

## 2020-12-01 PROCEDURE — 3288F FALL RISK ASSESSMENT DOCD: CPT | Mod: HCNC,CPTII,S$GLB, | Performed by: FAMILY MEDICINE

## 2020-12-01 PROCEDURE — 3074F SYST BP LT 130 MM HG: CPT | Mod: HCNC,CPTII,S$GLB, | Performed by: FAMILY MEDICINE

## 2020-12-01 PROCEDURE — 1125F PR PAIN SEVERITY QUANTIFIED, PAIN PRESENT: ICD-10-PCS | Mod: HCNC,S$GLB,, | Performed by: FAMILY MEDICINE

## 2020-12-01 PROCEDURE — 1159F PR MEDICATION LIST DOCUMENTED IN MEDICAL RECORD: ICD-10-PCS | Mod: HCNC,S$GLB,, | Performed by: FAMILY MEDICINE

## 2020-12-01 PROCEDURE — 3288F PR FALLS RISK ASSESSMENT DOCUMENTED: ICD-10-PCS | Mod: HCNC,CPTII,S$GLB, | Performed by: FAMILY MEDICINE

## 2020-12-01 PROCEDURE — 1159F MED LIST DOCD IN RCRD: CPT | Mod: HCNC,S$GLB,, | Performed by: FAMILY MEDICINE

## 2020-12-01 PROCEDURE — 99999 PR PBB SHADOW E&M-EST. PATIENT-LVL IV: CPT | Mod: PBBFAC,HCNC,, | Performed by: FAMILY MEDICINE

## 2020-12-01 PROCEDURE — 3074F PR MOST RECENT SYSTOLIC BLOOD PRESSURE < 130 MM HG: ICD-10-PCS | Mod: HCNC,CPTII,S$GLB, | Performed by: FAMILY MEDICINE

## 2020-12-01 PROCEDURE — 3078F DIAST BP <80 MM HG: CPT | Mod: HCNC,CPTII,S$GLB, | Performed by: FAMILY MEDICINE

## 2020-12-01 PROCEDURE — 3078F PR MOST RECENT DIASTOLIC BLOOD PRESSURE < 80 MM HG: ICD-10-PCS | Mod: HCNC,CPTII,S$GLB, | Performed by: FAMILY MEDICINE

## 2020-12-01 PROCEDURE — 99214 PR OFFICE/OUTPT VISIT, EST, LEVL IV, 30-39 MIN: ICD-10-PCS | Mod: HCNC,S$GLB,, | Performed by: FAMILY MEDICINE

## 2020-12-01 PROCEDURE — 1125F AMNT PAIN NOTED PAIN PRSNT: CPT | Mod: HCNC,S$GLB,, | Performed by: FAMILY MEDICINE

## 2020-12-01 RX ORDER — ROSUVASTATIN CALCIUM 10 MG/1
10 TABLET, COATED ORAL NIGHTLY
Qty: 90 TABLET | Refills: 3 | Status: SHIPPED | OUTPATIENT
Start: 2020-12-01 | End: 2020-12-01 | Stop reason: SDUPTHER

## 2020-12-01 RX ORDER — TIZANIDINE 4 MG/1
4 TABLET ORAL EVERY 8 HOURS
Qty: 45 TABLET | Refills: 2 | Status: SHIPPED | OUTPATIENT
Start: 2020-12-01 | End: 2022-05-24 | Stop reason: ALTCHOICE

## 2020-12-01 RX ORDER — ROSUVASTATIN CALCIUM 10 MG/1
10 TABLET, COATED ORAL NIGHTLY
Qty: 90 TABLET | Refills: 3 | Status: SHIPPED | OUTPATIENT
Start: 2020-12-01 | End: 2022-05-24

## 2020-12-01 NOTE — PROGRESS NOTES
Subjective:       Patient ID: Alaina Vee is a 80 y.o. female.    Chief Complaint: Hip Pain (left side )    HPI   80-year-old female with hypertension, hyperlipidemia, paroxysmal atrial fibrillation, and hypertension comes in for evaluation of pain radiating from the left buttocks down the left leg.  She reports that on November 3rd, when she was coming home, her  fell on top of her.  She fell on her buttocks.  She was able to get up.  She has been able to bear weight normally.  She has been able to walk normally.  However, approximately a week ago she started to have a pain radiating from the left buttocks down her leg.  She states that it feels like a shooting sensation.  She states that is mostly at nighttime.  She had a muscle relaxer at home, but cannot recall the name.  She states that this has helped.  However, she states that it makes her sleepy.  Otherwise, she reports no concerns pressure cholesterol she reports no concerns with her atrial fibrillation either.  She is also requesting a Cologuard kit.    Review of Systems   Respiratory: Negative for shortness of breath.    Cardiovascular: Negative for chest pain and palpitations.   Gastrointestinal: Negative for blood in stool, change in bowel habit and change in bowel habit.   Genitourinary: Negative for difficulty urinating.   Musculoskeletal: Negative for back pain and joint deformity.   Neurological: Negative for tremors and weakness.        See HPI         Objective:      Physical Exam  Vitals signs reviewed.   Constitutional:       Appearance: She is well-developed.   HENT:      Head: Normocephalic and atraumatic.      Right Ear: External ear normal.      Left Ear: External ear normal.      Nose: Nose normal.      Mouth/Throat:      Pharynx: No oropharyngeal exudate.   Eyes:      General:         Right eye: No discharge.         Left eye: No discharge.      Conjunctiva/sclera: Conjunctivae normal.      Pupils: Pupils are equal, round,  and reactive to light.   Neck:      Musculoskeletal: Normal range of motion and neck supple.      Trachea: No tracheal deviation.   Cardiovascular:      Rate and Rhythm: Normal rate and regular rhythm.      Heart sounds: Normal heart sounds. No murmur.   Pulmonary:      Effort: Pulmonary effort is normal.      Breath sounds: Normal breath sounds. No wheezing or rales.   Abdominal:      General: Bowel sounds are normal.      Palpations: Abdomen is soft. There is no mass.      Tenderness: There is no abdominal tenderness.   Lymphadenopathy:      Cervical: No cervical adenopathy.         Assessment:       1. Left sciatic nerve pain    2. Essential hypertension    3. Mixed hyperlipidemia    4. Paroxysmal atrial fibrillation    5. Screening for colorectal cancer    6. Need for hepatitis C screening test        Plan:       Alaina was seen today for hip pain.    Diagnoses and all orders for this visit:    Left sciatic nerve pain  -     tiZANidine (ZANAFLEX) 4 MG tablet; Take 1 tablet (4 mg total) by mouth every 8 (eight) hours.  Sciatica explained to patient.  Continue muscle relaxer as needed as prescribed.  Use heating pads 2-3 times a day.  Follow up PRN.    Essential hypertension  -     Comprehensive Metabolic Panel; Future  BP controlled.    Mixed hyperlipidemia  -     Comprehensive Metabolic Panel; Future  -     Lipid Panel; Future  -     rosuvastatin (CRESTOR) 10 MG tablet; Take 1 tablet (10 mg total) by mouth every evening.  Continue Crestor    Paroxysmal atrial fibrillation  On flecanide/warfarin    Screening for colorectal cancer  -     Cologuard Screening (Multitarget Stool DNA); Future  -     Cologuard Screening (Multitarget Stool DNA)  Patient agrees to Cologuard for colon cancer screening.  Video on how to collect shown (https://[email or web address].com/watch?v=LRiJiwkoiNQ)    Need for hepatitis C screening test  -     Hepatitis C Antibody; Future  Screen for HCV as per USPSTF guidelines

## 2020-12-08 ENCOUNTER — TELEPHONE (OUTPATIENT)
Dept: FAMILY MEDICINE | Facility: CLINIC | Age: 80
End: 2020-12-08

## 2020-12-14 ENCOUNTER — ANTI-COAG VISIT (OUTPATIENT)
Dept: CARDIOLOGY | Facility: CLINIC | Age: 80
End: 2020-12-14
Payer: MEDICARE

## 2020-12-14 ENCOUNTER — LAB VISIT (OUTPATIENT)
Dept: LAB | Facility: HOSPITAL | Age: 80
End: 2020-12-14
Attending: INTERNAL MEDICINE
Payer: MEDICARE

## 2020-12-14 DIAGNOSIS — Z79.01 LONG TERM (CURRENT) USE OF ANTICOAGULANTS: ICD-10-CM

## 2020-12-14 LAB
INR PPP: 2.4 (ref 0.8–1.2)
PROTHROMBIN TIME: 26.8 SEC (ref 9–12.5)

## 2020-12-14 PROCEDURE — 93793 PR ANTICOAGULANT MGMT FOR PT TAKING WARFARIN: ICD-10-PCS | Mod: S$GLB,,, | Performed by: PHARMACIST

## 2020-12-14 PROCEDURE — 93793 ANTICOAG MGMT PT WARFARIN: CPT | Mod: S$GLB,,, | Performed by: PHARMACIST

## 2020-12-14 PROCEDURE — 85610 PROTHROMBIN TIME: CPT | Mod: HCNC

## 2020-12-14 PROCEDURE — 36415 COLL VENOUS BLD VENIPUNCTURE: CPT | Mod: HCNC,PN

## 2020-12-16 ENCOUNTER — PES CALL (OUTPATIENT)
Dept: ADMINISTRATIVE | Facility: CLINIC | Age: 80
End: 2020-12-16

## 2020-12-16 ENCOUNTER — TELEPHONE (OUTPATIENT)
Dept: UROLOGY | Facility: CLINIC | Age: 80
End: 2020-12-16

## 2020-12-29 ENCOUNTER — OFFICE VISIT (OUTPATIENT)
Dept: UROLOGY | Facility: CLINIC | Age: 80
End: 2020-12-29
Payer: MEDICARE

## 2020-12-29 VITALS — WEIGHT: 239.75 LBS | BODY MASS INDEX: 38.53 KG/M2 | HEIGHT: 66 IN

## 2020-12-29 DIAGNOSIS — N20.0 NEPHROLITHIASIS: Primary | ICD-10-CM

## 2020-12-29 DIAGNOSIS — N39.46 MIXED INCONTINENCE URGE AND STRESS: ICD-10-CM

## 2020-12-29 PROCEDURE — 99999 PR PBB SHADOW E&M-EST. PATIENT-LVL III: CPT | Mod: PBBFAC,HCNC,, | Performed by: UROLOGY

## 2020-12-29 PROCEDURE — 99214 PR OFFICE/OUTPT VISIT, EST, LEVL IV, 30-39 MIN: ICD-10-PCS | Mod: HCNC,S$GLB,, | Performed by: UROLOGY

## 2020-12-29 PROCEDURE — 1101F PT FALLS ASSESS-DOCD LE1/YR: CPT | Mod: HCNC,CPTII,S$GLB, | Performed by: UROLOGY

## 2020-12-29 PROCEDURE — 1159F PR MEDICATION LIST DOCUMENTED IN MEDICAL RECORD: ICD-10-PCS | Mod: HCNC,S$GLB,, | Performed by: UROLOGY

## 2020-12-29 PROCEDURE — 1126F AMNT PAIN NOTED NONE PRSNT: CPT | Mod: HCNC,S$GLB,, | Performed by: UROLOGY

## 2020-12-29 PROCEDURE — 1159F MED LIST DOCD IN RCRD: CPT | Mod: HCNC,S$GLB,, | Performed by: UROLOGY

## 2020-12-29 PROCEDURE — 3288F PR FALLS RISK ASSESSMENT DOCUMENTED: ICD-10-PCS | Mod: HCNC,CPTII,S$GLB, | Performed by: UROLOGY

## 2020-12-29 PROCEDURE — 1126F PR PAIN SEVERITY QUANTIFIED, NO PAIN PRESENT: ICD-10-PCS | Mod: HCNC,S$GLB,, | Performed by: UROLOGY

## 2020-12-29 PROCEDURE — 99214 OFFICE O/P EST MOD 30 MIN: CPT | Mod: HCNC,S$GLB,, | Performed by: UROLOGY

## 2020-12-29 PROCEDURE — 1101F PR PT FALLS ASSESS DOC 0-1 FALLS W/OUT INJ PAST YR: ICD-10-PCS | Mod: HCNC,CPTII,S$GLB, | Performed by: UROLOGY

## 2020-12-29 PROCEDURE — 3288F FALL RISK ASSESSMENT DOCD: CPT | Mod: HCNC,CPTII,S$GLB, | Performed by: UROLOGY

## 2020-12-29 PROCEDURE — 99999 PR PBB SHADOW E&M-EST. PATIENT-LVL III: ICD-10-PCS | Mod: PBBFAC,HCNC,, | Performed by: UROLOGY

## 2020-12-29 NOTE — PROGRESS NOTES
Subjective:       Alaina Vee is a 80 y.o. female who is an established patient who was referred by Dr Esquivel for evaluation of UTI.      She reports issues with recurrent UTIs. She was treated for UTI in 3/16 (100k E coli, pansensitive) and again in 4/16 (100k E coli). She has urinary frequency associated with UTIs. Nocturia x 1-2. She has urgency and UUI at baseline. Also with fecal urgency/incontinence. She was given Ditropan 5mg BID years ago. Also with RICHARD. She has not noted if this has helped. Denies current dysuria. Denies hematuria. No h/o kidney stones.     She has significant LBP issues. She also reports facial and R shoulder/arm/torso pain.     She was treated for UTI after initial visit. She remains on Ditropan IR not up to TID, declined ER formulations. She reports taking another medication for UI from me but I don't have records of this.     SERG (5/16) showed ruslanley 9mm stone in LLP. PVR 2cc. Cytology was negative but noted uric acid crystals.     CT 7/16 - 7mm L UP, 6mm L LP stones and 8mm R renal pelvis stone.   KUB 7/16 - shows 7mm R stone but difficult to see (unsure if truly the stone)    KUB 1/17 - 7mm R renal stone though hard to see (likely overlying 12th rib)    She started Ditropan XL 15mg previously and had great improvement. She continues to have significant back issues.    She started to develop R flank pain and therefore CT scan ordered. CT showed 8mm stone at R UPJ, visible on KUB. She is now s/p R ESWL. Stone was noted to fragment well. She did not note passing any stone and is still having flank pain.     CT with R LP stone, no obstruction. Recently started on Coumadin for a fib.     KUB 1/18 - two left renal calculi  SERG 1/18 - 3mm and 5mm L calculi - hydro resolved    100% CaOx mono - stone passed spontaneously. Pain present for 1 hour at that time (R side).     SERG 5/20 - 1cm LUP and 5mm LLP stone, no hydro  KUB 11/20 - stable 1cm L renal calculus, LLP stones not seen  "      The following portions of the patient's history were reviewed and updated as appropriate: allergies, current medications, past family history, past medical history, past social history, past surgical history and problem list.    Review of Systems  Constitutional: no fever or chills  ENT: no nasal congestion or sore throat  Respiratory: no cough or shortness of breath  Cardiovascular: no chest pain or palpitations  Gastrointestinal: no nausea or vomiting, tolerating diet  Genitourinary: as per HPI  Hematologic/Lymphatic: no easy bruising or lymphadenopathy  Musculoskeletal: no arthralgias or myalgias  Skin: no rashes or lesions  Neurological: no seizures or tremors  Behavioral/Psych: no auditory or visual hallucinations       Objective:    Vitals:   Ht 5' 6" (1.676 m)   Wt 108.7 kg (239 lb 12 oz)   LMP  (LMP Unknown)   BMI 38.70 kg/m²     Physical Exam   General: well developed, well nourished in no acute distress  Head: normocephalic, atraumatic  Neck: supple, trachea midline, no obvious enlargement of thyroid  HEENT: EOMI, mucus membranes moist, sclera anicteric, no hearing impairment  Lungs: symmetric expansion, non-labored breathing  Cardiovascular: regular rate and rhythm, normal pulses  Abdomen: soft, non tender, non distended, no palpable masses, no hepatosplenomegaly, no hernias, no CVA tenderness  Musculoskeletal: no peripheral edema, normal ROM in bilateral upper and lower extremities  Lymphatics: no cervical or inguinal lymphadenopathy  Skin: no rashes or lesions  Neuro: alert and oriented x 3, no gross deficits  Psych: normal judgment and insight, normal mood/affect and non-anxious  Genitourinary:   patient declined exam      Lab Review   Urine analysis today in clinic shows - no urine    Lab Results   Component Value Date    WBC 7.10 07/14/2020    HGB 14.5 07/14/2020    HCT 44.7 07/14/2020    MCV 92 07/14/2020     07/14/2020     Lab Results   Component Value Date    CREATININE 0.8 " 12/01/2020    BUN 14 12/01/2020         Imaging  Images and reports were personally reviewed by me and discussed with patient  CT, SERG, KUB reviewed       Assessment/Plan:      1. Recurrent UTI / Nephrolithiasis   - UI likely contributing to UTIs   - Avoid constipation   - PVR acceptable   - Possible stone in LLP, uric acid crystals on cytology   - Discussed Estrace, will hold for now   - Consider cystoscopy in future   - CT - 2 stones on L, 1 stone on R. Only R stone visible on KUB.    - 8mm R UPJ stone. Discussed options - s/p R ESWL. Discussed risks, benefits, alternatives.   - s/p R ESWL on 2/27/17 - stone with excellent response   - KUB post-op - residual stone    - CT - no obstructing stones, R LP noted   - SERG/KUB - R hydro resolved. Two stones in L kidney   - KUB 1cm LLP stone (stable)   - KUB/SERG in 12 mths     2. Mixed incontinence urge and stress    - Discussed difference of UUI and RICHARD components. Reviewed etiology and workup of each.   - RICHARD: Kegels, PFPT, bulking agent, MUS.   - UUI: Behavioral changes, PFPT, anticholinergics. Botox/InterStim for refractory UUI.   - Doing great with Ditropan 5mg TID, continue         Follow up in 12 months w KUB/SERG

## 2021-01-04 ENCOUNTER — LAB VISIT (OUTPATIENT)
Dept: LAB | Facility: HOSPITAL | Age: 81
End: 2021-01-04
Attending: INTERNAL MEDICINE
Payer: MEDICARE

## 2021-01-04 ENCOUNTER — ANTI-COAG VISIT (OUTPATIENT)
Dept: CARDIOLOGY | Facility: CLINIC | Age: 81
End: 2021-01-04
Payer: MEDICARE

## 2021-01-04 DIAGNOSIS — Z79.01 LONG TERM (CURRENT) USE OF ANTICOAGULANTS: ICD-10-CM

## 2021-01-04 LAB
INR PPP: 1.6 (ref 0.8–1.2)
PROTHROMBIN TIME: 18.2 SEC (ref 9–12.5)

## 2021-01-04 PROCEDURE — 93793 PR ANTICOAGULANT MGMT FOR PT TAKING WARFARIN: ICD-10-PCS | Mod: S$GLB,,, | Performed by: PHARMACIST

## 2021-01-04 PROCEDURE — 85610 PROTHROMBIN TIME: CPT | Mod: HCNC

## 2021-01-04 PROCEDURE — 93793 ANTICOAG MGMT PT WARFARIN: CPT | Mod: S$GLB,,, | Performed by: PHARMACIST

## 2021-01-04 PROCEDURE — 36415 COLL VENOUS BLD VENIPUNCTURE: CPT | Mod: HCNC,PN

## 2021-01-22 ENCOUNTER — PES CALL (OUTPATIENT)
Dept: ADMINISTRATIVE | Facility: CLINIC | Age: 81
End: 2021-01-22

## 2021-01-25 ENCOUNTER — ANTI-COAG VISIT (OUTPATIENT)
Dept: CARDIOLOGY | Facility: CLINIC | Age: 81
End: 2021-01-25
Payer: MEDICARE

## 2021-01-25 ENCOUNTER — LAB VISIT (OUTPATIENT)
Dept: LAB | Facility: HOSPITAL | Age: 81
End: 2021-01-25
Attending: INTERNAL MEDICINE
Payer: MEDICARE

## 2021-01-25 DIAGNOSIS — Z79.01 LONG TERM (CURRENT) USE OF ANTICOAGULANTS: Primary | ICD-10-CM

## 2021-01-25 DIAGNOSIS — Z79.01 LONG TERM (CURRENT) USE OF ANTICOAGULANTS: ICD-10-CM

## 2021-01-25 LAB
INR PPP: 2.2 (ref 0.8–1.2)
PROTHROMBIN TIME: 22.7 SEC (ref 9–12.5)

## 2021-01-25 PROCEDURE — 93793 ANTICOAG MGMT PT WARFARIN: CPT | Mod: S$GLB,,, | Performed by: PHARMACIST

## 2021-01-25 PROCEDURE — 85610 PROTHROMBIN TIME: CPT

## 2021-01-25 PROCEDURE — 36415 COLL VENOUS BLD VENIPUNCTURE: CPT | Mod: PN

## 2021-01-25 PROCEDURE — 93793 PR ANTICOAGULANT MGMT FOR PT TAKING WARFARIN: ICD-10-PCS | Mod: S$GLB,,, | Performed by: PHARMACIST

## 2021-01-26 DIAGNOSIS — I48.0 PAF (PAROXYSMAL ATRIAL FIBRILLATION): ICD-10-CM

## 2021-01-26 RX ORDER — WARFARIN SODIUM 5 MG/1
2.5-5 TABLET ORAL DAILY
Qty: 90 TABLET | Refills: 3 | Status: SHIPPED | OUTPATIENT
Start: 2021-01-26 | End: 2021-03-15 | Stop reason: SDUPTHER

## 2021-02-08 ENCOUNTER — OFFICE VISIT (OUTPATIENT)
Dept: FAMILY MEDICINE | Facility: CLINIC | Age: 81
End: 2021-02-08
Payer: MEDICARE

## 2021-02-08 ENCOUNTER — TELEPHONE (OUTPATIENT)
Dept: ADMINISTRATIVE | Facility: HOSPITAL | Age: 81
End: 2021-02-08

## 2021-02-08 VITALS
BODY MASS INDEX: 38.72 KG/M2 | OXYGEN SATURATION: 96 % | TEMPERATURE: 97 F | WEIGHT: 240.94 LBS | RESPIRATION RATE: 20 BRPM | HEIGHT: 66 IN | SYSTOLIC BLOOD PRESSURE: 122 MMHG | HEART RATE: 64 BPM | DIASTOLIC BLOOD PRESSURE: 60 MMHG

## 2021-02-08 DIAGNOSIS — D68.59 HYPERCOAGULABLE STATE: ICD-10-CM

## 2021-02-08 DIAGNOSIS — Z00.00 ENCOUNTER FOR PREVENTIVE HEALTH EXAMINATION: Primary | ICD-10-CM

## 2021-02-08 DIAGNOSIS — E66.01 MORBID OBESITY: ICD-10-CM

## 2021-02-08 DIAGNOSIS — N18.30 STAGE 3 CHRONIC KIDNEY DISEASE, UNSPECIFIED WHETHER STAGE 3A OR 3B CKD: ICD-10-CM

## 2021-02-08 DIAGNOSIS — I48.0 PAF (PAROXYSMAL ATRIAL FIBRILLATION): ICD-10-CM

## 2021-02-08 DIAGNOSIS — I70.0 AORTIC ATHEROSCLEROSIS: ICD-10-CM

## 2021-02-08 DIAGNOSIS — Z63.6 CAREGIVER STRESS: ICD-10-CM

## 2021-02-08 DIAGNOSIS — H91.90 PERCEIVED HEARING LOSS: ICD-10-CM

## 2021-02-08 PROCEDURE — 3078F PR MOST RECENT DIASTOLIC BLOOD PRESSURE < 80 MM HG: ICD-10-PCS | Mod: CPTII,S$GLB,, | Performed by: NURSE PRACTITIONER

## 2021-02-08 PROCEDURE — 1158F PR ADVANCE CARE PLANNING DISCUSS DOCUMENTED IN MEDICAL RECORD: ICD-10-PCS | Mod: S$GLB,,, | Performed by: NURSE PRACTITIONER

## 2021-02-08 PROCEDURE — 3288F PR FALLS RISK ASSESSMENT DOCUMENTED: ICD-10-PCS | Mod: CPTII,S$GLB,, | Performed by: NURSE PRACTITIONER

## 2021-02-08 PROCEDURE — 1126F AMNT PAIN NOTED NONE PRSNT: CPT | Mod: S$GLB,,, | Performed by: NURSE PRACTITIONER

## 2021-02-08 PROCEDURE — 1101F PT FALLS ASSESS-DOCD LE1/YR: CPT | Mod: CPTII,S$GLB,, | Performed by: NURSE PRACTITIONER

## 2021-02-08 PROCEDURE — 99499 RISK ADDL DX/OHS AUDIT: ICD-10-PCS | Mod: S$GLB,,, | Performed by: NURSE PRACTITIONER

## 2021-02-08 PROCEDURE — 99499 UNLISTED E&M SERVICE: CPT | Mod: S$GLB,,, | Performed by: NURSE PRACTITIONER

## 2021-02-08 PROCEDURE — 1101F PR PT FALLS ASSESS DOC 0-1 FALLS W/OUT INJ PAST YR: ICD-10-PCS | Mod: CPTII,S$GLB,, | Performed by: NURSE PRACTITIONER

## 2021-02-08 PROCEDURE — 1158F ADVNC CARE PLAN TLK DOCD: CPT | Mod: S$GLB,,, | Performed by: NURSE PRACTITIONER

## 2021-02-08 PROCEDURE — 3078F DIAST BP <80 MM HG: CPT | Mod: CPTII,S$GLB,, | Performed by: NURSE PRACTITIONER

## 2021-02-08 PROCEDURE — 99999 PR PBB SHADOW E&M-EST. PATIENT-LVL V: ICD-10-PCS | Mod: PBBFAC,,, | Performed by: NURSE PRACTITIONER

## 2021-02-08 PROCEDURE — 3074F SYST BP LT 130 MM HG: CPT | Mod: CPTII,S$GLB,, | Performed by: NURSE PRACTITIONER

## 2021-02-08 PROCEDURE — G0439 PPPS, SUBSEQ VISIT: HCPCS | Mod: S$GLB,,, | Performed by: NURSE PRACTITIONER

## 2021-02-08 PROCEDURE — G0439 PR MEDICARE ANNUAL WELLNESS SUBSEQUENT VISIT: ICD-10-PCS | Mod: S$GLB,,, | Performed by: NURSE PRACTITIONER

## 2021-02-08 PROCEDURE — 3288F FALL RISK ASSESSMENT DOCD: CPT | Mod: CPTII,S$GLB,, | Performed by: NURSE PRACTITIONER

## 2021-02-08 PROCEDURE — 3074F PR MOST RECENT SYSTOLIC BLOOD PRESSURE < 130 MM HG: ICD-10-PCS | Mod: CPTII,S$GLB,, | Performed by: NURSE PRACTITIONER

## 2021-02-08 PROCEDURE — 99999 PR PBB SHADOW E&M-EST. PATIENT-LVL V: CPT | Mod: PBBFAC,,, | Performed by: NURSE PRACTITIONER

## 2021-02-08 PROCEDURE — 1126F PR PAIN SEVERITY QUANTIFIED, NO PAIN PRESENT: ICD-10-PCS | Mod: S$GLB,,, | Performed by: NURSE PRACTITIONER

## 2021-02-08 SDOH — SOCIAL DETERMINANTS OF HEALTH (SDOH): DEPENDENT RELATIVE NEEDING CARE AT HOME: Z63.6

## 2021-02-22 ENCOUNTER — ANTI-COAG VISIT (OUTPATIENT)
Dept: CARDIOLOGY | Facility: CLINIC | Age: 81
End: 2021-02-22
Payer: MEDICARE

## 2021-02-22 ENCOUNTER — LAB VISIT (OUTPATIENT)
Dept: LAB | Facility: HOSPITAL | Age: 81
End: 2021-02-22
Attending: INTERNAL MEDICINE
Payer: MEDICARE

## 2021-02-22 DIAGNOSIS — Z79.01 LONG TERM (CURRENT) USE OF ANTICOAGULANTS: ICD-10-CM

## 2021-02-22 DIAGNOSIS — Z79.01 LONG TERM (CURRENT) USE OF ANTICOAGULANTS: Primary | ICD-10-CM

## 2021-02-22 LAB
INR PPP: 1.6 (ref 0.8–1.2)
PROTHROMBIN TIME: 16.7 SEC (ref 9–12.5)

## 2021-02-22 PROCEDURE — 93793 PR ANTICOAGULANT MGMT FOR PT TAKING WARFARIN: ICD-10-PCS | Mod: S$GLB,,, | Performed by: PHARMACIST

## 2021-02-22 PROCEDURE — 85610 PROTHROMBIN TIME: CPT

## 2021-02-22 PROCEDURE — 93793 ANTICOAG MGMT PT WARFARIN: CPT | Mod: S$GLB,,, | Performed by: PHARMACIST

## 2021-02-22 PROCEDURE — 36415 COLL VENOUS BLD VENIPUNCTURE: CPT | Mod: PN

## 2021-03-08 ENCOUNTER — ANTI-COAG VISIT (OUTPATIENT)
Dept: CARDIOLOGY | Facility: CLINIC | Age: 81
End: 2021-03-08
Payer: MEDICARE

## 2021-03-08 ENCOUNTER — LAB VISIT (OUTPATIENT)
Dept: LAB | Facility: HOSPITAL | Age: 81
End: 2021-03-08
Attending: INTERNAL MEDICINE
Payer: MEDICARE

## 2021-03-08 DIAGNOSIS — Z79.01 LONG TERM (CURRENT) USE OF ANTICOAGULANTS: ICD-10-CM

## 2021-03-08 DIAGNOSIS — Z79.01 LONG TERM (CURRENT) USE OF ANTICOAGULANTS: Primary | ICD-10-CM

## 2021-03-08 LAB
INR PPP: 3.5 (ref 0.8–1.2)
PROTHROMBIN TIME: 34.5 SEC (ref 9–12.5)

## 2021-03-08 PROCEDURE — 93793 ANTICOAG MGMT PT WARFARIN: CPT | Mod: S$GLB,,, | Performed by: PHARMACIST

## 2021-03-08 PROCEDURE — 93793 PR ANTICOAGULANT MGMT FOR PT TAKING WARFARIN: ICD-10-PCS | Mod: S$GLB,,, | Performed by: PHARMACIST

## 2021-03-08 PROCEDURE — 85610 PROTHROMBIN TIME: CPT | Performed by: INTERNAL MEDICINE

## 2021-03-08 PROCEDURE — 36415 COLL VENOUS BLD VENIPUNCTURE: CPT | Mod: PN | Performed by: INTERNAL MEDICINE

## 2021-03-15 ENCOUNTER — LAB VISIT (OUTPATIENT)
Dept: LAB | Facility: HOSPITAL | Age: 81
End: 2021-03-15
Attending: INTERNAL MEDICINE
Payer: MEDICARE

## 2021-03-15 ENCOUNTER — ANTI-COAG VISIT (OUTPATIENT)
Dept: CARDIOLOGY | Facility: CLINIC | Age: 81
End: 2021-03-15
Payer: MEDICARE

## 2021-03-15 DIAGNOSIS — I48.0 PAF (PAROXYSMAL ATRIAL FIBRILLATION): ICD-10-CM

## 2021-03-15 DIAGNOSIS — Z79.01 LONG TERM (CURRENT) USE OF ANTICOAGULANTS: ICD-10-CM

## 2021-03-15 DIAGNOSIS — Z79.01 LONG TERM (CURRENT) USE OF ANTICOAGULANTS: Primary | ICD-10-CM

## 2021-03-15 LAB
INR PPP: 1.3 (ref 0.8–1.2)
PROTHROMBIN TIME: 13.8 SEC (ref 9–12.5)

## 2021-03-15 PROCEDURE — 93793 ANTICOAG MGMT PT WARFARIN: CPT | Mod: S$GLB,,, | Performed by: PHARMACIST

## 2021-03-15 PROCEDURE — 85610 PROTHROMBIN TIME: CPT | Performed by: INTERNAL MEDICINE

## 2021-03-15 PROCEDURE — 36415 COLL VENOUS BLD VENIPUNCTURE: CPT | Mod: PN | Performed by: INTERNAL MEDICINE

## 2021-03-15 PROCEDURE — 93793 PR ANTICOAGULANT MGMT FOR PT TAKING WARFARIN: ICD-10-PCS | Mod: S$GLB,,, | Performed by: PHARMACIST

## 2021-03-15 RX ORDER — WARFARIN SODIUM 5 MG/1
2.5-5 TABLET ORAL DAILY
Qty: 30 TABLET | Refills: 0 | Status: SHIPPED | OUTPATIENT
Start: 2021-03-15 | End: 2021-06-18 | Stop reason: SDUPTHER

## 2021-03-22 ENCOUNTER — ANTI-COAG VISIT (OUTPATIENT)
Dept: CARDIOLOGY | Facility: CLINIC | Age: 81
End: 2021-03-22
Payer: MEDICARE

## 2021-03-22 ENCOUNTER — LAB VISIT (OUTPATIENT)
Dept: LAB | Facility: HOSPITAL | Age: 81
End: 2021-03-22
Attending: INTERNAL MEDICINE
Payer: MEDICARE

## 2021-03-22 DIAGNOSIS — Z79.01 LONG TERM (CURRENT) USE OF ANTICOAGULANTS: Primary | ICD-10-CM

## 2021-03-22 DIAGNOSIS — Z79.01 LONG TERM (CURRENT) USE OF ANTICOAGULANTS: ICD-10-CM

## 2021-03-22 LAB
INR PPP: 1.3 (ref 0.8–1.2)
PROTHROMBIN TIME: 13.1 SEC (ref 9–12.5)

## 2021-03-22 PROCEDURE — 36415 COLL VENOUS BLD VENIPUNCTURE: CPT | Mod: PN | Performed by: INTERNAL MEDICINE

## 2021-03-22 PROCEDURE — 93793 PR ANTICOAGULANT MGMT FOR PT TAKING WARFARIN: ICD-10-PCS | Mod: S$GLB,,, | Performed by: PHARMACIST

## 2021-03-22 PROCEDURE — 93793 ANTICOAG MGMT PT WARFARIN: CPT | Mod: S$GLB,,, | Performed by: PHARMACIST

## 2021-03-22 PROCEDURE — 85610 PROTHROMBIN TIME: CPT | Performed by: INTERNAL MEDICINE

## 2021-03-29 ENCOUNTER — ANTI-COAG VISIT (OUTPATIENT)
Dept: CARDIOLOGY | Facility: CLINIC | Age: 81
End: 2021-03-29
Payer: MEDICARE

## 2021-03-29 ENCOUNTER — LAB VISIT (OUTPATIENT)
Dept: LAB | Facility: HOSPITAL | Age: 81
End: 2021-03-29
Attending: INTERNAL MEDICINE
Payer: MEDICARE

## 2021-03-29 DIAGNOSIS — Z79.01 LONG TERM (CURRENT) USE OF ANTICOAGULANTS: Primary | ICD-10-CM

## 2021-03-29 DIAGNOSIS — Z79.01 LONG TERM (CURRENT) USE OF ANTICOAGULANTS: ICD-10-CM

## 2021-03-29 LAB
INR PPP: 1.4 (ref 0.8–1.2)
PROTHROMBIN TIME: 14.3 SEC (ref 9–12.5)

## 2021-03-29 PROCEDURE — 85610 PROTHROMBIN TIME: CPT | Performed by: INTERNAL MEDICINE

## 2021-03-29 PROCEDURE — 93793 PR ANTICOAGULANT MGMT FOR PT TAKING WARFARIN: ICD-10-PCS | Mod: S$GLB,,, | Performed by: PHARMACIST

## 2021-03-29 PROCEDURE — 93793 ANTICOAG MGMT PT WARFARIN: CPT | Mod: S$GLB,,, | Performed by: PHARMACIST

## 2021-03-29 PROCEDURE — 36415 COLL VENOUS BLD VENIPUNCTURE: CPT | Mod: PN | Performed by: INTERNAL MEDICINE

## 2021-04-05 ENCOUNTER — LAB VISIT (OUTPATIENT)
Dept: LAB | Facility: HOSPITAL | Age: 81
End: 2021-04-05
Payer: MEDICARE

## 2021-04-05 ENCOUNTER — ANTI-COAG VISIT (OUTPATIENT)
Dept: CARDIOLOGY | Facility: CLINIC | Age: 81
End: 2021-04-05
Payer: MEDICARE

## 2021-04-05 DIAGNOSIS — Z79.01 LONG TERM (CURRENT) USE OF ANTICOAGULANTS: ICD-10-CM

## 2021-04-05 DIAGNOSIS — Z79.01 LONG TERM (CURRENT) USE OF ANTICOAGULANTS: Primary | ICD-10-CM

## 2021-04-05 LAB
INR PPP: 1.4 (ref 0.8–1.2)
PROTHROMBIN TIME: 14.2 SEC (ref 9–12.5)

## 2021-04-05 PROCEDURE — 36415 COLL VENOUS BLD VENIPUNCTURE: CPT | Mod: PN | Performed by: INTERNAL MEDICINE

## 2021-04-05 PROCEDURE — 93793 PR ANTICOAGULANT MGMT FOR PT TAKING WARFARIN: ICD-10-PCS | Mod: S$GLB,,, | Performed by: PHARMACIST

## 2021-04-05 PROCEDURE — 85610 PROTHROMBIN TIME: CPT | Performed by: INTERNAL MEDICINE

## 2021-04-05 PROCEDURE — 93793 ANTICOAG MGMT PT WARFARIN: CPT | Mod: S$GLB,,, | Performed by: PHARMACIST

## 2021-04-12 ENCOUNTER — ANTI-COAG VISIT (OUTPATIENT)
Dept: CARDIOLOGY | Facility: CLINIC | Age: 81
End: 2021-04-12
Payer: MEDICARE

## 2021-04-12 ENCOUNTER — LAB VISIT (OUTPATIENT)
Dept: LAB | Facility: HOSPITAL | Age: 81
End: 2021-04-12
Attending: INTERNAL MEDICINE
Payer: MEDICARE

## 2021-04-12 DIAGNOSIS — Z79.01 LONG TERM (CURRENT) USE OF ANTICOAGULANTS: ICD-10-CM

## 2021-04-12 DIAGNOSIS — Z79.01 LONG TERM (CURRENT) USE OF ANTICOAGULANTS: Primary | ICD-10-CM

## 2021-04-12 LAB
INR PPP: 2.3 (ref 0.8–1.2)
PROTHROMBIN TIME: 23.4 SEC (ref 9–12.5)

## 2021-04-12 PROCEDURE — 85610 PROTHROMBIN TIME: CPT | Performed by: INTERNAL MEDICINE

## 2021-04-12 PROCEDURE — 36415 COLL VENOUS BLD VENIPUNCTURE: CPT | Mod: PN | Performed by: INTERNAL MEDICINE

## 2021-04-12 PROCEDURE — 93793 PR ANTICOAGULANT MGMT FOR PT TAKING WARFARIN: ICD-10-PCS | Mod: S$GLB,,, | Performed by: PHARMACIST

## 2021-04-12 PROCEDURE — 93793 ANTICOAG MGMT PT WARFARIN: CPT | Mod: S$GLB,,, | Performed by: PHARMACIST

## 2021-04-13 ENCOUNTER — OFFICE VISIT (OUTPATIENT)
Dept: FAMILY MEDICINE | Facility: CLINIC | Age: 81
End: 2021-04-13
Payer: MEDICARE

## 2021-04-13 ENCOUNTER — HOSPITAL ENCOUNTER (OUTPATIENT)
Dept: RADIOLOGY | Facility: HOSPITAL | Age: 81
Discharge: HOME | End: 2021-04-13
Attending: NURSE PRACTITIONER
Payer: MEDICARE

## 2021-04-13 ENCOUNTER — TELEPHONE (OUTPATIENT)
Dept: FAMILY MEDICINE | Facility: CLINIC | Age: 81
End: 2021-04-13

## 2021-04-13 VITALS
DIASTOLIC BLOOD PRESSURE: 66 MMHG | BODY MASS INDEX: 39.15 KG/M2 | TEMPERATURE: 98 F | OXYGEN SATURATION: 95 % | RESPIRATION RATE: 19 BRPM | WEIGHT: 243.63 LBS | HEART RATE: 73 BPM | SYSTOLIC BLOOD PRESSURE: 138 MMHG | HEIGHT: 66 IN

## 2021-04-13 DIAGNOSIS — K42.9 UMBILICAL HERNIA WITHOUT OBSTRUCTION AND WITHOUT GANGRENE: ICD-10-CM

## 2021-04-13 DIAGNOSIS — K42.9 REDUCIBLE UMBILICAL HERNIA: ICD-10-CM

## 2021-04-13 DIAGNOSIS — K42.9 REDUCIBLE UMBILICAL HERNIA: Primary | ICD-10-CM

## 2021-04-13 DIAGNOSIS — L03.311 CELLULITIS OF ABDOMINAL WALL: Primary | ICD-10-CM

## 2021-04-13 PROCEDURE — 3288F PR FALLS RISK ASSESSMENT DOCUMENTED: ICD-10-PCS | Mod: CPTII,S$GLB,, | Performed by: NURSE PRACTITIONER

## 2021-04-13 PROCEDURE — 74177 CT ABD & PELVIS W/CONTRAST: CPT | Mod: 26,,, | Performed by: RADIOLOGY

## 2021-04-13 PROCEDURE — 1101F PR PT FALLS ASSESS DOC 0-1 FALLS W/OUT INJ PAST YR: ICD-10-PCS | Mod: CPTII,S$GLB,, | Performed by: NURSE PRACTITIONER

## 2021-04-13 PROCEDURE — 74177 CT ABDOMEN PELVIS WITH CONTRAST: ICD-10-PCS | Mod: 26,,, | Performed by: RADIOLOGY

## 2021-04-13 PROCEDURE — 1101F PT FALLS ASSESS-DOCD LE1/YR: CPT | Mod: CPTII,S$GLB,, | Performed by: NURSE PRACTITIONER

## 2021-04-13 PROCEDURE — 1159F PR MEDICATION LIST DOCUMENTED IN MEDICAL RECORD: ICD-10-PCS | Mod: S$GLB,,, | Performed by: NURSE PRACTITIONER

## 2021-04-13 PROCEDURE — 1159F MED LIST DOCD IN RCRD: CPT | Mod: S$GLB,,, | Performed by: NURSE PRACTITIONER

## 2021-04-13 PROCEDURE — 25500020 PHARM REV CODE 255: Performed by: NURSE PRACTITIONER

## 2021-04-13 PROCEDURE — 74177 CT ABD & PELVIS W/CONTRAST: CPT | Mod: TC

## 2021-04-13 PROCEDURE — 99999 PR PBB SHADOW E&M-EST. PATIENT-LVL IV: ICD-10-PCS | Mod: PBBFAC,,, | Performed by: NURSE PRACTITIONER

## 2021-04-13 PROCEDURE — 1125F AMNT PAIN NOTED PAIN PRSNT: CPT | Mod: S$GLB,,, | Performed by: NURSE PRACTITIONER

## 2021-04-13 PROCEDURE — 99214 PR OFFICE/OUTPT VISIT, EST, LEVL IV, 30-39 MIN: ICD-10-PCS | Mod: S$GLB,,, | Performed by: NURSE PRACTITIONER

## 2021-04-13 PROCEDURE — 99999 PR PBB SHADOW E&M-EST. PATIENT-LVL IV: CPT | Mod: PBBFAC,,, | Performed by: NURSE PRACTITIONER

## 2021-04-13 PROCEDURE — 99214 OFFICE O/P EST MOD 30 MIN: CPT | Mod: S$GLB,,, | Performed by: NURSE PRACTITIONER

## 2021-04-13 PROCEDURE — 1125F PR PAIN SEVERITY QUANTIFIED, PAIN PRESENT: ICD-10-PCS | Mod: S$GLB,,, | Performed by: NURSE PRACTITIONER

## 2021-04-13 PROCEDURE — 3288F FALL RISK ASSESSMENT DOCD: CPT | Mod: CPTII,S$GLB,, | Performed by: NURSE PRACTITIONER

## 2021-04-13 RX ORDER — CLINDAMYCIN HYDROCHLORIDE 300 MG/1
300 CAPSULE ORAL 3 TIMES DAILY
Qty: 21 CAPSULE | Refills: 0 | Status: SHIPPED | OUTPATIENT
Start: 2021-04-13 | End: 2022-05-24 | Stop reason: ALTCHOICE

## 2021-04-13 RX ADMIN — IOHEXOL 100 ML: 350 INJECTION, SOLUTION INTRAVENOUS at 11:04

## 2021-04-26 ENCOUNTER — ANTI-COAG VISIT (OUTPATIENT)
Dept: CARDIOLOGY | Facility: CLINIC | Age: 81
End: 2021-04-26
Payer: MEDICARE

## 2021-04-26 ENCOUNTER — LAB VISIT (OUTPATIENT)
Dept: LAB | Facility: HOSPITAL | Age: 81
End: 2021-04-26
Attending: INTERNAL MEDICINE
Payer: MEDICARE

## 2021-04-26 DIAGNOSIS — Z79.01 LONG TERM (CURRENT) USE OF ANTICOAGULANTS: Primary | ICD-10-CM

## 2021-04-26 DIAGNOSIS — Z79.01 LONG TERM (CURRENT) USE OF ANTICOAGULANTS: ICD-10-CM

## 2021-04-26 LAB
INR PPP: 1.9 (ref 0.8–1.2)
PROTHROMBIN TIME: 19.3 SEC (ref 9–12.5)

## 2021-04-26 PROCEDURE — 93793 PR ANTICOAGULANT MGMT FOR PT TAKING WARFARIN: ICD-10-PCS | Mod: S$GLB,,, | Performed by: PHARMACIST

## 2021-04-26 PROCEDURE — 93793 ANTICOAG MGMT PT WARFARIN: CPT | Mod: S$GLB,,, | Performed by: PHARMACIST

## 2021-04-26 PROCEDURE — 36415 COLL VENOUS BLD VENIPUNCTURE: CPT | Mod: PN | Performed by: INTERNAL MEDICINE

## 2021-04-26 PROCEDURE — 85610 PROTHROMBIN TIME: CPT | Performed by: INTERNAL MEDICINE

## 2021-05-10 ENCOUNTER — LAB VISIT (OUTPATIENT)
Dept: LAB | Facility: HOSPITAL | Age: 81
End: 2021-05-10
Attending: INTERNAL MEDICINE
Payer: MEDICARE

## 2021-05-10 ENCOUNTER — ANTI-COAG VISIT (OUTPATIENT)
Dept: CARDIOLOGY | Facility: CLINIC | Age: 81
End: 2021-05-10
Payer: MEDICARE

## 2021-05-10 DIAGNOSIS — Z79.01 LONG TERM (CURRENT) USE OF ANTICOAGULANTS: Primary | ICD-10-CM

## 2021-05-10 DIAGNOSIS — Z79.01 LONG TERM (CURRENT) USE OF ANTICOAGULANTS: ICD-10-CM

## 2021-05-10 LAB
INR PPP: 2.3 (ref 0.8–1.2)
PROTHROMBIN TIME: 23.6 SEC (ref 9–12.5)

## 2021-05-10 PROCEDURE — 93793 ANTICOAG MGMT PT WARFARIN: CPT | Mod: S$GLB,,, | Performed by: PHARMACIST

## 2021-05-10 PROCEDURE — 93793 PR ANTICOAGULANT MGMT FOR PT TAKING WARFARIN: ICD-10-PCS | Mod: S$GLB,,, | Performed by: PHARMACIST

## 2021-05-10 PROCEDURE — 85610 PROTHROMBIN TIME: CPT | Performed by: INTERNAL MEDICINE

## 2021-05-10 PROCEDURE — 36415 COLL VENOUS BLD VENIPUNCTURE: CPT | Mod: PN | Performed by: INTERNAL MEDICINE

## 2021-05-24 ENCOUNTER — ANTI-COAG VISIT (OUTPATIENT)
Dept: CARDIOLOGY | Facility: CLINIC | Age: 81
End: 2021-05-24
Payer: MEDICARE

## 2021-05-24 ENCOUNTER — LAB VISIT (OUTPATIENT)
Dept: LAB | Facility: HOSPITAL | Age: 81
End: 2021-05-24
Attending: INTERNAL MEDICINE
Payer: MEDICARE

## 2021-05-24 DIAGNOSIS — Z79.01 LONG TERM (CURRENT) USE OF ANTICOAGULANTS: ICD-10-CM

## 2021-05-24 DIAGNOSIS — Z79.01 LONG TERM (CURRENT) USE OF ANTICOAGULANTS: Primary | ICD-10-CM

## 2021-05-24 LAB
INR PPP: 2.3 (ref 0.8–1.2)
PROTHROMBIN TIME: 23.3 SEC (ref 9–12.5)

## 2021-05-24 PROCEDURE — 36415 COLL VENOUS BLD VENIPUNCTURE: CPT | Mod: PN | Performed by: INTERNAL MEDICINE

## 2021-05-24 PROCEDURE — 93793 PR ANTICOAGULANT MGMT FOR PT TAKING WARFARIN: ICD-10-PCS | Mod: S$GLB,,, | Performed by: PHARMACIST

## 2021-05-24 PROCEDURE — 85610 PROTHROMBIN TIME: CPT | Performed by: INTERNAL MEDICINE

## 2021-05-24 PROCEDURE — 93793 ANTICOAG MGMT PT WARFARIN: CPT | Mod: S$GLB,,, | Performed by: PHARMACIST

## 2021-06-07 ENCOUNTER — ANTI-COAG VISIT (OUTPATIENT)
Dept: CARDIOLOGY | Facility: CLINIC | Age: 81
End: 2021-06-07
Payer: MEDICARE

## 2021-06-07 ENCOUNTER — LAB VISIT (OUTPATIENT)
Dept: LAB | Facility: HOSPITAL | Age: 81
End: 2021-06-07
Payer: MEDICARE

## 2021-06-07 DIAGNOSIS — Z79.01 LONG TERM (CURRENT) USE OF ANTICOAGULANTS: ICD-10-CM

## 2021-06-07 DIAGNOSIS — I48.0 PAF (PAROXYSMAL ATRIAL FIBRILLATION): ICD-10-CM

## 2021-06-07 DIAGNOSIS — Z79.01 LONG TERM (CURRENT) USE OF ANTICOAGULANTS: Primary | ICD-10-CM

## 2021-06-07 LAB
INR PPP: 3.1 (ref 0.8–1.2)
PROTHROMBIN TIME: 30.5 SEC (ref 9–12.5)

## 2021-06-07 PROCEDURE — 93793 ANTICOAG MGMT PT WARFARIN: CPT | Mod: S$GLB,,, | Performed by: PHARMACIST

## 2021-06-07 PROCEDURE — 93793 PR ANTICOAGULANT MGMT FOR PT TAKING WARFARIN: ICD-10-PCS | Mod: S$GLB,,, | Performed by: PHARMACIST

## 2021-06-07 PROCEDURE — 85610 PROTHROMBIN TIME: CPT | Performed by: INTERNAL MEDICINE

## 2021-06-07 PROCEDURE — 36415 COLL VENOUS BLD VENIPUNCTURE: CPT | Mod: PN | Performed by: INTERNAL MEDICINE

## 2021-06-18 RX ORDER — WARFARIN SODIUM 5 MG/1
2.5-5 TABLET ORAL DAILY
Qty: 45 TABLET | Refills: 5 | Status: SHIPPED | OUTPATIENT
Start: 2021-06-18 | End: 2021-06-22 | Stop reason: SDUPTHER

## 2021-06-21 ENCOUNTER — LAB VISIT (OUTPATIENT)
Dept: LAB | Facility: HOSPITAL | Age: 81
End: 2021-06-21
Attending: INTERNAL MEDICINE
Payer: MEDICARE

## 2021-06-21 ENCOUNTER — ANTI-COAG VISIT (OUTPATIENT)
Dept: CARDIOLOGY | Facility: CLINIC | Age: 81
End: 2021-06-21
Payer: MEDICARE

## 2021-06-21 ENCOUNTER — TELEPHONE (OUTPATIENT)
Dept: VASCULAR SURGERY | Facility: CLINIC | Age: 81
End: 2021-06-21

## 2021-06-21 DIAGNOSIS — Z79.01 LONG TERM (CURRENT) USE OF ANTICOAGULANTS: Primary | ICD-10-CM

## 2021-06-21 DIAGNOSIS — Z79.01 LONG TERM (CURRENT) USE OF ANTICOAGULANTS: ICD-10-CM

## 2021-06-21 DIAGNOSIS — I48.0 PAF (PAROXYSMAL ATRIAL FIBRILLATION): ICD-10-CM

## 2021-06-21 LAB
INR PPP: 1.9 (ref 0.8–1.2)
PROTHROMBIN TIME: 19.7 SEC (ref 9–12.5)

## 2021-06-21 PROCEDURE — 93793 ANTICOAG MGMT PT WARFARIN: CPT | Mod: S$GLB,,, | Performed by: PHARMACIST

## 2021-06-21 PROCEDURE — 85610 PROTHROMBIN TIME: CPT | Performed by: INTERNAL MEDICINE

## 2021-06-21 PROCEDURE — 36415 COLL VENOUS BLD VENIPUNCTURE: CPT | Mod: PN | Performed by: INTERNAL MEDICINE

## 2021-06-21 PROCEDURE — 93793 PR ANTICOAGULANT MGMT FOR PT TAKING WARFARIN: ICD-10-PCS | Mod: S$GLB,,, | Performed by: PHARMACIST

## 2021-06-22 RX ORDER — WARFARIN SODIUM 5 MG/1
2.5-5 TABLET ORAL DAILY
Qty: 45 TABLET | Refills: 0 | Status: SHIPPED | OUTPATIENT
Start: 2021-06-22 | End: 2021-10-26

## 2021-06-28 ENCOUNTER — LAB VISIT (OUTPATIENT)
Dept: LAB | Facility: HOSPITAL | Age: 81
End: 2021-06-28
Attending: INTERNAL MEDICINE
Payer: MEDICARE

## 2021-06-28 ENCOUNTER — ANTI-COAG VISIT (OUTPATIENT)
Dept: CARDIOLOGY | Facility: CLINIC | Age: 81
End: 2021-06-28
Payer: MEDICARE

## 2021-06-28 DIAGNOSIS — Z79.01 LONG TERM (CURRENT) USE OF ANTICOAGULANTS: Primary | ICD-10-CM

## 2021-06-28 DIAGNOSIS — Z79.01 LONG TERM (CURRENT) USE OF ANTICOAGULANTS: ICD-10-CM

## 2021-06-28 LAB
INR PPP: 1.7 (ref 0.8–1.2)
PROTHROMBIN TIME: 17.8 SEC (ref 9–12.5)

## 2021-06-28 PROCEDURE — 36415 COLL VENOUS BLD VENIPUNCTURE: CPT | Mod: PN | Performed by: INTERNAL MEDICINE

## 2021-06-28 PROCEDURE — 85610 PROTHROMBIN TIME: CPT | Performed by: INTERNAL MEDICINE

## 2021-06-28 PROCEDURE — 93793 ANTICOAG MGMT PT WARFARIN: CPT | Mod: S$GLB,,,

## 2021-06-28 PROCEDURE — 93793 PR ANTICOAGULANT MGMT FOR PT TAKING WARFARIN: ICD-10-PCS | Mod: S$GLB,,,

## 2021-07-01 ENCOUNTER — OFFICE VISIT (OUTPATIENT)
Dept: VASCULAR SURGERY | Facility: CLINIC | Age: 81
End: 2021-07-01
Payer: MEDICARE

## 2021-07-01 VITALS
WEIGHT: 251.13 LBS | BODY MASS INDEX: 40.36 KG/M2 | DIASTOLIC BLOOD PRESSURE: 68 MMHG | SYSTOLIC BLOOD PRESSURE: 112 MMHG | HEIGHT: 66 IN

## 2021-07-01 DIAGNOSIS — I87.303 VENOUS HYPERTENSION OF BOTH LOWER EXTREMITIES: ICD-10-CM

## 2021-07-01 DIAGNOSIS — I89.0 LYMPHEDEMA OF BOTH LOWER EXTREMITIES: Primary | ICD-10-CM

## 2021-07-01 PROCEDURE — 3288F PR FALLS RISK ASSESSMENT DOCUMENTED: ICD-10-PCS | Mod: CPTII,S$GLB,, | Performed by: SURGERY

## 2021-07-01 PROCEDURE — 1125F PR PAIN SEVERITY QUANTIFIED, PAIN PRESENT: ICD-10-PCS | Mod: S$GLB,,, | Performed by: SURGERY

## 2021-07-01 PROCEDURE — 1101F PT FALLS ASSESS-DOCD LE1/YR: CPT | Mod: CPTII,S$GLB,, | Performed by: SURGERY

## 2021-07-01 PROCEDURE — 99499 RISK ADDL DX/OHS AUDIT: ICD-10-PCS | Mod: HCNC,S$GLB,, | Performed by: SURGERY

## 2021-07-01 PROCEDURE — 99499 UNLISTED E&M SERVICE: CPT | Mod: HCNC,S$GLB,, | Performed by: SURGERY

## 2021-07-01 PROCEDURE — 99999 PR PBB SHADOW E&M-EST. PATIENT-LVL III: CPT | Mod: PBBFAC,,, | Performed by: SURGERY

## 2021-07-01 PROCEDURE — 99214 PR OFFICE/OUTPT VISIT, EST, LEVL IV, 30-39 MIN: ICD-10-PCS | Mod: S$GLB,,, | Performed by: SURGERY

## 2021-07-01 PROCEDURE — 1101F PR PT FALLS ASSESS DOC 0-1 FALLS W/OUT INJ PAST YR: ICD-10-PCS | Mod: CPTII,S$GLB,, | Performed by: SURGERY

## 2021-07-01 PROCEDURE — 1125F AMNT PAIN NOTED PAIN PRSNT: CPT | Mod: S$GLB,,, | Performed by: SURGERY

## 2021-07-01 PROCEDURE — 99214 OFFICE O/P EST MOD 30 MIN: CPT | Mod: S$GLB,,, | Performed by: SURGERY

## 2021-07-01 PROCEDURE — 1159F PR MEDICATION LIST DOCUMENTED IN MEDICAL RECORD: ICD-10-PCS | Mod: S$GLB,,, | Performed by: SURGERY

## 2021-07-01 PROCEDURE — 99999 PR PBB SHADOW E&M-EST. PATIENT-LVL III: ICD-10-PCS | Mod: PBBFAC,,, | Performed by: SURGERY

## 2021-07-01 PROCEDURE — 1159F MED LIST DOCD IN RCRD: CPT | Mod: S$GLB,,, | Performed by: SURGERY

## 2021-07-01 PROCEDURE — 3288F FALL RISK ASSESSMENT DOCD: CPT | Mod: CPTII,S$GLB,, | Performed by: SURGERY

## 2021-07-02 ENCOUNTER — DOCUMENTATION ONLY (OUTPATIENT)
Dept: VASCULAR SURGERY | Facility: CLINIC | Age: 81
End: 2021-07-02

## 2021-07-02 ENCOUNTER — TELEPHONE (OUTPATIENT)
Dept: VASCULAR SURGERY | Facility: CLINIC | Age: 81
End: 2021-07-02

## 2021-07-06 ENCOUNTER — LAB VISIT (OUTPATIENT)
Dept: LAB | Facility: HOSPITAL | Age: 81
End: 2021-07-06
Attending: INTERNAL MEDICINE
Payer: MEDICARE

## 2021-07-06 ENCOUNTER — ANTI-COAG VISIT (OUTPATIENT)
Dept: CARDIOLOGY | Facility: CLINIC | Age: 81
End: 2021-07-06
Payer: MEDICARE

## 2021-07-06 DIAGNOSIS — Z79.01 LONG TERM (CURRENT) USE OF ANTICOAGULANTS: Primary | ICD-10-CM

## 2021-07-06 DIAGNOSIS — Z79.01 LONG TERM (CURRENT) USE OF ANTICOAGULANTS: ICD-10-CM

## 2021-07-06 LAB
INR PPP: 2.4 (ref 0.8–1.2)
PROTHROMBIN TIME: 23.8 SEC (ref 9–12.5)

## 2021-07-06 PROCEDURE — 93793 PR ANTICOAGULANT MGMT FOR PT TAKING WARFARIN: ICD-10-PCS | Mod: S$GLB,,, | Performed by: PHARMACIST

## 2021-07-06 PROCEDURE — 93793 ANTICOAG MGMT PT WARFARIN: CPT | Mod: S$GLB,,, | Performed by: PHARMACIST

## 2021-07-06 PROCEDURE — 36415 COLL VENOUS BLD VENIPUNCTURE: CPT | Mod: PN | Performed by: INTERNAL MEDICINE

## 2021-07-06 PROCEDURE — 85610 PROTHROMBIN TIME: CPT | Performed by: INTERNAL MEDICINE

## 2021-07-16 ENCOUNTER — ANTI-COAG VISIT (OUTPATIENT)
Dept: CARDIOLOGY | Facility: CLINIC | Age: 81
End: 2021-07-16
Payer: MEDICARE

## 2021-07-16 ENCOUNTER — LAB VISIT (OUTPATIENT)
Dept: LAB | Facility: HOSPITAL | Age: 81
End: 2021-07-16
Attending: INTERNAL MEDICINE
Payer: MEDICARE

## 2021-07-16 DIAGNOSIS — Z79.01 LONG TERM (CURRENT) USE OF ANTICOAGULANTS: ICD-10-CM

## 2021-07-16 DIAGNOSIS — Z79.01 LONG TERM (CURRENT) USE OF ANTICOAGULANTS: Primary | ICD-10-CM

## 2021-07-16 LAB
INR PPP: 2.2 (ref 0.8–1.2)
PROTHROMBIN TIME: 22.3 SEC (ref 9–12.5)

## 2021-07-16 PROCEDURE — 93793 ANTICOAG MGMT PT WARFARIN: CPT | Mod: S$GLB,,, | Performed by: PHARMACIST

## 2021-07-16 PROCEDURE — 85610 PROTHROMBIN TIME: CPT | Performed by: INTERNAL MEDICINE

## 2021-07-16 PROCEDURE — 93793 PR ANTICOAGULANT MGMT FOR PT TAKING WARFARIN: ICD-10-PCS | Mod: S$GLB,,, | Performed by: PHARMACIST

## 2021-07-16 PROCEDURE — 36415 COLL VENOUS BLD VENIPUNCTURE: CPT | Mod: PN | Performed by: INTERNAL MEDICINE

## 2021-07-22 ENCOUNTER — LAB VISIT (OUTPATIENT)
Dept: LAB | Facility: HOSPITAL | Age: 81
End: 2021-07-22
Attending: INTERNAL MEDICINE
Payer: MEDICARE

## 2021-07-22 ENCOUNTER — ANTI-COAG VISIT (OUTPATIENT)
Dept: CARDIOLOGY | Facility: CLINIC | Age: 81
End: 2021-07-22
Payer: MEDICARE

## 2021-07-22 DIAGNOSIS — Z79.01 LONG TERM (CURRENT) USE OF ANTICOAGULANTS: ICD-10-CM

## 2021-07-22 DIAGNOSIS — Z79.01 LONG TERM (CURRENT) USE OF ANTICOAGULANTS: Primary | ICD-10-CM

## 2021-07-22 LAB
INR PPP: 2.3 (ref 0.8–1.2)
PROTHROMBIN TIME: 23.5 SEC (ref 9–12.5)

## 2021-07-22 PROCEDURE — 93793 ANTICOAG MGMT PT WARFARIN: CPT | Mod: S$GLB,,, | Performed by: PHARMACIST

## 2021-07-22 PROCEDURE — 85610 PROTHROMBIN TIME: CPT | Performed by: INTERNAL MEDICINE

## 2021-07-22 PROCEDURE — 36415 COLL VENOUS BLD VENIPUNCTURE: CPT | Mod: PN | Performed by: INTERNAL MEDICINE

## 2021-07-22 PROCEDURE — 93793 PR ANTICOAGULANT MGMT FOR PT TAKING WARFARIN: ICD-10-PCS | Mod: S$GLB,,, | Performed by: PHARMACIST

## 2021-07-22 NOTE — PROGRESS NOTES
Patient here for close follow up after ablation. Additionally, her INR has been trending on the low end. INR is good today. Her dose was increased last week. She confirmed dose and compliance. She denies changes in meds, health, or diet. She has a small bruise on her left wrist, likely caused by her watch. No other changes. No signs or symptoms of bleeding. We will adjust dose to include the boosted dose from last week. Repeat INR next week in lab.   
No

## 2021-08-05 ENCOUNTER — ANTI-COAG VISIT (OUTPATIENT)
Dept: CARDIOLOGY | Facility: CLINIC | Age: 81
End: 2021-08-05
Payer: MEDICARE

## 2021-08-05 ENCOUNTER — LAB VISIT (OUTPATIENT)
Dept: LAB | Facility: HOSPITAL | Age: 81
End: 2021-08-05
Attending: INTERNAL MEDICINE
Payer: MEDICARE

## 2021-08-05 ENCOUNTER — TELEPHONE (OUTPATIENT)
Dept: VASCULAR SURGERY | Facility: CLINIC | Age: 81
End: 2021-08-05

## 2021-08-05 DIAGNOSIS — Z79.01 LONG TERM (CURRENT) USE OF ANTICOAGULANTS: ICD-10-CM

## 2021-08-05 DIAGNOSIS — Z79.01 LONG TERM (CURRENT) USE OF ANTICOAGULANTS: Primary | ICD-10-CM

## 2021-08-05 LAB
INR PPP: 3.1 (ref 0.8–1.2)
PROTHROMBIN TIME: 30.5 SEC (ref 9–12.5)

## 2021-08-05 PROCEDURE — 93793 ANTICOAG MGMT PT WARFARIN: CPT | Mod: S$GLB,,, | Performed by: PHARMACIST

## 2021-08-05 PROCEDURE — 85610 PROTHROMBIN TIME: CPT | Performed by: INTERNAL MEDICINE

## 2021-08-05 PROCEDURE — 36415 COLL VENOUS BLD VENIPUNCTURE: CPT | Mod: PN | Performed by: INTERNAL MEDICINE

## 2021-08-05 PROCEDURE — 93793 PR ANTICOAGULANT MGMT FOR PT TAKING WARFARIN: ICD-10-PCS | Mod: S$GLB,,, | Performed by: PHARMACIST

## 2021-08-19 ENCOUNTER — LAB VISIT (OUTPATIENT)
Dept: LAB | Facility: HOSPITAL | Age: 81
End: 2021-08-19
Attending: INTERNAL MEDICINE
Payer: MEDICARE

## 2021-08-19 ENCOUNTER — ANTI-COAG VISIT (OUTPATIENT)
Dept: CARDIOLOGY | Facility: CLINIC | Age: 81
End: 2021-08-19
Payer: MEDICARE

## 2021-08-19 DIAGNOSIS — Z79.01 LONG TERM (CURRENT) USE OF ANTICOAGULANTS: ICD-10-CM

## 2021-08-19 DIAGNOSIS — Z79.01 LONG TERM (CURRENT) USE OF ANTICOAGULANTS: Primary | ICD-10-CM

## 2021-08-19 LAB
INR PPP: 3 (ref 0.8–1.2)
PROTHROMBIN TIME: 30.1 SEC (ref 9–12.5)

## 2021-08-19 PROCEDURE — 93793 ANTICOAG MGMT PT WARFARIN: CPT | Mod: S$GLB,,, | Performed by: PHARMACIST

## 2021-08-19 PROCEDURE — 36415 COLL VENOUS BLD VENIPUNCTURE: CPT | Mod: PN | Performed by: INTERNAL MEDICINE

## 2021-08-19 PROCEDURE — 93793 PR ANTICOAGULANT MGMT FOR PT TAKING WARFARIN: ICD-10-PCS | Mod: S$GLB,,, | Performed by: PHARMACIST

## 2021-08-19 PROCEDURE — 85610 PROTHROMBIN TIME: CPT | Performed by: INTERNAL MEDICINE

## 2021-09-15 ENCOUNTER — LAB VISIT (OUTPATIENT)
Dept: LAB | Facility: HOSPITAL | Age: 81
End: 2021-09-15
Attending: FAMILY MEDICINE
Payer: MEDICARE

## 2021-09-15 ENCOUNTER — ANTI-COAG VISIT (OUTPATIENT)
Dept: CARDIOLOGY | Facility: CLINIC | Age: 81
End: 2021-09-15
Payer: MEDICARE

## 2021-09-15 DIAGNOSIS — Z79.01 LONG TERM (CURRENT) USE OF ANTICOAGULANTS: Primary | ICD-10-CM

## 2021-09-15 DIAGNOSIS — Z79.01 LONG TERM (CURRENT) USE OF ANTICOAGULANTS: ICD-10-CM

## 2021-09-15 LAB
INR PPP: 3.7 (ref 0.8–1.2)
PROTHROMBIN TIME: 36.7 SEC (ref 9–12.5)

## 2021-09-15 PROCEDURE — 85610 PROTHROMBIN TIME: CPT | Performed by: INTERNAL MEDICINE

## 2021-09-15 PROCEDURE — 93793 ANTICOAG MGMT PT WARFARIN: CPT | Mod: S$GLB,,, | Performed by: PHARMACIST

## 2021-09-15 PROCEDURE — 36415 COLL VENOUS BLD VENIPUNCTURE: CPT | Mod: PN | Performed by: INTERNAL MEDICINE

## 2021-09-15 PROCEDURE — 93793 PR ANTICOAGULANT MGMT FOR PT TAKING WARFARIN: ICD-10-PCS | Mod: S$GLB,,, | Performed by: PHARMACIST

## 2021-09-30 ENCOUNTER — ANTI-COAG VISIT (OUTPATIENT)
Dept: CARDIOLOGY | Facility: CLINIC | Age: 81
End: 2021-09-30
Payer: MEDICARE

## 2021-09-30 ENCOUNTER — LAB VISIT (OUTPATIENT)
Dept: LAB | Facility: HOSPITAL | Age: 81
End: 2021-09-30
Attending: INTERNAL MEDICINE
Payer: MEDICARE

## 2021-09-30 DIAGNOSIS — Z79.01 LONG TERM (CURRENT) USE OF ANTICOAGULANTS: ICD-10-CM

## 2021-09-30 DIAGNOSIS — Z79.01 LONG TERM (CURRENT) USE OF ANTICOAGULANTS: Primary | ICD-10-CM

## 2021-09-30 LAB
INR PPP: 3.4 (ref 0.8–1.2)
PROTHROMBIN TIME: 33.5 SEC (ref 9–12.5)

## 2021-09-30 PROCEDURE — 93793 ANTICOAG MGMT PT WARFARIN: CPT | Mod: S$GLB,,, | Performed by: PHARMACIST

## 2021-09-30 PROCEDURE — 93793 PR ANTICOAGULANT MGMT FOR PT TAKING WARFARIN: ICD-10-PCS | Mod: S$GLB,,, | Performed by: PHARMACIST

## 2021-09-30 PROCEDURE — 36415 COLL VENOUS BLD VENIPUNCTURE: CPT | Mod: PN | Performed by: INTERNAL MEDICINE

## 2021-09-30 PROCEDURE — 85610 PROTHROMBIN TIME: CPT | Performed by: INTERNAL MEDICINE

## 2021-10-14 ENCOUNTER — ANTI-COAG VISIT (OUTPATIENT)
Dept: CARDIOLOGY | Facility: CLINIC | Age: 81
End: 2021-10-14
Payer: MEDICARE

## 2021-10-14 ENCOUNTER — TELEPHONE (OUTPATIENT)
Dept: VASCULAR SURGERY | Facility: CLINIC | Age: 81
End: 2021-10-14

## 2021-10-14 ENCOUNTER — HOSPITAL ENCOUNTER (OUTPATIENT)
Dept: CARDIOLOGY | Facility: HOSPITAL | Age: 81
Discharge: HOME | End: 2021-10-14
Attending: SURGERY
Payer: MEDICARE

## 2021-10-14 DIAGNOSIS — I89.0 LYMPHEDEMA OF BOTH LOWER EXTREMITIES: ICD-10-CM

## 2021-10-14 DIAGNOSIS — Z79.01 LONG TERM (CURRENT) USE OF ANTICOAGULANTS: Primary | ICD-10-CM

## 2021-10-14 DIAGNOSIS — I87.303 VENOUS HYPERTENSION OF BOTH LOWER EXTREMITIES: ICD-10-CM

## 2021-10-14 PROCEDURE — 93793 ANTICOAG MGMT PT WARFARIN: CPT | Mod: S$GLB,,, | Performed by: PHARMACIST

## 2021-10-14 PROCEDURE — 93970 CV US LOWER VENOUS INSUFFICIENCY BILATERAL (CUPID ONLY): ICD-10-PCS | Mod: 26,HCNC,, | Performed by: SURGERY

## 2021-10-14 PROCEDURE — 93970 EXTREMITY STUDY: CPT | Mod: 26,HCNC,, | Performed by: SURGERY

## 2021-10-14 PROCEDURE — 93793 PR ANTICOAGULANT MGMT FOR PT TAKING WARFARIN: ICD-10-PCS | Mod: S$GLB,,, | Performed by: PHARMACIST

## 2021-10-14 PROCEDURE — 93970 EXTREMITY STUDY: CPT | Mod: TC,HCNC

## 2021-10-18 ENCOUNTER — CLINICAL SUPPORT (OUTPATIENT)
Dept: FAMILY MEDICINE | Facility: CLINIC | Age: 81
End: 2021-10-18
Payer: MEDICARE

## 2021-10-18 DIAGNOSIS — Z23 NEED FOR INFLUENZA VACCINATION: Primary | ICD-10-CM

## 2021-10-18 PROCEDURE — 99499 NO LOS: ICD-10-PCS | Mod: HCNC,S$GLB,, | Performed by: FAMILY MEDICINE

## 2021-10-18 PROCEDURE — 90694 VACC AIIV4 NO PRSRV 0.5ML IM: CPT | Mod: HCNC,S$GLB,, | Performed by: FAMILY MEDICINE

## 2021-10-18 PROCEDURE — 90694 FLU VACCINE - QUADRIVALENT - ADJUVANTED: ICD-10-PCS | Mod: HCNC,S$GLB,, | Performed by: FAMILY MEDICINE

## 2021-10-18 PROCEDURE — G0008 ADMIN INFLUENZA VIRUS VAC: HCPCS | Mod: HCNC,S$GLB,, | Performed by: FAMILY MEDICINE

## 2021-10-18 PROCEDURE — G0008 FLU VACCINE - QUADRIVALENT - ADJUVANTED: ICD-10-PCS | Mod: HCNC,S$GLB,, | Performed by: FAMILY MEDICINE

## 2021-10-18 PROCEDURE — 99499 UNLISTED E&M SERVICE: CPT | Mod: HCNC,S$GLB,, | Performed by: FAMILY MEDICINE

## 2021-10-20 LAB
LEFT GREAT SAPHENOUS DISTAL THIGH DIA: 0.2 CM
LEFT GREAT SAPHENOUS JUNCTION DIA: 0.6 CM
LEFT GREAT SAPHENOUS KNEE DIA: 0.3 CM
LEFT GREAT SAPHENOUS KNEE REFLUX: 6409 MS
LEFT GREAT SAPHENOUS MIDDLE THIGH DIA: 0.5 CM
LEFT GREAT SAPHENOUS MIDDLE THIGH REFLUX: 5914 MS
LEFT GREAT SAPHENOUS PROXIMAL CALF DIA: 0.3 CM
LEFT GREAT SAPHENOUS PROXIMAL CALF REFLUX: 2405 MS
LEFT SMALL SAPHENOUS KNEE DIA: 0.4 CM
LEFT SMALL SAPHENOUS SPJ DIA: 0.3 CM
RIGHT GREAT SAPHENOUS DISTAL THIGH DIA: 0.2 CM
RIGHT GREAT SAPHENOUS JUNCTION DIA: 1 CM
RIGHT GREAT SAPHENOUS KNEE DIA: 0.3 CM
RIGHT GREAT SAPHENOUS MIDDLE THIGH DIA: 0.4 CM
RIGHT GREAT SAPHENOUS PROXIMAL CALF DIA: 0.2 CM
RIGHT SMALL SAPHENOUS KNEE DIA: 0.4 CM
RIGHT SMALL SAPHENOUS SPJ DIA: 0.2 CM

## 2021-10-25 DIAGNOSIS — Z12.31 BREAST CANCER SCREENING BY MAMMOGRAM: Primary | ICD-10-CM

## 2021-10-26 ENCOUNTER — OFFICE VISIT (OUTPATIENT)
Dept: CARDIOLOGY | Facility: CLINIC | Age: 81
End: 2021-10-26
Payer: MEDICARE

## 2021-10-26 VITALS
HEIGHT: 66 IN | RESPIRATION RATE: 15 BRPM | SYSTOLIC BLOOD PRESSURE: 124 MMHG | DIASTOLIC BLOOD PRESSURE: 68 MMHG | BODY MASS INDEX: 38.89 KG/M2 | HEART RATE: 50 BPM | OXYGEN SATURATION: 98 % | WEIGHT: 242 LBS

## 2021-10-26 DIAGNOSIS — G47.33 OSA (OBSTRUCTIVE SLEEP APNEA): ICD-10-CM

## 2021-10-26 DIAGNOSIS — E66.9 NON MORBID OBESITY, UNSPECIFIED OBESITY TYPE: ICD-10-CM

## 2021-10-26 DIAGNOSIS — I89.0 LYMPHEDEMA OF BOTH LOWER EXTREMITIES: ICD-10-CM

## 2021-10-26 DIAGNOSIS — I77.810 AORTIC ROOT DILATATION: ICD-10-CM

## 2021-10-26 DIAGNOSIS — Z79.01 LONG TERM (CURRENT) USE OF ANTICOAGULANTS: ICD-10-CM

## 2021-10-26 DIAGNOSIS — I48.19 PERSISTENT ATRIAL FIBRILLATION: Primary | ICD-10-CM

## 2021-10-26 DIAGNOSIS — I10 ESSENTIAL HYPERTENSION: ICD-10-CM

## 2021-10-26 DIAGNOSIS — I70.0 AORTIC ATHEROSCLEROSIS: ICD-10-CM

## 2021-10-26 PROCEDURE — 1101F PR PT FALLS ASSESS DOC 0-1 FALLS W/OUT INJ PAST YR: ICD-10-PCS | Mod: HCNC,CPTII,S$GLB, | Performed by: INTERNAL MEDICINE

## 2021-10-26 PROCEDURE — 1101F PT FALLS ASSESS-DOCD LE1/YR: CPT | Mod: HCNC,CPTII,S$GLB, | Performed by: INTERNAL MEDICINE

## 2021-10-26 PROCEDURE — 99214 PR OFFICE/OUTPT VISIT, EST, LEVL IV, 30-39 MIN: ICD-10-PCS | Mod: HCNC,S$GLB,, | Performed by: INTERNAL MEDICINE

## 2021-10-26 PROCEDURE — 1126F AMNT PAIN NOTED NONE PRSNT: CPT | Mod: HCNC,CPTII,S$GLB, | Performed by: INTERNAL MEDICINE

## 2021-10-26 PROCEDURE — 99214 OFFICE O/P EST MOD 30 MIN: CPT | Mod: HCNC,S$GLB,, | Performed by: INTERNAL MEDICINE

## 2021-10-26 PROCEDURE — 93000 EKG 12-LEAD: ICD-10-PCS | Mod: HCNC,S$GLB,, | Performed by: INTERNAL MEDICINE

## 2021-10-26 PROCEDURE — 99999 PR PBB SHADOW E&M-EST. PATIENT-LVL IV: CPT | Mod: PBBFAC,HCNC,, | Performed by: INTERNAL MEDICINE

## 2021-10-26 PROCEDURE — 1160F PR REVIEW ALL MEDS BY PRESCRIBER/CLIN PHARMACIST DOCUMENTED: ICD-10-PCS | Mod: HCNC,CPTII,S$GLB, | Performed by: INTERNAL MEDICINE

## 2021-10-26 PROCEDURE — 1126F PR PAIN SEVERITY QUANTIFIED, NO PAIN PRESENT: ICD-10-PCS | Mod: HCNC,CPTII,S$GLB, | Performed by: INTERNAL MEDICINE

## 2021-10-26 PROCEDURE — 93000 ELECTROCARDIOGRAM COMPLETE: CPT | Mod: HCNC,S$GLB,, | Performed by: INTERNAL MEDICINE

## 2021-10-26 PROCEDURE — 99499 UNLISTED E&M SERVICE: CPT | Mod: S$GLB,,, | Performed by: INTERNAL MEDICINE

## 2021-10-26 PROCEDURE — 3288F PR FALLS RISK ASSESSMENT DOCUMENTED: ICD-10-PCS | Mod: HCNC,CPTII,S$GLB, | Performed by: INTERNAL MEDICINE

## 2021-10-26 PROCEDURE — 3288F FALL RISK ASSESSMENT DOCD: CPT | Mod: HCNC,CPTII,S$GLB, | Performed by: INTERNAL MEDICINE

## 2021-10-26 PROCEDURE — 1159F MED LIST DOCD IN RCRD: CPT | Mod: HCNC,CPTII,S$GLB, | Performed by: INTERNAL MEDICINE

## 2021-10-26 PROCEDURE — 99499 RISK ADDL DX/OHS AUDIT: ICD-10-PCS | Mod: S$GLB,,, | Performed by: INTERNAL MEDICINE

## 2021-10-26 PROCEDURE — 3078F DIAST BP <80 MM HG: CPT | Mod: HCNC,CPTII,S$GLB, | Performed by: INTERNAL MEDICINE

## 2021-10-26 PROCEDURE — 1160F RVW MEDS BY RX/DR IN RCRD: CPT | Mod: HCNC,CPTII,S$GLB, | Performed by: INTERNAL MEDICINE

## 2021-10-26 PROCEDURE — 3074F SYST BP LT 130 MM HG: CPT | Mod: HCNC,CPTII,S$GLB, | Performed by: INTERNAL MEDICINE

## 2021-10-26 PROCEDURE — 99999 PR PBB SHADOW E&M-EST. PATIENT-LVL IV: ICD-10-PCS | Mod: PBBFAC,HCNC,, | Performed by: INTERNAL MEDICINE

## 2021-10-26 PROCEDURE — 3074F PR MOST RECENT SYSTOLIC BLOOD PRESSURE < 130 MM HG: ICD-10-PCS | Mod: HCNC,CPTII,S$GLB, | Performed by: INTERNAL MEDICINE

## 2021-10-26 PROCEDURE — 3078F PR MOST RECENT DIASTOLIC BLOOD PRESSURE < 80 MM HG: ICD-10-PCS | Mod: HCNC,CPTII,S$GLB, | Performed by: INTERNAL MEDICINE

## 2021-10-26 PROCEDURE — 1159F PR MEDICATION LIST DOCUMENTED IN MEDICAL RECORD: ICD-10-PCS | Mod: HCNC,CPTII,S$GLB, | Performed by: INTERNAL MEDICINE

## 2021-10-28 ENCOUNTER — ANTI-COAG VISIT (OUTPATIENT)
Dept: CARDIOLOGY | Facility: CLINIC | Age: 81
End: 2021-10-28
Payer: MEDICARE

## 2021-10-28 ENCOUNTER — LAB VISIT (OUTPATIENT)
Dept: LAB | Facility: HOSPITAL | Age: 81
End: 2021-10-28
Attending: INTERNAL MEDICINE
Payer: MEDICARE

## 2021-10-28 DIAGNOSIS — Z79.01 LONG TERM (CURRENT) USE OF ANTICOAGULANTS: ICD-10-CM

## 2021-10-28 DIAGNOSIS — Z79.01 LONG TERM (CURRENT) USE OF ANTICOAGULANTS: Primary | ICD-10-CM

## 2021-10-28 LAB
INR PPP: 3.3 (ref 0.8–1.2)
PROTHROMBIN TIME: 32.4 SEC (ref 9–12.5)

## 2021-10-28 PROCEDURE — 36415 COLL VENOUS BLD VENIPUNCTURE: CPT | Mod: HCNC,PN | Performed by: INTERNAL MEDICINE

## 2021-10-28 PROCEDURE — 85610 PROTHROMBIN TIME: CPT | Mod: HCNC | Performed by: INTERNAL MEDICINE

## 2021-10-29 ENCOUNTER — HOSPITAL ENCOUNTER (OUTPATIENT)
Dept: RADIOLOGY | Facility: HOSPITAL | Age: 81
Discharge: HOME | End: 2021-10-29
Attending: NURSE PRACTITIONER
Payer: MEDICARE

## 2021-10-29 VITALS — BODY MASS INDEX: 38.9 KG/M2 | HEIGHT: 66 IN | WEIGHT: 242.06 LBS

## 2021-10-29 DIAGNOSIS — Z12.31 BREAST CANCER SCREENING BY MAMMOGRAM: ICD-10-CM

## 2021-10-29 PROCEDURE — 77063 MAMMO DIGITAL SCREENING BILAT WITH TOMO: ICD-10-PCS | Mod: 26,HCNC,, | Performed by: RADIOLOGY

## 2021-10-29 PROCEDURE — 77067 MAMMO DIGITAL SCREENING BILAT WITH TOMO: ICD-10-PCS | Mod: 26,HCNC,, | Performed by: RADIOLOGY

## 2021-10-29 PROCEDURE — 77067 SCR MAMMO BI INCL CAD: CPT | Mod: 26,HCNC,, | Performed by: RADIOLOGY

## 2021-10-29 PROCEDURE — 77063 BREAST TOMOSYNTHESIS BI: CPT | Mod: 26,HCNC,, | Performed by: RADIOLOGY

## 2021-10-29 PROCEDURE — 77067 SCR MAMMO BI INCL CAD: CPT | Mod: TC,HCNC

## 2021-11-02 ENCOUNTER — OFFICE VISIT (OUTPATIENT)
Dept: UROLOGY | Facility: CLINIC | Age: 81
End: 2021-11-02
Payer: MEDICARE

## 2021-11-02 ENCOUNTER — TELEPHONE (OUTPATIENT)
Dept: FAMILY MEDICINE | Facility: CLINIC | Age: 81
End: 2021-11-02
Payer: MEDICARE

## 2021-11-02 VITALS — BODY MASS INDEX: 39.32 KG/M2 | WEIGHT: 244.69 LBS | HEIGHT: 66 IN

## 2021-11-02 DIAGNOSIS — N20.0 NEPHROLITHIASIS: Primary | ICD-10-CM

## 2021-11-02 DIAGNOSIS — N39.46 MIXED INCONTINENCE URGE AND STRESS: ICD-10-CM

## 2021-11-02 PROCEDURE — 1159F MED LIST DOCD IN RCRD: CPT | Mod: HCNC,CPTII,S$GLB, | Performed by: UROLOGY

## 2021-11-02 PROCEDURE — 3288F PR FALLS RISK ASSESSMENT DOCUMENTED: ICD-10-PCS | Mod: HCNC,CPTII,S$GLB, | Performed by: UROLOGY

## 2021-11-02 PROCEDURE — 1159F PR MEDICATION LIST DOCUMENTED IN MEDICAL RECORD: ICD-10-PCS | Mod: HCNC,CPTII,S$GLB, | Performed by: UROLOGY

## 2021-11-02 PROCEDURE — 1160F PR REVIEW ALL MEDS BY PRESCRIBER/CLIN PHARMACIST DOCUMENTED: ICD-10-PCS | Mod: HCNC,CPTII,S$GLB, | Performed by: UROLOGY

## 2021-11-02 PROCEDURE — 1126F PR PAIN SEVERITY QUANTIFIED, NO PAIN PRESENT: ICD-10-PCS | Mod: HCNC,CPTII,S$GLB, | Performed by: UROLOGY

## 2021-11-02 PROCEDURE — 3288F FALL RISK ASSESSMENT DOCD: CPT | Mod: HCNC,CPTII,S$GLB, | Performed by: UROLOGY

## 2021-11-02 PROCEDURE — 99214 OFFICE O/P EST MOD 30 MIN: CPT | Mod: HCNC,S$GLB,, | Performed by: UROLOGY

## 2021-11-02 PROCEDURE — 1160F RVW MEDS BY RX/DR IN RCRD: CPT | Mod: HCNC,CPTII,S$GLB, | Performed by: UROLOGY

## 2021-11-02 PROCEDURE — 99999 PR PBB SHADOW E&M-EST. PATIENT-LVL III: ICD-10-PCS | Mod: PBBFAC,HCNC,, | Performed by: UROLOGY

## 2021-11-02 PROCEDURE — 99999 PR PBB SHADOW E&M-EST. PATIENT-LVL III: CPT | Mod: PBBFAC,HCNC,, | Performed by: UROLOGY

## 2021-11-02 PROCEDURE — 1101F PT FALLS ASSESS-DOCD LE1/YR: CPT | Mod: HCNC,CPTII,S$GLB, | Performed by: UROLOGY

## 2021-11-02 PROCEDURE — 1101F PR PT FALLS ASSESS DOC 0-1 FALLS W/OUT INJ PAST YR: ICD-10-PCS | Mod: HCNC,CPTII,S$GLB, | Performed by: UROLOGY

## 2021-11-02 PROCEDURE — 1126F AMNT PAIN NOTED NONE PRSNT: CPT | Mod: HCNC,CPTII,S$GLB, | Performed by: UROLOGY

## 2021-11-02 PROCEDURE — 99214 PR OFFICE/OUTPT VISIT, EST, LEVL IV, 30-39 MIN: ICD-10-PCS | Mod: HCNC,S$GLB,, | Performed by: UROLOGY

## 2021-11-02 RX ORDER — OXYBUTYNIN CHLORIDE 5 MG/1
5 TABLET ORAL 3 TIMES DAILY
Qty: 270 TABLET | Refills: 3 | Status: SHIPPED | OUTPATIENT
Start: 2021-11-02 | End: 2022-12-21 | Stop reason: SDUPTHER

## 2021-11-09 ENCOUNTER — HOSPITAL ENCOUNTER (OUTPATIENT)
Dept: CARDIOLOGY | Facility: HOSPITAL | Age: 81
Discharge: HOME | End: 2021-11-09
Attending: INTERNAL MEDICINE
Payer: MEDICARE

## 2021-11-09 VITALS — HEIGHT: 66 IN | WEIGHT: 244 LBS | BODY MASS INDEX: 39.21 KG/M2

## 2021-11-09 DIAGNOSIS — I77.810 AORTIC ROOT DILATATION: ICD-10-CM

## 2021-11-09 DIAGNOSIS — I48.19 PERSISTENT ATRIAL FIBRILLATION: ICD-10-CM

## 2021-11-09 LAB
AV INDEX (PROSTH): 0.68
AV MEAN GRADIENT: 5 MMHG
AV PEAK GRADIENT: 8 MMHG
AV VALVE AREA: 3.54 CM2
AV VELOCITY RATIO: 0.64
BSA FOR ECHO PROCEDURE: 2.27 M2
CV ECHO LV RWT: 0.52 CM
DOP CALC AO PEAK VEL: 1.43 M/S
DOP CALC AO VTI: 32.87 CM
DOP CALC LVOT AREA: 5.2 CM2
DOP CALC LVOT DIAMETER: 2.57 CM
DOP CALC LVOT PEAK VEL: 0.92 M/S
DOP CALC LVOT STROKE VOLUME: 116.4 CM3
DOP CALCLVOT PEAK VEL VTI: 22.45 CM
E WAVE DECELERATION TIME: 227.71 MSEC
E/A RATIO: 1.33
E/E' RATIO: 7.7 M/S
ECHO LV POSTERIOR WALL: 0.97 CM (ref 0.6–1.1)
EJECTION FRACTION: 60 %
FRACTIONAL SHORTENING: 22 % (ref 28–44)
INTERVENTRICULAR SEPTUM: 1.22 CM (ref 0.6–1.1)
IVRT: 107.27 MSEC
LA MAJOR: 6.48 CM
LA MINOR: 6.31 CM
LA WIDTH: 4.55 CM
LEFT ATRIUM SIZE: 4.22 CM
LEFT ATRIUM VOLUME INDEX: 47.9 ML/M2
LEFT ATRIUM VOLUME: 104.35 CM3
LEFT INTERNAL DIMENSION IN SYSTOLE: 2.9 CM (ref 2.1–4)
LEFT VENTRICLE DIASTOLIC VOLUME INDEX: 27.34 ML/M2
LEFT VENTRICLE DIASTOLIC VOLUME: 59.6 ML
LEFT VENTRICLE MASS INDEX: 60 G/M2
LEFT VENTRICLE SYSTOLIC VOLUME INDEX: 14.8 ML/M2
LEFT VENTRICLE SYSTOLIC VOLUME: 32.23 ML
LEFT VENTRICULAR INTERNAL DIMENSION IN DIASTOLE: 3.74 CM (ref 3.5–6)
LEFT VENTRICULAR MASS: 130.57 G
LV LATERAL E/E' RATIO: 6.42 M/S
LV SEPTAL E/E' RATIO: 9.63 M/S
MV PEAK A VEL: 0.58 M/S
MV PEAK E VEL: 0.77 M/S
MV STENOSIS PRESSURE HALF TIME: 66.04 MS
MV VALVE AREA P 1/2 METHOD: 3.33 CM2
PV PEAK VELOCITY: 0.92 CM/S
RA MAJOR: 5.91 CM
RA PRESSURE: 8 MMHG
RA WIDTH: 4.74 CM
RIGHT VENTRICULAR END-DIASTOLIC DIMENSION: 4.1 CM
SINUS: 4.26 CM
STJ: 3.31 CM
TDI LATERAL: 0.12 M/S
TDI SEPTAL: 0.08 M/S
TDI: 0.1 M/S
TRICUSPID ANNULAR PLANE SYSTOLIC EXCURSION: 2.09 CM

## 2021-11-09 PROCEDURE — 93306 TTE W/DOPPLER COMPLETE: CPT | Mod: 26,HCNC,, | Performed by: INTERNAL MEDICINE

## 2021-11-09 PROCEDURE — 93306 ECHO (CUPID ONLY): ICD-10-PCS | Mod: 26,HCNC,, | Performed by: INTERNAL MEDICINE

## 2021-11-09 PROCEDURE — 93306 TTE W/DOPPLER COMPLETE: CPT | Mod: HCNC

## 2021-11-17 ENCOUNTER — OFFICE VISIT (OUTPATIENT)
Dept: CARDIOLOGY | Facility: CLINIC | Age: 81
End: 2021-11-17
Payer: MEDICARE

## 2021-11-17 VITALS
SYSTOLIC BLOOD PRESSURE: 136 MMHG | RESPIRATION RATE: 15 BRPM | OXYGEN SATURATION: 95 % | DIASTOLIC BLOOD PRESSURE: 80 MMHG | BODY MASS INDEX: 39.21 KG/M2 | HEART RATE: 77 BPM | HEIGHT: 66 IN | WEIGHT: 244 LBS

## 2021-11-17 DIAGNOSIS — I48.0 PAF (PAROXYSMAL ATRIAL FIBRILLATION): ICD-10-CM

## 2021-11-17 DIAGNOSIS — E78.2 MIXED HYPERLIPIDEMIA: ICD-10-CM

## 2021-11-17 DIAGNOSIS — G47.33 OSA (OBSTRUCTIVE SLEEP APNEA): ICD-10-CM

## 2021-11-17 DIAGNOSIS — I77.810 AORTIC ROOT DILATATION: Primary | ICD-10-CM

## 2021-11-17 DIAGNOSIS — I70.0 AORTIC ATHEROSCLEROSIS: ICD-10-CM

## 2021-11-17 DIAGNOSIS — E66.9 NON MORBID OBESITY, UNSPECIFIED OBESITY TYPE: ICD-10-CM

## 2021-11-17 DIAGNOSIS — I10 ESSENTIAL HYPERTENSION: ICD-10-CM

## 2021-11-17 DIAGNOSIS — Z79.01 LONG TERM (CURRENT) USE OF ANTICOAGULANTS: ICD-10-CM

## 2021-11-17 PROCEDURE — 1126F PR PAIN SEVERITY QUANTIFIED, NO PAIN PRESENT: ICD-10-PCS | Mod: HCNC,CPTII,S$GLB, | Performed by: INTERNAL MEDICINE

## 2021-11-17 PROCEDURE — 1159F PR MEDICATION LIST DOCUMENTED IN MEDICAL RECORD: ICD-10-PCS | Mod: HCNC,CPTII,S$GLB, | Performed by: INTERNAL MEDICINE

## 2021-11-17 PROCEDURE — 99214 PR OFFICE/OUTPT VISIT, EST, LEVL IV, 30-39 MIN: ICD-10-PCS | Mod: HCNC,S$GLB,, | Performed by: INTERNAL MEDICINE

## 2021-11-17 PROCEDURE — 1100F PTFALLS ASSESS-DOCD GE2>/YR: CPT | Mod: HCNC,CPTII,S$GLB, | Performed by: INTERNAL MEDICINE

## 2021-11-17 PROCEDURE — 99999 PR PBB SHADOW E&M-EST. PATIENT-LVL IV: ICD-10-PCS | Mod: PBBFAC,HCNC,, | Performed by: INTERNAL MEDICINE

## 2021-11-17 PROCEDURE — 1126F AMNT PAIN NOTED NONE PRSNT: CPT | Mod: HCNC,CPTII,S$GLB, | Performed by: INTERNAL MEDICINE

## 2021-11-17 PROCEDURE — 3079F PR MOST RECENT DIASTOLIC BLOOD PRESSURE 80-89 MM HG: ICD-10-PCS | Mod: HCNC,CPTII,S$GLB, | Performed by: INTERNAL MEDICINE

## 2021-11-17 PROCEDURE — 99999 PR PBB SHADOW E&M-EST. PATIENT-LVL IV: CPT | Mod: PBBFAC,HCNC,, | Performed by: INTERNAL MEDICINE

## 2021-11-17 PROCEDURE — 3288F FALL RISK ASSESSMENT DOCD: CPT | Mod: HCNC,CPTII,S$GLB, | Performed by: INTERNAL MEDICINE

## 2021-11-17 PROCEDURE — 3288F PR FALLS RISK ASSESSMENT DOCUMENTED: ICD-10-PCS | Mod: HCNC,CPTII,S$GLB, | Performed by: INTERNAL MEDICINE

## 2021-11-17 PROCEDURE — 1160F RVW MEDS BY RX/DR IN RCRD: CPT | Mod: HCNC,CPTII,S$GLB, | Performed by: INTERNAL MEDICINE

## 2021-11-17 PROCEDURE — 93000 EKG 12-LEAD: ICD-10-PCS | Mod: HCNC,S$GLB,, | Performed by: INTERNAL MEDICINE

## 2021-11-17 PROCEDURE — 1159F MED LIST DOCD IN RCRD: CPT | Mod: HCNC,CPTII,S$GLB, | Performed by: INTERNAL MEDICINE

## 2021-11-17 PROCEDURE — 3075F PR MOST RECENT SYSTOLIC BLOOD PRESS GE 130-139MM HG: ICD-10-PCS | Mod: HCNC,CPTII,S$GLB, | Performed by: INTERNAL MEDICINE

## 2021-11-17 PROCEDURE — 93000 ELECTROCARDIOGRAM COMPLETE: CPT | Mod: HCNC,S$GLB,, | Performed by: INTERNAL MEDICINE

## 2021-11-17 PROCEDURE — 3075F SYST BP GE 130 - 139MM HG: CPT | Mod: HCNC,CPTII,S$GLB, | Performed by: INTERNAL MEDICINE

## 2021-11-17 PROCEDURE — 3079F DIAST BP 80-89 MM HG: CPT | Mod: HCNC,CPTII,S$GLB, | Performed by: INTERNAL MEDICINE

## 2021-11-17 PROCEDURE — 1160F PR REVIEW ALL MEDS BY PRESCRIBER/CLIN PHARMACIST DOCUMENTED: ICD-10-PCS | Mod: HCNC,CPTII,S$GLB, | Performed by: INTERNAL MEDICINE

## 2021-11-17 PROCEDURE — 99214 OFFICE O/P EST MOD 30 MIN: CPT | Mod: HCNC,S$GLB,, | Performed by: INTERNAL MEDICINE

## 2021-11-17 PROCEDURE — 1100F PR PT FALLS ASSESS DOC 2+ FALLS/FALL W/INJURY/YR: ICD-10-PCS | Mod: HCNC,CPTII,S$GLB, | Performed by: INTERNAL MEDICINE

## 2021-11-17 RX ORDER — METOPROLOL SUCCINATE 25 MG/1
25 TABLET, EXTENDED RELEASE ORAL DAILY
Qty: 90 TABLET | Refills: 3 | Status: SHIPPED | OUTPATIENT
Start: 2021-11-17 | End: 2022-12-22

## 2021-12-07 ENCOUNTER — TELEPHONE (OUTPATIENT)
Dept: VASCULAR SURGERY | Facility: CLINIC | Age: 81
End: 2021-12-07
Payer: MEDICARE

## 2021-12-08 ENCOUNTER — OFFICE VISIT (OUTPATIENT)
Dept: VASCULAR SURGERY | Facility: CLINIC | Age: 81
End: 2021-12-08
Payer: MEDICARE

## 2021-12-08 VITALS
HEIGHT: 66 IN | SYSTOLIC BLOOD PRESSURE: 124 MMHG | DIASTOLIC BLOOD PRESSURE: 69 MMHG | BODY MASS INDEX: 39.38 KG/M2 | HEART RATE: 58 BPM

## 2021-12-08 DIAGNOSIS — I89.0 LYMPHEDEMA OF BOTH LOWER EXTREMITIES: ICD-10-CM

## 2021-12-08 DIAGNOSIS — I87.2 VENOUS INSUFFICIENCY: Primary | ICD-10-CM

## 2021-12-08 PROCEDURE — 99214 PR OFFICE/OUTPT VISIT, EST, LEVL IV, 30-39 MIN: ICD-10-PCS | Mod: HCNC,S$GLB,, | Performed by: SURGERY

## 2021-12-08 PROCEDURE — 99214 OFFICE O/P EST MOD 30 MIN: CPT | Mod: HCNC,S$GLB,, | Performed by: SURGERY

## 2021-12-08 PROCEDURE — 99999 PR PBB SHADOW E&M-EST. PATIENT-LVL III: ICD-10-PCS | Mod: PBBFAC,HCNC,, | Performed by: SURGERY

## 2021-12-08 PROCEDURE — 99999 PR PBB SHADOW E&M-EST. PATIENT-LVL III: CPT | Mod: PBBFAC,HCNC,, | Performed by: SURGERY

## 2021-12-09 ENCOUNTER — DOCUMENTATION ONLY (OUTPATIENT)
Dept: VASCULAR SURGERY | Facility: CLINIC | Age: 81
End: 2021-12-09
Payer: MEDICARE

## 2021-12-10 ENCOUNTER — OFFICE VISIT (OUTPATIENT)
Dept: FAMILY MEDICINE | Facility: CLINIC | Age: 81
End: 2021-12-10
Payer: MEDICARE

## 2021-12-10 VITALS
HEIGHT: 66 IN | RESPIRATION RATE: 18 BRPM | BODY MASS INDEX: 40.15 KG/M2 | OXYGEN SATURATION: 96 % | TEMPERATURE: 98 F | DIASTOLIC BLOOD PRESSURE: 62 MMHG | HEART RATE: 54 BPM | WEIGHT: 249.81 LBS | SYSTOLIC BLOOD PRESSURE: 122 MMHG

## 2021-12-10 DIAGNOSIS — I78.1: Primary | ICD-10-CM

## 2021-12-10 DIAGNOSIS — R49.0 HOARSENESS, CHRONIC: ICD-10-CM

## 2021-12-10 PROCEDURE — 99999 PR PBB SHADOW E&M-EST. PATIENT-LVL V: CPT | Mod: PBBFAC,HCNC,, | Performed by: NURSE PRACTITIONER

## 2021-12-10 PROCEDURE — 99213 OFFICE O/P EST LOW 20 MIN: CPT | Mod: HCNC,S$GLB,, | Performed by: NURSE PRACTITIONER

## 2021-12-10 PROCEDURE — 99999 PR PBB SHADOW E&M-EST. PATIENT-LVL V: ICD-10-PCS | Mod: PBBFAC,HCNC,, | Performed by: NURSE PRACTITIONER

## 2021-12-10 PROCEDURE — 99213 PR OFFICE/OUTPT VISIT, EST, LEVL III, 20-29 MIN: ICD-10-PCS | Mod: HCNC,S$GLB,, | Performed by: NURSE PRACTITIONER

## 2021-12-13 ENCOUNTER — HOSPITAL ENCOUNTER (OUTPATIENT)
Dept: RADIOLOGY | Facility: HOSPITAL | Age: 81
Discharge: HOME | End: 2021-12-13
Attending: PODIATRIST
Payer: MEDICARE

## 2021-12-13 ENCOUNTER — OFFICE VISIT (OUTPATIENT)
Dept: PODIATRY | Facility: CLINIC | Age: 81
End: 2021-12-13
Payer: MEDICARE

## 2021-12-13 VITALS — HEIGHT: 66 IN | BODY MASS INDEX: 40.15 KG/M2 | WEIGHT: 249.81 LBS

## 2021-12-13 DIAGNOSIS — B35.1 ONYCHOMYCOSIS DUE TO DERMATOPHYTE: ICD-10-CM

## 2021-12-13 DIAGNOSIS — M79.671 RIGHT FOOT PAIN: Primary | ICD-10-CM

## 2021-12-13 DIAGNOSIS — M79.671 RIGHT FOOT PAIN: ICD-10-CM

## 2021-12-13 PROCEDURE — 99999 PR PBB SHADOW E&M-EST. PATIENT-LVL III: ICD-10-PCS | Mod: PBBFAC,HCNC,, | Performed by: PODIATRIST

## 2021-12-13 PROCEDURE — 73630 X-RAY EXAM OF FOOT: CPT | Mod: TC,HCNC,FY,PO,RT

## 2021-12-13 PROCEDURE — 99999 PR PBB SHADOW E&M-EST. PATIENT-LVL III: CPT | Mod: PBBFAC,HCNC,, | Performed by: PODIATRIST

## 2021-12-13 PROCEDURE — 73630 X-RAY EXAM OF FOOT: CPT | Mod: 26,HCNC,RT, | Performed by: RADIOLOGY

## 2021-12-13 PROCEDURE — 99203 PR OFFICE/OUTPT VISIT, NEW, LEVL III, 30-44 MIN: ICD-10-PCS | Mod: HCNC,S$GLB,, | Performed by: PODIATRIST

## 2021-12-13 PROCEDURE — 73630 XR FOOT COMPLETE 3 VIEW RIGHT: ICD-10-PCS | Mod: 26,HCNC,RT, | Performed by: RADIOLOGY

## 2021-12-13 PROCEDURE — 99203 OFFICE O/P NEW LOW 30 MIN: CPT | Mod: HCNC,S$GLB,, | Performed by: PODIATRIST

## 2022-01-26 ENCOUNTER — OFFICE VISIT (OUTPATIENT)
Dept: OTOLARYNGOLOGY | Facility: CLINIC | Age: 82
End: 2022-01-26
Payer: MEDICARE

## 2022-01-26 DIAGNOSIS — Z11.52 ENCOUNTER FOR SCREENING FOR COVID-19: Primary | ICD-10-CM

## 2022-01-26 DIAGNOSIS — R49.0 HOARSENESS, CHRONIC: ICD-10-CM

## 2022-01-26 DIAGNOSIS — Z53.20 PROCEDURE NOT CARRIED OUT BECAUSE OF PATIENT'S DECISION: ICD-10-CM

## 2022-01-26 LAB
CTP QC/QA: YES
SARS-COV-2 AG RESP QL IA.RAPID: POSITIVE

## 2022-01-26 PROCEDURE — 87811 SARS-COV-2 COVID19 W/OPTIC: CPT | Mod: S$GLB,,, | Performed by: OTOLARYNGOLOGY

## 2022-01-26 PROCEDURE — 99499 NO LOS: ICD-10-PCS | Mod: S$GLB,,, | Performed by: OTOLARYNGOLOGY

## 2022-01-26 PROCEDURE — 99499 UNLISTED E&M SERVICE: CPT | Mod: S$GLB,,, | Performed by: OTOLARYNGOLOGY

## 2022-01-26 PROCEDURE — 87811 SARS CORONAVIRUS 2 ANTIGEN POCT, MANUAL READ: ICD-10-PCS | Mod: S$GLB,,, | Performed by: OTOLARYNGOLOGY

## 2022-01-26 NOTE — PROGRESS NOTES
The patient left the office before the visit was finished. Patient tested positive for covid , appointment rescheduled

## 2022-01-31 ENCOUNTER — PES CALL (OUTPATIENT)
Dept: ADMINISTRATIVE | Facility: CLINIC | Age: 82
End: 2022-01-31
Payer: MEDICARE

## 2022-02-03 ENCOUNTER — PES CALL (OUTPATIENT)
Dept: ADMINISTRATIVE | Facility: CLINIC | Age: 82
End: 2022-02-03
Payer: MEDICARE

## 2022-02-07 NOTE — IP AVS SNAPSHOT
[place] [place] [address] [place] [address]  Phone: [phone]           Patient Discharge Instructions     Our goal is to set you up for success. This packet includes information on your condition, medications, and your home care. It will help you to care for yourself so you don't get sicker and need to go back to the hospital.     Please ask your nurse if you have any questions.        There are many details to remember when preparing to leave the hospital. Here is what you will need to do:    1. Take your medicine. If you are prescribed medications, review your Medication List in the following pages. You may have new medications to  at the pharmacy and others that you'll need to stop taking. Review the instructions for how and when to take your medications. Talk with your doctor or nurses if you are unsure of what to do.     2. Go to your follow-up appointments. Specific follow-up information is listed in the following pages. Your may be contacted by a transition nurse or clinical provider about future appointments. Be sure we have all of the phone numbers to reach you, if needed. Please contact your provider's office if you are unable to make an appointment.     3. Watch for warning signs. Your doctor or nurse will give you detailed warning signs to watch for and when to call for assistance. These instructions may also include educational information about your condition. If you experience any of warning signs to your health, call your doctor.               [facility]  Unless otherwise directed by your provider, please contact [facility], our nurse care line that is available for 24/7 assistance.     [phone] (toll-free)    Registered nurses in the [facility] provide clinical advisement, health education, appointment booking, and other advisory services.                    ** Verify the list of medication(s) below is accurate and up to date.    Amanda DIAZ Lara  1949    SURGERY: cataracts   SURGERY DATE: 2022  SURGEON: Dr. Jasper Alaniz.     Chief Complaint   Patient presents with   • Pre-Op Exam     cataract surg on  and 3/2 Dr. Jasper Alaniz at [facility] [place]       HISTORY OF PRESENT ILLNESS    Pt with bilateral cataracts.  Has been bothering her for at least a year.  Pt with blurriness that is not correctable.   Pt notes problems with his vision.      Pt with Sciatica limiting her exercise.  Pt can go up and down the stairs without any difficulty.  However is limited to about 15 min of walking before the sciatica bothers her.  Pt6 with h/os CAD however, sees Dr. Kelley     Past Surgical History:   Procedure Laterality Date   •  section, classic     • Cholecystectomy     • Dialysis fistula creation     • Hysteroscopy         RISKS:  PREVIOUS SURGERY: Yes  PROBLEMS WITH ANESTHESIA:  No    CV:  CHF No    PREVIOUS MI No       ARRYTHMIA Yes  A-FIB under control.   CAD No    PULM:  COPD No   BRIGETTE No  GI:  NO ACTIVE HEPATITIS     NO CHRONIC LIVER DISEASE  HEME:   NO DVT/PE     NO ACTIVE BLEEDING DISORDER       ANTICOAGULATION Yes       NEURO:  NO SEIZURES     CVA: No    Review of Systems   Constitutional: Negative for chills, fatigue, fever and unexpected weight change.   HENT: Negative for congestion, ear pain, hearing loss, nosebleeds, postnasal drip, rhinorrhea, sinus pressure, sinus pain, sneezing, sore throat, tinnitus, trouble swallowing and voice change.    Eyes: Positive for visual disturbance.   Respiratory: Negative for cough, chest tightness, shortness of breath and wheezing.    Cardiovascular: Negative for chest pain, palpitations and leg swelling.   Gastrointestinal: Negative for abdominal pain, blood in stool, constipation, diarrhea, nausea and vomiting.   Endocrine: Negative for cold intolerance, heat intolerance, polydipsia and polyuria.   Genitourinary: Negative for dysuria, frequency,  Outpatient Note for New Patient Visit    Patient:  Bobby Norman                                               : 1985  Age: 30 y.o. MRN: [id number]  Date : 2019    History Obtained From:  patient    CHIEF COMPLAINT:  Sore throat, abdominal cramps, N/V    HISTORY OF PRESENT ILLNESS:   The patient is a 30 y.o. female who presents with sore throat, cough, N/V, abdominal cramps, and diffuse arthralgias since Tuesday. Her son had similar symptoms on  and they resolved. Her  is an zanda [facility]  and some of his teammates have had similar symptoms, but they told patient that their symptoms were not as severe as hers. She hasn't been able to keep food or liquids down since 10 am. She felt lightheaded when moving from sitting to standing. Her  had to assist her while she ambulated into the building. She is sobbing because of her weakness and diffuse myalgias/arthralgias. She is unsure if she had fevers at home. Her  is going out of town tomorrow and she is worried that she won't be feeling well enough to take care of their children alone. Past Medical History:    No past medical history on file. Past Surgical History:    No past surgical history on file. Family History:   No family history on file. SocialHistory:   TOBACCO:   has no tobacco history on file. ETOH:   has no alcohol history on file. OCCUPATION:      Allergies: Patient has no allergy information on record. Current Medications:    Prior to Admission medications    Not on File       REVIEW OF SYSTEMS:  Review of Systems   Constitutional: Positive for fatigue. Respiratory: Positive for cough. Negative for shortness of breath. Cardiovascular: Negative for chest pain. Gastrointestinal: Positive for abdominal pain, nausea and vomiting. Negative for diarrhea. Genitourinary: Negative for dysuria. Musculoskeletal: Positive for arthralgias and myalgias.    Neurological: Positive for Carry this with you in case of emergency. If your medications have changed, please notify your healthcare provider.             Medication List      START taking these medications        Additional Info                      ciprofloxacin HCl 500 MG tablet   Commonly known as:  CIPRO   Quantity:  4 tablet   Refills:  0   Dose:  500 mg    Instructions:  Take 1 tablet (500 mg total) by mouth 2 (two) times daily.     Begin Date    AM    Noon    PM    Bedtime         CHANGE how you take these medications        Additional Info                      * hydrocodone-acetaminophen 5-325mg 5-325 mg per tablet   Commonly known as:  NORCO   Quantity:  30 tablet   Refills:  0   Dose:  1 tablet   What changed:  Another medication with the same name was added. Make sure you understand how and when to take each.    Instructions:  Take 1 tablet by mouth every 12 (twelve) hours as needed for Pain.     Begin Date    AM    Noon    PM    Bedtime       * hydrocodone-acetaminophen 5-325mg 5-325 mg per tablet   Commonly known as:  NORCO   Quantity:  20 tablet   Refills:  0   Dose:  1 tablet   What changed:  You were already taking a medication with the same name, and this prescription was added. Make sure you understand how and when to take each.    Instructions:  Take 1 tablet by mouth every 6 (six) hours as needed for Pain.     Begin Date    AM    Noon    PM    Bedtime       * Notice:  This list has 2 medication(s) that are the same as other medications prescribed for you. Read the directions carefully, and ask your doctor or other care provider to review them with you.      CONTINUE taking these medications        Additional Info                      clotrimazole-betamethasone 1-0.05% cream   Commonly known as:  LOTRISONE   Quantity:  90 g   Refills:  1    Instructions:  Apply topically 2 (two) times daily.     Begin Date    AM    Noon    PM    Bedtime       diclofenac sodium 1 % Gel   Quantity:  100 g   Refills:  3   Dose:  2 g     hematuria and urgency.   Musculoskeletal: Positive for arthralgias and gait problem. Negative for myalgias.   Skin: Negative for rash.   Neurological: Negative for dizziness, tremors, light-headedness, numbness and headaches.   Hematological: Negative for adenopathy.   Psychiatric/Behavioral: Negative for confusion and sleep disturbance. The patient is not nervous/anxious.        Past Medical History:   Diagnosis Date   • Atrial fibrillation with rapid ventricular response (CMS/[facility]) 12/10/2018   • Eczema 7/8/2017   • ESRD (end stage renal disease) on dialysis (CMS/[facility]) 5/26/2017   • Essential hypertension 11/17/2017   • Heart failure, unspecified (CMS/[facility]) 2/26/2017   • Kidney stone on left side 5/26/2017   • Mixed hyperlipidemia 10/1/2018   • Noncompliance    • Primary osteoarthritis of right knee 7/8/2017   • SOB (shortness of breath) 12/13/2018   • TB (tuberculosis)    • Type 2 diabetes mellitus with chronic kidney disease on chronic dialysis, with long-term current use of insulin (CMS/[facility]) 9/17/2018       Family History   Problem Relation Age of Onset   • Cancer, Ovarian Mother         ovarian cystadenoma ([place])   • Heart Father    • Dialysis/ESRD Sister    • Other Sister         COVID   • Patient is unaware of any medical problems Sister    • Patient is unaware of any medical problems Sister    • Patient is unaware of any medical problems Sister    • Patient is unaware of any medical problems Sister    • Stroke Sister    • Patient is unaware of any medical problems Sister    • Patient is unaware of any medical problems Sister    • Dialysis/ESRD Brother    • Other Brother         HEMORRHAGIA PANCREATITIS   • Patient is unaware of any medical problems Brother        Social History     Tobacco Use   • Smoking status: Never Smoker   • Smokeless tobacco: Never Used   Vaping Use   • Vaping Use: never used   Substance Use Topics   • Alcohol use: No   • Drug use: No       Current Outpatient Medications   Medication Sig  weakness and light-headedness. Physical Exam:      Vitals: There were no vitals taken for this visit. There is no height or weight on file to calculate BMI. Wt Readings from Last 3 Encounters:   No data found for Wt     Physical Exam   Constitutional: She is oriented to person, place, and time. She appears well-developed and well-nourished. Toxic appearing, uncomfortable, in distress   HENT:   Erythema of palatine tonsils   Eyes: Conjunctivae and EOM are normal. Right eye exhibits no discharge. Left eye exhibits no discharge. Neck: Normal range of motion. Neck supple. Cardiovascular: Regular rhythm and normal heart sounds. No murmur heard. tachycardia   Pulmonary/Chest: Effort normal and breath sounds normal. No respiratory distress. She has no wheezes. She has no rales. Abdominal: Soft. There is no guarding. Musculoskeletal: She exhibits no edema. Lymphadenopathy:     She has no cervical adenopathy. Neurological: She is alert and oriented to person, place, and time. Skin: Skin is warm and dry. Psychiatric: She has a normal mood and affect. Her behavior is normal. Judgment and thought content normal.          Assessment/Plan:      Patient appears toxic. She qualifies for orthostatic hypotension. She can barely ambulate and required assistance from sitting to standing while obtaining orthostatic vitals. She is afebrile, but has been taking Dayquil, which contains Acetaminophen. Patient will be sent immediately to ED. ED physician was contacted and given sign out on the patient by me. Patient assisted to personal vehicle in wheelchair by RN.  to drive her to ED now. She is likely dehydrated and will require IVF resuscitation and further testing.     Ceferino Guardado M.D.   5/30/2019, 3:13 PM Dispense Refill   • carvedilol (COREG) 12.5 MG tablet Take 1 tablet by mouth daily.     • warfarin (COUMADIN) 2.5 MG tablet 2.5mg Mon-Saturday, 1.25 on Sunday     • atorvastatin (LIPITOR) 10 MG tablet TAKE 1 TABLET BY MOUTH DAILY 90 tablet 0   • amLODIPine (NORVASC) 10 MG tablet Take 1 tablet by mouth daily. 90 tablet 1   • Methoxy PEG-Epoetin Beta (MIRCERA IJ) 30 mcg.     • ergocalciferol (DRISDOL) 1.25 mg (50,000 units) capsule Take 1 capsule by mouth.     • Ferric Citrate (Auryxia) 1  MG(Fe) Tab Take one tablets by mouth three times per day     • glimepiride (AMARYL) 2 MG tablet TAKE 1 TABLET BY MOUTH DAILY BEFORE BREAKFAST 90 tablet 1   • Auryxia 1  MG(Fe) Tab Take 2 g by mouth 3 times daily.      • blood glucose test strip Test blood sugar 3 times daily as directed. Diagnosis: Diabetes Mellitus  ICD 10: E11.22  Meter: freestyle 100 strip 3     No current facility-administered medications for this visit.       ALLERGIES:   Allergen Reactions   • Pioglitazone RASH   • Adhesive   (Environmental) PRURITUS   • Alcohol   (Food Or Med) PRURITUS       No components found for: 30DAYS    Visit Vitals  BP (!) 140/60   Pulse 70   Temp 96.3 °F (35.7 °C)   Wt 72.1 kg (159 lb)   LMP  (LMP Unknown)   SpO2 (!) 84%   BMI 28.17 kg/m²       Physical Exam  Vitals and nursing note reviewed.   Constitutional:       General: She is not in acute distress.     Appearance: She is well-developed.   HENT:      Head: Normocephalic and atraumatic.      Right Ear: Tympanic membrane, ear canal and external ear normal.      Left Ear: Tympanic membrane, ear canal and external ear normal.      Nose: Nose normal.      Mouth/Throat:      Mouth: Mucous membranes are moist.      Pharynx: Oropharynx is clear. No oropharyngeal exudate.      Neck: Normal range of motion and neck supple.   Eyes:      General: Lids are normal. No scleral icterus.        Right eye: No discharge.         Left eye: No discharge.      Conjunctiva/sclera:  Instructions:  Apply 2 g topically once daily.     Begin Date    AM    Noon    PM    Bedtime       fish oil-omega-3 fatty acids 300-1,000 mg capsule   Refills:  0   Dose:  1 g    Instructions:  Take 1 g by mouth once daily.     Begin Date    AM    Noon    PM    Bedtime       ibuprofen 600 MG tablet   Commonly known as:  ADVIL,MOTRIN   Quantity:  90 tablet   Refills:  0    Instructions:  TAKE 1 TABLET EVERY 8 HOURS AS NEEDED FOR PAIN     Begin Date    AM    Noon    PM    Bedtime       lisinopril 40 MG tablet   Commonly known as:  PRINIVIL,ZESTRIL   Quantity:  90 tablet   Refills:  1    Instructions:  TAKE 1 TABLET ONE TIME DAILY     Begin Date    AM    Noon    PM    Bedtime       lovastatin 40 MG tablet   Commonly known as:  MEVACOR   Quantity:  90 tablet   Refills:  1    Instructions:  TAKE 1 TABLET EVERY EVENING     Begin Date    AM    Noon    PM    Bedtime       multivitamin per tablet   Commonly known as:  THERAGRAN   Refills:  0   Dose:  1 tablet    Instructions:  Take 1 tablet by mouth Daily. 1 Tablet Oral Every day     Begin Date    AM    Noon    PM    Bedtime       omeprazole 20 MG capsule   Commonly known as:  PRILOSEC   Quantity:  90 capsule   Refills:  0    Instructions:  TAKE 1 CAPSULE ONE TIME DAILY     Begin Date    AM    Noon    PM    Bedtime       oxybutynin 15 MG Tr24   Commonly known as:  DITROPAN XL   Quantity:  30 tablet   Refills:  11   Dose:  15 mg    Instructions:  Take 1 tablet (15 mg total) by mouth once daily.     Begin Date    AM    Noon    PM    Bedtime       oxycodone-acetaminophen 5-325 mg per tablet   Commonly known as:  PERCOCET   Quantity:  25 tablet   Refills:  0   Dose:  1 tablet    Instructions:  Take 1 tablet by mouth every 4 (four) hours as needed for Pain.     Begin Date    AM    Noon    PM    Bedtime       tamsulosin 0.4 mg Cp24   Commonly known as:  FLOMAX   Quantity:  20 capsule   Refills:  0   Dose:  0.4 mg    Instructions:  Take 1 capsule (0.4 mg total) by mouth once daily.      Begin Date    AM    Noon    PM    Bedtime            Where to Get Your Medications      These medications were sent to [facility] [place], [place] - [address]  [address], [place] [address]     Phone:  [phone]     ciprofloxacin HCl 500 MG tablet    hydrocodone-acetaminophen 5-325mg 5-325 mg per tablet                  Please bring to all follow up appointments:    1. A copy of your discharge instructions.  2. All medicines you are currently taking in their original bottles.  3. Identification and insurance card.    Please arrive 15 minutes ahead of scheduled appointment time.    Please call 24 hours in advance if you must reschedule your appointment and/or time.        Your Scheduled Appointments     Mar 07, 2017  8:55 AM CST   Fasting Lab with [facility], [facility]    [address] [place] [id number] [phone]            Mar 07, 2017  9:00 AM CST   Color Flow Doppler Echo with ECHO, [facility]    [address]  [place] [id number] [phone]            Mar 07, 2017 10:00 AM CST   24 Hour Holter with HOLTER, [facility] ([facility])    [address]  [place] [id number] [phone]            Mar 13, 2017 10:00 AM CDT   Post OP with Yvonne Weaver MD   [facility] ([facility])    [address]  [address]  [place] [id number] [phone]            Mar 15, 2017 10:20 AM CDT   Established Patient Visit with Nahid Hidalgo MD   [facility] ([facility])    [address]  [place] [id number] [phone]              Follow-up Information     Follow up with Yvonne Weaver MD In 3 weeks.    Specialty:  Urology    Why:  For post-op follow up with [facility] x-ray    Contact information:    [address]  [address]  [place] [address]  [phone]       Conjunctivae normal.      Right eye: Right conjunctiva is not injected. No exudate.     Left eye: Left conjunctiva is not injected. No exudate.     Pupils: Pupils are equal, round, and reactive to light.   Neck:      Thyroid: No thyromegaly.      Vascular: No JVD.   Cardiovascular:      Rate and Rhythm: Normal rate. Rhythm irregular.      Heart sounds: Normal heart sounds. No murmur heard.    No friction rub.   Pulmonary:      Effort: Pulmonary effort is normal. No accessory muscle usage or respiratory distress.      Breath sounds: Normal breath sounds. No stridor. No wheezing, rhonchi or rales.   Chest:      Chest wall: No tenderness.   Abdominal:      General: Bowel sounds are normal. There is no distension.      Palpations: Abdomen is soft. There is no mass.      Tenderness: There is no abdominal tenderness. There is no guarding or rebound.   Musculoskeletal:         General: Normal range of motion.   Lymphadenopathy:      Head:      Right side of head: No submental, submandibular, preauricular, posterior auricular or occipital adenopathy.      Left side of head: No submental, submandibular, preauricular, posterior auricular or occipital adenopathy.      Cervical: No cervical adenopathy.      Right cervical: No superficial, deep or posterior cervical adenopathy.     Left cervical: No superficial, deep or posterior cervical adenopathy.   Skin:     General: Skin is warm and dry.   Neurological:      Mental Status: She is alert and oriented to person, place, and time.      Motor: No abnormal muscle tone.   Psychiatric:         Behavior: Behavior normal.         Thought Content: Thought content normal.           LABS  Sodium (mmol/L)   Date Value   12/22/2020 140     Potassium (mmol/L)   Date Value   12/22/2020 3.6     Chloride (mmol/L)   Date Value   12/22/2020 103     Glucose (mg/dL)   Date Value   12/22/2020 140 (H)     Calcium (mg/dL)   Date Value   12/22/2020 9.5     Carbon Dioxide (mmol/L)   Date Value      Discharge Instructions     Future Orders    Activity as tolerated     Call MD for:  difficulty breathing, headache or visual disturbances     Call MD for:  persistent nausea and vomiting     Call MD for:  severe uncontrolled pain     Call MD for:  temperature >100.4     Diet general     Questions:    Total calories:      Fat restriction, if any:      Protein restriction, if any:      Na restriction, if any:      Fluid restriction:      Additional restrictions:      No dressing needed         Discharge Instructions       Expect blood with urination. Drink plenty of fluids.  Have abdominal x-ray ([facility]) done prior to follow up visit on 3/13/2017    BATHING/DRESSING:  ? Ok to shower tomorrow    ACTIVITY LEVEL: If you have received sedation or an anesthetic, you may feel sleepy for several hours. Rest until you are more awake. Gradually resume your normal activities.    No heavy lifting.      DIET: You may resume your home diet. If nausea is present, increase your diet gradually with fluids and bland foods.  Medications: Pain medication should be taken only if needed and as directed. If antibiotics are prescribed, the medication should be taken until completed. You will be given an updated list of you medications.  ? No driving, alcoholic beverages or signing legal documents for next 24 hours or while taking pain medication    CALL THE DOCTOR:    For any obvious bleeding (some dried blood over the incision is normal).    Some blood in your urine is normal.     Redness, swelling, foul smell around incision or fever over 101.   Shortness of breath, Coughing Up Bloody Sputum, or Pains or Swelling in your Calves..   Persistent pain or nausea not relieved by medication.   Problems urinating - unable to urinate or heavy bleeding (with our without clots) in urine.    If any unusual problems or difficulties occur contact your doctor. If you cannot contact your doctor but feel your signs and symptoms warrant a    2020 25     BUN (mg/dL)   Date Value   2020 47 (H)     Creatinine (mg/dL)   Date Value   2020 5.27 (H)         WBC (K/mcL)   Date Value   2020 8.4     RBC (mil/mcL)   Date Value   2020 4.23     HCT (%)   Date Value   2020 37.3     HGB (g/dL)   Date Value   2021 11.4 (L)     PLT (K/mcL)   Date Value   2020 244     2022     Component   Ventricular Rate EKG/Min (BPM)   61    QRS-Interval (MSEC)   83    QT-Interval (MSEC)   411    QTc   413    R Axis (Degrees)   75    T Axis (Degrees)   73    REPORT TEXT   ATRIAL FIBRILLATION   SEPTAL MYOCARDIAL INFARCTION, OF INDETERMINATE AGE   ABNORMAL ECG   UNCONFIRMED REPORT   Confirmed by NAN BLOOM [facility] ([id number]) on 2022 2:14:07 PM      Transthoracic Echocardiogram (TTE)     Patient: Amanda Lara   Study Date/Time:  Dec 22 2021 10:00AM  MRN:     [id number]                  FIN#:             [id number]  :     1949               Ht/Wt:            160cm 72.6kg  Age:     72                       BSA/BMI:          1.76m^2 28.3kg/m^2  Gender:  F                        Baseline BP:      122 / 70  Ordering Physician:   Eliazar Kelley MD     Referring Physician:  Eliazar Kelley MD     Attending Physician:  Eliazar Kelley MD  Performing Physician: Chas, Cardiology  Diagnostic Physician: Angelito De Leon MD.  Sonographer:          Beverly Jones RDCS     --------------------------------------------------------------------------  INDICATIONS:   Atrial fibrillation. Sob.     --------------------------------------------------------------------------  STUDY CONCLUSIONS  SUMMARY:     1. Left ventricle: The cavity size is normal. Wall thickness is normal.     The ejection fraction was measured by 3D assessment. The ejection     fraction is 56%.  2. Baseline ECG: Atrial fibrillation.              ASSESSMENT/PLAN    Amanda Lara  Is here for a preoperative clearance for cataract surgery.    Patient  is  medically optimized for surgery.       Amanda Lara  is medically cleared for the surgery      Amanda was seen today for pre-op exam.    Diagnoses and all orders for this visit:    Senile cataract, unspecified age-related cataract type, unspecified laterality    Preop general physical exam    Asymptomatic menopause  -     BD DEXA AXIAL SKELETON; Future    Essential hypertension  -     Cancel: ELECTROCARDIOGRAM 12-LEAD  -     ELECTROCARDIOGRAM 12-LEAD; Future    Mixed hyperlipidemia    Type 2 diabetes mellitus with chronic kidney disease on chronic dialysis, without long-term current use of insulin (CMS/[facility])  -     GLYCOHEMOGLOBIN; Future  -     GLYCOHEMOGLOBIN    ESRD (end stage renal disease) on dialysis (CMS/[facility])    Congestive heart failure, unspecified HF chronicity, unspecified heart failure type (CMS/Formerly [facility])    Persistent atrial fibrillation (CMS/Formerly [facility])    ESRD:  Kidney function is stable  Pt is going to dialysis 2 x a week    HTN:  BP has been stable.  Tolerating medication    DM:  Seeing Dr. Alaniz  Getting cataracts surgery done  Check hba1c.  Will get it done tomorrown    Lipid:  Has been under control.  Last checked in 12/2021.  Total cholesterol at 166.  LDL is at 86.      Atrial fibrillation: Patient is under the care of her cardiologist and electrophysiologist.  Recent EKG shows A. fib and echocardiogram is unremarkable.  Patient is clinically stable and cleared for surgery.    CHF: Patient is euvolemic and stable.  No follow-ups on file.      Electronically signed by: Sunita Collazo MD       physicians attention return to the emergency room.          Primary Diagnosis     Your primary diagnosis was:  Kidney Stone      Admission Information     Date & Time Provider Department CSN    2/27/2017  5:15 AM Yvonne Weaver MD [facility] [id number]      Care Providers     Provider Role Specialty Primary office phone    Yvonne Weaver MD Attending Provider Urology [phone]    Yvonne Weaver MD Surgeon  Urology [phone]      Your Vitals Were     BP                   98/57           Recent Lab Values        6/7/2013 12/8/2016                        8:12 AM  1:00 PM          A1C 6.1 5.8          Comment for A1C at  1:00 PM on 12/8/2016:  According to ADA guidelines, hemoglobin A1C <7.0% represents  optimal control in non-pregnant diabetic patients.  Different  metrics may apply to specific populations.   Standards of Medical Care in Diabetes - 2016.  For the purpose of screening for the presence of diabetes:  <5.7%     Consistent with the absence of diabetes  5.7-6.4%  Consistent with increasing risk for diabetes   (prediabetes)  >or=6.5%  Consistent with diabetes  Currently no consensus exists for use of hemoglobin A1C  for diagnosis of diabetes for children.        Allergies as of 2/27/2017     No Known Allergies      Advance Directives     An advance directive is a document which, in the event you are no longer able to make decisions for yourself, tells your healthcare team what kind of treatment you do or do not want to receive, or who you would like to make those decisions for you.  If you do not currently have an advance directive, [facility] encourages you to create one.  For more information call:  [phone]), [phone] ([phone]),  or log on to [email or web address].        Language Assistance Services     ATTENTION: Language assistance services are available, free of charge. Please call [phone].      ATENCIÓN: Samia bobo  tiene a coyle disposición servicios gratuitos de asistencia lingüística. Llame al [phone].     [facility] Ý: N?u b?n nói Ti?ng Vi?t, có các d?ch v? h? tr? ngôn ng? mi?n phí dành cho b?n. G?i s? [phone].        [facility] Sign-Up     Activating your [facility] account is as easy as 1-2-3!     1) Visit [email or web address], select Sign Up Now, enter this activation code and your date of birth, then select Next.  [email or web address]  Expires: 3/16/2017  3:05 PM      2) Create a username and password to use when you visit [facility] in the future and select a security question in case you lose your password and select Next.    3) Enter your e-mail address and click Sign Up!    Additional Information  If you have questions, please e-mail [email or web address] or call [phone] to talk to our [facility] staff. Remember, [facility] is NOT to be used for urgent needs. For medical emergencies, dial 911.          [facility] complies with applicable Federal civil rights laws and does not discriminate on the basis of race, color, national origin, age, disability, or sex.

## 2022-02-16 ENCOUNTER — OFFICE VISIT (OUTPATIENT)
Dept: FAMILY MEDICINE | Facility: CLINIC | Age: 82
End: 2022-02-16
Payer: MEDICARE

## 2022-02-16 VITALS
BODY MASS INDEX: 40.15 KG/M2 | HEIGHT: 66 IN | DIASTOLIC BLOOD PRESSURE: 80 MMHG | HEART RATE: 81 BPM | OXYGEN SATURATION: 97 % | TEMPERATURE: 97 F | RESPIRATION RATE: 17 BRPM | WEIGHT: 249.81 LBS | SYSTOLIC BLOOD PRESSURE: 132 MMHG

## 2022-02-16 DIAGNOSIS — N39.46 MIXED INCONTINENCE URGE AND STRESS: ICD-10-CM

## 2022-02-16 DIAGNOSIS — N18.30 STAGE 3 CHRONIC KIDNEY DISEASE, UNSPECIFIED WHETHER STAGE 3A OR 3B CKD: ICD-10-CM

## 2022-02-16 DIAGNOSIS — I48.92 ATRIAL FLUTTER, UNSPECIFIED TYPE: ICD-10-CM

## 2022-02-16 DIAGNOSIS — I48.0 PAF (PAROXYSMAL ATRIAL FIBRILLATION): ICD-10-CM

## 2022-02-16 DIAGNOSIS — D68.59 HYPERCOAGULABLE STATE: ICD-10-CM

## 2022-02-16 DIAGNOSIS — I10 ESSENTIAL HYPERTENSION: ICD-10-CM

## 2022-02-16 DIAGNOSIS — E78.2 MIXED HYPERLIPIDEMIA: ICD-10-CM

## 2022-02-16 DIAGNOSIS — I70.0 AORTIC ATHEROSCLEROSIS: ICD-10-CM

## 2022-02-16 DIAGNOSIS — I77.810 AORTIC ROOT DILATATION: ICD-10-CM

## 2022-02-16 DIAGNOSIS — Z00.00 ENCOUNTER FOR PREVENTIVE HEALTH EXAMINATION: Primary | ICD-10-CM

## 2022-02-16 PROCEDURE — 1159F PR MEDICATION LIST DOCUMENTED IN MEDICAL RECORD: ICD-10-PCS | Mod: HCNC,CPTII,S$GLB, | Performed by: NURSE PRACTITIONER

## 2022-02-16 PROCEDURE — 1160F RVW MEDS BY RX/DR IN RCRD: CPT | Mod: HCNC,CPTII,S$GLB, | Performed by: NURSE PRACTITIONER

## 2022-02-16 PROCEDURE — 1170F PR FUNCTIONAL STATUS ASSESSED: ICD-10-PCS | Mod: HCNC,CPTII,S$GLB, | Performed by: NURSE PRACTITIONER

## 2022-02-16 PROCEDURE — 1101F PR PT FALLS ASSESS DOC 0-1 FALLS W/OUT INJ PAST YR: ICD-10-PCS | Mod: HCNC,CPTII,S$GLB, | Performed by: NURSE PRACTITIONER

## 2022-02-16 PROCEDURE — 1101F PT FALLS ASSESS-DOCD LE1/YR: CPT | Mod: HCNC,CPTII,S$GLB, | Performed by: NURSE PRACTITIONER

## 2022-02-16 PROCEDURE — 3288F FALL RISK ASSESSMENT DOCD: CPT | Mod: HCNC,CPTII,S$GLB, | Performed by: NURSE PRACTITIONER

## 2022-02-16 PROCEDURE — 3075F PR MOST RECENT SYSTOLIC BLOOD PRESS GE 130-139MM HG: ICD-10-PCS | Mod: HCNC,CPTII,S$GLB, | Performed by: NURSE PRACTITIONER

## 2022-02-16 PROCEDURE — G0439 PPPS, SUBSEQ VISIT: HCPCS | Mod: HCNC,S$GLB,, | Performed by: NURSE PRACTITIONER

## 2022-02-16 PROCEDURE — 3079F PR MOST RECENT DIASTOLIC BLOOD PRESSURE 80-89 MM HG: ICD-10-PCS | Mod: HCNC,CPTII,S$GLB, | Performed by: NURSE PRACTITIONER

## 2022-02-16 PROCEDURE — G0439 PR MEDICARE ANNUAL WELLNESS SUBSEQUENT VISIT: ICD-10-PCS | Mod: HCNC,S$GLB,, | Performed by: NURSE PRACTITIONER

## 2022-02-16 PROCEDURE — 99999 PR PBB SHADOW E&M-EST. PATIENT-LVL IV: ICD-10-PCS | Mod: PBBFAC,HCNC,, | Performed by: NURSE PRACTITIONER

## 2022-02-16 PROCEDURE — 1160F PR REVIEW ALL MEDS BY PRESCRIBER/CLIN PHARMACIST DOCUMENTED: ICD-10-PCS | Mod: HCNC,CPTII,S$GLB, | Performed by: NURSE PRACTITIONER

## 2022-02-16 PROCEDURE — 1159F MED LIST DOCD IN RCRD: CPT | Mod: HCNC,CPTII,S$GLB, | Performed by: NURSE PRACTITIONER

## 2022-02-16 PROCEDURE — 3079F DIAST BP 80-89 MM HG: CPT | Mod: HCNC,CPTII,S$GLB, | Performed by: NURSE PRACTITIONER

## 2022-02-16 PROCEDURE — 3288F PR FALLS RISK ASSESSMENT DOCUMENTED: ICD-10-PCS | Mod: HCNC,CPTII,S$GLB, | Performed by: NURSE PRACTITIONER

## 2022-02-16 PROCEDURE — 99999 PR PBB SHADOW E&M-EST. PATIENT-LVL IV: CPT | Mod: PBBFAC,HCNC,, | Performed by: NURSE PRACTITIONER

## 2022-02-16 PROCEDURE — 3075F SYST BP GE 130 - 139MM HG: CPT | Mod: HCNC,CPTII,S$GLB, | Performed by: NURSE PRACTITIONER

## 2022-02-16 PROCEDURE — 99499 RISK ADDL DX/OHS AUDIT: ICD-10-PCS | Mod: S$GLB,,, | Performed by: NURSE PRACTITIONER

## 2022-02-16 PROCEDURE — 99499 UNLISTED E&M SERVICE: CPT | Mod: S$GLB,,, | Performed by: NURSE PRACTITIONER

## 2022-02-16 PROCEDURE — 1170F FXNL STATUS ASSESSED: CPT | Mod: HCNC,CPTII,S$GLB, | Performed by: NURSE PRACTITIONER

## 2022-02-16 NOTE — PROGRESS NOTES
"  Alaina Vee presented for a  Medicare AWV and comprehensive Health Risk Assessment today. The following components were reviewed and updated:    · Medical history  · Family History  · Social history  · Allergies and Current Medications  · Health Risk Assessment  · Health Maintenance  · Care Team         ** See Completed Assessments for Annual Wellness Visit within the encounter summary.**         The following assessments were completed:  · Living Situation  · CAGE  · Depression Screening  · Timed Get Up and Go  · Whisper Test  · Cognitive Function Screening  · Nutrition Screening  · ADL Screening  · PAQ Screening        Vitals:    02/16/22 1340   BP: 132/80   BP Location: Left arm   Patient Position: Sitting   BP Method: Medium (Manual)   Pulse: 81   Resp: 17   Temp: 97.1 °F (36.2 °C)   TempSrc: Oral   SpO2: 97%   Weight: 113.3 kg (249 lb 12.5 oz)   Height: 5' 6" (1.676 m)     Body mass index is 40.32 kg/m².  Physical Exam  Vitals reviewed.   Constitutional:       General: She is not in acute distress.     Appearance: Normal appearance. She is not ill-appearing, toxic-appearing or diaphoretic.   Cardiovascular:      Pulses: Normal pulses.   Pulmonary:      Effort: Pulmonary effort is normal.   Skin:     General: Skin is warm and dry.   Neurological:      General: No focal deficit present.      Mental Status: She is alert and oriented to person, place, and time.   Psychiatric:         Mood and Affect: Mood normal.                   Diagnoses and health risks identified today and associated recommendations/orders:    1. Encounter for preventive health examination  The patient was seen today for an annual Medicare wellness exam.  Health maintenance and screening topics were discussed.  Proper diet and exercise recommendations reviewed.    2. PAF (paroxysmal atrial fibrillation)  No acute concerns    3. Atrial flutter, unspecified type  No acute concerns    4. Aortic atherosclerosis  No acute concerns    5. Stage 3 " chronic kidney disease, unspecified whether stage 3a or 3b CKD  No acute concerns    6. Hypercoagulable state  No acute concerns    7. Essential hypertension  No acute concerns    8. Mixed hyperlipidemia  No acute concerns    9. Aortic root dilatation  No acute concerns    10. Mixed incontinence urge and stress  No acute concerns      Provided Alaina with a 5-10 year written screening schedule and personal prevention plan. Recommendations were developed using the USPSTF age appropriate recommendations. Education, counseling, and referrals were provided as needed. After Visit Summary printed and given to patient which includes a list of additional screenings\tests needed.    Follow up in about 1 year (around 2/16/2023) for AWV.    Leland Martell, NP  I offered to discuss advanced care planning, including how to pick a person who would make decisions for you if you were unable to make them for yourself, called a health care power of , and what kind of decisions you might make such as use of life sustaining treatments such as ventilators and tube feeding when faced with a life limiting illness recorded on a living will that they will need to know. (How you want to be cared for as you near the end of your natural life)     X Patient is interested in learning more about how to make advanced directives.  I provided them paperwork and offered to discuss this with them.

## 2022-02-16 NOTE — PATIENT INSTRUCTIONS
Counseling and Referral of Other Preventative  (Italic type indicates deductible and co-insurance are waived)    Patient Name: Alaina Vee  Today's Date: 2/16/2022    Health Maintenance       Date Due Completion Date    COVID-19 Vaccine (3 - Booster for Moderna series) 09/05/2021 3/5/2021    DEXA SCAN 04/30/2022 4/30/2019    Override on 11/16/2010: Done    Lipid Panel 12/01/2025 12/1/2020    TETANUS VACCINE 05/03/2029 5/3/2019        No orders of the defined types were placed in this encounter.    The following information is provided to all patients.  This information is to help you find resources for any of the problems found today that may be affecting your health:                Living healthy guide: www.[facility].[place].gov      Understanding Diabetes: www.diabetes.org      Eating healthy: www.cdc.gov/healthyweight      [facility] home safety checklist: www.cdc.gov/steadi/patient.html      Agency on Aging: www.goea.[place].[facility]      Alcoholics anonymous (AA): www.aa.org      Physical Activity: www.carmen.nih.gov/jp0ckny      Tobacco use: www.quitwithusla.org

## 2022-02-16 NOTE — PROGRESS NOTES
Patient, Alaina Vee (MRN #[id number]), presented with a recorded BMI of 40.32 kg/m^2 consistent with the definition of morbid obesity (ICD-10 E66.01). The patient's morbid obesity was monitored, evaluated, addressed and/or treated. This addendum to the medical record is made on 02/16/2022.

## 2022-03-25 ENCOUNTER — OFFICE VISIT (OUTPATIENT)
Dept: FAMILY MEDICINE | Facility: CLINIC | Age: 82
End: 2022-03-25
Payer: MEDICARE

## 2022-03-25 ENCOUNTER — TELEPHONE (OUTPATIENT)
Dept: FAMILY MEDICINE | Facility: CLINIC | Age: 82
End: 2022-03-25

## 2022-03-25 VITALS
SYSTOLIC BLOOD PRESSURE: 124 MMHG | OXYGEN SATURATION: 95 % | RESPIRATION RATE: 18 BRPM | HEIGHT: 66 IN | DIASTOLIC BLOOD PRESSURE: 66 MMHG | TEMPERATURE: 98 F | HEART RATE: 60 BPM | WEIGHT: 260.13 LBS | BODY MASS INDEX: 41.8 KG/M2

## 2022-03-25 DIAGNOSIS — I48.0 PAF (PAROXYSMAL ATRIAL FIBRILLATION): ICD-10-CM

## 2022-03-25 DIAGNOSIS — E78.2 MIXED HYPERLIPIDEMIA: ICD-10-CM

## 2022-03-25 DIAGNOSIS — G89.29 CHRONIC PAIN OF RIGHT KNEE: Primary | ICD-10-CM

## 2022-03-25 DIAGNOSIS — I10 ESSENTIAL HYPERTENSION: ICD-10-CM

## 2022-03-25 DIAGNOSIS — Z96.653 S/P TOTAL KNEE REPLACEMENT, BILATERAL: ICD-10-CM

## 2022-03-25 DIAGNOSIS — M25.561 CHRONIC PAIN OF RIGHT KNEE: Primary | ICD-10-CM

## 2022-03-25 PROCEDURE — 1125F AMNT PAIN NOTED PAIN PRSNT: CPT | Mod: CPTII,S$GLB,, | Performed by: NURSE PRACTITIONER

## 2022-03-25 PROCEDURE — 1159F MED LIST DOCD IN RCRD: CPT | Mod: CPTII,S$GLB,, | Performed by: NURSE PRACTITIONER

## 2022-03-25 PROCEDURE — 3074F SYST BP LT 130 MM HG: CPT | Mod: CPTII,S$GLB,, | Performed by: NURSE PRACTITIONER

## 2022-03-25 PROCEDURE — 99214 OFFICE O/P EST MOD 30 MIN: CPT | Mod: S$GLB,,, | Performed by: NURSE PRACTITIONER

## 2022-03-25 PROCEDURE — 99999 PR PBB SHADOW E&M-EST. PATIENT-LVL IV: CPT | Mod: PBBFAC,,, | Performed by: NURSE PRACTITIONER

## 2022-03-25 PROCEDURE — 99214 PR OFFICE/OUTPT VISIT, EST, LEVL IV, 30-39 MIN: ICD-10-PCS | Mod: S$GLB,,, | Performed by: NURSE PRACTITIONER

## 2022-03-25 PROCEDURE — 3078F DIAST BP <80 MM HG: CPT | Mod: CPTII,S$GLB,, | Performed by: NURSE PRACTITIONER

## 2022-03-25 PROCEDURE — 3288F PR FALLS RISK ASSESSMENT DOCUMENTED: ICD-10-PCS | Mod: CPTII,S$GLB,, | Performed by: NURSE PRACTITIONER

## 2022-03-25 PROCEDURE — 99999 PR PBB SHADOW E&M-EST. PATIENT-LVL IV: ICD-10-PCS | Mod: PBBFAC,,, | Performed by: NURSE PRACTITIONER

## 2022-03-25 PROCEDURE — 3074F PR MOST RECENT SYSTOLIC BLOOD PRESSURE < 130 MM HG: ICD-10-PCS | Mod: CPTII,S$GLB,, | Performed by: NURSE PRACTITIONER

## 2022-03-25 PROCEDURE — 1101F PR PT FALLS ASSESS DOC 0-1 FALLS W/OUT INJ PAST YR: ICD-10-PCS | Mod: CPTII,S$GLB,, | Performed by: NURSE PRACTITIONER

## 2022-03-25 PROCEDURE — 3288F FALL RISK ASSESSMENT DOCD: CPT | Mod: CPTII,S$GLB,, | Performed by: NURSE PRACTITIONER

## 2022-03-25 PROCEDURE — 3078F PR MOST RECENT DIASTOLIC BLOOD PRESSURE < 80 MM HG: ICD-10-PCS | Mod: CPTII,S$GLB,, | Performed by: NURSE PRACTITIONER

## 2022-03-25 PROCEDURE — 1159F PR MEDICATION LIST DOCUMENTED IN MEDICAL RECORD: ICD-10-PCS | Mod: CPTII,S$GLB,, | Performed by: NURSE PRACTITIONER

## 2022-03-25 PROCEDURE — 1101F PT FALLS ASSESS-DOCD LE1/YR: CPT | Mod: CPTII,S$GLB,, | Performed by: NURSE PRACTITIONER

## 2022-03-25 PROCEDURE — 1125F PR PAIN SEVERITY QUANTIFIED, PAIN PRESENT: ICD-10-PCS | Mod: CPTII,S$GLB,, | Performed by: NURSE PRACTITIONER

## 2022-03-25 NOTE — PROGRESS NOTES
Routine Office Visit    Patient Name: Alaina Vee    : 1940  MRN: [id number]    Chief Complaint:  Fall, right knee pain    Subjective:  Alaina is a 81 y.o. female who presents today for:    1. Fall, right knee pain - patient who is known to me reports today for evaluation.  History of hypertension, hyperlipidemia, AFib, CKD.  Last month she was removing carpet and subsequently tripped over some uneven rolando.  Since then she has been having anterior sharp nonradiating right knee pain.  She endorses a history of a bilateral total knee replacements in . She has been taking tizanidine for symptoms without significant benefit.  She notes the pain is worse with movement and sometimes with sitting for prolonged periods.  She denies any new numbness or tingling or weakness.  She has been under lot of stress lately due to the care of her sick .    Past Medical History  Past Medical History:   Diagnosis Date    Arthritis     knees    Atrial fibrillation     Atrial flutter     Back pain     Cataract     Degenerative disc disease     Depression     General anesthetics causing adverse effect in therapeutic use     pt states sometimes slow to awaken.    GERD (gastroesophageal reflux disease)     Hyperlipidemia     Hypertension     Kidney stone     Obesity     Sleep apnea     does not wear CPAP    Urinary incontinence     Varicosities     Ventral hernia        Past Surgical History  Past Surgical History:   Procedure Laterality Date    APPENDECTOMY      BREAST BIOPSY Bilateral         breast biopsy, left      CHOLECYSTECTOMY      CYSTOURETEROSCOPY WITH RETROGRADE PYELOGRAPHY AND INSERTION OF STENT INTO URETER Right 1/10/2020    Procedure: CYSTOURETEROSCOPY, WITH RETROGRADE PYELOGRAM AND URETERAL STENT INSERTION;  Surgeon: Yvonne Weaver MD;  Location: [facility];  Service: Urology;  Laterality: Right;    JOINT REPLACEMENT      TKR , right    JOINT REPLACEMENT      TKR   left    LA EXPLORATORY OF ABDOMEN      URETEROSCOPIC REMOVAL OF URETERIC CALCULUS Right 1/31/2020    Procedure: Cystoscopy, possible retrograde pyelogram, ureteroscopy with laser lithotripsy, ureteral stent exchange;  Surgeon: Yvonne Weaver MD;  Location: [facility];  Service: Urology;  Laterality: Right;       Family History  Family History   Problem Relation Age of Onset    COPD Mother     Heart disease Sister         half sister    COPD Sister     No Known Problems Father     Parkinsonism Sister     No Known Problems Brother     No Known Problems Maternal Aunt     No Known Problems Maternal Uncle     No Known Problems Paternal Aunt     No Known Problems Paternal Uncle     No Known Problems Maternal Grandmother     No Known Problems Maternal Grandfather     No Known Problems Paternal Grandmother     No Known Problems Paternal Grandfather     Amblyopia Neg Hx     Blindness Neg Hx     Cancer Neg Hx     Cataracts Neg Hx     Diabetes Neg Hx     Glaucoma Neg Hx     Hypertension Neg Hx     Macular degeneration Neg Hx     Retinal detachment Neg Hx     Strabismus Neg Hx     Stroke Neg Hx     Thyroid disease Neg Hx        Social History  Social History     Socioeconomic History    Marital status:    Tobacco Use    Smoking status: Never Smoker    Smokeless tobacco: Never Used   Substance and Sexual Activity    Alcohol use: No    Drug use: No    Sexual activity: Yes     Partners: Male       Current Medications  Current Outpatient Medications on File Prior to Visit   Medication Sig Dispense Refill    apixaban (ELIQUIS) 5 mg Tab Take 1 tablet (5 mg total) by mouth 2 (two) times daily. 180 tablet 3    clindamycin (CLEOCIN) 300 MG capsule Take 1 capsule (300 mg total) by mouth 3 (three) times daily. 21 capsule 0    CYANOCOBALAMIN, VITAMIN B-12, (VITAMIN B-12 ORAL) Take 2,500 mcg by mouth every evening.      diclofenac sodium (VOLTAREN) 1 % Gel Apply 2 g topically once daily. 100  "g 3    fish oil-omega-3 fatty acids 300-1,000 mg capsule Take 1 g by mouth once daily.      metoprolol succinate (TOPROL-XL) 25 MG 24 hr tablet Take 1 tablet (25 mg total) by mouth once daily. 90 tablet 3    multivitamin (THERAGRAN) per tablet Take 1 tablet by mouth every evening. 1 Tablet Oral Every day      oxybutynin (DITROPAN) 5 MG Tab Take 1 tablet (5 mg total) by mouth 3 (three) times daily. 270 tablet 3    rosuvastatin (CRESTOR) 10 MG tablet Take 1 tablet (10 mg total) by mouth every evening. 90 tablet 3    tiZANidine (ZANAFLEX) 4 MG tablet Take 1 tablet (4 mg total) by mouth every 8 (eight) hours. 45 tablet 2     No current facility-administered medications on file prior to visit.       Allergies   Review of patient's allergies indicates:   Allergen Reactions    Celebrex [celecoxib]      Chest tightness    Lipitor [atorvastatin] Other (See Comments)       Review of Systems (Pertinent positives)  Review of Systems   Constitutional: Negative for chills, diaphoresis, fever, malaise/fatigue and weight loss.   HENT: Negative.    Eyes: Negative.    Respiratory: Negative for cough, hemoptysis, sputum production, shortness of breath and wheezing.    Cardiovascular: Negative for chest pain, palpitations, orthopnea, claudication, leg swelling and PND.   Gastrointestinal: Negative.    Genitourinary: Negative.    Musculoskeletal: Negative for back pain, joint pain, myalgias and neck pain.   Skin: Negative.    Neurological: Negative.    Endo/Heme/Allergies: Negative.    Psychiatric/Behavioral: Negative.        /66 (BP Location: Left arm, Patient Position: Sitting, BP Method: Medium (Manual))   Pulse 60   Temp 98.3 °F (36.8 °C) (Oral)   Resp 18   Ht 5' 6" (1.676 m)   Wt 118 kg (260 lb 2.3 oz)   LMP  (LMP Unknown)   SpO2 95%   BMI 41.99 kg/m²     Physical Exam  Constitutional:       General: She is not in acute distress.     Appearance: Normal appearance. She is not toxic-appearing or diaphoretic. "   Cardiovascular:      Rate and Rhythm: Normal rate and regular rhythm.      Pulses: Normal pulses.      Heart sounds: Normal heart sounds.   Pulmonary:      Effort: Pulmonary effort is normal.      Breath sounds: Normal breath sounds.   Abdominal:      General: Bowel sounds are normal. There is no distension.      Palpations: Abdomen is soft. There is no mass.      Tenderness: There is no abdominal tenderness.   Musculoskeletal:      Right knee: Normal. No swelling, deformity or bony tenderness. No tenderness. Normal alignment.   Skin:     General: Skin is warm and dry.      Capillary Refill: Capillary refill takes less than 2 seconds.   Neurological:      General: No focal deficit present.      Mental Status: She is alert and oriented to person, place, and time.   Psychiatric:         Mood and Affect: Mood normal.         Behavior: Behavior normal.          Assessment/Plan:  Alaina Vee is a 81 y.o. female who presents today for :    Diagnoses and all orders for this visit:    Chronic pain of right knee  -     X-Ray Knee 3 View Right; Future    S/p total knee replacement, bilateral    Essential hypertension    Mixed hyperlipidemia    PAF (paroxysmal atrial fibrillation)    - will check knee x-ray today  - recommended referral to Orthopedics but patient would like to wait on this at this time  - continue tizanidine as needed.  Avoid NSAIDs.  Recommended Tylenol extra-strength and knee brace for symptoms  - follow-up his symptoms worsen or fail to improve.  Can consider physical therapy as well if needed  - she has an appointment with her PCP set up for an annual visit already  - BP controlled        This office note has been dictated.  This dictation has been generated using M-Modal Fluency Direct dictation; some phonetic errors may occur.   My collaborating physician is Dr. Sameer Fink.

## 2022-03-25 NOTE — PROGRESS NOTES
03/25/22 0827   Depression Patient Health Questionnaire (PHQ-2)   Over the last two weeks how often have you been bothered by little interest or pleasure in doing things 0   Over the last two weeks how often have you been bothered by feeling down, depressed or hopeless 0   PHQ-2 Total Score 0

## 2022-03-25 NOTE — TELEPHONE ENCOUNTER
----- Message from Leland Martell NP sent at 3/25/2022 12:40 PM CDT -----  Please call patient and let her know her x-ray looks good.  If her pain continues I will refer her to our orthopedic specialist.  Thank you

## 2022-03-31 ENCOUNTER — OFFICE VISIT (OUTPATIENT)
Dept: OPTOMETRY | Facility: CLINIC | Age: 82
End: 2022-03-31
Payer: MEDICARE

## 2022-03-31 DIAGNOSIS — H52.13 MYOPIA WITH PRESBYOPIA, BILATERAL: ICD-10-CM

## 2022-03-31 DIAGNOSIS — Z01.00 ROUTINE EYE EXAM: Primary | ICD-10-CM

## 2022-03-31 DIAGNOSIS — Z46.0 ENCOUNTER FOR FITTING OR ADJUSTMENT OF SPECTACLES OR CONTACT LENSES: Primary | ICD-10-CM

## 2022-03-31 DIAGNOSIS — H52.4 MYOPIA WITH PRESBYOPIA, BILATERAL: ICD-10-CM

## 2022-03-31 DIAGNOSIS — Z97.3 WEARS CONTACT LENSES: ICD-10-CM

## 2022-03-31 PROCEDURE — 99999 PR PBB SHADOW E&M-EST. PATIENT-LVL III: CPT | Mod: PBBFAC,,, | Performed by: OPTOMETRIST

## 2022-03-31 PROCEDURE — 92015 DETERMINE REFRACTIVE STATE: CPT | Mod: S$GLB,,, | Performed by: OPTOMETRIST

## 2022-03-31 PROCEDURE — 99999 PR PBB SHADOW E&M-EST. PATIENT-LVL III: ICD-10-PCS | Mod: PBBFAC,,, | Performed by: OPTOMETRIST

## 2022-03-31 PROCEDURE — 92310 CONTACT LENS FITTING OU: CPT | Mod: S$GLB,,, | Performed by: OPTOMETRIST

## 2022-03-31 PROCEDURE — 92014 PR EYE EXAM, EST PATIENT,COMPREHESV: ICD-10-PCS | Mod: S$GLB,,, | Performed by: OPTOMETRIST

## 2022-03-31 PROCEDURE — 99499 NO LOS: ICD-10-PCS | Mod: S$GLB,,, | Performed by: OPTOMETRIST

## 2022-03-31 PROCEDURE — 92014 COMPRE OPH EXAM EST PT 1/>: CPT | Mod: S$GLB,,, | Performed by: OPTOMETRIST

## 2022-03-31 PROCEDURE — 92015 PR REFRACTION: ICD-10-PCS | Mod: S$GLB,,, | Performed by: OPTOMETRIST

## 2022-03-31 PROCEDURE — 99499 UNLISTED E&M SERVICE: CPT | Mod: S$GLB,,, | Performed by: OPTOMETRIST

## 2022-03-31 PROCEDURE — 92310 PR CONTACT LENS FITTING (NO CHANGE): ICD-10-PCS | Mod: S$GLB,,, | Performed by: OPTOMETRIST

## 2022-03-31 NOTE — PROGRESS NOTES
Subjective:       Patient ID: Alaina Vee is a 81 y.o. female      Chief Complaint   Patient presents with    Concerns About Ocular Health    Contact Lens Follow Up     History of Present Illness  Dls: 11/11/20 Dr. Boswell    82 y/o female presents today for ocular health check.  Pt states no changes in vision. Pt wears scls os only   Pt wants new rx for cls only.   Pt states does not want to be dilated today.     No tearing  + itching   + burning  No pain  No ha's  + floaters  No flashes    Eye meds  Visine ou prn     Johnna. Monovision OD near, OS distance with lens. Replacing monthly. Does not sleep in lenses        Assessment/Plan:     1. Routine eye exam  Eyemed vision    2. Myopia with presbyopia, bilateral  Educated patient on refractive error and discussed lens options. Dispensed updated spectacle Rx. Educated about adaptation period to new specs.    Eyeglass Final Rx     Eyeglass Final Rx       Sphere Cylinder Add    Right -2.25 Sphere +2.50    Left -0.50 Sphere +2.50    Expiration Date: 3/31/2023                  3. Wears contact lenses  Contact lens Rx released to pt. Daily wear only advised, replacement monthly with education to risks of extended wear. Okay to order if happy with comfort and VA. Discussed lens care, compliance and solutions. RTC yearly contact lens health check.     Contact Lens Final Rx     Final Contact Lens Rx       Brand Base Curve Diameter Sphere Cylinder Addl. Specs    Right No lens     Near    Left Acuvue Johnna 8.4 14.0 -0.75 Sphere     Expiration Date: 3/31/2023    Replacement: Monthly    Solutions: OptiFree PureMoist    Wearing Schedule: Daily Wear                  Follow up in about 1 year (around 3/31/2023).

## 2022-04-25 ENCOUNTER — HOSPITAL ENCOUNTER (OUTPATIENT)
Dept: RADIOLOGY | Facility: HOSPITAL | Age: 82
Discharge: HOME | End: 2022-04-25
Attending: UROLOGY
Payer: MEDICARE

## 2022-04-25 DIAGNOSIS — N20.0 NEPHROLITHIASIS: ICD-10-CM

## 2022-04-25 PROCEDURE — 76770 US RETROPERITONEAL COMPLETE: ICD-10-PCS | Mod: 26,,, | Performed by: RADIOLOGY

## 2022-04-25 PROCEDURE — 76770 US EXAM ABDO BACK WALL COMP: CPT | Mod: TC

## 2022-04-25 PROCEDURE — 74018 RADEX ABDOMEN 1 VIEW: CPT | Mod: TC,FY

## 2022-04-25 PROCEDURE — 74018 XR KUB: ICD-10-PCS | Mod: 26,,, | Performed by: RADIOLOGY

## 2022-04-25 PROCEDURE — 74018 RADEX ABDOMEN 1 VIEW: CPT | Mod: 26,,, | Performed by: RADIOLOGY

## 2022-04-25 PROCEDURE — 76770 US EXAM ABDO BACK WALL COMP: CPT | Mod: 26,,, | Performed by: RADIOLOGY

## 2022-05-24 ENCOUNTER — OFFICE VISIT (OUTPATIENT)
Dept: FAMILY MEDICINE | Facility: CLINIC | Age: 82
End: 2022-05-24
Payer: MEDICARE

## 2022-05-24 VITALS
TEMPERATURE: 98 F | SYSTOLIC BLOOD PRESSURE: 128 MMHG | HEART RATE: 59 BPM | OXYGEN SATURATION: 98 % | DIASTOLIC BLOOD PRESSURE: 68 MMHG | BODY MASS INDEX: 42.34 KG/M2 | WEIGHT: 262.38 LBS | RESPIRATION RATE: 18 BRPM

## 2022-05-24 DIAGNOSIS — I70.0 AORTIC ATHEROSCLEROSIS: ICD-10-CM

## 2022-05-24 DIAGNOSIS — N64.4 BREAST PAIN: ICD-10-CM

## 2022-05-24 DIAGNOSIS — Z00.00 GENERAL MEDICAL EXAM: Primary | ICD-10-CM

## 2022-05-24 DIAGNOSIS — I10 ESSENTIAL HYPERTENSION: ICD-10-CM

## 2022-05-24 DIAGNOSIS — I77.810 AORTIC ROOT DILATATION: ICD-10-CM

## 2022-05-24 DIAGNOSIS — R73.9 HYPERGLYCEMIA: ICD-10-CM

## 2022-05-24 DIAGNOSIS — E78.5 DYSLIPIDEMIA: ICD-10-CM

## 2022-05-24 DIAGNOSIS — I48.0 PAF (PAROXYSMAL ATRIAL FIBRILLATION): ICD-10-CM

## 2022-05-24 PROCEDURE — 3078F DIAST BP <80 MM HG: CPT | Mod: CPTII,S$GLB,, | Performed by: FAMILY MEDICINE

## 2022-05-24 PROCEDURE — 99999 PR PBB SHADOW E&M-EST. PATIENT-LVL III: ICD-10-PCS | Mod: PBBFAC,,, | Performed by: FAMILY MEDICINE

## 2022-05-24 PROCEDURE — 1126F PR PAIN SEVERITY QUANTIFIED, NO PAIN PRESENT: ICD-10-PCS | Mod: CPTII,S$GLB,, | Performed by: FAMILY MEDICINE

## 2022-05-24 PROCEDURE — 1159F MED LIST DOCD IN RCRD: CPT | Mod: CPTII,S$GLB,, | Performed by: FAMILY MEDICINE

## 2022-05-24 PROCEDURE — 1159F PR MEDICATION LIST DOCUMENTED IN MEDICAL RECORD: ICD-10-PCS | Mod: CPTII,S$GLB,, | Performed by: FAMILY MEDICINE

## 2022-05-24 PROCEDURE — 3074F PR MOST RECENT SYSTOLIC BLOOD PRESSURE < 130 MM HG: ICD-10-PCS | Mod: CPTII,S$GLB,, | Performed by: FAMILY MEDICINE

## 2022-05-24 PROCEDURE — 1126F AMNT PAIN NOTED NONE PRSNT: CPT | Mod: CPTII,S$GLB,, | Performed by: FAMILY MEDICINE

## 2022-05-24 PROCEDURE — 99397 PR PREVENTIVE VISIT,EST,65 & OVER: ICD-10-PCS | Mod: S$GLB,,, | Performed by: FAMILY MEDICINE

## 2022-05-24 PROCEDURE — 99999 PR PBB SHADOW E&M-EST. PATIENT-LVL III: CPT | Mod: PBBFAC,,, | Performed by: FAMILY MEDICINE

## 2022-05-24 PROCEDURE — 1160F PR REVIEW ALL MEDS BY PRESCRIBER/CLIN PHARMACIST DOCUMENTED: ICD-10-PCS | Mod: CPTII,S$GLB,, | Performed by: FAMILY MEDICINE

## 2022-05-24 PROCEDURE — 99397 PER PM REEVAL EST PAT 65+ YR: CPT | Mod: S$GLB,,, | Performed by: FAMILY MEDICINE

## 2022-05-24 PROCEDURE — 3074F SYST BP LT 130 MM HG: CPT | Mod: CPTII,S$GLB,, | Performed by: FAMILY MEDICINE

## 2022-05-24 PROCEDURE — 3078F PR MOST RECENT DIASTOLIC BLOOD PRESSURE < 80 MM HG: ICD-10-PCS | Mod: CPTII,S$GLB,, | Performed by: FAMILY MEDICINE

## 2022-05-24 PROCEDURE — 1160F RVW MEDS BY RX/DR IN RCRD: CPT | Mod: CPTII,S$GLB,, | Performed by: FAMILY MEDICINE

## 2022-05-27 ENCOUNTER — LAB VISIT (OUTPATIENT)
Dept: LAB | Facility: HOSPITAL | Age: 82
End: 2022-05-27
Attending: FAMILY MEDICINE
Payer: MEDICARE

## 2022-05-27 DIAGNOSIS — Z00.00 GENERAL MEDICAL EXAM: ICD-10-CM

## 2022-05-27 DIAGNOSIS — E78.5 DYSLIPIDEMIA: ICD-10-CM

## 2022-05-27 DIAGNOSIS — R73.9 HYPERGLYCEMIA: ICD-10-CM

## 2022-05-27 DIAGNOSIS — I48.0 PAF (PAROXYSMAL ATRIAL FIBRILLATION): ICD-10-CM

## 2022-05-27 LAB
ALBUMIN SERPL BCP-MCNC: 3.6 G/DL (ref 3.5–5.2)
ALP SERPL-CCNC: 69 U/L (ref 55–135)
ALT SERPL W/O P-5'-P-CCNC: 12 U/L (ref 10–44)
ANION GAP SERPL CALC-SCNC: 10 MMOL/L (ref 8–16)
AST SERPL-CCNC: 17 U/L (ref 10–40)
BASOPHILS # BLD AUTO: 0.06 K/UL (ref 0–0.2)
BASOPHILS NFR BLD: 1 % (ref 0–1.9)
BILIRUB SERPL-MCNC: 0.4 MG/DL (ref 0.1–1)
BUN SERPL-MCNC: 18 MG/DL (ref 8–23)
CALCIUM SERPL-MCNC: 9.3 MG/DL (ref 8.7–10.5)
CHLORIDE SERPL-SCNC: 105 MMOL/L (ref 95–110)
CHOLEST SERPL-MCNC: 203 MG/DL (ref 120–199)
CHOLEST/HDLC SERPL: 5 {RATIO} (ref 2–5)
CO2 SERPL-SCNC: 26 MMOL/L (ref 23–29)
CREAT SERPL-MCNC: 0.8 MG/DL (ref 0.5–1.4)
DIFFERENTIAL METHOD: ABNORMAL
EOSINOPHIL # BLD AUTO: 0.2 K/UL (ref 0–0.5)
EOSINOPHIL NFR BLD: 3.4 % (ref 0–8)
ERYTHROCYTE [DISTWIDTH] IN BLOOD BY AUTOMATED COUNT: 12.9 % (ref 11.5–14.5)
EST. GFR  (AFRICAN AMERICAN): >60 ML/MIN/1.73 M^2
EST. GFR  (NON AFRICAN AMERICAN): >60 ML/MIN/1.73 M^2
ESTIMATED AVG GLUCOSE: 126 MG/DL (ref 68–131)
GLUCOSE SERPL-MCNC: 103 MG/DL (ref 70–110)
HBA1C MFR BLD: 6 % (ref 4–5.6)
HCT VFR BLD AUTO: 45.9 % (ref 37–48.5)
HDLC SERPL-MCNC: 41 MG/DL (ref 40–75)
HDLC SERPL: 20.2 % (ref 20–50)
HGB BLD-MCNC: 14.6 G/DL (ref 12–16)
IMM GRANULOCYTES # BLD AUTO: 0.04 K/UL (ref 0–0.04)
IMM GRANULOCYTES NFR BLD AUTO: 0.6 % (ref 0–0.5)
LDLC SERPL CALC-MCNC: 143.8 MG/DL (ref 63–159)
LYMPHOCYTES # BLD AUTO: 1.6 K/UL (ref 1–4.8)
LYMPHOCYTES NFR BLD: 26.2 % (ref 18–48)
MCH RBC QN AUTO: 30 PG (ref 27–31)
MCHC RBC AUTO-ENTMCNC: 31.8 G/DL (ref 32–36)
MCV RBC AUTO: 94 FL (ref 82–98)
MONOCYTES # BLD AUTO: 0.5 K/UL (ref 0.3–1)
MONOCYTES NFR BLD: 7.7 % (ref 4–15)
NEUTROPHILS # BLD AUTO: 3.8 K/UL (ref 1.8–7.7)
NEUTROPHILS NFR BLD: 61.1 % (ref 38–73)
NONHDLC SERPL-MCNC: 162 MG/DL
NRBC BLD-RTO: 0 /100 WBC
PLATELET # BLD AUTO: 269 K/UL (ref 150–450)
PMV BLD AUTO: 10.6 FL (ref 9.2–12.9)
POTASSIUM SERPL-SCNC: 4.1 MMOL/L (ref 3.5–5.1)
PROT SERPL-MCNC: 7 G/DL (ref 6–8.4)
RBC # BLD AUTO: 4.86 M/UL (ref 4–5.4)
SODIUM SERPL-SCNC: 141 MMOL/L (ref 136–145)
TRIGL SERPL-MCNC: 91 MG/DL (ref 30–150)
WBC # BLD AUTO: 6.22 K/UL (ref 3.9–12.7)

## 2022-05-27 PROCEDURE — 85025 COMPLETE CBC W/AUTO DIFF WBC: CPT | Performed by: FAMILY MEDICINE

## 2022-05-27 PROCEDURE — 36415 COLL VENOUS BLD VENIPUNCTURE: CPT | Mod: PN | Performed by: FAMILY MEDICINE

## 2022-05-27 PROCEDURE — 80053 COMPREHEN METABOLIC PANEL: CPT | Performed by: FAMILY MEDICINE

## 2022-05-27 PROCEDURE — 83036 HEMOGLOBIN GLYCOSYLATED A1C: CPT | Performed by: FAMILY MEDICINE

## 2022-05-27 PROCEDURE — 80061 LIPID PANEL: CPT | Performed by: FAMILY MEDICINE

## 2022-05-30 NOTE — PROGRESS NOTES
Subjective:       Patient ID: Alaina Vee is a 81 y.o. female.    Chief Complaint: Annual Exam    HPI   81 year old female with comorbid conditions as per problem list comes in for annual exam. Reports only acute concern is intermittent breast pain for several months. No masses noted. .    Review of Systems   Constitutional: Negative for unexpected weight change.   HENT: Negative for ear pain and sore throat.    Eyes: Negative for visual disturbance.   Respiratory: Negative for shortness of breath.    Cardiovascular: Negative for chest pain.   Gastrointestinal: Negative for abdominal pain and blood in stool.   Genitourinary: Negative for dysuria and frequency.   Integumentary:  Positive for breast tenderness. Negative for rash, breast mass and breast discharge.   Neurological: Negative for weakness, numbness and headaches.   Hematological: Negative for adenopathy.   Psychiatric/Behavioral: Negative for suicidal ideas.   Breast: Positive for tenderness.Negative for mass        Objective:      Physical Exam  Vitals reviewed.   Constitutional:       General: She is not in acute distress.     Appearance: She is well-developed. She is obese.   HENT:      Head: Normocephalic and atraumatic.      Right Ear: Ear canal and external ear normal.      Left Ear: Ear canal and external ear normal.      Nose: Nose normal.      Mouth/Throat:      Mouth: Mucous membranes are moist.      Pharynx: No oropharyngeal exudate or posterior oropharyngeal erythema.   Eyes:      General:         Right eye: No discharge.         Left eye: No discharge.      Extraocular Movements: Extraocular movements intact.      Conjunctiva/sclera: Conjunctivae normal.      Pupils: Pupils are equal, round, and reactive to light.   Neck:      Trachea: No tracheal deviation.   Cardiovascular:      Rate and Rhythm: Normal rate and regular rhythm.      Pulses: Normal pulses.      Heart sounds: Normal heart sounds. No murmur heard.  Pulmonary:       Effort: Pulmonary effort is normal. No respiratory distress.      Breath sounds: Normal breath sounds. No wheezing or rales.   Abdominal:      General: Abdomen is flat. Bowel sounds are normal. There is no distension.      Palpations: Abdomen is soft. There is no mass.      Tenderness: There is no abdominal tenderness. There is no guarding.   Musculoskeletal:      Cervical back: Normal range of motion and neck supple. No rigidity or tenderness.   Lymphadenopathy:      Cervical: No cervical adenopathy.   Skin:     General: Skin is warm.      Capillary Refill: Capillary refill takes less than 2 seconds.   Neurological:      Mental Status: She is alert and oriented to person, place, and time.      Cranial Nerves: No cranial nerve deficit.      Sensory: No sensory deficit.   Psychiatric:         Mood and Affect: Mood normal.         Behavior: Behavior normal.         Assessment:       Problem List Items Addressed This Visit        Cardiac/Vascular    Essential hypertension    PAF (paroxysmal atrial fibrillation)    Relevant Orders    CBC Auto Differential (Completed)    Aortic root dilatation    Aortic atherosclerosis      Other Visit Diagnoses     General medical exam    -  Primary    Relevant Orders    Comprehensive Metabolic Panel (Completed)    CBC Auto Differential (Completed)    Dyslipidemia        Relevant Orders    Lipid Panel (Completed)    Hyperglycemia        Relevant Orders    Hemoglobin A1C (Completed)    Breast pain        Relevant Orders    Mammo Digital Diagnostic Bilat with Ramos          Plan:       Alaina was seen today for annual exam.    Diagnoses and all orders for this visit:    General medical exam  -     Comprehensive Metabolic Panel; Future  -     CBC Auto Differential; Future  Age appropriate recommendations reviewed.  Screening labs ordered.    Dyslipidemia  -     Lipid Panel; Future  Check lipids    Hyperglycemia  -     Hemoglobin A1C; Future  Check A1c    Breast pain  -     Mammo Digital  Diagnostic Bilat with Ramos; Future  Check mammogram    Essential hypertension  Current therapy effective, will continue    PAF (paroxysmal atrial fibrillation)  -     CBC Auto Differential; Future  Rate controlled  Continue current therapy    Aortic atherosclerosis/Aortic root dilation  Patient has not tolerated multiple statins  Discussed cardio recommendations for echo in May 2022

## 2022-06-01 ENCOUNTER — TELEPHONE (OUTPATIENT)
Dept: FAMILY MEDICINE | Facility: CLINIC | Age: 82
End: 2022-06-01
Payer: MEDICARE

## 2022-06-01 ENCOUNTER — HOSPITAL ENCOUNTER (OUTPATIENT)
Dept: RADIOLOGY | Facility: HOSPITAL | Age: 82
Discharge: HOME | End: 2022-06-01
Attending: FAMILY MEDICINE
Payer: MEDICARE

## 2022-06-01 DIAGNOSIS — N64.4 BREAST PAIN: ICD-10-CM

## 2022-06-01 PROCEDURE — 77062 BREAST TOMOSYNTHESIS BI: CPT | Mod: 26,,, | Performed by: RADIOLOGY

## 2022-06-01 PROCEDURE — 77062 MAMMO DIGITAL DIAGNOSTIC BILAT WITH TOMO: ICD-10-PCS | Mod: 26,,, | Performed by: RADIOLOGY

## 2022-06-01 PROCEDURE — 77066 MAMMO DIGITAL DIAGNOSTIC BILAT WITH TOMO: ICD-10-PCS | Mod: 26,,, | Performed by: RADIOLOGY

## 2022-06-01 PROCEDURE — 77062 BREAST TOMOSYNTHESIS BI: CPT | Mod: TC

## 2022-06-01 PROCEDURE — 77066 DX MAMMO INCL CAD BI: CPT | Mod: 26,,, | Performed by: RADIOLOGY

## 2022-06-01 NOTE — TELEPHONE ENCOUNTER
LVM for patient to contact office.   ----- Message from Juanito Butt Jr., MD sent at 6/1/2022 10:50 AM CDT -----  Mammogram Normal

## 2022-06-05 DIAGNOSIS — R73.03 PREDIABETES: ICD-10-CM

## 2022-06-05 DIAGNOSIS — E78.5 DYSLIPIDEMIA: Primary | ICD-10-CM

## 2022-06-06 ENCOUNTER — TELEPHONE (OUTPATIENT)
Dept: FAMILY MEDICINE | Facility: CLINIC | Age: 82
End: 2022-06-06
Payer: MEDICARE

## 2022-06-06 NOTE — TELEPHONE ENCOUNTER
No answer. Left message for Patient to return call to clinic to schedule 6 month f/u and labs prior to appt.

## 2022-06-06 NOTE — TELEPHONE ENCOUNTER
----- Message from Juanito Butt Jr., MD sent at 6/5/2022  9:46 PM CDT -----  Labs show sugar is high as is cholesterol. Important to watch her intake of greasy/fatty foods, and excess carbs. Please schedule 6 month follow with labs a week prior

## 2022-07-22 ENCOUNTER — TELEPHONE (OUTPATIENT)
Dept: CARDIOLOGY | Facility: CLINIC | Age: 82
End: 2022-07-22
Payer: MEDICARE

## 2022-07-22 NOTE — TELEPHONE ENCOUNTER
I left msg informing pt that we received a cardiac clearance request form from [facility]. I stated that Dr. Hidalgo cannot fill out the form as he has not seen her back in the clinic in 8 months. I noted that she could schedule an appt with him to have him fill it out then.

## 2022-08-04 NOTE — PROGRESS NOTES
Patient, Alaina Vee (MRN #[id number]), presented with a recorded BMI of 40.56 kg/m^2 consistent with the definition of morbid obesity (ICD-10 E66.01). The patient's morbid obesity was monitored, evaluated, addressed and/or treated. This addendum to the medical record is made on 01/30/2017.   Controlled with current regime

## 2022-09-01 DIAGNOSIS — Z78.0 ASYMPTOMATIC MENOPAUSAL STATE: ICD-10-CM

## 2022-09-12 ENCOUNTER — OFFICE VISIT (OUTPATIENT)
Dept: FAMILY MEDICINE | Facility: CLINIC | Age: 82
End: 2022-09-12
Payer: MEDICARE

## 2022-09-12 VITALS
HEIGHT: 66 IN | TEMPERATURE: 98 F | DIASTOLIC BLOOD PRESSURE: 72 MMHG | OXYGEN SATURATION: 98 % | BODY MASS INDEX: 40.04 KG/M2 | RESPIRATION RATE: 18 BRPM | HEART RATE: 60 BPM | SYSTOLIC BLOOD PRESSURE: 130 MMHG | WEIGHT: 249.13 LBS

## 2022-09-12 DIAGNOSIS — Z23 NEED FOR INFLUENZA VACCINATION: ICD-10-CM

## 2022-09-12 DIAGNOSIS — Z86.718 HISTORY OF DEEP VENOUS THROMBOSIS (DVT) OF DISTAL VEIN OF RIGHT LOWER EXTREMITY: ICD-10-CM

## 2022-09-12 DIAGNOSIS — M79.604 RIGHT LEG PAIN: Primary | ICD-10-CM

## 2022-09-12 DIAGNOSIS — G89.29 CHRONIC RIGHT HIP PAIN: ICD-10-CM

## 2022-09-12 DIAGNOSIS — M25.551 CHRONIC RIGHT HIP PAIN: ICD-10-CM

## 2022-09-12 PROCEDURE — 90694 VACC AIIV4 NO PRSRV 0.5ML IM: CPT | Mod: S$GLB,,, | Performed by: NURSE PRACTITIONER

## 2022-09-12 PROCEDURE — 1160F RVW MEDS BY RX/DR IN RCRD: CPT | Mod: CPTII,S$GLB,, | Performed by: NURSE PRACTITIONER

## 2022-09-12 PROCEDURE — 1125F PR PAIN SEVERITY QUANTIFIED, PAIN PRESENT: ICD-10-PCS | Mod: CPTII,S$GLB,, | Performed by: NURSE PRACTITIONER

## 2022-09-12 PROCEDURE — 3288F FALL RISK ASSESSMENT DOCD: CPT | Mod: CPTII,S$GLB,, | Performed by: NURSE PRACTITIONER

## 2022-09-12 PROCEDURE — 3078F PR MOST RECENT DIASTOLIC BLOOD PRESSURE < 80 MM HG: ICD-10-PCS | Mod: CPTII,S$GLB,, | Performed by: NURSE PRACTITIONER

## 2022-09-12 PROCEDURE — 99214 OFFICE O/P EST MOD 30 MIN: CPT | Mod: S$GLB,,, | Performed by: NURSE PRACTITIONER

## 2022-09-12 PROCEDURE — 3075F SYST BP GE 130 - 139MM HG: CPT | Mod: CPTII,S$GLB,, | Performed by: NURSE PRACTITIONER

## 2022-09-12 PROCEDURE — 99214 PR OFFICE/OUTPT VISIT, EST, LEVL IV, 30-39 MIN: ICD-10-PCS | Mod: S$GLB,,, | Performed by: NURSE PRACTITIONER

## 2022-09-12 PROCEDURE — 1159F PR MEDICATION LIST DOCUMENTED IN MEDICAL RECORD: ICD-10-PCS | Mod: CPTII,S$GLB,, | Performed by: NURSE PRACTITIONER

## 2022-09-12 PROCEDURE — 3288F PR FALLS RISK ASSESSMENT DOCUMENTED: ICD-10-PCS | Mod: CPTII,S$GLB,, | Performed by: NURSE PRACTITIONER

## 2022-09-12 PROCEDURE — 1159F MED LIST DOCD IN RCRD: CPT | Mod: CPTII,S$GLB,, | Performed by: NURSE PRACTITIONER

## 2022-09-12 PROCEDURE — 99999 PR PBB SHADOW E&M-EST. PATIENT-LVL IV: ICD-10-PCS | Mod: PBBFAC,,, | Performed by: NURSE PRACTITIONER

## 2022-09-12 PROCEDURE — G0008 FLU VACCINE - QUADRIVALENT - ADJUVANTED: ICD-10-PCS | Mod: S$GLB,,, | Performed by: NURSE PRACTITIONER

## 2022-09-12 PROCEDURE — 3075F PR MOST RECENT SYSTOLIC BLOOD PRESS GE 130-139MM HG: ICD-10-PCS | Mod: CPTII,S$GLB,, | Performed by: NURSE PRACTITIONER

## 2022-09-12 PROCEDURE — 1101F PR PT FALLS ASSESS DOC 0-1 FALLS W/OUT INJ PAST YR: ICD-10-PCS | Mod: CPTII,S$GLB,, | Performed by: NURSE PRACTITIONER

## 2022-09-12 PROCEDURE — 99999 PR PBB SHADOW E&M-EST. PATIENT-LVL IV: CPT | Mod: PBBFAC,,, | Performed by: NURSE PRACTITIONER

## 2022-09-12 PROCEDURE — 3078F DIAST BP <80 MM HG: CPT | Mod: CPTII,S$GLB,, | Performed by: NURSE PRACTITIONER

## 2022-09-12 PROCEDURE — 1125F AMNT PAIN NOTED PAIN PRSNT: CPT | Mod: CPTII,S$GLB,, | Performed by: NURSE PRACTITIONER

## 2022-09-12 PROCEDURE — 90694 FLU VACCINE - QUADRIVALENT - ADJUVANTED: ICD-10-PCS | Mod: S$GLB,,, | Performed by: NURSE PRACTITIONER

## 2022-09-12 PROCEDURE — 1101F PT FALLS ASSESS-DOCD LE1/YR: CPT | Mod: CPTII,S$GLB,, | Performed by: NURSE PRACTITIONER

## 2022-09-12 PROCEDURE — 1160F PR REVIEW ALL MEDS BY PRESCRIBER/CLIN PHARMACIST DOCUMENTED: ICD-10-PCS | Mod: CPTII,S$GLB,, | Performed by: NURSE PRACTITIONER

## 2022-09-12 PROCEDURE — G0008 ADMIN INFLUENZA VIRUS VAC: HCPCS | Mod: S$GLB,,, | Performed by: NURSE PRACTITIONER

## 2022-09-12 RX ORDER — TIZANIDINE 4 MG/1
4 TABLET ORAL EVERY 8 HOURS PRN
Qty: 30 TABLET | Refills: 0 | Status: SHIPPED | OUTPATIENT
Start: 2022-09-12 | End: 2022-09-12

## 2022-09-12 RX ORDER — TIZANIDINE 4 MG/1
4 TABLET ORAL EVERY 8 HOURS PRN
Qty: 30 TABLET | Refills: 0 | Status: SHIPPED | OUTPATIENT
Start: 2022-09-12 | End: 2022-09-22

## 2022-09-12 NOTE — PROGRESS NOTES
Patient given flu vaccine to left deltoid, no complaints or reactions noted. Vis given 8/6/21 to patient. Instructed to wait in lobby for 15 minutes to note for reactions, patient verbalized understanding.

## 2022-09-12 NOTE — PROGRESS NOTES
Routine Office Visit    Patient Name: Alaina Vee    : 1940  MRN: [id number]    Chief Complaint:  Right hip and leg pain    Subjective:  Alaina is a 82 y.o. female who presents today for:    1. Right hip and leg pain - patient who is known to me with the below documented medical history reports today for evaluation.  She has been having right posterior hip and right calf pain for over 6 months now without any specific inciting event.  The hip pain does not radiate anywhere and she denies any numbness or tingling or weakness of the right lower extremity.  In the last month home health has been coming to evaluate and treat her  so they have been moving some of her furniture around and she has not been able to use her SCD pump at night and the calf pain worsened after that.  She denies any erythema to the leg.  No specific medications tried.  She does have a history of a blood clot of the right leg in the past.    Past Medical History  Past Medical History:   Diagnosis Date    Arthritis     knees    Atrial fibrillation     Atrial flutter     Back pain     Cataract     Degenerative disc disease     Depression     General anesthetics causing adverse effect in therapeutic use     pt states sometimes slow to awaken.    GERD (gastroesophageal reflux disease)     Hyperlipidemia     Hypertension     Kidney stone     Obesity     Sleep apnea     does not wear CPAP    Urinary incontinence     Varicosities     Ventral hernia        Past Surgical History  Past Surgical History:   Procedure Laterality Date    APPENDECTOMY      BREAST BIOPSY Bilateral         breast biopsy, left      CHOLECYSTECTOMY      CYSTOURETEROSCOPY WITH RETROGRADE PYELOGRAPHY AND INSERTION OF STENT INTO URETER Right 1/10/2020    Procedure: CYSTOURETEROSCOPY, WITH RETROGRADE PYELOGRAM AND URETERAL STENT INSERTION;  Surgeon: Yvonne Weaver MD;  Location: [facility] OR;  Service: Urology;  Laterality: Right;    JOINT REPLACEMENT      TKR  , right    JOINT REPLACEMENT  2009    TKR  left    MD EXPLORATORY OF ABDOMEN      URETEROSCOPIC REMOVAL OF URETERIC CALCULUS Right 1/31/2020    Procedure: Cystoscopy, possible retrograde pyelogram, ureteroscopy with laser lithotripsy, ureteral stent exchange;  Surgeon: Yvonne Weaver MD;  Location: [facility];  Service: Urology;  Laterality: Right;       Family History  Family History   Problem Relation Age of Onset    COPD Mother     Heart disease Sister         half sister    COPD Sister     No Known Problems Father     Parkinsonism Sister     No Known Problems Brother     No Known Problems Maternal Aunt     No Known Problems Maternal Uncle     No Known Problems Paternal Aunt     No Known Problems Paternal Uncle     No Known Problems Maternal Grandmother     No Known Problems Maternal Grandfather     No Known Problems Paternal Grandmother     No Known Problems Paternal Grandfather     Amblyopia Neg Hx     Blindness Neg Hx     Cancer Neg Hx     Cataracts Neg Hx     Diabetes Neg Hx     Glaucoma Neg Hx     Hypertension Neg Hx     Macular degeneration Neg Hx     Retinal detachment Neg Hx     Strabismus Neg Hx     Stroke Neg Hx     Thyroid disease Neg Hx        Social History  Social History     Socioeconomic History    Marital status:    Tobacco Use    Smoking status: Never    Smokeless tobacco: Never   Substance and Sexual Activity    Alcohol use: No    Drug use: No    Sexual activity: Yes     Partners: Male       Current Medications  Current Outpatient Medications on File Prior to Visit   Medication Sig Dispense Refill    apixaban (ELIQUIS) 5 mg Tab Take 1 tablet (5 mg total) by mouth 2 (two) times daily. 180 tablet 3    diclofenac sodium (VOLTAREN) 1 % Gel Apply 2 g topically once daily. 100 g 3    metoprolol succinate (TOPROL-XL) 25 MG 24 hr tablet Take 1 tablet (25 mg total) by mouth once daily. 90 tablet 3    multivitamin (THERAGRAN) per tablet Take 1 tablet by mouth every evening. 1 Tablet Oral Every  "day      oxybutynin (DITROPAN) 5 MG Tab Take 1 tablet (5 mg total) by mouth 3 (three) times daily. 270 tablet 3     No current facility-administered medications on file prior to visit.       Allergies   Review of patient's allergies indicates:   Allergen Reactions    Rosuvastatin Other (See Comments)     Aches    Celebrex [celecoxib]      Chest tightness    Lipitor [atorvastatin] Other (See Comments)       Review of Systems (Pertinent positives)  Review of Systems   Constitutional: Negative.  Negative for chills, fever and weight loss.   HENT: Negative.     Eyes: Negative.    Respiratory: Negative.     Cardiovascular: Negative.    Gastrointestinal: Negative.    Genitourinary: Negative.    Musculoskeletal:  Positive for joint pain. Negative for back pain, falls, myalgias and neck pain.   Skin: Negative.    Neurological: Negative.    Endo/Heme/Allergies: Negative.    Psychiatric/Behavioral: Negative.       /72 (BP Location: Left arm, Patient Position: Sitting, BP Method: Medium (Manual))   Pulse 60   Temp 98 °F (36.7 °C) (Oral)   Resp 18   Ht 5' 6" (1.676 m)   Wt 113 kg (249 lb 1.9 oz)   LMP  (LMP Unknown)   SpO2 98%   BMI 40.21 kg/m²     Physical Exam  Vitals reviewed.   Constitutional:       General: She is not in acute distress.     Appearance: Normal appearance. She is not ill-appearing, toxic-appearing or diaphoretic.   HENT:      Head: Normocephalic and atraumatic.   Cardiovascular:      Rate and Rhythm: Normal rate and regular rhythm.      Pulses: Normal pulses.      Heart sounds: Normal heart sounds.      Comments: Calf circumference 44.5 cm bilaterally  Pulmonary:      Effort: Pulmonary effort is normal. No respiratory distress.      Breath sounds: Normal breath sounds. No wheezing.   Abdominal:      General: Bowel sounds are normal. There is no distension.      Palpations: Abdomen is soft.      Tenderness: There is no abdominal tenderness.   Musculoskeletal:         General: Tenderness (R calf) " present. No swelling or deformity. Normal range of motion.   Skin:     General: Skin is warm and dry.      Capillary Refill: Capillary refill takes less than 2 seconds.      Comments: No erythema or edema of BLE noted   Neurological:      General: No focal deficit present.      Mental Status: She is alert and oriented to person, place, and time.   Psychiatric:         Mood and Affect: Mood normal.         Behavior: Behavior normal.        Assessment/Plan:  Alaina Vee is a 82 y.o. female who presents today for :    Diagnoses and all orders for this visit:    Right leg pain  -     Cancel: US Lower Extremity Veins Right; Future  -     US Lower Extremity Veins Right; Future    History of deep venous thrombosis (DVT) of distal vein of right lower extremity    Chronic right hip pain  -     X-Ray Hip 2 or 3 views Right (with Pelvis when performed); Future  -     Discontinue: tiZANidine (ZANAFLEX) 4 MG tablet; Take 1 tablet (4 mg total) by mouth every 8 (eight) hours as needed (hip pain).  -     tiZANidine (ZANAFLEX) 4 MG tablet; Take 1 tablet (4 mg total) by mouth every 8 (eight) hours as needed (hip pain).    Need for influenza vaccination  -     Influenza (FLUAD) - Quadrivalent (Adjuvanted) *Preferred* (65+) (PF)    - check US today given history and tenderness  - will also check hip x-ray  - can consider orthopedic referral in the future  - patient has taken tizanidine in the past for her hip pain before.  With good relief.  Will send new prescription  - flu shot given today  - follow-up as need        This office note has been dictated.  This dictation has been generated using M-Modal Fluency Direct dictation; some phonetic errors may occur.   My collaborating physician is Dr. Sameer Fink.

## 2022-09-15 ENCOUNTER — OFFICE VISIT (OUTPATIENT)
Dept: CARDIOLOGY | Facility: CLINIC | Age: 82
End: 2022-09-15
Payer: MEDICARE

## 2022-09-15 VITALS
BODY MASS INDEX: 39.86 KG/M2 | SYSTOLIC BLOOD PRESSURE: 120 MMHG | HEART RATE: 56 BPM | HEIGHT: 66 IN | DIASTOLIC BLOOD PRESSURE: 79 MMHG | OXYGEN SATURATION: 96 % | RESPIRATION RATE: 18 BRPM | WEIGHT: 248 LBS

## 2022-09-15 DIAGNOSIS — Z79.01 LONG TERM (CURRENT) USE OF ANTICOAGULANTS: ICD-10-CM

## 2022-09-15 DIAGNOSIS — E66.01 MORBID OBESITY: ICD-10-CM

## 2022-09-15 DIAGNOSIS — I70.0 AORTIC ATHEROSCLEROSIS: ICD-10-CM

## 2022-09-15 DIAGNOSIS — I10 ESSENTIAL HYPERTENSION: ICD-10-CM

## 2022-09-15 DIAGNOSIS — I77.810 AORTIC ROOT DILATATION: Primary | ICD-10-CM

## 2022-09-15 DIAGNOSIS — I48.0 PAF (PAROXYSMAL ATRIAL FIBRILLATION): ICD-10-CM

## 2022-09-15 DIAGNOSIS — G47.33 OSA (OBSTRUCTIVE SLEEP APNEA): ICD-10-CM

## 2022-09-15 DIAGNOSIS — E78.2 MIXED HYPERLIPIDEMIA: ICD-10-CM

## 2022-09-15 PROCEDURE — 93000 EKG 12-LEAD: ICD-10-PCS | Mod: S$GLB,,, | Performed by: INTERNAL MEDICINE

## 2022-09-15 PROCEDURE — 99499 RISK ADDL DX/OHS AUDIT: ICD-10-PCS | Mod: HCNC,S$GLB,, | Performed by: INTERNAL MEDICINE

## 2022-09-15 PROCEDURE — 1159F PR MEDICATION LIST DOCUMENTED IN MEDICAL RECORD: ICD-10-PCS | Mod: CPTII,S$GLB,, | Performed by: INTERNAL MEDICINE

## 2022-09-15 PROCEDURE — 99214 OFFICE O/P EST MOD 30 MIN: CPT | Mod: S$GLB,,, | Performed by: INTERNAL MEDICINE

## 2022-09-15 PROCEDURE — 1159F MED LIST DOCD IN RCRD: CPT | Mod: CPTII,S$GLB,, | Performed by: INTERNAL MEDICINE

## 2022-09-15 PROCEDURE — 1101F PT FALLS ASSESS-DOCD LE1/YR: CPT | Mod: CPTII,S$GLB,, | Performed by: INTERNAL MEDICINE

## 2022-09-15 PROCEDURE — 99214 PR OFFICE/OUTPT VISIT, EST, LEVL IV, 30-39 MIN: ICD-10-PCS | Mod: S$GLB,,, | Performed by: INTERNAL MEDICINE

## 2022-09-15 PROCEDURE — 3074F SYST BP LT 130 MM HG: CPT | Mod: CPTII,S$GLB,, | Performed by: INTERNAL MEDICINE

## 2022-09-15 PROCEDURE — 3288F PR FALLS RISK ASSESSMENT DOCUMENTED: ICD-10-PCS | Mod: CPTII,S$GLB,, | Performed by: INTERNAL MEDICINE

## 2022-09-15 PROCEDURE — 1126F PR PAIN SEVERITY QUANTIFIED, NO PAIN PRESENT: ICD-10-PCS | Mod: CPTII,S$GLB,, | Performed by: INTERNAL MEDICINE

## 2022-09-15 PROCEDURE — 1101F PR PT FALLS ASSESS DOC 0-1 FALLS W/OUT INJ PAST YR: ICD-10-PCS | Mod: CPTII,S$GLB,, | Performed by: INTERNAL MEDICINE

## 2022-09-15 PROCEDURE — 99999 PR PBB SHADOW E&M-EST. PATIENT-LVL III: CPT | Mod: PBBFAC,,, | Performed by: INTERNAL MEDICINE

## 2022-09-15 PROCEDURE — 1126F AMNT PAIN NOTED NONE PRSNT: CPT | Mod: CPTII,S$GLB,, | Performed by: INTERNAL MEDICINE

## 2022-09-15 PROCEDURE — 3288F FALL RISK ASSESSMENT DOCD: CPT | Mod: CPTII,S$GLB,, | Performed by: INTERNAL MEDICINE

## 2022-09-15 PROCEDURE — 1160F RVW MEDS BY RX/DR IN RCRD: CPT | Mod: CPTII,S$GLB,, | Performed by: INTERNAL MEDICINE

## 2022-09-15 PROCEDURE — 99999 PR PBB SHADOW E&M-EST. PATIENT-LVL III: ICD-10-PCS | Mod: PBBFAC,,, | Performed by: INTERNAL MEDICINE

## 2022-09-15 PROCEDURE — 3078F DIAST BP <80 MM HG: CPT | Mod: CPTII,S$GLB,, | Performed by: INTERNAL MEDICINE

## 2022-09-15 PROCEDURE — 3074F PR MOST RECENT SYSTOLIC BLOOD PRESSURE < 130 MM HG: ICD-10-PCS | Mod: CPTII,S$GLB,, | Performed by: INTERNAL MEDICINE

## 2022-09-15 PROCEDURE — 1160F PR REVIEW ALL MEDS BY PRESCRIBER/CLIN PHARMACIST DOCUMENTED: ICD-10-PCS | Mod: CPTII,S$GLB,, | Performed by: INTERNAL MEDICINE

## 2022-09-15 PROCEDURE — 93000 ELECTROCARDIOGRAM COMPLETE: CPT | Mod: S$GLB,,, | Performed by: INTERNAL MEDICINE

## 2022-09-15 PROCEDURE — 99499 UNLISTED E&M SERVICE: CPT | Mod: HCNC,S$GLB,, | Performed by: INTERNAL MEDICINE

## 2022-09-15 PROCEDURE — 3078F PR MOST RECENT DIASTOLIC BLOOD PRESSURE < 80 MM HG: ICD-10-PCS | Mod: CPTII,S$GLB,, | Performed by: INTERNAL MEDICINE

## 2022-09-15 NOTE — PROGRESS NOTES
CARDIOVASCULAR PROGRESS NOTE    REASON FOR CONSULT:   Alaina Vee is a 82 y.o. female who presents for f/u of AF/AFL.    PCP: Daquan  EP: Prema  Uro: Meg  Heme: Melchor  HISTORY OF PRESENT ILLNESS:   Last seen November 2021.    The patient returns for follow-up.  She denies intercurrent angina, dyspnea, palpitations, or syncope.  There has been no PND, orthopnea, or lower extremity edema.  She denies melena, hematuria, or claudicant symptoms.  She continues take her Eliquis anticoagulation, but tells me she may be having issues pain forward in the future.  We discussed switching to Xarelto or Coumadin.  I have asked the patient to check with her pharmacy to see which would be most affordable for her.    CARDIOVASCULAR HISTORY:   AF/AFL, CHADSVASC 3, s/p ablation (Dr. Lloyd) 6/12/17, DCCV 10/4/17  Aortic root dil 4.3 cm (echo 11/2021)  SULEMA    PAST MEDICAL HISTORY:     Past Medical History:   Diagnosis Date    Arthritis     knees    Atrial fibrillation     Atrial flutter     Back pain     Cataract     Degenerative disc disease     Depression     General anesthetics causing adverse effect in therapeutic use     pt states sometimes slow to awaken.    GERD (gastroesophageal reflux disease)     Hyperlipidemia     Hypertension     Kidney stone     Obesity     Sleep apnea     does not wear CPAP    Urinary incontinence     Varicosities     Ventral hernia        PAST SURGICAL HISTORY:     Past Surgical History:   Procedure Laterality Date    APPENDECTOMY      BREAST BIOPSY Bilateral     1985    breast biopsy, left      CHOLECYSTECTOMY      CYSTOURETEROSCOPY WITH RETROGRADE PYELOGRAPHY AND INSERTION OF STENT INTO URETER Right 1/10/2020    Procedure: CYSTOURETEROSCOPY, WITH RETROGRADE PYELOGRAM AND URETERAL STENT INSERTION;  Surgeon: Yvonne Weaver MD;  Location: [facility];  Service: Urology;  Laterality: Right;    JOINT REPLACEMENT      TKR , right    JOINT REPLACEMENT  2009    TKR  left    CT  EXPLORATORY OF ABDOMEN      URETEROSCOPIC REMOVAL OF URETERIC CALCULUS Right 1/31/2020    Procedure: Cystoscopy, possible retrograde pyelogram, ureteroscopy with laser lithotripsy, ureteral stent exchange;  Surgeon: Yvonne Weaver MD;  Location: [facility];  Service: Urology;  Laterality: Right;       ALLERGIES AND MEDICATION:   Review of patient's allergies indicates:  No Known Allergies  Previous Medications    APIXABAN (ELIQUIS) 5 MG TAB    Take 1 tablet (5 mg total) by mouth 2 (two) times daily.    DICLOFENAC SODIUM (VOLTAREN) 1 % GEL    Apply 2 g topically once daily.    METOPROLOL SUCCINATE (TOPROL-XL) 25 MG 24 HR TABLET    Take 1 tablet (25 mg total) by mouth once daily.    MULTIVITAMIN (THERAGRAN) PER TABLET    Take 1 tablet by mouth every evening. 1 Tablet Oral Every day    OXYBUTYNIN (DITROPAN) 5 MG TAB    Take 1 tablet (5 mg total) by mouth 3 (three) times daily.    TIZANIDINE (ZANAFLEX) 4 MG TABLET    Take 1 tablet (4 mg total) by mouth every 8 (eight) hours as needed (hip pain).       SOCIAL HISTORY:     Social History     Socioeconomic History    Marital status:    Tobacco Use    Smoking status: Never    Smokeless tobacco: Never   Substance and Sexual Activity    Alcohol use: No    Drug use: No    Sexual activity: Yes     Partners: Male       FAMILY HISTORY:     Family History   Problem Relation Age of Onset    COPD Mother     Heart disease Sister         half sister    COPD Sister     No Known Problems Father     Parkinsonism Sister     No Known Problems Brother     No Known Problems Maternal Aunt     No Known Problems Maternal Uncle     No Known Problems Paternal Aunt     No Known Problems Paternal Uncle     No Known Problems Maternal Grandmother     No Known Problems Maternal Grandfather     No Known Problems Paternal Grandmother     No Known Problems Paternal Grandfather     Amblyopia Neg Hx     Blindness Neg Hx     Cancer Neg Hx     Cataracts Neg Hx     Diabetes Neg Hx     Glaucoma Neg  "Hx     Hypertension Neg Hx     Macular degeneration Neg Hx     Retinal detachment Neg Hx     Strabismus Neg Hx     Stroke Neg Hx     Thyroid disease Neg Hx        REVIEW OF SYSTEMS:   Review of Systems   Constitutional:  Negative for chills, diaphoresis and fever.   HENT:  Negative for nosebleeds.    Eyes:  Negative for blurred vision, double vision and photophobia.   Respiratory:  Negative for hemoptysis, shortness of breath and wheezing.    Cardiovascular:  Negative for chest pain, palpitations, orthopnea, claudication, leg swelling and PND.   Gastrointestinal:  Negative for abdominal pain, blood in stool, heartburn, melena, nausea and vomiting.   Genitourinary:  Negative for flank pain and hematuria.   Musculoskeletal:  Negative for falls, myalgias and neck pain.   Skin:  Negative for rash.   Neurological:  Negative for dizziness, seizures, loss of consciousness, weakness and headaches.   Endo/Heme/Allergies:  Negative for polydipsia. Does not bruise/bleed easily.   Psychiatric/Behavioral:  Negative for depression and memory loss. The patient is not nervous/anxious.      PHYSICAL EXAM:     Vitals:    09/15/22 0823   BP: 120/79   Pulse: (!) 56   Resp: 18      Body mass index is 40.03 kg/m².  Weight: 112.5 kg (248 lb 0.3 oz)   Height: 5' 6" (167.6 cm)     Physical Exam  Vitals reviewed.   Constitutional:       General: She is not in acute distress.     Appearance: She is well-developed. She is obese. She is not ill-appearing, toxic-appearing or diaphoretic.   HENT:      Head: Normocephalic and atraumatic.   Eyes:      General: No scleral icterus.     Extraocular Movements: Extraocular movements intact.      Conjunctiva/sclera: Conjunctivae normal.      Pupils: Pupils are equal, round, and reactive to light.   Neck:      Thyroid: No thyromegaly.      Vascular: Normal carotid pulses. No carotid bruit or JVD.      Trachea: Trachea normal. No tracheal deviation.   Cardiovascular:      Rate and Rhythm: Bradycardia " present. Rhythm irregularly irregular.      Pulses:           Carotid pulses are 2+ on the right side and 2+ on the left side.     Heart sounds: S1 normal and S2 normal. Heart sounds are distant. No murmur heard.    No friction rub. No gallop.   Pulmonary:      Effort: Pulmonary effort is normal. No respiratory distress.      Breath sounds: Normal breath sounds. No stridor. No wheezing, rhonchi or rales.   Chest:      Chest wall: No tenderness.   Abdominal:      General: There is no distension.      Palpations: Abdomen is soft.   Musculoskeletal:         General: No tenderness. Normal range of motion.      Cervical back: Normal range of motion and neck supple. No edema or rigidity.   Feet:      Right foot:      Skin integrity: No ulcer.      Left foot:      Skin integrity: No ulcer.   Skin:     General: Skin is warm and dry.      Coloration: Skin is not jaundiced.   Neurological:      General: No focal deficit present.      Mental Status: She is alert and oriented to person, place, and time.      Cranial Nerves: No cranial nerve deficit.   Psychiatric:         Mood and Affect: Mood normal.         Speech: Speech normal.         Behavior: Behavior normal. Behavior is cooperative.       DATA:   EKG: (personally reviewed tracing(s))  9/15/22 AF 45, low volt, was in SR 11/17/21    Laboratory:  CBC:  Recent Labs   Lab 01/06/20  1722 07/14/20  1416 05/27/22  0753   WBC 7.31 7.10 6.22   Hemoglobin 14.2 14.5 14.6   Hematocrit 43.5 44.7 45.9   Platelets 248 272 269       CHEMISTRIES:  Recent Labs   Lab 12/01/20  1044 04/13/21  1005 05/27/22  0753   Glucose 120 H 103 103   Sodium 144 143 141   Potassium 4.0 4.3 4.1   BUN 14 15 18   Creatinine 0.8 0.9 0.8   eGFR if African American >60 >60 >60   eGFR if non African American >60 >60 >60   Calcium 9.1 9.5 9.3       CARDIAC BIOMARKERS:        COAGS:  Recent Labs   Lab 09/30/21  0938 10/14/21  0931 10/28/21  0755   INR 3.4 H 3.3 H 3.3 H       LIPIDS/LFTS:  Recent Labs   Lab  07/14/20  1416 12/01/20  1044 05/27/22  0753   Cholesterol 155  155 130 203 H   Triglycerides 110  110 116 91   HDL 45  45 40 41   LDL Cholesterol 88.0  88.0 66.8 143.8   Non-HDL Cholesterol 110  110 90 162   AST 17  17 15 17   ALT 7 L  7 L 8 L 12     Lab Results   Component Value Date    TSH 1.372 09/20/2018     Cardiovascular Testing:  Echo 11/9/21 (Ao root prev 4.2cm on exam 6/8/20)  The left ventricle is normal in size with mild concentric hypertrophy and normal systolic function.  The estimated ejection fraction is 60%.  Normal right ventricular size with normal right ventricular systolic function.  The sinuses of Valsalva is moderately dilated, 4.3 cm.    LE venous US/reflux 10/14/21  There is no evidence of a right lower extremity DVT.  There is no evidence of a left lower extremity DVT.  The left greater saphenous vein has reflux.   Color flow evaluation of the right lower extremity demonstrates no evidence of venous thrombosis in the deep or superficial veins. Significant reflux is not noted in the GSV.  Reflux does not include the saphenofemoral junction (SFJ).   Significant reflux is not noted in the SSV.  Reflux does not includethe saphenopopliteal junction (SPJ).  There is no evidence of reflux in the Accessory vein.  Color flow evaluation of the left lower extremity demonstrates no evidence of venous thrombosis in the deep or superficial veins. Significant reflux is noted in the GSV at the level of the mid thigh, knee and calf.  Reflux does not include the saphenofemoral junction (SFJ).   Significant reflux is not noted in the SSV.  Reflux does not includethe saphenopopliteal junction (SPJ).  There is no evidence of reflux in the Accessory vein.    EVM 10/5/17-11/4/17  Sinus rhythm with first-degree AV block.  Overall, negative event monitor.    SKINNY 8/31/17    1 - Normal left ventricular systolic function.     2 - Left atrial appendage is single lobed with no visualized thrombus.    3 - Aortic  root 3.8cm    Holter 3/7/17  PREDOMINANT RHYTHM  1. Atrial flutter with ventricular rates varying between 29 and 89 bpm with an average of 55 bpm.   VENTRICULAR ARRHYTHMIAS  1. There were no PVCs. There was no ventricular ectopic activity.  SUPRA VENTRICULAR ARRHYTHMIAS  1. Atrial flutter was noted throughout the study.   AV CONDUCTION  1. There was no evidence of high grade AV emma filtering.   2. The longest RR interval was 2215 msec.   DIARY  1. The diary was not returned  MISCELLANEOUS  1. There were occasional hookup related artifacts. Overall, the study was of good quality.   2. This was a tape of adequate length (24 hrs).    RADHA 7/14/16  Right lower extremity RADHA: 1.06  Left lower extremity RADHA: 1.08    ASSESSMENT:   # PAF/AFL, now in AF with slow VR, asymptomatic.  CHADSVASC 3.  S/p AFL ablation 6/2017, s/p SKINNY/DCCV 8/31/17, DCCV 10/4/17 (prev on flecainide, stopped 10/26/21).  Now on eliquis 5mg bid.   # HTN, controlled  # HLP, not on statin (prev intol of atorva)  # Hx RLE DVT, now resolved  # BMI 40, up 1 unit(s) vs last OV  # SULEMA no longer on CPAP  # Aortic root dil 3.8->4.1->4.2->4.3 cm (echo 11/2021)  # aortic atherosclerosis (CT A/P 4/13/21)    PLAN:   Cont med rx  Cont eliquis 5mg bid.  Pt having difficulty afforing med, may need to switch to coumadin at follow up.  Check echo and holter  Diet/exercise/weight loss, pt previously declined bariatric evaluation.  RTC 1 month      Nahid Hidalgo MD, FACC

## 2022-09-21 ENCOUNTER — HOSPITAL ENCOUNTER (OUTPATIENT)
Dept: RADIOLOGY | Facility: HOSPITAL | Age: 82
Discharge: HOME | End: 2022-09-21
Attending: NURSE PRACTITIONER
Payer: MEDICARE

## 2022-09-21 DIAGNOSIS — M79.604 RIGHT LEG PAIN: ICD-10-CM

## 2022-09-21 PROCEDURE — 93971 US LOWER EXTREMITY VEINS RIGHT: ICD-10-PCS | Mod: 26,RT,, | Performed by: RADIOLOGY

## 2022-09-21 PROCEDURE — 93971 EXTREMITY STUDY: CPT | Mod: TC,RT

## 2022-09-21 PROCEDURE — 93971 EXTREMITY STUDY: CPT | Mod: 26,RT,, | Performed by: RADIOLOGY

## 2022-09-22 ENCOUNTER — TELEPHONE (OUTPATIENT)
Dept: FAMILY MEDICINE | Facility: CLINIC | Age: 82
End: 2022-09-22
Payer: MEDICARE

## 2022-09-22 NOTE — TELEPHONE ENCOUNTER
BERNARDOM for patient to contact office.    ----- Message from Leland Martell NP sent at 9/22/2022  1:39 PM CDT -----  Please call patient and let her know her x-ray and ultrasound are normal.  Thank you   Keystone Flap Text: The defect edges were debeveled with a #15 scalpel blade.  Given the location of the defect, shape of the defect a keystone flap was deemed most appropriate.  Using a sterile surgical marker, an appropriate keystone flap was drawn incorporating the defect, outlining the appropriate donor tissue and placing the expected incisions within the relaxed skin tension lines where possible. The area thus outlined was incised deep to adipose tissue with a #15 scalpel blade.  The skin margins were undermined to an appropriate distance in all directions around the primary defect and laterally outward around the flap utilizing iris scissors.

## 2022-09-23 ENCOUNTER — TELEPHONE (OUTPATIENT)
Dept: FAMILY MEDICINE | Facility: CLINIC | Age: 82
End: 2022-09-23
Payer: MEDICARE

## 2022-09-23 NOTE — TELEPHONE ENCOUNTER
BERNARDOM for patient to contact office.    ----- Message from Leland Martell NP sent at 9/23/2022 11:04 AM CDT -----  Please call patient and let her know her x-ray and ultrasound are normal.  Thank you

## 2022-09-26 ENCOUNTER — HOSPITAL ENCOUNTER (OUTPATIENT)
Dept: CARDIOLOGY | Facility: HOSPITAL | Age: 82
Discharge: HOME | End: 2022-09-26
Attending: INTERNAL MEDICINE
Payer: MEDICARE

## 2022-09-26 DIAGNOSIS — I48.0 PAF (PAROXYSMAL ATRIAL FIBRILLATION): ICD-10-CM

## 2022-09-26 DIAGNOSIS — I77.810 AORTIC ROOT DILATATION: ICD-10-CM

## 2022-09-26 LAB
ASCENDING AORTA: 4.08 CM
AV PEAK GRADIENT: 7 MMHG
AV VELOCITY RATIO: 0.78
CV ECHO LV RWT: 0.58 CM
DOP CALC AO PEAK VEL: 1.34 M/S
DOP CALC LVOT AREA: 4.2 CM2
DOP CALC LVOT DIAMETER: 2.32 CM
DOP CALC LVOT PEAK VEL: 1.05 M/S
DOP CALC LVOT STROKE VOLUME: 112.81 CM3
DOP CALCLVOT PEAK VEL VTI: 26.7 CM
E WAVE DECELERATION TIME: 159.34 MSEC
E/A RATIO: 3.64
ECHO LV POSTERIOR WALL: 1.26 CM (ref 0.6–1.1)
EJECTION FRACTION: 65 %
FRACTIONAL SHORTENING: 26 % (ref 28–44)
INTERVENTRICULAR SEPTUM: 1.28 CM (ref 0.6–1.1)
IVC DIAMETER: 1.9 CM
IVRT: 103.81 MSEC
LA MAJOR: 6.98 CM
LA MINOR: 6.94 CM
LA WIDTH: 5 CM
LEFT ATRIUM SIZE: 4.5 CM
LEFT ATRIUM VOLUME: 133.11 CM3
LEFT INTERNAL DIMENSION IN SYSTOLE: 3.2 CM (ref 2.1–4)
LEFT VENTRICLE DIASTOLIC VOLUME: 84.46 ML
LEFT VENTRICLE SYSTOLIC VOLUME: 40.93 ML
LEFT VENTRICULAR INTERNAL DIMENSION IN DIASTOLE: 4.33 CM (ref 3.5–6)
LEFT VENTRICULAR MASS: 202.83 G
LVOT MG: 2.31 MMHG
LVOT MV: 0.7 CM/S
MV PEAK A VEL: 0.28 M/S
MV PEAK E VEL: 1.02 M/S
PISA TR MAX VEL: 2.11 M/S
PV PEAK VELOCITY: 0.83 CM/S
RA MAJOR: 6.83 CM
RA PRESSURE: 3 MMHG
RA WIDTH: 4.7 CM
RIGHT VENTRICULAR END-DIASTOLIC DIMENSION: 3.6 CM
SINUS: 2.3 CM
STJ: 3.71 CM
TR MAX PG: 18 MMHG
TRICUSPID ANNULAR PLANE SYSTOLIC EXCURSION: 1.6 CM
TV PEAK GRADIENT: 4.28 MMHG
TV REST PULMONARY ARTERY PRESSURE: 21 MMHG

## 2022-09-26 PROCEDURE — 93227 HOLTER MONITOR - 24 HOUR (CUPID ONLY): ICD-10-PCS | Mod: ,,, | Performed by: INTERNAL MEDICINE

## 2022-09-26 PROCEDURE — 93306 TTE W/DOPPLER COMPLETE: CPT

## 2022-09-26 PROCEDURE — 93227 XTRNL ECG REC<48 HR R&I: CPT | Mod: ,,, | Performed by: INTERNAL MEDICINE

## 2022-09-26 PROCEDURE — 93226 XTRNL ECG REC<48 HR SCAN A/R: CPT

## 2022-09-26 PROCEDURE — 93306 ECHO (CUPID ONLY): ICD-10-PCS | Mod: 26,,, | Performed by: INTERNAL MEDICINE

## 2022-09-26 PROCEDURE — 93306 TTE W/DOPPLER COMPLETE: CPT | Mod: 26,,, | Performed by: INTERNAL MEDICINE

## 2022-09-28 LAB
OHS CV EVENT MONITOR DAY: 0
OHS CV HOLTER LENGTH DECIMAL HOURS: 23.98
OHS CV HOLTER LENGTH HOURS: 23
OHS CV HOLTER LENGTH MINUTES: 59
OHS CV HOLTER SINUS AVERAGE HR: 51
OHS CV HOLTER SINUS MAX HR: 90
OHS CV HOLTER SINUS MIN HR: 31

## 2022-10-17 NOTE — PLAN OF CARE
Received report from SHELLY Crowder. Patient s/p dccv, AAOx3. VSS, no c/o pain or discomfort at this time, resp even and unlabored. Post procedure protocol reviewed with patient and patient's family. Understanding verbalized. Family members at bedside. Nurse call bell within reach. Will continue to monitor per post procedure protocol.     Risk Alerts: No risk alerts present

## 2022-10-20 NOTE — TELEPHONE ENCOUNTER
----- Message from Blank Ramos sent at 10/20/2022 12:01 PM CDT -----  Regarding: self .[phone]  .Type: RX Refill Request    Who Called: self    Have you contacted your pharmacy: yes    Refill or New Rx: refill    RX Name and Strength: apixaban (ELIQUIS) 5 mg Tab      Is this a 30 day or 90 day RX: 90 day    Preferred Pharmacy with phone number: [facility]     Local or Mail Order: local    Would the patient rather a call back or a response via [facility]? Call back    Best Call Back Number .[phone]

## 2022-12-21 DIAGNOSIS — N39.46 MIXED INCONTINENCE URGE AND STRESS: ICD-10-CM

## 2022-12-22 ENCOUNTER — TELEPHONE (OUTPATIENT)
Dept: FAMILY MEDICINE | Facility: CLINIC | Age: 82
End: 2022-12-22
Payer: MEDICARE

## 2022-12-22 ENCOUNTER — OFFICE VISIT (OUTPATIENT)
Dept: CARDIOLOGY | Facility: CLINIC | Age: 82
End: 2022-12-22
Payer: MEDICARE

## 2022-12-22 VITALS
WEIGHT: 240 LBS | HEART RATE: 65 BPM | SYSTOLIC BLOOD PRESSURE: 142 MMHG | BODY MASS INDEX: 38.73 KG/M2 | DIASTOLIC BLOOD PRESSURE: 90 MMHG | RESPIRATION RATE: 16 BRPM | OXYGEN SATURATION: 95 %

## 2022-12-22 DIAGNOSIS — E66.9 NON MORBID OBESITY, UNSPECIFIED OBESITY TYPE: ICD-10-CM

## 2022-12-22 DIAGNOSIS — Z78.9 STATIN INTOLERANCE: ICD-10-CM

## 2022-12-22 DIAGNOSIS — E78.2 MIXED HYPERLIPIDEMIA: ICD-10-CM

## 2022-12-22 DIAGNOSIS — I48.19 PERSISTENT ATRIAL FIBRILLATION: Primary | ICD-10-CM

## 2022-12-22 DIAGNOSIS — I10 ESSENTIAL HYPERTENSION: ICD-10-CM

## 2022-12-22 DIAGNOSIS — I70.0 AORTIC ATHEROSCLEROSIS: ICD-10-CM

## 2022-12-22 DIAGNOSIS — E66.01 MORBID OBESITY: ICD-10-CM

## 2022-12-22 DIAGNOSIS — Z79.01 LONG TERM (CURRENT) USE OF ANTICOAGULANTS: ICD-10-CM

## 2022-12-22 DIAGNOSIS — I77.810 AORTIC ROOT DILATATION: ICD-10-CM

## 2022-12-22 PROCEDURE — 1159F MED LIST DOCD IN RCRD: CPT | Mod: HCNC,CPTII,S$GLB, | Performed by: INTERNAL MEDICINE

## 2022-12-22 PROCEDURE — 93000 ELECTROCARDIOGRAM COMPLETE: CPT | Mod: HCNC,S$GLB,, | Performed by: INTERNAL MEDICINE

## 2022-12-22 PROCEDURE — 3288F PR FALLS RISK ASSESSMENT DOCUMENTED: ICD-10-PCS | Mod: HCNC,CPTII,S$GLB, | Performed by: INTERNAL MEDICINE

## 2022-12-22 PROCEDURE — 1159F PR MEDICATION LIST DOCUMENTED IN MEDICAL RECORD: ICD-10-PCS | Mod: HCNC,CPTII,S$GLB, | Performed by: INTERNAL MEDICINE

## 2022-12-22 PROCEDURE — 3288F FALL RISK ASSESSMENT DOCD: CPT | Mod: HCNC,CPTII,S$GLB, | Performed by: INTERNAL MEDICINE

## 2022-12-22 PROCEDURE — 3077F PR MOST RECENT SYSTOLIC BLOOD PRESSURE >= 140 MM HG: ICD-10-PCS | Mod: HCNC,CPTII,S$GLB, | Performed by: INTERNAL MEDICINE

## 2022-12-22 PROCEDURE — 1101F PT FALLS ASSESS-DOCD LE1/YR: CPT | Mod: HCNC,CPTII,S$GLB, | Performed by: INTERNAL MEDICINE

## 2022-12-22 PROCEDURE — 1160F PR REVIEW ALL MEDS BY PRESCRIBER/CLIN PHARMACIST DOCUMENTED: ICD-10-PCS | Mod: HCNC,CPTII,S$GLB, | Performed by: INTERNAL MEDICINE

## 2022-12-22 PROCEDURE — 3080F DIAST BP >= 90 MM HG: CPT | Mod: HCNC,CPTII,S$GLB, | Performed by: INTERNAL MEDICINE

## 2022-12-22 PROCEDURE — 99999 PR PBB SHADOW E&M-EST. PATIENT-LVL III: CPT | Mod: PBBFAC,HCNC,, | Performed by: INTERNAL MEDICINE

## 2022-12-22 PROCEDURE — 1126F AMNT PAIN NOTED NONE PRSNT: CPT | Mod: HCNC,CPTII,S$GLB, | Performed by: INTERNAL MEDICINE

## 2022-12-22 PROCEDURE — 1126F PR PAIN SEVERITY QUANTIFIED, NO PAIN PRESENT: ICD-10-PCS | Mod: HCNC,CPTII,S$GLB, | Performed by: INTERNAL MEDICINE

## 2022-12-22 PROCEDURE — 99214 PR OFFICE/OUTPT VISIT, EST, LEVL IV, 30-39 MIN: ICD-10-PCS | Mod: HCNC,S$GLB,, | Performed by: INTERNAL MEDICINE

## 2022-12-22 PROCEDURE — 1160F RVW MEDS BY RX/DR IN RCRD: CPT | Mod: HCNC,CPTII,S$GLB, | Performed by: INTERNAL MEDICINE

## 2022-12-22 PROCEDURE — 3080F PR MOST RECENT DIASTOLIC BLOOD PRESSURE >= 90 MM HG: ICD-10-PCS | Mod: HCNC,CPTII,S$GLB, | Performed by: INTERNAL MEDICINE

## 2022-12-22 PROCEDURE — 99214 OFFICE O/P EST MOD 30 MIN: CPT | Mod: HCNC,S$GLB,, | Performed by: INTERNAL MEDICINE

## 2022-12-22 PROCEDURE — 99999 PR PBB SHADOW E&M-EST. PATIENT-LVL III: ICD-10-PCS | Mod: PBBFAC,HCNC,, | Performed by: INTERNAL MEDICINE

## 2022-12-22 PROCEDURE — 1101F PR PT FALLS ASSESS DOC 0-1 FALLS W/OUT INJ PAST YR: ICD-10-PCS | Mod: HCNC,CPTII,S$GLB, | Performed by: INTERNAL MEDICINE

## 2022-12-22 PROCEDURE — 93000 EKG 12-LEAD: ICD-10-PCS | Mod: HCNC,S$GLB,, | Performed by: INTERNAL MEDICINE

## 2022-12-22 PROCEDURE — 3077F SYST BP >= 140 MM HG: CPT | Mod: HCNC,CPTII,S$GLB, | Performed by: INTERNAL MEDICINE

## 2022-12-22 RX ORDER — LOSARTAN POTASSIUM 25 MG/1
25 TABLET ORAL DAILY
Qty: 90 TABLET | Refills: 3 | Status: SHIPPED | OUTPATIENT
Start: 2022-12-22

## 2022-12-22 RX ORDER — OXYBUTYNIN CHLORIDE 5 MG/1
5 TABLET ORAL 3 TIMES DAILY
Qty: 270 TABLET | Refills: 3 | Status: SHIPPED | OUTPATIENT
Start: 2022-12-22 | End: 2023-10-11 | Stop reason: SDUPTHER

## 2022-12-22 NOTE — TELEPHONE ENCOUNTER
----- Message from Opal Serrano sent at 12/22/2022 10:05 AM CST -----  Regarding: [id number]  Type:  Patient Returning Call    Who Called: self    Who Left Message for Patient: Dave    Does the patient know what this is regarding?:no    Would the patient rather a call back or a response via My [facility]? Call back    Best Call Back Number:[phone]      Additional Information:

## 2022-12-22 NOTE — TELEPHONE ENCOUNTER
----- Message from Izabel Lawrence LPN sent at 12/22/2022  9:38 AM CST -----  Regarding: appointment  Pt was seen at [facility] by . Pt will need an appointment in 2 weeks for a BP check with  per . Please help scheduled pt at your next available appointment.

## 2022-12-22 NOTE — PROGRESS NOTES
CARDIOVASCULAR PROGRESS NOTE    REASON FOR CONSULT:   Alaina Vee is a 82 y.o. female who presents for f/u of AF/AFL.    PCP: Daquan  EP: Prema  Uro: Meg  Heme: Melchor  HISTORY OF PRESENT ILLNESS:   The patient returns for follow-up.  She reports generally stable status without angina, dyspnea, palpitations, or syncope.  There has been no PND, orthopnea, or lower extremity edema.  She denies melena, hematuria, or claudicant symptoms.  She continues take her Eliquis without any issues.    Her blood pressure somewhat out of range today, the patient describes some issues with sleeping.  She does carry history of sleep apnea but is not on CPAP.  I offered her repeat referral to sleep Medicine, and she is not interested at this time.      I reviewed the results of her Holter noting controlled heart rates with an up to 2.2nd pause.  The patient apparently is no longer taking metoprolol.  Her echocardiogram notes stable mild aortic root dilatation.    CARDIOVASCULAR HISTORY:   AF/AFL, CHADSVASC 3, s/p ablation (Dr. Lloyd) 6/12/17, DCCV 10/4/17, on eliquis 5mg bid  Aortic root dil 4.3->4.1 cm (echo 9/2022)  SULEMA, no longer on CPAP    PAST MEDICAL HISTORY:     Past Medical History:   Diagnosis Date    Arthritis     knees    Atrial fibrillation     Atrial flutter     Back pain     Cataract     Degenerative disc disease     Depression     General anesthetics causing adverse effect in therapeutic use     pt states sometimes slow to awaken.    GERD (gastroesophageal reflux disease)     Hyperlipidemia     Hypertension     Kidney stone     Obesity     Sleep apnea     does not wear CPAP    Urinary incontinence     Varicosities     Ventral hernia        PAST SURGICAL HISTORY:     Past Surgical History:   Procedure Laterality Date    APPENDECTOMY      BREAST BIOPSY Bilateral     1985    breast biopsy, left      CHOLECYSTECTOMY      CYSTOURETEROSCOPY WITH RETROGRADE PYELOGRAPHY AND INSERTION OF STENT INTO URETER  Right 1/10/2020    Procedure: CYSTOURETEROSCOPY, WITH RETROGRADE PYELOGRAM AND URETERAL STENT INSERTION;  Surgeon: Yvonne Weaver MD;  Location: [facility] OR;  Service: Urology;  Laterality: Right;    JOINT REPLACEMENT      TKR , right    JOINT REPLACEMENT  2009    TKR  left    CT EXPLORATORY OF ABDOMEN      URETEROSCOPIC REMOVAL OF URETERIC CALCULUS Right 1/31/2020    Procedure: Cystoscopy, possible retrograde pyelogram, ureteroscopy with laser lithotripsy, ureteral stent exchange;  Surgeon: Yvonne Weaver MD;  Location: [facility] OR;  Service: Urology;  Laterality: Right;       ALLERGIES AND MEDICATION:   Review of patient's allergies indicates:  No Known Allergies  Previous Medications    APIXABAN (ELIQUIS) 5 MG TAB    Take 1 tablet (5 mg total) by mouth 2 (two) times daily.    DICLOFENAC SODIUM (VOLTAREN) 1 % GEL    Apply 2 g topically once daily.    METOPROLOL SUCCINATE (TOPROL-XL) 25 MG 24 HR TABLET    Take 1 tablet (25 mg total) by mouth once daily.    MULTIVITAMIN (THERAGRAN) PER TABLET    Take 1 tablet by mouth every evening. 1 Tablet Oral Every day    OXYBUTYNIN (DITROPAN) 5 MG TAB    Take 1 tablet (5 mg total) by mouth 3 (three) times daily.       SOCIAL HISTORY:     Social History     Socioeconomic History    Marital status:    Tobacco Use    Smoking status: Never    Smokeless tobacco: Never   Substance and Sexual Activity    Alcohol use: No    Drug use: No    Sexual activity: Yes     Partners: Male       FAMILY HISTORY:     Family History   Problem Relation Age of Onset    COPD Mother     Heart disease Sister         half sister    COPD Sister     No Known Problems Father     Parkinsonism Sister     No Known Problems Brother     No Known Problems Maternal Aunt     No Known Problems Maternal Uncle     No Known Problems Paternal Aunt     No Known Problems Paternal Uncle     No Known Problems Maternal Grandmother     No Known Problems Maternal Grandfather     No Known Problems Paternal  Grandmother     No Known Problems Paternal Grandfather     Amblyopia Neg Hx     Blindness Neg Hx     Cancer Neg Hx     Cataracts Neg Hx     Diabetes Neg Hx     Glaucoma Neg Hx     Hypertension Neg Hx     Macular degeneration Neg Hx     Retinal detachment Neg Hx     Strabismus Neg Hx     Stroke Neg Hx     Thyroid disease Neg Hx        REVIEW OF SYSTEMS:   Review of Systems   Constitutional:  Negative for chills, diaphoresis and fever.   HENT:  Negative for nosebleeds.    Eyes:  Negative for blurred vision, double vision and photophobia.   Respiratory:  Negative for hemoptysis, shortness of breath and wheezing.    Cardiovascular:  Negative for chest pain, palpitations, orthopnea, claudication, leg swelling and PND.   Gastrointestinal:  Negative for abdominal pain, blood in stool, heartburn, melena, nausea and vomiting.   Genitourinary:  Negative for flank pain and hematuria.   Musculoskeletal:  Negative for falls, myalgias and neck pain.   Skin:  Negative for rash.   Neurological:  Negative for dizziness, seizures, loss of consciousness, weakness and headaches.   Endo/Heme/Allergies:  Negative for polydipsia. Does not bruise/bleed easily.   Psychiatric/Behavioral:  Negative for depression and memory loss. The patient is not nervous/anxious.      PHYSICAL EXAM:     Vitals:    12/22/22 0918   BP: (!) 142/90   Pulse: 65   Resp: 16      Body mass index is 38.73 kg/m².  Weight: 108.8 kg (239 lb 15.5 oz)         Physical Exam  Vitals reviewed.   Constitutional:       General: She is not in acute distress.     Appearance: She is well-developed. She is obese. She is not ill-appearing, toxic-appearing or diaphoretic.   HENT:      Head: Normocephalic and atraumatic.   Eyes:      General: No scleral icterus.     Extraocular Movements: Extraocular movements intact.      Conjunctiva/sclera: Conjunctivae normal.      Pupils: Pupils are equal, round, and reactive to light.   Neck:      Thyroid: No thyromegaly.      Vascular: Normal  carotid pulses. No carotid bruit or JVD.      Trachea: Trachea normal. No tracheal deviation.   Cardiovascular:      Rate and Rhythm: Normal rate. Rhythm irregularly irregular.      Pulses:           Carotid pulses are 2+ on the right side and 2+ on the left side.     Heart sounds: S1 normal and S2 normal. Heart sounds are distant. No murmur heard.    No friction rub. No gallop.   Pulmonary:      Effort: Pulmonary effort is normal. No respiratory distress.      Breath sounds: Normal breath sounds. No stridor. No wheezing, rhonchi or rales.   Chest:      Chest wall: No tenderness.   Abdominal:      General: There is no distension.      Palpations: Abdomen is soft.   Musculoskeletal:         General: No tenderness. Normal range of motion.      Cervical back: Normal range of motion and neck supple. No edema or rigidity.   Feet:      Right foot:      Skin integrity: No ulcer.      Left foot:      Skin integrity: No ulcer.   Skin:     General: Skin is warm and dry.      Coloration: Skin is not jaundiced.   Neurological:      General: No focal deficit present.      Mental Status: She is alert and oriented to person, place, and time.      Cranial Nerves: No cranial nerve deficit.   Psychiatric:         Mood and Affect: Mood normal.         Speech: Speech normal.         Behavior: Behavior normal. Behavior is cooperative.       DATA:   EKG: (personally reviewed tracing(s))  12/22/22 AF 57    Laboratory:  CBC:  Recent Labs   Lab 01/06/20  1722 07/14/20  1416 05/27/22  0753   WBC 7.31 7.10 6.22   Hemoglobin 14.2 14.5 14.6   Hematocrit 43.5 44.7 45.9   Platelets 248 272 269       CHEMISTRIES:  Recent Labs   Lab 12/01/20  1044 04/13/21  1005 05/27/22  0753   Glucose 120 H 103 103   Sodium 144 143 141   Potassium 4.0 4.3 4.1   BUN 14 15 18   Creatinine 0.8 0.9 0.8   eGFR if African American >60 >60 >60   eGFR if non African American >60 >60 >60   Calcium 9.1 9.5 9.3       CARDIAC BIOMARKERS:        COAGS:  Recent Labs   Lab  09/30/21  0938 10/14/21  0931 10/28/21  0755   INR 3.4 H 3.3 H 3.3 H       LIPIDS/LFTS:  Recent Labs   Lab 07/14/20  1416 12/01/20  1044 05/27/22  0753   Cholesterol 155  155 130 203 H   Triglycerides 110  110 116 91   HDL 45  45 40 41   LDL Cholesterol 88.0  88.0 66.8 143.8   Non-HDL Cholesterol 110  110 90 162   AST 17  17 15 17   ALT 7 L  7 L 8 L 12     Lab Results   Component Value Date    TSH 1.372 09/20/2018     Cardiovascular Testing:  Echo 9/26/22 (Ao root 4.3cm on report 11/2021)  The left ventricle is normal in size with mild concentric hypertrophy and normal systolic function.  The estimated ejection fraction is 65%.  Atrial fibrillation observed.  Normal right ventricular size with normal right ventricular systolic function.  Moderate left atrial enlargement.  Moderate right atrial enlargement.  Mild mitral regurgitation.  Normal central venous pressure (3 mmHg).  The estimated PA systolic pressure is 21 mmHg.  There is no pulmonary hypertension.  The sinuses of Valsalva is mildly dilated. 3.8cm  The ascending aorta is mildly dilated. 4.1cm    Holter 9/26/22  Atrial fibrillation  Heart rates varied between 31 and 90 BPM with an average of 51 BPM.  There were very rare PVCs  Longest R-R 2.9s at 5:03pm.    LE venous US/reflux 10/14/21  There is no evidence of a right lower extremity DVT.  There is no evidence of a left lower extremity DVT.  The left greater saphenous vein has reflux.   Color flow evaluation of the right lower extremity demonstrates no evidence of venous thrombosis in the deep or superficial veins. Significant reflux is not noted in the GSV.  Reflux does not include the saphenofemoral junction (SFJ).   Significant reflux is not noted in the SSV.  Reflux does not includethe saphenopopliteal junction (SPJ).  There is no evidence of reflux in the Accessory vein.  Color flow evaluation of the left lower extremity demonstrates no evidence of venous thrombosis in the deep or superficial  veins. Significant reflux is noted in the GSV at the level of the mid thigh, knee and calf.  Reflux does not include the saphenofemoral junction (SFJ).   Significant reflux is not noted in the SSV.  Reflux does not includethe saphenopopliteal junction (SPJ).  There is no evidence of reflux in the Accessory vein.    EVM 10/5/17-11/4/17  Sinus rhythm with first-degree AV block.  Overall, negative event monitor.    SKINNY 8/31/17    1 - Normal left ventricular systolic function.     2 - Left atrial appendage is single lobed with no visualized thrombus.    3 - Aortic root 3.8cm    Holter 3/7/17  PREDOMINANT RHYTHM  1. Atrial flutter with ventricular rates varying between 29 and 89 bpm with an average of 55 bpm.   VENTRICULAR ARRHYTHMIAS  1. There were no PVCs. There was no ventricular ectopic activity.  SUPRA VENTRICULAR ARRHYTHMIAS  1. Atrial flutter was noted throughout the study.   AV CONDUCTION  1. There was no evidence of high grade AV emma filtering.   2. The longest RR interval was 2215 msec.   DIARY  1. The diary was not returned  MISCELLANEOUS  1. There were occasional hookup related artifacts. Overall, the study was of good quality.   2. This was a tape of adequate length (24 hrs).    RADHA 7/14/16  Right lower extremity RADHA: 1.06  Left lower extremity RADHA: 1.08    ASSESSMENT:   # PAF/AFL, now in AF with slow VR, asymptomatic.  CHADSVASC 3.  S/p AFL ablation 6/2017, s/p SKINNY/DCCV 8/31/17, DCCV 10/4/17 (prev on flecainide, stopped 10/26/21).  Now on eliquis 5mg bid.   # HTN, uncontrolled, now off metoprolol.  # HLP,statin intolerant (prev intol of atorva/rosuva)  # Hx RLE DVT, now resolved  # BMI 39, down 1 unit(s) vs last OV  # SULEMA no longer on CPAP  # Aortic root dil 3.8->4.1->4.2->4.3->4.1 cm (echo 9/2022)  # aortic atherosclerosis (CT A/P 4/13/21)    PLAN:   Cont med rx  Cont eliquis 5mg bid.  Toprol removed from med list  Add losartan 25mg qd  Check BMP 1 week  RN BP check 2 weeks (  Daquan)  Diet/exercise/weight loss, pt previously declined bariatric evaluation.  Pt declines re-referral to sleep med.  RTC 6 months with surveillance echo (June 2023)      Nahid Hidalgo MD, FACC

## 2022-12-28 ENCOUNTER — LAB VISIT (OUTPATIENT)
Dept: LAB | Facility: HOSPITAL | Age: 82
End: 2022-12-28
Attending: INTERNAL MEDICINE
Payer: MEDICARE

## 2022-12-28 DIAGNOSIS — I10 ESSENTIAL HYPERTENSION: ICD-10-CM

## 2022-12-28 LAB
ANION GAP SERPL CALC-SCNC: 6 MMOL/L (ref 8–16)
BUN SERPL-MCNC: 13 MG/DL (ref 8–23)
CALCIUM SERPL-MCNC: 9.4 MG/DL (ref 8.7–10.5)
CHLORIDE SERPL-SCNC: 106 MMOL/L (ref 95–110)
CO2 SERPL-SCNC: 28 MMOL/L (ref 23–29)
CREAT SERPL-MCNC: 0.9 MG/DL (ref 0.5–1.4)
EST. GFR  (NO RACE VARIABLE): >60 ML/MIN/1.73 M^2
GLUCOSE SERPL-MCNC: 100 MG/DL (ref 70–110)
POTASSIUM SERPL-SCNC: 4.1 MMOL/L (ref 3.5–5.1)
SODIUM SERPL-SCNC: 140 MMOL/L (ref 136–145)

## 2022-12-28 PROCEDURE — 80048 BASIC METABOLIC PNL TOTAL CA: CPT | Mod: HCNC | Performed by: INTERNAL MEDICINE

## 2022-12-28 PROCEDURE — 36415 COLL VENOUS BLD VENIPUNCTURE: CPT | Mod: HCNC,PN | Performed by: INTERNAL MEDICINE

## 2023-01-20 ENCOUNTER — PES CALL (OUTPATIENT)
Dept: ADMINISTRATIVE | Facility: CLINIC | Age: 83
End: 2023-01-20
Payer: MEDICARE

## 2023-02-28 ENCOUNTER — OFFICE VISIT (OUTPATIENT)
Dept: FAMILY MEDICINE | Facility: CLINIC | Age: 83
End: 2023-02-28
Payer: MEDICARE

## 2023-02-28 VITALS
RESPIRATION RATE: 16 BRPM | BODY MASS INDEX: 40.25 KG/M2 | TEMPERATURE: 99 F | WEIGHT: 250.44 LBS | OXYGEN SATURATION: 95 % | DIASTOLIC BLOOD PRESSURE: 78 MMHG | HEART RATE: 57 BPM | SYSTOLIC BLOOD PRESSURE: 120 MMHG | HEIGHT: 66 IN

## 2023-02-28 DIAGNOSIS — Z00.00 ENCOUNTER FOR PREVENTIVE HEALTH EXAMINATION: Primary | ICD-10-CM

## 2023-02-28 DIAGNOSIS — E66.01 MORBID OBESITY: ICD-10-CM

## 2023-02-28 DIAGNOSIS — I48.19 PERSISTENT ATRIAL FIBRILLATION: ICD-10-CM

## 2023-02-28 DIAGNOSIS — I70.0 AORTIC ATHEROSCLEROSIS: ICD-10-CM

## 2023-02-28 DIAGNOSIS — D68.59 HYPERCOAGULABLE STATE: ICD-10-CM

## 2023-02-28 PROBLEM — N18.30 CHRONIC KIDNEY DISEASE, STAGE III (MODERATE): Status: RESOLVED | Noted: 2020-03-04 | Resolved: 2023-02-28

## 2023-02-28 PROCEDURE — 99999 PR PBB SHADOW E&M-EST. PATIENT-LVL IV: CPT | Mod: PBBFAC,HCNC,, | Performed by: NURSE PRACTITIONER

## 2023-02-28 PROCEDURE — 3288F PR FALLS RISK ASSESSMENT DOCUMENTED: ICD-10-PCS | Mod: HCNC,CPTII,S$GLB, | Performed by: NURSE PRACTITIONER

## 2023-02-28 PROCEDURE — 1126F PR PAIN SEVERITY QUANTIFIED, NO PAIN PRESENT: ICD-10-PCS | Mod: HCNC,CPTII,S$GLB, | Performed by: NURSE PRACTITIONER

## 2023-02-28 PROCEDURE — 1159F MED LIST DOCD IN RCRD: CPT | Mod: HCNC,CPTII,S$GLB, | Performed by: NURSE PRACTITIONER

## 2023-02-28 PROCEDURE — 3074F PR MOST RECENT SYSTOLIC BLOOD PRESSURE < 130 MM HG: ICD-10-PCS | Mod: HCNC,CPTII,S$GLB, | Performed by: NURSE PRACTITIONER

## 2023-02-28 PROCEDURE — 1159F PR MEDICATION LIST DOCUMENTED IN MEDICAL RECORD: ICD-10-PCS | Mod: HCNC,CPTII,S$GLB, | Performed by: NURSE PRACTITIONER

## 2023-02-28 PROCEDURE — 3078F PR MOST RECENT DIASTOLIC BLOOD PRESSURE < 80 MM HG: ICD-10-PCS | Mod: HCNC,CPTII,S$GLB, | Performed by: NURSE PRACTITIONER

## 2023-02-28 PROCEDURE — 1101F PR PT FALLS ASSESS DOC 0-1 FALLS W/OUT INJ PAST YR: ICD-10-PCS | Mod: HCNC,CPTII,S$GLB, | Performed by: NURSE PRACTITIONER

## 2023-02-28 PROCEDURE — 1170F FXNL STATUS ASSESSED: CPT | Mod: HCNC,CPTII,S$GLB, | Performed by: NURSE PRACTITIONER

## 2023-02-28 PROCEDURE — 1170F PR FUNCTIONAL STATUS ASSESSED: ICD-10-PCS | Mod: HCNC,CPTII,S$GLB, | Performed by: NURSE PRACTITIONER

## 2023-02-28 PROCEDURE — 99999 PR PBB SHADOW E&M-EST. PATIENT-LVL IV: ICD-10-PCS | Mod: PBBFAC,HCNC,, | Performed by: NURSE PRACTITIONER

## 2023-02-28 PROCEDURE — 1126F AMNT PAIN NOTED NONE PRSNT: CPT | Mod: HCNC,CPTII,S$GLB, | Performed by: NURSE PRACTITIONER

## 2023-02-28 PROCEDURE — G0439 PPPS, SUBSEQ VISIT: HCPCS | Mod: HCNC,S$GLB,, | Performed by: NURSE PRACTITIONER

## 2023-02-28 PROCEDURE — 1101F PT FALLS ASSESS-DOCD LE1/YR: CPT | Mod: HCNC,CPTII,S$GLB, | Performed by: NURSE PRACTITIONER

## 2023-02-28 PROCEDURE — 3288F FALL RISK ASSESSMENT DOCD: CPT | Mod: HCNC,CPTII,S$GLB, | Performed by: NURSE PRACTITIONER

## 2023-02-28 PROCEDURE — 3078F DIAST BP <80 MM HG: CPT | Mod: HCNC,CPTII,S$GLB, | Performed by: NURSE PRACTITIONER

## 2023-02-28 PROCEDURE — 3074F SYST BP LT 130 MM HG: CPT | Mod: HCNC,CPTII,S$GLB, | Performed by: NURSE PRACTITIONER

## 2023-02-28 PROCEDURE — G0439 PR MEDICARE ANNUAL WELLNESS SUBSEQUENT VISIT: ICD-10-PCS | Mod: HCNC,S$GLB,, | Performed by: NURSE PRACTITIONER

## 2023-02-28 NOTE — PROGRESS NOTES
"  Alaina Vee presented for a  Medicare AWV and comprehensive Health Risk Assessment today. The following components were reviewed and updated:    Medical history  Family History  Social history  Allergies and Current Medications  Health Risk Assessment  Health Maintenance  Care Team         ** See Completed Assessments for Annual Wellness Visit within the encounter summary.**         The following assessments were completed:  Living Situation  CAGE  Depression Screening  Timed Get Up and Go  Whisper Test  Cognitive Function Screening  Nutrition Screening  ADL Screening  PAQ Screening        Vitals:    02/28/23 1358   BP: 120/78   BP Location: Left arm   Patient Position: Sitting   BP Method: Large (Manual)   Pulse: (!) 57   Resp: 16   Temp: 98.6 °F (37 °C)   TempSrc: Oral   SpO2: 95%   Weight: 113.6 kg (250 lb 7.1 oz)   Height: 5' 6" (1.676 m)     Body mass index is 40.42 kg/m².  Physical Exam  Vitals reviewed.   Constitutional:       General: She is not in acute distress.     Appearance: Normal appearance. She is not ill-appearing, toxic-appearing or diaphoretic.   HENT:      Head: Normocephalic and atraumatic.   Cardiovascular:      Pulses: Normal pulses.   Pulmonary:      Effort: Pulmonary effort is normal. No respiratory distress.      Breath sounds: No wheezing.   Skin:     General: Skin is warm and dry.   Neurological:      Mental Status: She is alert and oriented to person, place, and time.   Psychiatric:         Mood and Affect: Mood normal.         Behavior: Behavior normal.               Diagnoses and health risks identified today and associated recommendations/orders:    1. Encounter for preventive health examination  The patient was seen today for an annual Medicare wellness exam.  Health maintenance and screening topics were discussed.  Proper diet and exercise recommendations were reviewed.    2. Morbid obesity  No acute Concerns.  Diet and exercise recommendations discussed with patient.    3. " Hypercoagulable state  The present plan is affective.  Continue current medications.    4. Persistent atrial fibrillation  The present plan is affective.  Continue current medications.    5. Aortic atherosclerosis  The present plan is affective.  Continue current medications.      Review current opioid prescriptions: Pt does not take any opiates.  Screen for potential Substance Use Disorders:  checked. Patient does not take any opiates. No indication of CINDY or OUD.    Provided Alaina with a 5-10 year written screening schedule and personal prevention plan. Recommendations were developed using the USPSTF age appropriate recommendations. Education, counseling, and referrals were provided as needed. After Visit Summary printed and given to patient which includes a list of additional screenings\tests needed.    Follow up in about 1 year (around 2/28/2024) for AWV.    Leland Martell, JACIEL      I offered to discuss advanced care planning, including how to pick a person who would make decisions for you if you were unable to make them for yourself, called a health care power of , and what kind of decisions you might make such as use of life sustaining treatments such as ventilators and tube feeding when faced with a life limiting illness recorded on a living will that they will need to know. (How you want to be cared for as you near the end of your natural life)     X Patient is interested in learning more about how to make advanced directives.  I provided them paperwork and offered to discuss this with them.

## 2023-03-07 ENCOUNTER — OFFICE VISIT (OUTPATIENT)
Dept: UROLOGY | Facility: CLINIC | Age: 83
End: 2023-03-07
Payer: MEDICARE

## 2023-03-07 VITALS — WEIGHT: 247.25 LBS | BODY MASS INDEX: 39.91 KG/M2

## 2023-03-07 DIAGNOSIS — N20.0 NEPHROLITHIASIS: ICD-10-CM

## 2023-03-07 DIAGNOSIS — N39.46 MIXED INCONTINENCE URGE AND STRESS: Primary | ICD-10-CM

## 2023-03-07 PROCEDURE — 1101F PT FALLS ASSESS-DOCD LE1/YR: CPT | Mod: HCNC,CPTII,S$GLB, | Performed by: UROLOGY

## 2023-03-07 PROCEDURE — 1126F AMNT PAIN NOTED NONE PRSNT: CPT | Mod: HCNC,CPTII,S$GLB, | Performed by: UROLOGY

## 2023-03-07 PROCEDURE — 3288F PR FALLS RISK ASSESSMENT DOCUMENTED: ICD-10-PCS | Mod: HCNC,CPTII,S$GLB, | Performed by: UROLOGY

## 2023-03-07 PROCEDURE — 1160F RVW MEDS BY RX/DR IN RCRD: CPT | Mod: HCNC,CPTII,S$GLB, | Performed by: UROLOGY

## 2023-03-07 PROCEDURE — 3288F FALL RISK ASSESSMENT DOCD: CPT | Mod: HCNC,CPTII,S$GLB, | Performed by: UROLOGY

## 2023-03-07 PROCEDURE — 99999 PR PBB SHADOW E&M-EST. PATIENT-LVL III: ICD-10-PCS | Mod: PBBFAC,HCNC,, | Performed by: UROLOGY

## 2023-03-07 PROCEDURE — 99214 OFFICE O/P EST MOD 30 MIN: CPT | Mod: HCNC,S$GLB,, | Performed by: UROLOGY

## 2023-03-07 PROCEDURE — 1101F PR PT FALLS ASSESS DOC 0-1 FALLS W/OUT INJ PAST YR: ICD-10-PCS | Mod: HCNC,CPTII,S$GLB, | Performed by: UROLOGY

## 2023-03-07 PROCEDURE — 1159F PR MEDICATION LIST DOCUMENTED IN MEDICAL RECORD: ICD-10-PCS | Mod: HCNC,CPTII,S$GLB, | Performed by: UROLOGY

## 2023-03-07 PROCEDURE — 1126F PR PAIN SEVERITY QUANTIFIED, NO PAIN PRESENT: ICD-10-PCS | Mod: HCNC,CPTII,S$GLB, | Performed by: UROLOGY

## 2023-03-07 PROCEDURE — 99999 PR PBB SHADOW E&M-EST. PATIENT-LVL III: CPT | Mod: PBBFAC,HCNC,, | Performed by: UROLOGY

## 2023-03-07 PROCEDURE — 99214 PR OFFICE/OUTPT VISIT, EST, LEVL IV, 30-39 MIN: ICD-10-PCS | Mod: HCNC,S$GLB,, | Performed by: UROLOGY

## 2023-03-07 PROCEDURE — 1159F MED LIST DOCD IN RCRD: CPT | Mod: HCNC,CPTII,S$GLB, | Performed by: UROLOGY

## 2023-03-07 PROCEDURE — 1160F PR REVIEW ALL MEDS BY PRESCRIBER/CLIN PHARMACIST DOCUMENTED: ICD-10-PCS | Mod: HCNC,CPTII,S$GLB, | Performed by: UROLOGY

## 2023-03-07 NOTE — PROGRESS NOTES
Subjective:       Alaina Vee is a 82 y.o. female who is an established patient who was referred by Dr Esquivel  for evaluation of UTI.      She reports issues with recurrent UTIs. She was treated for UTI in 3/16 (100k E coli, pansensitive) and again in 4/16 (100k E coli). She has urinary frequency associated with UTIs. Nocturia x 1-2. She has urgency and UUI at baseline. Also with fecal urgency/incontinence. She was given Ditropan 5mg BID years ago. Also with RICHARD. She has not noted if this has helped. Denies current dysuria. Denies hematuria. No h/o kidney stones.     She has significant LBP issues. She also reports facial and R shoulder/arm/torso pain.     She was treated for UTI after initial visit. She remains on Ditropan IR not up to TID, declined ER formulations. She reports taking another medication for UI from me but I don't have records of this.     SERG (5/16) showed ruslanley 9mm stone in LLP. PVR 2cc. Cytology was negative but noted uric acid crystals.     CT 7/16 - 7mm L UP, 6mm L LP stones and 8mm R renal pelvis stone.   KUB 7/16 - shows 7mm R stone but difficult to see (unsure if truly the stone)    KUB 1/17 - 7mm R renal stone though hard to see (likely overlying 12th rib)    She started Ditropan XL 15mg previously and had great improvement. She continues to have significant back issues.    She started to develop R flank pain and therefore CT scan ordered. CT showed 8mm stone at R UPJ, visible on KUB. She is now s/p R ESWL 2017. Stone was noted to fragment well. She did not note passing any stone and is still having flank pain.     Was on Coumadin for a fib. Now on Eliquis.    Denies stone issues recently.  Remains on Ditropan BID.  now in nursing home with dementia, caring for him has been very difficult on her for last 2-3 years.        KUB 1/18 - two left renal calculi  SERG 1/18 - 3mm and 5mm L calculi - hydro resolved    100% CaOx mono - stone passed spontaneously. Pain present for 1  hour at that time (R side).     SERG 5/20 - 1cm LUP and 5mm LLP stone, no hydro  KUB 11/20 - stable 1cm L renal calculus, LLP stones not seen     CT 4/21 (done for hernia eval) - two L renal stones (1cm LLP, 4mm LUP)  KUB 4/22 - 8mm L renal stone  SERG 4/22 - mild prominence of R renal pelvis, 7mm L renal stone        The following portions of the patient's history were reviewed and updated as appropriate: allergies, current medications, past family history, past medical history, past social history, past surgical history and problem list.    Review of Systems  Constitutional: no fever or chills  ENT: no nasal congestion or sore throat  Respiratory: no cough or shortness of breath  Cardiovascular: no chest pain or palpitations  Gastrointestinal: no nausea or vomiting, tolerating diet  Genitourinary: as per HPI  Hematologic/Lymphatic: no easy bruising or lymphadenopathy  Musculoskeletal: no arthralgias or myalgias  Skin: no rashes or lesions  Neurological: no seizures or tremors  Behavioral/Psych: no auditory or visual hallucinations       Objective:    Vitals:   Wt 112.1 kg (247 lb 3.9 oz)   LMP  (LMP Unknown)   BMI 39.91 kg/m²     Physical Exam   General: well developed, well nourished in no acute distress  Head: normocephalic, atraumatic  Neck: supple, trachea midline, no obvious enlargement of thyroid  HEENT: EOMI, mucus membranes moist, sclera anicteric, no hearing impairment  Lungs: symmetric expansion, non-labored breathing  Skin: no rashes or lesions  Neuro: alert and oriented x 3, no gross deficits  Psych: normal judgment and insight, normal mood/affect and non-anxious  Genitourinary:   patient declined exam      Lab Review   Urine analysis today in clinic shows - negative    Lab Results   Component Value Date    WBC 6.22 05/27/2022    HGB 14.6 05/27/2022    HCT 45.9 05/27/2022    MCV 94 05/27/2022     05/27/2022     Lab Results   Component Value Date    CREATININE 0.9 12/28/2022    BUN 13 12/28/2022          Imaging  Images and reports were personally reviewed by me and discussed with patient  CT, SERG, KUB reviewed       Assessment/Plan:      1. Recurrent UTI / Nephrolithiasis   - UI likely contributing to UTIs   - Avoid constipation   - PVR acceptable   - Possible stone in LLP, uric acid crystals on cytology   - Discussed Estrace, will hold for now   - Consider cystoscopy in future   - CT - 2 stones on L, 1 stone on R. Only R stone visible on KUB.    - 8mm R UPJ stone. Discussed options - s/p R ESWL. Discussed risks, benefits, alternatives.   - s/p R ESWL on 2/27/17 - stone with excellent response   - KUB post-op - residual stone    - CT - no obstructing stones, R LP noted   - SERG/KUB - R hydro resolved. Two stones in L kidney   - KUB 1cm LLP stone (stable)   - KUB/SERG 4/22 - stable L renal stone   - Recheck imaging about 2-3 months     2. Mixed incontinence urge and stress    - Discussed difference of UUI and RICHARD components. Reviewed etiology and workup of each.   - RICHARD: Kegels, PFPT, bulking agent, MUS.   - UUI: Behavioral changes, PFPT, anticholinergics. Botox/InterStim for refractory UUI.   - Doing great with Ditropan 5mg BID, continue. Discussed concerns for use in elderly.         Follow up in 3 months w KUB/SERG

## 2023-03-28 ENCOUNTER — OFFICE VISIT (OUTPATIENT)
Dept: PODIATRY | Facility: CLINIC | Age: 83
End: 2023-03-28
Payer: MEDICARE

## 2023-03-28 VITALS — BODY MASS INDEX: 39.72 KG/M2 | HEIGHT: 66 IN | WEIGHT: 247.13 LBS

## 2023-03-28 DIAGNOSIS — M72.2 PLANTAR FASCIITIS: ICD-10-CM

## 2023-03-28 DIAGNOSIS — M20.40 HAMMER TOE, UNSPECIFIED LATERALITY: ICD-10-CM

## 2023-03-28 DIAGNOSIS — E11.49 TYPE II DIABETES MELLITUS WITH NEUROLOGICAL MANIFESTATIONS: Primary | ICD-10-CM

## 2023-03-28 DIAGNOSIS — B35.1 ONYCHOMYCOSIS DUE TO DERMATOPHYTE: ICD-10-CM

## 2023-03-28 PROCEDURE — 1101F PT FALLS ASSESS-DOCD LE1/YR: CPT | Mod: HCNC,CPTII,S$GLB, | Performed by: PODIATRIST

## 2023-03-28 PROCEDURE — 1159F MED LIST DOCD IN RCRD: CPT | Mod: HCNC,CPTII,S$GLB, | Performed by: PODIATRIST

## 2023-03-28 PROCEDURE — 1159F PR MEDICATION LIST DOCUMENTED IN MEDICAL RECORD: ICD-10-PCS | Mod: HCNC,CPTII,S$GLB, | Performed by: PODIATRIST

## 2023-03-28 PROCEDURE — 99999 PR PBB SHADOW E&M-EST. PATIENT-LVL III: CPT | Mod: PBBFAC,HCNC,, | Performed by: PODIATRIST

## 2023-03-28 PROCEDURE — 99214 OFFICE O/P EST MOD 30 MIN: CPT | Mod: HCNC,S$GLB,, | Performed by: PODIATRIST

## 2023-03-28 PROCEDURE — 1126F PR PAIN SEVERITY QUANTIFIED, NO PAIN PRESENT: ICD-10-PCS | Mod: HCNC,CPTII,S$GLB, | Performed by: PODIATRIST

## 2023-03-28 PROCEDURE — 1126F AMNT PAIN NOTED NONE PRSNT: CPT | Mod: HCNC,CPTII,S$GLB, | Performed by: PODIATRIST

## 2023-03-28 PROCEDURE — 99214 PR OFFICE/OUTPT VISIT, EST, LEVL IV, 30-39 MIN: ICD-10-PCS | Mod: HCNC,S$GLB,, | Performed by: PODIATRIST

## 2023-03-28 PROCEDURE — 3288F FALL RISK ASSESSMENT DOCD: CPT | Mod: HCNC,CPTII,S$GLB, | Performed by: PODIATRIST

## 2023-03-28 PROCEDURE — 1101F PR PT FALLS ASSESS DOC 0-1 FALLS W/OUT INJ PAST YR: ICD-10-PCS | Mod: HCNC,CPTII,S$GLB, | Performed by: PODIATRIST

## 2023-03-28 PROCEDURE — 3288F PR FALLS RISK ASSESSMENT DOCUMENTED: ICD-10-PCS | Mod: HCNC,CPTII,S$GLB, | Performed by: PODIATRIST

## 2023-03-28 PROCEDURE — 99999 PR PBB SHADOW E&M-EST. PATIENT-LVL III: ICD-10-PCS | Mod: PBBFAC,HCNC,, | Performed by: PODIATRIST

## 2023-03-28 RX ORDER — CICLOPIROX 80 MG/ML
SOLUTION TOPICAL NIGHTLY
Qty: 6.6 ML | Refills: 3 | Status: SHIPPED | OUTPATIENT
Start: 2023-03-28

## 2023-03-28 NOTE — PROGRESS NOTES
Subjective:      Patient ID: Alaina Vee is a 82 y.o. female.    Chief Complaint: Foot Problem (B/l great toes)    Alaina is a 82 y.o. female who presents to the podiatry clinic  with complaint of  left  foot pain. Onset of the symptoms was several weeks ago. Precipitating event: none known. Current symptoms include: ability to bear weight, but with some pain. Aggravating factors: any weight bearing. Symptoms have progressed to a point and plateaued. Patient has had no prior foot problems. Evaluation to date: none. Treatment to date: none. Patients rates pain 4/10 on pain scale. Also reports elongated nails that are difficult to trim.        Review of Systems   Constitutional: Negative for chills.   Cardiovascular:  Negative for chest pain and claudication.   Respiratory:  Negative for cough.    Skin:  Positive for color change, dry skin and nail changes.   Musculoskeletal:  Positive for joint pain.   Gastrointestinal:  Negative for nausea.   Neurological:  Positive for paresthesias. Negative for numbness.   Psychiatric/Behavioral:  The patient is not nervous/anxious.          Objective:      Physical Exam  Constitutional:       Appearance: She is well-developed.      Comments: Oriented to time, place, and person.   Cardiovascular:      Comments: DP and PT pulses are palpable bilaterally. 3 sec capillary refill time and toes and feet are warm to touch proximally .  There is  hair growth on the feet and toes b/l. There is no edema b/l. No spider veins or varicosities present b/l.     Musculoskeletal:      Comments: Equinus noted b/l ankles with < 10 deg DF noted. MMT 5/5 in DF/PF/Inv/Ev resistance with no reproduction of pain in any direction. Passive range of motion of ankle and pedal joints is painless b/l.    Pain on palpation plantar medial left heel, no pain with ROM or MMT or medial and lateral compression of heel, - tinel's sign     Feet:      Right foot:      Skin integrity: No callus or dry skin.       Left foot:      Skin integrity: No callus or dry skin.   Lymphadenopathy:      Comments: Negative lymphadenopathy bilateral popliteal fossa and tarsal tunnel.   Skin:     Comments: No open lesions, lacerations or wounds noted.Interdigital spaces clean, dry and intact b/l. No erythema noted to b/l foot.    Toenails 1-5 bilaterally are elongated by 2-3 mm, thickened by 2-3 mm, discolored/yellowed, dystrophic, brittle with subungual debris.       Neurological:      Mental Status: She is alert.      Comments: Light touch, proprioception, and sharp/dull sensation are all intact bilaterally. Protective threshold with the Tennille-Wienstein monofilament is intact bilaterally.    Psychiatric:         Behavior: Behavior is cooperative.             Assessment:       Encounter Diagnoses   Name Primary?    Type II diabetes mellitus with neurological manifestations Yes    Hammer toe, unspecified laterality     Plantar fasciitis     Onychomycosis due to dermatophyte          Plan:       Alaina was seen today for foot problem.    Diagnoses and all orders for this visit:    Type II diabetes mellitus with neurological manifestations  -     DIABETIC SHOES FOR HOME USE    Hammer toe, unspecified laterality  -     DIABETIC SHOES FOR HOME USE    Plantar fasciitis    Onychomycosis due to dermatophyte    Other orders  -     ciclopirox (PENLAC) 8 % Soln; Apply topically nightly.      I counseled the patient on her conditions, their implications and medical management.        - Shoe inspection. Diabetic Foot Education. Patient reminded of the importance of good nutrition and blood sugar control to help prevent podiatric complications of diabetes. Patient instructed on proper foot hygeine. We discussed wearing proper shoe gear, daily foot inspections, never walking without protective shoe gear, caution putting sharp instruments to feet     - Discussed DM foot care:  Wear comfortable, proper fitting shoes. Wash feet daily. Dry well. After  drying, apply moisturizer to feet (no lotion to webspaces). Inspect feet daily for skin breaks, blisters, swelling, or redness. Wear cotton socks (preferably white)  Change socks every day. Do NOT walk barefoot. Do NOT use heating pads or warm/hot water soaks     At patient's request, I discussed different treatments for toenail fungus. We discussed oral antifungals but I did not recommend them as a first line treatment since the medication is taken internally and can have side effects such as rash, taste disturbances, and liver enzyme elevation. We discussed topical Penlac to be applied daily and removed weekly. Pt. Expresses understanding and would like to try the Penlac. Rx sent to the pharmacy.     Rx diabetic shoes with custom molded inserts to be worn at all times while ambulating. Prescription provided with list of local retailers.     Nails 1-5 trimmed B/L filed smooth.   F/u one year DM foot exam sooner PRN.

## 2023-06-09 ENCOUNTER — OFFICE VISIT (OUTPATIENT)
Dept: FAMILY MEDICINE | Facility: CLINIC | Age: 83
End: 2023-06-09
Payer: MEDICARE

## 2023-06-09 VITALS
HEIGHT: 66 IN | WEIGHT: 257.94 LBS | HEART RATE: 60 BPM | RESPIRATION RATE: 15 BRPM | DIASTOLIC BLOOD PRESSURE: 76 MMHG | BODY MASS INDEX: 41.45 KG/M2 | TEMPERATURE: 98 F | OXYGEN SATURATION: 93 % | SYSTOLIC BLOOD PRESSURE: 130 MMHG

## 2023-06-09 DIAGNOSIS — M54.50 CHRONIC MIDLINE LOW BACK PAIN WITHOUT SCIATICA: ICD-10-CM

## 2023-06-09 DIAGNOSIS — I10 ESSENTIAL HYPERTENSION: ICD-10-CM

## 2023-06-09 DIAGNOSIS — R73.03 PREDIABETES: ICD-10-CM

## 2023-06-09 DIAGNOSIS — G89.29 CHRONIC MIDLINE LOW BACK PAIN WITHOUT SCIATICA: ICD-10-CM

## 2023-06-09 DIAGNOSIS — E78.2 MIXED HYPERLIPIDEMIA: ICD-10-CM

## 2023-06-09 DIAGNOSIS — R09.82 POST-NASAL DRIP: Primary | ICD-10-CM

## 2023-06-09 PROCEDURE — 3288F FALL RISK ASSESSMENT DOCD: CPT | Mod: CPTII,S$GLB,, | Performed by: NURSE PRACTITIONER

## 2023-06-09 PROCEDURE — 1126F PR PAIN SEVERITY QUANTIFIED, NO PAIN PRESENT: ICD-10-PCS | Mod: CPTII,S$GLB,, | Performed by: NURSE PRACTITIONER

## 2023-06-09 PROCEDURE — 99999 PR PBB SHADOW E&M-EST. PATIENT-LVL IV: CPT | Mod: PBBFAC,,, | Performed by: NURSE PRACTITIONER

## 2023-06-09 PROCEDURE — 3288F PR FALLS RISK ASSESSMENT DOCUMENTED: ICD-10-PCS | Mod: CPTII,S$GLB,, | Performed by: NURSE PRACTITIONER

## 2023-06-09 PROCEDURE — 3078F DIAST BP <80 MM HG: CPT | Mod: CPTII,S$GLB,, | Performed by: NURSE PRACTITIONER

## 2023-06-09 PROCEDURE — 1159F PR MEDICATION LIST DOCUMENTED IN MEDICAL RECORD: ICD-10-PCS | Mod: CPTII,S$GLB,, | Performed by: NURSE PRACTITIONER

## 2023-06-09 PROCEDURE — 3078F PR MOST RECENT DIASTOLIC BLOOD PRESSURE < 80 MM HG: ICD-10-PCS | Mod: CPTII,S$GLB,, | Performed by: NURSE PRACTITIONER

## 2023-06-09 PROCEDURE — 99999 PR PBB SHADOW E&M-EST. PATIENT-LVL IV: ICD-10-PCS | Mod: PBBFAC,,, | Performed by: NURSE PRACTITIONER

## 2023-06-09 PROCEDURE — 1159F MED LIST DOCD IN RCRD: CPT | Mod: CPTII,S$GLB,, | Performed by: NURSE PRACTITIONER

## 2023-06-09 PROCEDURE — 3075F PR MOST RECENT SYSTOLIC BLOOD PRESS GE 130-139MM HG: ICD-10-PCS | Mod: CPTII,S$GLB,, | Performed by: NURSE PRACTITIONER

## 2023-06-09 PROCEDURE — 1101F PR PT FALLS ASSESS DOC 0-1 FALLS W/OUT INJ PAST YR: ICD-10-PCS | Mod: CPTII,S$GLB,, | Performed by: NURSE PRACTITIONER

## 2023-06-09 PROCEDURE — 1101F PT FALLS ASSESS-DOCD LE1/YR: CPT | Mod: CPTII,S$GLB,, | Performed by: NURSE PRACTITIONER

## 2023-06-09 PROCEDURE — 1160F PR REVIEW ALL MEDS BY PRESCRIBER/CLIN PHARMACIST DOCUMENTED: ICD-10-PCS | Mod: CPTII,S$GLB,, | Performed by: NURSE PRACTITIONER

## 2023-06-09 PROCEDURE — 1126F AMNT PAIN NOTED NONE PRSNT: CPT | Mod: CPTII,S$GLB,, | Performed by: NURSE PRACTITIONER

## 2023-06-09 PROCEDURE — 99214 PR OFFICE/OUTPT VISIT, EST, LEVL IV, 30-39 MIN: ICD-10-PCS | Mod: S$GLB,,, | Performed by: NURSE PRACTITIONER

## 2023-06-09 PROCEDURE — 1160F RVW MEDS BY RX/DR IN RCRD: CPT | Mod: CPTII,S$GLB,, | Performed by: NURSE PRACTITIONER

## 2023-06-09 PROCEDURE — 3075F SYST BP GE 130 - 139MM HG: CPT | Mod: CPTII,S$GLB,, | Performed by: NURSE PRACTITIONER

## 2023-06-09 PROCEDURE — 99214 OFFICE O/P EST MOD 30 MIN: CPT | Mod: S$GLB,,, | Performed by: NURSE PRACTITIONER

## 2023-06-09 RX ORDER — DESLORATADINE 5 MG/1
5 TABLET ORAL DAILY
Qty: 30 TABLET | Refills: 1 | Status: SHIPPED | OUTPATIENT
Start: 2023-06-09 | End: 2024-06-08

## 2023-06-09 NOTE — PROGRESS NOTES
"Routine Office Visit    Patient Name: Alaina Vee    : 1940  MRN: [id number]    Chief Complaint:  Nasal drip, back pain    Subjective:  Alaina is a 82 y.o. female who presents today for:    1. Nasal drip, back pain - patient who is known to me with a history of lumbar spondylosis, AFib reports today for evaluation.  She notes that her chronic midline lumbar back pain has gotten worse over the past few months.  The pain does not radiate anywhere.  No reported numbness or tingling.  She notes some weakness in the leg sometimes because of the pain.  She has seen pain management in the past before.  She has had MRI imaging in the past as well which showed "Multilevel lumbar spondylosis with spinal stenosis at the L4-L5 and L3-L4 and L2-L3 disc spaces as above" (). She will take an occasional Tylenol for the pain which only helped somewhat.      In the last month she has been doing a lot of home renovations due to damages in her bathroom so she has been exposed to lot of dust and chemicals.  Because of this she endorses a nighttime postnasal drip which is bothersome.  She has not use any medications for this.    No other acute concerns.    Past Medical History  Past Medical History:   Diagnosis Date    Arthritis     knees    Atrial fibrillation     Atrial flutter     Back pain     Cataract     Degenerative disc disease     Depression     General anesthetics causing adverse effect in therapeutic use     pt states sometimes slow to awaken.    GERD (gastroesophageal reflux disease)     Hyperlipidemia     Hypertension     Kidney stone     Obesity     Sleep apnea     does not wear CPAP    Urinary incontinence     Varicosities     Ventral hernia        Past Surgical History  Past Surgical History:   Procedure Laterality Date    APPENDECTOMY      BREAST BIOPSY Bilateral     1985    breast biopsy, left      CHOLECYSTECTOMY      CYSTOURETEROSCOPY WITH RETROGRADE PYELOGRAPHY AND INSERTION OF STENT INTO URETER Right " 1/10/2020    Procedure: CYSTOURETEROSCOPY, WITH RETROGRADE PYELOGRAM AND URETERAL STENT INSERTION;  Surgeon: Yvonne Weaver MD;  Location: [facility] OR;  Service: Urology;  Laterality: Right;    JOINT REPLACEMENT      TKR , right    JOINT REPLACEMENT  2009    TKR  left    ID EXPLORATORY OF ABDOMEN      URETEROSCOPIC REMOVAL OF URETERIC CALCULUS Right 1/31/2020    Procedure: Cystoscopy, possible retrograde pyelogram, ureteroscopy with laser lithotripsy, ureteral stent exchange;  Surgeon: Yvonne Weaver MD;  Location: [facility] OR;  Service: Urology;  Laterality: Right;       Family History  Family History   Problem Relation Age of Onset    COPD Mother     Heart disease Sister         half sister    COPD Sister     No Known Problems Father     Parkinsonism Sister     No Known Problems Brother     No Known Problems Maternal Aunt     No Known Problems Maternal Uncle     No Known Problems Paternal Aunt     No Known Problems Paternal Uncle     No Known Problems Maternal Grandmother     No Known Problems Maternal Grandfather     No Known Problems Paternal Grandmother     No Known Problems Paternal Grandfather     Amblyopia Neg Hx     Blindness Neg Hx     Cancer Neg Hx     Cataracts Neg Hx     Diabetes Neg Hx     Glaucoma Neg Hx     Hypertension Neg Hx     Macular degeneration Neg Hx     Retinal detachment Neg Hx     Strabismus Neg Hx     Stroke Neg Hx     Thyroid disease Neg Hx        Social History  Social History     Socioeconomic History    Marital status:    Tobacco Use    Smoking status: Never    Smokeless tobacco: Never   Substance and Sexual Activity    Alcohol use: No    Drug use: No    Sexual activity: Yes     Partners: Male     Social Determinants of Health     Financial Resource Strain: Low Risk     Difficulty of Paying Living Expenses: Not very hard   Food Insecurity: No Food Insecurity    Worried About Running Out of Food in the Last Year: Never true    Ran Out of Food in the Last Year: Never true    Transportation Needs: No Transportation Needs    Lack of Transportation (Medical): No    Lack of Transportation (Non-Medical): No   Physical Activity: Inactive    Days of Exercise per Week: 0 days    Minutes of Exercise per Session: 0 min   Stress: No Stress Concern Present    Feeling of Stress : Not at all   Social Connections: Moderately Isolated    Frequency of Communication with Friends and Family: More than three times a week    Frequency of Social Gatherings with Friends and Family: More than three times a week    Attends Yazdanism Services: Never    Active Member of Clubs or Organizations: No    Attends Club or Organization Meetings: Never    Marital Status:    Housing Stability: Low Risk     Unable to Pay for Housing in the Last Year: No    Number of Places Lived in the Last Year: 1    Unstable Housing in the Last Year: No       Current Medications  Current Outpatient Medications on File Prior to Visit   Medication Sig Dispense Refill    apixaban (ELIQUIS) 5 mg Tab Take 1 tablet (5 mg total) by mouth 2 (two) times daily. 180 tablet 3    ciclopirox (PENLAC) 8 % Soln Apply topically nightly. 6.6 mL 3    diclofenac sodium (VOLTAREN) 1 % Gel Apply 2 g topically once daily. 100 g 3    losartan (COZAAR) 25 MG tablet Take 1 tablet (25 mg total) by mouth once daily. 90 tablet 3    multivitamin (THERAGRAN) per tablet Take 1 tablet by mouth every evening. 1 Tablet Oral Every day      oxybutynin (DITROPAN) 5 MG Tab Take 1 tablet (5 mg total) by mouth 3 (three) times daily. 270 tablet 3     No current facility-administered medications on file prior to visit.       Allergies   Review of patient's allergies indicates:   Allergen Reactions    Rosuvastatin Other (See Comments)     Aches    Celebrex [celecoxib]      Chest tightness    Lipitor [atorvastatin] Other (See Comments)       Review of Systems (Pertinent positives)  Review of Systems   Constitutional: Negative.  Negative for chills, fever and weight loss.  "  HENT: Negative.  Negative for congestion, sinus pain and sore throat.    Eyes: Negative.    Respiratory:  Negative for cough, shortness of breath, wheezing and stridor.    Cardiovascular:  Negative for chest pain, palpitations, orthopnea and claudication.   Gastrointestinal: Negative.  Negative for abdominal pain, diarrhea, nausea and vomiting.   Genitourinary: Negative.  Negative for dysuria, frequency and urgency.   Musculoskeletal:  Positive for back pain. Negative for joint pain and neck pain.   Skin: Negative.    Neurological: Negative.  Negative for dizziness, tingling, loss of consciousness and headaches.   Endo/Heme/Allergies: Negative.    Psychiatric/Behavioral: Negative.       /76 (BP Location: Right arm, Patient Position: Sitting, BP Method: X-Large (Manual))   Pulse 60   Temp 97.5 °F (36.4 °C) (Oral)   Ht 5' 6" (1.676 m)   Wt 117 kg (257 lb 15 oz)   LMP  (LMP Unknown)   SpO2 (!) 93%   BMI 41.63 kg/m²     Physical Exam  Vitals reviewed.   Constitutional:       General: She is not in acute distress.     Appearance: Normal appearance. She is not ill-appearing, toxic-appearing or diaphoretic.   HENT:      Head: Normocephalic and atraumatic.   Cardiovascular:      Rate and Rhythm: Normal rate and regular rhythm.      Pulses: Normal pulses.      Heart sounds: Normal heart sounds.   Pulmonary:      Effort: Pulmonary effort is normal. No respiratory distress.      Breath sounds: Normal breath sounds. No wheezing.   Abdominal:      General: Bowel sounds are normal. There is no distension.      Palpations: Abdomen is soft.      Tenderness: There is no abdominal tenderness.   Musculoskeletal:         General: No swelling, tenderness or deformity. Normal range of motion.   Skin:     General: Skin is warm and dry.      Capillary Refill: Capillary refill takes less than 2 seconds.   Neurological:      General: No focal deficit present.      Mental Status: She is alert and oriented to person, place, and " time.   Psychiatric:         Mood and Affect: Mood normal.         Behavior: Behavior normal.        Assessment/Plan:  Alaina Vee is a 82 y.o. female who presents today for :    Alaina was seen today for cough.    Diagnoses and all orders for this visit:    Post-nasal drip  -     desloratadine (CLARINEX) 5 mg tablet; Take 1 tablet (5 mg total) by mouth once daily.    Daily antihistamine offered nasal steroid but patient states she can not tolerate nasal sprays.    Chronic midline low back pain without sciatica  -     Ambulatory referral/consult to Pain Clinic; Future    Will reconsult Pain Clinic.  Patient is agreeable to seeing them.    Prediabetes  -     HEMOGLOBIN A1C; Future    Check A1c.    Mixed hyperlipidemia  -     Lipid Panel; Future  -     COMPREHENSIVE METABOLIC PANEL; Future    Check labs.    Essential hypertension  -     Lipid Panel; Future  -     COMPREHENSIVE METABOLIC PANEL; Future  -     CBC W/ AUTO DIFFERENTIAL; Future    Controlled.  Check labs.        This office note has been dictated.  This dictation has been generated using M-Modal Fluency Direct dictation; some phonetic errors may occur.   My collaborating physician is Dr. Sameer Fink.

## 2023-06-12 ENCOUNTER — LAB VISIT (OUTPATIENT)
Dept: LAB | Facility: HOSPITAL | Age: 83
End: 2023-06-12
Attending: NURSE PRACTITIONER
Payer: MEDICARE

## 2023-06-12 DIAGNOSIS — I10 ESSENTIAL HYPERTENSION: ICD-10-CM

## 2023-06-12 DIAGNOSIS — R73.03 PREDIABETES: ICD-10-CM

## 2023-06-12 DIAGNOSIS — E78.2 MIXED HYPERLIPIDEMIA: ICD-10-CM

## 2023-06-12 LAB
ALBUMIN SERPL BCP-MCNC: 3.5 G/DL (ref 3.5–5.2)
ALP SERPL-CCNC: 77 U/L (ref 55–135)
ALT SERPL W/O P-5'-P-CCNC: 11 U/L (ref 10–44)
ANION GAP SERPL CALC-SCNC: 8 MMOL/L (ref 8–16)
AST SERPL-CCNC: 16 U/L (ref 10–40)
BASOPHILS # BLD AUTO: 0.05 K/UL (ref 0–0.2)
BASOPHILS NFR BLD: 0.9 % (ref 0–1.9)
BILIRUB SERPL-MCNC: 0.8 MG/DL (ref 0.1–1)
BUN SERPL-MCNC: 19 MG/DL (ref 8–23)
CALCIUM SERPL-MCNC: 9.7 MG/DL (ref 8.7–10.5)
CHLORIDE SERPL-SCNC: 103 MMOL/L (ref 95–110)
CHOLEST SERPL-MCNC: 233 MG/DL (ref 120–199)
CHOLEST/HDLC SERPL: 5.1 {RATIO} (ref 2–5)
CO2 SERPL-SCNC: 29 MMOL/L (ref 23–29)
CREAT SERPL-MCNC: 0.9 MG/DL (ref 0.5–1.4)
DIFFERENTIAL METHOD: NORMAL
EOSINOPHIL # BLD AUTO: 0.2 K/UL (ref 0–0.5)
EOSINOPHIL NFR BLD: 3.9 % (ref 0–8)
ERYTHROCYTE [DISTWIDTH] IN BLOOD BY AUTOMATED COUNT: 12.4 % (ref 11.5–14.5)
EST. GFR  (NO RACE VARIABLE): >60 ML/MIN/1.73 M^2
ESTIMATED AVG GLUCOSE: 117 MG/DL (ref 68–131)
GLUCOSE SERPL-MCNC: 107 MG/DL (ref 70–110)
HBA1C MFR BLD: 5.7 % (ref 4–5.6)
HCT VFR BLD AUTO: 45.9 % (ref 37–48.5)
HDLC SERPL-MCNC: 46 MG/DL (ref 40–75)
HDLC SERPL: 19.7 % (ref 20–50)
HGB BLD-MCNC: 15.1 G/DL (ref 12–16)
IMM GRANULOCYTES # BLD AUTO: 0.02 K/UL (ref 0–0.04)
IMM GRANULOCYTES NFR BLD AUTO: 0.4 % (ref 0–0.5)
LDLC SERPL CALC-MCNC: 162.8 MG/DL (ref 63–159)
LYMPHOCYTES # BLD AUTO: 1.6 K/UL (ref 1–4.8)
LYMPHOCYTES NFR BLD: 28.4 % (ref 18–48)
MCH RBC QN AUTO: 30.7 PG (ref 27–31)
MCHC RBC AUTO-ENTMCNC: 32.9 G/DL (ref 32–36)
MCV RBC AUTO: 93 FL (ref 82–98)
MONOCYTES # BLD AUTO: 0.5 K/UL (ref 0.3–1)
MONOCYTES NFR BLD: 8.2 % (ref 4–15)
NEUTROPHILS # BLD AUTO: 3.3 K/UL (ref 1.8–7.7)
NEUTROPHILS NFR BLD: 58.2 % (ref 38–73)
NONHDLC SERPL-MCNC: 187 MG/DL
NRBC BLD-RTO: 0 /100 WBC
PLATELET # BLD AUTO: 251 K/UL (ref 150–450)
PMV BLD AUTO: 10.6 FL (ref 9.2–12.9)
POTASSIUM SERPL-SCNC: 4.3 MMOL/L (ref 3.5–5.1)
PROT SERPL-MCNC: 7 G/DL (ref 6–8.4)
RBC # BLD AUTO: 4.92 M/UL (ref 4–5.4)
SODIUM SERPL-SCNC: 140 MMOL/L (ref 136–145)
TRIGL SERPL-MCNC: 121 MG/DL (ref 30–150)
WBC # BLD AUTO: 5.64 K/UL (ref 3.9–12.7)

## 2023-06-12 PROCEDURE — 36415 COLL VENOUS BLD VENIPUNCTURE: CPT | Mod: PN | Performed by: NURSE PRACTITIONER

## 2023-06-12 PROCEDURE — 83036 HEMOGLOBIN GLYCOSYLATED A1C: CPT | Performed by: NURSE PRACTITIONER

## 2023-06-12 PROCEDURE — 80061 LIPID PANEL: CPT | Performed by: NURSE PRACTITIONER

## 2023-06-12 PROCEDURE — 80053 COMPREHEN METABOLIC PANEL: CPT | Performed by: NURSE PRACTITIONER

## 2023-06-12 PROCEDURE — 85025 COMPLETE CBC W/AUTO DIFF WBC: CPT | Performed by: NURSE PRACTITIONER

## 2023-06-19 ENCOUNTER — OFFICE VISIT (OUTPATIENT)
Dept: PAIN MEDICINE | Facility: CLINIC | Age: 83
End: 2023-06-19
Payer: MEDICARE

## 2023-06-19 VITALS
SYSTOLIC BLOOD PRESSURE: 141 MMHG | WEIGHT: 255.5 LBS | BODY MASS INDEX: 41.24 KG/M2 | DIASTOLIC BLOOD PRESSURE: 66 MMHG | HEART RATE: 58 BPM | RESPIRATION RATE: 16 BRPM | OXYGEN SATURATION: 95 %

## 2023-06-19 DIAGNOSIS — G89.29 CHRONIC MIDLINE LOW BACK PAIN WITHOUT SCIATICA: ICD-10-CM

## 2023-06-19 DIAGNOSIS — M54.50 CHRONIC MIDLINE LOW BACK PAIN WITHOUT SCIATICA: ICD-10-CM

## 2023-06-19 DIAGNOSIS — M51.36 DDD (DEGENERATIVE DISC DISEASE), LUMBAR: Primary | ICD-10-CM

## 2023-06-19 DIAGNOSIS — M47.816 LUMBAR SPONDYLOSIS: ICD-10-CM

## 2023-06-19 PROBLEM — M51.369 DDD (DEGENERATIVE DISC DISEASE), LUMBAR: Status: ACTIVE | Noted: 2023-06-19

## 2023-06-19 PROCEDURE — 99204 PR OFFICE/OUTPT VISIT, NEW, LEVL IV, 45-59 MIN: ICD-10-PCS | Mod: S$GLB,,, | Performed by: PAIN MEDICINE

## 2023-06-19 PROCEDURE — 1125F AMNT PAIN NOTED PAIN PRSNT: CPT | Mod: CPTII,S$GLB,, | Performed by: PAIN MEDICINE

## 2023-06-19 PROCEDURE — 99999 PR PBB SHADOW E&M-EST. PATIENT-LVL III: CPT | Mod: PBBFAC,,, | Performed by: PAIN MEDICINE

## 2023-06-19 PROCEDURE — 1101F PT FALLS ASSESS-DOCD LE1/YR: CPT | Mod: CPTII,S$GLB,, | Performed by: PAIN MEDICINE

## 2023-06-19 PROCEDURE — 1160F PR REVIEW ALL MEDS BY PRESCRIBER/CLIN PHARMACIST DOCUMENTED: ICD-10-PCS | Mod: CPTII,S$GLB,, | Performed by: PAIN MEDICINE

## 2023-06-19 PROCEDURE — 3288F FALL RISK ASSESSMENT DOCD: CPT | Mod: CPTII,S$GLB,, | Performed by: PAIN MEDICINE

## 2023-06-19 PROCEDURE — 99204 OFFICE O/P NEW MOD 45 MIN: CPT | Mod: S$GLB,,, | Performed by: PAIN MEDICINE

## 2023-06-19 PROCEDURE — 1101F PR PT FALLS ASSESS DOC 0-1 FALLS W/OUT INJ PAST YR: ICD-10-PCS | Mod: CPTII,S$GLB,, | Performed by: PAIN MEDICINE

## 2023-06-19 PROCEDURE — 3078F PR MOST RECENT DIASTOLIC BLOOD PRESSURE < 80 MM HG: ICD-10-PCS | Mod: CPTII,S$GLB,, | Performed by: PAIN MEDICINE

## 2023-06-19 PROCEDURE — 1159F MED LIST DOCD IN RCRD: CPT | Mod: CPTII,S$GLB,, | Performed by: PAIN MEDICINE

## 2023-06-19 PROCEDURE — 99999 PR PBB SHADOW E&M-EST. PATIENT-LVL III: ICD-10-PCS | Mod: PBBFAC,,, | Performed by: PAIN MEDICINE

## 2023-06-19 PROCEDURE — 1125F PR PAIN SEVERITY QUANTIFIED, PAIN PRESENT: ICD-10-PCS | Mod: CPTII,S$GLB,, | Performed by: PAIN MEDICINE

## 2023-06-19 PROCEDURE — 1160F RVW MEDS BY RX/DR IN RCRD: CPT | Mod: CPTII,S$GLB,, | Performed by: PAIN MEDICINE

## 2023-06-19 PROCEDURE — 3078F DIAST BP <80 MM HG: CPT | Mod: CPTII,S$GLB,, | Performed by: PAIN MEDICINE

## 2023-06-19 PROCEDURE — 3288F PR FALLS RISK ASSESSMENT DOCUMENTED: ICD-10-PCS | Mod: CPTII,S$GLB,, | Performed by: PAIN MEDICINE

## 2023-06-19 PROCEDURE — 1159F PR MEDICATION LIST DOCUMENTED IN MEDICAL RECORD: ICD-10-PCS | Mod: CPTII,S$GLB,, | Performed by: PAIN MEDICINE

## 2023-06-19 PROCEDURE — 3077F PR MOST RECENT SYSTOLIC BLOOD PRESSURE >= 140 MM HG: ICD-10-PCS | Mod: CPTII,S$GLB,, | Performed by: PAIN MEDICINE

## 2023-06-19 PROCEDURE — 3077F SYST BP >= 140 MM HG: CPT | Mod: CPTII,S$GLB,, | Performed by: PAIN MEDICINE

## 2023-06-19 NOTE — PROGRESS NOTES
Subjective:     Patient ID: Alaina Vee is a 82 y.o. female    Chief Complaint: Low-back Pain (Midline achy pain)      Referred by: Leland Martell NP      HPI:    Initial Encounter (6/19/23):  Alaina Vee is a 82 y.o. female who presents today with chronic bilateral low back pain.  Patient has been seen by me in the past.  Underwent physical therapy with good results.  Currently, pain has been worsening over the past few months.  Patient reports this is related to taking care of her .  He has been in a nursing home since January of 2023.  States that since this time her back pain has been improving.  She localizes pain across the lower lumbar region.  It does not radiate.  She denies any associated numbness, tingling, weakness, bowel bladder dysfunction.  Pain is intermittent and worsened with certain activities.  She states that she does not necessarily feel that additional treatment is needed at this time, but wanted to get formally evaluated to ensure that no major problems were present.   This pain is described in detail below.    Physical Therapy:  Yes.    Non-pharmacologic Treatment:  Rest helps         TENS?  No    Pain Medications:         Currently taking:  Voltaren gel    Has tried in the past:  NSAIDs, Tylenol    Has not tried:  Opioids, Muscle relaxants, TCAs, SNRIs, anticonvulsants    Blood thinners:  Eliquis    Interventional Therapies:  None    Relevant Surgeries:  None    Affecting sleep?  Yes    Affecting daily activities? yes    Depressive symptoms? No          SI/HI? No    Work status: Retired    Pain Scores:    Best:       0/10  Worst:     5/10  Usually:   3/10  Today:    1/10    Pain Disability Index  Family/Home Responsibilities:: 3  Recreation:: 2  Social Activity:: 0  Occupation:: 0  Sexual Behavior:: 0  Self Care:: 0  Life-Support Activities:: 1  Pain Disability Index (PDI): 6    Review of Systems   Constitutional:  Negative for activity change, appetite change,  chills, fatigue, fever and unexpected weight change.   HENT:  Negative for hearing loss.    Eyes:  Negative for visual disturbance.   Respiratory:  Negative for chest tightness and shortness of breath.    Cardiovascular:  Negative for chest pain.   Gastrointestinal:  Negative for abdominal pain, constipation, diarrhea, nausea and vomiting.   Genitourinary:  Negative for difficulty urinating.   Musculoskeletal:  Positive for back pain, gait problem and myalgias. Negative for neck pain.   Skin:  Negative for rash.   Neurological:  Negative for dizziness, weakness, light-headedness, numbness and headaches.   Psychiatric/Behavioral:  Positive for sleep disturbance. Negative for hallucinations and suicidal ideas. The patient is not nervous/anxious.      Past Medical History:   Diagnosis Date    Arthritis     knees    Atrial fibrillation     Atrial flutter     Back pain     Cataract     Degenerative disc disease     Depression     General anesthetics causing adverse effect in therapeutic use     pt states sometimes slow to awaken.    GERD (gastroesophageal reflux disease)     Hyperlipidemia     Hypertension     Kidney stone     Obesity     Sleep apnea     does not wear CPAP    Urinary incontinence     Varicosities     Ventral hernia        Past Surgical History:   Procedure Laterality Date    APPENDECTOMY      BREAST BIOPSY Bilateral     1985    breast biopsy, left      CHOLECYSTECTOMY      CYSTOURETEROSCOPY WITH RETROGRADE PYELOGRAPHY AND INSERTION OF STENT INTO URETER Right 1/10/2020    Procedure: CYSTOURETEROSCOPY, WITH RETROGRADE PYELOGRAM AND URETERAL STENT INSERTION;  Surgeon: Yvonne Weaver MD;  Location: [facility] OR;  Service: Urology;  Laterality: Right;    JOINT REPLACEMENT      TKR , right    JOINT REPLACEMENT  2009    TKR  left    [place] EXPLORATORY OF ABDOMEN      URETEROSCOPIC REMOVAL OF URETERIC CALCULUS Right 1/31/2020    Procedure: Cystoscopy, possible retrograde pyelogram, ureteroscopy with laser  lithotripsy, ureteral stent exchange;  Surgeon: Yvonne Weaver MD;  Location: [facility];  Service: Urology;  Laterality: Right;       Social History     Socioeconomic History    Marital status:    Tobacco Use    Smoking status: Never    Smokeless tobacco: Never   Substance and Sexual Activity    Alcohol use: No    Drug use: No    Sexual activity: Yes     Partners: Male     Social Determinants of Health     Financial Resource Strain: Low Risk     Difficulty of Paying Living Expenses: Not very hard   Food Insecurity: No Food Insecurity    Worried About Running Out of Food in the Last Year: Never true    Ran Out of Food in the Last Year: Never true   Transportation Needs: No Transportation Needs    Lack of Transportation (Medical): No    Lack of Transportation (Non-Medical): No   Physical Activity: Inactive    Days of Exercise per Week: 0 days    Minutes of Exercise per Session: 0 min   Stress: No Stress Concern Present    Feeling of Stress : Not at all   Social Connections: Moderately Isolated    Frequency of Communication with Friends and Family: More than three times a week    Frequency of Social Gatherings with Friends and Family: More than three times a week    Attends Temple Services: Never    Active Member of Clubs or Organizations: No    Attends Club or Organization Meetings: Never    Marital Status:    Housing Stability: Low Risk     Unable to Pay for Housing in the Last Year: No    Number of Places Lived in the Last Year: 1    Unstable Housing in the Last Year: No       Review of patient's allergies indicates:   Allergen Reactions    Rosuvastatin Other (See Comments)     Aches    Celebrex [celecoxib]      Chest tightness    Lipitor [atorvastatin] Other (See Comments)       Current Outpatient Medications on File Prior to Visit   Medication Sig Dispense Refill    apixaban (ELIQUIS) 5 mg Tab Take 1 tablet (5 mg total) by mouth 2 (two) times daily. 180 tablet 3    ciclopirox (PENLAC) 8 %  Soln Apply topically nightly. 6.6 mL 3    desloratadine (CLARINEX) 5 mg tablet Take 1 tablet (5 mg total) by mouth once daily. 30 tablet 1    diclofenac sodium (VOLTAREN) 1 % Gel Apply 2 g topically once daily. 100 g 3    losartan (COZAAR) 25 MG tablet Take 1 tablet (25 mg total) by mouth once daily. 90 tablet 3    multivitamin (THERAGRAN) per tablet Take 1 tablet by mouth every evening. 1 Tablet Oral Every day      oxybutynin (DITROPAN) 5 MG Tab Take 1 tablet (5 mg total) by mouth 3 (three) times daily. 270 tablet 3     No current facility-administered medications on file prior to visit.       Objective:      BP (!) 141/66 (BP Location: Right arm, Patient Position: Sitting, BP Method: Medium (Automatic))   Pulse (!) 58   Resp 16   Wt 115.9 kg (255 lb 8 oz)   LMP  (LMP Unknown)   SpO2 95%   BMI 41.24 kg/m²     Exam:  GEN:  Well developed, well nourished.  No acute distress.  Normal pain behavior.  HEENT:  No trauma.  Mucous membranes moist.  Nares patent bilaterally.  PSYCH: Normal affect. Thought content appropriate.  CHEST:  Breathing symmetric.  No audible wheezing.  ABD: Soft, non-distended.  SKIN:  Warm, pink, dry.  No rash on exposed areas.    EXT:  No cyanosis, clubbing, or edema.  No color change or changes in nail or hair growth.  NEURO/MUSCULOSKELETAL:  Fully alert, oriented, and appropriate. Speech normal micheal. No cranial nerve deficits.   Gait:  Normal.  No trendelenburg sign bilaterally.   Motor Strength:  5/5 motor strength throughout lower extremities.   Sensory:  No sensory deficit in the lower extremities.   Reflexes:  Unable to elicit bilateral patellar and Achilles DTRs (status post bilateral total knee replacements).  No clonus or spasticity.  L-Spine:  Full ROM without pain on flexion or extension.  Positive pain with axial/facet loading bilaterally.  Negative SLR bilaterally.    No TTP over lumbar paraspinals, bilateral SI joints, hips, piriformis muscles, or GTB.         Imaging:    Narrative & Impression    EXAMINATION:  CT ABDOMEN PELVIS WITH CONTRAST     CLINICAL HISTORY:  Hernia, complicated;     TECHNIQUE:  5 mm axial images were obtained through the abdomen and pelvis following administration of 100 cc of Omnipaque 350 IV contrast.  Coronal and sagittal reformats were performed.     COMPARISON:  Radiograph 11/12/2020.     FINDINGS:  Visualized heart appears at the upper limit of normal in size.  No pericardial effusion.     Visualized lungs demonstrate bibasilar subsegmental band like opacities, likely atelectasis or scarring.  No pleural effusion.     The liver is at the upper limit of normal in size.  Hepatic parenchyma demonstrates homogeneous enhancement without focal lesions.  No intrahepatic bile duct dilatation.  Portal vasculature is patent.     Gallbladder is surgically absent.  Common bile duct is normal in caliber.     Stomach and duodenum are decompressed and not well evaluated.     Spleen, pancreas and adrenal glands are within normal limits.     Kidneys are normal in size and location.  There is a 1.9 cm low-attenuation right renal cortical lesion, likely a cyst.  Adjacent subcentimeter cortical hypodensities too small to accurately characterize.  There are 2 nonobstructing calcified stones within the left renal collecting system, the largest of which measures 1 cm.  No hydronephrosis or hydroureter.  Bladder is decompressed and not well evaluated.     Uterus and ovaries appear within normal limits.  No pelvic free fluid.     The small bowel is normal in caliber.  There are numerous colonic diverticuli.  There is a moderate amount of retained fecal material throughout the visualized colon suggesting constipation.  The appendix is surgically absent.  No obstruction or inflammatory changes.     The abdominal aorta tapers normally with mild atherosclerotic calcification.  Celiac artery, SMA, bilateral renal arteries and FELISA are patent.     No ascites or  intra-abdominal free air.  No focal mesenteric masses.  No abdominal or pelvic lymphadenopathy.     There are fat containing umbilical and supraumbilical hernias with thickening and mild stranding of the adjacent skin.  Remaining visualized subcutaneous soft tissues are within normal limits.     No acute osseous abnormalities.  There are advanced degenerative changes of the spine.  There is mild bilateral femoroacetabular osteoarthritis.     Impression:     1. Fat containing umbilical and supraumbilical hernias with thickening and mild stranding of the adjacent skin possibly related to cellulitis.  No inflammatory changes within the herniating fat to suggest strangulation.  2. Extensive colonic diverticulosis without associated inflammatory changes.  3. Left nonobstructing renal stones.  4. Right renal cyst and too small to characterize cortical hypodensity.  5. Advanced degenerative changes of the spine.  6. Additional findings as detailed in the body of the report.        Electronically signed by: Jose Calvo MD  Date:                                            04/13/2021  Time:                                           12:03       Assessment:       Encounter Diagnoses   Name Primary?    Chronic midline low back pain without sciatica     DDD (degenerative disc disease), lumbar Yes    Lumbar spondylosis          Plan:       Alaina was seen today for low-back pain.    Diagnoses and all orders for this visit:    DDD (degenerative disc disease), lumbar    Chronic midline low back pain without sciatica  -     Ambulatory referral/consult to Pain Clinic    Lumbar spondylosis        Alaina Vee is a 82 y.o. female with chronic bilateral low back pain.  Currently, pain seems to be mostly axial suspect is related to lower lumbar facet joints.  Can not rule out pain related to intervertebral discs or vertebral bodies.  Not having overt radicular symptoms.  No overt neurologic deficits on examination.    Pertinent  imaging studies reviewed by me. Imaging results were discussed with patient.  No further treatment needed at this time.  Patient states that symptoms are improving.  She is not very interested in additional physical therapy or injections at this time.  Return to clinic as needed.            This note was created by combination of typed  and M-Modal dictation. Transcription and phonetic errors may be present.  If there are any questions, please contact me.

## 2023-06-21 ENCOUNTER — TELEPHONE (OUTPATIENT)
Dept: FAMILY MEDICINE | Facility: CLINIC | Age: 83
End: 2023-06-21
Payer: MEDICARE

## 2023-06-21 NOTE — TELEPHONE ENCOUNTER
----- Message from Leland Martell NP sent at 6/14/2023  9:08 AM CDT -----  Please call patient about her lab work.  Everything looks good except her blood sugars are still a little elevated.  She is not diabetic but she is prediabetic.  Her cholesterol is elevated from last time as well.  She needs to work on diet, exercise, and weight loss to control these readings.  No need to start medications today however.  I would like her to follow up with her PCP in 6 months for continued evaluation.  Please schedule for patient as he has not seen her in some time.  Thank you

## 2023-06-23 DIAGNOSIS — I48.19 PERSISTENT ATRIAL FIBRILLATION: ICD-10-CM

## 2023-06-23 DIAGNOSIS — I10 ESSENTIAL HYPERTENSION: Primary | ICD-10-CM

## 2023-07-13 ENCOUNTER — OFFICE VISIT (OUTPATIENT)
Dept: OPTOMETRY | Facility: CLINIC | Age: 83
End: 2023-07-13
Payer: COMMERCIAL

## 2023-07-13 ENCOUNTER — OFFICE VISIT (OUTPATIENT)
Dept: OPTOMETRY | Facility: CLINIC | Age: 83
End: 2023-07-13
Payer: MEDICARE

## 2023-07-13 DIAGNOSIS — H52.13 MYOPIA WITH PRESBYOPIA, BILATERAL: ICD-10-CM

## 2023-07-13 DIAGNOSIS — H52.4 MYOPIA WITH PRESBYOPIA, BILATERAL: ICD-10-CM

## 2023-07-13 DIAGNOSIS — Z01.00 ROUTINE EYE EXAM: Primary | ICD-10-CM

## 2023-07-13 DIAGNOSIS — Z46.0 ENCOUNTER FOR FITTING OR ADJUSTMENT OF SPECTACLES OR CONTACT LENSES: Primary | ICD-10-CM

## 2023-07-13 DIAGNOSIS — Z97.3 WEARS CONTACT LENSES: ICD-10-CM

## 2023-07-13 PROCEDURE — 92015 DETERMINE REFRACTIVE STATE: CPT | Mod: HCNC,S$GLB,, | Performed by: OPTOMETRIST

## 2023-07-13 PROCEDURE — 99999 PR PBB SHADOW E&M-EST. PATIENT-LVL II: ICD-10-PCS | Mod: PBBFAC,HCNC,, | Performed by: OPTOMETRIST

## 2023-07-13 PROCEDURE — 92015 PR REFRACTION: ICD-10-PCS | Mod: HCNC,S$GLB,, | Performed by: OPTOMETRIST

## 2023-07-13 PROCEDURE — 92014 COMPRE OPH EXAM EST PT 1/>: CPT | Mod: HCNC,S$GLB,, | Performed by: OPTOMETRIST

## 2023-07-13 PROCEDURE — 92310 CONTACT LENS FITTING OU: CPT | Mod: CSM,HCNC,S$GLB, | Performed by: OPTOMETRIST

## 2023-07-13 PROCEDURE — 92014 PR EYE EXAM, EST PATIENT,COMPREHESV: ICD-10-PCS | Mod: HCNC,S$GLB,, | Performed by: OPTOMETRIST

## 2023-07-13 PROCEDURE — 99499 UNLISTED E&M SERVICE: CPT | Mod: HCNC,,, | Performed by: OPTOMETRIST

## 2023-07-13 PROCEDURE — 99999 PR PBB SHADOW E&M-EST. PATIENT-LVL II: CPT | Mod: PBBFAC,HCNC,, | Performed by: OPTOMETRIST

## 2023-07-13 PROCEDURE — 92310 PR CONTACT LENS FITTING (NO CHANGE): ICD-10-PCS | Mod: CSM,HCNC,S$GLB, | Performed by: OPTOMETRIST

## 2023-07-13 PROCEDURE — 99499 NO LOS: ICD-10-PCS | Mod: HCNC,,, | Performed by: OPTOMETRIST

## 2023-07-13 NOTE — PROGRESS NOTES
Subjective:       Patient ID: Alaina Vee is a 82 y.o. female      Chief Complaint   Patient presents with    Concerns About Ocular Health    Contact Lens Follow Up     History of Present Illness  HPI    Dls: 3/31/22 Dr. Boswell     83 y/o female presents today with no complaints.  Pt wears scls os only.   Pt wants new rx for cls only    No tearing  + ou  itching   No burning  No pain  No ha's  No floaters  No flashes    Eye meds  None      Wearing johnna monovision no lens OD, lens OS, replacing monthly. does not sleep in lenses. Happy with lens and fit, likes monovision.       Assessment/Plan:     1. Routine eye exam  [facility]    2. Myopia with presbyopia, bilateral  Educated patient on refractive error and discussed lens options. Dispensed updated spectacle Rx. Educated about adaptation period to new specs.    Eyeglass Final Rx       Eyeglass Final Rx         Sphere Cylinder Add    Right -2.50 Sphere +2.50    Left -0.50 Sphere +2.50      Expiration Date: 7/13/2024                      3. Wears contact lenses  Contact lens Rx released to pt. Daily wear only advised, replacement monthly with education to risks of extended wear. Advised against sleeping, swimming, showering in lenses. Okay to order if happy with comfort and [facility]. Discussed lens care, compliance and solutions. RTC yearly contact lens health check.       Contact Lens Final Rx       Final Contact Lens Rx         Brand Base Curve Diameter Sphere Cylinder Addl. Specs    Right No lens     Near    Left Acuvue Johnna 8.4 14.0 -0.75 Sphere       Expiration Date: 7/13/2024    Replacement: Monthly    Solutions: OptiFree PureMoist    Wearing Schedule: Daily Wear                      Follow up in about 1 year (around 7/13/2024) for Annual eye exam, Contact Lens.

## 2023-09-07 ENCOUNTER — TELEPHONE (OUTPATIENT)
Dept: FAMILY MEDICINE | Facility: CLINIC | Age: 83
End: 2023-09-07
Payer: MEDICARE

## 2023-09-07 NOTE — TELEPHONE ENCOUNTER
----- Message from Marimar Cuhrchill sent at 9/7/2023  9:02 AM CDT -----  Type: Patient Call Back    Who called:pt     What is the request in detail:pt calling to speak to nurse in regards to having leg cramps at night maybe due to medications. Call pt     Can the clinic reply by [facility]?    Would the patient rather a call back or a response via [facility]? call    Best call back number:[phone] (home)       Additional Information:

## 2023-09-08 ENCOUNTER — OFFICE VISIT (OUTPATIENT)
Dept: FAMILY MEDICINE | Facility: CLINIC | Age: 83
End: 2023-09-08
Payer: MEDICARE

## 2023-09-08 VITALS
WEIGHT: 261.94 LBS | HEIGHT: 66 IN | RESPIRATION RATE: 16 BRPM | DIASTOLIC BLOOD PRESSURE: 80 MMHG | OXYGEN SATURATION: 98 % | BODY MASS INDEX: 42.1 KG/M2 | HEART RATE: 59 BPM | TEMPERATURE: 98 F | SYSTOLIC BLOOD PRESSURE: 124 MMHG

## 2023-09-08 DIAGNOSIS — M47.816 LUMBAR SPONDYLOSIS: ICD-10-CM

## 2023-09-08 DIAGNOSIS — R25.2 NOCTURNAL MUSCLE CRAMPS: Primary | ICD-10-CM

## 2023-09-08 PROCEDURE — 3079F PR MOST RECENT DIASTOLIC BLOOD PRESSURE 80-89 MM HG: ICD-10-PCS | Mod: HCNC,CPTII,S$GLB, | Performed by: NURSE PRACTITIONER

## 2023-09-08 PROCEDURE — 1159F PR MEDICATION LIST DOCUMENTED IN MEDICAL RECORD: ICD-10-PCS | Mod: HCNC,CPTII,S$GLB, | Performed by: NURSE PRACTITIONER

## 2023-09-08 PROCEDURE — 3288F PR FALLS RISK ASSESSMENT DOCUMENTED: ICD-10-PCS | Mod: HCNC,CPTII,S$GLB, | Performed by: NURSE PRACTITIONER

## 2023-09-08 PROCEDURE — 1159F MED LIST DOCD IN RCRD: CPT | Mod: HCNC,CPTII,S$GLB, | Performed by: NURSE PRACTITIONER

## 2023-09-08 PROCEDURE — 1101F PT FALLS ASSESS-DOCD LE1/YR: CPT | Mod: HCNC,CPTII,S$GLB, | Performed by: NURSE PRACTITIONER

## 2023-09-08 PROCEDURE — 3288F FALL RISK ASSESSMENT DOCD: CPT | Mod: HCNC,CPTII,S$GLB, | Performed by: NURSE PRACTITIONER

## 2023-09-08 PROCEDURE — 99213 OFFICE O/P EST LOW 20 MIN: CPT | Mod: HCNC,S$GLB,, | Performed by: NURSE PRACTITIONER

## 2023-09-08 PROCEDURE — 99213 PR OFFICE/OUTPT VISIT, EST, LEVL III, 20-29 MIN: ICD-10-PCS | Mod: HCNC,S$GLB,, | Performed by: NURSE PRACTITIONER

## 2023-09-08 PROCEDURE — 99999 PR PBB SHADOW E&M-EST. PATIENT-LVL IV: ICD-10-PCS | Mod: PBBFAC,HCNC,, | Performed by: NURSE PRACTITIONER

## 2023-09-08 PROCEDURE — 1125F PR PAIN SEVERITY QUANTIFIED, PAIN PRESENT: ICD-10-PCS | Mod: HCNC,CPTII,S$GLB, | Performed by: NURSE PRACTITIONER

## 2023-09-08 PROCEDURE — 1125F AMNT PAIN NOTED PAIN PRSNT: CPT | Mod: HCNC,CPTII,S$GLB, | Performed by: NURSE PRACTITIONER

## 2023-09-08 PROCEDURE — 3074F SYST BP LT 130 MM HG: CPT | Mod: HCNC,CPTII,S$GLB, | Performed by: NURSE PRACTITIONER

## 2023-09-08 PROCEDURE — 1101F PR PT FALLS ASSESS DOC 0-1 FALLS W/OUT INJ PAST YR: ICD-10-PCS | Mod: HCNC,CPTII,S$GLB, | Performed by: NURSE PRACTITIONER

## 2023-09-08 PROCEDURE — 99999 PR PBB SHADOW E&M-EST. PATIENT-LVL IV: CPT | Mod: PBBFAC,HCNC,, | Performed by: NURSE PRACTITIONER

## 2023-09-08 PROCEDURE — 3079F DIAST BP 80-89 MM HG: CPT | Mod: HCNC,CPTII,S$GLB, | Performed by: NURSE PRACTITIONER

## 2023-09-08 PROCEDURE — 3074F PR MOST RECENT SYSTOLIC BLOOD PRESSURE < 130 MM HG: ICD-10-PCS | Mod: HCNC,CPTII,S$GLB, | Performed by: NURSE PRACTITIONER

## 2023-09-08 RX ORDER — MELOXICAM 15 MG/1
15 TABLET ORAL DAILY
COMMUNITY

## 2023-09-08 RX ORDER — TIZANIDINE 2 MG/1
2 TABLET ORAL NIGHTLY PRN
Qty: 15 TABLET | Refills: 0 | Status: SHIPPED | OUTPATIENT
Start: 2023-09-08 | End: 2023-09-08

## 2023-09-08 RX ORDER — TIZANIDINE 2 MG/1
2 TABLET ORAL NIGHTLY PRN
Qty: 15 TABLET | Refills: 0 | Status: SHIPPED | OUTPATIENT
Start: 2023-09-08 | End: 2023-09-18

## 2023-09-08 RX ORDER — GABAPENTIN 600 MG/1
600 TABLET ORAL DAILY
COMMUNITY

## 2023-09-08 NOTE — PROGRESS NOTES
Routine Office Visit    Patient Name: Alaina Vee    : 1940  MRN: [id number]    Chief Complaint:  Muscle cramps    Subjective:  Alaina is a 83 y.o. female who presents today for:    1. Muscle cramps - patient who is known to me with the below documented medical history reports today for evaluation.  Recently she saw an outside orthopedic doctor (Ross Winter) states she had a hip injection and was given meloxicam and gabapentin.  In the last week to week and a half she has noticed some extremity bothersome bilateral leg cramps waking her up in the middle of the night requiring her to walk around and stretch legs out.  Denies any numbness or tingling or paresthesias.  Denies any worsening of her usual back pain.  Denies any claudication symptoms. She states that the gabapenin has not really caused any sedation or drowsiness.    Past Medical History  Past Medical History:   Diagnosis Date    Arthritis     knees    Atrial fibrillation     Atrial flutter     Back pain     Cataract     Degenerative disc disease     Depression     General anesthetics causing adverse effect in therapeutic use     pt states sometimes slow to awaken.    GERD (gastroesophageal reflux disease)     Hyperlipidemia     Hypertension     Kidney stone     Obesity     Sleep apnea     does not wear CPAP    Urinary incontinence     Varicosities     Ventral hernia        Past Surgical History  Past Surgical History:   Procedure Laterality Date    APPENDECTOMY      BREAST BIOPSY Bilateral         breast biopsy, left      CHOLECYSTECTOMY      CYSTOURETEROSCOPY WITH RETROGRADE PYELOGRAPHY AND INSERTION OF STENT INTO URETER Right 1/10/2020    Procedure: CYSTOURETEROSCOPY, WITH RETROGRADE PYELOGRAM AND URETERAL STENT INSERTION;  Surgeon: Yvonne Weaver MD;  Location: [facility];  Service: Urology;  Laterality: Right;    JOINT REPLACEMENT      TKR , right    JOINT REPLACEMENT      TKR  left    CA EXPLORATORY OF ABDOMEN       URETEROSCOPIC REMOVAL OF URETERIC CALCULUS Right 1/31/2020    Procedure: Cystoscopy, possible retrograde pyelogram, ureteroscopy with laser lithotripsy, ureteral stent exchange;  Surgeon: Yvonne Weaver MD;  Location: [facility];  Service: Urology;  Laterality: Right;       Family History  Family History   Problem Relation Age of Onset    COPD Mother     Heart disease Sister         half sister    COPD Sister     No Known Problems Father     Parkinsonism Sister     No Known Problems Brother     No Known Problems Maternal Aunt     No Known Problems Maternal Uncle     No Known Problems Paternal Aunt     No Known Problems Paternal Uncle     No Known Problems Maternal Grandmother     No Known Problems Maternal Grandfather     No Known Problems Paternal Grandmother     No Known Problems Paternal Grandfather     Amblyopia Neg Hx     Blindness Neg Hx     Cancer Neg Hx     Cataracts Neg Hx     Diabetes Neg Hx     Glaucoma Neg Hx     Hypertension Neg Hx     Macular degeneration Neg Hx     Retinal detachment Neg Hx     Strabismus Neg Hx     Stroke Neg Hx     Thyroid disease Neg Hx        Social History  Social History     Socioeconomic History    Marital status:    Tobacco Use    Smoking status: Never    Smokeless tobacco: Never   Substance and Sexual Activity    Alcohol use: No    Drug use: No    Sexual activity: Yes     Partners: Male     Social Determinants of Health     Financial Resource Strain: Low Risk  (2/28/2023)    Overall Financial Resource Strain (CARDIA)     Difficulty of Paying Living Expenses: Not very hard   Food Insecurity: No Food Insecurity (2/28/2023)    Hunger Vital Sign     Worried About Running Out of Food in the Last Year: Never true     Ran Out of Food in the Last Year: Never true   Transportation Needs: No Transportation Needs (2/28/2023)    PRAPARE - Transportation     Lack of Transportation (Medical): No     Lack of Transportation (Non-Medical): No   Physical Activity: Inactive  (2/28/2023)    Exercise Vital Sign     Days of Exercise per Week: 0 days     Minutes of Exercise per Session: 0 min   Stress: No Stress Concern Present (2/28/2023)    [employer] [place] [employer] - Occupational Stress Questionnaire     Feeling of Stress : Not at all   Social Connections: Moderately Isolated (2/28/2023)    Social Connection and Isolation Panel [NHANES]     Frequency of Communication with Friends and Family: More than three times a week     Frequency of Social Gatherings with Friends and Family: More than three times a week     Attends [employer]ism Services: Never     Active Member of Clubs or Organizations: No     Attends Club or Organization Meetings: Never     Marital Status:    Housing Stability: Low Risk  (2/28/2023)    Housing Stability Vital Sign     Unable to Pay for Housing in the Last Year: No     Number of Places Lived in the Last Year: 1     Unstable Housing in the Last Year: No       Current Medications  Current Outpatient Medications on File Prior to Visit   Medication Sig Dispense Refill    apixaban (ELIQUIS) 5 mg Tab Take 1 tablet (5 mg total) by mouth 2 (two) times daily. 180 tablet 3    desloratadine (CLARINEX) 5 mg tablet Take 1 tablet (5 mg total) by mouth once daily. 30 tablet 1    diclofenac sodium (VOLTAREN) 1 % Gel Apply 2 g topically once daily. 100 g 3    gabapentin (NEURONTIN) 600 MG tablet Take 600 mg by mouth once daily.      losartan (COZAAR) 25 MG tablet Take 1 tablet (25 mg total) by mouth once daily. 90 tablet 3    meloxicam (MOBIC) 15 MG tablet Take 15 mg by mouth once daily.      multivitamin (THERAGRAN) per tablet Take 1 tablet by mouth every evening. 1 Tablet Oral Every day      oxybutynin (DITROPAN) 5 MG Tab Take 1 tablet (5 mg total) by mouth 3 (three) times daily. 270 tablet 3    ciclopirox (PENLAC) 8 % Soln Apply topically nightly. (Patient not taking: Reported on 9/8/2023) 6.6 mL 3     No current facility-administered medications on file prior  "to visit.       Allergies   Review of patient's allergies indicates:   Allergen Reactions    Rosuvastatin Other (See Comments)     Aches    Celebrex [celecoxib]      Chest tightness    Lipitor [atorvastatin] Other (See Comments)       Review of Systems (Pertinent positives)  Review of Systems   Constitutional:  Negative for chills, fever and weight loss.   HENT: Negative.     Eyes: Negative.    Respiratory: Negative.     Cardiovascular: Negative.    Gastrointestinal: Negative.    Genitourinary: Negative.    Musculoskeletal:  Positive for back pain. Negative for falls, myalgias and neck pain.   Skin: Negative.    Neurological: Negative.    Endo/Heme/Allergies: Negative.    Psychiatric/Behavioral: Negative.         /80 (BP Location: Left arm, Patient Position: Sitting, BP Method: Large (Manual))   Pulse (!) 59   Temp 98 °F (36.7 °C) (Oral)   Resp 16   Ht 5' 6" (1.676 m)   Wt 118.8 kg (261 lb 14.5 oz)   LMP  (LMP Unknown)   SpO2 98%   BMI 42.27 kg/m²     Physical Exam  Vitals reviewed.   Constitutional:       General: She is not in acute distress.     Appearance: Normal appearance. She is not ill-appearing, toxic-appearing or diaphoretic.   Cardiovascular:      Rate and Rhythm: Normal rate and regular rhythm.      Pulses: Normal pulses.      Heart sounds: Normal heart sounds.   Pulmonary:      Effort: Pulmonary effort is normal. No respiratory distress.      Breath sounds: Normal breath sounds. No wheezing.   Musculoskeletal:         General: No swelling, tenderness or deformity. Normal range of motion.   Skin:     General: Skin is warm and dry.      Capillary Refill: Capillary refill takes less than 2 seconds.   Neurological:      Mental Status: She is alert and oriented to person, place, and time.   Psychiatric:         Mood and Affect: Mood normal.         Behavior: Behavior normal.          Assessment/Plan:  Alaina Vee is a 83 y.o. female who presents today for :    Alaina was seen today for " leg pain.    Diagnoses and all orders for this visit:    Nocturnal muscle cramps  -     Discontinue: tiZANidine (ZANAFLEX) 2 MG tablet; Take 1 tablet (2 mg total) by mouth nightly as needed (muscle cramps). NOT to be taken with gabapenting  -     tiZANidine (ZANAFLEX) 2 MG tablet; Take 1 tablet (2 mg total) by mouth nightly as needed (muscle cramps). NOT to be taken with gabapenting    Lumbar spondylosis  -     Discontinue: tiZANidine (ZANAFLEX) 2 MG tablet; Take 1 tablet (2 mg total) by mouth nightly as needed (muscle cramps). NOT to be taken with gabapenting  -     tiZANidine (ZANAFLEX) 2 MG tablet; Take 1 tablet (2 mg total) by mouth nightly as needed (muscle cramps). NOT to be taken with gabapentin    No alarming signs or symptoms on examination.  No leg swelling.  Recommended nightly stretching to assist with nocturnal muscle cramps.  Patient can take tizanidine p.r.n. nightly to assist with this.  Recommended patient to not take this with gabapentin.    Patient verbalized understanding of instructions.  All questions answered.        This office note has been dictated.  This dictation has been generated using M-Modal Fluency Direct dictation; some phonetic errors may occur.   My collaborating physician is Dr. Sameer Fink.

## 2023-09-11 ENCOUNTER — TELEPHONE (OUTPATIENT)
Dept: FAMILY MEDICINE | Facility: CLINIC | Age: 83
End: 2023-09-11
Payer: MEDICARE

## 2023-09-11 DIAGNOSIS — Z12.31 SCREENING MAMMOGRAM FOR BREAST CANCER: Primary | ICD-10-CM

## 2023-09-11 NOTE — TELEPHONE ENCOUNTER
----- Message from Emerald Deshpande sent at 9/11/2023 11:18 AM CDT -----  Regarding: pt called      pt requesting mammogram Type:  Mammogram         Caller is requesting to schedule their annual mammogram appointment.  Order is not listed in EPIC.  Please enter order and contact patient to schedule.     Name of Caller: NORMA FERRERA [[id number]]         Where would they like the mammogram performed?           Would the patient rather a call back or a response via My [facility]? call         Best Call Back Number: Telephone Information:  Mobile          [phone]             Additional Information:

## 2023-09-12 ENCOUNTER — HOSPITAL ENCOUNTER (OUTPATIENT)
Dept: RADIOLOGY | Facility: HOSPITAL | Age: 83
Discharge: HOME | End: 2023-09-12
Attending: FAMILY MEDICINE
Payer: MEDICARE

## 2023-09-12 DIAGNOSIS — Z12.31 SCREENING MAMMOGRAM FOR BREAST CANCER: ICD-10-CM

## 2023-09-12 PROCEDURE — 77067 MAMMO DIGITAL SCREENING BILAT WITH TOMO: ICD-10-PCS | Mod: 26,HCNC,, | Performed by: RADIOLOGY

## 2023-09-12 PROCEDURE — 77067 SCR MAMMO BI INCL CAD: CPT | Mod: TC,HCNC

## 2023-09-12 PROCEDURE — 77063 MAMMO DIGITAL SCREENING BILAT WITH TOMO: ICD-10-PCS | Mod: 26,HCNC,, | Performed by: RADIOLOGY

## 2023-09-12 PROCEDURE — 77063 BREAST TOMOSYNTHESIS BI: CPT | Mod: 26,HCNC,, | Performed by: RADIOLOGY

## 2023-09-12 PROCEDURE — 77067 SCR MAMMO BI INCL CAD: CPT | Mod: 26,HCNC,, | Performed by: RADIOLOGY

## 2023-09-18 ENCOUNTER — TELEPHONE (OUTPATIENT)
Dept: FAMILY MEDICINE | Facility: CLINIC | Age: 83
End: 2023-09-18
Payer: MEDICARE

## 2023-09-20 DIAGNOSIS — Z78.0 MENOPAUSE: ICD-10-CM

## 2023-09-29 ENCOUNTER — OFFICE VISIT (OUTPATIENT)
Dept: FAMILY MEDICINE | Facility: CLINIC | Age: 83
End: 2023-09-29
Payer: MEDICARE

## 2023-09-29 VITALS
OXYGEN SATURATION: 95 % | BODY MASS INDEX: 41.52 KG/M2 | DIASTOLIC BLOOD PRESSURE: 64 MMHG | WEIGHT: 258.38 LBS | HEIGHT: 66 IN | HEART RATE: 50 BPM | TEMPERATURE: 98 F | SYSTOLIC BLOOD PRESSURE: 102 MMHG

## 2023-09-29 DIAGNOSIS — M25.551 RIGHT HIP PAIN: ICD-10-CM

## 2023-09-29 DIAGNOSIS — M54.31 RIGHT SIDED SCIATICA: Primary | ICD-10-CM

## 2023-09-29 DIAGNOSIS — E78.2 MIXED HYPERLIPIDEMIA: Chronic | ICD-10-CM

## 2023-09-29 DIAGNOSIS — I10 ESSENTIAL HYPERTENSION: Chronic | ICD-10-CM

## 2023-09-29 DIAGNOSIS — R73.03 PREDIABETES: ICD-10-CM

## 2023-09-29 PROCEDURE — 3074F PR MOST RECENT SYSTOLIC BLOOD PRESSURE < 130 MM HG: ICD-10-PCS | Mod: HCNC,CPTII,S$GLB, | Performed by: FAMILY MEDICINE

## 2023-09-29 PROCEDURE — 99214 PR OFFICE/OUTPT VISIT, EST, LEVL IV, 30-39 MIN: ICD-10-PCS | Mod: HCNC,S$GLB,, | Performed by: FAMILY MEDICINE

## 2023-09-29 PROCEDURE — 1125F PR PAIN SEVERITY QUANTIFIED, PAIN PRESENT: ICD-10-PCS | Mod: HCNC,CPTII,S$GLB, | Performed by: FAMILY MEDICINE

## 2023-09-29 PROCEDURE — 3074F SYST BP LT 130 MM HG: CPT | Mod: HCNC,CPTII,S$GLB, | Performed by: FAMILY MEDICINE

## 2023-09-29 PROCEDURE — 3078F PR MOST RECENT DIASTOLIC BLOOD PRESSURE < 80 MM HG: ICD-10-PCS | Mod: HCNC,CPTII,S$GLB, | Performed by: FAMILY MEDICINE

## 2023-09-29 PROCEDURE — 1159F PR MEDICATION LIST DOCUMENTED IN MEDICAL RECORD: ICD-10-PCS | Mod: HCNC,CPTII,S$GLB, | Performed by: FAMILY MEDICINE

## 2023-09-29 PROCEDURE — 99999 PR PBB SHADOW E&M-EST. PATIENT-LVL IV: CPT | Mod: PBBFAC,HCNC,, | Performed by: FAMILY MEDICINE

## 2023-09-29 PROCEDURE — 1125F AMNT PAIN NOTED PAIN PRSNT: CPT | Mod: HCNC,CPTII,S$GLB, | Performed by: FAMILY MEDICINE

## 2023-09-29 PROCEDURE — 3078F DIAST BP <80 MM HG: CPT | Mod: HCNC,CPTII,S$GLB, | Performed by: FAMILY MEDICINE

## 2023-09-29 PROCEDURE — 99214 OFFICE O/P EST MOD 30 MIN: CPT | Mod: HCNC,S$GLB,, | Performed by: FAMILY MEDICINE

## 2023-09-29 PROCEDURE — 1160F PR REVIEW ALL MEDS BY PRESCRIBER/CLIN PHARMACIST DOCUMENTED: ICD-10-PCS | Mod: HCNC,CPTII,S$GLB, | Performed by: FAMILY MEDICINE

## 2023-09-29 PROCEDURE — 1160F RVW MEDS BY RX/DR IN RCRD: CPT | Mod: HCNC,CPTII,S$GLB, | Performed by: FAMILY MEDICINE

## 2023-09-29 PROCEDURE — 1159F MED LIST DOCD IN RCRD: CPT | Mod: HCNC,CPTII,S$GLB, | Performed by: FAMILY MEDICINE

## 2023-09-29 PROCEDURE — 99999 PR PBB SHADOW E&M-EST. PATIENT-LVL IV: ICD-10-PCS | Mod: PBBFAC,HCNC,, | Performed by: FAMILY MEDICINE

## 2023-09-29 RX ORDER — TIZANIDINE 4 MG/1
4 TABLET ORAL 2 TIMES DAILY PRN
Qty: 60 TABLET | Refills: 2 | Status: SHIPPED | OUTPATIENT
Start: 2023-09-29

## 2023-09-29 NOTE — PROGRESS NOTES
"  Patient Name: Alaina Vee    : 1940  MRN: [id number]      Subjective:     Patient ID: Alaina is a 83 y.o. female    Chief Complaint:  Sciatica    83-year-old female presents for evaluation of a right leg pain.  She reports that a few weeks ago she had a fall at home in July. States she fell forward at hit the right knee. Had swelling in the right knee. She did not seek medical attention. States she had been managing ice packs. States she is now having shooting pain from the knee to the right hip. Having difficult walking.          Review of Systems   Constitutional:  Negative for unexpected weight change.   HENT:  Negative for ear pain and sore throat.    Eyes:  Negative for visual disturbance.   Respiratory:  Negative for shortness of breath.    Cardiovascular:  Negative for chest pain.   Gastrointestinal:  Negative for abdominal pain and blood in stool.   Genitourinary:  Negative for dysuria and frequency.   Musculoskeletal:  Positive for back pain and leg pain.   Integumentary:  Negative for rash.   Neurological:  Negative for weakness, numbness and headaches.   Hematological:  Negative for adenopathy.   Psychiatric/Behavioral:  Negative for suicidal ideas.         Objective:   /64 (BP Location: Left arm, Patient Position: Sitting, BP Method: Large (Manual))   Pulse (!) 50   Temp 97.7 °F (36.5 °C) (Oral)   Ht 5' 6" (1.676 m)   Wt 117.2 kg (258 lb 6.1 oz)   LMP  (LMP Unknown)   SpO2 95%   BMI 41.70 kg/m²     Physical Exam  Vitals reviewed.   Constitutional:       General: She is not in acute distress.     Appearance: She is well-developed. She is obese.   HENT:      Head: Normocephalic and atraumatic.      Right Ear: Ear canal and external ear normal.      Left Ear: Ear canal and external ear normal.      Nose: Nose normal.      Mouth/Throat:      Mouth: Mucous membranes are moist.      Pharynx: No oropharyngeal exudate or posterior oropharyngeal erythema.   Eyes:      General:         " Right eye: No discharge.         Left eye: No discharge.      Extraocular Movements: Extraocular movements intact.      Conjunctiva/sclera: Conjunctivae normal.      Pupils: Pupils are equal, round, and reactive to light.   Neck:      Trachea: No tracheal deviation.   Cardiovascular:      Rate and Rhythm: Normal rate and regular rhythm.      Pulses: Normal pulses.      Heart sounds: Normal heart sounds. No murmur heard.  Pulmonary:      Effort: Pulmonary effort is normal. No respiratory distress.      Breath sounds: Normal breath sounds. No wheezing or rales.   Abdominal:      General: Abdomen is flat. Bowel sounds are normal. There is no distension.      Palpations: Abdomen is soft. There is no mass.      Tenderness: There is no abdominal tenderness. There is no guarding.   Musculoskeletal:      Cervical back: Normal range of motion and neck supple. No rigidity or tenderness.      Right hip: Tenderness present. No deformity. Decreased range of motion.      Left hip: No deformity.   Lymphadenopathy:      Cervical: No cervical adenopathy.   Skin:     General: Skin is warm.      Capillary Refill: Capillary refill takes less than 2 seconds.   Neurological:      Mental Status: She is alert and oriented to person, place, and time.      Cranial Nerves: No cranial nerve deficit.      Sensory: No sensory deficit.   Psychiatric:         Mood and Affect: Mood normal.         Behavior: Behavior normal.        Assessment        ICD-10-CM ICD-9-CM   1. Right sided sciatica  M54.31 724.3   2. Right hip pain  M25.551 719.45   3. Essential hypertension  I10 401.9   4. Mixed hyperlipidemia  E78.2 272.2   5. Prediabetes  R73.03 790.29         Plan:     1. Right sided sciatica  -     tiZANidine (ZANAFLEX) 4 MG tablet; Take 1 tablet (4 mg total) by mouth 2 (two) times daily as needed (back/leg pain).  Dispense: 60 tablet; Refill: 2  -     Ambulatory referral/consult to Physical/Occupational Therapy; Future; Expected date:  10/06/2023  Discussed using an increased dose of Zanaflex she had been previously prescribed.  Course of physical therapy recommended.    2. Right hip pain  -     X-Ray Hip 2 or 3 views Right (with Pelvis when performed); Future; Expected date: 09/29/2023  -     Ambulatory referral/consult to Physical/Occupational Therapy; Future; Expected date: 10/06/2023  Check x-ray of hip.    3. Essential hypertension  Assessment & Plan:  Currently not taking Losartan  Continue to monitir BP    Orders:  -     CBC auto differential; Future; Expected date: 01/15/2024  -     Comprehensive metabolic panel; Future; Expected date: 01/15/2024  -     Hemoglobin A1c; Future; Expected date: 01/15/2024  -     Lipid panel; Future; Expected date: 01/15/2024    4. Mixed hyperlipidemia  -     CBC auto differential; Future; Expected date: 01/15/2024  -     Comprehensive metabolic panel; Future; Expected date: 01/15/2024  -     Hemoglobin A1c; Future; Expected date: 01/15/2024  -     Lipid panel; Future; Expected date: 01/15/2024  Check fasting lipids.    5. Prediabetes  -     Hemoglobin A1c; Future; Expected date: 01/15/2024  Check A1c.         -Juanito Butt Jr., MD, AAHIVS      This office note has been dictated.  This dictation has been generated using M-Modal Fluency Direct dictation; some phonetic errors may occur.         Patient Instructions   Change the tizanidine top 4 mg twice a day  Can use Meloxicam when pain is bad        Future Appointments   Date Time Provider Department Center   12/18/2023  3:20 PM Juanito Butt Jr., MD [facility]   1/17/2024  9:00 AM LAB, [facility] DRAW STATION [facility] LAB [facility]   1/24/2024 10:00 AM Juanito Butt Jr., MD [facility]

## 2023-10-06 ENCOUNTER — TELEPHONE (OUTPATIENT)
Dept: FAMILY MEDICINE | Facility: CLINIC | Age: 83
End: 2023-10-06
Payer: MEDICARE

## 2023-10-06 NOTE — TELEPHONE ENCOUNTER
----- Message from Juanito Butt Jr., MD sent at 10/6/2023 12:36 AM CDT -----  Xray showing arthritic changes in low back. Should proceed with physical therapy as recommended.

## 2023-10-11 DIAGNOSIS — N39.46 MIXED INCONTINENCE URGE AND STRESS: ICD-10-CM

## 2023-10-11 NOTE — TELEPHONE ENCOUNTER
----- Message from Audelia Mera sent at 10/11/2023  1:18 PM CDT -----  Regarding: Self/  [phone]  Type: Patient Call Back    Who called:  Patient    What is the request in detail:  Patient would like a call from staff regarding her Xray results.  Thank you    Would the patient rather a call back or a response via My [facility]?  Call back    Best call back number:  [phone]      Thank you

## 2023-10-11 NOTE — TELEPHONE ENCOUNTER
Called pt back , confirmed her full name and d.o.b reviewed x-ray results . She verbalized understanding and said shes on the waiting list for PPT . Asked if a msg couyld be sent for a refill on her oxybutin     LOV 9/29/2023

## 2023-10-11 NOTE — TELEPHONE ENCOUNTER
No care due was identified.  [facility] Embedded Care Due Messages. Reference number: [id number].   10/11/2023 2:33:10 PM CDT

## 2023-10-12 RX ORDER — OXYBUTYNIN CHLORIDE 5 MG/1
5 TABLET ORAL 3 TIMES DAILY
Qty: 270 TABLET | Refills: 3 | Status: SHIPPED | OUTPATIENT
Start: 2023-10-12 | End: 2024-01-09

## 2023-10-28 ENCOUNTER — HOSPITAL ENCOUNTER (EMERGENCY)
Facility: HOSPITAL | Age: 83
Discharge: HOME | End: 2023-10-28
Attending: EMERGENCY MEDICINE
Payer: MEDICARE

## 2023-10-28 VITALS
OXYGEN SATURATION: 99 % | BODY MASS INDEX: 41.78 KG/M2 | DIASTOLIC BLOOD PRESSURE: 60 MMHG | HEART RATE: 65 BPM | SYSTOLIC BLOOD PRESSURE: 118 MMHG | HEIGHT: 66 IN | RESPIRATION RATE: 18 BRPM | TEMPERATURE: 98 F | WEIGHT: 260 LBS

## 2023-10-28 DIAGNOSIS — M54.16 LUMBAR BACK PAIN WITH RADICULOPATHY AFFECTING RIGHT LOWER EXTREMITY: Primary | ICD-10-CM

## 2023-10-28 PROCEDURE — 99283 EMERGENCY DEPT VISIT LOW MDM: CPT | Mod: HCNC

## 2023-10-28 PROCEDURE — 63600175 PHARM REV CODE 636 W HCPCS: Mod: HCNC | Performed by: EMERGENCY MEDICINE

## 2023-10-28 RX ORDER — PREDNISONE 20 MG/1
40 TABLET ORAL
Status: COMPLETED | OUTPATIENT
Start: 2023-10-28 | End: 2023-10-28

## 2023-10-28 RX ORDER — PREDNISONE 20 MG/1
40 TABLET ORAL DAILY
Qty: 8 TABLET | Refills: 0 | Status: SHIPPED | OUTPATIENT
Start: 2023-10-28 | End: 2023-11-01

## 2023-10-28 RX ADMIN — PREDNISONE 40 MG: 20 TABLET ORAL at 10:10

## 2023-10-28 NOTE — ED NOTES
Patient discharge delayed d/t bradycardia. Pt reporting no symptoms. Cardiac monitoring in place. Dr. Mendenhall notified.

## 2023-10-28 NOTE — DISCHARGE INSTRUCTIONS
Please follow-up with your orthopedist and regular PCP next week.  Return if you get worse or if new symptoms develop such as fever, leg weakness, discoloration of late, severe worsening of pain, or any leg redness or swelling.

## 2023-10-28 NOTE — ED PROVIDER NOTES
Encounter Date: 10/28/2023    SCRIBE #1 NOTE: I, Flora Escobar, am scribing for, and in the presence of,  Moiz Mendenhall Jr., MD. I have scribed the following portions of the note - Other sections scribed: HPI, ROS.     History     Chief Complaint   Patient presents with    Sciatica     PT to ER with reports of right leg pain being treated for sciatica. Pt reports pain is worse today since waking up.      CC: Right leg pain    HPI: This is a 83 year old female with Afib, arthritis, sciatica, HLD, and HTN, who presents to the ED with right sided leg pain that began 1 month ago and increased this morning upon waking up at 2 am. Patient reports she could not move her right leg due to the pain. Patient reports the pain starts at the gluteal area and goes down the side of her leg to the great toe. Patient reports she is being treated for sciatica and followed by her PCP (Dr. Butt) and orthopedics (Dr. Winter). Patient is prescribed a muscle relaxer, but reports no relief from them. Patient denies weakness, shortness of breath, leg swelling, fever, urinary issues, or chest pain. Patient notes DVT 1 year ago (on Eliquis). Patient denies history of DM.     The history is provided by the patient. No  was used.     Review of patient's allergies indicates:   Allergen Reactions    Rosuvastatin Other (See Comments)     Aches    Celebrex [celecoxib]      Chest tightness    Lipitor [atorvastatin] Other (See Comments)     Past Medical History:   Diagnosis Date    Arthritis     knees    Atrial fibrillation     Atrial flutter     Back pain     Cataract     Degenerative disc disease     Depression     General anesthetics causing adverse effect in therapeutic use     pt states sometimes slow to awaken.    GERD (gastroesophageal reflux disease)     Hyperlipidemia     Hypertension     Kidney stone     Obesity     Sleep apnea     does not wear CPAP    Urinary incontinence     Varicosities      Ventral hernia      Past Surgical History:   Procedure Laterality Date    APPENDECTOMY      BREAST BIOPSY Bilateral     1985    breast biopsy, left      CHOLECYSTECTOMY      CYSTOURETEROSCOPY WITH RETROGRADE PYELOGRAPHY AND INSERTION OF STENT INTO URETER Right 1/10/2020    Procedure: CYSTOURETEROSCOPY, WITH RETROGRADE PYELOGRAM AND URETERAL STENT INSERTION;  Surgeon: Yvonne Weaver MD;  Location: [facility] OR;  Service: Urology;  Laterality: Right;    JOINT REPLACEMENT      TKR , right    JOINT REPLACEMENT  2009    TKR  left    ME EXPLORATORY OF ABDOMEN      URETEROSCOPIC REMOVAL OF URETERIC CALCULUS Right 1/31/2020    Procedure: Cystoscopy, possible retrograde pyelogram, ureteroscopy with laser lithotripsy, ureteral stent exchange;  Surgeon: Yvonne Weaver MD;  Location: [facility] OR;  Service: Urology;  Laterality: Right;     Family History   Problem Relation Age of Onset    COPD Mother     Heart disease Sister         half sister    COPD Sister     No Known Problems Father     Parkinsonism Sister     No Known Problems Brother     No Known Problems Maternal Aunt     No Known Problems Maternal Uncle     No Known Problems Paternal Aunt     No Known Problems Paternal Uncle     No Known Problems Maternal Grandmother     No Known Problems Maternal Grandfather     No Known Problems Paternal Grandmother     No Known Problems Paternal Grandfather     Amblyopia Neg Hx     Blindness Neg Hx     Cancer Neg Hx     Cataracts Neg Hx     Diabetes Neg Hx     Glaucoma Neg Hx     Hypertension Neg Hx     Macular degeneration Neg Hx     Retinal detachment Neg Hx     Strabismus Neg Hx     Stroke Neg Hx     Thyroid disease Neg Hx      Social History     Tobacco Use    Smoking status: Never    Smokeless tobacco: Never   Substance Use Topics    Alcohol use: No    Drug use: No     Review of Systems   Constitutional:  Negative for fever.   HENT:  Negative for congestion.    Eyes:  Negative for  pain.   Respiratory:  Negative for shortness of breath.    Cardiovascular:  Negative for chest pain and leg swelling.   Gastrointestinal:  Negative for abdominal pain.   Genitourinary:  Negative for difficulty urinating, frequency and urgency.   Musculoskeletal:  Positive for arthralgias (right leg, glute to great toe).   Skin:  Negative for rash.   Neurological:  Negative for weakness.   All other systems reviewed and are negative.      Physical Exam     Initial Vitals [10/28/23 1016]   BP Pulse Resp Temp SpO2   (!) 163/76 (!) 55 18 97.5 °F (36.4 °C) 97 %      MAP       --         Physical Exam    Nursing note and vitals reviewed.  Constitutional: She appears well-developed and well-nourished. She is not diaphoretic. No distress.   HENT:   Head: Normocephalic and atraumatic.   Right Ear: External ear normal.   Left Ear: External ear normal.   Nose: Nose normal.   Mouth/Throat: Oropharynx is clear and moist.   Eyes: Conjunctivae and EOM are normal. Pupils are equal, round, and reactive to light. Right eye exhibits no discharge. Left eye exhibits no discharge. No scleral icterus.   Neck: Neck supple.   Normal range of motion.  Cardiovascular:  Normal rate, regular rhythm, normal heart sounds and intact distal pulses.           No murmur heard.  Pulmonary/Chest: Breath sounds normal. No respiratory distress. She has no wheezes. She has no rhonchi. She has no rales.   Abdominal: Abdomen is soft. Bowel sounds are normal. She exhibits no distension and no mass. There is no abdominal tenderness. There is no rebound and no guarding.   Musculoskeletal:         General: No tenderness or edema. Normal range of motion.      Cervical back: Normal range of motion and neck supple.      Comments: The patient's right lower extremity appears well-perfused.  DP and PT pulses are 2+.  Patient has normal strength and sensation of bilateral lower extremities.  She is ambulating with a cane.  She is able to bear weight on the leg.  There  is no asymmetry/edema or erythema of the lower extremities.       Neurological: She is alert and oriented to person, place, and time. She has normal strength. No cranial nerve deficit or sensory deficit.   Skin: Skin is warm and dry. No rash noted. No erythema. No pallor.   Psychiatric: She has a normal mood and affect. Her behavior is normal. Judgment and thought content normal.       ED Course   Procedures  Labs Reviewed - No data to display  EKG Readings: (Independently Interpreted)   Initial Reading: No STEMI. Ectopy: No Ectopy.   EKG reviewed and interpreted by me shows atrial fibrillation with slow ventricular response at a rate of 48 beats per minute.  The QRS and QTC intervals are normal.  There is normal axis.  There is normal R-wave progression.  There are no ST segment or T-wave ischemic findings.         Imaging Results    None          Medications   predniSONE tablet 40 mg (40 mg Oral Given 10/28/23 1058)     Medical Decision Making  This is the emergent evaluation of an 83-year-old female presents emergency department for evaluation of worsening right lower extremity pain.  She describes the pain is going from the lower right gluteal area down the side and back of her leg down to her foot.  She states she is had this pain for a month.  She states that she is been diagnosed with sciatica.  She states the pain got worse last night.  I considered other causes of the patient's pain.  There is no evidence that this is DVT or an ischemic limb based on physical examination.  The patient does have a history of DVT in the past and takes anticoagulation medication for this.  She states that she takes it regularly and has not missed any doses.  She is no chest pain or shortness of breath.  She is taking acetaminophen and tizanidine at home.  She has follow-up with orthopedics.  Given the above, this is likely radicular pain.  Given patient's age, I will avoid oral NSAIDs.  Additionally, noticed that patient is  allergic to celecoxib.  I will give the patient a short course of prednisone.  She is no fever, bowel or bladder symptoms, or lower extremity weakness.  I do not suspect spinal cord compression.  Patient is stable for discharge.  Advised follow up as above and return for any new or worsening symptoms such as leg swelling, severe worsening of pain.      I was approached by the nurse regarding patient's low heart rate.  The patient is bradycardic.  Right now heart rate is 45 beats per minute.  She has no hypotension.  She is not feeling weak or dizzy.  She does have a known history of atrial fibrillation with slow ventricular response.  I think she is still stable for discharge.    Amount and/or Complexity of Data Reviewed  External Data Reviewed: notes.    Risk  Prescription drug management.            Scribe Attestation:   Scribe #1: I performed the above scribed service and the documentation accurately describes the services I performed. I attest to the accuracy of the note.                    I, Moiz Mendenhall MD, personally performed the services described in this documentation.  All medical record entries made by the scribe were at my direction and in my presence.  I have reviewed the chart and agree that the record reflects my personal performance and is accurate and complete.   Clinical Impression:   Final diagnoses:  [M54.16] Lumbar back pain with radiculopathy affecting right lower extremity (Primary)        ED Disposition Condition    Discharge Stable          ED Prescriptions       Medication Sig Dispense Start Date End Date Auth. Provider    predniSONE (DELTASONE) 20 MG tablet Take 2 tablets (40 mg total) by mouth once daily. for 4 days 8 tablet 10/28/2023 11/1/2023 Moiz Mendenhall Jr., MD          Follow-up Information       Follow up With Specialties Details Why Contact Info    Juanito Butt Jr., MD Family Medicine Call in 1 week  [address] [facility] [facility]  [place] [address] [phone]                Moiz Mendenhall Jr., MD  10/28/23 1123       Moiz Mendenhall Jr., MD  10/28/23 1600

## 2023-10-28 NOTE — ED TRIAGE NOTES
Pt presents to the ED for evaluation and treatment of right sided hip pain that radiates down her leg to her toes that worsened this morning. Pt reports chronic pain d/t sciatica but pain meds dont relief. Pt denies any recent trauma or injury to leg or any other symptoms at this time. Pmhx of Afib, Aflutter, DDD, HTN, and arthritis. Pt is AAOx4, ambulatory w/ a cane and NADN.

## 2023-10-28 NOTE — ED NOTES
HR assessed while pt ambulated at 65 bpm. No symptoms reported. Dr. Zhang Call updated. Per Dr. Mendenhall pt cleared for discharge.

## 2023-10-30 ENCOUNTER — TELEPHONE (OUTPATIENT)
Dept: WOUND CARE | Facility: HOSPITAL | Age: 83
End: 2023-10-30
Payer: MEDICARE

## 2023-10-30 ENCOUNTER — PATIENT OUTREACH (OUTPATIENT)
Dept: EMERGENCY MEDICINE | Facility: HOSPITAL | Age: 83
End: 2023-10-30

## 2023-10-30 ENCOUNTER — TELEPHONE (OUTPATIENT)
Dept: FAMILY MEDICINE | Facility: CLINIC | Age: 83
End: 2023-10-30
Payer: MEDICARE

## 2023-10-30 DIAGNOSIS — M54.31 RIGHT SIDED SCIATICA: Primary | ICD-10-CM

## 2023-10-30 NOTE — TELEPHONE ENCOUNTER
----- Message from Lucía Delong sent at 10/30/2023  4:10 PM CDT -----  Regarding: Request for Sooner Apt  Type:  Sooner Appointment Request    Patient is requesting a sooner appointment.  Patient declined first available appointment listed as well as another facility and provider .  Patient will not accept being placed on the waitlist and is requesting a message be sent to doctor.    Name of Caller: Self     When is the first available appointment? 11/16/23    Symptoms: toenail turned black     Would the patient rather a call back or a response via [facility]? Call     Best Call Back Number:  [phone]

## 2023-10-30 NOTE — TELEPHONE ENCOUNTER
Patient stated that she wouldn't be open to going back to Dr. Chi, however, was open to going to Dr. Hammer. Provided phone number to get scheduled and advised to return to ED if the blackness on her toes spread and if she doesn't have any sensory to it.  Also provided pain management phone number to get scheduled.  She verbalized understanding.

## 2023-10-30 NOTE — TELEPHONE ENCOUNTER
For her sciatic pain, I placed a referral to see Pain Management.    If her toe has turned black she needs to go back to the ER to emergency evaluation as that cold be a sign of blood supply compromise.

## 2023-10-30 NOTE — TELEPHONE ENCOUNTER
----- Message from Haroldo Palomares sent at 10/30/2023  8:11 AM CDT -----  Regarding: Alaina  Type: Patient Callback     Who called: Alaina     What is the request in detail: Pt stated that she went to see the doctor and he up the dosage on her medication. Patient had to go to the ER for her leg pain.There is an infection in the sciatic nerve. She was given medication and she is still in pain. Patient also stated she was suppose to get an MRI and still has not gotten it and she was suppose to get an appointment for physical therapy as well and is on the waiting list. Pt stated that she has a problem with her toe. It has turned black. Patient is not happy with the situation. Please contact the patient because she is upset. She does not want to see Dr. Chi anymore and she would like to see someone else.     Can the clinic reply by [facility]? Yes     Would the patient rather a call back or a response via [facility]? Callback     Best call back number: [phone]      Additional Information:Pt stated that the voicemail does not work so if she does not answer please call her right back to give her time to answer.

## 2023-11-09 ENCOUNTER — TELEPHONE (OUTPATIENT)
Dept: FAMILY MEDICINE | Facility: CLINIC | Age: 83
End: 2023-11-09
Payer: MEDICARE

## 2023-11-09 NOTE — TELEPHONE ENCOUNTER
----- Message from Martha Perez sent at 10/30/2023 12:29 PM CDT -----  Regarding: ED Follow Up Appointment  Hello,    This patient was seen in the ED on 10/28/23 and needs a 7-day follow up appointment.  Could you please assist with scheduling a visit for her.  Thank you so much!    Martha Perez  ED Navigator

## 2023-11-09 NOTE — TELEPHONE ENCOUNTER
Patient stated that she no longer wants to see Dr uBtt and to cancel all of her appointments with the provider.

## 2023-11-13 ENCOUNTER — TELEPHONE (OUTPATIENT)
Dept: PODIATRY | Facility: CLINIC | Age: 83
End: 2023-11-13
Payer: MEDICARE

## 2023-11-20 ENCOUNTER — HOSPITAL ENCOUNTER (EMERGENCY)
Facility: HOSPITAL | Age: 83
Discharge: HOME | End: 2023-11-20
Attending: EMERGENCY MEDICINE
Payer: MEDICARE

## 2023-11-20 VITALS
WEIGHT: 260 LBS | HEART RATE: 65 BPM | OXYGEN SATURATION: 96 % | SYSTOLIC BLOOD PRESSURE: 134 MMHG | BODY MASS INDEX: 41.78 KG/M2 | HEIGHT: 66 IN | DIASTOLIC BLOOD PRESSURE: 81 MMHG | RESPIRATION RATE: 20 BRPM | TEMPERATURE: 98 F

## 2023-11-20 DIAGNOSIS — G43.809 OTHER MIGRAINE WITHOUT STATUS MIGRAINOSUS, NOT INTRACTABLE: Primary | ICD-10-CM

## 2023-11-20 LAB
ALLENS TEST: ABNORMAL
ANION GAP SERPL CALC-SCNC: 13 MMOL/L (ref 8–16)
BASOPHILS # BLD AUTO: 0.05 K/UL (ref 0–0.2)
BASOPHILS NFR BLD: 0.5 % (ref 0–1.9)
BUN SERPL-MCNC: 21 MG/DL (ref 6–30)
CHLORIDE SERPL-SCNC: 102 MMOL/L (ref 95–110)
CREAT SERPL-MCNC: 0.9 MG/DL (ref 0.5–1.4)
DELSYS: ABNORMAL
DIFFERENTIAL METHOD: ABNORMAL
EOSINOPHIL # BLD AUTO: 0.2 K/UL (ref 0–0.5)
EOSINOPHIL NFR BLD: 2.1 % (ref 0–8)
ERYTHROCYTE [DISTWIDTH] IN BLOOD BY AUTOMATED COUNT: 12.9 % (ref 11.5–14.5)
GLUCOSE SERPL-MCNC: 103 MG/DL (ref 70–110)
HCT VFR BLD AUTO: 46.8 % (ref 37–48.5)
HCT VFR BLD CALC: 44 %PCV (ref 36–54)
HGB BLD-MCNC: 15.3 G/DL (ref 12–16)
IMM GRANULOCYTES # BLD AUTO: 0.05 K/UL (ref 0–0.04)
IMM GRANULOCYTES NFR BLD AUTO: 0.5 % (ref 0–0.5)
LYMPHOCYTES # BLD AUTO: 1.5 K/UL (ref 1–4.8)
LYMPHOCYTES NFR BLD: 16.2 % (ref 18–48)
MCH RBC QN AUTO: 30.8 PG (ref 27–31)
MCHC RBC AUTO-ENTMCNC: 32.7 G/DL (ref 32–36)
MCV RBC AUTO: 94 FL (ref 82–98)
MONOCYTES # BLD AUTO: 0.7 K/UL (ref 0.3–1)
MONOCYTES NFR BLD: 7.3 % (ref 4–15)
NEUTROPHILS # BLD AUTO: 6.9 K/UL (ref 1.8–7.7)
NEUTROPHILS NFR BLD: 73.4 % (ref 38–73)
NRBC BLD-RTO: 0 /100 WBC
PLATELET # BLD AUTO: 268 K/UL (ref 150–450)
PMV BLD AUTO: 10 FL (ref 9.2–12.9)
POC IONIZED CALCIUM: 1.27 MMOL/L (ref 1.06–1.42)
POC TCO2 (MEASURED): 30 MMOL/L (ref 23–29)
POTASSIUM BLD-SCNC: 3.9 MMOL/L (ref 3.5–5.1)
RBC # BLD AUTO: 4.97 M/UL (ref 4–5.4)
SAMPLE: ABNORMAL
SITE: ABNORMAL
SODIUM BLD-SCNC: 140 MMOL/L (ref 136–145)
WBC # BLD AUTO: 9.34 K/UL (ref 3.9–12.7)

## 2023-11-20 PROCEDURE — 85014 HEMATOCRIT: CPT | Mod: HCNC

## 2023-11-20 PROCEDURE — 25500020 PHARM REV CODE 255: Mod: HCNC | Performed by: EMERGENCY MEDICINE

## 2023-11-20 PROCEDURE — 82565 ASSAY OF CREATININE: CPT | Mod: HCNC

## 2023-11-20 PROCEDURE — 80048 BASIC METABOLIC PNL TOTAL CA: CPT | Mod: HCNC

## 2023-11-20 PROCEDURE — 99900035 HC TECH TIME PER 15 MIN (STAT): Mod: HCNC

## 2023-11-20 PROCEDURE — 84295 ASSAY OF SERUM SODIUM: CPT | Mod: HCNC

## 2023-11-20 PROCEDURE — 85025 COMPLETE CBC W/AUTO DIFF WBC: CPT | Mod: HCNC | Performed by: EMERGENCY MEDICINE

## 2023-11-20 PROCEDURE — 82330 ASSAY OF CALCIUM: CPT | Mod: HCNC

## 2023-11-20 PROCEDURE — 99285 EMERGENCY DEPT VISIT HI MDM: CPT | Mod: 25,HCNC

## 2023-11-20 PROCEDURE — 84132 ASSAY OF SERUM POTASSIUM: CPT | Mod: HCNC

## 2023-11-20 RX ADMIN — IOHEXOL 80 ML: 350 INJECTION, SOLUTION INTRAVENOUS at 05:11

## 2023-11-20 NOTE — ED PROVIDER NOTES
"Encounter Date: 11/20/2023       History     Chief Complaint   Patient presents with    Headache     States woke up around midnight Saturday and had "lightening" headache that radiated down frontal, left and right side of head. States symptoms resolved the next morning. Denies dizziness, weakness, changes in speech, vision changes,or any other symptoms. No tx used.      HPI    83-year-old female with past medical history of atrial flutter, GERD, hypertension, SULEMA presents with headache x2 days.  Patient reports onset of a headache on the top right side of her head radiating down through the right face behind her right eye.  She reports history of headaches but no history of migraines or similar headaches in the past.  She reports not taking any medications prior to arrival, states that it considerably improved today but states that she can feel feels some soreness and does not want it to become as bad as it was yesterday.  She reports no associated nausea or vomiting, denies any numbness or tingling, denies any difficulty walking, or any further associated symptoms.    Review of patient's allergies indicates:   Allergen Reactions    Rosuvastatin Other (See Comments)     Aches    Celebrex [celecoxib]      Chest tightness    Lipitor [atorvastatin] Other (See Comments)     Past Medical History:   Diagnosis Date    Arthritis     knees    Atrial fibrillation     Atrial flutter     Back pain     Cataract     Degenerative disc disease     Depression     General anesthetics causing adverse effect in therapeutic use     pt states sometimes slow to awaken.    GERD (gastroesophageal reflux disease)     Hyperlipidemia     Hypertension     Kidney stone     Obesity     Sleep apnea     does not wear CPAP    Urinary incontinence     Varicosities     Ventral hernia      Past Surgical History:   Procedure Laterality Date    APPENDECTOMY      BREAST BIOPSY Bilateral     1985    breast biopsy, left      CHOLECYSTECTOMY      " CYSTOURETEROSCOPY WITH RETROGRADE PYELOGRAPHY AND INSERTION OF STENT INTO URETER Right 1/10/2020    Procedure: CYSTOURETEROSCOPY, WITH RETROGRADE PYELOGRAM AND URETERAL STENT INSERTION;  Surgeon: Yvonne Weaver MD;  Location: [facility] OR;  Service: Urology;  Laterality: Right;    JOINT REPLACEMENT      TKR , right    JOINT REPLACEMENT  2009    TKR  left    WA EXPLORATORY OF ABDOMEN      URETEROSCOPIC REMOVAL OF URETERIC CALCULUS Right 1/31/2020    Procedure: Cystoscopy, possible retrograde pyelogram, ureteroscopy with laser lithotripsy, ureteral stent exchange;  Surgeon: Yvonne Weaver MD;  Location: [facility] OR;  Service: Urology;  Laterality: Right;     Family History   Problem Relation Age of Onset    COPD Mother     Heart disease Sister         half sister    COPD Sister     No Known Problems Father     Parkinsonism Sister     No Known Problems Brother     No Known Problems Maternal Aunt     No Known Problems Maternal Uncle     No Known Problems Paternal Aunt     No Known Problems Paternal Uncle     No Known Problems Maternal Grandmother     No Known Problems Maternal Grandfather     No Known Problems Paternal Grandmother     No Known Problems Paternal Grandfather     Amblyopia Neg Hx     Blindness Neg Hx     Cancer Neg Hx     Cataracts Neg Hx     Diabetes Neg Hx     Glaucoma Neg Hx     Hypertension Neg Hx     Macular degeneration Neg Hx     Retinal detachment Neg Hx     Strabismus Neg Hx     Stroke Neg Hx     Thyroid disease Neg Hx      Social History     Tobacco Use    Smoking status: Never    Smokeless tobacco: Never   Substance Use Topics    Alcohol use: No    Drug use: No     Review of Systems   Constitutional: Negative.    HENT: Negative.     Eyes: Negative.    Respiratory: Negative.     Cardiovascular: Negative.    Gastrointestinal: Negative.    Genitourinary: Negative.    Musculoskeletal: Negative.    Skin: Negative.    Neurological:  Positive for headaches.       Physical Exam     Initial Vitals  [11/20/23 1522]   BP Pulse Resp Temp SpO2   134/81 60 16 98.4 °F (36.9 °C) (!) 92 %      MAP       --         Physical Exam    Nursing note and vitals reviewed.  Constitutional: She appears well-developed and well-nourished. She is not diaphoretic. No distress.   HENT:   Head: Normocephalic and atraumatic.   Nose: Nose normal.   Eyes: EOM are normal. Pupils are equal, round, and reactive to light.   Neck: Neck supple. No JVD present.   Normal range of motion.  Cardiovascular:  Normal rate, regular rhythm, normal heart sounds and intact distal pulses.           Pulmonary/Chest: Breath sounds normal. No stridor. No respiratory distress. She has no wheezes. She has no rales.   Abdominal: Abdomen is soft. Bowel sounds are normal. She exhibits no distension. There is no abdominal tenderness.   Musculoskeletal:         General: No tenderness or edema. Normal range of motion.      Cervical back: Normal range of motion and neck supple.     Neurological: She is alert and oriented to person, place, and time. She has normal strength. No cranial nerve deficit or sensory deficit.   Skin: Skin is warm and dry. Capillary refill takes less than 2 seconds. No rash noted. No erythema.         ED Course   Procedures  Labs Reviewed   CBC W/ AUTO DIFFERENTIAL - Abnormal; Notable for the following components:       Result Value    Immature Grans (Abs) 0.05 (*)     Gran % 73.4 (*)     Lymph % 16.2 (*)     All other components within normal limits   ISTAT PROCEDURE - Abnormal; Notable for the following components:    POC TCO2 (MEASURED) 30 (*)     All other components within normal limits          Imaging Results              CTA Head and Neck (xpd) (Final result)  Result time 11/20/23 18:10:27      Final result by Nahomy Steinberg MD (11/20/23 18:10:27)                   Impression:      1. No acute intracranial abnormalities identified.  If clinical concern for acute infarction, future MRI follow-up be obtained.  2. CTA head and neck  with no evidence of large vessel occlusion or dissection.      Electronically signed by: Nahomy Steinberg MD  Date:    11/20/2023  Time:    18:10               Narrative:    EXAMINATION:  CTA HEAD AND NECK (XPD)    CLINICAL HISTORY:  Transient ischemic attack (TIA);Dizziness, persistent/recurrent, cardiac or vascular cause suspected;    TECHNIQUE:  Non contrast low dose axial images were obtained through the head.  CT angiogram was performed from the level of the lorraine to the top of the head following the IV administration of 85mL of Omnipaque 350.   Sagittal and coronal reconstructions and maximum intensity projection reconstructions were performed. Arterial stenosis percentages are based on NASCET measurement criteria.  Rapid AI screening was performed.    COMPARISON:  None    FINDINGS:  There is mild chronic microvascular ischemic change.  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  No evidence of acute/recent major vascular distribution cerebral infarction, intraparenchymal hemorrhage, or intra-axial space occupying lesion. The ventricular system is normal in size and configuration with no evidence of hydrocephalus. No effacement of the skull-base cisterns. No abnormal extra-axial fluid collections or blood products. Visualized paranasal sinuses and mastoid air cells are clear. The calvarium shows no significant abnormality.    CTA Neck: The origins of the right brachiocephalic, left common carotid and left subclavian arteries from the arch are within normal limits.  The vertebral arteries are patent without evidence for focal stenosis or occlusion.   The bilateral common carotid arteries and internal carotid arteries are patent without evidence for focal stenosis or occlusion.  No evidence of carotid or vertebral artery dissection.    Anterior circulation: The bilateral distal cervical, petrous, cavernous, and supraclinoid ICAs are patent without significant stenosis or aneurysm.  Mild  atherosclerotic plaquing of the cavernous segments of the ICAs.  The anterior and middle cerebral arteries are patent without significant stenosis, occlusion, or aneurysm.    Posterior circulation: Distal vertebral arteries, basilar artery and posterior cerebral arteries are patent without significant focal stenosis, occlusion, or aneurysm.    Airways: Unremarkable.    Glands/Nodes: The parotid, submandibular, and thyroid glands are unremarkable.    Spine: No acute cervical spine abnormalities identified.  Mild degenerative changes with intervertebral disc space narrowing are seen throughout the cervical levels.    Lungs: Visualized lung apices are clear.                                       Medications   iohexoL (OMNIPAQUE 350) injection 80 mL (80 mLs Intravenous Given 11/20/23 8386)     Medical Decision Making  Amount and/or Complexity of Data Reviewed  Labs: ordered.  Radiology: ordered.    Risk  Prescription drug management.      MDM:    83-year-old female with past medical history of atrial flutter, GERD, hypertension, SULEMA presents with headache x2 days.  Physical exam as noted above.  ED workup notable for CBC with hemoglobin 15.3, white blood cell count 9.34, Chem 8 showing creatinine 0.9, CTA head and neck is unremarkable. At this time given patient's history, physical exam, and ED workup do not suspect ICH, symptomatic aneurysm, temporal arteritis, meningitis/encephalitis, electrolyte abnormality, acute blood loss anemia, AACG, fracture/trauma, or any further malignant cause. Discussed diagnosis and further treatment with patient, including f/u.  Return precautions given and all questions answered.  Patient in understanding of plan.  Pt discharged to home improved and stable.        Note was created using voice recognition software. Note may have occasional typographical or grammatical errors, garbled syntax, and other bizarre constructions that may not have been identified and edited despite good juanjo  initial review prior to signing.                                      Clinical Impression:  Final diagnoses:  [G43.809] Other migraine without status migrainosus, not intractable (Primary)          ED Disposition Condition    Discharge Stable          ED Prescriptions    None       Follow-up Information       Follow up With Specialties Details Why Contact [facility] - Emergency Dept Emergency Medicine Go to  If symptoms worsen [address] [facility] [place] [address]  [phone]    Luis Alberto Harris MD Family Medicine Schedule an appointment as soon as possible for a visit   [address] [place] [id number]  [phone]               James Mcnally MD  11/21/23 0598

## 2023-11-20 NOTE — ED TRIAGE NOTES
"Pt presents to ED with complaint of right sided headache that started Saturday. Pt states she woke up around midnight and had a "lightening" headache that radiated down frontal and right side of the face. Pt states the symptoms has slowly resolved but she still has a minor headache 2/10. Pt denies chest pain or sob. Ne neuro deficit noted.   "

## 2023-11-20 NOTE — CARE UPDATE
Latest Reference Range & Units 11/20/23 17:22   Sodium, Blood Gas 136 - 145 mmol/L 140   Potassium, Blood Gas 3.5 - 5.1 mmol/L 3.9   POC Chloride 95 - 110 mmol/L 102   POC Anion Gap 8 - 16 mmol/L 13   POC BUN 6 - 30 mg/dL 21   Sample  VENOUS   POC Ionized Calcium 1.06 - 1.42 mmol/L 1.27   POC Glucose 70 - 110 mg/dL 103   POC Hematocrit 36 - 54 %PCV 44   POC TCO2 (MEASURED) 23 - 29 mmol/L 30 (H)   [facility]  Room Air   Site  Other   POC Creatinine 0.5 - 1.4 mg/dL 0.9   (H): Data is abnormally high

## 2023-11-21 ENCOUNTER — TELEPHONE (OUTPATIENT)
Dept: FAMILY MEDICINE | Facility: CLINIC | Age: 83
End: 2023-11-21
Payer: MEDICARE

## 2023-11-21 ENCOUNTER — PATIENT OUTREACH (OUTPATIENT)
Dept: EMERGENCY MEDICINE | Facility: HOSPITAL | Age: 83
End: 2023-11-21
Payer: MEDICARE

## 2023-11-21 NOTE — TELEPHONE ENCOUNTER
----- Message from Kimberly Linton sent at 11/21/2023 12:02 PM CST -----  Regarding: Post ED visit follow up appt within 7 days of d/c date 11/20/23    Good afternoon: Pt was seen in the ED on 11/20/23 for Other migraine without status migrainosus, not intractable. Pt requires a Post ED visit follow up appt within 7 days of d/c date. Please contact pt to schedule either an in-office or virtual follow up appt with any available provider by 11/27/23.     Thank you,  Kimberly Linton

## 2023-11-28 DIAGNOSIS — I10 ESSENTIAL HYPERTENSION, BENIGN: ICD-10-CM

## 2023-11-28 DIAGNOSIS — G52.1 GLOSSOPHARYNGEAL NEURALGIA: ICD-10-CM

## 2023-11-28 DIAGNOSIS — G44.53 PRIMARY THUNDERCLAP HEADACHE: Primary | ICD-10-CM

## 2023-11-28 DIAGNOSIS — G50.9 DISORDER OF THE FIFTH CRANIAL NERVE: ICD-10-CM

## 2023-11-28 DIAGNOSIS — G50.1 ATYPICAL FACE PAIN: ICD-10-CM

## 2023-12-07 ENCOUNTER — HOSPITAL ENCOUNTER (OUTPATIENT)
Dept: CARDIOLOGY | Facility: HOSPITAL | Age: 83
Discharge: HOME | End: 2023-12-07
Attending: INTERNAL MEDICINE
Payer: MEDICARE

## 2023-12-07 DIAGNOSIS — I48.19 PERSISTENT ATRIAL FIBRILLATION: ICD-10-CM

## 2023-12-07 DIAGNOSIS — I77.810 AORTIC ROOT DILATATION: ICD-10-CM

## 2023-12-07 LAB
ASCENDING AORTA: 4.17 CM
AV INDEX (PROSTH): 0.69
AV MEAN GRADIENT: 6 MMHG
AV PEAK GRADIENT: 9 MMHG
AV VALVE AREA BY VELOCITY RATIO: 2.75 CM²
AV VALVE AREA: 2.86 CM²
AV VELOCITY RATIO: 0.66
CV ECHO LV RWT: 0.63 CM
DOP CALC AO PEAK VEL: 1.51 M/S
DOP CALC AO VTI: 28.3 CM
DOP CALC LVOT AREA: 4.2 CM2
DOP CALC LVOT DIAMETER: 2.3 CM
DOP CALC LVOT PEAK VEL: 1 M/S
DOP CALC LVOT STROKE VOLUME: 80.98 CM3
DOP CALCLVOT PEAK VEL VTI: 19.5 CM
E WAVE DECELERATION TIME: 178.74 MSEC
E/A RATIO: 2.83
ECHO LV POSTERIOR WALL: 1.3 CM (ref 0.6–1.1)
FRACTIONAL SHORTENING: 27 % (ref 28–44)
INTERVENTRICULAR SEPTUM: 1.29 CM (ref 0.6–1.1)
IVC DIAMETER: 2.01 CM
IVRT: 87.54 MSEC
LA MAJOR: 6.34 CM
LA MINOR: 6.84 CM
LA WIDTH: 4.4 CM
LEFT ATRIUM SIZE: 5.11 CM
LEFT ATRIUM VOLUME: 125.76 CM3
LEFT INTERNAL DIMENSION IN SYSTOLE: 3.01 CM (ref 2.1–4)
LEFT VENTRICLE DIASTOLIC VOLUME: 76.19 ML
LEFT VENTRICLE SYSTOLIC VOLUME: 35.19 ML
LEFT VENTRICULAR INTERNAL DIMENSION IN DIASTOLE: 4.15 CM (ref 3.5–6)
LEFT VENTRICULAR MASS: 195.88 G
LV LATERAL E/E' RATIO: 7.08 M/S
LVOT MG: 2.45 MMHG
LVOT MV: 0.74 CM/S
MV PEAK A VEL: 0.3 M/S
MV PEAK E VEL: 0.85 M/S
MV STENOSIS PRESSURE HALF TIME: 51.84 MS
MV VALVE AREA P 1/2 METHOD: 4.24 CM2
PISA TR MAX VEL: 1.47 M/S
PV PEAK GRADIENT: 4 MMHG
PV PEAK VELOCITY: 1.06 M/S
RA MAJOR: 4.93 CM
RA PRESSURE ESTIMATED: 8 MMHG
RA WIDTH: 3.8 CM
RIGHT VENTRICULAR END-DIASTOLIC DIMENSION: 3.01 CM
RV TB RVSP: 9 MMHG
RV TISSUE DOPPLER FREE WALL SYSTOLIC VELOCITY 1 (APICAL 4 CHAMBER VIEW): 12.06 CM/S
SINUS: 4.4 CM
STJ: 3.42 CM
TDI LATERAL: 0.12 M/S
TR MAX PG: 9 MMHG
TRICUSPID ANNULAR PLANE SYSTOLIC EXCURSION: 2.16 CM
TV PEAK GRADIENT: 3 MMHG
TV REST PULMONARY ARTERY PRESSURE: 17 MMHG

## 2023-12-07 PROCEDURE — 93306 TTE W/DOPPLER COMPLETE: CPT | Mod: 26,HCNC,, | Performed by: INTERNAL MEDICINE

## 2023-12-07 PROCEDURE — 93306 ECHO (CUPID ONLY): ICD-10-PCS | Mod: 26,HCNC,, | Performed by: INTERNAL MEDICINE

## 2023-12-07 PROCEDURE — 93306 TTE W/DOPPLER COMPLETE: CPT | Mod: HCNC

## 2023-12-08 ENCOUNTER — HOSPITAL ENCOUNTER (OUTPATIENT)
Dept: RADIOLOGY | Facility: HOSPITAL | Age: 83
Discharge: HOME | End: 2023-12-08
Attending: PSYCHIATRY & NEUROLOGY
Payer: MEDICARE

## 2023-12-08 DIAGNOSIS — G44.53 PRIMARY THUNDERCLAP HEADACHE: ICD-10-CM

## 2023-12-08 DIAGNOSIS — G50.1 ATYPICAL FACE PAIN: ICD-10-CM

## 2023-12-08 DIAGNOSIS — G52.1 GLOSSOPHARYNGEAL NEURALGIA: ICD-10-CM

## 2023-12-08 DIAGNOSIS — G50.9 DISORDER OF THE FIFTH CRANIAL NERVE: ICD-10-CM

## 2023-12-08 DIAGNOSIS — I10 ESSENTIAL HYPERTENSION, BENIGN: ICD-10-CM

## 2023-12-08 PROCEDURE — A9585 GADOBUTROL INJECTION: HCPCS | Mod: HCNC | Performed by: PSYCHIATRY & NEUROLOGY

## 2023-12-08 PROCEDURE — 70553 MRI BRAIN STEM W/O & W/DYE: CPT | Mod: 26,HCNC,, | Performed by: RADIOLOGY

## 2023-12-08 PROCEDURE — 70553 MRI BRAIN W WO CONTRAST: ICD-10-PCS | Mod: 26,HCNC,, | Performed by: RADIOLOGY

## 2023-12-08 PROCEDURE — 25500020 PHARM REV CODE 255: Mod: HCNC | Performed by: PSYCHIATRY & NEUROLOGY

## 2023-12-08 PROCEDURE — 70553 MRI BRAIN STEM W/O & W/DYE: CPT | Mod: TC,HCNC

## 2023-12-08 RX ORDER — GADOBUTROL 604.72 MG/ML
10 INJECTION INTRAVENOUS
Status: DISCONTINUED | OUTPATIENT
Start: 2023-12-08 | End: 2023-12-10 | Stop reason: HOSPADM

## 2023-12-08 RX ORDER — GADOBUTROL 604.72 MG/ML
10 INJECTION INTRAVENOUS
Status: COMPLETED | OUTPATIENT
Start: 2023-12-08 | End: 2023-12-08

## 2023-12-08 RX ADMIN — GADOBUTROL 10 ML: 604.72 INJECTION INTRAVENOUS at 11:12

## 2023-12-21 ENCOUNTER — CLINICAL SUPPORT (OUTPATIENT)
Dept: REHABILITATION | Facility: HOSPITAL | Age: 83
End: 2023-12-21
Attending: FAMILY MEDICINE
Payer: MEDICARE

## 2023-12-21 DIAGNOSIS — G89.29 CHRONIC RIGHT-SIDED LOW BACK PAIN WITH RIGHT-SIDED SCIATICA: Primary | ICD-10-CM

## 2023-12-21 DIAGNOSIS — M25.551 RIGHT HIP PAIN: ICD-10-CM

## 2023-12-21 DIAGNOSIS — M54.31 RIGHT SIDED SCIATICA: ICD-10-CM

## 2023-12-21 DIAGNOSIS — M54.41 CHRONIC RIGHT-SIDED LOW BACK PAIN WITH RIGHT-SIDED SCIATICA: Primary | ICD-10-CM

## 2023-12-21 PROCEDURE — 97110 THERAPEUTIC EXERCISES: CPT | Mod: HCNC,PN

## 2023-12-21 PROCEDURE — 97161 PT EVAL LOW COMPLEX 20 MIN: CPT | Mod: HCNC,PN

## 2023-12-21 NOTE — PLAN OF CARE
"[facility]   Physical Therapy Initial Evaluation      Name: Alaina Vee  Clinic Number: [id number]    Therapy Diagnosis:   Encounter Diagnoses   Name Primary?    Right sided sciatica     Right hip pain         Physician: Juanito Butt Jr., MD    Physician Orders: PT Eval and Treat  Medical Diagnosis from Referral:   M54.31 (ICD-10-CM) - Right sided sciatica   M25.551 (ICD-10-CM) - Right hip pain     Evaluation Date: 12/21/2023  Authorization Period Expiration: 9/28/24  Plan of Care Expiration: 3/31/24  Progress Note Due: 1/21/24  Date of Surgery: na  Visit # / Visits authorized: 1/ 1   FOTO: 1/ 3    Precautions: Standard     Time In: 12:00 pm  Time Out: 12:50 pm  Total Billable Time: 50 minutes    Subjective     Date of onset: 2 months.    History of current condition - Alaina reports: she has sciatica that  "wont go away". Notes that the pain goes from he low back down the back of her thigh all the way down to her R foot. Occasionally she gets the same pain on the L. Had a CSI which helped for a few weeks but the pain has returned. She has had a hx of this pain, but usually it goes away     Falls: October fell but did not injure her back she thinks    Imaging: MRI studies: see chart    Prior Therapy: yes  Social History:  lives alone  Occupation: retired  Prior Level of Function: WNL  Current Level of Function: limited by pain    Pain:  Current 5/10, worst 10/10, best 0/10   Location: Low back, R LE  Description: Aching and Dull  Aggravating Factors: Bending, Walking, and Lifting  Easing Factors: rest    Patients goals: return to playing darts and shuffle board, ADLs     Medical History:   Past Medical History:   Diagnosis Date    Arthritis     knees    Atrial fibrillation     Atrial flutter     Back pain     Cataract     Degenerative disc disease     Depression     General anesthetics causing adverse effect in therapeutic use     pt states sometimes slow to awaken.    GERD " (gastroesophageal reflux disease)     Hyperlipidemia     Hypertension     Kidney stone     Obesity     Sleep apnea     does not wear CPAP    Urinary incontinence     Varicosities     Ventral hernia        Surgical History:   Alaina Vee  has a past surgical history that includes Appendectomy; Cholecystectomy; breast biopsy, left; pr exploratory of abdomen; Joint replacement; Joint replacement (2009); Breast biopsy (Bilateral); Cystoureteroscopy with retrograde pyelography and insertion of stent into ureter (Right, 1/10/2020); and Ureteroscopic removal of ureteric calculus (Right, 2020).    Medications:   Alaina has a current medication list which includes the following prescription(s): apixaban, ciclopirox, desloratadine, diclofenac sodium, gabapentin, losartan, meloxicam, multivitamin, oxybutynin, and tizanidine.    Allergies:   Review of patient's allergies indicates:   Allergen Reactions    Rosuvastatin Other (See Comments)     Aches    Celebrex [celecoxib]      Chest tightness    Lipitor [atorvastatin] Other (See Comments)        Objective      Observation: antalgic poor gait with SPC    Posture:  forward flexed     Functional:   30 sec chair rise: 5 reps   TUs     Lumbar Range of Motion:    Limitations Pain   Flexion 50%   n        Extension 50%   n            Lower Extremity Strength  Right LE  Left LE    Quadriceps: 3+/5 Quadriceps: 3+/5   Hamstrings: 3+/5 Hamstrings: 3+/5   Iliospoas: 3+/5 Iliospoas: 3+/5   Hip extension:  3+/5 Hip extension: 3+/5   PGM: 3-/5 PGM: 3-/5     Sensation:normal      Special Tests:  -Bridge Test: poor    Joint Mobility: hypomobile hip IR with pain. + scour, + ANGELINA on R    Unable to test lumbar spine since she cannot lie in prone     Palpation: TTP R greater troch       Intake Outcome Measure for FOTO low back Survey    Therapist reviewed FOTO scores for Alaina Vee on 2023.   FOTO report - see Media section or FOTO account episode  details.    Intake Score: TBA%         Treatment     Total Treatment time (time-based codes) separate from Evaluation: 23 minutes     Alaina received the treatments listed below:      therapeutic exercises to develop strength and ROM for 23 minutes including:  Patient education on importance of exercises and motion, advice to seek nutrition counciling    Bridges x20   Supine clams YTB x30   Sit to stands 2x10      Patient Education and Home Exercises     Education provided:   - see above    Written Home Exercises Provided: yes. Exercises were reviewed and Alaina was able to demonstrate them prior to the end of the session.  Alaina demonstrated good  understanding of the education provided. See EMR under Patient Instructions for exercises provided during therapy sessions.    Assessment     Alaina is a 83 y.o. female referred to outpatient Physical Therapy with a medical diagnosis of   M54.31 (ICD-10-CM) - Right sided sciatica   M25.551 (ICD-10-CM) - Right hip pain   Patient presents with reduced lumbar ROM and poor core and hip strength which are likely reducing this patient's functional capacity. In addition, there may also be psychosocial factors related to this patients pain due to the chronic nature of the symptoms. There also is s/s of hip OA which are likely a main contributor to pain     Patient prognosis is Good.   Patient will benefit from skilled outpatient Physical Therapy to address the deficits stated above and in the chart below, provide patient /family education, and to maximize patientt's level of independence.     Plan of care discussed with patient: Yes  Patient's spiritual, cultural and educational needs considered and patient is agreeable to the plan of care and goals as stated below:     Anticipated Barriers for therapy: none    Medical Necessity is demonstrated by the following  History  Co-morbidities and personal factors that may impact the plan of care [x] LOW: no personal factors /  co-morbidities  [] MODERATE: 1-2 personal factors / co-morbidities  [] HIGH: 3+ personal factors / co-morbidities    Moderate / High Support Documentation:   Co-morbidities affecting plan of care: see med hx    Personal Factors:   no deficits     Examination  Body Structures and Functions, activity limitations and participation restrictions that may impact the plan of care [x] LOW: addressing 1-2 elements  [] MODERATE: 3+ elements  [] HIGH: 4+ elements (please support below)    Moderate / High Support Documentation: none     Clinical Presentation [x] LOW: stable  [] MODERATE: Evolving  [] HIGH: Unstable     Decision Making/ Complexity Score: low       GOALS: Short Term Goals:  4 weeks  1.Report decreased lumbar pain  < / =  3/10  to increase tolerance for ADLs  2. Increase ROM by 25% where limited in order to perform ADLs without difficulty.  3. Increase strength by 1/3 MMT grade in areas of limitation to increase tolerance for ADL and work activities.  4. Pt to tolerate HEP to improve ROM and independence with ADL's    Long Term Goals: 8 weeks  1.Report decreased lumbar pain < / = 2/10  to increase tolerance for ADLs  2.Patient goal: able to play darts and shuffle board.  3.Increase strength to 4+/5 in  areas of limitation  to increase tolerance for ADL and work activities.  4. Pt will report at <35% on FOTO  to demonstrate increase in LE function with every day tasks.    Plan     Plan of care Certification: 12/21/2023 to 3/31/24.    Outpatient Physical Therapy 1-2 times weekly for 8 weeks to include the following interventions: Gait Training, Manual Therapy, Neuromuscular Re-ed, Therapeutic Activities, and Therapeutic Exercise.     Chun West PT        Physician's Signature: _________________________________________ Date: ________________

## 2024-01-07 NOTE — PROGRESS NOTES
[facility]   Physical Therapy Treatment Note      Name: Alaina Vee  Clinic Number: [id number]    Therapy Diagnosis: No diagnosis found.  Physician: Juanito Butt Jr., MD    Visit Date: 1/8/2024        Therapy Diagnosis:        Encounter Diagnoses   Name Primary?    Right sided sciatica      Right hip pain          Physician: Juanito Butt Jr., MD     Physician Orders: PT Eval and Treat  Medical Diagnosis from Referral:   M54.31 (ICD-10-CM) - Right sided sciatica   M25.551 (ICD-10-CM) - Right hip pain      Evaluation Date: 12/21/2023  Authorization Period Expiration: 9/28/24  Plan of Care Expiration: 3/31/24  Progress Note Due: 1/21/24  Date of Surgery: na  Visit # / Visits authorized: 1/ 1   FOTO: 1/ 3     Precautions: Standard      Time In: 9:00 am  Time Out: 10:00 am  Total Billable Time: 54 minutes    PTA Visit #: 1/5       Subjective     Patient reports: She was pretty sore after that last session but she's glad she's here.   She was compliant with home exercise program.  Response to previous treatment: initial eval  Functional change: NA    Pain: 4/10  Location: right hip      Objective      Objective Measures updated at progress report unless specified.     Treatment     Alaina received the treatments listed below:      neuromuscular re-education activities to improve: Balance, Coordination, and Kinesthetic for 12 minutes. The following activities were included:  Quad Sets 3x10  LAQ's 2x10 +2#      therapeutic exercises to develop strength and ROM for 42 minutes including:  Patient education on importance of exercises and motion, advice to seek nutrition counciling  +Nustep 10'  Bridges x20   Supine clams RTB x30x  +Supine Hip Add 30x  Sit to stands 2x10       Patient Education and Home Exercises      Education provided:   - see above     Written Home Exercises Provided: yes. Exercises were reviewed and Alaina was able to demonstrate them prior to the end of the session.   Alaina demonstrated good  understanding of the education provided. See EMR under Patient Instructions for exercises provided during therapy sessions.     Assessment    Alaina presented to PT for her first visit following her initial evaluation, ambulating with SPC cane, reporting continued right hip discomfort. Focused today's session on improved glute, quad and core strength.  She required verbal/tactile cueing to perform most exercises with proper form and muscle activation.  Alaina reported increased discomfort in the right hip during LAQ's  Advised Alaina if discomfort increased over 5/10 pain, to discontinue. She verbalized good understanding and was able to complete without further increased pain or discomfort. Concluding today's session with Alaina reporting decreased right hip pain and improved functional mobility following today's session.        Patient prognosis is Good.   Patient will benefit from skilled outpatient Physical Therapy to address the deficits stated above and in the chart below, provide patient /family education, and to maximize patientt's level of independence.      Plan of care discussed with patient: Yes  Patient's spiritual, cultural and educational needs considered and patient is agreeable to the plan of care and goals as stated below:      Anticipated Barriers for therapy: none    Goals:     GOALS: Short Term Goals:  4 weeks  1.Report decreased lumbar pain  < / =  3/10  to increase tolerance for ADLs  2. Increase ROM by 25% where limited in order to perform ADLs without difficulty.  3. Increase strength by 1/3 MMT grade in areas of limitation to increase tolerance for ADL and work activities.  4. Pt to tolerate HEP to improve ROM and independence with ADL's     Long Term Goals: 8 weeks  1.Report decreased lumbar pain < / = 2/10  to increase tolerance for ADLs  2.Patient goal: able to play darts and shuffle board.  3.Increase strength to 4+/5 in  areas of limitation  to increase tolerance  for ADL and work activities.  4. Pt will report at <35% on FOTO  to demonstrate increase in LE function with every day tasks.      Plan     Will assess Alaina's response to today's session and improve RLE and core strength as tolerated.     Mira Danielson, PTA

## 2024-01-08 ENCOUNTER — CLINICAL SUPPORT (OUTPATIENT)
Dept: REHABILITATION | Facility: HOSPITAL | Age: 84
End: 2024-01-08
Payer: MEDICARE

## 2024-01-08 DIAGNOSIS — M25.551 RIGHT HIP PAIN: Primary | ICD-10-CM

## 2024-01-08 PROCEDURE — 97110 THERAPEUTIC EXERCISES: CPT | Mod: PN,CQ

## 2024-01-08 PROCEDURE — 97112 NEUROMUSCULAR REEDUCATION: CPT | Mod: PN,CQ

## 2024-01-09 ENCOUNTER — OFFICE VISIT (OUTPATIENT)
Dept: UROLOGY | Facility: CLINIC | Age: 84
End: 2024-01-09
Payer: MEDICARE

## 2024-01-09 VITALS — WEIGHT: 258.19 LBS | BODY MASS INDEX: 41.67 KG/M2

## 2024-01-09 DIAGNOSIS — N39.0 RECURRENT UTI: ICD-10-CM

## 2024-01-09 DIAGNOSIS — N39.46 MIXED INCONTINENCE URGE AND STRESS: Primary | ICD-10-CM

## 2024-01-09 DIAGNOSIS — N20.0 NEPHROLITHIASIS: ICD-10-CM

## 2024-01-09 PROCEDURE — 3288F FALL RISK ASSESSMENT DOCD: CPT | Mod: CPTII,S$GLB,, | Performed by: UROLOGY

## 2024-01-09 PROCEDURE — 1101F PT FALLS ASSESS-DOCD LE1/YR: CPT | Mod: CPTII,S$GLB,, | Performed by: UROLOGY

## 2024-01-09 PROCEDURE — 99999 PR PBB SHADOW E&M-EST. PATIENT-LVL III: CPT | Mod: PBBFAC,,, | Performed by: UROLOGY

## 2024-01-09 PROCEDURE — 1159F MED LIST DOCD IN RCRD: CPT | Mod: CPTII,S$GLB,, | Performed by: UROLOGY

## 2024-01-09 PROCEDURE — 1125F AMNT PAIN NOTED PAIN PRSNT: CPT | Mod: CPTII,S$GLB,, | Performed by: UROLOGY

## 2024-01-09 PROCEDURE — 99214 OFFICE O/P EST MOD 30 MIN: CPT | Mod: S$GLB,,, | Performed by: UROLOGY

## 2024-01-09 PROCEDURE — 1160F RVW MEDS BY RX/DR IN RCRD: CPT | Mod: CPTII,S$GLB,, | Performed by: UROLOGY

## 2024-01-09 RX ORDER — SOLIFENACIN SUCCINATE 10 MG/1
10 TABLET, FILM COATED ORAL DAILY
Qty: 90 TABLET | Refills: 3 | Status: SHIPPED | OUTPATIENT
Start: 2024-01-09

## 2024-01-09 NOTE — PROGRESS NOTES
Subjective:       Alaina Vee is a 83 y.o. female who is an established patient who was referred by Dr Esquivel  for evaluation of UTI.      She reports issues with recurrent UTIs. She was treated for UTI in 3/16 (100k E coli, pansensitive) and again in 4/16 (100k E coli). She has urinary frequency associated with UTIs. Nocturia x 1-2. She has urgency and UUI at baseline. Also with fecal urgency/incontinence. She was given Ditropan 5mg BID years ago. Also with RICHARD. She has not noted if this has helped. Denies current dysuria. Denies hematuria. No h/o kidney stones.     She has significant LBP issues. She also reports facial and R shoulder/arm/torso pain.     She was treated for UTI after initial visit. She remains on Ditropan IR not up to TID, declined ER formulations. She reports taking another medication for UI from me but I don't have records of this.     SERG (5/16) showed ruslanley 9mm stone in LLP. PVR 2cc. Cytology was negative but noted uric acid crystals.     CT 7/16 - 7mm L UP, 6mm L LP stones and 8mm R renal pelvis stone.   KUB 7/16 - shows 7mm R stone but difficult to see (unsure if truly the stone)    KUB 1/17 - 7mm R renal stone though hard to see (likely overlying 12th rib)    She started Ditropan XL 15mg previously and had great improvement. She continues to have significant back issues.    She started to develop R flank pain and therefore CT scan ordered. CT showed 8mm stone at R UPJ, visible on KUB. She is now s/p R ESWL 2017. Stone was noted to fragment well. She did not note passing any stone and is still having flank pain.     Was on Coumadin for a fib. Now on Eliquis.    Denies stone issues recently.  Remains on Ditropan BID.  now in nursing home with dementia, caring for him has been very difficult on her for last 2-3 years.    1/9/2024  Returns now with c/o worsening UUI. Also with RICHARD (chronic, not worsening). Remain on Ditropan 5mg BID. Also c/o sciatic nerve pain.       KUB 1/18  - two left renal calculi  SERG 1/18 - 3mm and 5mm L calculi - hydro resolved    100% CaOx mono - stone passed spontaneously. Pain present for 1 hour at that time (R side).     SERG 5/20 - 1cm LUP and 5mm LLP stone, no hydro  KUB 11/20 - stable 1cm L renal calculus, LLP stones not seen     CT 4/21 (done for hernia eval) - two L renal stones (1cm LLP, 4mm LUP)  KUB 4/22 - 8mm L renal stone  SERG 4/22 - mild prominence of R renal pelvis, 7mm L renal stone        The following portions of the patient's history were reviewed and updated as appropriate: allergies, current medications, past family history, past medical history, past social history, past surgical history and problem list.    Review of Systems  Constitutional: no fever or chills  ENT: no nasal congestion or sore throat  Respiratory: no cough or shortness of breath  Cardiovascular: no chest pain or palpitations  Gastrointestinal: no nausea or vomiting, tolerating diet  Genitourinary: as per HPI  Hematologic/Lymphatic: no easy bruising or lymphadenopathy  Musculoskeletal: no arthralgias or myalgias  Skin: no rashes or lesions  Neurological: no seizures or tremors  Behavioral/Psych: no auditory or visual hallucinations       Objective:    Vitals:   Wt 117.1 kg (258 lb 2.5 oz)   LMP  (LMP Unknown)   BMI 41.67 kg/m²     Physical Exam   General: well developed, well nourished in no acute distress  Head: normocephalic, atraumatic  Neck: supple, trachea midline, no obvious enlargement of thyroid  HEENT: EOMI, mucus membranes moist, sclera anicteric, no hearing impairment  Lungs: symmetric expansion, non-labored breathing  Skin: no rashes or lesions  Neuro: alert and oriented x 3, no gross deficits  Psych: normal judgment and insight, normal mood/affect and non-anxious  Genitourinary:  deferred      Lab Review   Urine analysis today in clinic shows - negative    Lab Results   Component Value Date    WBC 9.34 11/20/2023    HGB 15.3 11/20/2023    HCT 46.8 11/20/2023     HCT 44 11/20/2023    MCV 94 11/20/2023     11/20/2023     Lab Results   Component Value Date    CREATININE 0.9 06/12/2023    BUN 19 06/12/2023         Imaging  Images and reports were personally reviewed by me and discussed with patient  CT, SERG, KUB reviewed       Assessment/Plan:      1. Recurrent UTI / Nephrolithiasis   - UI likely contributing to UTIs   - Avoid constipation   - PVR acceptable   - Possible stone in LLP, uric acid crystals on cytology   - Discussed Estrace, will hold for now   - Consider cystoscopy in future   - CT - 2 stones on L, 1 stone on R. Only R stone visible on KUB.    - 8mm R UPJ stone. Discussed options - s/p R ESWL. Discussed risks, benefits, alternatives.   - s/p R ESWL on 2/27/17 - stone with excellent response   - KUB post-op - residual stone    - CT - no obstructing stones, R LP noted   - SERG/KUB - R hydro resolved. Two stones in L kidney   - KUB 1cm LLP stone (stable)   - KUB/SERG 4/22 - stable L renal stone   - Recheck imaging about 2-3 months - not done     2. Mixed incontinence urge and stress    - Discussed difference of UUI and RICHARD components. Reviewed etiology and workup of each.   - RICHARD: Kegels, PFPT, bulking agent, MUS.   - UUI: Behavioral changes, PFPT, anticholinergics. Botox/InterStim for refractory UUI.   - Was doing well on Ditropan BID. Will change to Vesicare 10mg. Stop Ditropan. Consider b-agonist       Follow up in 2 months with PVR

## 2024-01-17 ENCOUNTER — LAB VISIT (OUTPATIENT)
Dept: LAB | Facility: HOSPITAL | Age: 84
End: 2024-01-17
Attending: FAMILY MEDICINE
Payer: MEDICARE

## 2024-01-17 DIAGNOSIS — E78.2 MIXED HYPERLIPIDEMIA: Chronic | ICD-10-CM

## 2024-01-17 DIAGNOSIS — I10 ESSENTIAL HYPERTENSION: Chronic | ICD-10-CM

## 2024-01-17 DIAGNOSIS — R73.03 PREDIABETES: ICD-10-CM

## 2024-01-17 LAB
ALBUMIN SERPL BCP-MCNC: 3.7 G/DL (ref 3.5–5.2)
ALP SERPL-CCNC: 88 U/L (ref 55–135)
ALT SERPL W/O P-5'-P-CCNC: 13 U/L (ref 10–44)
ANION GAP SERPL CALC-SCNC: 7 MMOL/L (ref 8–16)
AST SERPL-CCNC: 15 U/L (ref 10–40)
BASOPHILS # BLD AUTO: 0.05 K/UL (ref 0–0.2)
BASOPHILS NFR BLD: 0.7 % (ref 0–1.9)
BILIRUB SERPL-MCNC: 1.1 MG/DL (ref 0.1–1)
BUN SERPL-MCNC: 16 MG/DL (ref 8–23)
CALCIUM SERPL-MCNC: 9.8 MG/DL (ref 8.7–10.5)
CHLORIDE SERPL-SCNC: 104 MMOL/L (ref 95–110)
CHOLEST SERPL-MCNC: 202 MG/DL (ref 120–199)
CHOLEST/HDLC SERPL: 5.1 {RATIO} (ref 2–5)
CO2 SERPL-SCNC: 32 MMOL/L (ref 23–29)
CREAT SERPL-MCNC: 0.8 MG/DL (ref 0.5–1.4)
DIFFERENTIAL METHOD BLD: NORMAL
EOSINOPHIL # BLD AUTO: 0.2 K/UL (ref 0–0.5)
EOSINOPHIL NFR BLD: 2.5 % (ref 0–8)
ERYTHROCYTE [DISTWIDTH] IN BLOOD BY AUTOMATED COUNT: 12.8 % (ref 11.5–14.5)
EST. GFR  (NO RACE VARIABLE): >60 ML/MIN/1.73 M^2
ESTIMATED AVG GLUCOSE: 131 MG/DL (ref 68–131)
GLUCOSE SERPL-MCNC: 111 MG/DL (ref 70–110)
HBA1C MFR BLD: 6.2 % (ref 4–5.6)
HCT VFR BLD AUTO: 46.5 % (ref 37–48.5)
HDLC SERPL-MCNC: 40 MG/DL (ref 40–75)
HDLC SERPL: 19.8 % (ref 20–50)
HGB BLD-MCNC: 15.2 G/DL (ref 12–16)
IMM GRANULOCYTES # BLD AUTO: 0.02 K/UL (ref 0–0.04)
IMM GRANULOCYTES NFR BLD AUTO: 0.3 % (ref 0–0.5)
LDLC SERPL CALC-MCNC: 132.4 MG/DL (ref 63–159)
LYMPHOCYTES # BLD AUTO: 1.4 K/UL (ref 1–4.8)
LYMPHOCYTES NFR BLD: 18.9 % (ref 18–48)
MCH RBC QN AUTO: 30.8 PG (ref 27–31)
MCHC RBC AUTO-ENTMCNC: 32.7 G/DL (ref 32–36)
MCV RBC AUTO: 94 FL (ref 82–98)
MONOCYTES # BLD AUTO: 0.5 K/UL (ref 0.3–1)
MONOCYTES NFR BLD: 7 % (ref 4–15)
NEUTROPHILS # BLD AUTO: 5.1 K/UL (ref 1.8–7.7)
NEUTROPHILS NFR BLD: 70.6 % (ref 38–73)
NONHDLC SERPL-MCNC: 162 MG/DL
NRBC BLD-RTO: 0 /100 WBC
PLATELET # BLD AUTO: 250 K/UL (ref 150–450)
PMV BLD AUTO: 10.5 FL (ref 9.2–12.9)
POTASSIUM SERPL-SCNC: 4.3 MMOL/L (ref 3.5–5.1)
PROT SERPL-MCNC: 7 G/DL (ref 6–8.4)
RBC # BLD AUTO: 4.93 M/UL (ref 4–5.4)
SODIUM SERPL-SCNC: 143 MMOL/L (ref 136–145)
TRIGL SERPL-MCNC: 148 MG/DL (ref 30–150)
WBC # BLD AUTO: 7.26 K/UL (ref 3.9–12.7)

## 2024-01-17 PROCEDURE — 36415 COLL VENOUS BLD VENIPUNCTURE: CPT | Mod: PN | Performed by: FAMILY MEDICINE

## 2024-01-17 PROCEDURE — 83036 HEMOGLOBIN GLYCOSYLATED A1C: CPT | Performed by: FAMILY MEDICINE

## 2024-01-17 PROCEDURE — 85025 COMPLETE CBC W/AUTO DIFF WBC: CPT | Performed by: FAMILY MEDICINE

## 2024-01-17 PROCEDURE — 80061 LIPID PANEL: CPT | Performed by: FAMILY MEDICINE

## 2024-01-17 PROCEDURE — 80053 COMPREHEN METABOLIC PANEL: CPT | Performed by: FAMILY MEDICINE

## 2024-01-18 ENCOUNTER — CLINICAL SUPPORT (OUTPATIENT)
Dept: REHABILITATION | Facility: HOSPITAL | Age: 84
End: 2024-01-18
Payer: MEDICARE

## 2024-01-18 DIAGNOSIS — M54.41 CHRONIC RIGHT-SIDED LOW BACK PAIN WITH RIGHT-SIDED SCIATICA: Primary | ICD-10-CM

## 2024-01-18 DIAGNOSIS — G89.29 CHRONIC RIGHT-SIDED LOW BACK PAIN WITH RIGHT-SIDED SCIATICA: Primary | ICD-10-CM

## 2024-01-18 PROCEDURE — 97110 THERAPEUTIC EXERCISES: CPT | Mod: PN

## 2024-01-18 PROCEDURE — 97112 NEUROMUSCULAR REEDUCATION: CPT | Mod: PN

## 2024-01-18 NOTE — PROGRESS NOTES
[facility]   Physical Therapy Treatment Note      Name: Alaina Vee  Clinic Number: [id number]    Therapy Diagnosis:   Encounter Diagnosis   Name Primary?    Chronic right-sided low back pain with right-sided sciatica Yes     Physician: Juanito Butt Jr., MD    Visit Date: 1/18/2024        Therapy Diagnosis:        Encounter Diagnoses   Name Primary?    Right sided sciatica      Right hip pain          Physician: Juanito Butt Jr., MD     Physician Orders: PT Eval and Treat  Medical Diagnosis from Referral:   M54.31 (ICD-10-CM) - Right sided sciatica   M25.551 (ICD-10-CM) - Right hip pain      Evaluation Date: 12/21/2023  Authorization Period Expiration: 9/28/24  Plan of Care Expiration: 3/31/24  Progress Note Due: 1/21/24  Date of Surgery: na  Visit # / Visits authorized: 2/20   FOTO: 1/ 3     Precautions: Standard      Time In: 11:00 am  Time Out: 11:45 am  Total Billable Time: 45 minutes    PTA Visit #: 1/5       Subjective     Patient reports: doing better since starting therapy. Has to leave early today  She was compliant with home exercise program.  Response to previous treatment: initial eval  Functional change: NA    Pain: 4/10  Location: right hip      Objective      Objective Measures updated at progress report unless specified.     Treatment     Alaina received the treatments listed below:      neuromuscular re-education activities to improve: Balance, Coordination, and Kinesthetic for 15 minutes. The following activities were included:  Every minute on the minute for 9 mins:  Minute 1 - STS  Minute 2 - marches   Minute 3 - LAQ    therapeutic exercises to develop strength and ROM for 30 minutes including:  Patient education on importance of exercises and motion, advice to seek nutrition counciling  +Nustep 10'  Bridges x20   S/L clams RTB x30x     Patient Education and Home Exercises      Education provided:   - see above     Written Home Exercises Provided: yes.  Exercises were reviewed and Alaina was able to demonstrate them prior to the end of the session.  Alaina demonstrated good  understanding of the education provided. See EMR under Patient Instructions for exercises provided during therapy sessions.     Assessment    Alaina subjectively reporting improvements. Seems to respond well to exercises. Did a circuit today to target strength and cardiovascular endurance which was renard well. Issued this for HEP.        Patient prognosis is Good.   Patient will benefit from skilled outpatient Physical Therapy to address the deficits stated above and in the chart below, provide patient /family education, and to maximize patientt's level of independence.      Plan of care discussed with patient: Yes  Patient's spiritual, cultural and educational needs considered and patient is agreeable to the plan of care and goals as stated below:      Anticipated Barriers for therapy: none    Goals:     GOALS: Short Term Goals:  4 weeks  1.Report decreased lumbar pain  < / =  3/10  to increase tolerance for ADLs  2. Increase ROM by 25% where limited in order to perform ADLs without difficulty.  3. Increase strength by 1/3 MMT grade in areas of limitation to increase tolerance for ADL and work activities.  4. Pt to tolerate HEP to improve ROM and independence with ADL's     Long Term Goals: 8 weeks  1.Report decreased lumbar pain < / = 2/10  to increase tolerance for ADLs  2.Patient goal: able to play darts and shuffle board.  3.Increase strength to 4+/5 in  areas of limitation  to increase tolerance for ADL and work activities.  4. Pt will report at <35% on FOTO  to demonstrate increase in LE function with every day tasks.      Plan     Will assess Alaina's response to today's session and improve RLE and core strength as tolerated.     Chun West, PT

## 2024-01-25 ENCOUNTER — CLINICAL SUPPORT (OUTPATIENT)
Dept: REHABILITATION | Facility: HOSPITAL | Age: 84
End: 2024-01-25
Payer: MEDICARE

## 2024-01-25 DIAGNOSIS — G89.29 CHRONIC RIGHT-SIDED LOW BACK PAIN WITH RIGHT-SIDED SCIATICA: Primary | ICD-10-CM

## 2024-01-25 DIAGNOSIS — M54.41 CHRONIC RIGHT-SIDED LOW BACK PAIN WITH RIGHT-SIDED SCIATICA: Primary | ICD-10-CM

## 2024-01-25 PROCEDURE — 97112 NEUROMUSCULAR REEDUCATION: CPT | Mod: PN

## 2024-01-25 NOTE — PROGRESS NOTES
[facility]   Physical Therapy Treatment Note      Name: Alaina Vee  Clinic Number: [id number]    Therapy Diagnosis:   Encounter Diagnosis   Name Primary?    Chronic right-sided low back pain with right-sided sciatica Yes       Physician: Juanito Butt Jr., MD    Visit Date: 1/25/2024        Therapy Diagnosis:        Encounter Diagnoses   Name Primary?    Right sided sciatica      Right hip pain          Physician: Juanito Butt Jr., MD     Physician Orders: PT Eval and Treat  Medical Diagnosis from Referral:   M54.31 (ICD-10-CM) - Right sided sciatica   M25.551 (ICD-10-CM) - Right hip pain      Evaluation Date: 12/21/2023  Authorization Period Expiration: 9/28/24  Plan of Care Expiration: 3/31/24  Progress Note Due: 1/21/24  Date of Surgery: na  Visit # / Visits authorized: 3/20   FOTO: 1/ 3     Precautions: Standard      Time In: 11:00 am  Time Out: 11:53 am  Total Billable Time: 30 minutes    PTA Visit #: 1/5       Subjective     Patient reports: doing better since starting therapy. Has to leave early today  She was compliant with home exercise program.  Response to previous treatment: initial eval  Functional change: NA    Pain: 4/10  Location: right hip      Objective      Objective Measures updated at progress report unless specified.     Treatment     Alania received the treatments listed below:      neuromuscular re-education activities to improve: Balance, Coordination, and Kinesthetic for 23 minutes. The following activities were included:  Every minute on the minute for 9 mins:  Minute 1 - STS  Minute 2 - marches standing   Minute 3 - LAQs     therapeutic exercises to develop strength and ROM for 30 minutes including:  Patient education on importance of exercises and motion, advice to seek nutrition counciling  +Nustep 10'  Bridges x20   S/L clams RTB x30x  Deadlifts 10#        Patient Education and Home Exercises      Education provided:   - see above     Written  Home Exercises Provided: yes. Exercises were reviewed and Alaina was able to demonstrate them prior to the end of the session.  Alaina demonstrated good  understanding of the education provided. See EMR under Patient Instructions for exercises provided during therapy sessions.     Assessment   Very deconditioned. She requires frequent rest breaks. Education on importance of daily exercises.        Patient prognosis is Good.   Patient will benefit from skilled outpatient Physical Therapy to address the deficits stated above and in the chart below, provide patient /family education, and to maximize patientt's level of independence.      Plan of care discussed with patient: Yes  Patient's spiritual, cultural and educational needs considered and patient is agreeable to the plan of care and goals as stated below:      Anticipated Barriers for therapy: none    Goals:     GOALS: Short Term Goals:  4 weeks  1.Report decreased lumbar pain  < / =  3/10  to increase tolerance for ADLs  2. Increase ROM by 25% where limited in order to perform ADLs without difficulty.  3. Increase strength by 1/3 MMT grade in areas of limitation to increase tolerance for ADL and work activities.  4. Pt to tolerate HEP to improve ROM and independence with ADL's     Long Term Goals: 8 weeks  1.Report decreased lumbar pain < / = 2/10  to increase tolerance for ADLs  2.Patient goal: able to play darts and shuffle board.  3.Increase strength to 4+/5 in  areas of limitation  to increase tolerance for ADL and work activities.  4. Pt will report at <35% on FOTO  to demonstrate increase in LE function with every day tasks.      Plan     Will assess Alaina's response to today's session and improve RLE and core strength as tolerated.     Chun West, PT

## 2024-02-01 ENCOUNTER — CLINICAL SUPPORT (OUTPATIENT)
Dept: REHABILITATION | Facility: HOSPITAL | Age: 84
End: 2024-02-01
Payer: MEDICARE

## 2024-02-01 DIAGNOSIS — M54.50 CHRONIC BILATERAL LOW BACK PAIN WITHOUT SCIATICA: Primary | ICD-10-CM

## 2024-02-01 DIAGNOSIS — G89.29 CHRONIC BILATERAL LOW BACK PAIN WITHOUT SCIATICA: Primary | ICD-10-CM

## 2024-02-01 PROCEDURE — 97112 NEUROMUSCULAR REEDUCATION: CPT | Mod: PN

## 2024-02-01 NOTE — PROGRESS NOTES
[facility]   Physical Therapy Treatment Note / reassessment      Name: Alaina Vee  Clinic Number: [id number]    Therapy Diagnosis:   Encounter Diagnosis   Name Primary?    Chronic bilateral low back pain without sciatica Yes         Physician: Juanito Butt Jr., MD    Visit Date: 2024        Therapy Diagnosis:        Encounter Diagnoses   Name Primary?    Right sided sciatica      Right hip pain          Physician: Juanito Butt Jr., MD     Physician Orders: PT Eval and Treat  Medical Diagnosis from Referral:   M54.31 (ICD-10-CM) - Right sided sciatica   M25.551 (ICD-10-CM) - Right hip pain      Evaluation Date: 2023  Authorization Period Expiration: 24  Plan of Care Expiration: 3/31/24  Progress Note Due: performed on 24  Date of Surgery: na  Visit # / Visits authorized: 3/20   FOTO: 1/ 3     Precautions: Standard      Time In: 11:00 am  Time Out: 12:00 pm  Total Billable Time: 30 minutes    PTA Visit #:        Subjective     Patient reports: feeling better with less pain  She was compliant with home exercise program.  Response to previous treatment: initial eval  Functional change: NA    Pain: 1/10  Location: right hip      Objective      Observation: antalgic poor gait with SPC     Posture:  forward flexed      Functional:   30 sec chair rise: 5 reps   TUs      Lumbar Range of Motion:     Limitations Pain   Flexion 50%    n         Extension 50%    n               Lower Extremity Strength  Right LE   Left LE     Quadriceps: 4-/5 Quadriceps: 4-/5   Hamstrings: 4-/5 Hamstrings: 4-/5   Iliospoas: 4-/5 Iliospoas: 4-/5   Hip extension:  4-/5 Hip extension: 4-/5   PGM: 3-/5 PGM: 3+/5      Sensation:normal        Special Tests:  -Bridge Test: poor     Joint Mobility: hypomobile hip IR with pain. + scour, + ANGELINA on R     Unable to test lumbar spine since she cannot lie in prone      Palpation: TTP R greater troch       Treatment     Alaina received the  treatments listed below:      neuromuscular re-education activities to improve: Balance, Coordination, and Kinesthetic for 30 minutes. The following activities were included:  Every minute on the minute for 9 mins:  Minute 1 - STS  Minute 2 - marches standing   Minute 3 - deadlifts   Minute 4 - rest     therapeutic exercises to develop strength and ROM for 30 minutes including:  Patient education on importance of exercises and motion, advice to seek nutrition counciling  +Nustep 10'  Bridges x20   S/L clams RTB x30x       Patient Education and Home Exercises      Education provided:   - see above     Written Home Exercises Provided: yes. Exercises were reviewed and Alaina was able to demonstrate them prior to the end of the session.  Alaina demonstrated good  understanding of the education provided. See EMR under Patient Instructions for exercises provided during therapy sessions.     Assessment   Cardiovascular conditioning seems to be helping as well as posterior chain strength. Progressing well. Strength improved per reassessment        Patient prognosis is Good.   Patient will benefit from skilled outpatient Physical Therapy to address the deficits stated above and in the chart below, provide patient /family education, and to maximize patientt's level of independence.      Plan of care discussed with patient: Yes  Patient's spiritual, cultural and educational needs considered and patient is agreeable to the plan of care and goals as stated below:      Anticipated Barriers for therapy: none    Goals:     GOALS: Short Term Goals:  4 weeks progressing all others  1.Report decreased lumbar pain  < / =  3/10  to increase tolerance for ADLs MET  2. Increase ROM by 25% where limited in order to perform ADLs without difficulty.   3. Increase strength by 1/3 MMT grade in areas of limitation to increase tolerance for ADL and work activities.  4. Pt to tolerate HEP to improve ROM and independence with ADL's     Long Term  Goals: 8 weeks  1.Report decreased lumbar pain < / = 2/10  to increase tolerance for ADLs  2.Patient goal: able to play darts and shuffle board.  3.Increase strength to 4+/5 in  areas of limitation  to increase tolerance for ADL and work activities.  4. Pt will report at <35% on FOTO  to demonstrate increase in LE function with every day tasks.      Plan     Strength and cardiovascular endurance     Chun West, PT

## 2024-02-06 ENCOUNTER — CLINICAL SUPPORT (OUTPATIENT)
Dept: REHABILITATION | Facility: HOSPITAL | Age: 84
End: 2024-02-06
Payer: MEDICARE

## 2024-02-06 DIAGNOSIS — G89.29 CHRONIC BILATERAL LOW BACK PAIN WITHOUT SCIATICA: Primary | ICD-10-CM

## 2024-02-06 DIAGNOSIS — M54.50 CHRONIC BILATERAL LOW BACK PAIN WITHOUT SCIATICA: Primary | ICD-10-CM

## 2024-02-06 PROCEDURE — 97112 NEUROMUSCULAR REEDUCATION: CPT | Mod: PN

## 2024-02-06 NOTE — PROGRESS NOTES
[facility]   Physical Therapy Treatment Note / reassessment      Name: Alaina Vee  Clinic Number: [id number]    Therapy Diagnosis:   Encounter Diagnosis   Name Primary?    Chronic bilateral low back pain without sciatica Yes         Physician: Juanito Butt Jr., MD    Visit Date: 2/6/2024        Therapy Diagnosis:        Encounter Diagnoses   Name Primary?    Right sided sciatica      Right hip pain          Physician: Juanito Butt Jr., MD     Physician Orders: PT Eval and Treat  Medical Diagnosis from Referral:   M54.31 (ICD-10-CM) - Right sided sciatica   M25.551 (ICD-10-CM) - Right hip pain      Evaluation Date: 12/21/2023  Authorization Period Expiration: 9/28/24  Plan of Care Expiration: 3/31/24  Progress Note Due: performed on 2/1/24  Date of Surgery: na  Visit # / Visits authorized: 3/20   FOTO: 1/ 3     Precautions: Standard      Time In: 11:00 am  Time Out: 12:00 pm  Total Billable Time: 30 minutes    PTA Visit #: 1/5       Subjective     Patient reports: feeling better with less pain  She was compliant with home exercise program.  Response to previous treatment: initial eval  Functional change: NA    Pain: 1/10  Location: right hip      Objective        Treatment     Alaina received the treatments listed below:      neuromuscular re-education activities to improve: Balance, Coordination, and Kinesthetic for 30 minutes. The following activities were included:  Every minute on the minute for 9 mins:  Minute 1 - STS  Minute 2 - marches standing   Minute 3 - deadlifts   Minute 4 - rest     therapeutic exercises to develop strength and ROM for 30 minutes including:  Patient education on importance of exercises and motion, advice to seek nutrition counciling  +Nustep 10'  Bridges x20   S/L clams RTB x30x       Patient Education and Home Exercises      Education provided:   - see above     Written Home Exercises Provided: yes. Exercises were reviewed and Alaina was able to  demonstrate them prior to the end of the session.  Alaina demonstrated good  understanding of the education provided. See EMR under Patient Instructions for exercises provided during therapy sessions.     Assessment   Cardiovascular conditioning seems to be helping as well as posterior chain strength. Progressing well. Strength improved per reassessment        Patient prognosis is Good.   Patient will benefit from skilled outpatient Physical Therapy to address the deficits stated above and in the chart below, provide patient /family education, and to maximize patientt's level of independence.      Plan of care discussed with patient: Yes  Patient's spiritual, cultural and educational needs considered and patient is agreeable to the plan of care and goals as stated below:      Anticipated Barriers for therapy: none    Goals:     GOALS: Short Term Goals:  4 weeks progressing all others  1.Report decreased lumbar pain  < / =  3/10  to increase tolerance for ADLs MET  2. Increase ROM by 25% where limited in order to perform ADLs without difficulty.   3. Increase strength by 1/3 MMT grade in areas of limitation to increase tolerance for ADL and work activities.  4. Pt to tolerate HEP to improve ROM and independence with ADL's     Long Term Goals: 8 weeks  1.Report decreased lumbar pain < / = 2/10  to increase tolerance for ADLs  2.Patient goal: able to play darts and shuffle board.  3.Increase strength to 4+/5 in  areas of limitation  to increase tolerance for ADL and work activities.  4. Pt will report at <35% on FOTO  to demonstrate increase in LE function with every day tasks.      Plan     Strength and cardiovascular endurance     Chun West PT

## 2024-02-12 ENCOUNTER — TELEPHONE (OUTPATIENT)
Dept: FAMILY MEDICINE | Facility: CLINIC | Age: 84
End: 2024-02-12
Payer: MEDICARE

## 2024-02-12 NOTE — TELEPHONE ENCOUNTER
----- Message from Juanito Butt Jr., MD sent at 1/28/2024  4:45 AM CST -----  Patient missed her last 2 appts with me. Needs to schedule appt in next 2 weeks. Can be done at an 11:20 slot available.

## 2024-02-20 ENCOUNTER — CLINICAL SUPPORT (OUTPATIENT)
Dept: REHABILITATION | Facility: HOSPITAL | Age: 84
End: 2024-02-20
Payer: MEDICARE

## 2024-02-20 DIAGNOSIS — M54.50 CHRONIC BILATERAL LOW BACK PAIN WITHOUT SCIATICA: Primary | ICD-10-CM

## 2024-02-20 DIAGNOSIS — G89.29 CHRONIC BILATERAL LOW BACK PAIN WITHOUT SCIATICA: Primary | ICD-10-CM

## 2024-02-20 PROCEDURE — 97112 NEUROMUSCULAR REEDUCATION: CPT | Mod: PN

## 2024-02-20 NOTE — PROGRESS NOTES
[facility]   Physical Therapy Treatment Note / reassessment      Name: Alaina Vee  Clinic Number: [id number]    Therapy Diagnosis:   Encounter Diagnosis   Name Primary?    Chronic bilateral low back pain without sciatica Yes           Physician: Juanito Butt Jr., MD    Visit Date: 2/20/2024        Therapy Diagnosis:        Encounter Diagnoses   Name Primary?    Right sided sciatica      Right hip pain          Physician: Juanito Butt Jr., MD     Physician Orders: PT Eval and Treat  Medical Diagnosis from Referral:   M54.31 (ICD-10-CM) - Right sided sciatica   M25.551 (ICD-10-CM) - Right hip pain      Evaluation Date: 12/21/2023  Authorization Period Expiration: 9/28/24  Plan of Care Expiration: 3/31/24  Progress Note Due: performed on 2/1/24  Date of Surgery: na  Visit # / Visits authorized: 6/20   FOTO: 1/ 3     Precautions: Standard      Time In: 11:00 am  Time Out: 12:00 pm  Total Billable Time: 30 minutes    PTA Visit #: 1/5       Subjective     Patient reports: feeling better with less pain  She was compliant with home exercise program.  Response to previous treatment: initial eval  Functional change: NA    Pain: 1/10  Location: right hip      Objective        Treatment     Alaina received the treatments listed below:      neuromuscular re-education activities to improve: Balance, Coordination, and Kinesthetic for 30 minutes. The following activities were included:  Every minute on the minute for 9 mins:  Minute 1 - STS  Minute 2 - marches standing   Minute 3 - deadlifts 15#  Minute 4 - walk 30ft    therapeutic exercises to develop strength and ROM for 30 minutes including:  Patient education on importance of exercises and motion, advice to seek nutrition counciling  +Nustep 10'  Bridges x30  S/L clams RTB x40       Patient Education and Home Exercises      Education provided:   - see above     Written Home Exercises Provided: yes. Exercises were reviewed and Alaina was  able to demonstrate them prior to the end of the session.  Alaina demonstrated good  understanding of the education provided. See EMR under Patient Instructions for exercises provided during therapy sessions.     Assessment   Cardiovascular conditioning seems to be helping as well as posterior chain strength. Progressing well. Will again reassess next visit to determine remainder of POC       Patient prognosis is Good.   Patient will benefit from skilled outpatient Physical Therapy to address the deficits stated above and in the chart below, provide patient /family education, and to maximize patientt's level of independence.      Plan of care discussed with patient: Yes  Patient's spiritual, cultural and educational needs considered and patient is agreeable to the plan of care and goals as stated below:      Anticipated Barriers for therapy: none    Goals:     GOALS: Short Term Goals:  4 weeks progressing all others  1.Report decreased lumbar pain  < / =  3/10  to increase tolerance for ADLs MET  2. Increase ROM by 25% where limited in order to perform ADLs without difficulty.   3. Increase strength by 1/3 MMT grade in areas of limitation to increase tolerance for ADL and work activities.  4. Pt to tolerate HEP to improve ROM and independence with ADL's     Long Term Goals: 8 weeks  1.Report decreased lumbar pain < / = 2/10  to increase tolerance for ADLs  2.Patient goal: able to play darts and shuffle board.  3.Increase strength to 4+/5 in  areas of limitation  to increase tolerance for ADL and work activities.  4. Pt will report at <35% on FOTO  to demonstrate increase in LE function with every day tasks.      Plan     Strength and cardiovascular endurance     Chun West PT

## 2024-02-27 ENCOUNTER — CLINICAL SUPPORT (OUTPATIENT)
Dept: REHABILITATION | Facility: HOSPITAL | Age: 84
End: 2024-02-27
Payer: MEDICARE

## 2024-02-27 DIAGNOSIS — M54.50 CHRONIC BILATERAL LOW BACK PAIN WITHOUT SCIATICA: Primary | ICD-10-CM

## 2024-02-27 DIAGNOSIS — G89.29 CHRONIC BILATERAL LOW BACK PAIN WITHOUT SCIATICA: Primary | ICD-10-CM

## 2024-02-27 PROCEDURE — 97112 NEUROMUSCULAR REEDUCATION: CPT | Mod: PN

## 2024-02-27 NOTE — PROGRESS NOTES
[facility]   Physical Therapy Treatment Note / reassessment/ D/C note     Name: Alaina Vee  Clinic Number: [id number]    Therapy Diagnosis:   Encounter Diagnosis   Name Primary?    Chronic bilateral low back pain without sciatica Yes       Physician: Juanito Butt Jr., MD    Visit Date: 2024        Therapy Diagnosis:        Encounter Diagnoses   Name Primary?    Right sided sciatica      Right hip pain          Physician: Juanito Butt Jr., MD     Physician Orders: PT Eval and Treat  Medical Diagnosis from Referral:   M54.31 (ICD-10-CM) - Right sided sciatica   M25.551 (ICD-10-CM) - Right hip pain      Evaluation Date: 2023  Authorization Period Expiration: 24  Plan of Care Expiration: 3/31/24  Progress Note Due: performed on   Date of Surgery: na  Visit # / Visits authorized:    FOTO: 1/ 3     Precautions: Standard      Time In: 11:00 am  Time Out: 11:50 pm  Total Billable Time: 30 minutes    PTA Visit #:        Subjective     Patient reports: feeling better with less pain. Pain is stabilized  She was compliant with home exercise program.  Response to previous treatment: initial eval  Functional change: NA    Pain: 0/10  Location: right hip      Objective      Observation: antalgic poor gait with SPC     Posture:  forward flexed      Functional:   30 sec chair rise: 5 reps   TUs --> 15s with no cane      Lumbar Range of Motion:     Limitations Pain   Flexion 75%    n         Extension 75%    n               Lower Extremity Strength  Right LE   Left LE     Quadriceps: 4+/5 Quadriceps: 4+/5   Hamstrings: 4+/5 Hamstrings: 4+/5   Iliospoas: 4+/5 Iliospoas: 4+/5   Hip extension:  4+/5 Hip extension: 4+/5   PGM: 4+/5 PGM: 4+/5      Sensation:normal        Special Tests:  -Bridge Test: fair     Joint Mobility: hypomobile hip IR with pain. + scour, + ANGELINA on R     Unable to test lumbar spine since she cannot lie in prone      Palpation: no palpable  tenderness      Treatment     Alaina received the treatments listed below:      neuromuscular re-education activities to improve: Balance, Coordination, and Kinesthetic for 30 minutes. The following activities were included:  Every minute on the minute for 9 mins:  Minute 1 - STS  Minute 2 - marches standing   Minute 3 - deadlifts 15#  Minute 4 - walk 30ft    therapeutic exercises to develop strength and ROM for 20 minutes including:  Reassessment as above   +Nustep 10'  Bridges x30  S/L clams RTB x40       Patient Education and Home Exercises      Education provided:   - see above     Written Home Exercises Provided: yes. Exercises were reviewed and Alaina was able to demonstrate them prior to the end of the session.  Alaina demonstrated good  understanding of the education provided. See EMR under Patient Instructions for exercises provided during therapy sessions.     Assessment   Reassessment shows excellent improvement overall. Ready for d/c at this time       Patient prognosis is Good.   Patient will benefit from skilled outpatient Physical Therapy to address the deficits stated above and in the chart below, provide patient /family education, and to maximize patientt's level of independence.      Plan of care discussed with patient: Yes  Patient's spiritual, cultural and educational needs considered and patient is agreeable to the plan of care and goals as stated below:      Anticipated Barriers for therapy: none    Goals:     GOALS: Short Term Goals:  4 weeks progressing all others Met all   1.Report decreased lumbar pain  < / =  3/10  to increase tolerance for ADLs MET  2. Increase ROM by 25% where limited in order to perform ADLs without difficulty. MET  3. Increase strength by 1/3 MMT grade in areas of limitation to increase tolerance for ADL and work activities. MET  4. Pt to tolerate HEP to improve ROM and independence with ADL's     Long Term Goals: 8 weeks - Met all but foto which was not assessed    1.Report decreased lumbar pain < / = 2/10  to increase tolerance for ADLs  2.Patient goal: able to play darts and shuffle board.  3.Increase strength to 4+/5 in  areas of limitation  to increase tolerance for ADL and work activities.  4. Pt will report at <35% on FOTO  to demonstrate increase in LE function with every day tasks.      Plan     MAGUI Wset, PT

## 2024-03-12 ENCOUNTER — OFFICE VISIT (OUTPATIENT)
Dept: UROLOGY | Facility: CLINIC | Age: 84
End: 2024-03-12
Payer: MEDICARE

## 2024-03-12 VITALS — BODY MASS INDEX: 41.45 KG/M2 | WEIGHT: 256.81 LBS

## 2024-03-12 DIAGNOSIS — N20.0 NEPHROLITHIASIS: Primary | ICD-10-CM

## 2024-03-12 DIAGNOSIS — N39.0 RECURRENT UTI: ICD-10-CM

## 2024-03-12 DIAGNOSIS — N39.46 MIXED INCONTINENCE URGE AND STRESS: ICD-10-CM

## 2024-03-12 PROCEDURE — 1126F AMNT PAIN NOTED NONE PRSNT: CPT | Mod: CPTII,S$GLB,, | Performed by: UROLOGY

## 2024-03-12 PROCEDURE — 1101F PT FALLS ASSESS-DOCD LE1/YR: CPT | Mod: CPTII,S$GLB,, | Performed by: UROLOGY

## 2024-03-12 PROCEDURE — 99999 PR PBB SHADOW E&M-EST. PATIENT-LVL III: CPT | Mod: PBBFAC,,, | Performed by: UROLOGY

## 2024-03-12 PROCEDURE — 1159F MED LIST DOCD IN RCRD: CPT | Mod: CPTII,S$GLB,, | Performed by: UROLOGY

## 2024-03-12 PROCEDURE — 3288F FALL RISK ASSESSMENT DOCD: CPT | Mod: CPTII,S$GLB,, | Performed by: UROLOGY

## 2024-03-12 PROCEDURE — 1160F RVW MEDS BY RX/DR IN RCRD: CPT | Mod: CPTII,S$GLB,, | Performed by: UROLOGY

## 2024-03-12 PROCEDURE — 99214 OFFICE O/P EST MOD 30 MIN: CPT | Mod: S$GLB,,, | Performed by: UROLOGY

## 2024-03-12 NOTE — PROGRESS NOTES
Subjective:       Alaina Vee is a 83 y.o. female who is an established patient who was referred by Dr Esquivel  for evaluation of UTI.      She reports issues with recurrent UTIs. She was treated for UTI in 3/16 (100k E coli, pansensitive) and again in 4/16 (100k E coli). She has urinary frequency associated with UTIs. Nocturia x 1-2. She has urgency and UUI at baseline. Also with fecal urgency/incontinence. She was given Ditropan 5mg BID years ago. Also with RICHARD. She has not noted if this has helped. Denies current dysuria. Denies hematuria. No h/o kidney stones.     She has significant LBP issues. She also reports facial and R shoulder/arm/torso pain.     She was treated for UTI after initial visit. She remains on Ditropan IR not up to TID, declined ER formulations. She reports taking another medication for UI from me but I don't have records of this.     SERG (5/16) showed ruslanley 9mm stone in LLP. PVR 2cc. Cytology was negative but noted uric acid crystals.     CT 7/16 - 7mm L UP, 6mm L LP stones and 8mm R renal pelvis stone.   KUB 7/16 - shows 7mm R stone but difficult to see (unsure if truly the stone)    KUB 1/17 - 7mm R renal stone though hard to see (likely overlying 12th rib)    She started Ditropan XL 15mg previously and had great improvement. She continues to have significant back issues.    She started to develop R flank pain and therefore CT scan ordered. CT showed 8mm stone at R UPJ, visible on KUB. She is now s/p R ESWL 2017. Stone was noted to fragment well. She did not note passing any stone and is still having flank pain.     Was on Coumadin for a fib. Now on Eliquis.    Denies stone issues recently.  Remains on Ditropan BID.  now in nursing home with dementia, caring for him has been very difficult on her for last 2-3 years.    1/9/2024  Returns now with c/o worsening UUI. Also with RICHARD (chronic, not worsening). Remain on Ditropan 5mg BID. Also c/o sciatic nerve pain.      3/12/2024  Given trial Vesicare 10mg - doing much better. No further UUI. Denies sensation of VENTURA. Strong to medium stream. Still with sciatica.   PVR (bladder scan) today - 194      KUB 1/18 - two left renal calculi  SERG 1/18 - 3mm and 5mm L calculi - hydro resolved    100% CaOx mono - stone passed spontaneously. Pain present for 1 hour at that time (R side).     SERG 5/20 - 1cm LUP and 5mm LLP stone, no hydro  KUB 11/20 - stable 1cm L renal calculus, LLP stones not seen     CT 4/21 (done for hernia eval) - two L renal stones (1cm LLP, 4mm LUP)  KUB 4/22 - 8mm L renal stone  SERG 4/22 - mild prominence of R renal pelvis, 7mm L renal stone        The following portions of the patient's history were reviewed and updated as appropriate: allergies, current medications, past family history, past medical history, past social history, past surgical history and problem list.    Review of Systems  Constitutional: no fever or chills  ENT: no nasal congestion or sore throat  Respiratory: no cough or shortness of breath  Cardiovascular: no chest pain or palpitations  Gastrointestinal: no nausea or vomiting, tolerating diet  Genitourinary: as per HPI  Hematologic/Lymphatic: no easy bruising or lymphadenopathy  Musculoskeletal: no arthralgias or myalgias  Skin: no rashes or lesions  Neurological: no seizures or tremors  Behavioral/Psych: no auditory or visual hallucinations       Objective:    Vitals:   Wt 116.5 kg (256 lb 13.4 oz)   LMP  (LMP Unknown)   BMI 41.45 kg/m²     Physical Exam   General: well developed, well nourished in no acute distress  Head: normocephalic, atraumatic  Neck: supple, trachea midline, no obvious enlargement of thyroid  HEENT: EOMI, mucus membranes moist, sclera anicteric, no hearing impairment  Lungs: symmetric expansion, non-labored breathing  Skin: no rashes or lesions  Neuro: alert and oriented x 3, no gross deficits  Psych: normal judgment and insight, normal mood/affect and  non-anxious  Genitourinary:  deferred      Lab Review   Urine analysis today in clinic shows - negative    Lab Results   Component Value Date    WBC 7.26 01/17/2024    HGB 15.2 01/17/2024    HCT 46.5 01/17/2024    HCT 44 11/20/2023    MCV 94 01/17/2024     01/17/2024     Lab Results   Component Value Date    CREATININE 0.8 01/17/2024    BUN 16 01/17/2024         Imaging  Images and reports were personally reviewed by me and discussed with patient  CT, SERG, KUB reviewed       Assessment/Plan:      1. Recurrent UTI / Nephrolithiasis   - UI likely contributing to UTIs   - Avoid constipation   - PVR acceptable   - Possible stone in LLP, uric acid crystals on cytology   - Discussed Estrace, will hold for now   - Consider cystoscopy in future   - CT - 2 stones on L, 1 stone on R. Only R stone visible on KUB.    - 8mm R UPJ stone. Discussed options - s/p R ESWL. Discussed risks, benefits, alternatives.   - s/p R ESWL on 2/27/17 - stone with excellent response   - KUB post-op - residual stone    - CT - no obstructing stones, R LP noted   - SERG/KUB - R hydro resolved. Two stones in L kidney   - KUB 1cm LLP stone (stable)   - KUB/SERG 4/22 - stable L renal stone   - Recheck imaging about 2-3 months - will do at next visit (KUB/SERG)     2. Mixed incontinence urge and stress    - Discussed difference of UUI and RICHARD components. Reviewed etiology and workup of each.   - RICHARD: Kegels, PFPT, bulking agent, MUS.   - UUI: Behavioral changes, PFPT, anticholinergics. Botox/InterStim for refractory UUI.   - Was doing well on Ditropan BID. Now on Vesicare 10mg. Doing well. Caution SE. Consider b-agonist    - Cont Vesicare 10mg       Follow up in 6mths with KUB/SERG

## 2024-05-17 ENCOUNTER — TELEPHONE (OUTPATIENT)
Dept: UROLOGY | Facility: HOSPITAL | Age: 84
End: 2024-05-17
Payer: MEDICARE

## 2024-05-17 RX ORDER — SOLIFENACIN SUCCINATE 10 MG/1
10 TABLET, FILM COATED ORAL DAILY
Qty: 90 TABLET | Refills: 3 | Status: SHIPPED | OUTPATIENT
Start: 2024-05-17

## 2024-05-17 NOTE — TELEPHONE ENCOUNTER
----- Message from Irene Cheung MA sent at 5/17/2024  9:10 AM CDT -----  Type: RX Refill Request    Who Called: Self    Have you contacted your pharmacy: no    Refill or New Rx:refill     RX Name and Strength:solifenacin (VESICARE) 10 MG tablet      Preferred Pharmacy with phone number: [facility] on [address]    Local or Mail Order:local    Ordering Provider:Meg    Would the patient rather a call back or a response via [facility]? Yes,c all     Best Call Back Number: [phone] (home)

## 2024-05-21 DIAGNOSIS — M47.816 LUMBAR SPONDYLOSIS: ICD-10-CM

## 2024-05-21 DIAGNOSIS — R25.2 NOCTURNAL MUSCLE CRAMPS: ICD-10-CM

## 2024-05-21 RX ORDER — TIZANIDINE 2 MG/1
TABLET ORAL
Qty: 15 TABLET | Refills: 0 | Status: SHIPPED | OUTPATIENT
Start: 2024-05-21

## 2024-05-21 RX ORDER — LOSARTAN POTASSIUM 25 MG/1
25 TABLET ORAL
Qty: 90 TABLET | Refills: 0 | Status: SHIPPED | OUTPATIENT
Start: 2024-05-21

## 2024-05-21 NOTE — TELEPHONE ENCOUNTER
Patient last seen 12/22/2022. Scheduled for follow up 07/03/2024. No refills since last OV. Sent to Dr. Hidalgo.

## 2024-05-21 NOTE — TELEPHONE ENCOUNTER
Please call patient to confirm follow up office visit in July 2024.  No further refills will be authorized without being seen.

## 2024-05-23 RX ORDER — APIXABAN 5 MG/1
5 TABLET, FILM COATED ORAL 2 TIMES DAILY
Qty: 180 TABLET | Refills: 1 | Status: CANCELLED | OUTPATIENT
Start: 2024-05-23

## 2024-07-03 ENCOUNTER — OFFICE VISIT (OUTPATIENT)
Dept: CARDIOLOGY | Facility: CLINIC | Age: 84
End: 2024-07-03
Payer: MEDICARE

## 2024-07-03 VITALS
DIASTOLIC BLOOD PRESSURE: 78 MMHG | RESPIRATION RATE: 18 BRPM | OXYGEN SATURATION: 95 % | HEIGHT: 66 IN | SYSTOLIC BLOOD PRESSURE: 134 MMHG | HEART RATE: 55 BPM | WEIGHT: 254.94 LBS | BODY MASS INDEX: 40.97 KG/M2

## 2024-07-03 DIAGNOSIS — I70.0 AORTIC ATHEROSCLEROSIS: ICD-10-CM

## 2024-07-03 DIAGNOSIS — I77.810 AORTIC ROOT DILATATION: ICD-10-CM

## 2024-07-03 DIAGNOSIS — E78.2 MIXED HYPERLIPIDEMIA: ICD-10-CM

## 2024-07-03 DIAGNOSIS — Z78.9 STATIN INTOLERANCE: ICD-10-CM

## 2024-07-03 DIAGNOSIS — E66.01 MORBID OBESITY: ICD-10-CM

## 2024-07-03 DIAGNOSIS — Z79.01 LONG TERM (CURRENT) USE OF ANTICOAGULANTS: ICD-10-CM

## 2024-07-03 DIAGNOSIS — I10 ESSENTIAL HYPERTENSION: ICD-10-CM

## 2024-07-03 DIAGNOSIS — I48.91 ATRIAL FIBRILLATION, UNSPECIFIED TYPE: ICD-10-CM

## 2024-07-03 DIAGNOSIS — G47.33 OSA (OBSTRUCTIVE SLEEP APNEA): ICD-10-CM

## 2024-07-03 DIAGNOSIS — I48.19 PERSISTENT ATRIAL FIBRILLATION: Primary | ICD-10-CM

## 2024-07-03 LAB
OHS QRS DURATION: 82 MS
OHS QTC CALCULATION: 440 MS

## 2024-07-03 PROCEDURE — 99214 OFFICE O/P EST MOD 30 MIN: CPT | Mod: S$GLB,,, | Performed by: INTERNAL MEDICINE

## 2024-07-03 PROCEDURE — 3078F DIAST BP <80 MM HG: CPT | Mod: CPTII,S$GLB,, | Performed by: INTERNAL MEDICINE

## 2024-07-03 PROCEDURE — 3075F SYST BP GE 130 - 139MM HG: CPT | Mod: CPTII,S$GLB,, | Performed by: INTERNAL MEDICINE

## 2024-07-03 PROCEDURE — 1160F RVW MEDS BY RX/DR IN RCRD: CPT | Mod: CPTII,S$GLB,, | Performed by: INTERNAL MEDICINE

## 2024-07-03 PROCEDURE — 1159F MED LIST DOCD IN RCRD: CPT | Mod: CPTII,S$GLB,, | Performed by: INTERNAL MEDICINE

## 2024-07-03 PROCEDURE — 1126F AMNT PAIN NOTED NONE PRSNT: CPT | Mod: CPTII,S$GLB,, | Performed by: INTERNAL MEDICINE

## 2024-07-03 PROCEDURE — 93000 ELECTROCARDIOGRAM COMPLETE: CPT | Mod: S$GLB,,, | Performed by: INTERNAL MEDICINE

## 2024-07-03 PROCEDURE — 99999 PR PBB SHADOW E&M-EST. PATIENT-LVL IV: CPT | Mod: PBBFAC,,, | Performed by: INTERNAL MEDICINE

## 2024-07-03 PROCEDURE — 1101F PT FALLS ASSESS-DOCD LE1/YR: CPT | Mod: CPTII,S$GLB,, | Performed by: INTERNAL MEDICINE

## 2024-07-03 PROCEDURE — G2211 COMPLEX E/M VISIT ADD ON: HCPCS | Mod: S$GLB,,, | Performed by: INTERNAL MEDICINE

## 2024-07-03 PROCEDURE — 3288F FALL RISK ASSESSMENT DOCD: CPT | Mod: CPTII,S$GLB,, | Performed by: INTERNAL MEDICINE

## 2024-07-03 RX ORDER — ATORVASTATIN CALCIUM 40 MG/1
40 TABLET, FILM COATED ORAL
COMMUNITY

## 2024-07-03 NOTE — PROGRESS NOTES
CARDIOVASCULAR PROGRESS NOTE    REASON FOR CONSULT:   Alaina Vee is a 83 y.o. female who presents for f/u of AF/AFL, ao root dil.    PCP: Daquan  EP: Prema  Uro: Meg  Heme: Melchor  HISTORY OF PRESENT ILLNESS:   Last seen December 2022.    The patient returns for follow up.  She denies intercurrent angina, dyspnea, palpitations, or syncope.  She has had no PND, orthopnea, melena, hematuria, or claudication symptoms.  She continues take her Eliquis without any difficulty.  She tells me her  is now on a nursing home and she is doing much better with this change.    CARDIOVASCULAR HISTORY:   AF/AFL, CHADSVASC 3, s/p ablation (Dr. Lloyd) 6/12/17, DCCV 10/4/17, on eliquis 5mg bid  Aortic root dil 4.3->4.1->4.4 cm (echo 12/2023)  SULEMA, no longer on CPAP    PAST MEDICAL HISTORY:     Past Medical History:   Diagnosis Date    Arthritis     knees    Atrial fibrillation     Atrial flutter     Back pain     Cataract     Degenerative disc disease     Depression     General anesthetics causing adverse effect in therapeutic use     pt states sometimes slow to awaken.    GERD (gastroesophageal reflux disease)     Hyperlipidemia     Hypertension     Kidney stone     Obesity     Sleep apnea     does not wear CPAP    Urinary incontinence     Varicosities     Ventral hernia        PAST SURGICAL HISTORY:     Past Surgical History:   Procedure Laterality Date    APPENDECTOMY      BREAST BIOPSY Bilateral     1985    breast biopsy, left      CHOLECYSTECTOMY      CYSTOURETEROSCOPY WITH RETROGRADE PYELOGRAPHY AND INSERTION OF STENT INTO URETER Right 1/10/2020    Procedure: CYSTOURETEROSCOPY, WITH RETROGRADE PYELOGRAM AND URETERAL STENT INSERTION;  Surgeon: Yvonne Weaver MD;  Location: [facility];  Service: Urology;  Laterality: Right;    JOINT REPLACEMENT      TKR , right    JOINT REPLACEMENT  2009    TKR  left    ND EXPLORATORY OF ABDOMEN      URETEROSCOPIC REMOVAL OF URETERIC CALCULUS Right 1/31/2020     Procedure: Cystoscopy, possible retrograde pyelogram, ureteroscopy with laser lithotripsy, ureteral stent exchange;  Surgeon: Yvonne Weaver MD;  Location: [facility];  Service: Urology;  Laterality: Right;       ALLERGIES AND MEDICATION:   Review of patient's allergies indicates:  No Known Allergies  Previous Medications    APIXABAN (ELIQUIS) 5 MG TAB    Take 1 tablet (5 mg total) by mouth 2 (two) times daily.    DESLORATADINE (CLARINEX) 5 MG TABLET    Take 1 tablet (5 mg total) by mouth once daily.    DICLOFENAC SODIUM (VOLTAREN) 1 % GEL    Apply 2 g topically once daily.    GABAPENTIN (NEURONTIN) 600 MG TABLET    Take 600 mg by mouth once daily.    LOSARTAN (COZAAR) 25 MG TABLET    TAKE 1 TABLET ONE TIME DAILY    MELOXICAM (MOBIC) 15 MG TABLET    Take 15 mg by mouth once daily.    MULTIVITAMIN (THERAGRAN) PER TABLET    Take 1 tablet by mouth every evening. 1 Tablet Oral Every day    SOLIFENACIN (VESICARE) 10 MG TABLET    Take 1 tablet (10 mg total) by mouth once daily.    TIZANIDINE (ZANAFLEX) 2 MG TABLET    TAKE 1 TABLET BY MOUTH NIGHTLY AS NEEDED FOR MUSCLE CRAMPS. NOT TO BE TAKEN WITH GABAPENTIN.    TIZANIDINE (ZANAFLEX) 4 MG TABLET    Take 1 tablet (4 mg total) by mouth 2 (two) times daily as needed (back/leg pain).       SOCIAL HISTORY:     Social History     Socioeconomic History    Marital status:    Tobacco Use    Smoking status: Never    Smokeless tobacco: Never   Substance and Sexual Activity    Alcohol use: No    Drug use: No    Sexual activity: Yes     Partners: Male     Social Determinants of Health     Financial Resource Strain: Low Risk  (2/28/2023)    Overall Financial Resource Strain (CARDIA)     Difficulty of Paying Living Expenses: Not very hard   Food Insecurity: No Food Insecurity (2/28/2023)    Hunger Vital Sign     Worried About Running Out of Food in the Last Year: Never true     Ran Out of Food in the Last Year: Never true   Transportation Needs: No Transportation Needs  (2/28/2023)    PRAPARE - Transportation     Lack of Transportation (Medical): No     Lack of Transportation (Non-Medical): No   Physical Activity: Inactive (2/28/2023)    Exercise Vital Sign     Days of Exercise per Week: 0 days     Minutes of Exercise per Session: 0 min   Stress: No Stress Concern Present (2/28/2023)    [employer] [employer] - Occupational Stress Questionnaire     Feeling of Stress : Not at all   Housing Stability: Low Risk  (2/28/2023)    Housing Stability Vital Sign     Unable to Pay for Housing in the Last Year: No     Number of Places Lived in the Last Year: 1     Unstable Housing in the Last Year: No       FAMILY HISTORY:     Family History   Problem Relation Name Age of Onset    COPD Mother      Heart disease Sister          half sister    COPD Sister      No Known Problems Father      Parkinsonism Sister      No Known Problems Brother      No Known Problems Maternal Aunt      No Known Problems Maternal Uncle      No Known Problems Paternal Aunt      No Known Problems Paternal Uncle      No Known Problems Maternal Grandmother      No Known Problems Maternal Grandfather      No Known Problems Paternal Grandmother      No Known Problems Paternal Grandfather      Amblyopia Neg Hx      Blindness Neg Hx      Cancer Neg Hx      Cataracts Neg Hx      Diabetes Neg Hx      Glaucoma Neg Hx      Hypertension Neg Hx      Macular degeneration Neg Hx      Retinal detachment Neg Hx      Strabismus Neg Hx      Stroke Neg Hx      Thyroid disease Neg Hx         REVIEW OF SYSTEMS:   Review of Systems   Constitutional:  Negative for chills, diaphoresis and fever.   HENT:  Negative for nosebleeds.    Eyes:  Negative for blurred vision, double vision and photophobia.   Respiratory:  Negative for hemoptysis, shortness of breath and wheezing.    Cardiovascular:  Negative for chest pain, palpitations, orthopnea, claudication, leg swelling and PND.   Gastrointestinal:  Negative for abdominal pain,  blood in stool, heartburn, melena, nausea and vomiting.   Genitourinary:  Negative for flank pain and hematuria.   Musculoskeletal:  Negative for falls, myalgias and neck pain.   Skin:  Negative for rash.   Neurological:  Negative for dizziness, seizures, loss of consciousness, weakness and headaches.   Endo/Heme/Allergies:  Negative for polydipsia. Does not bruise/bleed easily.   Psychiatric/Behavioral:  Negative for depression and memory loss. The patient is not nervous/anxious.        PHYSICAL EXAM:     There were no vitals filed for this visit.     There is no height or weight on file to calculate BMI.            Physical Exam  Vitals reviewed.   Constitutional:       General: She is not in acute distress.     Appearance: She is well-developed. She is obese. She is not ill-appearing, toxic-appearing or diaphoretic.   HENT:      Head: Normocephalic and atraumatic.   Eyes:      General: No scleral icterus.     Extraocular Movements: Extraocular movements intact.      Conjunctiva/sclera: Conjunctivae normal.      Pupils: Pupils are equal, round, and reactive to light.   Neck:      Thyroid: No thyromegaly.      Vascular: Normal carotid pulses. No carotid bruit or JVD.      Trachea: Trachea normal. No tracheal deviation.   Cardiovascular:      Rate and Rhythm: Bradycardia present. Rhythm irregularly irregular.      Pulses:           Carotid pulses are 2+ on the right side and 2+ on the left side.     Heart sounds: S1 normal and S2 normal. Heart sounds are distant. No murmur heard.     No friction rub. No gallop.   Pulmonary:      Effort: Pulmonary effort is normal. No respiratory distress.      Breath sounds: Normal breath sounds. No stridor. No wheezing, rhonchi or rales.   Chest:      Chest wall: No tenderness.   Abdominal:      General: There is no distension.      Palpations: Abdomen is soft.   Musculoskeletal:         General: No swelling. Normal range of motion.      Cervical back: Normal range of motion and  neck supple. No edema or rigidity.   Feet:      Right foot:      Skin integrity: No ulcer.      Left foot:      Skin integrity: No ulcer.   Skin:     General: Skin is warm and dry.      Coloration: Skin is not jaundiced.   Neurological:      General: No focal deficit present.      Mental Status: She is alert and oriented to person, place, and time.      Cranial Nerves: No cranial nerve deficit.   Psychiatric:         Mood and Affect: Mood normal.         Speech: Speech normal.         Behavior: Behavior normal. Behavior is cooperative.         DATA:   EKG: (personally reviewed tracing(s))  7/3/24 AF 56    Laboratory:  CBC:  Recent Labs   Lab 06/12/23  0720 11/20/23  1722 11/20/23  1725 01/17/24  0906   WBC 5.64  --  9.34 7.26   Hemoglobin 15.1  --  15.3 15.2   POC Hematocrit  --  44  --   --    Hematocrit 45.9  --  46.8 46.5   Platelets 251  --  268 250       CHEMISTRIES:  Recent Labs   Lab 05/27/22  0753 12/28/22  0832 06/12/23  0720 01/17/24  0906   Glucose 103 100 107 111 H   Sodium 141 140 140 143   Potassium 4.1 4.1 4.3 4.3   BUN 18 13 19 16   Creatinine 0.8 0.9 0.9 0.8   eGFR if  >60  --   --   --    eGFR if non  >60  --   --   --    Calcium 9.3 9.4 9.7 9.8       CARDIAC BIOMARKERS:        COAGS:  Recent Labs   Lab 09/30/21  0938 10/14/21  0931 10/28/21  0755   INR 3.4 H 3.3 H 3.3 H       LIPIDS/LFTS:  Recent Labs   Lab 05/27/22  0753 06/12/23  0720 01/17/24  0906   Cholesterol 203 H 233 H 202 H   Triglycerides 91 121 148   HDL 41 46 40   LDL Cholesterol 143.8 162.8 H 132.4   Non-HDL Cholesterol 162 187 162   AST 17 16 15   ALT 12 11 13     Lab Results   Component Value Date    TSH 1.372 09/20/2018     Cardiovascular Testing:  Echo 12/7/2023    Left Ventricle: The left ventricle is normal in size. Mildly increased wall thickness. There is mild concentric hypertrophy. Normal wall motion. There is normal systolic function with a visually estimated ejection fraction of 60 - 65%.  Unable to assess diastolic function due to atrial fibrillation.    Right Ventricle: Normal right ventricular cavity size. Systolic function is normal.    Aortic Valve: The aortic valve is a trileaflet valve. There is mild aortic valve sclerosis.    Aorta: Aortic root is moderately dilated measuring 4.4 cm. Ascending aorta is mildly to moderately dilated measuring 4.17 cm.    Holter 9/26/22  Atrial fibrillation  Heart rates varied between 31 and 90 BPM with an average of 51 BPM.  There were very rare PVCs  Longest R-R 2.9s at 5:03pm.    LE venous US/reflux 10/14/21  There is no evidence of a right lower extremity DVT.  There is no evidence of a left lower extremity DVT.  The left greater saphenous vein has reflux.   Color flow evaluation of the right lower extremity demonstrates no evidence of venous thrombosis in the deep or superficial veins. Significant reflux is not noted in the GSV.  Reflux does not include the saphenofemoral junction (SFJ).   Significant reflux is not noted in the SSV.  Reflux does not includethe saphenopopliteal junction (SPJ).  There is no evidence of reflux in the Accessory vein.  Color flow evaluation of the left lower extremity demonstrates no evidence of venous thrombosis in the deep or superficial veins. Significant reflux is noted in the GSV at the level of the mid thigh, knee and calf.  Reflux does not include the saphenofemoral junction (SFJ).   Significant reflux is not noted in the SSV.  Reflux does not includethe saphenopopliteal junction (SPJ).  There is no evidence of reflux in the Accessory vein.    EVM 10/5/17-11/4/17  Sinus rhythm with first-degree AV block.  Overall, negative event monitor.    SKINNY 8/31/17    1 - Normal left ventricular systolic function.     2 - Left atrial appendage is single lobed with no visualized thrombus.    3 - Aortic root 3.8cm    Holter 3/7/17  PREDOMINANT RHYTHM  1. Atrial flutter with ventricular rates varying between 29 and 89 bpm with an average  of 55 bpm.   VENTRICULAR ARRHYTHMIAS  1. There were no PVCs. There was no ventricular ectopic activity.  SUPRA VENTRICULAR ARRHYTHMIAS  1. Atrial flutter was noted throughout the study.   AV CONDUCTION  1. There was no evidence of high grade AV emma filtering.   2. The longest RR interval was 2215 msec.   DIARY  1. The diary was not returned  MISCELLANEOUS  1. There were occasional hookup related artifacts. Overall, the study was of good quality.   2. This was a tape of adequate length (24 hrs).    RADHA 7/14/16  Right lower extremity RADHA: 1.06  Left lower extremity RADHA: 1.08    ASSESSMENT:   # PAF/AFL, in AF with slow VR, asymptomatic.  CHADSVASC 3.  S/p AFL ablation 6/2017, s/p SKINNY/DCCV 8/31/17, DCCV 10/4/17 (prev on flecainide, stopped 10/26/21).  Now on eliquis 5mg bid.   # HTN, controlled  # HLP, statin intolerant (prev intol of atorva/rosuva)  # Hx RLE DVT, now resolved  # BMI 41, up 2 unit(s) vs last OV  # SULEMA no longer on CPAP, prev declined re-referral to sleep med  # Aortic root dil 3.8->4.1->4.2->4.3->4.1->4.4 cm (echo 12/2023)  # aortic atherosclerosis (CT A/P 4/13/21)    PLAN:   Cont med rx  Cont eliquis 5mg bid.  Check echo  Diet/exercise/weight loss  RTC 6 months (Jan 2025)    The above documents medical care services that are part of ongoing care related to this patient's serious/complex condition (Code ) (PAF/HTN).        Nahid Hidalgo MD, FACC

## 2024-07-11 ENCOUNTER — HOSPITAL ENCOUNTER (OUTPATIENT)
Dept: CARDIOLOGY | Facility: HOSPITAL | Age: 84
Discharge: HOME | End: 2024-07-11
Attending: INTERNAL MEDICINE
Payer: MEDICARE

## 2024-07-11 DIAGNOSIS — I77.810 AORTIC ROOT DILATATION: ICD-10-CM

## 2024-07-11 LAB
ASCENDING AORTA: 3.82 CM
AV INDEX (PROSTH): 0.7
AV MEAN GRADIENT: 5 MMHG
AV PEAK GRADIENT: 8 MMHG
AV VALVE AREA BY VELOCITY RATIO: 2.55 CM²
AV VALVE AREA: 2.54 CM²
AV VELOCITY RATIO: 0.7
CV ECHO LV RWT: 0.48 CM
DOP CALC AO PEAK VEL: 1.41 M/S
DOP CALC AO VTI: 29.8 CM
DOP CALC LVOT AREA: 3.6 CM2
DOP CALC LVOT DIAMETER: 2.15 CM
DOP CALC LVOT PEAK VEL: 0.99 M/S
DOP CALC LVOT STROKE VOLUME: 75.84 CM3
DOP CALCLVOT PEAK VEL VTI: 20.9 CM
E WAVE DECELERATION TIME: 195.26 MSEC
E/A RATIO: 3.52
E/E' RATIO: 9.78 M/S
ECHO LV POSTERIOR WALL: 1.2 CM (ref 0.6–1.1)
FRACTIONAL SHORTENING: 34 % (ref 28–44)
INTERVENTRICULAR SEPTUM: 1.34 CM (ref 0.6–1.1)
IVC DIAMETER: 1.61 CM
LA MAJOR: 6.13 CM
LA MINOR: 6.38 CM
LA WIDTH: 3.9 CM
LEFT ATRIUM SIZE: 4.64 CM
LEFT ATRIUM VOLUME: 96.17 CM3
LEFT INTERNAL DIMENSION IN SYSTOLE: 3.31 CM (ref 2.1–4)
LEFT VENTRICLE DIASTOLIC VOLUME: 120.01 ML
LEFT VENTRICLE SYSTOLIC VOLUME: 44.33 ML
LEFT VENTRICULAR INTERNAL DIMENSION IN DIASTOLE: 5.03 CM (ref 3.5–6)
LEFT VENTRICULAR MASS: 255.64 G
LV LATERAL E/E' RATIO: 8.8 M/S
LV SEPTAL E/E' RATIO: 11 M/S
LVED V (TEICH): 120.01 ML
LVES V (TEICH): 44.33 ML
LVOT MG: 2.18 MMHG
LVOT MV: 0.69 CM/S
MV PEAK A VEL: 0.25 M/S
MV PEAK E VEL: 0.88 M/S
MV STENOSIS PRESSURE HALF TIME: 56.62 MS
MV VALVE AREA P 1/2 METHOD: 3.89 CM2
OHS CV RV/LV RATIO: 0.68 CM
PISA TR MAX VEL: 1.9 M/S
PV PEAK GRADIENT: 5 MMHG
PV PEAK VELOCITY: 1.1 M/S
RA MAJOR: 5.17 CM
RA PRESSURE ESTIMATED: 8 MMHG
RA WIDTH: 3.1 CM
RIGHT VENTRICLE DIASTOLIC BASEL DIMENSION: 3.4 CM
RIGHT VENTRICULAR END-DIASTOLIC DIMENSION: 3.41 CM
RV TB RVSP: 10 MMHG
RV TISSUE DOPPLER FREE WALL SYSTOLIC VELOCITY 1 (APICAL 4 CHAMBER VIEW): 13.82 CM/S
SINUS: 4.2 CM
STJ: 3.85 CM
TDI LATERAL: 0.1 M/S
TDI SEPTAL: 0.08 M/S
TDI: 0.09 M/S
TR MAX PG: 14 MMHG
TRICUSPID ANNULAR PLANE SYSTOLIC EXCURSION: 1.78 CM
TV REST PULMONARY ARTERY PRESSURE: 22 MMHG

## 2024-07-11 PROCEDURE — 93306 TTE W/DOPPLER COMPLETE: CPT | Mod: 26,,, | Performed by: INTERNAL MEDICINE

## 2024-07-11 PROCEDURE — 93306 TTE W/DOPPLER COMPLETE: CPT

## 2024-08-19 ENCOUNTER — PATIENT OUTREACH (OUTPATIENT)
Dept: ADMINISTRATIVE | Facility: HOSPITAL | Age: 84
End: 2024-08-19
Payer: MEDICARE

## 2024-09-16 ENCOUNTER — OFFICE VISIT (OUTPATIENT)
Dept: UROLOGY | Facility: CLINIC | Age: 84
End: 2024-09-16
Payer: MEDICARE

## 2024-09-16 VITALS — WEIGHT: 253.19 LBS | BODY MASS INDEX: 40.87 KG/M2

## 2024-09-16 DIAGNOSIS — N39.0 RECURRENT UTI: ICD-10-CM

## 2024-09-16 DIAGNOSIS — N20.0 NEPHROLITHIASIS: Primary | ICD-10-CM

## 2024-09-16 DIAGNOSIS — N39.46 MIXED INCONTINENCE URGE AND STRESS: ICD-10-CM

## 2024-09-16 PROCEDURE — 99213 OFFICE O/P EST LOW 20 MIN: CPT | Mod: S$GLB,,, | Performed by: UROLOGY

## 2024-09-16 PROCEDURE — 99999 PR PBB SHADOW E&M-EST. PATIENT-LVL III: CPT | Mod: PBBFAC,,, | Performed by: UROLOGY

## 2024-09-16 PROCEDURE — 3288F FALL RISK ASSESSMENT DOCD: CPT | Mod: CPTII,S$GLB,, | Performed by: UROLOGY

## 2024-09-16 PROCEDURE — 1101F PT FALLS ASSESS-DOCD LE1/YR: CPT | Mod: CPTII,S$GLB,, | Performed by: UROLOGY

## 2024-09-16 PROCEDURE — 1159F MED LIST DOCD IN RCRD: CPT | Mod: CPTII,S$GLB,, | Performed by: UROLOGY

## 2024-09-16 PROCEDURE — 1160F RVW MEDS BY RX/DR IN RCRD: CPT | Mod: CPTII,S$GLB,, | Performed by: UROLOGY

## 2024-09-16 PROCEDURE — G2211 COMPLEX E/M VISIT ADD ON: HCPCS | Mod: S$GLB,,, | Performed by: UROLOGY

## 2024-09-16 PROCEDURE — 1126F AMNT PAIN NOTED NONE PRSNT: CPT | Mod: CPTII,S$GLB,, | Performed by: UROLOGY

## 2024-09-16 NOTE — PROGRESS NOTES
Subjective:       Alaina Vee is a 84 y.o. female who is an established patient who was referred by Dr Esquivel  for evaluation of UTI.      She reports issues with recurrent UTIs. She was treated for UTI in 3/16 (100k E coli, pansensitive) and again in 4/16 (100k E coli). She has urinary frequency associated with UTIs. Nocturia x 1-2. She has urgency and UUI at baseline. Also with fecal urgency/incontinence. She was given Ditropan 5mg BID years ago. Also with RICHARD. She has not noted if this has helped. Denies current dysuria. Denies hematuria. No h/o kidney stones.     SERG (5/16) showed likley 9mm stone in LLP. PVR 2cc. Cytology was negative but noted uric acid crystals.     CT 7/16 - 7mm L UP, 6mm L LP stones and 8mm R renal pelvis stone.   KUB 7/16 - shows 7mm R stone but difficult to see (unsure if truly the stone)    KUB 1/17 - 7mm R renal stone though hard to see (likely overlying 12th rib)    She started Ditropan XL 15mg previously and had great improvement. She continues to have significant back issues.    She started to develop R flank pain and therefore CT scan ordered. CT showed 8mm stone at R UPJ, visible on KUB. She is now s/p R ESWL 2017. Stone was noted to fragment well. She did not note passing any stone and is still having flank pain.     Was on Coumadin for a fib. Now on Eliquis.    Denies stone issues recently.  Remains on Ditropan BID.  now in nursing home with dementia, caring for him has been very difficult on her for last 2-3 years.    1/9/2024  Returns now with c/o worsening UUI. Also with RICHARD (chronic, not worsening). Remain on Ditropan 5mg BID. Also c/o sciatic nerve pain.     3/12/2024  Given trial Vesicare 10mg - doing much better. No further UUI. Denies sensation of VENTURA. Strong to medium stream. Still with sciatica.   PVR (bladder scan) today - 194cc    9/16/2024  Planned for SERG and KUB - not done (Willow Levin). Denies flank pain. Denies UTIs.       KUB 1/18 - two  left renal calculi  SERG 1/18 - 3mm and 5mm L calculi - hydro resolved    100% CaOx mono - stone passed spontaneously. Pain present for 1 hour at that time (R side).     SERG 5/20 - 1cm LUP and 5mm LLP stone, no hydro  KUB 11/20 - stable 1cm L renal calculus, LLP stones not seen     CT 4/21 (done for hernia eval) - two L renal stones (1cm LLP, 4mm LUP)  KUB 4/22 - 8mm L renal stone  SERG 4/22 - mild prominence of R renal pelvis, 7mm L renal stone            The following portions of the patient's history were reviewed and updated as appropriate: allergies, current medications, past family history, past medical history, past social history, past surgical history and problem list.    Review of Systems  Constitutional: no fever or chills  ENT: no nasal congestion or sore throat  Respiratory: no cough or shortness of breath  Cardiovascular: no chest pain or palpitations  Gastrointestinal: no nausea or vomiting, tolerating diet  Genitourinary: as per HPI  Hematologic/Lymphatic: no easy bruising or lymphadenopathy  Musculoskeletal: no arthralgias or myalgias  Skin: no rashes or lesions  Neurological: no seizures or tremors  Behavioral/Psych: no auditory or visual hallucinations       Objective:    Vitals:   Wt 114.8 kg (253 lb 3.2 oz)   LMP  (LMP Unknown)   BMI 40.87 kg/m²     Physical Exam   General: well developed, well nourished in no acute distress  Head: normocephalic, atraumatic  Neck: supple, trachea midline, no obvious enlargement of thyroid  HEENT: EOMI, mucus membranes moist, sclera anicteric, no hearing impairment  Lungs: symmetric expansion, non-labored breathing  Skin: no rashes or lesions  Neuro: alert and oriented x 3, no gross deficits  Psych: normal judgment and insight, normal mood/affect and non-anxious  Genitourinary:  deferred      Lab Review   Urine analysis today in clinic shows - negative    Lab Results   Component Value Date    WBC 7.26 01/17/2024    HGB 15.2 01/17/2024    HCT 46.5 01/17/2024     HCT 44 11/20/2023    MCV 94 01/17/2024     01/17/2024     Lab Results   Component Value Date    CREATININE 0.8 01/17/2024    BUN 16 01/17/2024         Imaging  Images and reports were personally reviewed by me and discussed with patient  CT, SERG, KUB reviewed       Assessment/Plan:      1. Recurrent UTI / Nephrolithiasis   - UI likely contributing to UTIs   - Avoid constipation   - PVR acceptable   - Possible stone in LLP, uric acid crystals on cytology   - Discussed Estrace, will hold for now   - Consider cystoscopy in future   - CT - 2 stones on L, 1 stone on R. Only R stone visible on KUB.    - 8mm R UPJ stone. Discussed options - s/p R ESWL. Discussed risks, benefits, alternatives.   - s/p R ESWL on 2/27/17 - stone with excellent response   - KUB post-op - residual stone    - CT - no obstructing stones, R LP noted   - SERG/KUB - R hydro resolved. Two stones in L kidney   - KUB 1cm LLP stone (stable)   - KUB/SERG 4/22 - stable L renal stone   - Recheck imaging about 2-3 months - will reschedule (KUB/SERG)     2. Mixed incontinence urge and stress    - Discussed difference of UUI and RICHARD components. Reviewed etiology and workup of each.   - RICHARD: Kegels, PFPT, bulking agent, MUS.   - UUI: Behavioral changes, PFPT, anticholinergics. Botox/InterStim for refractory UUI.   - Was doing well on Ditropan BID. Now on Vesicare 10mg. Doing well. Caution SE. Consider b-agonist    - Cont Vesicare 10mg       Follow up in 2-3 weeks w KUB/SERG       Visit today included increased complexity associated with the care of the episodic problem nephrolithiasis, UI addressed and managing the longitudinal care of the patient due to the serious and/or complex managed problem(s) nephrolithiasis, UI.

## 2024-09-24 ENCOUNTER — HOSPITAL ENCOUNTER (OUTPATIENT)
Dept: RADIOLOGY | Facility: HOSPITAL | Age: 84
Discharge: HOME | End: 2024-09-24
Attending: UROLOGY
Payer: MEDICARE

## 2024-09-24 DIAGNOSIS — N20.0 NEPHROLITHIASIS: ICD-10-CM

## 2024-09-24 PROCEDURE — 76770 US EXAM ABDO BACK WALL COMP: CPT | Mod: TC

## 2024-09-24 PROCEDURE — 76770 US EXAM ABDO BACK WALL COMP: CPT | Mod: 26,,, | Performed by: RADIOLOGY

## 2024-09-24 PROCEDURE — 74018 RADEX ABDOMEN 1 VIEW: CPT | Mod: TC,FY

## 2024-09-24 PROCEDURE — 74018 RADEX ABDOMEN 1 VIEW: CPT | Mod: 26,,, | Performed by: RADIOLOGY

## 2024-10-04 ENCOUNTER — TELEPHONE (OUTPATIENT)
Dept: UROLOGY | Facility: CLINIC | Age: 84
End: 2024-10-04
Payer: MEDICARE

## 2024-10-04 NOTE — TELEPHONE ENCOUNTER
Attempted to contact pt. Unable to lvm . Appt sent via mail.  ----- Message from Yvonne Weaver MD sent at 10/4/2024 12:27 PM CDT -----  Imaging was done but no f/u appt was made. Please make f/u appt to see me in next 1-2 weeks to review SERG and KUB.

## 2024-10-21 ENCOUNTER — OFFICE VISIT (OUTPATIENT)
Dept: UROLOGY | Facility: CLINIC | Age: 84
End: 2024-10-21
Payer: MEDICARE

## 2024-10-21 VITALS — BODY MASS INDEX: 40.69 KG/M2 | WEIGHT: 252.13 LBS

## 2024-10-21 DIAGNOSIS — N20.0 NEPHROLITHIASIS: Primary | ICD-10-CM

## 2024-10-21 DIAGNOSIS — N39.46 MIXED INCONTINENCE URGE AND STRESS: ICD-10-CM

## 2024-10-21 DIAGNOSIS — N39.0 RECURRENT UTI: ICD-10-CM

## 2024-10-21 PROCEDURE — 1101F PT FALLS ASSESS-DOCD LE1/YR: CPT | Mod: CPTII,S$GLB,, | Performed by: UROLOGY

## 2024-10-21 PROCEDURE — 1160F RVW MEDS BY RX/DR IN RCRD: CPT | Mod: CPTII,S$GLB,, | Performed by: UROLOGY

## 2024-10-21 PROCEDURE — 1126F AMNT PAIN NOTED NONE PRSNT: CPT | Mod: CPTII,S$GLB,, | Performed by: UROLOGY

## 2024-10-21 PROCEDURE — 1159F MED LIST DOCD IN RCRD: CPT | Mod: CPTII,S$GLB,, | Performed by: UROLOGY

## 2024-10-21 PROCEDURE — 99999 PR PBB SHADOW E&M-EST. PATIENT-LVL III: CPT | Mod: PBBFAC,,, | Performed by: UROLOGY

## 2024-10-21 PROCEDURE — 99214 OFFICE O/P EST MOD 30 MIN: CPT | Mod: S$GLB,,, | Performed by: UROLOGY

## 2024-10-21 PROCEDURE — G2211 COMPLEX E/M VISIT ADD ON: HCPCS | Mod: S$GLB,,, | Performed by: UROLOGY

## 2024-10-21 PROCEDURE — 3288F FALL RISK ASSESSMENT DOCD: CPT | Mod: CPTII,S$GLB,, | Performed by: UROLOGY

## 2024-10-21 RX ORDER — SOLIFENACIN SUCCINATE 10 MG/1
10 TABLET, FILM COATED ORAL DAILY
Qty: 90 TABLET | Refills: 3 | Status: SHIPPED | OUTPATIENT
Start: 2024-10-21

## 2024-10-21 NOTE — PROGRESS NOTES
Subjective:       Alaina Vee is a 84 y.o. female who is an established patient who was referred by Dr Esquivel  for evaluation of UTI.      She reports issues with recurrent UTIs. She was treated for UTI in 3/16 (100k E coli, pansensitive) and again in 4/16 (100k E coli). She has urinary frequency associated with UTIs. Nocturia x 1-2. She has urgency and UUI at baseline. Also with fecal urgency/incontinence. She was given Ditropan 5mg BID years ago. Also with RICHARD. She has not noted if this has helped. Denies current dysuria. Denies hematuria. No h/o kidney stones.     SERG (5/16) showed likley 9mm stone in LLP. PVR 2cc. Cytology was negative but noted uric acid crystals.     CT 7/16 - 7mm L UP, 6mm L LP stones and 8mm R renal pelvis stone.   KUB 7/16 - shows 7mm R stone but difficult to see (unsure if truly the stone)    KUB 1/17 - 7mm R renal stone though hard to see (likely overlying 12th rib)    She started Ditropan XL 15mg previously and had great improvement. She continues to have significant back issues.    She started to develop R flank pain and therefore CT scan ordered. CT showed 8mm stone at R UPJ, visible on KUB. She is now s/p R ESWL 2017. Stone was noted to fragment well. She did not note passing any stone and is still having flank pain.     Was on Coumadin for a fib. Now on Eliquis.    Denies stone issues recently.  Remains on Ditropan BID.  now in nursing home with dementia, caring for him has been very difficult on her for last 2-3 years.    1/9/2024  Returns now with c/o worsening UUI. Also with RICHARD (chronic, not worsening). Remain on Ditropan 5mg BID. Also c/o sciatic nerve pain.     3/12/2024  Given trial Vesicare 10mg - doing much better. No further UUI. Denies sensation of VENTURA. Strong to medium stream. Still with sciatica.   PVR (bladder scan) today - 194cc    9/16/2024  Planned for SERG and KUB - not done ([facility]). Denies flank pain. Denies UTIs.     10/21/2024  Here to  review imaging. Stone stable. No UTI symptoms. Vesicare working well.         KUB 1/18 - two left renal calculi  SERG 1/18 - 3mm and 5mm L calculi - hydro resolved    100% CaOx mono - stone passed spontaneously. Pain present for 1 hour at that time (R side).     SERG 5/20 - 1cm LUP and 5mm LLP stone, no hydro  KUB 11/20 - stable 1cm L renal calculus, LLP stones not seen     CT 4/21 (done for hernia eval) - two L renal stones (1cm LLP, 4mm LUP)  KUB 4/22 - 8mm L renal stone  SERG 4/22 - mild prominence of R renal pelvis, 7mm L renal stone    SERG 9/24 - 1.1cm L renal stone, no hydro. R simple cysts  KUB 9/24 - stable 1cm L renal stone, R kidney obscured.         The following portions of the patient's history were reviewed and updated as appropriate: allergies, current medications, past family history, past medical history, past social history, past surgical history and problem list.    Review of Systems  Constitutional: no fever or chills  ENT: no nasal congestion or sore throat  Respiratory: no cough or shortness of breath  Cardiovascular: no chest pain or palpitations  Gastrointestinal: no nausea or vomiting, tolerating diet  Genitourinary: as per HPI  Hematologic/Lymphatic: no easy bruising or lymphadenopathy  Musculoskeletal: no arthralgias or myalgias  Skin: no rashes or lesions  Neurological: no seizures or tremors  Behavioral/Psych: no auditory or visual hallucinations       Objective:    Vitals:   LMP  (LMP Unknown)     Physical Exam   General: well developed, well nourished in no acute distress  Head: normocephalic, atraumatic  Neck: supple, trachea midline, no obvious enlargement of thyroid  HEENT: EOMI, mucus membranes moist, sclera anicteric, no hearing impairment  Lungs: symmetric expansion, non-labored breathing  Skin: no rashes or lesions  Neuro: alert and oriented x 3, no gross deficits  Psych: normal judgment and insight, normal mood/affect and non-anxious  Genitourinary:  deferred      Lab Review    Urine analysis today in clinic shows - negative    Lab Results   Component Value Date    WBC 7.26 01/17/2024    HGB 15.2 01/17/2024    HCT 46.5 01/17/2024    HCT 44 11/20/2023    MCV 94 01/17/2024     01/17/2024     Lab Results   Component Value Date    CREATININE 0.8 01/17/2024    BUN 16 01/17/2024         Imaging  Images and reports were personally reviewed by me and discussed with patient  CT, SERG, KUB reviewed       Assessment/Plan:      1. Recurrent UTI / Nephrolithiasis   - UI likely contributing to UTIs   - Avoid constipation   - PVR acceptable   - Possible stone in LLP, uric acid crystals on cytology   - Discussed Estrace, will hold for now   - Consider cystoscopy in future   - CT - 2 stones on L, 1 stone on R. Only R stone visible on KUB.    - 8mm R UPJ stone. Discussed options - s/p R ESWL. Discussed risks, benefits, alternatives.   - s/p R ESWL on 2/27/17 - stone with excellent response   - KUB post-op - residual stone    - CT - no obstructing stones, R LP noted   - SERG/KUB - R hydro resolved. Two stones in L kidney   - KUB 1cm LLP stone (stable)   - KUB/SERG 4/22 & 108/24 - stable L renal stone   - Annual imaging in 12mth     2. Mixed incontinence urge and stress    - Discussed difference of UUI and RICHARD components. Reviewed etiology and workup of each.   - RICHARD: Kegels, PFPT, bulking agent, MUS.   - UUI: Behavioral changes, PFPT, anticholinergics. Botox/InterStim for refractory UUI.   - Was doing well on Ditropan BID. Now on Vesicare 10mg. Doing well. Caution SE. Consider b-agonist    - Cont Vesicare 10mg       Follow up in 12m with KUB/SERG       Visit today included increased complexity associated with the care of the episodic problem nephrolithiasis, UI addressed and managing the longitudinal care of the patient due to the serious and/or complex managed problem(s) nephrolithiasis, UI.

## 2025-01-16 ENCOUNTER — NURSE TRIAGE (OUTPATIENT)
Dept: ADMINISTRATIVE | Facility: CLINIC | Age: 85
End: 2025-01-16
Payer: MEDICARE

## 2025-01-16 NOTE — TELEPHONE ENCOUNTER
No Contact/Triage. Unable to reach patient. Patient's phone rings and then abruptly ends call.       Reason for Disposition   Second attempt to contact caller AND no contact made. Phone number verified.    Additional Information   Negative: Caller has already spoken with the PCP (or office), and has no further questions   Negative: Caller has already spoken with another triager and has no further questions   Negative: Caller has already spoken with another triager or PCP (or office), and has further questions and triager able to answer questions.   Negative: Busy signal.  First attempt to contact caller.  Follow-up call scheduled within 15 minutes.   Negative: No answer.  First attempt to contact caller.  Follow-up call scheduled within 15 minutes.   Negative: Message left on identified voicemail   Negative: Message left on unidentified voice mail. Phone number verified.   Negative: Message left with person in household   Negative: Wrong number reached. Phone number verified.    Protocols used: No Contact or Duplicate Contact Call-A-[place]

## 2025-03-31 RX ORDER — SOLIFENACIN SUCCINATE 10 MG/1
10 TABLET, FILM COATED ORAL DAILY
Qty: 90 TABLET | Refills: 3 | Status: SHIPPED | OUTPATIENT
Start: 2025-03-31

## 2025-03-31 NOTE — TELEPHONE ENCOUNTER
----- Message from Med Assistant Irene sent at 3/31/2025  9:36 AM CDT -----  Type: RX Refill RequestWho Called: SelfHave you contacted your pharmacy: noRefill or New Rx:refill RX Name and Strength:Pt. Is out of medication at this time .. solifenacin (VESICARE) 10 MG tabletPreferred Pharmacy with phone number: [id number] - [place] - [address] Phone: [phone]: [phone] or Mail Order:localOrdering Provider:Seb the patient rather a call back or a response via [facility]? Yes, call Best Call Back Number: [phone] (home)

## 2025-04-10 RX ORDER — SOLIFENACIN SUCCINATE 10 MG/1
10 TABLET, FILM COATED ORAL DAILY
Qty: 90 TABLET | Refills: 3 | Status: SHIPPED | OUTPATIENT
Start: 2025-04-10

## 2025-04-17 RX ORDER — SOLIFENACIN SUCCINATE 10 MG/1
10 TABLET, FILM COATED ORAL DAILY
Qty: 90 TABLET | Refills: 3 | Status: SHIPPED | OUTPATIENT
Start: 2025-04-17

## 2025-07-21 RX ORDER — APIXABAN 5 MG/1
5 TABLET, FILM COATED ORAL 2 TIMES DAILY
Qty: 180 TABLET | Refills: 3 | Status: CANCELLED | OUTPATIENT
Start: 2025-07-21

## 2025-07-26 NOTE — TELEPHONE ENCOUNTER
Please call patient to schedule follow up Office Visit with testing as prev ordered if not yet complete.  No further refills will be authorized without being seen in the office.

## 2025-08-22 ENCOUNTER — TELEPHONE (OUTPATIENT)
Dept: CARDIOLOGY | Facility: CLINIC | Age: 85
End: 2025-08-22
Payer: MEDICARE

## 2025-08-28 ENCOUNTER — OFFICE VISIT (OUTPATIENT)
Dept: OPTOMETRY | Facility: CLINIC | Age: 85
End: 2025-08-28
Payer: MEDICARE

## 2025-08-28 DIAGNOSIS — Z01.00 ROUTINE EYE EXAM: Primary | ICD-10-CM

## 2025-08-28 DIAGNOSIS — Z97.3 WEARS CONTACT LENSES: ICD-10-CM

## 2025-08-28 DIAGNOSIS — Z46.0 ENCOUNTER FOR FITTING OR ADJUSTMENT OF SPECTACLES OR CONTACT LENSES: Primary | ICD-10-CM

## 2025-08-28 DIAGNOSIS — H52.7 REFRACTIVE ERROR: ICD-10-CM

## 2025-08-28 PROCEDURE — 92015 DETERMINE REFRACTIVE STATE: CPT | Mod: S$GLB,,, | Performed by: OPTOMETRIST

## 2025-08-28 PROCEDURE — 1100F PTFALLS ASSESS-DOCD GE2>/YR: CPT | Mod: CPTII,S$GLB,, | Performed by: OPTOMETRIST

## 2025-08-28 PROCEDURE — 99999 PR PBB SHADOW E&M-EST. PATIENT-LVL II: CPT | Mod: PBBFAC,,, | Performed by: OPTOMETRIST

## 2025-08-28 PROCEDURE — 3288F FALL RISK ASSESSMENT DOCD: CPT | Mod: CPTII,S$GLB,, | Performed by: OPTOMETRIST

## 2025-08-28 PROCEDURE — 99499 UNLISTED E&M SERVICE: CPT | Mod: ,,, | Performed by: OPTOMETRIST

## 2025-08-28 PROCEDURE — 1126F AMNT PAIN NOTED NONE PRSNT: CPT | Mod: CPTII,S$GLB,, | Performed by: OPTOMETRIST

## 2025-08-28 PROCEDURE — 92014 COMPRE OPH EXAM EST PT 1/>: CPT | Mod: S$GLB,,, | Performed by: OPTOMETRIST

## (undated) DEVICE — BASKET STONE RETRV 1.9FRX120CM

## (undated) DEVICE — SEE MEDLINE ITEM 152532

## (undated) DEVICE — BLANKET UPPER BODY 78.7X29.9IN

## (undated) DEVICE — GLOVE BIOGEL ECLIPSE SZ 6

## (undated) DEVICE — GLOVE BIOGEL PI MICRO SZ 7

## (undated) DEVICE — Device

## (undated) DEVICE — CATH URTRL OPEN END STR TIP 5F

## (undated) DEVICE — GLOVE SURGICAL LATEX SZ 6.5

## (undated) DEVICE — COVER SNAP KAP 26IN

## (undated) DEVICE — MAT QUICK 40X30 FLOOR FLUID LF

## (undated) DEVICE — SYR ONLY LUER LOCK 20CC

## (undated) DEVICE — SOL IRR NACL .9% 3000ML

## (undated) DEVICE — SENSOR DUAL FLEX STR 150CM

## (undated) DEVICE — SUPPORT ULNA NERVE PROTECTOR

## (undated) DEVICE — CANISTER SUCTION 2 LTR

## (undated) DEVICE — CONTAINER SPECIMEN STRL 4OZ

## (undated) DEVICE — SHEATH NAVIGATOR HD 11/13 36

## (undated) DEVICE — FIBER MOSES 200 DFL

## (undated) DEVICE — ADAPT UROLOGICAL 2-WAY STOPCK